# Patient Record
Sex: MALE | Race: WHITE | NOT HISPANIC OR LATINO | Employment: OTHER | URBAN - METROPOLITAN AREA
[De-identification: names, ages, dates, MRNs, and addresses within clinical notes are randomized per-mention and may not be internally consistent; named-entity substitution may affect disease eponyms.]

---

## 2017-01-30 ENCOUNTER — OFFICE VISIT - HEALTHEAST (OUTPATIENT)
Dept: CARDIOLOGY | Facility: CLINIC | Age: 67
End: 2017-01-30

## 2017-01-30 ENCOUNTER — AMBULATORY - HEALTHEAST (OUTPATIENT)
Dept: CARDIOLOGY | Facility: CLINIC | Age: 67
End: 2017-01-30

## 2017-01-30 DIAGNOSIS — I25.10 CORONARY ARTERY DISEASE INVOLVING NATIVE CORONARY ARTERY OF NATIVE HEART WITHOUT ANGINA PECTORIS: ICD-10-CM

## 2017-01-30 DIAGNOSIS — I10 ESSENTIAL HYPERTENSION: ICD-10-CM

## 2017-01-30 DIAGNOSIS — E78.5 HYPERLIPIDEMIA LDL GOAL <70: ICD-10-CM

## 2017-01-30 DIAGNOSIS — I73.9 PVD (PERIPHERAL VASCULAR DISEASE) (H): ICD-10-CM

## 2017-01-30 ASSESSMENT — MIFFLIN-ST. JEOR: SCORE: 1804.99

## 2017-10-25 ENCOUNTER — RECORDS - HEALTHEAST (OUTPATIENT)
Dept: ADMINISTRATIVE | Facility: OTHER | Age: 67
End: 2017-10-25

## 2017-10-25 ENCOUNTER — AMBULATORY - HEALTHEAST (OUTPATIENT)
Dept: CARDIOLOGY | Facility: CLINIC | Age: 67
End: 2017-10-25

## 2017-10-30 ENCOUNTER — OFFICE VISIT - HEALTHEAST (OUTPATIENT)
Dept: CARDIOLOGY | Facility: CLINIC | Age: 67
End: 2017-10-30

## 2017-10-30 DIAGNOSIS — I25.10 CAD (CORONARY ARTERY DISEASE): ICD-10-CM

## 2017-10-30 DIAGNOSIS — R00.1 BRADYCARDIA: ICD-10-CM

## 2017-10-30 RX ORDER — LORAZEPAM 0.5 MG
2000 TABLET ORAL DAILY
Refills: 3 | Status: SHIPPED | COMMUNITY
Start: 2017-09-15

## 2017-10-30 RX ORDER — FUROSEMIDE 20 MG
2 TABLET ORAL DAILY
Refills: 9 | Status: ON HOLD | COMMUNITY
Start: 2017-10-20 | End: 2023-01-01

## 2017-10-30 ASSESSMENT — MIFFLIN-ST. JEOR: SCORE: 1782.31

## 2017-10-31 ENCOUNTER — HOSPITAL ENCOUNTER (OUTPATIENT)
Dept: CARDIOLOGY | Facility: HOSPITAL | Age: 67
Discharge: HOME | End: 2017-10-31
Attending: INTERNAL MEDICINE

## 2017-10-31 DIAGNOSIS — R00.1 BRADYCARDIA: ICD-10-CM

## 2017-11-06 ENCOUNTER — COMMUNICATION - HEALTHEAST (OUTPATIENT)
Dept: CARDIOLOGY | Facility: CLINIC | Age: 67
End: 2017-11-06

## 2017-11-06 DIAGNOSIS — I25.10 CAD (CORONARY ARTERY DISEASE): ICD-10-CM

## 2017-11-28 ENCOUNTER — RECORDS - HEALTHEAST (OUTPATIENT)
Dept: LAB | Facility: CLINIC | Age: 67
End: 2017-11-28

## 2017-11-28 LAB
ALT SERPL W P-5'-P-CCNC: 15 U/L (ref 0–45)
CHOLEST SERPL-MCNC: 164 MG/DL
FASTING STATUS PATIENT QL REPORTED: YES
HDLC SERPL-MCNC: 41 MG/DL
LDLC SERPL CALC-MCNC: 98 MG/DL
TRIGL SERPL-MCNC: 125 MG/DL

## 2017-11-29 ENCOUNTER — AMBULATORY - HEALTHEAST (OUTPATIENT)
Dept: CARDIOLOGY | Facility: CLINIC | Age: 67
End: 2017-11-29

## 2018-01-31 ENCOUNTER — AMBULATORY - HEALTHEAST (OUTPATIENT)
Dept: CARDIOLOGY | Facility: CLINIC | Age: 68
End: 2018-01-31

## 2018-01-31 DIAGNOSIS — I25.10 CAD (CORONARY ARTERY DISEASE): ICD-10-CM

## 2018-05-31 ENCOUNTER — RECORDS - HEALTHEAST (OUTPATIENT)
Dept: LAB | Facility: CLINIC | Age: 68
End: 2018-05-31

## 2018-05-31 ENCOUNTER — COMMUNICATION - HEALTHEAST (OUTPATIENT)
Dept: ADMINISTRATIVE | Facility: CLINIC | Age: 68
End: 2018-05-31

## 2018-05-31 LAB
ANION GAP SERPL CALCULATED.3IONS-SCNC: 11 MMOL/L (ref 5–18)
BUN SERPL-MCNC: 26 MG/DL (ref 8–22)
CALCIUM SERPL-MCNC: 9.3 MG/DL (ref 8.5–10.5)
CHLORIDE BLD-SCNC: 101 MMOL/L (ref 98–107)
CO2 SERPL-SCNC: 25 MMOL/L (ref 22–31)
CREAT SERPL-MCNC: 1.88 MG/DL (ref 0.7–1.3)
GFR SERPL CREATININE-BSD FRML MDRD: 36 ML/MIN/1.73M2
GLUCOSE BLD-MCNC: 136 MG/DL (ref 70–125)
POTASSIUM BLD-SCNC: 4.7 MMOL/L (ref 3.5–5)
SODIUM SERPL-SCNC: 137 MMOL/L (ref 136–145)
TSH SERPL DL<=0.005 MIU/L-ACNC: 1.66 UIU/ML (ref 0.3–5)

## 2018-06-01 LAB — VZV IGG SER QL IA: POSITIVE

## 2018-07-31 ENCOUNTER — RECORDS - HEALTHEAST (OUTPATIENT)
Dept: ADMINISTRATIVE | Facility: OTHER | Age: 68
End: 2018-07-31

## 2018-07-31 ENCOUNTER — AMBULATORY - HEALTHEAST (OUTPATIENT)
Dept: CARDIOLOGY | Facility: CLINIC | Age: 68
End: 2018-07-31

## 2018-08-01 ENCOUNTER — OFFICE VISIT - HEALTHEAST (OUTPATIENT)
Dept: CARDIOLOGY | Facility: CLINIC | Age: 68
End: 2018-08-01

## 2018-08-01 DIAGNOSIS — I25.10 CORONARY ARTERY DISEASE INVOLVING NATIVE CORONARY ARTERY OF NATIVE HEART WITHOUT ANGINA PECTORIS: ICD-10-CM

## 2018-08-01 DIAGNOSIS — R09.89 CAROTID BRUIT: ICD-10-CM

## 2018-08-01 DIAGNOSIS — I10 ESSENTIAL HYPERTENSION: ICD-10-CM

## 2018-08-01 DIAGNOSIS — I73.9 PVD (PERIPHERAL VASCULAR DISEASE) (H): ICD-10-CM

## 2018-08-01 DIAGNOSIS — E78.5 HYPERLIPIDEMIA LDL GOAL <70: ICD-10-CM

## 2018-08-01 ASSESSMENT — MIFFLIN-ST. JEOR: SCORE: 1746.02

## 2018-08-02 ENCOUNTER — HOSPITAL ENCOUNTER (OUTPATIENT)
Dept: ULTRASOUND IMAGING | Facility: HOSPITAL | Age: 68
Discharge: HOME | End: 2018-08-02
Attending: INTERNAL MEDICINE

## 2018-08-02 ENCOUNTER — COMMUNICATION - HEALTHEAST (OUTPATIENT)
Dept: CARDIOLOGY | Facility: CLINIC | Age: 68
End: 2018-08-02

## 2018-08-02 ENCOUNTER — AMBULATORY - HEALTHEAST (OUTPATIENT)
Dept: VASCULAR SURGERY | Facility: CLINIC | Age: 68
End: 2018-08-02

## 2018-08-02 DIAGNOSIS — I25.10 CAD (CORONARY ARTERY DISEASE): ICD-10-CM

## 2018-08-02 DIAGNOSIS — R09.89 CAROTID BRUIT: ICD-10-CM

## 2018-08-02 DIAGNOSIS — I73.9 PVD (PERIPHERAL VASCULAR DISEASE) (H): ICD-10-CM

## 2018-11-29 ENCOUNTER — RECORDS - HEALTHEAST (OUTPATIENT)
Dept: LAB | Facility: CLINIC | Age: 68
End: 2018-11-29

## 2018-11-29 LAB
ANION GAP SERPL CALCULATED.3IONS-SCNC: 9 MMOL/L (ref 5–18)
BUN SERPL-MCNC: 30 MG/DL (ref 8–22)
CALCIUM SERPL-MCNC: 9.2 MG/DL (ref 8.5–10.5)
CHLORIDE BLD-SCNC: 101 MMOL/L (ref 98–107)
CHOLEST SERPL-MCNC: 152 MG/DL
CO2 SERPL-SCNC: 28 MMOL/L (ref 22–31)
CREAT SERPL-MCNC: 2.04 MG/DL (ref 0.7–1.3)
FASTING STATUS PATIENT QL REPORTED: NORMAL
GFR SERPL CREATININE-BSD FRML MDRD: 33 ML/MIN/1.73M2
GLUCOSE BLD-MCNC: 175 MG/DL (ref 70–125)
HDLC SERPL-MCNC: 48 MG/DL
LDLC SERPL CALC-MCNC: 83 MG/DL
POTASSIUM BLD-SCNC: 4.4 MMOL/L (ref 3.5–5)
SODIUM SERPL-SCNC: 138 MMOL/L (ref 136–145)
TRIGL SERPL-MCNC: 103 MG/DL

## 2018-11-30 LAB — 25(OH)D3 SERPL-MCNC: 37.3 NG/ML (ref 30–80)

## 2019-01-10 ENCOUNTER — COMMUNICATION - HEALTHEAST (OUTPATIENT)
Dept: CARDIOLOGY | Facility: CLINIC | Age: 69
End: 2019-01-10

## 2019-01-10 DIAGNOSIS — I25.10 CAD (CORONARY ARTERY DISEASE): ICD-10-CM

## 2019-05-28 ENCOUNTER — RECORDS - HEALTHEAST (OUTPATIENT)
Dept: LAB | Facility: CLINIC | Age: 69
End: 2019-05-28

## 2019-05-28 LAB
ANION GAP SERPL CALCULATED.3IONS-SCNC: 10 MMOL/L (ref 5–18)
BUN SERPL-MCNC: 39 MG/DL (ref 8–22)
CALCIUM SERPL-MCNC: 9.5 MG/DL (ref 8.5–10.5)
CHLORIDE BLD-SCNC: 104 MMOL/L (ref 98–107)
CHOLEST SERPL-MCNC: 141 MG/DL
CO2 SERPL-SCNC: 26 MMOL/L (ref 22–31)
CREAT SERPL-MCNC: 2.34 MG/DL (ref 0.7–1.3)
FASTING STATUS PATIENT QL REPORTED: NORMAL
GFR SERPL CREATININE-BSD FRML MDRD: 28 ML/MIN/1.73M2
GLUCOSE BLD-MCNC: 98 MG/DL (ref 70–125)
HDLC SERPL-MCNC: 41 MG/DL
IRON SATN MFR SERPL: 25 % (ref 20–50)
IRON SERPL-MCNC: 73 UG/DL (ref 42–175)
LDLC SERPL CALC-MCNC: 73 MG/DL
MAGNESIUM SERPL-MCNC: 2 MG/DL (ref 1.8–2.6)
POTASSIUM BLD-SCNC: 4.1 MMOL/L (ref 3.5–5)
SODIUM SERPL-SCNC: 140 MMOL/L (ref 136–145)
TIBC SERPL-MCNC: 289 UG/DL (ref 313–563)
TRANSFERRIN SERPL-MCNC: 231 MG/DL (ref 212–360)
TRIGL SERPL-MCNC: 137 MG/DL

## 2019-08-27 ENCOUNTER — COMMUNICATION - HEALTHEAST (OUTPATIENT)
Dept: CARDIOLOGY | Facility: CLINIC | Age: 69
End: 2019-08-27

## 2019-08-27 DIAGNOSIS — I25.10 CAD (CORONARY ARTERY DISEASE): ICD-10-CM

## 2019-08-29 ENCOUNTER — RECORDS - HEALTHEAST (OUTPATIENT)
Dept: ADMINISTRATIVE | Facility: OTHER | Age: 69
End: 2019-08-29

## 2019-08-29 ENCOUNTER — AMBULATORY - HEALTHEAST (OUTPATIENT)
Dept: CARDIOLOGY | Facility: CLINIC | Age: 69
End: 2019-08-29

## 2019-09-06 ENCOUNTER — OFFICE VISIT - HEALTHEAST (OUTPATIENT)
Dept: CARDIOLOGY | Facility: CLINIC | Age: 69
End: 2019-09-06

## 2019-09-06 DIAGNOSIS — I25.10 CAD (CORONARY ARTERY DISEASE): ICD-10-CM

## 2019-09-06 ASSESSMENT — MIFFLIN-ST. JEOR: SCORE: 1768.7

## 2019-09-19 ENCOUNTER — HOSPITAL ENCOUNTER (OUTPATIENT)
Dept: NUCLEAR MEDICINE | Facility: HOSPITAL | Age: 69
Discharge: HOME | End: 2019-09-19
Attending: INTERNAL MEDICINE

## 2019-09-19 ENCOUNTER — HOSPITAL ENCOUNTER (OUTPATIENT)
Dept: CARDIOLOGY | Facility: HOSPITAL | Age: 69
Discharge: HOME | End: 2019-09-19
Attending: INTERNAL MEDICINE

## 2019-09-19 DIAGNOSIS — I25.10 CAD (CORONARY ARTERY DISEASE): ICD-10-CM

## 2019-09-19 LAB
CV STRESS CURRENT BP HE: NORMAL
CV STRESS CURRENT HR HE: 61
CV STRESS CURRENT HR HE: 62
CV STRESS CURRENT HR HE: 64
CV STRESS CURRENT HR HE: 69
CV STRESS CURRENT HR HE: 69
CV STRESS CURRENT HR HE: 70
CV STRESS CURRENT HR HE: 71
CV STRESS CURRENT HR HE: 71
CV STRESS CURRENT HR HE: 72
CV STRESS CURRENT HR HE: 72
CV STRESS CURRENT HR HE: 73
CV STRESS CURRENT HR HE: 77
CV STRESS CURRENT HR HE: 77
CV STRESS CURRENT HR HE: 78
CV STRESS DEVIATION TIME HE: NORMAL
CV STRESS ECHO PERCENT HR HE: NORMAL
CV STRESS EXERCISE STAGE HE: NORMAL
CV STRESS FINAL RESTING BP HE: NORMAL
CV STRESS FINAL RESTING HR HE: 72
CV STRESS MAX HR HE: 78
CV STRESS MAX TREADMILL GRADE HE: 0
CV STRESS MAX TREADMILL SPEED HE: 0
CV STRESS PEAK DIA BP HE: NORMAL
CV STRESS PEAK SYS BP HE: NORMAL
CV STRESS PHASE HE: NORMAL
CV STRESS PROTOCOL HE: NORMAL
CV STRESS RESTING PT POSITION HE: NORMAL
CV STRESS ST DEVIATION AMOUNT HE: NORMAL
CV STRESS ST DEVIATION ELEVATION HE: NORMAL
CV STRESS ST EVELATION AMOUNT HE: NORMAL
CV STRESS TEST TYPE HE: NORMAL
CV STRESS TOTAL STAGE TIME MIN 1 HE: NORMAL
NUC STRESS EJECTION FRACTION: 52 %
STRESS ECHO BASELINE BP: NORMAL
STRESS ECHO BASELINE HR: 60
STRESS ECHO CALCULATED PERCENT HR: 52 %
STRESS ECHO LAST STRESS BP: NORMAL
STRESS ECHO LAST STRESS HR: 73

## 2019-11-12 ENCOUNTER — RECORDS - HEALTHEAST (OUTPATIENT)
Dept: LAB | Facility: CLINIC | Age: 69
End: 2019-11-12

## 2019-11-13 LAB — BACTERIA SPEC CULT: NO GROWTH

## 2019-11-29 ENCOUNTER — RECORDS - HEALTHEAST (OUTPATIENT)
Dept: LAB | Facility: CLINIC | Age: 69
End: 2019-11-29

## 2019-11-29 LAB
ANION GAP SERPL CALCULATED.3IONS-SCNC: 10 MMOL/L (ref 5–18)
BUN SERPL-MCNC: 37 MG/DL (ref 8–22)
CALCIUM SERPL-MCNC: 9.1 MG/DL (ref 8.5–10.5)
CHLORIDE BLD-SCNC: 102 MMOL/L (ref 98–107)
CHOLEST SERPL-MCNC: 156 MG/DL
CO2 SERPL-SCNC: 25 MMOL/L (ref 22–31)
CREAT SERPL-MCNC: 2.59 MG/DL (ref 0.7–1.3)
FASTING STATUS PATIENT QL REPORTED: ABNORMAL
GFR SERPL CREATININE-BSD FRML MDRD: 25 ML/MIN/1.73M2
GLUCOSE BLD-MCNC: 233 MG/DL (ref 70–125)
HDLC SERPL-MCNC: 39 MG/DL
LDLC SERPL CALC-MCNC: 94 MG/DL
POTASSIUM BLD-SCNC: 4.9 MMOL/L (ref 3.5–5)
PSA SERPL-MCNC: 0.9 NG/ML (ref 0–4.5)
SODIUM SERPL-SCNC: 137 MMOL/L (ref 136–145)
TRIGL SERPL-MCNC: 114 MG/DL
TSH SERPL DL<=0.005 MIU/L-ACNC: 2.28 UIU/ML (ref 0.3–5)

## 2020-03-31 ENCOUNTER — RECORDS - HEALTHEAST (OUTPATIENT)
Dept: ADMINISTRATIVE | Facility: OTHER | Age: 70
End: 2020-03-31

## 2020-03-31 ENCOUNTER — AMBULATORY - HEALTHEAST (OUTPATIENT)
Dept: CARDIOLOGY | Facility: CLINIC | Age: 70
End: 2020-03-31

## 2020-04-01 ENCOUNTER — OFFICE VISIT - HEALTHEAST (OUTPATIENT)
Dept: CARDIOLOGY | Facility: CLINIC | Age: 70
End: 2020-04-01

## 2020-04-01 DIAGNOSIS — E78.5 DYSLIPIDEMIA, GOAL LDL BELOW 70: ICD-10-CM

## 2020-04-01 DIAGNOSIS — I10 ESSENTIAL HYPERTENSION: ICD-10-CM

## 2020-04-01 DIAGNOSIS — I73.9 PVD (PERIPHERAL VASCULAR DISEASE) (H): ICD-10-CM

## 2020-04-01 DIAGNOSIS — I25.10 CORONARY ARTERY DISEASE INVOLVING NATIVE CORONARY ARTERY, ANGINA PRESENCE UNSPECIFIED, UNSPECIFIED WHETHER NATIVE OR TRANSPLANTED HEART: ICD-10-CM

## 2020-04-01 DIAGNOSIS — E78.5 HYPERLIPIDEMIA LDL GOAL <70: ICD-10-CM

## 2020-04-06 ENCOUNTER — RECORDS - HEALTHEAST (OUTPATIENT)
Dept: ADMINISTRATIVE | Facility: OTHER | Age: 70
End: 2020-04-06

## 2020-04-06 ENCOUNTER — AMBULATORY - HEALTHEAST (OUTPATIENT)
Dept: CARDIOLOGY | Facility: CLINIC | Age: 70
End: 2020-04-06

## 2020-04-07 ENCOUNTER — COMMUNICATION - HEALTHEAST (OUTPATIENT)
Dept: CARDIOLOGY | Facility: CLINIC | Age: 70
End: 2020-04-07

## 2020-04-09 ENCOUNTER — AMBULATORY - HEALTHEAST (OUTPATIENT)
Dept: VASCULAR SURGERY | Facility: CLINIC | Age: 70
End: 2020-04-09

## 2020-04-09 DIAGNOSIS — I73.9 PVD (PERIPHERAL VASCULAR DISEASE) (H): ICD-10-CM

## 2020-06-02 ENCOUNTER — RECORDS - HEALTHEAST (OUTPATIENT)
Dept: LAB | Facility: CLINIC | Age: 70
End: 2020-06-02

## 2020-06-02 LAB
ALBUMIN SERPL-MCNC: 3.2 G/DL (ref 3.5–5)
ALBUMIN UR-MCNC: ABNORMAL MG/DL
ANION GAP SERPL CALCULATED.3IONS-SCNC: 10 MMOL/L (ref 5–18)
APPEARANCE UR: CLEAR
BACTERIA #/AREA URNS HPF: ABNORMAL HPF
BILIRUB UR QL STRIP: NEGATIVE
BUN SERPL-MCNC: 40 MG/DL (ref 8–22)
CALCIUM SERPL-MCNC: 8.9 MG/DL (ref 8.5–10.5)
CHLORIDE BLD-SCNC: 100 MMOL/L (ref 98–107)
CHOLEST SERPL-MCNC: 135 MG/DL
CO2 SERPL-SCNC: 26 MMOL/L (ref 22–31)
COLOR UR AUTO: ABNORMAL
CREAT SERPL-MCNC: 2.6 MG/DL (ref 0.7–1.3)
CREAT UR-MCNC: 40.1 MG/DL
FASTING STATUS PATIENT QL REPORTED: NORMAL
GFR SERPL CREATININE-BSD FRML MDRD: 25 ML/MIN/1.73M2
GLUCOSE BLD-MCNC: 205 MG/DL (ref 70–125)
GLUCOSE UR STRIP-MCNC: NEGATIVE MG/DL
HDLC SERPL-MCNC: 42 MG/DL
HGB UR QL STRIP: NEGATIVE
KETONES UR STRIP-MCNC: NEGATIVE MG/DL
LDLC SERPL CALC-MCNC: 79 MG/DL
LEUKOCYTE ESTERASE UR QL STRIP: NEGATIVE
MAGNESIUM SERPL-MCNC: 2.2 MG/DL (ref 1.8–2.6)
MICROALBUMIN UR-MCNC: 34.45 MG/DL (ref 0–1.99)
MICROALBUMIN/CREAT UR: 859.1 MG/G
NITRATE UR QL: NEGATIVE
PH UR STRIP: 6 [PH] (ref 4.5–8)
PHOSPHATE SERPL-MCNC: 3.8 MG/DL (ref 2.5–4.5)
POTASSIUM BLD-SCNC: 4.3 MMOL/L (ref 3.5–5)
PTH-INTACT SERPL-MCNC: 74 PG/ML (ref 10–86)
RBC #/AREA URNS AUTO: ABNORMAL HPF
SODIUM SERPL-SCNC: 136 MMOL/L (ref 136–145)
SP GR UR STRIP: 1.01 (ref 1–1.03)
SQUAMOUS #/AREA URNS AUTO: ABNORMAL LPF
TRIGL SERPL-MCNC: 70 MG/DL
UROBILINOGEN UR STRIP-ACNC: ABNORMAL
WBC #/AREA URNS AUTO: ABNORMAL HPF

## 2020-06-21 ENCOUNTER — COMMUNICATION - HEALTHEAST (OUTPATIENT)
Dept: CARDIOLOGY | Facility: CLINIC | Age: 70
End: 2020-06-21

## 2020-06-21 DIAGNOSIS — I25.10 CAD (CORONARY ARTERY DISEASE): ICD-10-CM

## 2020-10-31 ENCOUNTER — COMMUNICATION - HEALTHEAST (OUTPATIENT)
Dept: CARDIOLOGY | Facility: CLINIC | Age: 70
End: 2020-10-31

## 2020-10-31 DIAGNOSIS — I25.10 CAD (CORONARY ARTERY DISEASE): ICD-10-CM

## 2020-12-17 ENCOUNTER — RECORDS - HEALTHEAST (OUTPATIENT)
Dept: LAB | Facility: CLINIC | Age: 70
End: 2020-12-17

## 2020-12-17 LAB
ANION GAP SERPL CALCULATED.3IONS-SCNC: 9 MMOL/L (ref 5–18)
BUN SERPL-MCNC: 38 MG/DL (ref 8–28)
CALCIUM SERPL-MCNC: 8.9 MG/DL (ref 8.5–10.5)
CHLORIDE BLD-SCNC: 99 MMOL/L (ref 98–107)
CHOLEST SERPL-MCNC: 155 MG/DL
CO2 SERPL-SCNC: 27 MMOL/L (ref 22–31)
CREAT SERPL-MCNC: 2.75 MG/DL (ref 0.7–1.3)
FASTING STATUS PATIENT QL REPORTED: NORMAL
GFR SERPL CREATININE-BSD FRML MDRD: 23 ML/MIN/1.73M2
GLUCOSE BLD-MCNC: 279 MG/DL (ref 70–125)
HDLC SERPL-MCNC: 50 MG/DL
LDLC SERPL CALC-MCNC: 84 MG/DL
POTASSIUM BLD-SCNC: 4.4 MMOL/L (ref 3.5–5)
SODIUM SERPL-SCNC: 135 MMOL/L (ref 136–145)
TRIGL SERPL-MCNC: 104 MG/DL
TSH SERPL DL<=0.005 MIU/L-ACNC: 1.95 UIU/ML (ref 0.3–5)

## 2021-03-09 ENCOUNTER — AMBULATORY - HEALTHEAST (OUTPATIENT)
Dept: NURSING | Facility: CLINIC | Age: 71
End: 2021-03-09

## 2021-03-30 ENCOUNTER — AMBULATORY - HEALTHEAST (OUTPATIENT)
Dept: NURSING | Facility: CLINIC | Age: 71
End: 2021-03-30

## 2021-05-17 ENCOUNTER — COMMUNICATION - HEALTHEAST (OUTPATIENT)
Dept: CARDIOLOGY | Facility: CLINIC | Age: 71
End: 2021-05-17

## 2021-05-17 DIAGNOSIS — I25.10 CAD (CORONARY ARTERY DISEASE): ICD-10-CM

## 2021-05-17 RX ORDER — METOPROLOL SUCCINATE 50 MG/1
50 TABLET, EXTENDED RELEASE ORAL DAILY
Qty: 90 TABLET | Refills: 0 | Status: SHIPPED | OUTPATIENT
Start: 2021-05-17 | End: 2021-07-06

## 2021-05-29 ENCOUNTER — RECORDS - HEALTHEAST (OUTPATIENT)
Dept: ADMINISTRATIVE | Facility: CLINIC | Age: 71
End: 2021-05-29

## 2021-05-30 VITALS — BODY MASS INDEX: 30.07 KG/M2 | WEIGHT: 222 LBS | HEIGHT: 72 IN

## 2021-05-31 ENCOUNTER — RECORDS - HEALTHEAST (OUTPATIENT)
Dept: ADMINISTRATIVE | Facility: CLINIC | Age: 71
End: 2021-05-31

## 2021-05-31 VITALS — BODY MASS INDEX: 29.39 KG/M2 | HEIGHT: 72 IN | WEIGHT: 217 LBS

## 2021-06-01 ENCOUNTER — RECORDS - HEALTHEAST (OUTPATIENT)
Dept: ADMINISTRATIVE | Facility: CLINIC | Age: 71
End: 2021-06-01

## 2021-06-01 ENCOUNTER — RECORDS - HEALTHEAST (OUTPATIENT)
Dept: ADMINISTRATIVE | Facility: OTHER | Age: 71
End: 2021-06-01

## 2021-06-01 VITALS — WEIGHT: 209 LBS | BODY MASS INDEX: 28.31 KG/M2 | HEIGHT: 72 IN

## 2021-06-01 NOTE — PATIENT INSTRUCTIONS - HE
Please try taking the Lisinopril at bedtime and let me know how the lightheaded symptoms are going.We are going to plan a non exercise nuclear stress test and I will be in touch with the results.We talked about referral to the vascular clinic.Please call if you wan to discuss further.My nurse is moises and her number is [phone]

## 2021-06-01 NOTE — PROGRESS NOTES
[facility] Progress Note    Assessment:  1.  Coronary artery disease with no anginal complaints but long-standing history of diabetes.  Coronary angiography after abnormal nuclear stress test as outlined November 2016.  We did talk about follow-up stress testing as he does have long-standing diabetes and remote history of bypass surgery and will plan Lexiscan nuclear stress testing and comment once available.  He would not be able to adequately walk on the treadmill.    2.  Hypertension with evidence for autonomic dysfunction.  Pressures have been variable but does report some lightheadedness during the early afternoon after he takes his morning medications.  I suggested he switch the lisinopril to bedtime.  He will continue with the current combination of medications monitoring his blood pressure updating me with any symptoms or changes.    3,Carotid artery disease.  Carotid ultrasound August 2018 with less than 50% stenosis on the right less than 50% stenosis on the left.  Recent lipids demonstrate improvement in LDL cholesterol now down to 73 on 40 mg of rosuvastatin.    4.  Peripheral vascular disease.  Abnormal TRAE.  I previously had referred him to the vascular clinic but he indicates that he is not currently interested.  We talked about the potential for limited ability for wound healing and at this point he is declining a referral to the vascular clinic but will keep me updated.  He does see a podiatrist periodically.      Plan: As outlined above with follow-up in 6 months    1. CAD (coronary artery disease)  metoprolol succinate (TOPROL-XL) 50 MG 24 hr tablet    NM Pharmacologic Stress Test         An After Visit Summary was printed and given to the patient.    Subjective:    Dakota Bauer is a 69 y.o. male who returned for a planned  follow up visit.  He reports that he is been feeling well.  He denies specific complaints of chest pain, shortness of breath, orthopnea or PND.  He does have  some lightheadedness primarily during the mid afternoon after he is taken his medications in the morning.  On occasion he will become lightheaded and have to sit down for short period of time but no syncope or near syncope.  He has been following with Dr. Bhatia from renal and I have reviewed his most recent notes from 2019 including laboratory studies.  In 2019 creatinine was 2.85 glucose 78.    He has a history of coronary artery disease with prior bypass surgery in  with three-vessel disease as outlined previously.  He did undergo coronary angiography 2016 demonstrated patent grafts and some disease in the  Target vessels with plan medical management.  Review of Systems:   General: WNL  Eyes: WNL  Ears/Nose/Throat: WNL  Lungs: WNL  Heart: WNL  Stomach: WNL  Bladder: WNL  Muscle/Joints: WNL  Skin: WNL  Nervous System: WNL  Mental Health: WNL     Blood: WNL      Problem List:    Patient Active Problem List   Diagnosis     CAD (coronary artery disease)     Hypertension     PVD (peripheral vascular disease) (H)     Hyperlipidemia LDL goal <70       Social History     Socioeconomic History     Marital status:      Spouse name: Not on file     Number of children: Not on file     Years of education: Not on file     Highest education level: Not on file   Occupational History     Not on file   Social Needs     Financial resource strain: Not on file     Food insecurity:     Worry: Not on file     Inability: Not on file     Transportation needs:     Medical: Not on file     Non-medical: Not on file   Tobacco Use     Smoking status: Former Smoker     Last attempt to quit: 10/10/1984     Years since quittin.9     Smokeless tobacco: Never Used   Substance and Sexual Activity     Alcohol use: Not on file     Drug use: Not on file     Sexual activity: Not on file   Lifestyle     Physical activity:     Days per week: Not on file     Minutes per session: Not on file     Stress: Not on file  "  Relationships     Social connections:     Talks on phone: Not on file     Gets together: Not on file     Attends Jainism service: Not on file     Active member of club or organization: Not on file     Attends meetings of clubs or organizations: Not on file     Relationship status: Not on file     Intimate partner violence:     Fear of current or ex partner: Not on file     Emotionally abused: Not on file     Physically abused: Not on file     Forced sexual activity: Not on file   Other Topics Concern     Not on file   Social History Narrative     Not on file       Family History   Problem Relation Age of Onset     Coronary artery disease Father      Coronary artery disease Brother        Current Outpatient Medications   Medication Sig Dispense Refill     ALPRAZolam (XANAX) 0.5 MG tablet Take 0.5 mg by mouth daily.        aspirin 81 MG EC tablet Take 81 mg by mouth daily.       BD INSULIN SYRINGE ULTRA-FINE 0.3 mL 30 gauge x 1/2\" Syrg USE 4 TIMES A DAY WITH INSULIN  3     furosemide (LASIX) 20 MG tablet Take 20 mg by mouth daily.  9     gabapentin (NEURONTIN) 300 MG capsule Take 300 mg by mouth 3 (three) times a day.              HUMALOG 100 unit/mL injection Inject under the skin 3 (three) times a day before meals.              insulin glargine (LANTUS) 100 unit/mL injection Inject 30 Units under the skin bedtime.       levothyroxine (SYNTHROID, LEVOTHROID) 150 MCG tablet Take 150 mcg by mouth daily.       lisinopril (PRINIVIL,ZESTRIL) 40 MG tablet Take 40 mg by mouth daily.       metoprolol succinate (TOPROL-XL) 50 MG 24 hr tablet TAKE 1 TABLET DAILY 90 tablet 4     multivitamin with iron (ONE DAILY WITH IRON) Tab tablet Take 1 tablet by mouth daily.       omega-3 fatty acids-vitamin E (FISH OIL) 1,000 mg cap Take 1,000 mg by mouth daily.       rosuvastatin (CRESTOR) 40 MG tablet Take 40 mg by mouth bedtime.       TRIAMCINOLONE ACETONIDE (NASACORT NASL) 1 spray into each nostril daily.       VITAMIN D3 2,000 " unit capsule Take 2,000 Units by mouth daily.  3     insulin regular (NOVOLIN R) 100 unit/mL injection as directed up to 35 units       ONETOUCH ULTRA TEST strips TEST AS DIRECTED 4 TIMES A DAY AND AS NEEDED.  3     No current facility-administered medications for this visit.        Objective:     /66 (Patient Site: Right Arm, Patient Position: Sitting, Cuff Size: Adult Large)   Pulse (!) 52   Resp 16   Ht 6' (1.829 m)   Wt 214 lb (97.1 kg)   BMI 29.02 kg/m    214 lb (97.1 kg)       Physical Exam:    GENERAL APPEARANCE: alert, no apparent distress  HEENT: no scleral icterus or xanthelasma  NECK: jugular venous pressure within normal limits  CHEST: symmetric, the lungs are clear to auscultation  CARDIOVASCULAR: regular rhythm systolic murmur, S4, right carotid bruit  Abdomen: No Organomegaly, masses, bruits, or tenderness. Bowels sounds are present      EXTREMITIES: no cyanosis, clubbing or edema, diminished pulses in the dorsalis pedis and posterior tibial distribution.    Cardiac Testing:  Cardiac Testing:  . ANKLE-BRACHIAL INDICES   2. BILATERAL LOWER EXTREMITY ARTERIAL DUPLEX ULTRASOUND  11/4/2016 2:28 PM     INDICATIONS: Leg pain.  TECHNIQUE: Arterial duplex ultrasound with gray-scale images and color-flow Doppler and spectral analysis.  COMPARISONS: 09/10/2012.     FINDINGS: The distal left posterior tibial and dorsalis pedis arteries and the distal right dorsalis pedis artery are incompressible. The index for the right posterior tibial artery is markedly abnormal, 0.51.     Digital pressures predict adequate wound healing potential on the right, 48 mm Hg, and marginal wound healing potential on the left, 37 mm Hg.     Bilateral lower extremity arterial duplex ultrasound demonstrates essentially normal multiphasic flow from the external iliac to the popliteal arteries bilaterally. On the left, there is some fluctuation of flow velocities in the superficial femoral   artery, suggesting at least  moderate stenosis. There are abnormal monophasic waveforms in the distal posterior tibial and dorsalis pedis arteries bilaterally, suggesting trifurcation disease.     CONCLUSIONS:   1.  There is loss of compliance and incompressibility distally, so accurate ankle-brachial indices cannot be obtained. The measured index for the right posterior tibial artery is markedly abnormal.  2.  Abnormal monophasic waveforms in the distal posterior tibial and dorsalis pedis arteries bilaterally, suggesting significant trifurcation disease.  3.  The measured digital pressures predict adequate wound healing potential on the right and marginal wound healing potential on the left.         Procedure Type       Diagnostic procedure:Left Heart Catheterization , Venous Graft   Catheterization, LIMA Graft Catheterization, Coronary   Angiogram       Conclusions       Procedure Summary   Known CAD, presenting with occasional CP and abnormal stress   test       LM 30% mid   LAD occluded proximally   LCx occluded proximally   Ramus 60% proximal stenosis, small caliber vessel with diffuse   disease   RCA 50% distal stenosis feeding small caliber PDA       BENITEZ to LAD patent, good distal LAD target   SVG to diagonal patent, mild disease in target diagonal   SVG to OM patent, diffusely diseased target vessel with 70%   stenosis, affecting retrograde filling into mid Cx       EDP 11       Overall, diffuse CAD, but patent grafts. Potential areas for   PCI would include ostial ramus and  of Cx to improve flow   into mid Cx. However, the ramus is a small caliber vessel and   the ostial Cx is heavily calcified, therefore would reserve   PCI if he has significant increase in symptoms, and failing   medical therapy       Recommendations       Cont risk factor control and medical management       Signatures       Electronically signed by SHYAM ELLIS MD       Lab Results:    Lab Results   Component Value Date     05/28/2019    K 4.1  05/28/2019     05/28/2019    CO2 26 05/28/2019    BUN 39 (H) 05/28/2019    CREATININE 2.34 (H) 05/28/2019    CALCIUM 9.5 05/28/2019     Lab Results   Component Value Date    CHOL 141 05/28/2019    TRIG 137 05/28/2019    HDL 41 05/28/2019     No results found for: BNP  Creatinine (mg/dL)   Date Value   05/28/2019 2.34 (H)   11/29/2018 2.04 (H)   05/31/2018 1.88 (H)   11/28/2017 1.90 (H)     LDL Calculated (mg/dL)   Date Value   05/28/2019 73   11/29/2018 83   11/28/2017 98     Lab Results   Component Value Date    WBC 6.2 11/25/2016    HGB 14.7 11/25/2016    HCT 44.7 11/25/2016    MCV 89 11/25/2016     11/25/2016               This note has been dictated using voice recognition software. Any grammatical or context distortions are unintentional and inherent to the software.      Robinson Chopra M.D., F.A.C.C.  [facility]  [phone]

## 2021-06-02 ENCOUNTER — RECORDS - HEALTHEAST (OUTPATIENT)
Dept: ADMINISTRATIVE | Facility: OTHER | Age: 71
End: 2021-06-02

## 2021-06-02 ENCOUNTER — OFFICE VISIT - HEALTHEAST (OUTPATIENT)
Dept: CARDIOLOGY | Facility: CLINIC | Age: 71
End: 2021-06-02

## 2021-06-02 ENCOUNTER — RECORDS - HEALTHEAST (OUTPATIENT)
Dept: CARDIOLOGY | Facility: CLINIC | Age: 71
End: 2021-06-02

## 2021-06-02 DIAGNOSIS — E78.5 HYPERLIPIDEMIA LDL GOAL <70: ICD-10-CM

## 2021-06-02 DIAGNOSIS — I25.10 CAD (CORONARY ARTERY DISEASE): ICD-10-CM

## 2021-06-02 DIAGNOSIS — I10 ESSENTIAL HYPERTENSION: ICD-10-CM

## 2021-06-02 LAB
ALBUMIN SERPL-MCNC: 3.5 G/DL (ref 3.5–5)
ALP SERPL-CCNC: 56 U/L (ref 45–120)
ALT SERPL W P-5'-P-CCNC: 17 U/L (ref 0–45)
ANION GAP SERPL CALCULATED.3IONS-SCNC: 10 MMOL/L (ref 5–18)
AST SERPL W P-5'-P-CCNC: 25 U/L (ref 0–40)
ATRIAL RATE - MUSE: 52 BPM
BILIRUB SERPL-MCNC: 0.8 MG/DL (ref 0–1)
BUN SERPL-MCNC: 41 MG/DL (ref 8–28)
CALCIUM SERPL-MCNC: 8.7 MG/DL (ref 8.5–10.5)
CHLORIDE BLD-SCNC: 100 MMOL/L (ref 98–107)
CHOLEST SERPL-MCNC: 131 MG/DL
CO2 SERPL-SCNC: 25 MMOL/L (ref 22–31)
CREAT SERPL-MCNC: 2.78 MG/DL (ref 0.7–1.3)
DIASTOLIC BLOOD PRESSURE - MUSE: NORMAL
FASTING STATUS PATIENT QL REPORTED: NO
GFR SERPL CREATININE-BSD FRML MDRD: 23 ML/MIN/1.73M2
GLUCOSE BLD-MCNC: 189 MG/DL (ref 70–125)
HDLC SERPL-MCNC: 43 MG/DL
INTERPRETATION ECG - MUSE: NORMAL
LDLC SERPL CALC-MCNC: 68 MG/DL
P AXIS - MUSE: 41 DEGREES
POTASSIUM BLD-SCNC: 4.1 MMOL/L (ref 3.5–5)
PR INTERVAL - MUSE: 186 MS
PROT SERPL-MCNC: 6.8 G/DL (ref 6–8)
QRS DURATION - MUSE: 124 MS
QT - MUSE: 442 MS
QTC - MUSE: 411 MS
R AXIS - MUSE: -49 DEGREES
SODIUM SERPL-SCNC: 135 MMOL/L (ref 136–145)
SYSTOLIC BLOOD PRESSURE - MUSE: NORMAL
T AXIS - MUSE: 0 DEGREES
TRIGL SERPL-MCNC: 99 MG/DL
TROPONIN I SERPL-MCNC: 0.13 NG/ML (ref 0–0.29)
VENTRICULAR RATE- MUSE: 52 BPM

## 2021-06-02 RX ORDER — NITROGLYCERIN 0.4 MG/1
0.4 TABLET SUBLINGUAL EVERY 5 MIN PRN
Qty: 25 TABLET | Refills: 3 | Status: SHIPPED | OUTPATIENT
Start: 2021-06-02 | End: 2023-02-13

## 2021-06-02 ASSESSMENT — MIFFLIN-ST. JEOR: SCORE: 1818.59

## 2021-06-03 VITALS
RESPIRATION RATE: 16 BRPM | HEART RATE: 52 BPM | BODY MASS INDEX: 28.99 KG/M2 | WEIGHT: 214 LBS | HEIGHT: 72 IN | SYSTOLIC BLOOD PRESSURE: 152 MMHG | DIASTOLIC BLOOD PRESSURE: 66 MMHG

## 2021-06-04 VITALS
DIASTOLIC BLOOD PRESSURE: 68 MMHG | WEIGHT: 208 LBS | HEART RATE: 64 BPM | BODY MASS INDEX: 28.21 KG/M2 | SYSTOLIC BLOOD PRESSURE: 141 MMHG

## 2021-06-06 ENCOUNTER — HEALTH MAINTENANCE LETTER (OUTPATIENT)
Age: 71
End: 2021-06-06

## 2021-06-07 NOTE — TELEPHONE ENCOUNTER
----- Message from Karina Leo sent at 4/7/2020  1:33 PM CDT -----  General phone call:  WIFE CALLING, PATIENT IS AT [facility] RIGHT NOW, AMPUTATE PART OF BIG TOE. HE IS ALSO TALKING WITH VASCULAR AT THIS TIME. BLOOD FLOW IS 1.5 TO HIS FEET AT THIS TIME.  PLEASE CALL    Caller: WIFE, VADIM HUGO  Primary cardiologist: DR BENAVIDES  Detailed reason for call: SEE ABOVE  New or active symptoms? SEE ABOVE  Best phone number: [phone]  Best time to contact: ANY TIME  Ok to leave a detailed message? YES  Device?     Additional Info:

## 2021-06-07 NOTE — PATIENT INSTRUCTIONS - HE
It was nice to visit with you by telephone.  And pleased to hear you are not having any specific heart related symptoms.  You did tell me about a nonhealing you are on your big toe and that you have been seeing a podiatrist.  I did send a note to  vascular asking them to coordinate a visit with you.  Please let me know if you do not hear back from them.  I also have requested Dr. Bhatia's recent notes and laboratory studies.  I would like to follow-up in 3 to 4 months with you but asked that you call with any questions or concerns.My nurse is Clari and her number is [phone]

## 2021-06-07 NOTE — TELEPHONE ENCOUNTER
They had asked that I help arrange a vascular consult per the podiatrist, looks like that was arranged  mdg

## 2021-06-07 NOTE — PROGRESS NOTES
"Dakota Bauer is a 69 y.o. male who is being evaluated via a billable telephone visit.      The patient has been notified of following:     \"This telephone visit will be conducted via a call between you and your physician/provider. We have found that certain health care needs can be provided without the need for a physical exam.  This service lets us provide the care you need with a short phone conversation.  If a prescription is necessary we can send it directly to your pharmacy.  If lab work is needed we can place an order for that and you can then stop by our lab to have the test done at a later time.    If during the course of the call the physician/provider feels a telephone visit is not appropriate, you will not be charged for this service.\"     Patient has given verbal consent to a Telephone visit? Yes    Dakota Bauer complains of    Chief Complaint   Patient presents with     Follow-up       I have reviewed and updated the patient's Past Medical History, Social History, Family History and Medication List.    ALLERGIES  Hydrochlorothiazide and Levofloxacin    Additional provider notes: I had the opportunity to visit with Dakota by telephone today in lieu of an office visit secondary to concerns related to the Covid virus.  I have reviewed recent notes including a note from his primary care provider in February 2020 when he presented with symptoms of bronchitis which he states has resolved in the interim.  He tells me today that he is not had any specific complaints of chest pain or shortness of breath.  He has had a chronic difficulty with orthostatic hypotension but he states that this is been under reasonable control.  His blood pressures have trended in the 1 30-1 40 systolic range and in the lower has resulted in more symptomatic dizziness.  He has a history of autonomic dysfunction likely related to his diabetes.  He tells me he saw Dr. Stanley a few months ago and I will request his note and " laboratory studies.    He also tells me that he has had a nonhealing sore on his left big toe and has been seen by Dr. Monroe from podiatry.  He tells me that Dr. Mathis suggested that he be seen by vascular and I have made a request for their consultation.  In 2016 he did have serial studies of his legs that demonstrated loss of compliance and incompressibility distally with abnormal right posterior tibial artery pulses and abnormal pulses on the left.  At that time it was recommended that he be seen by my colleague Dr Fuentes but he was reluctant to make additional visits at that time.    He has a history of coronary artery disease with coronary angiogram in November 2016 most recently.  He had bypass surgery in 2005.  Angiogram demonstrated patent LIMA to the LAD, patent vein graft to a diagonal with mild disease in the target vessel.  Vein graft to the obtuse marginal vessel with diffuse disease in the target vessel with medical management planned at that time.  He did have a nuclear stress test in summer 2019 that demonstrated no ischemic findings with prior areas of infarction and mild reduction in left ventricular function.          Date of      11/25/2016 08:50   Study        AM     Procedure      Procedure Type      Diagnostic procedure:Left Heart Catheterization , Venous Graft   Catheterization, LIMA Graft Catheterization, Coronary   Angiogram      Conclusions      Procedure Summary   Known CAD, presenting with occasional CP and abnormal stress   test      LM 30% mid   LAD occluded proximally   LCx occluded proximally   Ramus 60% proximal stenosis, small caliber vessel with diffuse   disease   RCA 50% distal stenosis feeding small caliber PDA      LIMA to LAD patent, good distal LAD target   SVG to diagonal patent, mild disease in target diagonal   SVG to OM patent, diffusely diseased target vessel with 70%   stenosis, affecting retrograde filling into mid Cx      EDP 11      Overall, diffuse CAD, but  patent grafts. Potential areas for   PCI would include ostial ramus and  of Cx to improve flow   into mid Cx. However, the ramus is a small caliber vessel and   the ostial Cx is heavily calcified, therefore would reserve   PCI if he has significant increase in symptoms, and failing   medical therapy      Recommendations      Cont risk factor control and medical management      Signatures      Electronically signed by SHYAM ELLIS MD   (Diagnostic Physician) on 2016 at 09:37 AM     NM Pharmacologic Stress Test   Order# [id number]   Reading physician: Juany Fuentes MD  Ordering physician: Robinson Chopra MD  Study date: 19    Patient Information     Patient Name   Dakota Bauer  MRN   [id number]  Sex   Male   1   1950 (69 y.o.)    EKG Scan      Stress Test Data - Scan on 19 8:29 AM by     Indications     CAD (coronary artery disease)    Conclusion       The pharmacologic nuclear stress test is abnormal.    There is a small area of a moderate degree of nontransmural infarction in the basilar inferolateral segment(s) and a small area of a mild degree of nontransmural infarction in the distal anteroseptal segment(s) of the left ventricle.    Severe resting hypertension    The left ventricular ejection fraction is 52%.    When compared to the images of 2016, there has been no significant change.     US Carotid Bilateral (Order [id number])   Imaging   Date: 2018  Department: [facility] Ultrasound  Released By: Radha Segovia  Authorizing: Robinson Chopra MD    Study Result     US CAROTID BILATERAL  2018 8:32 AM     INDICATION: Other specified symptoms and signs involving the circulatory and respiratory systems  TECHNIQUE: Duplex exam performed utilizing 2D gray-scale imaging, Doppler interrogation with color-flow and spectral waveform analysis.  COMPARISON: Carotid duplex, 11/15/2016     FINDINGS:  RIGHT: There is mild atheromatous plaque. Normal waveforms with no  significant stenosis.     LEFT: There is mild atheromatous plaque. Normal waveforms with no significant stenosis.     Both vertebral arteries and subclavian artery waveforms are normal.     VELOCITY CHART:   The following velocities were obtained in the RIGHT carotid system.  CCA: 76/9 cm/s  ICA: 122/29 cm/s  ECA: 574/21 cm/s  ICA/CCA: PS 1.6     The following velocities were obtained in the LEFT carotid system.  CCA: 89/13 cm/s  ICA: 127/14 cm/s  ECA: 174/14 cm/s  ICA/CCA: PS 1.4                           IMPRESSION:   CONCLUSION:  1.  RIGHT: Less than 50% stenosis of the right ICA  2.  LEFT: Less than 50% stenosis of the left ICA      Reviewed By     Nitza Cervantes RN on 11/11/2016 12:01    Robinson Chopra MD on 11/6/2016 18:01     Arterial Legs Bilateral Complete (Order [id number])   Imaging   Date: 11/4/2016  Department: [facility] Ultrasound  Released By: Alicia Miller [facility]  Authorizing: Robinson Chopra MD    Study Result     1. ANKLE-BRACHIAL INDICES   2. BILATERAL LOWER EXTREMITY ARTERIAL DUPLEX ULTRASOUND  11/4/2016 2:28 PM     INDICATIONS: Leg pain.  TECHNIQUE: Arterial duplex ultrasound with gray-scale images and color-flow Doppler and spectral analysis.  COMPARISONS: 09/10/2012.     FINDINGS: The distal left posterior tibial and dorsalis pedis arteries and the distal right dorsalis pedis artery are incompressible. The index for the right posterior tibial artery is markedly abnormal, 0.51.     Digital pressures predict adequate wound healing potential on the right, 48 mm Hg, and marginal wound healing potential on the left, 37 mm Hg.     Bilateral lower extremity arterial duplex ultrasound demonstrates essentially normal multiphasic flow from the external iliac to the popliteal arteries bilaterally. On the left, there is some fluctuation of flow velocities in the superficial femoral   artery, suggesting at least moderate stenosis. There are abnormal monophasic waveforms in the distal  posterior tibial and dorsalis pedis arteries bilaterally, suggesting trifurcation disease.     CONCLUSIONS:   1.  There is loss of compliance and incompressibility distally, so accurate ankle-brachial indices cannot be obtained. The measured index for the right posterior tibial artery is markedly abnormal.  2.  Abnormal monophasic waveforms in the distal posterior tibial and dorsalis pedis arteries bilaterally, suggesting significant trifurcation disease.  3.  The measured digital pressures predict adequate wound healing potential on the right and marginal wound healing potential on the left.        1.  Coronary artery disease.  No complaints of chest discomfort.  He states he has been active and doing chores around his home.  He did have a nuclear stress test as described September 2019 and will continue to monitor for symptoms and will continue with the current combination of medications.    2.  Hypertension.  Labile history of hypertension.  He is followed with Dr. Stanley related to chronic renal insufficiency.  He does have autonomic dysfunction and therefore we have not been overly aggressive with respect to lowering his blood pressure.  He tells me that in the interim symptoms have been stable.    3.  Nonhealing ulcer of his big toe.  He tells me today that he is been following with podiatry who wanted him to be seen in vascular.  He does have a ankle-brachial index from a number of years ago as outlined above.  Furl to vascular.  There has been some recent data with respect adding low-dose Xarelto and look forward to input from my vascular colleagues.    4.  History of carotid disease this was felt to be mild on the most recent carotid ultrasound from 2018.    5.  Risk factor modification.  Lipids from member 2019 finds his LDL to be higher than previous 94 from 73 although total cholesterol was 156.  Once things settle down from the virus I would like him to have repeat lipid studies.    Plan as  outlined above with follow-up in 3 to 4 months.    Phone call initiated 1:21 pm  Phone call ended 1:39 PM    Robinson Chopra MD

## 2021-06-08 NOTE — PROGRESS NOTES
[facility] Progress Note    Assessment:  1.  Coronary artery disease.  Complaints of anginal chest discomfort with recent coronary angiogram November 2015 as described.  No coronary intervention was performed and he is not describing anginal chest discomfort we'll continue to monitor with aggressive risk factor modification.    2.  Hypertension.  Autonomic dysfunction and orthostatic hypotension with improvement in symptoms of lightheadedness.  The tendency towards lower heartbeat on atenolol and we talked about whether we would consider Holter monitor.  At this time he is asymptomatic and his resting heart rate during my examination was 55 and he will continue to monitor.    3.  Hyperlipidemia.  Lipids from November 2016 finds an LDL cholesterol of 65 and triglycerides of 149    4.  Moderate carotid artery disease.  Ongoing aggressive risk factor modification.    5.  Recent initiation of CPAP.  He does have a cold back to the CPAP as soon as he can.        Plan:    1. Coronary artery disease involving native coronary artery of native heart without angina pectoris     2. Hyperlipidemia LDL goal <70     3. Essential hypertension     4. PVD (peripheral vascular disease)           An After Visit Summary was printed and given to the patient.    Subjective:    Dakota Bauer is a 66 y.o. male who returned for a planned  follow up visit.  He tells me today for the last few months she's been feeling better.  The dizzy symptoms are improved and in fact he discontinue the doxazosin a few weeks ago and has been monitoring his blood pressure.  He brings in blood pressure records for my review and blood pressures appear to be under reasonable control.  He monitors his blood pressure standing as he does have some autonomic dysfunction.    He tells me he has a cold-like symptom over the past few days but otherwise is doing well  Review of Systems:   General: WNL  Eyes: WNL  Ears/Nose/Throat: WNL  Lungs:  WNL  Heart: WNL  Stomach: WNL  Bladder: WNL  Muscle/Joints: WNL  Skin: WNL  Nervous System: WNL  Mental Health: WNL     Blood: WNL      Problem List:    Patient Active Problem List   Diagnosis     CAD (coronary artery disease)     Hypertension     PVD (peripheral vascular disease)     Hyperlipidemia LDL goal <70       Social History     Social History     Marital status:      Spouse name: N/A     Number of children: N/A     Years of education: N/A     Occupational History     Not on file.     Social History Main Topics     Smoking status: Former Smoker     Quit date: 10/10/1984     Smokeless tobacco: Not on file     Alcohol use Not on file     Drug use: Not on file     Sexual activity: Not on file     Other Topics Concern     Not on file     Social History Narrative       Family History   Problem Relation Age of Onset     Coronary artery disease Father      Coronary artery disease Brother        Current Outpatient Prescriptions   Medication Sig Dispense Refill     ALPRAZolam (XANAX) 0.5 MG tablet Take 0.5 mg by mouth daily.        aspirin 81 MG EC tablet Take 81 mg by mouth daily.       atenolol (TENORMIN) 100 MG tablet Take 100 mg by mouth daily.       gabapentin (NEURONTIN) 300 MG capsule Take 300 mg by mouth daily.       insulin glargine (LANTUS) 100 unit/mL injection Inject 30 Units under the skin bedtime.       insulin regular (NOVOLIN R) 100 unit/mL injection as directed up to 35 units       levothyroxine (SYNTHROID, LEVOTHROID) 150 MCG tablet Take 150 mcg by mouth daily.       lisinopril (PRINIVIL,ZESTRIL) 40 MG tablet Take 40 mg by mouth daily.       multivitamin with iron (ONE DAILY WITH IRON) Tab tablet Take 1 tablet by mouth daily.       omega-3 fatty acids-vitamin E (FISH OIL) 1,000 mg cap Take 1,000 mg by mouth daily.       rosuvastatin (CRESTOR) 40 MG tablet Take 40 mg by mouth bedtime.       TRIAMCINOLONE ACETONIDE (NASACORT NASL) 1 spray into each nostril daily.       doxazosin (CARDURA) 8 MG  tablet Take 8 mg by mouth bedtime.       No current facility-administered medications for this visit.        Objective:     Visit Vitals     /74 (Patient Site: Left Arm, Patient Position: Sitting, Cuff Size: Adult Large)     Pulse (!) 50     Resp 18     Ht 6' (1.829 m)     Wt 222 lb (100.7 kg)     SpO2 97%     BMI 30.11 kg/m2     222 lb (100.7 kg)   Pressure standing 146/68  Heart rate 55    Physical Exam:    GENERAL APPEARANCE: alert, no apparent distress  HEENT: no scleral icterus or xanthelasma  NECK: jugular venous pressure   CHEST: symmetric, the lungs are clear to auscultation  CARDIOVASCULAR: regular rhythm without murmur, click, or gallop; no carotid bruits  Abdomen: No Organomegaly, masses, bruits, or tenderness. Bowels sounds are present      EXTREMITIES: no cyanosis, clubbing or edema    Cardiac Testing:      Conclusions      Procedure Summary  Known CAD, presenting with occasional CP and abnormal stress  test      LM 30% mid  LAD occluded proximally  LCx occluded proximally  Ramus 60% proximal stenosis, small caliber vessel with diffuse  disease  RCA 50% distal stenosis feeding small caliber PDA      LIMA to LAD patent, good distal LAD target  SVG to diagonal patent, mild disease in target diagonal  SVG to OM patent, diffusely diseased target vessel with 70%  stenosis, affecting retrograde filling into mid Cx      EDP 11      Overall, diffuse CAD, but patent grafts. Potential areas for  PCI would include ostial ramus and  of Cx to improve flow  into mid Cx. However, the ramus is a small caliber vessel and  the ostial Cx is heavily calcified, therefore would reserve  PCI if he has significant increase in symptoms, and failing  medical therapy      Recommendations      Cont risk factor control and medical management      Signatures      Electronically signed by SHYAM ELLIS MD  (Diagnostic Physician) on 11/25/2016 at 09:37 AM    CONCLUSION:   1. Lexiscan stress nuclear study is abnormal; there  is a small area of inferolateral ischemia and a very small area of apical ischemia.  2. Normal left ventricular ejection fraction of 58%.     COMMENTS:   The patient is at high risk of future cardiac ischemic events based on perfusion imaging due to the presence of 2 separate ischemic defects.     The findings of this examination were communicated to Dr. Robinson Chopra in the Epic healthcare record.   Wolf Joya MD  11/7/2016 2:25 PM       Conclusions      Summary  Findings:  No reported chest discomfort. Exercise stopped secondary to leg pain  suggestive of claudication.  Reduced exercise tolerance.Resting hypertension.  Blunted heart rate response to exercise.  Abnormal stress ECG with 1mm ST depression in the inferior-lateral leads  with exercise and in recovery.  Definity contrast utilized.  Resting LV function appears normal.Resting LVEF estimated at 60%.  Post exercise LV function appears appropriately augmented without obvious  wall motion abnormality. The posterior wall is not optimally visualized.      Conclusions:  Non diagnostic exercise stress echocardiogram.Exercise heart rate achieved  significantly less than 85% of maximum predicted heart rate.ECG changes  with exercise are abnormal without obvious wall motion abnormality at the  workload achieved.Patient reported leg pain with exercise concerning for  claudication.  Consider additional evaluation given non diagnostic stress echocardiogram  and symptoms suggestive of claudication.    IMPRESSION:   CONCLUSION:  1. Moderate atheromatous plaque in the carotid arteries.  2. 50-69% stenosis proximal right ICA likely at the lower end of this range.  3. 50-69% stenosis proximal left ICA likely at the lower end of this range.  4. Note stenoses in both ECAs as well may explain the bruit especially on the right side.     Evaluation based on velocities and NASCET criteria.       Result History        Lab Results:    Lab Results   Component Value Date    NA  139 11/23/2016    K 4.8 11/23/2016     11/23/2016    CO2 26 11/23/2016    BUN 23 (H) 11/23/2016    CREATININE 1.58 (H) 11/23/2016    CALCIUM 8.6 11/23/2016     Lab Results   Component Value Date    CHOL 136 11/23/2016    TRIG 149 11/23/2016    HDL 41 11/23/2016     No results found for: BNP  CREATININE (mg/dL)   Date Value   11/23/2016 1.58 (H)   10/10/2016 1.54 (H)   05/27/2016 1.69 (H)   11/18/2015 1.70 (H)     LDL CALCULATED (mg/dL)   Date Value   11/23/2016 65   05/27/2016 67   11/18/2015 77     Lab Results   Component Value Date    WBC 6.2 11/25/2016    HGB 14.7 11/25/2016    HCT 44.7 11/25/2016    MCV 89 11/25/2016     11/25/2016               This note has been dictated using voice recognition software. Any grammatical or context distortions are unintentional and inherent to the software.      Robinson Chopra M.D., F.A.C.C.  [facility], [place]  [phone]

## 2021-06-13 NOTE — PROGRESS NOTES
[facility] Progress Note    Assessment:  1.  Coronary artery disease.  No complaints of anginal chest discomfort.  Coronary angiographic findings as outlined from November 2016.  Continue with aggressive risk factor modification.  Lipids from November 2016 finds an LDL at goal at 65.    2.  Hypertension.  He has evidence for autonomic dysfunction.  He checks his blood pressure in the standing position.  He is on atenolol and has underlying renal insufficiency and may wish to consider changing to metoprolol.  I am suggesting that we pursue a Holter monitor to further define rhythm over a 24-hour period of time.    3.  Moderate carotid stenosis bilateral.  Lower end of the range.  We will plan follow-up carotid ultrasound probably in 6 months.        Plan: As outlined above with follow-up in 6 months    1. CAD (coronary artery disease)  CANCELED: ECG Clinic - Today   2. Bradycardia  Holter Monitor         An After Visit Summary was printed and given to the patient.    Subjective:    Dakota Bauer is a 67 y.o. male who returned for a planned  follow up visit.  He reports that he has been feeling well.  He has not had any complaints of chest discomfort.  As outlined he has some autonomic dysfunction with variable blood pressure but lightheaded symptoms have been reasonable.  He will get mildly lightheaded upon standing.  He monitors his blood pressure in the standing position.  He had bypass surgery in 2005 three-vessel with coronary angiogram Feb 2016 demonstrating patent grafts as outlined below.  He reports that he saw Dr Bhatia 1 month ago with no medication changes from an antihypertensive standpoint.  He brings in blood pressure readings for the most part under reasonable control although his blood pressure cuff read a little bit higher here today and the blood pressures that we obtained.    Review of Systems:   General: WNL  Eyes: WNL  Ears/Nose/Throat: WNL  Lungs: WNL  Heart: WNL  Stomach:  "WNL  Bladder: WNL  Muscle/Joints: WNL  Skin: WNL  Nervous System: Dizziness  Mental Health: WNL     Blood: WNL      Problem List:    Patient Active Problem List   Diagnosis     CAD (coronary artery disease)     Hypertension     PVD (peripheral vascular disease)     Hyperlipidemia LDL goal <70       Social History     Social History     Marital status:      Spouse name: N/A     Number of children: N/A     Years of education: N/A     Occupational History     Not on file.     Social History Main Topics     Smoking status: Former Smoker     Quit date: 10/10/1984     Smokeless tobacco: Not on file     Alcohol use Not on file     Drug use: Not on file     Sexual activity: Not on file     Other Topics Concern     Not on file     Social History Narrative       Family History   Problem Relation Age of Onset     Coronary artery disease Father      Coronary artery disease Brother        Current Outpatient Prescriptions   Medication Sig Dispense Refill     ALPRAZolam (XANAX) 0.5 MG tablet Take 0.5 mg by mouth daily.        aspirin 81 MG EC tablet Take 81 mg by mouth daily.       atenolol (TENORMIN) 100 MG tablet Take 100 mg by mouth daily.       BD INSULIN SYRINGE ULTRA-FINE 0.3 mL 30 gauge x 1/2\" Syrg USE 4 TIMES A DAY WITH INSULIN  3     furosemide (LASIX) 20 MG tablet Take 20 mg by mouth daily.  9     gabapentin (NEURONTIN) 300 MG capsule Take 300 mg by mouth 2 (two) times a day.        insulin glargine (LANTUS) 100 unit/mL injection Inject 30 Units under the skin bedtime.       insulin regular (NOVOLIN R) 100 unit/mL injection as directed up to 35 units       levothyroxine (SYNTHROID, LEVOTHROID) 150 MCG tablet Take 150 mcg by mouth daily.       lisinopril (PRINIVIL,ZESTRIL) 40 MG tablet Take 40 mg by mouth daily.       multivitamin with iron (ONE DAILY WITH IRON) Tab tablet Take 1 tablet by mouth daily.       omega-3 fatty acids-vitamin E (FISH OIL) 1,000 mg cap Take 1,000 mg by mouth daily.       ONETOUCH ULTRA " TEST strips TEST AS DIRECTED 4 TIMES A DAY AND AS NEEDED.  3     rosuvastatin (CRESTOR) 40 MG tablet Take 40 mg by mouth bedtime.       TRIAMCINOLONE ACETONIDE (NASACORT NASL) 1 spray into each nostril daily.       VITAMIN D3 2,000 unit capsule Take 2,000 Units by mouth daily.  3     doxazosin (CARDURA) 8 MG tablet Take 4 mg by mouth at bedtime.        No current facility-administered medications for this visit.        Objective:     /75 (Patient Site: Right Arm, Patient Position: Sitting, Cuff Size: Adult Regular)  Pulse (!) 52  Resp 16  Ht 6' (1.829 m)  Wt 217 lb (98.4 kg)  BMI 29.43 kg/m2  217 lb (98.4 kg)   148/70 sitting.  140/68 standing.    Physical Exam:    GENERAL APPEARANCE: alert, no apparent distress  HEENT: no scleral icterus or xanthelasma  NECK: jugular venous pressure within normal limits  CHEST: symmetric, the lungs are clear to auscultation  CARDIOVASCULAR: regular rhythm without murmur, S4; no carotid bruits  Abdomen: No Organomegaly, masses, bruits, or tenderness. Bowels sounds are present      EXTREMITIES: no cyanosis, clubbing or edema    Cardiac Testing:      LM 30% mid   LAD occluded proximally   LCx occluded proximally   Ramus 60% proximal stenosis, small caliber vessel with diffuse   disease   RCA 50% distal stenosis feeding small caliber PDA       BENITEZ to LAD patent, good distal LAD target   SVG to diagonal patent, mild disease in target diagonal   SVG to OM patent, diffusely diseased target vessel with 70%   stenosis, affecting retrograde filling into mid Cx       EDP 11       Overall, diffuse CAD, but patent grafts. Potential areas for   PCI would include ostial ramus and  of Cx to improve flow   into mid Cx. However, the ramus is a small caliber vessel and   the ostial Cx is heavily calcified, therefore would reserve   PCI if he has significant increase in symptoms, and failing   medical therapy       Recommendations       Cont risk factor control and medical  management       Signatures       Electronically signed by SHYAM ELLIS MD   (Diagnostic Physician) on 11/25/2016 at 09:37 AM    CONCLUSION:   1.  Lexiscan stress nuclear study is abnormal; there is a small area of inferolateral ischemia and a very small area of apical ischemia.  2.  Normal left ventricular ejection fraction of 58%.     COMMENTS:   The patient is at high risk of future cardiac ischemic events based on perfusion imaging due to the presence of 2 separate ischemic defects.     The findings of this examination were communicated to Dr. Robinson Chopra in the Epic healthcare record.   Wolf Joya MD  11/7/2016 2:25 PM     Conclusions       Summary   Findings:   No reported chest discomfort. Exercise stopped secondary to leg pain   suggestive of claudication.   Reduced exercise tolerance.Resting hypertension.   Blunted heart rate response to exercise.   Abnormal stress ECG with 1mm ST depression in the inferior-lateral leads   with exercise and in recovery.   Definity contrast utilized.   Resting LV function appears normal.Resting LVEF estimated at 60%.   Post exercise LV function appears appropriately augmented without obvious   wall motion abnormality. The posterior wall is not optimally visualized.       Conclusions:   Non diagnostic exercise stress echocardiogram.Exercise heart rate achieved   significantly less than 85% of maximum predicted heart rate.ECG changes   with exercise are abnormal without obvious wall motion abnormality at the   workload achieved.Patient reported leg pain with exercise concerning for   claudication.   Consider additional evaluation given non diagnostic stress echocardiogram   and symptoms suggestive of claudication.       IMPRESSION:   CONCLUSION:  1.  Moderate atheromatous plaque in the carotid arteries.  2.  50-69% stenosis proximal right ICA likely at the lower end of this range.  3.  50-69% stenosis proximal left ICA likely at the lower end of this range.  4.  Note  stenoses in both ECAs as well may explain the bruit especially on the right side.     Evaluation based on velocities and NASCET criteria.  CONCLUSIONS:   1.  There is loss of compliance and incompressibility distally, so accurate ankle-brachial indices cannot be obtained. The measured index for the right posterior tibial artery is markedly abnormal.  2.  Abnormal monophasic waveforms in the distal posterior tibial and dorsalis pedis arteries bilaterally, suggesting significant trifurcation disease.  3.  The measured digital pressures predict adequate wound healing potential on the right and marginal wound healing potential on the left.         Lab Results:    Lab Results   Component Value Date     (L) 05/25/2017    K 4.7 05/25/2017     05/25/2017    CO2 29 05/25/2017    BUN 37 (H) 05/25/2017    CREATININE 2.23 (H) 05/25/2017    CALCIUM 9.3 05/25/2017     Lab Results   Component Value Date    CHOL 136 11/23/2016    TRIG 149 11/23/2016    HDL 41 11/23/2016     No results found for: BNP  Creatinine (mg/dL)   Date Value   05/25/2017 2.23 (H)   11/23/2016 1.58 (H)   10/10/2016 1.54 (H)   05/27/2016 1.69 (H)     LDL Calculated (mg/dL)   Date Value   11/23/2016 65   05/27/2016 67   11/18/2015 77     Lab Results   Component Value Date    WBC 6.2 11/25/2016    HGB 14.7 11/25/2016    HCT 44.7 11/25/2016    MCV 89 11/25/2016     11/25/2016               This note has been dictated using voice recognition software. Any grammatical or context distortions are unintentional and inherent to the software.      Robinson Chopra M.D., F.A.C.C.  [facility], [place]  [phone]

## 2021-06-16 ENCOUNTER — HOSPITAL ENCOUNTER (OUTPATIENT)
Dept: NUCLEAR MEDICINE | Facility: HOSPITAL | Age: 71
Discharge: HOME | End: 2021-06-16
Attending: INTERNAL MEDICINE
Payer: COMMERCIAL

## 2021-06-16 ENCOUNTER — HOSPITAL ENCOUNTER (OUTPATIENT)
Dept: CARDIOLOGY | Facility: HOSPITAL | Age: 71
Discharge: HOME | End: 2021-06-16
Attending: INTERNAL MEDICINE
Payer: COMMERCIAL

## 2021-06-16 DIAGNOSIS — I25.10 CAD (CORONARY ARTERY DISEASE): ICD-10-CM

## 2021-06-16 LAB
CV STRESS CURRENT BP HE: NORMAL
CV STRESS CURRENT HR HE: 53
CV STRESS CURRENT HR HE: 55
CV STRESS CURRENT HR HE: 57
CV STRESS CURRENT HR HE: 61
CV STRESS CURRENT HR HE: 63
CV STRESS CURRENT HR HE: 63
CV STRESS CURRENT HR HE: 64
CV STRESS CURRENT HR HE: 64
CV STRESS CURRENT HR HE: 65
CV STRESS CURRENT HR HE: 66
CV STRESS CURRENT HR HE: 67
CV STRESS CURRENT HR HE: 67
CV STRESS DEVIATION TIME HE: NORMAL
CV STRESS ECHO PERCENT HR HE: NORMAL
CV STRESS EXERCISE STAGE HE: NORMAL
CV STRESS FINAL RESTING BP HE: NORMAL
CV STRESS FINAL RESTING HR HE: 67
CV STRESS MAX HR HE: 67
CV STRESS MAX TREADMILL GRADE HE: 0
CV STRESS MAX TREADMILL SPEED HE: 0
CV STRESS PEAK DIA BP HE: NORMAL
CV STRESS PEAK SYS BP HE: NORMAL
CV STRESS PHASE HE: NORMAL
CV STRESS PROTOCOL HE: NORMAL
CV STRESS RESTING PT POSITION HE: NORMAL
CV STRESS RESTING PT POSITION HE: NORMAL
CV STRESS ST DEVIATION AMOUNT HE: NORMAL
CV STRESS ST DEVIATION ELEVATION HE: NORMAL
CV STRESS ST EVELATION AMOUNT HE: NORMAL
CV STRESS TEST TYPE HE: NORMAL
CV STRESS TOTAL STAGE TIME MIN 1 HE: NORMAL
NUC STRESS EJECTION FRACTION: 62 %
RATE PRESSURE PRODUCT: NORMAL
STRESS ECHO BASELINE DIASTOLIC HE: 84
STRESS ECHO BASELINE HR: 55
STRESS ECHO BASELINE SYSTOLIC BP: 194
STRESS ECHO CALCULATED PERCENT HR: 45 %
STRESS ECHO LAST STRESS DIASTOLIC BP: 76
STRESS ECHO LAST STRESS HR: 66
STRESS ECHO LAST STRESS SYSTOLIC BP: 168
STRESS ECHO TARGET HR: 150

## 2021-06-17 ENCOUNTER — TRANSFERRED RECORDS (OUTPATIENT)
Dept: HEALTH INFORMATION MANAGEMENT | Facility: CLINIC | Age: 71
End: 2021-06-17

## 2021-06-17 ENCOUNTER — RECORDS - HEALTHEAST (OUTPATIENT)
Dept: LAB | Facility: CLINIC | Age: 71
End: 2021-06-17

## 2021-06-17 LAB
ANION GAP SERPL CALCULATED.3IONS-SCNC: 11 MMOL/L (ref 5–18)
BUN SERPL-MCNC: 40 MG/DL (ref 8–28)
CALCIUM SERPL-MCNC: 8.4 MG/DL (ref 8.5–10.5)
CHLORIDE BLD-SCNC: 100 MMOL/L (ref 98–107)
CHOLEST SERPL-MCNC: 137 MG/DL
CO2 SERPL-SCNC: 25 MMOL/L (ref 22–31)
CREAT SERPL-MCNC: 2.8 MG/DL (ref 0.7–1.3)
FASTING STATUS PATIENT QL REPORTED: NORMAL
GFR SERPL CREATININE-BSD FRML MDRD: 23 ML/MIN/1.73M2
GLUCOSE BLD-MCNC: 147 MG/DL (ref 70–125)
HDLC SERPL-MCNC: 41 MG/DL
LDLC SERPL CALC-MCNC: 78 MG/DL
MAGNESIUM SERPL-MCNC: 2.2 MG/DL (ref 1.8–2.6)
POTASSIUM BLD-SCNC: 4.6 MMOL/L (ref 3.5–5)
SODIUM SERPL-SCNC: 136 MMOL/L (ref 136–145)
TRIGL SERPL-MCNC: 89 MG/DL

## 2021-06-19 NOTE — PROGRESS NOTES
[facility] Progress Note    Assessment:  1.  Coronary artery disease.  No complaints of anginal chest discomfort.  Coronary angiographic findings as outlined November 2016.  We are going to continue with aggressive risk factor modification.  His LDL November 2017 was higher than previous.  I have asked him to have a repeat lipid panel which she would like to do when he has laboratory studies drawn in Dr Bhatia's office.  He is going to try to schedule this.    2.Hypertension with evidence for autonomic dysfunction.  He checks his blood pressures in the standing position.  Following up with Dr. Bhatia in the near future.    3.  Moderate carotid stenosis.  We are going to plan follow-up carotid ultrasound.    4.  Peripheral vascular disease.  He did have an abnormal ankle-brachial index potential marginal wound healing on the left.  I have offered to consult with vascular surgery and have made the referral.      Plan: Outlined above follow-up in 6 months.    1. Carotid bruit  US Carotid Bilateral   2. PVD (peripheral vascular disease) (H)  Ambulatory referral to Vascular Surgery   3. Coronary artery disease involving native coronary artery of native heart without angina pectoris     4. Hyperlipidemia LDL goal <70     5. Essential hypertension           An After Visit Summary was printed and given to the patient.    Subjective:    Dakota Bauer is a 67 y.o. male who returned for a planned  follow up visit.  Ports in the interim he has been feeling well.  He said no complaints of chest discomfort.  He does have a history of autonomic dysfunction variable blood pressures but lightheaded symptoms have been reasonably well-controlled.  He had some lightheadedness 1 month ago but in general this is been under good control.  He has monitored his blood pressure when standing because of the autonomic dysfunction.    He had bypass surgery in 2005 with three-vessel disease.  He had coronary angiogram Feb 2016  "that demonstrated patent grafts with the report outlined below.  He does follow with Dr. Bhatia and has an upcoming follow-up within the next few weeks.    He has a history of peripheral vascular disease.  He describes primarily neuropathic pain.    Review of Systems:   General: WNL  Eyes: WNL  Ears/Nose/Throat: WNL  Lungs: WNL  Heart: WNL  Stomach: Constipation  Bladder: WNL  Muscle/Joints: WNL  Skin: WNL  Nervous System: Dizziness  Mental Health: Anxiety     Blood: WNL      Problem List:    Patient Active Problem List   Diagnosis     CAD (coronary artery disease)     Hypertension     PVD (peripheral vascular disease) (H)     Hyperlipidemia LDL goal <70       Social History     Social History     Marital status:      Spouse name: N/A     Number of children: N/A     Years of education: N/A     Occupational History     Not on file.     Social History Main Topics     Smoking status: Former Smoker     Quit date: 10/10/1984     Smokeless tobacco: Never Used     Alcohol use Not on file     Drug use: Not on file     Sexual activity: Not on file     Other Topics Concern     Not on file     Social History Narrative       Family History   Problem Relation Age of Onset     Coronary artery disease Father      Coronary artery disease Brother        Current Outpatient Prescriptions   Medication Sig Dispense Refill     ALPRAZolam (XANAX) 0.5 MG tablet Take 0.5 mg by mouth daily.        aspirin 81 MG EC tablet Take 81 mg by mouth daily.       BD INSULIN SYRINGE ULTRA-FINE 0.3 mL 30 gauge x 1/2\" Syrg USE 4 TIMES A DAY WITH INSULIN  3     furosemide (LASIX) 20 MG tablet Take 20 mg by mouth daily.  9     gabapentin (NEURONTIN) 300 MG capsule Take 300 mg by mouth 2 (two) times a day.        insulin glargine (LANTUS) 100 unit/mL injection Inject 30 Units under the skin bedtime.       insulin regular (NOVOLIN R) 100 unit/mL injection as directed up to 35 units       levothyroxine (SYNTHROID, LEVOTHROID) 150 MCG tablet Take 150 " mcg by mouth daily.       lisinopril (PRINIVIL,ZESTRIL) 40 MG tablet Take 40 mg by mouth daily.       metoprolol succinate (TOPROL XL) 50 MG 24 hr tablet Take 1 tablet (50 mg total) by mouth daily. 90 tablet 3     multivitamin with iron (ONE DAILY WITH IRON) Tab tablet Take 1 tablet by mouth daily.       omega-3 fatty acids-vitamin E (FISH OIL) 1,000 mg cap Take 1,000 mg by mouth daily.       ONETOUCH ULTRA TEST strips TEST AS DIRECTED 4 TIMES A DAY AND AS NEEDED.  3     rosuvastatin (CRESTOR) 40 MG tablet Take 40 mg by mouth bedtime.       TRIAMCINOLONE ACETONIDE (NASACORT NASL) 1 spray into each nostril daily.       VITAMIN D3 2,000 unit capsule Take 2,000 Units by mouth daily.  3     doxazosin (CARDURA) 8 MG tablet Take 4 mg by mouth at bedtime.        No current facility-administered medications for this visit.        Objective:     /68 (Patient Site: Right Arm, Patient Position: Sitting, Cuff Size: Adult Regular)  Pulse 60  Resp 14  Ht 6' (1.829 m)  Wt 209 lb (94.8 kg)  BMI 28.35 kg/m2  209 lb (94.8 kg)       Physical Exam:    GENERAL APPEARANCE: alert, no apparent distress  HEENT: no scleral icterus or xanthelasma  NECK: jugular venous pressure within normal limits.  CHEST: symmetric, the lungs are clear to auscultation  CARDIOVASCULAR: regular rhythm without murmur, S4; no carotid bruits  Abdomen: No Organomegaly, masses, bruits, or tenderness. Bowels sounds are present      EXTREMITIES: no cyanosis, clubbing or edema    Cardiac Testing:  . ANKLE-BRACHIAL INDICES   2. BILATERAL LOWER EXTREMITY ARTERIAL DUPLEX ULTRASOUND  11/4/2016 2:28 PM     INDICATIONS: Leg pain.  TECHNIQUE: Arterial duplex ultrasound with gray-scale images and color-flow Doppler and spectral analysis.  COMPARISONS: 09/10/2012.     FINDINGS: The distal left posterior tibial and dorsalis pedis arteries and the distal right dorsalis pedis artery are incompressible. The index for the right posterior tibial artery is markedly  abnormal, 0.51.     Digital pressures predict adequate wound healing potential on the right, 48 mm Hg, and marginal wound healing potential on the left, 37 mm Hg.     Bilateral lower extremity arterial duplex ultrasound demonstrates essentially normal multiphasic flow from the external iliac to the popliteal arteries bilaterally. On the left, there is some fluctuation of flow velocities in the superficial femoral   artery, suggesting at least moderate stenosis. There are abnormal monophasic waveforms in the distal posterior tibial and dorsalis pedis arteries bilaterally, suggesting trifurcation disease.     CONCLUSIONS:   1.  There is loss of compliance and incompressibility distally, so accurate ankle-brachial indices cannot be obtained. The measured index for the right posterior tibial artery is markedly abnormal.  2.  Abnormal monophasic waveforms in the distal posterior tibial and dorsalis pedis arteries bilaterally, suggesting significant trifurcation disease.  3.  The measured digital pressures predict adequate wound healing potential on the right and marginal wound healing potential on the left.        Procedure Type       Diagnostic procedure:Left Heart Catheterization , Venous Graft   Catheterization, LIMA Graft Catheterization, Coronary   Angiogram       Conclusions       Procedure Summary   Known CAD, presenting with occasional CP and abnormal stress   test       LM 30% mid   LAD occluded proximally   LCx occluded proximally   Ramus 60% proximal stenosis, small caliber vessel with diffuse   disease   RCA 50% distal stenosis feeding small caliber PDA       BENITEZ to LAD patent, good distal LAD target   SVG to diagonal patent, mild disease in target diagonal   SVG to OM patent, diffusely diseased target vessel with 70%   stenosis, affecting retrograde filling into mid Cx       EDP 11       Overall, diffuse CAD, but patent grafts. Potential areas for   PCI would include ostial ramus and  of Cx to improve  flow   into mid Cx. However, the ramus is a small caliber vessel and   the ostial Cx is heavily calcified, therefore would reserve   PCI if he has significant increase in symptoms, and failing   medical therapy       Recommendations       Cont risk factor control and medical management       Signatures       Electronically signed by SHYAM ELLIS MD    Lab Results:    Lab Results   Component Value Date     05/31/2018    K 4.7 05/31/2018     05/31/2018    CO2 25 05/31/2018    BUN 26 (H) 05/31/2018    CREATININE 1.88 (H) 05/31/2018    CALCIUM 9.3 05/31/2018     Lab Results   Component Value Date    CHOL 164 11/28/2017    TRIG 125 11/28/2017    HDL 41 11/28/2017     No results found for: BNP  Creatinine (mg/dL)   Date Value   05/31/2018 1.88 (H)   11/28/2017 1.90 (H)   05/25/2017 2.23 (H)   11/23/2016 1.58 (H)     LDL Calculated (mg/dL)   Date Value   11/28/2017 98   11/23/2016 65   05/27/2016 67     Lab Results   Component Value Date    WBC 6.2 11/25/2016    HGB 14.7 11/25/2016    HCT 44.7 11/25/2016    MCV 89 11/25/2016     11/25/2016               This note has been dictated using voice recognition software. Any grammatical or context distortions are unintentional and inherent to the software.      Robinson Chopra M.D., F.A.C.C.  [facility], [place]  [phone]

## 2021-06-26 NOTE — PATIENT INSTRUCTIONS - HE
It was nice to visit with you today.  We discussed some chest discomfort that you are having with exertion.  Please try to limit your activity so that you do not get chest discomfort until we can get the stress test completed.  Please seek more immediate attention by coming to the ER if symptoms are worsening or progressive.  We talked about utilizing nitroglycerin for discomfort that lasts more than 5 to 10 minutes.  Please be seated or lying down when to use nitroglycerin because it can cause lightheadedness or dizziness.  We are going to plan a stress test and laboratory studies in the near future.  I will be in touch with results.  My nurse is Clari and her number is [phone].

## 2021-07-04 NOTE — PROGRESS NOTES
Progress Notes by Robinson Chopra MD at 6/2/2021  9:30 AM     Author: Robinson Chopra MD Service: -- Author Type: Physician    Filed: 6/2/2021 10:23 AM Encounter Date: 6/2/2021 Status: Signed    : Robinson Chopra MD (Physician)             [facility] Progress Note    Assessment:  1.  Coronary artery disease.  He is noting some intermittent short-lived chest discomfort with exertion.  I have asked him to monitor for any increased symptoms and to seek more immediate attention.  I have given him a prescription for sublingual nitroglycerin and discussed its use.  We talked about being cautious given that he does have a history of labile hypertension.  Plan troponin and EKG today.  Plan nuclear stress testing.    2.  Hypertension.  Pressures appear to be under reasonable control.  He has a history of orthostatic changes and autonomic dysfunction.  He has a history of renal insufficiency and tells me he saw Pari Brennre a few months ago and felt that his kidney function was stable.  I have reviewed labs from December.    3.  Dyslipidemia.  Lipids were mildly above ideal goal December 2020 at which time LDL was 84.  We are going to plan follow-up lipids at the time that he comes for stress testing.      Plan: 1.  Laboratory studies.  EKG.  Lexiscan nuclear stress test in the near future.  Patient to update me and seek more immediate attention if worsening symptoms.    1. CAD (coronary artery disease)  ECG Clinic - Today    Troponin I    NM MPI with Lexiscan    Lipid Profile    Comprehensive metabolic panel    nitroglycerin (NITROSTAT) 0.4 MG SL tablet   2. Essential hypertension     3. Hyperlipidemia LDL goal <70           An After Visit Summary was printed and given to the patient.    Subjective:    Dakota Bauer is a 70 y.o. male who returned for a planned  follow up visit.  I have reviewed notes regarding his vascular issues and toe amputation.  He does tell me that he was working out in  the yard yesterday and moving rocks he had chest discomfort that lasted a relatively short period of time approximately 1 minute in the left side of his chest which was nonradiating and estimated to be 5 on a scale of 1-10.  He does note and when he has been out mowing the lawn or other like activities this summer he develops a short-lived chest discomfort.  He has not had chest discomfort at rest.  He does not report additional symptoms.  He does report that yesterday evening he felt nauseated with a little bit of chest discomfort later in the evening.  We did talk about trying to limit his when the sun and heavy activities like lifting rocks.    He has a history of coronary artery disease with coronary angiogram November 2016 most recently.  Had bypass surgery in 2005 with the angiogram at that time demonstrating LIMA to the LAD to be patent, vein graft to a diagonal with mild disease vein graft to an obtuse marginal vessel to have diffuse disease with medical management plans.  Nuclear stress test in 2019 reported no ischemic findings with prior area of infarction and mild reduction in left ventricular function.  He is not experiencing any chest discomfort at this time.    Review of Systems:   General: WNL  Eyes: WNL  Ears/Nose/Throat: WNL  Lungs: Shortness of Breath  Heart: Chest Pain, Shortness of Breath with activity  Stomach: WNL  Bladder: WNL  Muscle/Joints: Joint Pain  Skin: WNL  Nervous System: Dizziness, Loss of Balance  Mental Health: WNL     Blood: WNL      Problem List:    Patient Active Problem List   Diagnosis   ? CAD (coronary artery disease)   ? Hypertension   ? PVD (peripheral vascular disease) (H)   ? Hyperlipidemia LDL goal <70       Social History     Socioeconomic History   ? Marital status:      Spouse name: Not on file   ? Number of children: Not on file   ? Years of education: Not on file   ? Highest education level: Not on file   Occupational History   ? Not on file   Social Needs    ? Financial resource strain: Not on file   ? Food insecurity     Worry: Not on file     Inability: Not on file   ? Transportation needs     Medical: Not on file     Non-medical: Not on file   Tobacco Use   ? Smoking status: Former Smoker     Quit date: 10/10/1984     Years since quittin.6   ? Smokeless tobacco: Never Used   Substance and Sexual Activity   ? Alcohol use: Not on file   ? Drug use: Not on file   ? Sexual activity: Not on file   Lifestyle   ? Physical activity     Days per week: Not on file     Minutes per session: Not on file   ? Stress: Not on file   Relationships   ? Social connections     Talks on phone: Not on file     Gets together: Not on file     Attends Judaism service: Not on file     Active member of club or organization: Not on file     Attends meetings of clubs or organizations: Not on file     Relationship status: Not on file   ? Intimate partner violence     Fear of current or ex partner: Not on file     Emotionally abused: Not on file     Physically abused: Not on file     Forced sexual activity: Not on file   Other Topics Concern   ? Not on file   Social History Narrative   ? Not on file       Family History   Problem Relation Age of Onset   ? Coronary artery disease Father    ? Coronary artery disease Brother        Current Outpatient Medications   Medication Sig Dispense Refill   ? ALPRAZolam (XANAX) 0.5 MG tablet Take 0.5 mg by mouth daily.      ? aspirin 81 MG EC tablet Take 81 mg by mouth daily.     ? furosemide (LASIX) 20 MG tablet Take 20 mg by mouth daily.  9   ? gabapentin (NEURONTIN) 300 MG capsule Take 300 mg by mouth 3 (three) times a day.            ? HUMALOG 100 unit/mL injection Inject under the skin 3 (three) times a day before meals.            ? insulin glargine (LANTUS) 100 unit/mL injection Inject 30 Units under the skin every morning.      ? levothyroxine (SYNTHROID, LEVOTHROID) 150 MCG tablet Take 150 mcg by mouth daily.     ? lisinopril (PRINIVIL,ZESTRIL)  40 MG tablet Take 40 mg by mouth daily.     ? metoprolol succinate (TOPROL-XL) 50 MG 24 hr tablet Take 1 tablet (50 mg total) by mouth daily. 90 tablet 0   ? multivitamin with iron (ONE DAILY WITH IRON) Tab tablet Take 1 tablet by mouth daily.     ? omega-3 fatty acids-vitamin E (FISH OIL) 1,000 mg cap Take 1,000 mg by mouth daily.     ? rosuvastatin (CRESTOR) 40 MG tablet Take 40 mg by mouth bedtime.     ? TRIAMCINOLONE ACETONIDE (NASACORT NASL) 1 spray into each nostril daily.     ? VITAMIN D3 2,000 unit capsule Take 2,000 Units by mouth daily.  3   ? insulin regular (NOVOLIN R) 100 unit/mL injection as directed up to 35 units     ? nitroglycerin (NITROSTAT) 0.4 MG SL tablet Place 1 tablet (0.4 mg total) under the tongue every 5 (five) minutes as needed for chest pain. 25 tablet 3   ? ONETOUCH ULTRA TEST strips TEST AS DIRECTED 4 TIMES A DAY AND AS NEEDED.  3     No current facility-administered medications for this visit.        Objective:     /50 (Patient Site: Right Arm, Patient Position: Sitting, Cuff Size: Adult Regular)   Pulse 60   Resp 18   Ht 6' (1.829 m)   Wt (!) 225 lb (102.1 kg)   BMI 30.52 kg/m    (!) 225 lb (102.1 kg)   [unfilled]  Wt Readings from Last 3 Encounters:   06/02/21 (!) 225 lb (102.1 kg)   12/06/20 (!) 223 lb (101.2 kg)   04/01/20 208 lb (94.3 kg)       Physical Exam:    GENERAL APPEARANCE: alert, no apparent distress  HEENT: no scleral icterus or xanthelasma  NECK: jugular venous pressure within normal limits  CHEST: symmetric, the lungs are clear to auscultation  CARDIOVASCULAR: regular rhythm without murmur, S4; no carotid bruits  Abdomen: No Organomegaly, masses, bruits, or tenderness. Bowels sounds are present      EXTREMITIES: no cyanosis, clubbing or edema, left foot in the boot with toe amputation.    Cardiac Testing:  Indications    CAD (coronary artery disease) [I25.10 (ICD-10-CM)]       Conclusion    Cardiac Diagnostic Report      Demographics      Patient Name  KYLAH BARCLAY      Referring Physician    MJ BENAVIDES MD      MRN #        [id number]          Diagnostic Physician   SHYAM ELLIS MD      Account #    [id number]           Interventional                                   Physician      D.O.B        1950         Report Status:      Date of      2016 08:50   Study        AM     Procedure      Procedure Type      Diagnostic procedure:Left Heart Catheterization , Venous Graft   Catheterization, LIMA Graft Catheterization, Coronary   Angiogram      Conclusions      Procedure Summary   Known CAD, presenting with occasional CP and abnormal stress   test      LM 30% mid   LAD occluded proximally   LCx occluded proximally   Ramus 60% proximal stenosis, small caliber vessel with diffuse   disease   RCA 50% distal stenosis feeding small caliber PDA      LIMA to LAD patent, good distal LAD target   SVG to diagonal patent, mild disease in target diagonal   SVG to OM patent, diffusely diseased target vessel with 70%   stenosis, affecting retrograde filling into mid Cx      EDP 11      Overall, diffuse CAD, but patent grafts. Potential areas for   PCI would include ostial ramus and  of Cx to improve flow   into mid Cx. However, the ramus is a small caliber vessel and   the ostial Cx is heavily calcified, therefore would reserve   PCI if he has significant increase in symptoms, and failing   medical therapy      Recommendations      Cont risk factor control and medical management      Signatures      Electronically signed by SHYAM ELLIS MD   (Diagnostic Physician) on 2016 at 09:37 AM     Angiographic Findings     Patient Information    Patient Name   Dakota Bauer MRN   [id number] Sex   Male  1   1950 (66 y.o.)   Indications    Coronary artery disease   Dx: CAD (coronary artery disease) [I25.10 (ICD-10-CM)]   Summary    Stress Echocardiography Report      Demographics      Patient Name      KYLAH BARCLAY  Date of Study           10/18/2016       MRN               [id number]      Room Number      Account Number    [id number]      Accession Number  [id number]      Date of Birth     1950     Referring Physician     MJ BENAVIDES MD      Age               66 year(s)     Sonographer             [id number]      Gender            Male           Interpreting Physician  MJ BENAVIDES MD                                                            [facility] OUTPATIENT     Procedure     Type of Study      Stress procedure:ECHO STRESS EXERCISE.     Procedure Date  Date: 10/18/2016 Start: 07:45 AM     Study Location: [facility]  Technical Quality: Limited visualization     Patient Status: Routine     Contrast Medium: Definity. Used - 6 mland Wasted - 4 ml     Height: 72 inches Weight: 224 pounds BSA: 2.24 m^2 BMI: 30.38 kg/m^2     HR: 55 bpm BP: 148/74 mmHg     Indications  Coronary artery disease.      Conclusions      Summary   Findings:   No reported chest discomfort. Exercise stopped secondary to leg pain   suggestive of claudication.   Reduced exercise tolerance.Resting hypertension.   Blunted heart rate response to exercise.   Abnormal stress ECG with 1mm ST depression in the inferior-lateral leads   with exercise and in recovery.   Definity contrast utilized.   Resting LV function appears normal.Resting LVEF estimated at 60%.   Post exercise LV function appears appropriately augmented without obvious   wall motion abnormality. The posterior wall is not optimally visualized.      Conclusions:   Non diagnostic exercise stress echocardiogram.Exercise heart rate achieved   significantly less than 85% of maximum predicted heart rate.ECG changes   with exercise are abnormal without obvious wall motion abnormality at the   workload achieved.Patient reported leg pain with exercise concerning for   claudication.   Consider additional evaluation given non diagnostic stress echocardiogram   and symptoms suggestive of claudication.          HOLTER MONITOR      INTERPRETATION DATE:   11/01/2017     TEST DATE:  10/31/2017       INTERPRETATION:  Predominant rhythm was sinus rhythm with rates ranging from 42  beats per minute to 85 beats per minute.  Tendency towards sinus bradycardia was  noted with average heart rate of 56 beats per minute and nocturnal resting heart  rates commonly 45 to 50 beats per minute.  There were no clinically significant  pauses.  Rare atrial premature beats were noted 16 over 24 hours.  Occasional  ventricular premature beats were noted, 574 over 24 hours with occasional  ventricular trigeminy.  There were 4 ventricular couplets.  Rate-dependent left  bundle branch block aberrancy was rarely noted with heart rates greater than 75  beats per minute.     CONCLUSION:  Tendency towards sinus bradycardia noted, but no clinically significant  pauses.  Rate-dependent left bundle branch block aberrancy was also present.        TJ RAMIREZ MD  pa  D 11/01/2017 14:07:17  T 11/01/2017 14:13:11  R 11/01/2017 14:13:11    ECG shows sinus bradycardia at 52 bpm, nonspecific T wave changes, cannot exclude lateral ischemia with LVH changes.  Compared to an EKG in the chart record from #2016 PVCs are not present T wave changes are more noted.  Awaiting EKG requested from an outside hospital.  Lab Results:    Lab Results   Component Value Date     (L) 12/17/2020    K 4.4 12/17/2020    CL 99 12/17/2020    CO2 27 12/17/2020    BUN 38 (H) 12/17/2020    CREATININE 2.75 (H) 12/17/2020    CALCIUM 8.9 12/17/2020     Lab Results   Component Value Date    CHOL 155 12/17/2020    TRIG 104 12/17/2020    HDL 50 12/17/2020     No results found for: BNP  Creatinine (mg/dL)   Date Value   12/17/2020 2.75 (H)   06/02/2020 2.60 (H)   11/29/2019 2.59 (H)   05/28/2019 2.34 (H)     LDL Calculated (mg/dL)   Date Value   12/17/2020 84   06/02/2020 79   11/29/2019 94     Lab Results   Component Value Date    WBC 6.2 11/25/2016    HGB 14.7 11/25/2016    HCT 44.7 11/25/2016    MCV 89 11/25/2016      11/25/2016         This note has been dictated using voice recognition software. Any grammatical or context distortions are unintentional and inherent to the software.

## 2021-07-06 ENCOUNTER — COMMUNICATION - HEALTHEAST (OUTPATIENT)
Dept: CARDIOLOGY | Facility: CLINIC | Age: 71
End: 2021-07-06

## 2021-07-06 VITALS
WEIGHT: 225 LBS | DIASTOLIC BLOOD PRESSURE: 50 MMHG | BODY MASS INDEX: 30.48 KG/M2 | SYSTOLIC BLOOD PRESSURE: 134 MMHG | RESPIRATION RATE: 18 BRPM | HEART RATE: 60 BPM | HEIGHT: 72 IN

## 2021-07-06 DIAGNOSIS — I25.10 CAD (CORONARY ARTERY DISEASE): ICD-10-CM

## 2021-07-06 RX ORDER — METOPROLOL SUCCINATE 50 MG/1
50 TABLET, EXTENDED RELEASE ORAL DAILY
Qty: 90 TABLET | Refills: 3 | Status: SHIPPED | OUTPATIENT
Start: 2021-07-06 | End: 2022-04-15

## 2021-07-26 ENCOUNTER — OFFICE VISIT (OUTPATIENT)
Dept: CARDIOLOGY | Facility: CLINIC | Age: 71
End: 2021-07-26
Payer: COMMERCIAL

## 2021-07-26 VITALS
HEIGHT: 72 IN | SYSTOLIC BLOOD PRESSURE: 142 MMHG | BODY MASS INDEX: 30.75 KG/M2 | DIASTOLIC BLOOD PRESSURE: 66 MMHG | RESPIRATION RATE: 16 BRPM | HEART RATE: 60 BPM | WEIGHT: 227 LBS

## 2021-07-26 DIAGNOSIS — I25.10 CORONARY ARTERY DISEASE INVOLVING NATIVE CORONARY ARTERY OF NATIVE HEART WITHOUT ANGINA PECTORIS: ICD-10-CM

## 2021-07-26 DIAGNOSIS — E78.5 HYPERLIPIDEMIA LDL GOAL <70: Primary | ICD-10-CM

## 2021-07-26 DIAGNOSIS — I15.0 RENOVASCULAR HYPERTENSION: ICD-10-CM

## 2021-07-26 PROCEDURE — 99214 OFFICE O/P EST MOD 30 MIN: CPT | Performed by: INTERNAL MEDICINE

## 2021-07-26 RX ORDER — CHLORAL HYDRATE 500 MG
1 CAPSULE ORAL DAILY
COMMUNITY
End: 2023-01-01

## 2021-07-26 ASSESSMENT — MIFFLIN-ST. JEOR: SCORE: 1827.67

## 2021-07-26 NOTE — PATIENT INSTRUCTIONS
Please call my nurse Clari at [phone] with any questions or concerns,We will plan to follow up in 6 months or sooner if any thomas related questions.

## 2021-07-26 NOTE — PROGRESS NOTES
HEART CARE CONSULTATON NOTE        Assessment/Recommendations   Assessment/Plan:  1.  Coronary artery disease.  Recent nuclear stress test does not show significant ischemia with a small area of nontransmural infarction in the distal anteroseptal wall and basal inferior wall.  We talked about monitoring for times of angina and to update me with any questions or concerns.  He has a history of coronary artery disease as previously outlined with prior bypass surgery in 2005.  He did undergo coronary angiography [place] 2016 that demonstrated patent grafts and some disease in the target vessels with medical management.    2.  Dyslipidemia.  He has had 2 recent laboratory results related to his lipids.  LDL cholesterol most recently in June 2 was 68 but the subsequent number on June 17 was 78.  He is on rosuvastatin 40 mg daily.  We talked about ongoing prudent diet and exercise and at this point we will continue with the current dosing of Crestor.    3.  Hypertension.  He does have a history of orthostatic hypotension that has been under reasonable control.  He does have chronic renal insufficiency with a creatinine most recently of 2.8.  He does follow with renal as well.    4.  History of mild nonobstructive carotid disease without bruit on examination.  We did talk about follow-up carotid ultrasound.  He wanted to wait and discuss this further at our 6-month follow-up.    Plan.  We will asked that he follow-up in 6 months and contact me with any changes in the interim.  2.  I made no current changes in his medication regimen       History of Present Illness/Subjective    HPI: Dakota Bauer is a 70 year old male is seen in follow-up.  He reports that he is been feeling well.  He really has not experienced any additional chest discomfort since our visit a few months ago.  He denies significant shortness of breath, dizziness or lightheadedness.  He has a longstanding history of some postural hypotension which has  been stable with mild lightheadedness upon standing.  I have reviewed recent notes from his primary care provider.  He recently saw his renal physician Dr Bhatia but I do not have his notes that are readily available for my review.  We did talk about being more cautious with respect to heavy activity in the hot humid weather.  He had told last visit while moving rocks he had some chest discomfort that was short duration.       Physical Examination  Review of Systems   VITALS: BP (!) 142/66 (BP Location: Right arm, Patient Position: Sitting, Cuff Size: Adult Large)   Pulse 60   Resp 16   Ht 1.829 m (6')   Wt 103 kg (227 lb)   BMI 30.79 kg/m    BMI: Body mass index is 30.79 kg/m .  Wt Readings from Last 3 Encounters:   07/26/21 103 kg (227 lb)   06/02/21 102.1 kg (225 lb)   04/01/20 94.3 kg (208 lb)       General Appearance:   no distress, normal body habitus   ENT/Mouth: membranes moist, no oral lesions or bleeding gums.      EYES:  no scleral icterus, normal conjunctivae   Neck: no carotid bruits or thyromegaly   Chest/Lungs:   lungs are clear to auscultation, no rales or wheezing, equal chest wall expansion    Cardiovascular:   Regular. Normal first and second heart sounds with no significant murmur; the carotid, radial and posterior tibial pulses are intact, Jugular venous pressure within normal limits, mild lower extremity edema bilaterally    Abdomen:  no organomegaly, masses, bruits, or tenderness; bowel sounds are present   Extremities: no cyanosis or clubbing   Skin: no xanthelasma, warm.    Neurologic: normal  bilateral, no tremors     Psychiatric: alert and oriented x3, calm     Review Of Systems  Skin: negative  Eyes: negative  Ears/Nose/Throat: negative  Respiratory: No shortness of breath, dyspnea on exertion, cough, or hemoptysis  Cardiovascular: negative  Gastrointestinal: negative  Genitourinary: negative  Musculoskeletal: negative  Neurologic: negative  Psychiatric:  negative  Hematologic/Lymphatic/Immunologic: negative  Endocrine: negative          Lab Results    Chemistry/lipid CBC Cardiac Enzymes/BNP/TSH/INR   Recent Labs   Lab Test 21  0920   CHOL 137   HDL 41   LDL 78   TRIG 89     Recent Labs   Lab Test 21  0920 21  1016 20  0830   LDL 78 68 84     Recent Labs   Lab Test 21  0920      POTASSIUM 4.6   CHLORIDE 100   CO2 25   *   BUN 40*   CR 2.80*   GFRESTIMATED 23*   SVITLANA 8.4*     Recent Labs   Lab Test 21  0920 21  1016 20  0830   CR 2.80* 2.78* 2.75*     No results for input(s): A1C in the last 96968 hours.    Patient Information    Patient Name   Dakota Bauer MRN   [id number] Legal Sex   Male              Age   1950 (70 year old)   NM Lexiscan stress test  Order: [id number]  Status:  Final result   Visible to patient:  Yes (MyChart) Next appt:  None Dx:  CAD (coronary artery disease)   1 Result Note  Details    Reading Physician Reading Date Result Priority   Provider, Historical 2021 Routine   Marleni Gavin MD  [phone] 2021       Result Text     The nuclear stress test is abnormal.     Nuclear images demonstrate a small area of nontransmural infarctions involving the distal anteroseptal wall and basal inferolateral wall.  No ischemia identified.     The left ventricular ejection fraction at stress is 62% without wall motion abnormality.     A prior study was conducted on 2019.  No change identified.     The patient is at a low risk of future cardiac ischemic events.         Study Result    Narrative & Impression   US CAROTID BILATERAL  2018 8:32 AM     INDICATION: Other specified symptoms and signs involving the circulatory and respiratory systems  TECHNIQUE: Duplex exam performed utilizing 2D gray-scale imaging, Doppler interrogation with color-flow and spectral waveform analysis.  COMPARISON: Carotid duplex, 11/15/2016     FINDINGS:  RIGHT: There is mild atheromatous plaque.  Normal waveforms with no significant stenosis.     LEFT: There is mild atheromatous plaque. Normal waveforms with no significant stenosis.     Both vertebral arteries and subclavian artery waveforms are normal.     VELOCITY CHART:   The following velocities were obtained in the RIGHT carotid system.  CCA: 76/9 cm/s  ICA: 122/29 cm/s  ECA: 574/21 cm/s  ICA/CCA: PS 1.6     The following velocities were obtained in the LEFT carotid system.  CCA: 89/13 cm/s  ICA: 127/14 cm/s  ECA: 174/14 cm/s  ICA/CCA: PS 1.4                           IMPRESSION:  CONCLUSION:  1.  RIGHT: Less than 50% stenosis of the right ICA  2.  LEFT: Less than 50% stenosis of the left ICA     Evaluation based on velocities and NASCET criteria.          No results for input(s): WBC, HGB, HCT, MCV, PLT in the last 81023 hours.  No results for input(s): HGB in the last 87441 hours. Recent Labs   Lab Test 06/02/21  1016   TROPONINI 0.13     No results for input(s): BNP, NTBNPI, NTBNP in the last 78140 hours.  Recent Labs   Lab Test 12/17/20  0830   TSH 1.95     No results for input(s): INR in the last 39200 hours.     Medical History  Surgical History Family History Social History   Past Medical History:   Diagnosis Date     Coronary artery disease      Diabetes mellitus (H)      Hyperlipidemia      Hypertension      Past Surgical History:   Procedure Laterality Date     BYPASS GRAFT ARTERY CORONARY  2005    [facility] with Dr. Holter     CARDIAC CATHETERIZATION  2005     CARDIAC CATHETERIZATION  11/25/2016    no intervention     CERVICAL DISC SURGERY  2014     Family History   Problem Relation Age of Onset     Coronary Artery Disease Father      Coronary Artery Disease Brother         Social History     Socioeconomic History     Marital status:      Spouse name: Not on file     Number of children: Not on file     Years of education: Not on file     Highest education level: Not on file   Occupational History     Not on file   Tobacco Use      Smoking status: Former Smoker     Quit date: 10/10/1984     Years since quittin.8     Smokeless tobacco: Never Used   Substance and Sexual Activity     Alcohol use: Not on file     Drug use: Not on file     Sexual activity: Not on file   Other Topics Concern     Not on file   Social History Narrative     Not on file     Social Determinants of Health     Financial Resource Strain:      Difficulty of Paying Living Expenses:    Food Insecurity:      Worried About Running Out of Food in the Last Year:      Ran Out of Food in the Last Year:    Transportation Needs:      Lack of Transportation (Medical):      Lack of Transportation (Non-Medical):    Physical Activity:      Days of Exercise per Week:      Minutes of Exercise per Session:    Stress:      Feeling of Stress :    Social Connections:      Frequency of Communication with Friends and Family:      Frequency of Social Gatherings with Friends and Family:      Attends Restorationist Services:      Active Member of Clubs or Organizations:      Attends Club or Organization Meetings:      Marital Status:    Intimate Partner Violence:      Fear of Current or Ex-Partner:      Emotionally Abused:      Physically Abused:      Sexually Abused:          Medications  Allergies   Current Outpatient Medications   Medication Sig Dispense Refill     ALPRAZolam (XANAX) 0.5 MG tablet [ALPRAZOLAM (XANAX) 0.5 MG TABLET] Take 0.5 mg by mouth daily.        aspirin 81 MG EC tablet [ASPIRIN 81 MG EC TABLET] Take 81 mg by mouth daily.       fish oil-omega-3 fatty acids 1000 MG capsule Take 1 capsule by mouth daily       furosemide (LASIX) 20 MG tablet [FUROSEMIDE (LASIX) 20 MG TABLET] Take 20 mg by mouth daily.  9     gabapentin (NEURONTIN) 300 MG capsule [GABAPENTIN (NEURONTIN) 300 MG CAPSULE] Take 300 mg by mouth 3 (three) times a day.              HUMALOG 100 unit/mL injection [HUMALOG 100 UNIT/ML INJECTION] Inject under the skin 3 (three) times a day before meals.              insulin  glargine (LANTUS) 100 unit/mL injection [INSULIN GLARGINE (LANTUS) 100 UNIT/ML INJECTION] Inject 30 Units under the skin every morning.        levothyroxine (SYNTHROID, LEVOTHROID) 150 MCG tablet [LEVOTHYROXINE (SYNTHROID, LEVOTHROID) 150 MCG TABLET] Take 150 mcg by mouth daily.       lisinopril (PRINIVIL,ZESTRIL) 40 MG tablet [LISINOPRIL (PRINIVIL,ZESTRIL) 40 MG TABLET] Take 40 mg by mouth daily.       metoprolol succinate (TOPROL-XL) 50 MG 24 hr tablet [METOPROLOL SUCCINATE (TOPROL-XL) 50 MG 24 HR TABLET] Take 1 tablet (50 mg total) by mouth daily. 90 tablet 3     multivitamin with iron (ONE DAILY WITH IRON) Tab tablet [MULTIVITAMIN WITH IRON (ONE DAILY WITH IRON) TAB TABLET] Take 1 tablet by mouth daily.       nitroglycerin (NITROSTAT) 0.4 MG SL tablet [NITROGLYCERIN (NITROSTAT) 0.4 MG SL TABLET] Place 1 tablet (0.4 mg total) under the tongue every 5 (five) minutes as needed for chest pain. 25 tablet 3     ONETOUCH ULTRA TEST strips [ONETOUCH ULTRA TEST STRIPS] TEST AS DIRECTED 4 TIMES A DAY AND AS NEEDED.  3     rosuvastatin (CRESTOR) 40 MG tablet [ROSUVASTATIN (CRESTOR) 40 MG TABLET] Take 40 mg by mouth bedtime.       TRIAMCINOLONE ACETONIDE (NASACORT NASL) [TRIAMCINOLONE ACETONIDE (NASACORT NASL)] 1 spray into each nostril daily.       VITAMIN D3 2,000 unit capsule [VITAMIN D3 2,000 UNIT CAPSULE] Take 2,000 Units by mouth daily.  3        Allergies   Allergen Reactions     Hydrochlorothiazide Unknown     Levofloxacin Diarrhea         Robinson Chopra MD

## 2021-07-26 NOTE — LETTER
7/26/2021    Jamal Gaffney MD  [facility] [address]  [facility] [address]    RE: Dakota Bauer       Dear Colleague,    I had the pleasure of seeing Dakota Bauer in the [facility].      HEART CARE CONSULTATON NOTE        Assessment/Recommendations   Assessment/Plan:  1.  Coronary artery disease.  Recent nuclear stress test does not show significant ischemia with a small area of nontransmural infarction in the distal anteroseptal wall and basal inferior wall.  We talked about monitoring for times of angina and to update me with any questions or concerns.  He has a history of coronary artery disease as previously outlined with prior bypass surgery in 2005.  He did undergo coronary angiography [place] 2016 that demonstrated patent grafts and some disease in the target vessels with medical management.    2.  Dyslipidemia.  He has had 2 recent laboratory results related to his lipids.  LDL cholesterol most recently in June 2 was 68 but the subsequent number on June 17 was 78.  He is on rosuvastatin 40 mg daily.  We talked about ongoing prudent diet and exercise and at this point we will continue with the current dosing of Crestor.    3.  Hypertension.  He does have a history of orthostatic hypotension that has been under reasonable control.  He does have chronic renal insufficiency with a creatinine most recently of 2.8.  He does follow with renal as well.    4.  History of mild nonobstructive carotid disease without bruit on examination.  We did talk about follow-up carotid ultrasound.  He wanted to wait and discuss this further at our 6-month follow-up.    Plan.  We will asked that he follow-up in 6 months and contact me with any changes in the interim.  2.  I made no current changes in his medication regimen       History of Present Illness/Subjective    HPI: Dakota Bauer is a 70 year old male is seen in follow-up.  He reports that he is  been feeling well.  He really has not experienced any additional chest discomfort since our visit a few months ago.  He denies significant shortness of breath, dizziness or lightheadedness.  He has a longstanding history of some postural hypotension which has been stable with mild lightheadedness upon standing.  I have reviewed recent notes from his primary care provider.  He recently saw his renal physician Dr Bhatia but I do not have his notes that are readily available for my review.  We did talk about being more cautious with respect to heavy activity in the hot humid weather.  He had told last visit while moving rocks he had some chest discomfort that was short duration.       Physical Examination  Review of Systems   VITALS: BP (!) 142/66 (BP Location: Right arm, Patient Position: Sitting, Cuff Size: Adult Large)   Pulse 60   Resp 16   Ht 1.829 m (6')   Wt 103 kg (227 lb)   BMI 30.79 kg/m    BMI: Body mass index is 30.79 kg/m .  Wt Readings from Last 3 Encounters:   07/26/21 103 kg (227 lb)   06/02/21 102.1 kg (225 lb)   04/01/20 94.3 kg (208 lb)       General Appearance:   no distress, normal body habitus   ENT/Mouth: membranes moist, no oral lesions or bleeding gums.      EYES:  no scleral icterus, normal conjunctivae   Neck: no carotid bruits or thyromegaly   Chest/Lungs:   lungs are clear to auscultation, no rales or wheezing, equal chest wall expansion    Cardiovascular:   Regular. Normal first and second heart sounds with no significant murmur; the carotid, radial and posterior tibial pulses are intact, Jugular venous pressure within normal limits, mild lower extremity edema bilaterally    Abdomen:  no organomegaly, masses, bruits, or tenderness; bowel sounds are present   Extremities: no cyanosis or clubbing   Skin: no xanthelasma, warm.    Neurologic: normal  bilateral, no tremors     Psychiatric: alert and oriented x3, calm     Review Of Systems  Skin: negative  Eyes:  negative  Ears/Nose/Throat: negative  Respiratory: No shortness of breath, dyspnea on exertion, cough, or hemoptysis  Cardiovascular: negative  Gastrointestinal: negative  Genitourinary: negative  Musculoskeletal: negative  Neurologic: negative  Psychiatric: negative  Hematologic/Lymphatic/Immunologic: negative  Endocrine: negative          Lab Results    Chemistry/lipid CBC Cardiac Enzymes/BNP/TSH/INR   Recent Labs   Lab Test 21  0920   CHOL 137   HDL 41   LDL 78   TRIG 89     Recent Labs   Lab Test 21  0920 21  1016 20  0830   LDL 78 68 84     Recent Labs   Lab Test 21  0920      POTASSIUM 4.6   CHLORIDE 100   CO2 25   *   BUN 40*   CR 2.80*   GFRESTIMATED 23*   SVITLANA 8.4*     Recent Labs   Lab Test 21  0920 21  1016 20  0830   CR 2.80* 2.78* 2.75*     No results for input(s): A1C in the last 74709 hours.    Patient Information    Patient Name   Dakota Bauer MRN   [id number] Legal Sex   Male              Age   1950 (70 year old)   NM Lexiscan stress test  Order: [id number]  Status:  Final result   Visible to patient:  Yes (MyChart) Next appt:  None Dx:  CAD (coronary artery disease)   1 Result Note  Details    Reading Physician Reading Date Result Priority   Provider, Historical 2021 Routine   Marleni Gavin MD  [phone] 2021       Result Text     The nuclear stress test is abnormal.     Nuclear images demonstrate a small area of nontransmural infarctions involving the distal anteroseptal wall and basal inferolateral wall.  No ischemia identified.     The left ventricular ejection fraction at stress is 62% without wall motion abnormality.     A prior study was conducted on 2019.  No change identified.     The patient is at a low risk of future cardiac ischemic events.         Study Result    Narrative & Impression   US CAROTID BILATERAL  2018 8:32 AM     INDICATION: Other specified symptoms and signs involving the  circulatory and respiratory systems  TECHNIQUE: Duplex exam performed utilizing 2D gray-scale imaging, Doppler interrogation with color-flow and spectral waveform analysis.  COMPARISON: Carotid duplex, 11/15/2016     FINDINGS:  RIGHT: There is mild atheromatous plaque. Normal waveforms with no significant stenosis.     LEFT: There is mild atheromatous plaque. Normal waveforms with no significant stenosis.     Both vertebral arteries and subclavian artery waveforms are normal.     VELOCITY CHART:   The following velocities were obtained in the RIGHT carotid system.  CCA: 76/9 cm/s  ICA: 122/29 cm/s  ECA: 574/21 cm/s  ICA/CCA: PS 1.6     The following velocities were obtained in the LEFT carotid system.  CCA: 89/13 cm/s  ICA: 127/14 cm/s  ECA: 174/14 cm/s  ICA/CCA: PS 1.4                           IMPRESSION:  CONCLUSION:  1.  RIGHT: Less than 50% stenosis of the right ICA  2.  LEFT: Less than 50% stenosis of the left ICA     Evaluation based on velocities and NASCET criteria.          No results for input(s): WBC, HGB, HCT, MCV, PLT in the last 12760 hours.  No results for input(s): HGB in the last 43607 hours. Recent Labs   Lab Test 06/02/21  1016   TROPONINI 0.13     No results for input(s): BNP, NTBNPI, NTBNP in the last 90689 hours.  Recent Labs   Lab Test 12/17/20  0830   TSH 1.95     No results for input(s): INR in the last 36916 hours.     Medical History  Surgical History Family History Social History   Past Medical History:   Diagnosis Date     Coronary artery disease      Diabetes mellitus (H)      Hyperlipidemia      Hypertension      Past Surgical History:   Procedure Laterality Date     BYPASS GRAFT ARTERY CORONARY  2005    [place] with Dr. Holter     CARDIAC CATHETERIZATION  2005     CARDIAC CATHETERIZATION  11/25/2016    no intervention     CERVICAL DISC SURGERY  2014     Family History   Problem Relation Age of Onset     Coronary Artery Disease Father      Coronary Artery Disease Brother          Social History     Socioeconomic History     Marital status:      Spouse name: Not on file     Number of children: Not on file     Years of education: Not on file     Highest education level: Not on file   Occupational History     Not on file   Tobacco Use     Smoking status: Former Smoker     Quit date: 10/10/1984     Years since quittin.8     Smokeless tobacco: Never Used   Substance and Sexual Activity     Alcohol use: Not on file     Drug use: Not on file     Sexual activity: Not on file   Other Topics Concern     Not on file   Social History Narrative     Not on file     Social Determinants of Health     Financial Resource Strain:      Difficulty of Paying Living Expenses:    Food Insecurity:      Worried About Running Out of Food in the Last Year:      Ran Out of Food in the Last Year:    Transportation Needs:      Lack of Transportation (Medical):      Lack of Transportation (Non-Medical):    Physical Activity:      Days of Exercise per Week:      Minutes of Exercise per Session:    Stress:      Feeling of Stress :    Social Connections:      Frequency of Communication with Friends and Family:      Frequency of Social Gatherings with Friends and Family:      Attends Orthodox Services:      Active Member of Clubs or Organizations:      Attends Club or Organization Meetings:      Marital Status:    Intimate Partner Violence:      Fear of Current or Ex-Partner:      Emotionally Abused:      Physically Abused:      Sexually Abused:          Medications  Allergies   Current Outpatient Medications   Medication Sig Dispense Refill     ALPRAZolam (XANAX) 0.5 MG tablet [ALPRAZOLAM (XANAX) 0.5 MG TABLET] Take 0.5 mg by mouth daily.        aspirin 81 MG EC tablet [ASPIRIN 81 MG EC TABLET] Take 81 mg by mouth daily.       fish oil-omega-3 fatty acids 1000 MG capsule Take 1 capsule by mouth daily       furosemide (LASIX) 20 MG tablet [FUROSEMIDE (LASIX) 20 MG TABLET] Take 20 mg by mouth daily.  9      gabapentin (NEURONTIN) 300 MG capsule [GABAPENTIN (NEURONTIN) 300 MG CAPSULE] Take 300 mg by mouth 3 (three) times a day.              HUMALOG 100 unit/mL injection [HUMALOG 100 UNIT/ML INJECTION] Inject under the skin 3 (three) times a day before meals.              insulin glargine (LANTUS) 100 unit/mL injection [INSULIN GLARGINE (LANTUS) 100 UNIT/ML INJECTION] Inject 30 Units under the skin every morning.        levothyroxine (SYNTHROID, LEVOTHROID) 150 MCG tablet [LEVOTHYROXINE (SYNTHROID, LEVOTHROID) 150 MCG TABLET] Take 150 mcg by mouth daily.       lisinopril (PRINIVIL,ZESTRIL) 40 MG tablet [LISINOPRIL (PRINIVIL,ZESTRIL) 40 MG TABLET] Take 40 mg by mouth daily.       metoprolol succinate (TOPROL-XL) 50 MG 24 hr tablet [METOPROLOL SUCCINATE (TOPROL-XL) 50 MG 24 HR TABLET] Take 1 tablet (50 mg total) by mouth daily. 90 tablet 3     multivitamin with iron (ONE DAILY WITH IRON) Tab tablet [MULTIVITAMIN WITH IRON (ONE DAILY WITH IRON) TAB TABLET] Take 1 tablet by mouth daily.       nitroglycerin (NITROSTAT) 0.4 MG SL tablet [NITROGLYCERIN (NITROSTAT) 0.4 MG SL TABLET] Place 1 tablet (0.4 mg total) under the tongue every 5 (five) minutes as needed for chest pain. 25 tablet 3     ONETOUCH ULTRA TEST strips [ONETOUCH ULTRA TEST STRIPS] TEST AS DIRECTED 4 TIMES A DAY AND AS NEEDED.  3     rosuvastatin (CRESTOR) 40 MG tablet [ROSUVASTATIN (CRESTOR) 40 MG TABLET] Take 40 mg by mouth bedtime.       TRIAMCINOLONE ACETONIDE (NASACORT NASL) [TRIAMCINOLONE ACETONIDE (NASACORT NASL)] 1 spray into each nostril daily.       VITAMIN D3 2,000 unit capsule [VITAMIN D3 2,000 UNIT CAPSULE] Take 2,000 Units by mouth daily.  3        Allergies   Allergen Reactions     Hydrochlorothiazide Unknown     Levofloxacin Diarrhea         Robinson Chopra MD      Thank you for allowing me to participate in the care of your patient.      Sincerely,     Robinson Chopra MD     [facility]  cc:    No referring provider defined for this encounter.

## 2021-09-26 ENCOUNTER — HEALTH MAINTENANCE LETTER (OUTPATIENT)
Age: 71
End: 2021-09-26

## 2021-12-17 ENCOUNTER — LAB REQUISITION (OUTPATIENT)
Dept: LAB | Facility: CLINIC | Age: 71
End: 2021-12-17

## 2021-12-17 DIAGNOSIS — E78.5 HYPERLIPIDEMIA, UNSPECIFIED: ICD-10-CM

## 2021-12-17 DIAGNOSIS — I12.9 HYPERTENSIVE CHRONIC KIDNEY DISEASE WITH STAGE 1 THROUGH STAGE 4 CHRONIC KIDNEY DISEASE, OR UNSPECIFIED CHRONIC KIDNEY DISEASE: ICD-10-CM

## 2021-12-17 DIAGNOSIS — Z12.5 ENCOUNTER FOR SCREENING FOR MALIGNANT NEOPLASM OF PROSTATE: ICD-10-CM

## 2021-12-17 DIAGNOSIS — E03.9 HYPOTHYROIDISM, UNSPECIFIED: ICD-10-CM

## 2021-12-17 LAB
ANION GAP SERPL CALCULATED.3IONS-SCNC: 13 MMOL/L (ref 5–18)
BUN SERPL-MCNC: 40 MG/DL (ref 8–28)
CALCIUM SERPL-MCNC: 9 MG/DL (ref 8.5–10.5)
CHLORIDE BLD-SCNC: 103 MMOL/L (ref 98–107)
CHOLEST SERPL-MCNC: 137 MG/DL
CO2 SERPL-SCNC: 21 MMOL/L (ref 22–31)
CREAT SERPL-MCNC: 3.29 MG/DL (ref 0.7–1.3)
CREAT UR-MCNC: 39 MG/DL
FASTING STATUS PATIENT QL REPORTED: ABNORMAL
GFR SERPL CREATININE-BSD FRML MDRD: 18 ML/MIN/1.73M2
GLUCOSE BLD-MCNC: 247 MG/DL (ref 70–125)
HDLC SERPL-MCNC: 36 MG/DL
LDLC SERPL CALC-MCNC: 76 MG/DL
MICROALBUMIN UR-MCNC: 27.97 MG/DL (ref 0–1.99)
MICROALBUMIN/CREAT UR: 717.2 MG/G CR
POTASSIUM BLD-SCNC: 4.3 MMOL/L (ref 3.5–5)
PSA SERPL-MCNC: 0.9 UG/L (ref 0–6.5)
SODIUM SERPL-SCNC: 137 MMOL/L (ref 136–145)
TRIGL SERPL-MCNC: 127 MG/DL
TSH SERPL DL<=0.005 MIU/L-ACNC: 0.19 UIU/ML (ref 0.3–5)

## 2021-12-17 PROCEDURE — 84443 ASSAY THYROID STIM HORMONE: CPT | Performed by: FAMILY MEDICINE

## 2021-12-17 PROCEDURE — 82043 UR ALBUMIN QUANTITATIVE: CPT | Performed by: FAMILY MEDICINE

## 2021-12-17 PROCEDURE — 80061 LIPID PANEL: CPT | Performed by: FAMILY MEDICINE

## 2021-12-17 PROCEDURE — 80048 BASIC METABOLIC PNL TOTAL CA: CPT | Performed by: FAMILY MEDICINE

## 2021-12-17 PROCEDURE — G0103 PSA SCREENING: HCPCS | Performed by: FAMILY MEDICINE

## 2022-01-21 ENCOUNTER — OFFICE VISIT (OUTPATIENT)
Dept: CARDIOLOGY | Facility: CLINIC | Age: 72
End: 2022-01-21
Payer: COMMERCIAL

## 2022-01-21 VITALS
HEART RATE: 60 BPM | BODY MASS INDEX: 29.66 KG/M2 | RESPIRATION RATE: 16 BRPM | SYSTOLIC BLOOD PRESSURE: 172 MMHG | DIASTOLIC BLOOD PRESSURE: 74 MMHG | WEIGHT: 219 LBS | HEIGHT: 72 IN

## 2022-01-21 DIAGNOSIS — I10 BENIGN ESSENTIAL HYPERTENSION: Primary | ICD-10-CM

## 2022-01-21 PROCEDURE — 99214 OFFICE O/P EST MOD 30 MIN: CPT | Performed by: INTERNAL MEDICINE

## 2022-01-21 RX ORDER — FLASH GLUCOSE SENSOR
KIT MISCELLANEOUS
COMMUNITY
Start: 2021-12-10

## 2022-01-21 RX ORDER — SYRING-NEEDL,DISP,INSUL,0.3 ML 30 GX5/16"
SYRINGE, EMPTY DISPOSABLE MISCELLANEOUS
COMMUNITY
Start: 2021-11-16 | End: 2023-01-01 | Stop reason: DRUGHIGH

## 2022-01-21 RX ORDER — PEN NEEDLE, DIABETIC 31 GX3/16"
NEEDLE, DISPOSABLE MISCELLANEOUS
COMMUNITY
Start: 2021-12-20

## 2022-01-21 ASSESSMENT — MIFFLIN-ST. JEOR: SCORE: 1786.38

## 2022-01-21 NOTE — PROGRESS NOTES
HEART CARE ENCOUNTER CONSULTATON NOTE      [facility]  [phone]      Assessment/Recommendations   Assessment/Plan:  1.  Coronary artery disease.  Recent nuclear stress test did not show significant ischemia with normal function and small area of nontransmural infarction in the distal anterior septal wall and basilar inferior wall.  We will continue with aggressive medical management and monitoring.    2.  Hypertension.  Blood pressure has been variable.  He does have chronic renal insufficiency with a creatinine most recently of approximately 3.2.  He follows with Dr. Chand.  He has a history of orthostatic hypotension with more aggressive blood pressure management and overall his blood pressures appear to be reasonable on average 130/70.  He will continue to monitor his blood pressure and update us with any issues.    3.  Dyslipidemia.  Most recent lipids are near goal.  LDL cholesterol of 76 with an ideal LDL goal less than 70.  We discussed whether consideration should be given to addition of Zetia.  His preference was not to add additional medications at this time given that he is close to the LDL goal of 70 we will continue with the current combination of medications.    4.  History of mild nonobstructive carotid disease.  Most recent carotid ultrasound is from 2018 that demonstrated mild disease bilaterally.    No change in cardiovascular medications today.  Continue to monitor blood pressure and symptoms and follow-up in approximately 6 months.       History of Present Illness/Subjective    HPI: Dakota Bauer is a 71 year old male who is seen in follow-up.  He reports that he has been feeling well.  He specifically denies chest pain, shortness of breath, or palpitation.  He recently visited with Dr. Bhatia.  He reports some mild lightheadedness intermittently upon standing especially if his blood pressure drops below 120 systolic.  His blood pressures have been variable and at  times they run somewhat elevated but most of the time reviewing his blood pressure graph are reasonable typically in the 130 average over 70-80 average.    He has a history of coronary artery disease as previously outlined and renal insufficiency.  He had a nuclear stress test June 2021 did not suggest significant ischemia with normal left ventricular function.  He underwent prior bypass surgery in 2005.  In February 2016 demonstrated patent grafts with some disease in the target vessels with medical management.    Recent Echocardiogram Results:  Patient Name KYLAH BARCLAY      Referring Physician    MJ BENAVIDES MD      MRN #        [id number]          Diagnostic Physician   SHYAM ELLIS MD      Account #    [id number]           Interventional                                   Physician      D.O.B        1950         Report Status:      Date of      11/25/2016 08:50   Study        AM     Procedure      Procedure Type      Diagnostic procedure:Left Heart Catheterization , Venous Graft   Catheterization, LIMA Graft Catheterization, Coronary   Angiogram      Conclusions      Procedure Summary   Known CAD, presenting with occasional CP and abnormal stress   test      LM 30% mid   LAD occluded proximally   LCx occluded proximally   Ramus 60% proximal stenosis, small caliber vessel with diffuse   disease   RCA 50% distal stenosis feeding small caliber PDA      LIMA to LAD patent, good distal LAD target   SVG to diagonal patent, mild disease in target diagonal   SVG to OM patent, diffusely diseased target vessel with 70%   stenosis, affecting retrograde filling into mid Cx      EDP 11      Overall, diffuse CAD, but patent grafts. Potential areas for   PCI would include ostial ramus and  of Cx to improve flow   into mid Cx. However, the ramus is a small caliber vessel and   the ostial Cx is heavily calcified, therefore would reserve   PCI if he has significant increase in symptoms, and failing   medical therapy       Recommendations      Cont risk factor control and medical management      Signatures      Electronically signed by SHYAM ELLIS MD   (Diagnostic Physician) on 2016 at 09:37 AM     Angiographic Findings      Diagnostic Findings      Cardiac Arteries and Lesion Findings     LMCA: Minimal disease and Moderate disease.     LAD: Chronic occlusion and Severe disease.     LCx: Chronic occlusion and Severe disease.     RCA: Moderate disease.     Ramus: Moderate disease.     Procedure Data  Procedure Date  Date: 2016Start: 08:50 AMEnd: 09:26 AM         Recent Coronary Angiogram Results:    Patient Information    Patient Name   Dakota Bauer MRN   [id number] Legal Sex   Male              Age   1950 (71 year old)       NM Lexiscan stress test  Order: [id number]   Status: Final result     Visible to patient: Yes (seen)     Next appt: 2022 at 08:50 AM in Cardiology (Robinson Chopra MD)     Dx: CAD (coronary artery disease)     1 Result Note    Details    Reading Physician Reading Date Result Priority   Provider, Historical 2021 Routine   Marleni Gavin MD  [phone] 2021      Result Text     The nuclear stress test is abnormal.     Nuclear images demonstrate a small area of nontransmural infarctions involving the distal anteroseptal wall and basal inferolateral wall.  No ischemia identified.     The left ventricular ejection fraction at stress is 62% without wall motion abnormality.     A prior study was conducted on 2019.  No change identified.     The patient is at a low risk of future cardiac ischemic events.          Physical Examination  Review of Systems   Vitals: 172/74 upon arrival, repeat 130/70, weight 219 pounds, heart rate of 60s and regular.  Wt Readings from Last 3 Encounters:   21 103 kg (227 lb)   21 102.1 kg (225 lb)   20 94.3 kg (208 lb)       General Appearance:   no distress, normal body habitus   ENT/Mouth: membranes moist, no oral lesions  or bleeding gums.      EYES:  no scleral icterus, normal conjunctivae   Neck: no carotid bruits or thyromegaly   Chest/Lungs:   lungs are clear to auscultation, no rales or wheezing, well-healed sternal scar, equal chest wall expansion    Cardiovascular:    Distant, regular. Normal first and second heart sounds with soft systolic murmur, S4; the carotid, radial and posterior tibial pulses are intact, Jugular venous pressure within normal limits, no significant edema bilaterally    Abdomen:  no organomegaly, masses, bruits, or tenderness; bowel sounds are present   Extremities: no cyanosis or clubbing   Skin: no xanthelasma, warm.    Neurologic: normal  bilateral, no tremors     Psychiatric: alert and oriented x3, calm        Please refer above for cardiac ROS details.        Medical History  Surgical History Family History Social History   Past Medical History:   Diagnosis Date     Coronary artery disease      Diabetes mellitus (H)      Hyperlipidemia      Hypertension      Past Surgical History:   Procedure Laterality Date     BYPASS GRAFT ARTERY CORONARY      [place] with Dr. Holter     CARDIAC CATHETERIZATION       CARDIAC CATHETERIZATION  2016    no intervention     CERVICAL DISC SURGERY       Family History   Problem Relation Age of Onset     Coronary Artery Disease Father      Coronary Artery Disease Brother         Social History     Socioeconomic History     Marital status:      Spouse name: Not on file     Number of children: Not on file     Years of education: Not on file     Highest education level: Not on file   Occupational History     Not on file   Tobacco Use     Smoking status: Former Smoker     Quit date: 10/10/1984     Years since quittin.3     Smokeless tobacco: Never Used   Substance and Sexual Activity     Alcohol use: Not on file     Drug use: Not on file     Sexual activity: Not on file   Other Topics Concern     Not on file   Social History Narrative     Not  on file     Social Determinants of Health     Financial Resource Strain: Not on file   Food Insecurity: Not on file   Transportation Needs: Not on file   Physical Activity: Not on file   Stress: Not on file   Social Connections: Not on file   Intimate Partner Violence: Not on file   Housing Stability: Not on file           Medications  Allergies   Current Outpatient Medications   Medication Sig Dispense Refill     ALPRAZolam (XANAX) 0.5 MG tablet [ALPRAZOLAM (XANAX) 0.5 MG TABLET] Take 0.5 mg by mouth daily.        aspirin 81 MG EC tablet [ASPIRIN 81 MG EC TABLET] Take 81 mg by mouth daily.       fish oil-omega-3 fatty acids 1000 MG capsule Take 1 capsule by mouth daily       furosemide (LASIX) 20 MG tablet [FUROSEMIDE (LASIX) 20 MG TABLET] Take 20 mg by mouth daily.  9     gabapentin (NEURONTIN) 300 MG capsule [GABAPENTIN (NEURONTIN) 300 MG CAPSULE] Take 300 mg by mouth 3 (three) times a day.              HUMALOG 100 unit/mL injection [HUMALOG 100 UNIT/ML INJECTION] Inject under the skin 3 (three) times a day before meals.              insulin glargine (LANTUS) 100 unit/mL injection [INSULIN GLARGINE (LANTUS) 100 UNIT/ML INJECTION] Inject 30 Units under the skin every morning.        levothyroxine (SYNTHROID, LEVOTHROID) 150 MCG tablet [LEVOTHYROXINE (SYNTHROID, LEVOTHROID) 150 MCG TABLET] Take 150 mcg by mouth daily.       lisinopril (PRINIVIL,ZESTRIL) 40 MG tablet [LISINOPRIL (PRINIVIL,ZESTRIL) 40 MG TABLET] Take 40 mg by mouth daily.       metoprolol succinate (TOPROL-XL) 50 MG 24 hr tablet [METOPROLOL SUCCINATE (TOPROL-XL) 50 MG 24 HR TABLET] Take 1 tablet (50 mg total) by mouth daily. 90 tablet 3     multivitamin with iron (ONE DAILY WITH IRON) Tab tablet [MULTIVITAMIN WITH IRON (ONE DAILY WITH IRON) TAB TABLET] Take 1 tablet by mouth daily.       nitroglycerin (NITROSTAT) 0.4 MG SL tablet [NITROGLYCERIN (NITROSTAT) 0.4 MG SL TABLET] Place 1 tablet (0.4 mg total) under the tongue every 5 (five) minutes as  needed for chest pain. 25 tablet 3     ONETOUCH ULTRA TEST strips [ONETOUCH ULTRA TEST STRIPS] TEST AS DIRECTED 4 TIMES A DAY AND AS NEEDED.  3     rosuvastatin (CRESTOR) 40 MG tablet [ROSUVASTATIN (CRESTOR) 40 MG TABLET] Take 40 mg by mouth bedtime.       TRIAMCINOLONE ACETONIDE (NASACORT NASL) [TRIAMCINOLONE ACETONIDE (NASACORT NASL)] 1 spray into each nostril daily.       VITAMIN D3 2,000 unit capsule [VITAMIN D3 2,000 UNIT CAPSULE] Take 2,000 Units by mouth daily.  3       Allergies   Allergen Reactions     Hydrochlorothiazide Unknown     Levofloxacin Diarrhea          Lab Results    Chemistry/lipid CBC Cardiac Enzymes/BNP/TSH/INR   Recent Labs   Lab Test 12/17/21  0754   CHOL 137   HDL 36*   LDL 76   TRIG 127     Recent Labs   Lab Test 12/17/21  0754 06/17/21  0920 06/02/21  1016   LDL 76 78 68     Recent Labs   Lab Test 12/17/21  0754      POTASSIUM 4.3   CHLORIDE 103   CO2 21*   *   BUN 40*   CR 3.29*   GFRESTIMATED 18*   SVITLANA 9.0     Recent Labs   Lab Test 12/17/21  0754 06/17/21  0920 06/02/21  1016   CR 3.29* 2.80* 2.78*     No results for input(s): A1C in the last 95976 hours.       No results for input(s): WBC, HGB, HCT, MCV, PLT in the last 32095 hours.  No results for input(s): HGB in the last 43609 hours. Recent Labs   Lab Test 06/02/21  1016   TROPONINI 0.13     No results for input(s): BNP, NTBNPI, NTBNP in the last 87190 hours.  Recent Labs   Lab Test 12/17/21  0754   TSH 0.19*     No results for input(s): INR in the last 82196 hours.     Robinson Chopra MD

## 2022-01-21 NOTE — LETTER
1/21/2022    Jamal Gaffney MD  [facility] [address]  [facility] [address]    RE: Dakota Bauer       Dear Colleague,     I had the pleasure of seeing Dakota Bauer in the [facility].    HEART CARE ENCOUNTER CONSULTATON NOTE      [facility]  [phone]      Assessment/Recommendations   Assessment/Plan:  1.  Coronary artery disease.  Recent nuclear stress test did not show significant ischemia with normal function and small area of nontransmural infarction in the distal anterior septal wall and basilar inferior wall.  We will continue with aggressive medical management and monitoring.    2.  Hypertension.  Blood pressure has been variable.  He does have chronic renal insufficiency with a creatinine most recently of approximately 3.2.  He follows with Dr. Chand.  He has a history of orthostatic hypotension with more aggressive blood pressure management and overall his blood pressures appear to be reasonable on average 130/70.  He will continue to monitor his blood pressure and update us with any issues.    3.  Dyslipidemia.  Most recent lipids are near goal.  LDL cholesterol of 76 with an ideal LDL goal less than 70.  We discussed whether consideration should be given to addition of Zetia.  His preference was not to add additional medications at this time given that he is close to the LDL goal of 70 we will continue with the current combination of medications.    4.  History of mild nonobstructive carotid disease.  Most recent carotid ultrasound is from 2018 that demonstrated mild disease bilaterally.    No change in cardiovascular medications today.  Continue to monitor blood pressure and symptoms and follow-up in approximately 6 months.       History of Present Illness/Subjective    HPI: Dakota Bauer is a 71 year old male who is seen in follow-up.  He reports that he has been feeling well.  He specifically denies chest pain, shortness of breath, or  palpitation.  He recently visited with Dr. Bhatia.  He reports some mild lightheadedness intermittently upon standing especially if his blood pressure drops below 120 systolic.  His blood pressures have been variable and at times they run somewhat elevated but most of the time reviewing his blood pressure graph are reasonable typically in the 130 average over 70-80 average.    He has a history of coronary artery disease as previously outlined and renal insufficiency.  He had a nuclear stress test June 2021 did not suggest significant ischemia with normal left ventricular function.  He underwent prior bypass surgery in 2005.  In February 2016 demonstrated patent grafts with some disease in the target vessels with medical management.    Recent Echocardiogram Results:  Patient Name KYLAH BARCLAY      Referring Physician    MJ BENAVIDES MD      MRN #        [id number]          Diagnostic Physician   SHYAM ELLIS MD      Account #    [id number]           Interventional                                   Physician      D.O.B        1950         Report Status:      Date of      11/25/2016 08:50   Study        AM     Procedure      Procedure Type      Diagnostic procedure:Left Heart Catheterization , Venous Graft   Catheterization, LIMA Graft Catheterization, Coronary   Angiogram      Conclusions      Procedure Summary   Known CAD, presenting with occasional CP and abnormal stress   test      LM 30% mid   LAD occluded proximally   LCx occluded proximally   Ramus 60% proximal stenosis, small caliber vessel with diffuse   disease   RCA 50% distal stenosis feeding small caliber PDA      LIMA to LAD patent, good distal LAD target   SVG to diagonal patent, mild disease in target diagonal   SVG to OM patent, diffusely diseased target vessel with 70%   stenosis, affecting retrograde filling into mid Cx      EDP 11      Overall, diffuse CAD, but patent grafts. Potential areas for   PCI would include ostial ramus and  of Cx  to improve flow   into mid Cx. However, the ramus is a small caliber vessel and   the ostial Cx is heavily calcified, therefore would reserve   PCI if he has significant increase in symptoms, and failing   medical therapy      Recommendations      Cont risk factor control and medical management      Signatures      Electronically signed by SHYAM ELLIS MD   (Diagnostic Physician) on 2016 at 09:37 AM     Angiographic Findings      Diagnostic Findings      Cardiac Arteries and Lesion Findings     LMCA: Minimal disease and Moderate disease.     LAD: Chronic occlusion and Severe disease.     LCx: Chronic occlusion and Severe disease.     RCA: Moderate disease.     Ramus: Moderate disease.     Procedure Data  Procedure Date  Date: 2016Start: 08:50 AMEnd: 09:26 AM         Recent Coronary Angiogram Results:    Patient Information    Patient Name   Dakota Bauer MRN   [id number] Legal Sex   Male              Age   1950 (71 year old)       NM Lexiscan stress test  Order: [id number]   Status: Final result     Visible to patient: Yes (seen)     Next appt: 2022 at 08:50 AM in Cardiology (Robinson Chopra MD)     Dx: CAD (coronary artery disease)     1 Result Note    Details    Reading Physician Reading Date Result Priority   Provider, Historical 2021 Routine   Marleni Gavin MD  [phone] 2021      Result Text     The nuclear stress test is abnormal.     Nuclear images demonstrate a small area of nontransmural infarctions involving the distal anteroseptal wall and basal inferolateral wall.  No ischemia identified.     The left ventricular ejection fraction at stress is 62% without wall motion abnormality.     A prior study was conducted on 2019.  No change identified.     The patient is at a low risk of future cardiac ischemic events.          Physical Examination  Review of Systems   Vitals: 172/74 upon arrival, repeat 130/70, weight 219 pounds, heart rate of 60s and regular.  Wt  Readings from Last 3 Encounters:   21 103 kg (227 lb)   21 102.1 kg (225 lb)   20 94.3 kg (208 lb)       General Appearance:   no distress, normal body habitus   ENT/Mouth: membranes moist, no oral lesions or bleeding gums.      EYES:  no scleral icterus, normal conjunctivae   Neck: no carotid bruits or thyromegaly   Chest/Lungs:   lungs are clear to auscultation, no rales or wheezing, well-healed sternal scar, equal chest wall expansion    Cardiovascular:    Distant, regular. Normal first and second heart sounds with soft systolic murmur, S4; the carotid, radial and posterior tibial pulses are intact, Jugular venous pressure within normal limits, no significant edema bilaterally    Abdomen:  no organomegaly, masses, bruits, or tenderness; bowel sounds are present   Extremities: no cyanosis or clubbing   Skin: no xanthelasma, warm.    Neurologic: normal  bilateral, no tremors     Psychiatric: alert and oriented x3, calm        Please refer above for cardiac ROS details.        Medical History  Surgical History Family History Social History   Past Medical History:   Diagnosis Date     Coronary artery disease      Diabetes mellitus (H)      Hyperlipidemia      Hypertension      Past Surgical History:   Procedure Laterality Date     BYPASS GRAFT ARTERY CORONARY      [place] with Dr. Holter     CARDIAC CATHETERIZATION  2005     CARDIAC CATHETERIZATION  2016    no intervention     CERVICAL DISC SURGERY       Family History   Problem Relation Age of Onset     Coronary Artery Disease Father      Coronary Artery Disease Brother         Social History     Socioeconomic History     Marital status:      Spouse name: Not on file     Number of children: Not on file     Years of education: Not on file     Highest education level: Not on file   Occupational History     Not on file   Tobacco Use     Smoking status: Former Smoker     Quit date: 10/10/1984     Years since quittin.3      Smokeless tobacco: Never Used   Substance and Sexual Activity     Alcohol use: Not on file     Drug use: Not on file     Sexual activity: Not on file   Other Topics Concern     Not on file   Social History Narrative     Not on file     Social Determinants of Health     Financial Resource Strain: Not on file   Food Insecurity: Not on file   Transportation Needs: Not on file   Physical Activity: Not on file   Stress: Not on file   Social Connections: Not on file   Intimate Partner Violence: Not on file   Housing Stability: Not on file           Medications  Allergies   Current Outpatient Medications   Medication Sig Dispense Refill     ALPRAZolam (XANAX) 0.5 MG tablet [ALPRAZOLAM (XANAX) 0.5 MG TABLET] Take 0.5 mg by mouth daily.        aspirin 81 MG EC tablet [ASPIRIN 81 MG EC TABLET] Take 81 mg by mouth daily.       fish oil-omega-3 fatty acids 1000 MG capsule Take 1 capsule by mouth daily       furosemide (LASIX) 20 MG tablet [FUROSEMIDE (LASIX) 20 MG TABLET] Take 20 mg by mouth daily.  9     gabapentin (NEURONTIN) 300 MG capsule [GABAPENTIN (NEURONTIN) 300 MG CAPSULE] Take 300 mg by mouth 3 (three) times a day.              HUMALOG 100 unit/mL injection [HUMALOG 100 UNIT/ML INJECTION] Inject under the skin 3 (three) times a day before meals.              insulin glargine (LANTUS) 100 unit/mL injection [INSULIN GLARGINE (LANTUS) 100 UNIT/ML INJECTION] Inject 30 Units under the skin every morning.        levothyroxine (SYNTHROID, LEVOTHROID) 150 MCG tablet [LEVOTHYROXINE (SYNTHROID, LEVOTHROID) 150 MCG TABLET] Take 150 mcg by mouth daily.       lisinopril (PRINIVIL,ZESTRIL) 40 MG tablet [LISINOPRIL (PRINIVIL,ZESTRIL) 40 MG TABLET] Take 40 mg by mouth daily.       metoprolol succinate (TOPROL-XL) 50 MG 24 hr tablet [METOPROLOL SUCCINATE (TOPROL-XL) 50 MG 24 HR TABLET] Take 1 tablet (50 mg total) by mouth daily. 90 tablet 3     multivitamin with iron (ONE DAILY WITH IRON) Tab tablet [MULTIVITAMIN WITH IRON (ONE DAILY  WITH IRON) TAB TABLET] Take 1 tablet by mouth daily.       nitroglycerin (NITROSTAT) 0.4 MG SL tablet [NITROGLYCERIN (NITROSTAT) 0.4 MG SL TABLET] Place 1 tablet (0.4 mg total) under the tongue every 5 (five) minutes as needed for chest pain. 25 tablet 3     ONETOUCH ULTRA TEST strips [ONETOUCH ULTRA TEST STRIPS] TEST AS DIRECTED 4 TIMES A DAY AND AS NEEDED.  3     rosuvastatin (CRESTOR) 40 MG tablet [ROSUVASTATIN (CRESTOR) 40 MG TABLET] Take 40 mg by mouth bedtime.       TRIAMCINOLONE ACETONIDE (NASACORT NASL) [TRIAMCINOLONE ACETONIDE (NASACORT NASL)] 1 spray into each nostril daily.       VITAMIN D3 2,000 unit capsule [VITAMIN D3 2,000 UNIT CAPSULE] Take 2,000 Units by mouth daily.  3       Allergies   Allergen Reactions     Hydrochlorothiazide Unknown     Levofloxacin Diarrhea          Lab Results    Chemistry/lipid CBC Cardiac Enzymes/BNP/TSH/INR   Recent Labs   Lab Test 12/17/21  0754   CHOL 137   HDL 36*   LDL 76   TRIG 127     Recent Labs   Lab Test 12/17/21  0754 06/17/21  0920 06/02/21  1016   LDL 76 78 68     Recent Labs   Lab Test 12/17/21  0754      POTASSIUM 4.3   CHLORIDE 103   CO2 21*   *   BUN 40*   CR 3.29*   GFRESTIMATED 18*   SVITLANA 9.0     Recent Labs   Lab Test 12/17/21  0754 06/17/21  0920 06/02/21  1016   CR 3.29* 2.80* 2.78*     No results for input(s): A1C in the last 24956 hours.       No results for input(s): WBC, HGB, HCT, MCV, PLT in the last 32754 hours.  No results for input(s): HGB in the last 01694 hours. Recent Labs   Lab Test 06/02/21  1016   TROPONINI 0.13     No results for input(s): BNP, NTBNPI, NTBNP in the last 94005 hours.  Recent Labs   Lab Test 12/17/21  0754   TSH 0.19*     No results for input(s): INR in the last 84885 hours.         Robinson Chopra MD  [facility]

## 2022-02-28 ENCOUNTER — LAB REQUISITION (OUTPATIENT)
Dept: LAB | Facility: CLINIC | Age: 72
End: 2022-02-28

## 2022-02-28 DIAGNOSIS — E03.9 HYPOTHYROIDISM, UNSPECIFIED: ICD-10-CM

## 2022-02-28 LAB — TSH SERPL DL<=0.005 MIU/L-ACNC: 0.7 UIU/ML (ref 0.3–5)

## 2022-02-28 PROCEDURE — 84443 ASSAY THYROID STIM HORMONE: CPT | Performed by: FAMILY MEDICINE

## 2022-04-15 DIAGNOSIS — I25.10 CAD (CORONARY ARTERY DISEASE): ICD-10-CM

## 2022-04-15 RX ORDER — METOPROLOL SUCCINATE 50 MG/1
50 TABLET, EXTENDED RELEASE ORAL DAILY
Qty: 90 TABLET | Refills: 2 | Status: SHIPPED | OUTPATIENT
Start: 2022-04-15 | End: 2022-11-22

## 2022-04-20 ENCOUNTER — LAB REQUISITION (OUTPATIENT)
Dept: LAB | Facility: CLINIC | Age: 72
End: 2022-04-20

## 2022-04-20 DIAGNOSIS — E03.9 HYPOTHYROIDISM, UNSPECIFIED: ICD-10-CM

## 2022-04-20 LAB — TSH SERPL DL<=0.005 MIU/L-ACNC: 1.13 UIU/ML (ref 0.3–5)

## 2022-04-20 PROCEDURE — 84443 ASSAY THYROID STIM HORMONE: CPT | Performed by: FAMILY MEDICINE

## 2022-06-17 ENCOUNTER — LAB REQUISITION (OUTPATIENT)
Dept: LAB | Facility: CLINIC | Age: 72
End: 2022-06-17

## 2022-06-17 DIAGNOSIS — I12.9 HYPERTENSIVE CHRONIC KIDNEY DISEASE WITH STAGE 1 THROUGH STAGE 4 CHRONIC KIDNEY DISEASE, OR UNSPECIFIED CHRONIC KIDNEY DISEASE: ICD-10-CM

## 2022-06-17 DIAGNOSIS — E78.5 HYPERLIPIDEMIA, UNSPECIFIED: ICD-10-CM

## 2022-06-17 LAB
ANION GAP SERPL CALCULATED.3IONS-SCNC: 13 MMOL/L (ref 5–18)
BUN SERPL-MCNC: 48 MG/DL (ref 8–28)
CALCIUM SERPL-MCNC: 9.1 MG/DL (ref 8.5–10.5)
CHLORIDE BLD-SCNC: 103 MMOL/L (ref 98–107)
CHOLEST SERPL-MCNC: 154 MG/DL
CO2 SERPL-SCNC: 21 MMOL/L (ref 22–31)
CREAT SERPL-MCNC: 3.38 MG/DL (ref 0.7–1.3)
FASTING STATUS PATIENT QL REPORTED: ABNORMAL
GFR SERPL CREATININE-BSD FRML MDRD: 19 ML/MIN/1.73M2
GLUCOSE BLD-MCNC: 241 MG/DL (ref 70–125)
HDLC SERPL-MCNC: 42 MG/DL
IRON SATN MFR SERPL: 32 % (ref 20–50)
IRON SERPL-MCNC: 88 UG/DL (ref 42–175)
LDLC SERPL CALC-MCNC: 80 MG/DL
MAGNESIUM SERPL-MCNC: 2.3 MG/DL (ref 1.8–2.6)
POTASSIUM BLD-SCNC: 4.4 MMOL/L (ref 3.5–5)
SODIUM SERPL-SCNC: 137 MMOL/L (ref 136–145)
TIBC SERPL-MCNC: 276 UG/DL (ref 313–563)
TRANSFERRIN SERPL-MCNC: 221 MG/DL (ref 212–360)
TRIGL SERPL-MCNC: 160 MG/DL

## 2022-06-17 PROCEDURE — 80061 LIPID PANEL: CPT | Performed by: FAMILY MEDICINE

## 2022-06-17 PROCEDURE — 84466 ASSAY OF TRANSFERRIN: CPT | Performed by: FAMILY MEDICINE

## 2022-06-17 PROCEDURE — 82310 ASSAY OF CALCIUM: CPT | Performed by: FAMILY MEDICINE

## 2022-06-17 PROCEDURE — 83735 ASSAY OF MAGNESIUM: CPT | Performed by: FAMILY MEDICINE

## 2022-07-03 ENCOUNTER — HEALTH MAINTENANCE LETTER (OUTPATIENT)
Age: 72
End: 2022-07-03

## 2022-08-12 ENCOUNTER — OFFICE VISIT (OUTPATIENT)
Dept: CARDIOLOGY | Facility: CLINIC | Age: 72
End: 2022-08-12
Payer: COMMERCIAL

## 2022-08-12 VITALS
SYSTOLIC BLOOD PRESSURE: 164 MMHG | DIASTOLIC BLOOD PRESSURE: 60 MMHG | HEART RATE: 60 BPM | WEIGHT: 216 LBS | BODY MASS INDEX: 29.29 KG/M2 | RESPIRATION RATE: 14 BRPM

## 2022-08-12 DIAGNOSIS — I25.10 CORONARY ARTERY DISEASE DUE TO LIPID RICH PLAQUE: Primary | ICD-10-CM

## 2022-08-12 DIAGNOSIS — I25.83 CORONARY ARTERY DISEASE DUE TO LIPID RICH PLAQUE: Primary | ICD-10-CM

## 2022-08-12 DIAGNOSIS — I10 BENIGN ESSENTIAL HYPERTENSION: ICD-10-CM

## 2022-08-12 PROCEDURE — 99214 OFFICE O/P EST MOD 30 MIN: CPT | Performed by: INTERNAL MEDICINE

## 2022-08-12 RX ORDER — ATORVASTATIN CALCIUM 40 MG/1
40 TABLET, FILM COATED ORAL DAILY
Qty: 90 TABLET | Refills: 4 | Status: SHIPPED | OUTPATIENT
Start: 2022-08-12 | End: 2023-01-01

## 2022-08-12 RX ORDER — EZETIMIBE 10 MG/1
10 TABLET ORAL DAILY
Qty: 90 TABLET | Refills: 4 | Status: SHIPPED | OUTPATIENT
Start: 2022-08-12

## 2022-08-12 RX ORDER — LEVOTHYROXINE SODIUM 125 UG/1
125 TABLET ORAL DAILY
COMMUNITY
Start: 2022-07-27

## 2022-08-12 NOTE — LETTER
8/12/2022    Jamal Gaffney MD  [facility] [address]  [facility] [address]    RE: Dakota Bauer       Dear Colleague,     I had the pleasure of seeing Dakota Bauer in the [facility].    HEART CARE ENCOUNTER CONSULTATON NOTE      [facility]  [phone]      Assessment/Recommendations   Assessment/Plan:  1.  Coronary artery disease.  No complaints of anginal chest discomfort.  Recent nuclear stress test within the past approximate 1 year showed no significant ischemia with normal LV function, small area of nontransmural infarction in the distal anterior septal and basilar inferior wall.  Increased risk of contrast with prior angiography.  Continued medical management and close monitoring.    2.  Hypertension.  Blood pressure is variable.  He has chronic renal insufficiency and follows with .  He does have difficulties with orthostatic hypotension if blood pressure drops too low.  He has some mild lightheadedness with mild trend towards bradycardia.  He does not have profound syncope or near syncope.  We discussed whether we would utilize a Holter monitor but at this point he would like to hold on it as he is feeling okay and will keep me updated.    3.  Dyslipidemia.  He is on 40 mg of rosuvastatin in the setting of renal insufficiency.  His LDL was mildly above goal.  I have suggested that we change him to 40 mg of atorvastatin with 10 of Zetia and that we recheck his lipids, AST and ALT in 2 months.  He is in agreement with this plan and I did make these changes.    Plan as outlined above with follow-up in 6 months with lipids, AST and ALT in 2 months.       History of Present Illness/Subjective    HPI: Dakota Bauer is a 71 year old male who is seen in follow-up.  He reports that overall he has been feeling well.  He has had no anginal type chest discomfort or significant shortness of breath.  He reports that he has seen Dr. Bhatia and I have  reviewed recent notes from his primary care provider Dr. Gaffney.  He reports that blood pressures have been under reasonable control for the most part and he brings in blood pressure records for my review.  He does have some occasional lightheadedness upon standing but no syncope or near syncope.    He has a history of coronary artery disease, diabetes mellitus, renal insufficiency.  He had a nuclear stress test June 2021 that demonstrated no significant ischemia.  He has a history of remote bypass surgery in 2005 and in 2016 had findings of patent grafts with some disease in target vessels with medical management planned at that time as outlined.    Recent Echocardiogram Results:    Conclusion    Cardiac Diagnostic Report      Demographics      Patient Name KYLAH BARCLAY      Referring Physician    MJ BENAVIDES MD      MRN #        [id number]          Diagnostic Physician   SHYAM ELLIS MD      Account #    [id number]           Interventional                                   Physician      D.O.B        1950         Report Status:      Date of      11/25/2016 08:50   Study        AM     Procedure      Procedure Type      Diagnostic procedure:Left Heart Catheterization , Venous Graft   Catheterization, LIMA Graft Catheterization, Coronary   Angiogram      Conclusions      Procedure Summary   Known CAD, presenting with occasional CP and abnormal stress   test      LM 30% mid   LAD occluded proximally   LCx occluded proximally   Ramus 60% proximal stenosis, small caliber vessel with diffuse   disease   RCA 50% distal stenosis feeding small caliber PDA      LIMA to LAD patent, good distal LAD target   SVG to diagonal patent, mild disease in target diagonal   SVG to OM patent, diffusely diseased target vessel with 70%   stenosis, affecting retrograde filling into mid Cx      EDP 11      Overall, diffuse CAD, but patent grafts. Potential areas for   PCI would include ostial ramus and  of Cx to improve flow    into mid Cx. However, the ramus is a small caliber vessel and   the ostial Cx is heavily calcified, therefore would reserve   PCI if he has significant increase in symptoms, and failing   medical therapy      Recommendations      Cont risk factor control and medical management      Signatures      Electronically signed by SHYAM ELLIS MD   (Diagnostic Physician) on 2016 at 09:37 AM       Recent Coronary Angiogram Results:    Dakota Bauer MRN   [id number] Legal Sex   Male              Age   1950 (71 year old)       NM Lexiscan stress test  Order: [id number]   Status: Final result     Visible to patient: Yes (seen)     Next appt: 2022 at 09:20 AM in Cardiology (Robinson Chopra MD)     Dx: CAD (coronary artery disease)     1 Result Note    Details    Reading Physician Reading Date Result Priority   Provider, Historical 2021 Routine   Marleni Gavin MD  [phone] 2021      Result Text     The nuclear stress test is abnormal.     Nuclear images demonstrate a small area of nontransmural infarctions involving the distal anteroseptal wall and basal inferolateral wall.  No ischemia identified.     The left ventricular ejection fraction at stress is 62% without wall motion abnormality.     A prior study was conducted on 2019.  No change identified.     The patient is at a low risk of future cardiac ischemic events.        INTERPRETATION DATE:  2017     TEST DATE:  10/31/2017       INTERPRETATION:  Predominant rhythm was sinus rhythm with rates ranging from 42  beats per minute to 85 beats per minute.  Tendency towards sinus bradycardia was  noted with average heart rate of 56 beats per minute and nocturnal resting heart  rates commonly 45 to 50 beats per minute.  There were no clinically significant  pauses.  Rare atrial premature beats were noted 16 over 24 hours.  Occasional  ventricular premature beats were noted, 574 over 24 hours with occasional  ventricular trigeminy.   There were 4 ventricular couplets.  Rate-dependent left  bundle branch block aberrancy was rarely noted with heart rates greater than 75  beats per minute.     CONCLUSION:  Tendency towards sinus bradycardia noted, but no clinically significant  pauses.  Rate-dependent left bundle branch block aberrancy was also present.        TJ RAMIREZ MD  pa  D 11/01/2017 14:07:17  T 11/01/2017 14:13:11  R 11/01/2017 14:13:11       Physical Examination  Review of Systems   Vitals: 164/60, repeat during my examination 136/60, weight 216 pounds, heart rate 50s and regular  Wt Readings from Last 3 Encounters:   01/21/22 99.3 kg (219 lb)   07/26/21 103 kg (227 lb)   06/02/21 102.1 kg (225 lb)       General Appearance:   no distress, normal body habitus   ENT/Mouth:  Wearing a facemask.      EYES:  no scleral icterus, normal conjunctivae   Neck: no carotid bruits or thyromegaly   Chest/Lungs:   lungs are clear to auscultation, no rales or wheezing, well-healed sternal scar, equal chest wall expansion    Cardiovascular:    Distant, regular. Normal first and second heart sounds with soft systolic murmur, no rubs, or gallops; the carotid, radial and posterior tibial pulses are intact, Jugular venous pressure within normal limits, no edema bilaterally    Abdomen:  no organomegaly, masses, bruits, or tenderness; bowel sounds are present   Extremities: no cyanosis or clubbing   Skin: no xanthelasma, warm.    Neurologic: , no tremors     Psychiatric: alert and oriented x3, calm        Please refer above for cardiac ROS details.        Medical History  Surgical History Family History Social History   Past Medical History:   Diagnosis Date     Coronary artery disease      Diabetes mellitus (H)      Hyperlipidemia      Hypertension      Past Surgical History:   Procedure Laterality Date     BYPASS GRAFT ARTERY CORONARY  2005    [facility] with Dr. Holter     CARDIAC CATHETERIZATION  2005     CARDIAC CATHETERIZATION  11/25/2016    no  intervention     CERVICAL DISC SURGERY       Family History   Problem Relation Age of Onset     Coronary Artery Disease Father      Coronary Artery Disease Brother         Social History     Socioeconomic History     Marital status:      Spouse name: Not on file     Number of children: Not on file     Years of education: Not on file     Highest education level: Not on file   Occupational History     Not on file   Tobacco Use     Smoking status: Former Smoker     Quit date: 10/10/1984     Years since quittin.8     Smokeless tobacco: Never Used   Substance and Sexual Activity     Alcohol use: Not on file     Drug use: Not on file     Sexual activity: Not on file   Other Topics Concern     Not on file   Social History Narrative     Not on file     Social Determinants of Health     Financial Resource Strain: Not on file   Food Insecurity: Not on file   Transportation Needs: Not on file   Physical Activity: Not on file   Stress: Not on file   Social Connections: Not on file   Intimate Partner Violence: Not on file   Housing Stability: Not on file           Medications  Allergies   Current Outpatient Medications   Medication Sig Dispense Refill     ALPRAZolam (XANAX) 0.5 MG tablet [ALPRAZOLAM (XANAX) 0.5 MG TABLET] Take 0.5 mg by mouth daily.        aspirin 81 MG EC tablet [ASPIRIN 81 MG EC TABLET] Take 81 mg by mouth daily.       Continuous Blood Gluc Sensor (FREESTYLE DAVID 14 DAY SENSOR) MISC        fish oil-omega-3 fatty acids 1000 MG capsule Take 1 capsule by mouth daily       furosemide (LASIX) 20 MG tablet [FUROSEMIDE (LASIX) 20 MG TABLET] Take 20 mg by mouth daily.  9     gabapentin (NEURONTIN) 300 MG capsule [GABAPENTIN (NEURONTIN) 300 MG CAPSULE] Take 300 mg by mouth 3 (three) times a day.              HUMALOG 100 unit/mL injection [HUMALOG 100 UNIT/ML INJECTION] Inject under the skin 3 (three) times a day before meals.              insulin glargine (LANTUS) 100 unit/mL injection [INSULIN  "GLARGINE (LANTUS) 100 UNIT/ML INJECTION] Inject 30 Units under the skin every morning.        levothyroxine (SYNTHROID/LEVOTHROID) 137 MCG tablet Take 150 mcg by mouth daily        lisinopril (PRINIVIL,ZESTRIL) 40 MG tablet [LISINOPRIL (PRINIVIL,ZESTRIL) 40 MG TABLET] Take 40 mg by mouth daily.       metoprolol succinate ER (TOPROL-XL) 50 MG 24 hr tablet Take 1 tablet (50 mg) by mouth daily 90 tablet 2     MONOJECT ULTRA COMFORT SYRINGE 30G X 5/16\" 0.3 ML        multivitamin with iron (ONE DAILY WITH IRON) Tab tablet [MULTIVITAMIN WITH IRON (ONE DAILY WITH IRON) TAB TABLET] Take 1 tablet by mouth daily.       nitroglycerin (NITROSTAT) 0.4 MG SL tablet [NITROGLYCERIN (NITROSTAT) 0.4 MG SL TABLET] Place 1 tablet (0.4 mg total) under the tongue every 5 (five) minutes as needed for chest pain. 25 tablet 3     rosuvastatin (CRESTOR) 40 MG tablet [ROSUVASTATIN (CRESTOR) 40 MG TABLET] Take 40 mg by mouth bedtime.       TECHLITE PEN NEEDLES 31G X 5 MM miscellaneous        TRIAMCINOLONE ACETONIDE (NASACORT NASL) [TRIAMCINOLONE ACETONIDE (NASACORT NASL)] 1 spray into each nostril daily.       VITAMIN D3 2,000 unit capsule [VITAMIN D3 2,000 UNIT CAPSULE] Take 2,000 Units by mouth daily.  3       Allergies   Allergen Reactions     Hydrochlorothiazide Unknown     Levofloxacin Diarrhea          Lab Results    Chemistry/lipid CBC Cardiac Enzymes/BNP/TSH/INR   Recent Labs   Lab Test 06/17/22  0851   CHOL 154   HDL 42   LDL 80   TRIG 160*     Recent Labs   Lab Test 06/17/22  0851 12/17/21  0754 06/17/21  0920   LDL 80 76 78     Recent Labs   Lab Test 06/17/22  0851      POTASSIUM 4.4   CHLORIDE 103   CO2 21*   *   BUN 48*   CR 3.38*   GFRESTIMATED 19*   SVITLANA 9.1     Recent Labs   Lab Test 06/17/22  0851 12/17/21  0754 06/17/21  0920   CR 3.38* 3.29* 2.80*     No results for input(s): A1C in the last 93500 hours.       No results for input(s): WBC, HGB, HCT, MCV, PLT in the last 73775 hours.  No results for input(s): HGB " in the last 46633 hours. Recent Labs   Lab Test 06/02/21  1016   TROPONINI 0.13     No results for input(s): BNP, NTBNPI, NTBNP in the last 67468 hours.  Recent Labs   Lab Test 04/20/22  0918   TSH 1.13     No results for input(s): INR in the last 42837 hours.     Robinson Chopra MD    Thank you for allowing me to participate in the care of your patient.      Sincerely,     Robinson Chopra MD     [facility]    cc:   Robinson Chopra MD  [address]  [address]  [place] [address]

## 2022-08-12 NOTE — PATIENT INSTRUCTIONS
We discussed adding Zetia to your cholesterol treatment.We also discussed stopping Rosuvastatin and starting Atorvastatin which is generic lipitor.We will plan to recheck your blood work in 2 months.Please let me know if you have any symptoms or concerns.My nurse is Clari and her number is [phone]  We talked about Jesus Lewis or partner at [facility].

## 2022-08-26 ENCOUNTER — LAB REQUISITION (OUTPATIENT)
Dept: LAB | Facility: CLINIC | Age: 72
End: 2022-08-26

## 2022-08-26 DIAGNOSIS — S91.302A UNSPECIFIED OPEN WOUND, LEFT FOOT, INITIAL ENCOUNTER: ICD-10-CM

## 2022-08-26 PROCEDURE — 87070 CULTURE OTHR SPECIMN AEROBIC: CPT | Performed by: FAMILY MEDICINE

## 2022-08-29 LAB
BACTERIA WND CULT: ABNORMAL
BACTERIA WND CULT: ABNORMAL

## 2022-09-08 ENCOUNTER — LAB REQUISITION (OUTPATIENT)
Dept: LAB | Facility: CLINIC | Age: 72
End: 2022-09-08

## 2022-09-08 ENCOUNTER — TELEPHONE (OUTPATIENT)
Dept: CARDIOLOGY | Facility: CLINIC | Age: 72
End: 2022-09-08

## 2022-09-08 DIAGNOSIS — S91.302D UNSPECIFIED OPEN WOUND, LEFT FOOT, SUBSEQUENT ENCOUNTER: ICD-10-CM

## 2022-09-08 LAB — ERYTHROCYTE [SEDIMENTATION RATE] IN BLOOD BY WESTERGREN METHOD: 36 MM/HR (ref 0–20)

## 2022-09-08 PROCEDURE — 85652 RBC SED RATE AUTOMATED: CPT | Performed by: PHYSICIAN ASSISTANT

## 2022-09-14 ENCOUNTER — TELEPHONE (OUTPATIENT)
Dept: CARDIOLOGY | Facility: CLINIC | Age: 72
End: 2022-09-14

## 2022-09-14 NOTE — TELEPHONE ENCOUNTER
Last Blood Pressure: 164/60  Last Heart Rate: 60  Date: 8/12/22  Location: [facility]    8/12/22 Blood Pressure: 136/60   Heart Rate: 60  Location: [facility]   Retaken at visit by Dr. Chopra     Patient reported blood pressure updated in Epic. Blood pressure falls within [place] Community Measures guidelines.  Patient will follow up as previously advised.

## 2022-10-12 ENCOUNTER — LAB (OUTPATIENT)
Dept: CARDIOLOGY | Facility: CLINIC | Age: 72
End: 2022-10-12
Payer: COMMERCIAL

## 2022-10-12 DIAGNOSIS — I25.83 CORONARY ARTERY DISEASE DUE TO LIPID RICH PLAQUE: ICD-10-CM

## 2022-10-12 DIAGNOSIS — I25.10 CORONARY ARTERY DISEASE DUE TO LIPID RICH PLAQUE: ICD-10-CM

## 2022-10-12 LAB
ALBUMIN SERPL BCG-MCNC: 3.8 G/DL (ref 3.5–5.2)
ALP SERPL-CCNC: 78 U/L (ref 40–129)
ALT SERPL W P-5'-P-CCNC: 18 U/L (ref 10–50)
ANION GAP SERPL CALCULATED.3IONS-SCNC: 11 MMOL/L (ref 7–15)
AST SERPL W P-5'-P-CCNC: 29 U/L (ref 10–50)
BILIRUB SERPL-MCNC: 0.5 MG/DL
BUN SERPL-MCNC: 30.5 MG/DL (ref 8–23)
CALCIUM SERPL-MCNC: 8.9 MG/DL (ref 8.8–10.2)
CHLORIDE SERPL-SCNC: 97 MMOL/L (ref 98–107)
CHOLEST SERPL-MCNC: 99 MG/DL
CREAT SERPL-MCNC: 2.47 MG/DL (ref 0.67–1.17)
DEPRECATED HCO3 PLAS-SCNC: 26 MMOL/L (ref 22–29)
GFR SERPL CREATININE-BSD FRML MDRD: 27 ML/MIN/1.73M2
GLUCOSE SERPL-MCNC: 244 MG/DL (ref 70–99)
HDLC SERPL-MCNC: 39 MG/DL
LDLC SERPL CALC-MCNC: 41 MG/DL
NONHDLC SERPL-MCNC: 60 MG/DL
POTASSIUM SERPL-SCNC: 4.3 MMOL/L (ref 3.4–5.3)
PROT SERPL-MCNC: 6.6 G/DL (ref 6.4–8.3)
SODIUM SERPL-SCNC: 134 MMOL/L (ref 136–145)
TRIGL SERPL-MCNC: 97 MG/DL

## 2022-10-12 PROCEDURE — 80061 LIPID PANEL: CPT

## 2022-10-12 PROCEDURE — 36415 COLL VENOUS BLD VENIPUNCTURE: CPT

## 2022-10-12 PROCEDURE — 80053 COMPREHEN METABOLIC PANEL: CPT

## 2022-11-22 DIAGNOSIS — I25.10 CAD (CORONARY ARTERY DISEASE): ICD-10-CM

## 2022-11-22 RX ORDER — METOPROLOL SUCCINATE 50 MG/1
50 TABLET, EXTENDED RELEASE ORAL DAILY
Qty: 90 TABLET | Refills: 2 | Status: SHIPPED | OUTPATIENT
Start: 2022-11-22 | End: 2024-01-01

## 2022-12-16 ENCOUNTER — TRANSFERRED RECORDS (OUTPATIENT)
Dept: HEALTH INFORMATION MANAGEMENT | Facility: CLINIC | Age: 72
End: 2022-12-16

## 2022-12-16 ENCOUNTER — LAB REQUISITION (OUTPATIENT)
Dept: LAB | Facility: CLINIC | Age: 72
End: 2022-12-16

## 2022-12-16 DIAGNOSIS — E78.5 HYPERLIPIDEMIA, UNSPECIFIED: ICD-10-CM

## 2022-12-16 DIAGNOSIS — I12.9 HYPERTENSIVE CHRONIC KIDNEY DISEASE WITH STAGE 1 THROUGH STAGE 4 CHRONIC KIDNEY DISEASE, OR UNSPECIFIED CHRONIC KIDNEY DISEASE: ICD-10-CM

## 2022-12-16 LAB
ANION GAP SERPL CALCULATED.3IONS-SCNC: 11 MMOL/L (ref 7–15)
BUN SERPL-MCNC: 53 MG/DL (ref 8–23)
CALCIUM SERPL-MCNC: 8.9 MG/DL (ref 8.8–10.2)
CHLORIDE SERPL-SCNC: 102 MMOL/L (ref 98–107)
CHOLEST SERPL-MCNC: 118 MG/DL
CREAT SERPL-MCNC: 2.77 MG/DL (ref 0.67–1.17)
DEPRECATED HCO3 PLAS-SCNC: 26 MMOL/L (ref 22–29)
GFR SERPL CREATININE-BSD FRML MDRD: 24 ML/MIN/1.73M2
GLUCOSE SERPL-MCNC: 134 MG/DL (ref 70–99)
HDLC SERPL-MCNC: 43 MG/DL
LDLC SERPL CALC-MCNC: 60 MG/DL
NONHDLC SERPL-MCNC: 75 MG/DL
POTASSIUM SERPL-SCNC: 4.7 MMOL/L (ref 3.4–5.3)
SODIUM SERPL-SCNC: 139 MMOL/L (ref 136–145)
TRIGL SERPL-MCNC: 76 MG/DL

## 2022-12-16 PROCEDURE — 80048 BASIC METABOLIC PNL TOTAL CA: CPT | Performed by: FAMILY MEDICINE

## 2022-12-16 PROCEDURE — 80061 LIPID PANEL: CPT | Performed by: FAMILY MEDICINE

## 2023-01-01 ENCOUNTER — LAB REQUISITION (OUTPATIENT)
Dept: LAB | Facility: CLINIC | Age: 73
End: 2023-01-01

## 2023-01-01 ENCOUNTER — TELEPHONE (OUTPATIENT)
Dept: VASCULAR SURGERY | Facility: CLINIC | Age: 73
End: 2023-01-01

## 2023-01-01 ENCOUNTER — HOSPITAL ENCOUNTER (OUTPATIENT)
Dept: CARDIOLOGY | Facility: HOSPITAL | Age: 73
Discharge: HOME | End: 2023-06-09
Attending: INTERNAL MEDICINE
Payer: COMMERCIAL

## 2023-01-01 ENCOUNTER — TELEPHONE (OUTPATIENT)
Dept: PHARMACY | Facility: PHYSICIAN GROUP | Age: 73
End: 2023-01-01

## 2023-01-01 ENCOUNTER — APPOINTMENT (OUTPATIENT)
Dept: OCCUPATIONAL THERAPY | Facility: HOSPITAL | Age: 73
DRG: 246 | End: 2023-01-01
Payer: COMMERCIAL

## 2023-01-01 ENCOUNTER — HOSPITAL ENCOUNTER (OUTPATIENT)
Dept: INTERVENTIONAL RADIOLOGY/VASCULAR | Facility: HOSPITAL | Age: 73
Discharge: HOME | End: 2023-06-21
Attending: SURGERY | Admitting: SURGERY
Payer: COMMERCIAL

## 2023-01-01 ENCOUNTER — OFFICE VISIT (OUTPATIENT)
Dept: VASCULAR SURGERY | Facility: CLINIC | Age: 73
End: 2023-01-01
Attending: SURGERY
Payer: COMMERCIAL

## 2023-01-01 ENCOUNTER — HOSPITAL ENCOUNTER (OUTPATIENT)
Dept: CARDIAC REHAB | Facility: HOSPITAL | Age: 73
Discharge: HOME | End: 2023-09-14
Attending: INTERNAL MEDICINE
Payer: COMMERCIAL

## 2023-01-01 ENCOUNTER — OFFICE VISIT (OUTPATIENT)
Dept: CARDIOLOGY | Facility: CLINIC | Age: 73
End: 2023-01-01
Payer: COMMERCIAL

## 2023-01-01 ENCOUNTER — TELEPHONE (OUTPATIENT)
Dept: CARDIOLOGY | Facility: CLINIC | Age: 73
End: 2023-01-01

## 2023-01-01 ENCOUNTER — HEALTH MAINTENANCE LETTER (OUTPATIENT)
Age: 73
End: 2023-01-01

## 2023-01-01 ENCOUNTER — OFFICE VISIT (OUTPATIENT)
Dept: CARDIOLOGY | Facility: CLINIC | Age: 73
End: 2023-01-01
Attending: INTERNAL MEDICINE
Payer: COMMERCIAL

## 2023-01-01 ENCOUNTER — HOSPITAL ENCOUNTER (OUTPATIENT)
Dept: NUCLEAR MEDICINE | Facility: HOSPITAL | Age: 73
Discharge: HOME | End: 2023-06-09
Attending: INTERNAL MEDICINE
Payer: COMMERCIAL

## 2023-01-01 ENCOUNTER — ANCILLARY PROCEDURE (OUTPATIENT)
Dept: VASCULAR ULTRASOUND | Facility: CLINIC | Age: 73
End: 2023-01-01
Attending: SURGERY
Payer: COMMERCIAL

## 2023-01-01 ENCOUNTER — APPOINTMENT (OUTPATIENT)
Dept: RADIOLOGY | Facility: HOSPITAL | Age: 73
DRG: 246 | End: 2023-01-01
Attending: EMERGENCY MEDICINE
Payer: COMMERCIAL

## 2023-01-01 ENCOUNTER — OFFICE VISIT (OUTPATIENT)
Dept: FAMILY MEDICINE | Facility: CLINIC | Age: 73
End: 2023-01-01
Payer: COMMERCIAL

## 2023-01-01 ENCOUNTER — APPOINTMENT (OUTPATIENT)
Dept: MRI IMAGING | Facility: HOSPITAL | Age: 73
DRG: 246 | End: 2023-01-01
Attending: INTERNAL MEDICINE
Payer: COMMERCIAL

## 2023-01-01 ENCOUNTER — APPOINTMENT (OUTPATIENT)
Dept: CARDIOLOGY | Facility: HOSPITAL | Age: 73
DRG: 246 | End: 2023-01-01
Attending: INTERNAL MEDICINE
Payer: COMMERCIAL

## 2023-01-01 ENCOUNTER — APPOINTMENT (OUTPATIENT)
Dept: OCCUPATIONAL THERAPY | Facility: HOSPITAL | Age: 73
DRG: 246 | End: 2023-01-01
Attending: INTERNAL MEDICINE
Payer: COMMERCIAL

## 2023-01-01 ENCOUNTER — TRANSFERRED RECORDS (OUTPATIENT)
Dept: HEALTH INFORMATION MANAGEMENT | Facility: CLINIC | Age: 73
End: 2023-01-01

## 2023-01-01 ENCOUNTER — OFFICE VISIT (OUTPATIENT)
Dept: VASCULAR SURGERY | Facility: CLINIC | Age: 73
End: 2023-01-01
Payer: COMMERCIAL

## 2023-01-01 ENCOUNTER — HOSPITAL ENCOUNTER (INPATIENT)
Facility: HOSPITAL | Age: 73
LOS: 6 days | Discharge: HOME | DRG: 246 | End: 2023-08-02
Attending: EMERGENCY MEDICINE | Admitting: INTERNAL MEDICINE
Payer: COMMERCIAL

## 2023-01-01 ENCOUNTER — APPOINTMENT (OUTPATIENT)
Dept: PHYSICAL THERAPY | Facility: HOSPITAL | Age: 73
DRG: 246 | End: 2023-01-01
Attending: INTERNAL MEDICINE
Payer: COMMERCIAL

## 2023-01-01 ENCOUNTER — APPOINTMENT (OUTPATIENT)
Dept: ULTRASOUND IMAGING | Facility: HOSPITAL | Age: 73
DRG: 246 | End: 2023-01-01
Attending: PSYCHIATRY & NEUROLOGY
Payer: COMMERCIAL

## 2023-01-01 VITALS
SYSTOLIC BLOOD PRESSURE: 159 MMHG | TEMPERATURE: 97.5 F | HEART RATE: 54 BPM | DIASTOLIC BLOOD PRESSURE: 73 MMHG | RESPIRATION RATE: 16 BRPM | OXYGEN SATURATION: 96 %

## 2023-01-01 VITALS
SYSTOLIC BLOOD PRESSURE: 125 MMHG | WEIGHT: 221.4 LBS | HEIGHT: 72 IN | BODY MASS INDEX: 29.99 KG/M2 | HEART RATE: 59 BPM | TEMPERATURE: 98.7 F | OXYGEN SATURATION: 98 % | RESPIRATION RATE: 18 BRPM | DIASTOLIC BLOOD PRESSURE: 60 MMHG

## 2023-01-01 VITALS
DIASTOLIC BLOOD PRESSURE: 70 MMHG | RESPIRATION RATE: 16 BRPM | SYSTOLIC BLOOD PRESSURE: 128 MMHG | BODY MASS INDEX: 29.39 KG/M2 | HEART RATE: 62 BPM | HEIGHT: 72 IN | OXYGEN SATURATION: 100 % | WEIGHT: 217 LBS

## 2023-01-01 VITALS
DIASTOLIC BLOOD PRESSURE: 78 MMHG | WEIGHT: 228 LBS | SYSTOLIC BLOOD PRESSURE: 130 MMHG | BODY MASS INDEX: 30.92 KG/M2 | HEART RATE: 58 BPM | OXYGEN SATURATION: 97 % | RESPIRATION RATE: 16 BRPM

## 2023-01-01 VITALS
SYSTOLIC BLOOD PRESSURE: 155 MMHG | DIASTOLIC BLOOD PRESSURE: 66 MMHG | OXYGEN SATURATION: 100 % | RESPIRATION RATE: 18 BRPM | TEMPERATURE: 98.1 F | HEART RATE: 58 BPM

## 2023-01-01 VITALS
HEART RATE: 60 BPM | WEIGHT: 216 LBS | RESPIRATION RATE: 14 BRPM | DIASTOLIC BLOOD PRESSURE: 58 MMHG | SYSTOLIC BLOOD PRESSURE: 150 MMHG | BODY MASS INDEX: 29.29 KG/M2

## 2023-01-01 VITALS — DIASTOLIC BLOOD PRESSURE: 62 MMHG | SYSTOLIC BLOOD PRESSURE: 134 MMHG

## 2023-01-01 VITALS
HEART RATE: 58 BPM | OXYGEN SATURATION: 98 % | DIASTOLIC BLOOD PRESSURE: 64 MMHG | TEMPERATURE: 98 F | SYSTOLIC BLOOD PRESSURE: 156 MMHG | RESPIRATION RATE: 16 BRPM

## 2023-01-01 VITALS
DIASTOLIC BLOOD PRESSURE: 58 MMHG | RESPIRATION RATE: 16 BRPM | SYSTOLIC BLOOD PRESSURE: 140 MMHG | HEART RATE: 56 BPM | TEMPERATURE: 97.6 F

## 2023-01-01 VITALS
HEIGHT: 72 IN | WEIGHT: 221 LBS | DIASTOLIC BLOOD PRESSURE: 62 MMHG | OXYGEN SATURATION: 99 % | BODY MASS INDEX: 29.93 KG/M2 | HEART RATE: 57 BPM | SYSTOLIC BLOOD PRESSURE: 156 MMHG | RESPIRATION RATE: 16 BRPM

## 2023-01-01 DIAGNOSIS — S91.302A OPEN WOUND OF LEFT FOOT, INITIAL ENCOUNTER: ICD-10-CM

## 2023-01-01 DIAGNOSIS — T14.8XXA BLEEDING FROM WOUND: Primary | ICD-10-CM

## 2023-01-01 DIAGNOSIS — I21.4 NSTEMI (NON-ST ELEVATED MYOCARDIAL INFARCTION) (H): Primary | ICD-10-CM

## 2023-01-01 DIAGNOSIS — R06.09 DOE (DYSPNEA ON EXERTION): Primary | ICD-10-CM

## 2023-01-01 DIAGNOSIS — I50.20 HEART FAILURE WITH REDUCED EJECTION FRACTION, NYHA CLASS I (H): ICD-10-CM

## 2023-01-01 DIAGNOSIS — Z13.6 ENCOUNTER FOR SCREENING FOR ABDOMINAL AORTIC ANEURYSM (AAA) IN PATIENT 50 YEARS OF AGE OR OLDER WITHOUT OTHER RISK FACTORS FOR AAA: ICD-10-CM

## 2023-01-01 DIAGNOSIS — I25.10 CORONARY ARTERY DISEASE DUE TO LIPID RICH PLAQUE: Primary | ICD-10-CM

## 2023-01-01 DIAGNOSIS — E03.9 HYPOTHYROIDISM, UNSPECIFIED: ICD-10-CM

## 2023-01-01 DIAGNOSIS — N18.4 CKD (CHRONIC KIDNEY DISEASE) STAGE 4, GFR 15-29 ML/MIN (H): Primary | ICD-10-CM

## 2023-01-01 DIAGNOSIS — I10 BENIGN ESSENTIAL HYPERTENSION: ICD-10-CM

## 2023-01-01 DIAGNOSIS — R06.09 DOE (DYSPNEA ON EXERTION): ICD-10-CM

## 2023-01-01 DIAGNOSIS — I73.9 PAD (PERIPHERAL ARTERY DISEASE) (H): ICD-10-CM

## 2023-01-01 DIAGNOSIS — I15.0 RENOVASCULAR HYPERTENSION: ICD-10-CM

## 2023-01-01 DIAGNOSIS — I70.248 ATHEROSCLEROSIS OF NATIVE ARTERIES OF LEFT LEG WITH ULCERATION OF OTHER PART OF LOWER LEG (H): ICD-10-CM

## 2023-01-01 DIAGNOSIS — N18.4 TYPE 1 DIABETES MELLITUS WITH STAGE 4 CHRONIC KIDNEY DISEASE (H): Chronic | ICD-10-CM

## 2023-01-01 DIAGNOSIS — R52 PAIN: ICD-10-CM

## 2023-01-01 DIAGNOSIS — I63.132 CEREBRAL INFARCTION DUE TO EMBOLISM OF LEFT CAROTID ARTERY (H): ICD-10-CM

## 2023-01-01 DIAGNOSIS — N18.4 ACUTE RENAL FAILURE SUPERIMPOSED ON STAGE 4 CHRONIC KIDNEY DISEASE, UNSPECIFIED ACUTE RENAL FAILURE TYPE (H): ICD-10-CM

## 2023-01-01 DIAGNOSIS — R78.81 STREPTOCOCCAL BACTEREMIA: ICD-10-CM

## 2023-01-01 DIAGNOSIS — E78.5 HYPERLIPIDEMIA, UNSPECIFIED: ICD-10-CM

## 2023-01-01 DIAGNOSIS — N17.9 ACUTE RENAL FAILURE SUPERIMPOSED ON STAGE 4 CHRONIC KIDNEY DISEASE, UNSPECIFIED ACUTE RENAL FAILURE TYPE (H): ICD-10-CM

## 2023-01-01 DIAGNOSIS — Z12.5 ENCOUNTER FOR SCREENING FOR MALIGNANT NEOPLASM OF PROSTATE: ICD-10-CM

## 2023-01-01 DIAGNOSIS — I73.9 PAD (PERIPHERAL ARTERY DISEASE) (H): Primary | ICD-10-CM

## 2023-01-01 DIAGNOSIS — I12.9 HYPERTENSIVE CHRONIC KIDNEY DISEASE WITH STAGE 1 THROUGH STAGE 4 CHRONIC KIDNEY DISEASE, OR UNSPECIFIED CHRONIC KIDNEY DISEASE: ICD-10-CM

## 2023-01-01 DIAGNOSIS — B95.5 STREPTOCOCCAL BACTEREMIA: ICD-10-CM

## 2023-01-01 DIAGNOSIS — G47.33 OBSTRUCTIVE SLEEP APNEA: ICD-10-CM

## 2023-01-01 DIAGNOSIS — F41.9 ANXIETY: ICD-10-CM

## 2023-01-01 DIAGNOSIS — I21.4 NSTEMI (NON-ST ELEVATED MYOCARDIAL INFARCTION) (H): ICD-10-CM

## 2023-01-01 DIAGNOSIS — I73.9 PVD (PERIPHERAL VASCULAR DISEASE) (H): Primary | ICD-10-CM

## 2023-01-01 DIAGNOSIS — D50.9 IRON DEFICIENCY ANEMIA, UNSPECIFIED IRON DEFICIENCY ANEMIA TYPE: ICD-10-CM

## 2023-01-01 DIAGNOSIS — I25.10 CORONARY ARTERY DISEASE INVOLVING NATIVE CORONARY ARTERY OF NATIVE HEART WITHOUT ANGINA PECTORIS: ICD-10-CM

## 2023-01-01 DIAGNOSIS — E10.22 TYPE 1 DIABETES MELLITUS WITH STAGE 4 CHRONIC KIDNEY DISEASE (H): Chronic | ICD-10-CM

## 2023-01-01 DIAGNOSIS — E55.9 VITAMIN D DEFICIENCY, UNSPECIFIED: ICD-10-CM

## 2023-01-01 DIAGNOSIS — E78.5 HYPERLIPIDEMIA LDL GOAL <70: ICD-10-CM

## 2023-01-01 DIAGNOSIS — I25.83 CORONARY ARTERY DISEASE DUE TO LIPID RICH PLAQUE: Primary | ICD-10-CM

## 2023-01-01 LAB
ABO/RH(D): NORMAL
ACT BLD: 209 SECONDS (ref 74–150)
ACT BLD: 451 SECONDS (ref 74–150)
ALBUMIN SERPL BCG-MCNC: 2.8 G/DL (ref 3.5–5.2)
ALBUMIN SERPL BCG-MCNC: 2.9 G/DL (ref 3.5–5.2)
ALBUMIN SERPL BCG-MCNC: 3 G/DL (ref 3.5–5.2)
ALBUMIN SERPL BCG-MCNC: 3.2 G/DL (ref 3.5–5.2)
ALBUMIN SERPL BCG-MCNC: 3.2 G/DL (ref 3.5–5.2)
ALBUMIN SERPL BCG-MCNC: 3.5 G/DL (ref 3.5–5.2)
ALBUMIN UR-MCNC: 200 MG/DL
ALP SERPL-CCNC: 43 U/L (ref 40–129)
ALP SERPL-CCNC: 48 U/L (ref 40–129)
ALP SERPL-CCNC: 49 U/L (ref 40–129)
ALP SERPL-CCNC: 50 U/L (ref 40–129)
ALP SERPL-CCNC: 50 U/L (ref 40–129)
ALP SERPL-CCNC: 59 U/L (ref 40–129)
ALT SERPL W P-5'-P-CCNC: 103 U/L (ref 0–70)
ALT SERPL W P-5'-P-CCNC: 45 U/L (ref 0–70)
ALT SERPL W P-5'-P-CCNC: 45 U/L (ref 0–70)
ALT SERPL W P-5'-P-CCNC: 56 U/L (ref 0–70)
ALT SERPL W P-5'-P-CCNC: 71 U/L (ref 0–70)
ALT SERPL W P-5'-P-CCNC: 87 U/L (ref 0–70)
ANION GAP SERPL CALCULATED.3IONS-SCNC: 10 MMOL/L (ref 7–15)
ANION GAP SERPL CALCULATED.3IONS-SCNC: 11 MMOL/L (ref 7–15)
ANION GAP SERPL CALCULATED.3IONS-SCNC: 11 MMOL/L (ref 7–15)
ANION GAP SERPL CALCULATED.3IONS-SCNC: 12 MMOL/L (ref 7–15)
ANION GAP SERPL CALCULATED.3IONS-SCNC: 12 MMOL/L (ref 7–15)
ANION GAP SERPL CALCULATED.3IONS-SCNC: 14 MMOL/L (ref 7–15)
ANION GAP SERPL CALCULATED.3IONS-SCNC: 19 MMOL/L (ref 7–15)
ANION GAP SERPL CALCULATED.3IONS-SCNC: 23 MMOL/L (ref 7–15)
ANION GAP SERPL CALCULATED.3IONS-SCNC: 6 MMOL/L (ref 5–18)
ANION GAP SERPL CALCULATED.3IONS-SCNC: 8 MMOL/L (ref 7–15)
ANION GAP SERPL CALCULATED.3IONS-SCNC: 9 MMOL/L (ref 7–15)
ANTIBODY SCREEN: NEGATIVE
APPEARANCE UR: CLEAR
AST SERPL W P-5'-P-CCNC: 118 U/L (ref 0–45)
AST SERPL W P-5'-P-CCNC: 137 U/L (ref 0–45)
AST SERPL W P-5'-P-CCNC: 59 U/L (ref 0–45)
AST SERPL W P-5'-P-CCNC: 63 U/L (ref 0–45)
AST SERPL W P-5'-P-CCNC: 84 U/L (ref 0–45)
AST SERPL W P-5'-P-CCNC: 87 U/L (ref 0–45)
ATRIAL RATE - MUSE: 58 BPM
ATRIAL RATE - MUSE: 66 BPM
ATRIAL RATE - MUSE: 88 BPM
ATRIAL RATE - MUSE: 90 BPM
BACTERIA BLD CULT: ABNORMAL
BACTERIA BLD CULT: NO GROWTH
BACTERIA BLD CULT: NO GROWTH
BASOPHILS # BLD AUTO: 0 10E3/UL (ref 0–0.2)
BASOPHILS # BLD AUTO: 0 10E3/UL (ref 0–0.2)
BASOPHILS NFR BLD AUTO: 0 %
BASOPHILS NFR BLD AUTO: 0 %
BILIRUB DIRECT SERPL-MCNC: 0.2 MG/DL (ref 0–0.3)
BILIRUB SERPL-MCNC: 0.4 MG/DL
BILIRUB SERPL-MCNC: 0.4 MG/DL
BILIRUB SERPL-MCNC: 0.5 MG/DL
BILIRUB SERPL-MCNC: 0.6 MG/DL
BILIRUB SERPL-MCNC: 1 MG/DL
BILIRUB SERPL-MCNC: 1.3 MG/DL
BILIRUB UR QL STRIP: NEGATIVE
BUN SERPL-MCNC: 38 MG/DL (ref 8–28)
BUN SERPL-MCNC: 42.6 MG/DL (ref 8–23)
BUN SERPL-MCNC: 49.7 MG/DL (ref 8–23)
BUN SERPL-MCNC: 50.4 MG/DL (ref 8–23)
BUN SERPL-MCNC: 53.5 MG/DL (ref 8–23)
BUN SERPL-MCNC: 53.9 MG/DL (ref 8–23)
BUN SERPL-MCNC: 60.9 MG/DL (ref 8–23)
BUN SERPL-MCNC: 61.7 MG/DL (ref 8–23)
BUN SERPL-MCNC: 76.5 MG/DL (ref 8–23)
BUN SERPL-MCNC: 82.7 MG/DL (ref 8–23)
BUN SERPL-MCNC: 85.9 MG/DL (ref 8–23)
BUN SERPL-MCNC: 90.9 MG/DL (ref 8–23)
BUN SERPL-MCNC: 93.5 MG/DL (ref 8–23)
CALCIUM SERPL-MCNC: 8.1 MG/DL (ref 8.8–10.2)
CALCIUM SERPL-MCNC: 8.1 MG/DL (ref 8.8–10.2)
CALCIUM SERPL-MCNC: 8.5 MG/DL (ref 8.8–10.2)
CALCIUM SERPL-MCNC: 8.6 MG/DL (ref 8.8–10.2)
CALCIUM SERPL-MCNC: 8.7 MG/DL (ref 8.8–10.2)
CALCIUM SERPL-MCNC: 8.7 MG/DL (ref 8.8–10.2)
CALCIUM SERPL-MCNC: 8.8 MG/DL (ref 8.8–10.2)
CALCIUM SERPL-MCNC: 9.1 MG/DL (ref 8.8–10.2)
CALCIUM SERPL-MCNC: 9.2 MG/DL (ref 8.8–10.2)
CALCIUM SERPL-MCNC: 9.4 MG/DL (ref 8.5–10.5)
CALCIUM SERPL-MCNC: 9.4 MG/DL (ref 8.8–10.2)
CHLORIDE BLD-SCNC: 103 MMOL/L (ref 98–107)
CHLORIDE SERPL-SCNC: 100 MMOL/L (ref 98–107)
CHLORIDE SERPL-SCNC: 101 MMOL/L (ref 98–107)
CHLORIDE SERPL-SCNC: 101 MMOL/L (ref 98–107)
CHLORIDE SERPL-SCNC: 103 MMOL/L (ref 98–107)
CHLORIDE SERPL-SCNC: 95 MMOL/L (ref 98–107)
CHLORIDE SERPL-SCNC: 96 MMOL/L (ref 98–107)
CHLORIDE SERPL-SCNC: 97 MMOL/L (ref 98–107)
CHLORIDE SERPL-SCNC: 98 MMOL/L (ref 98–107)
CHOLEST SERPL-MCNC: 122 MG/DL
CHOLEST SERPL-MCNC: 96 MG/DL
CO2 SERPL-SCNC: 30 MMOL/L (ref 22–31)
COLOR UR AUTO: YELLOW
CREAT SERPL-MCNC: 3.05 MG/DL (ref 0.67–1.17)
CREAT SERPL-MCNC: 3.21 MG/DL (ref 0.67–1.17)
CREAT SERPL-MCNC: 3.21 MG/DL (ref 0.7–1.3)
CREAT SERPL-MCNC: 3.41 MG/DL (ref 0.67–1.17)
CREAT SERPL-MCNC: 3.43 MG/DL (ref 0.67–1.17)
CREAT SERPL-MCNC: 3.6 MG/DL (ref 0.67–1.17)
CREAT SERPL-MCNC: 3.64 MG/DL (ref 0.67–1.17)
CREAT SERPL-MCNC: 3.71 MG/DL (ref 0.67–1.17)
CREAT SERPL-MCNC: 4.11 MG/DL (ref 0.67–1.17)
CREAT SERPL-MCNC: 4.21 MG/DL (ref 0.67–1.17)
CREAT SERPL-MCNC: 4.91 MG/DL (ref 0.67–1.17)
CREAT SERPL-MCNC: 4.99 MG/DL (ref 0.67–1.17)
CREAT SERPL-MCNC: 5.16 MG/DL (ref 0.67–1.17)
CRP SERPL-MCNC: 169.1 MG/L
CV STRESS CURRENT BP HE: NORMAL
CV STRESS CURRENT HR HE: 62
CV STRESS CURRENT HR HE: 63
CV STRESS CURRENT HR HE: 64
CV STRESS CURRENT HR HE: 65
CV STRESS CURRENT HR HE: 66
CV STRESS CURRENT HR HE: 67
CV STRESS DEVIATION TIME HE: NORMAL
CV STRESS ECHO PERCENT HR HE: NORMAL
CV STRESS EXERCISE STAGE HE: NORMAL
CV STRESS FINAL RESTING BP HE: NORMAL
CV STRESS FINAL RESTING HR HE: 64
CV STRESS MAX HR HE: 67
CV STRESS MAX TREADMILL GRADE HE: 0
CV STRESS MAX TREADMILL SPEED HE: 0
CV STRESS PEAK DIA BP HE: NORMAL
CV STRESS PEAK SYS BP HE: NORMAL
CV STRESS PHASE HE: NORMAL
CV STRESS PROTOCOL HE: NORMAL
CV STRESS RESTING PT POSITION HE: NORMAL
CV STRESS ST DEVIATION AMOUNT HE: NORMAL
CV STRESS ST DEVIATION ELEVATION HE: NORMAL
CV STRESS ST EVELATION AMOUNT HE: NORMAL
CV STRESS TEST TYPE HE: NORMAL
CV STRESS TOTAL STAGE TIME MIN 1 HE: NORMAL
DEPRECATED HCO3 PLAS-SCNC: 13 MMOL/L (ref 22–29)
DEPRECATED HCO3 PLAS-SCNC: 17 MMOL/L (ref 22–29)
DEPRECATED HCO3 PLAS-SCNC: 21 MMOL/L (ref 22–29)
DEPRECATED HCO3 PLAS-SCNC: 22 MMOL/L (ref 22–29)
DEPRECATED HCO3 PLAS-SCNC: 22 MMOL/L (ref 22–29)
DEPRECATED HCO3 PLAS-SCNC: 23 MMOL/L (ref 22–29)
DEPRECATED HCO3 PLAS-SCNC: 23 MMOL/L (ref 22–29)
DEPRECATED HCO3 PLAS-SCNC: 24 MMOL/L (ref 22–29)
DEPRECATED HCO3 PLAS-SCNC: 24 MMOL/L (ref 22–29)
DEPRECATED HCO3 PLAS-SCNC: 25 MMOL/L (ref 22–29)
DEPRECATED HCO3 PLAS-SCNC: 29 MMOL/L (ref 22–29)
DEPRECATED HCO3 PLAS-SCNC: 30 MMOL/L (ref 22–29)
DIASTOLIC BLOOD PRESSURE - MUSE: NORMAL MMHG
EGFRCR SERPLBLD CKD-EPI 2021: 21 ML/MIN/1.73M2
ENTEROCOCCUS FAECALIS: NOT DETECTED
ENTEROCOCCUS FAECIUM: NOT DETECTED
EOSINOPHIL # BLD AUTO: 0 10E3/UL (ref 0–0.7)
EOSINOPHIL # BLD AUTO: 0 10E3/UL (ref 0–0.7)
EOSINOPHIL NFR BLD AUTO: 0 %
EOSINOPHIL NFR BLD AUTO: 0 %
ERYTHROCYTE [DISTWIDTH] IN BLOOD BY AUTOMATED COUNT: 14 % (ref 10–15)
ERYTHROCYTE [DISTWIDTH] IN BLOOD BY AUTOMATED COUNT: 14.4 % (ref 10–15)
ERYTHROCYTE [DISTWIDTH] IN BLOOD BY AUTOMATED COUNT: 14.6 % (ref 10–15)
ERYTHROCYTE [DISTWIDTH] IN BLOOD BY AUTOMATED COUNT: 14.7 % (ref 10–15)
ERYTHROCYTE [DISTWIDTH] IN BLOOD BY AUTOMATED COUNT: 14.8 % (ref 10–15)
ERYTHROCYTE [SEDIMENTATION RATE] IN BLOOD BY WESTERGREN METHOD: 101 MM/HR (ref 0–20)
FASTING STATUS PATIENT QL REPORTED: NORMAL
FLUAV RNA SPEC QL NAA+PROBE: NEGATIVE
FLUBV RNA RESP QL NAA+PROBE: NEGATIVE
GFR SERPL CREATININE-BSD FRML MDRD: 11 ML/MIN/1.73M2
GFR SERPL CREATININE-BSD FRML MDRD: 12 ML/MIN/1.73M2
GFR SERPL CREATININE-BSD FRML MDRD: 12 ML/MIN/1.73M2
GFR SERPL CREATININE-BSD FRML MDRD: 14 ML/MIN/1.73M2
GFR SERPL CREATININE-BSD FRML MDRD: 15 ML/MIN/1.73M2
GFR SERPL CREATININE-BSD FRML MDRD: 17 ML/MIN/1.73M2
GFR SERPL CREATININE-BSD FRML MDRD: 18 ML/MIN/1.73M2
GFR SERPL CREATININE-BSD FRML MDRD: 18 ML/MIN/1.73M2
GFR SERPL CREATININE-BSD FRML MDRD: 20 ML/MIN/1.73M2
GFR SERPL CREATININE-BSD FRML MDRD: 20 ML/MIN/1.73M2
GLUCOSE BLD-MCNC: 126 MG/DL (ref 70–125)
GLUCOSE BLDC GLUCOMTR-MCNC: 120 MG/DL (ref 70–99)
GLUCOSE BLDC GLUCOMTR-MCNC: 161 MG/DL (ref 70–99)
GLUCOSE BLDC GLUCOMTR-MCNC: 186 MG/DL (ref 70–99)
GLUCOSE BLDC GLUCOMTR-MCNC: 193 MG/DL (ref 70–99)
GLUCOSE BLDC GLUCOMTR-MCNC: 194 MG/DL (ref 70–99)
GLUCOSE BLDC GLUCOMTR-MCNC: 195 MG/DL (ref 70–99)
GLUCOSE BLDC GLUCOMTR-MCNC: 198 MG/DL (ref 70–99)
GLUCOSE BLDC GLUCOMTR-MCNC: 207 MG/DL (ref 70–99)
GLUCOSE BLDC GLUCOMTR-MCNC: 213 MG/DL (ref 70–99)
GLUCOSE BLDC GLUCOMTR-MCNC: 229 MG/DL (ref 70–99)
GLUCOSE BLDC GLUCOMTR-MCNC: 230 MG/DL (ref 70–99)
GLUCOSE BLDC GLUCOMTR-MCNC: 234 MG/DL (ref 70–99)
GLUCOSE BLDC GLUCOMTR-MCNC: 243 MG/DL (ref 70–99)
GLUCOSE BLDC GLUCOMTR-MCNC: 262 MG/DL (ref 70–99)
GLUCOSE BLDC GLUCOMTR-MCNC: 271 MG/DL (ref 70–99)
GLUCOSE BLDC GLUCOMTR-MCNC: 297 MG/DL (ref 70–99)
GLUCOSE BLDC GLUCOMTR-MCNC: 306 MG/DL (ref 70–99)
GLUCOSE BLDC GLUCOMTR-MCNC: 309 MG/DL (ref 70–99)
GLUCOSE BLDC GLUCOMTR-MCNC: 311 MG/DL (ref 70–99)
GLUCOSE BLDC GLUCOMTR-MCNC: 315 MG/DL (ref 70–99)
GLUCOSE BLDC GLUCOMTR-MCNC: 321 MG/DL (ref 70–99)
GLUCOSE BLDC GLUCOMTR-MCNC: 342 MG/DL (ref 70–99)
GLUCOSE BLDC GLUCOMTR-MCNC: 343 MG/DL (ref 70–99)
GLUCOSE BLDC GLUCOMTR-MCNC: 354 MG/DL (ref 70–99)
GLUCOSE BLDC GLUCOMTR-MCNC: 357 MG/DL (ref 70–99)
GLUCOSE BLDC GLUCOMTR-MCNC: 369 MG/DL (ref 70–99)
GLUCOSE BLDC GLUCOMTR-MCNC: 400 MG/DL (ref 70–99)
GLUCOSE BLDC GLUCOMTR-MCNC: 87 MG/DL (ref 70–99)
GLUCOSE SERPL-MCNC: 152 MG/DL (ref 70–99)
GLUCOSE SERPL-MCNC: 161 MG/DL (ref 70–99)
GLUCOSE SERPL-MCNC: 167 MG/DL (ref 70–99)
GLUCOSE SERPL-MCNC: 186 MG/DL (ref 70–99)
GLUCOSE SERPL-MCNC: 192 MG/DL (ref 70–99)
GLUCOSE SERPL-MCNC: 207 MG/DL (ref 70–99)
GLUCOSE SERPL-MCNC: 227 MG/DL (ref 70–99)
GLUCOSE SERPL-MCNC: 251 MG/DL (ref 70–99)
GLUCOSE SERPL-MCNC: 267 MG/DL (ref 70–99)
GLUCOSE SERPL-MCNC: 356 MG/DL (ref 70–99)
GLUCOSE SERPL-MCNC: 70 MG/DL (ref 70–99)
GLUCOSE SERPL-MCNC: 71 MG/DL (ref 70–99)
GLUCOSE UR STRIP-MCNC: 100 MG/DL
HBA1C MFR BLD: 7.4 %
HCT VFR BLD AUTO: 27.1 % (ref 40–53)
HCT VFR BLD AUTO: 28.2 % (ref 40–53)
HCT VFR BLD AUTO: 29.5 % (ref 40–53)
HCT VFR BLD AUTO: 30.4 % (ref 40–53)
HCT VFR BLD AUTO: 32 % (ref 40–53)
HCT VFR BLD AUTO: 32.2 % (ref 40–53)
HCT VFR BLD AUTO: 35.6 % (ref 40–53)
HDLC SERPL-MCNC: 34 MG/DL
HDLC SERPL-MCNC: 40 MG/DL
HGB BLD-MCNC: 10.2 G/DL (ref 13.3–17.7)
HGB BLD-MCNC: 10.6 G/DL (ref 13.3–17.7)
HGB BLD-MCNC: 11.3 G/DL (ref 13.3–17.7)
HGB BLD-MCNC: 11.7 G/DL (ref 13.3–17.7)
HGB BLD-MCNC: 8.7 G/DL (ref 13.3–17.7)
HGB BLD-MCNC: 9.2 G/DL (ref 13.3–17.7)
HGB BLD-MCNC: 9.5 G/DL (ref 13.3–17.7)
HGB BLD-MCNC: 9.8 G/DL (ref 13.3–17.7)
HGB UR QL STRIP: NEGATIVE
HOLD SPECIMEN: NORMAL
HOLD SPECIMEN: NORMAL
HYALINE CASTS: 3 /LPF
IMM GRANULOCYTES # BLD: 0 10E3/UL
IMM GRANULOCYTES # BLD: 0.1 10E3/UL
IMM GRANULOCYTES NFR BLD: 0 %
IMM GRANULOCYTES NFR BLD: 1 %
INR PPP: 1 (ref 0.85–1.15)
INTERPRETATION ECG - MUSE: NORMAL
IRON BINDING CAPACITY (ROCHE): 222 UG/DL (ref 240–430)
IRON SATN MFR SERPL: 7 % (ref 15–46)
IRON SERPL-MCNC: 15 UG/DL (ref 61–157)
KETONES UR STRIP-MCNC: NEGATIVE MG/DL
LACTATE SERPL-SCNC: 1.8 MMOL/L (ref 0.7–2)
LDLC SERPL CALC-MCNC: 47 MG/DL
LDLC SERPL CALC-MCNC: 64 MG/DL
LEUKOCYTE ESTERASE UR QL STRIP: NEGATIVE
LISTERIA SPECIES (DETECTED/NOT DETECTED): NOT DETECTED
LVEF ECHO: NORMAL
LVEF ECHO: NORMAL
LYMPHOCYTES # BLD AUTO: 0.3 10E3/UL (ref 0.8–5.3)
LYMPHOCYTES # BLD AUTO: 0.5 10E3/UL (ref 0.8–5.3)
LYMPHOCYTES NFR BLD AUTO: 2 %
LYMPHOCYTES NFR BLD AUTO: 5 %
MAGNESIUM SERPL-MCNC: 2 MG/DL (ref 1.7–2.3)
MAGNESIUM SERPL-MCNC: 2.2 MG/DL (ref 1.7–2.3)
MAGNESIUM SERPL-MCNC: 2.3 MG/DL (ref 1.7–2.3)
MCH RBC QN AUTO: 29.3 PG (ref 26.5–33)
MCH RBC QN AUTO: 29.9 PG (ref 26.5–33)
MCH RBC QN AUTO: 30.1 PG (ref 26.5–33)
MCH RBC QN AUTO: 30.2 PG (ref 26.5–33)
MCH RBC QN AUTO: 30.5 PG (ref 26.5–33)
MCHC RBC AUTO-ENTMCNC: 31.3 G/DL (ref 31.5–36.5)
MCHC RBC AUTO-ENTMCNC: 31.7 G/DL (ref 31.5–36.5)
MCHC RBC AUTO-ENTMCNC: 32.1 G/DL (ref 31.5–36.5)
MCHC RBC AUTO-ENTMCNC: 32.6 G/DL (ref 31.5–36.5)
MCHC RBC AUTO-ENTMCNC: 32.9 G/DL (ref 31.5–36.5)
MCHC RBC AUTO-ENTMCNC: 33.1 G/DL (ref 31.5–36.5)
MCHC RBC AUTO-ENTMCNC: 33.2 G/DL (ref 31.5–36.5)
MCV RBC AUTO: 91 FL (ref 78–100)
MCV RBC AUTO: 92 FL (ref 78–100)
MCV RBC AUTO: 93 FL (ref 78–100)
MCV RBC AUTO: 94 FL (ref 78–100)
MCV RBC AUTO: 94 FL (ref 78–100)
MONOCYTES # BLD AUTO: 0.4 10E3/UL (ref 0–1.3)
MONOCYTES # BLD AUTO: 0.6 10E3/UL (ref 0–1.3)
MONOCYTES NFR BLD AUTO: 4 %
MONOCYTES NFR BLD AUTO: 5 %
MUCOUS THREADS #/AREA URNS LPF: PRESENT /LPF
NEUTROPHILS # BLD AUTO: 10.4 10E3/UL (ref 1.6–8.3)
NEUTROPHILS # BLD AUTO: 9.8 10E3/UL (ref 1.6–8.3)
NEUTROPHILS NFR BLD AUTO: 91 %
NEUTROPHILS NFR BLD AUTO: 92 %
NITRATE UR QL: NEGATIVE
NONHDLC SERPL-MCNC: 62 MG/DL
NONHDLC SERPL-MCNC: 82 MG/DL
NRBC # BLD AUTO: 0 10E3/UL
NRBC # BLD AUTO: 0 10E3/UL
NRBC BLD AUTO-RTO: 0 /100
NRBC BLD AUTO-RTO: 0 /100
NT-PROBNP SERPL-MCNC: 4719 PG/ML (ref 0–900)
NUC STRESS EJECTION FRACTION: 67 %
P AXIS - MUSE: 52 DEGREES
P AXIS - MUSE: 57 DEGREES
P AXIS - MUSE: 71 DEGREES
P AXIS - MUSE: 73 DEGREES
PH UR STRIP: 5.5 [PH] (ref 5–7)
PHOSPHATE SERPL-MCNC: 4 MG/DL (ref 2.5–4.5)
PHOSPHATE SERPL-MCNC: 4.5 MG/DL (ref 2.5–4.5)
PLATELET # BLD AUTO: 123 10E3/UL (ref 150–450)
PLATELET # BLD AUTO: 126 10E3/UL (ref 150–450)
PLATELET # BLD AUTO: 130 10E3/UL (ref 150–450)
PLATELET # BLD AUTO: 135 10E3/UL (ref 150–450)
PLATELET # BLD AUTO: 139 10E3/UL (ref 150–450)
PLATELET # BLD AUTO: 146 10E3/UL (ref 150–450)
PLATELET # BLD AUTO: 154 10E3/UL (ref 150–450)
PLATELET # BLD AUTO: 168 10E3/UL (ref 150–450)
POTASSIUM BLD-SCNC: 4.3 MMOL/L (ref 3.5–5)
POTASSIUM SERPL-SCNC: 3.9 MMOL/L (ref 3.4–5.3)
POTASSIUM SERPL-SCNC: 4 MMOL/L (ref 3.4–5.3)
POTASSIUM SERPL-SCNC: 4.1 MMOL/L (ref 3.4–5.3)
POTASSIUM SERPL-SCNC: 4.3 MMOL/L (ref 3.4–5.3)
POTASSIUM SERPL-SCNC: 4.6 MMOL/L (ref 3.4–5.3)
POTASSIUM SERPL-SCNC: 4.6 MMOL/L (ref 3.4–5.3)
POTASSIUM SERPL-SCNC: 4.7 MMOL/L (ref 3.4–5.3)
POTASSIUM SERPL-SCNC: 5.2 MMOL/L (ref 3.4–5.3)
PR INTERVAL - MUSE: 202 MS
PR INTERVAL - MUSE: 202 MS
PR INTERVAL - MUSE: 208 MS
PR INTERVAL - MUSE: 214 MS
PROT SERPL-MCNC: 5.8 G/DL (ref 6.4–8.3)
PROT SERPL-MCNC: 6.1 G/DL (ref 6.4–8.3)
PROT SERPL-MCNC: 6.2 G/DL (ref 6.4–8.3)
PROT SERPL-MCNC: 6.3 G/DL (ref 6.4–8.3)
PROT SERPL-MCNC: 6.4 G/DL (ref 6.4–8.3)
PROT SERPL-MCNC: 6.7 G/DL (ref 6.4–8.3)
PSA SERPL DL<=0.01 NG/ML-MCNC: 1.41 NG/ML (ref 0–6.5)
PTH-INTACT SERPL-MCNC: 81 PG/ML (ref 15–65)
QRS DURATION - MUSE: 122 MS
QRS DURATION - MUSE: 126 MS
QRS DURATION - MUSE: 126 MS
QRS DURATION - MUSE: 130 MS
QT - MUSE: 376 MS
QT - MUSE: 416 MS
QT - MUSE: 440 MS
QT - MUSE: 458 MS
QTC - MUSE: 449 MS
QTC - MUSE: 459 MS
QTC - MUSE: 461 MS
QTC - MUSE: 503 MS
R AXIS - MUSE: -53 DEGREES
R AXIS - MUSE: -53 DEGREES
R AXIS - MUSE: -57 DEGREES
R AXIS - MUSE: -65 DEGREES
RATE PRESSURE PRODUCT: NORMAL
RBC # BLD AUTO: 2.91 10E6/UL (ref 4.4–5.9)
RBC # BLD AUTO: 3.08 10E6/UL (ref 4.4–5.9)
RBC # BLD AUTO: 3.24 10E6/UL (ref 4.4–5.9)
RBC # BLD AUTO: 3.25 10E6/UL (ref 4.4–5.9)
RBC # BLD AUTO: 3.41 10E6/UL (ref 4.4–5.9)
RBC # BLD AUTO: 3.47 10E6/UL (ref 4.4–5.9)
RBC # BLD AUTO: 3.89 10E6/UL (ref 4.4–5.9)
RBC URINE: <1 /HPF
RSV RNA SPEC NAA+PROBE: NEGATIVE
SARS-COV-2 RNA RESP QL NAA+PROBE: NEGATIVE
SODIUM SERPL-SCNC: 128 MMOL/L (ref 136–145)
SODIUM SERPL-SCNC: 130 MMOL/L (ref 136–145)
SODIUM SERPL-SCNC: 131 MMOL/L (ref 136–145)
SODIUM SERPL-SCNC: 134 MMOL/L (ref 136–145)
SODIUM SERPL-SCNC: 135 MMOL/L (ref 136–145)
SODIUM SERPL-SCNC: 136 MMOL/L (ref 135–145)
SODIUM SERPL-SCNC: 136 MMOL/L (ref 136–145)
SODIUM SERPL-SCNC: 136 MMOL/L (ref 136–145)
SODIUM SERPL-SCNC: 137 MMOL/L (ref 136–145)
SODIUM SERPL-SCNC: 138 MMOL/L (ref 136–145)
SODIUM SERPL-SCNC: 139 MMOL/L (ref 136–145)
SP GR UR STRIP: 1.03 (ref 1–1.03)
SPECIMEN EXPIRATION DATE: NORMAL
STAPHYLOCOCCUS AUREUS: NOT DETECTED
STAPHYLOCOCCUS EPIDERMIDIS: NOT DETECTED
STAPHYLOCOCCUS LUGDUNENSIS: NOT DETECTED
STAPHYLOCOCCUS SPECIES: NOT DETECTED
STREPTOCOCCUS AGALACTIAE: NOT DETECTED
STREPTOCOCCUS ANGINOSUS GROUP: NOT DETECTED
STREPTOCOCCUS PNEUMONIAE: NOT DETECTED
STREPTOCOCCUS PYOGENES: NOT DETECTED
STREPTOCOCCUS SPECIES: DETECTED
STRESS ECHO BASELINE DIASTOLIC HE: 89
STRESS ECHO BASELINE HR: 62
STRESS ECHO BASELINE SYSTOLIC BP: 201
STRESS ECHO CALCULATED PERCENT HR: 45 %
STRESS ECHO LAST STRESS DIASTOLIC BP: 74
STRESS ECHO LAST STRESS HR: 67
STRESS ECHO LAST STRESS SYSTOLIC BP: 166
STRESS ECHO TARGET HR: 148
SYSTOLIC BLOOD PRESSURE - MUSE: NORMAL MMHG
T AXIS - MUSE: 105 DEGREES
T AXIS - MUSE: 138 DEGREES
T AXIS - MUSE: 45 DEGREES
T AXIS - MUSE: 93 DEGREES
TRIGL SERPL-MCNC: 75 MG/DL
TRIGL SERPL-MCNC: 92 MG/DL
TROPONIN T SERPL HS-MCNC: 289 NG/L
TSH SERPL DL<=0.005 MIU/L-ACNC: 1.68 UIU/ML (ref 0.3–4.2)
TSH SERPL DL<=0.005 MIU/L-ACNC: 2.48 UIU/ML (ref 0.3–5)
UFH PPP CHRO-ACNC: 0.98 IU/ML
UROBILINOGEN UR STRIP-MCNC: <2 MG/DL
VENTRICULAR RATE- MUSE: 58 BPM
VENTRICULAR RATE- MUSE: 66 BPM
VENTRICULAR RATE- MUSE: 88 BPM
VENTRICULAR RATE- MUSE: 90 BPM
VIT D+METAB SERPL-MCNC: 39 NG/ML (ref 20–50)
WBC # BLD AUTO: 10 10E3/UL (ref 4–11)
WBC # BLD AUTO: 10.8 10E3/UL (ref 4–11)
WBC # BLD AUTO: 11.3 10E3/UL (ref 4–11)
WBC # BLD AUTO: 4.8 10E3/UL (ref 4–11)
WBC # BLD AUTO: 5.3 10E3/UL (ref 4–11)
WBC # BLD AUTO: 5.3 10E3/UL (ref 4–11)
WBC # BLD AUTO: 7.9 10E3/UL (ref 4–11)
WBC URINE: <1 /HPF

## 2023-01-01 PROCEDURE — 258N000003 HC RX IP 258 OP 636: Performed by: STUDENT IN AN ORGANIZED HEALTH CARE EDUCATION/TRAINING PROGRAM

## 2023-01-01 PROCEDURE — 80053 COMPREHEN METABOLIC PANEL: CPT | Performed by: INTERNAL MEDICINE

## 2023-01-01 PROCEDURE — 93010 ELECTROCARDIOGRAM REPORT: CPT | Performed by: INTERNAL MEDICINE

## 2023-01-01 PROCEDURE — C1760 CLOSURE DEV, VASC: HCPCS

## 2023-01-01 PROCEDURE — 99223 1ST HOSP IP/OBS HIGH 75: CPT | Performed by: INTERNAL MEDICINE

## 2023-01-01 PROCEDURE — 250N000013 HC RX MED GY IP 250 OP 250 PS 637: Performed by: INTERNAL MEDICINE

## 2023-01-01 PROCEDURE — 97166 OT EVAL MOD COMPLEX 45 MIN: CPT | Mod: GO

## 2023-01-01 PROCEDURE — 84484 ASSAY OF TROPONIN QUANT: CPT | Performed by: EMERGENCY MEDICINE

## 2023-01-01 PROCEDURE — 99152 MOD SED SAME PHYS/QHP 5/>YRS: CPT

## 2023-01-01 PROCEDURE — 99222 1ST HOSP IP/OBS MODERATE 55: CPT | Performed by: PODIATRIST

## 2023-01-01 PROCEDURE — C1887 CATHETER, GUIDING: HCPCS | Performed by: INTERNAL MEDICINE

## 2023-01-01 PROCEDURE — 84155 ASSAY OF PROTEIN SERUM: CPT | Performed by: INTERNAL MEDICINE

## 2023-01-01 PROCEDURE — 210N000001 HC R&B IMCU HEART CARE

## 2023-01-01 PROCEDURE — 250N000011 HC RX IP 250 OP 636: Mod: JZ | Performed by: INTERNAL MEDICINE

## 2023-01-01 PROCEDURE — 250N000011 HC RX IP 250 OP 636: Performed by: SURGERY

## 2023-01-01 PROCEDURE — 99232 SBSQ HOSP IP/OBS MODERATE 35: CPT | Performed by: INTERNAL MEDICINE

## 2023-01-01 PROCEDURE — 93005 ELECTROCARDIOGRAM TRACING: CPT | Performed by: EMERGENCY MEDICINE

## 2023-01-01 PROCEDURE — C9604 PERC D-E COR REVASC T CABG S: HCPCS | Performed by: INTERNAL MEDICINE

## 2023-01-01 PROCEDURE — 97116 GAIT TRAINING THERAPY: CPT | Mod: GP

## 2023-01-01 PROCEDURE — 83880 ASSAY OF NATRIURETIC PEPTIDE: CPT | Performed by: EMERGENCY MEDICINE

## 2023-01-01 PROCEDURE — 93000 ELECTROCARDIOGRAM COMPLETE: CPT | Performed by: INTERNAL MEDICINE

## 2023-01-01 PROCEDURE — 99153 MOD SED SAME PHYS/QHP EA: CPT

## 2023-01-01 PROCEDURE — 99232 SBSQ HOSP IP/OBS MODERATE 35: CPT | Performed by: PSYCHIATRY & NEUROLOGY

## 2023-01-01 PROCEDURE — 93320 DOPPLER ECHO COMPLETE: CPT | Mod: 26 | Performed by: INTERNAL MEDICINE

## 2023-01-01 PROCEDURE — 84443 ASSAY THYROID STIM HORMONE: CPT | Performed by: FAMILY MEDICINE

## 2023-01-01 PROCEDURE — 272N000001 HC OR GENERAL SUPPLY STERILE: Performed by: INTERNAL MEDICINE

## 2023-01-01 PROCEDURE — 86901 BLOOD TYPING SEROLOGIC RH(D): CPT | Performed by: NURSE PRACTITIONER

## 2023-01-01 PROCEDURE — 96375 TX/PRO/DX INJ NEW DRUG ADDON: CPT

## 2023-01-01 PROCEDURE — 83970 ASSAY OF PARATHORMONE: CPT | Performed by: INTERNAL MEDICINE

## 2023-01-01 PROCEDURE — C1887 CATHETER, GUIDING: HCPCS

## 2023-01-01 PROCEDURE — 36415 COLL VENOUS BLD VENIPUNCTURE: CPT | Performed by: INTERNAL MEDICINE

## 2023-01-01 PROCEDURE — 85347 COAGULATION TIME ACTIVATED: CPT

## 2023-01-01 PROCEDURE — 250N000011 HC RX IP 250 OP 636: Performed by: INTERNAL MEDICINE

## 2023-01-01 PROCEDURE — 97110 THERAPEUTIC EXERCISES: CPT | Mod: GO

## 2023-01-01 PROCEDURE — 85018 HEMOGLOBIN: CPT | Performed by: SURGERY

## 2023-01-01 PROCEDURE — B2111ZZ FLUOROSCOPY OF MULTIPLE CORONARY ARTERIES USING LOW OSMOLAR CONTRAST: ICD-10-PCS | Performed by: INTERNAL MEDICINE

## 2023-01-01 PROCEDURE — 85014 HEMATOCRIT: CPT | Performed by: INTERNAL MEDICINE

## 2023-01-01 PROCEDURE — 87149 DNA/RNA DIRECT PROBE: CPT | Performed by: EMERGENCY MEDICINE

## 2023-01-01 PROCEDURE — 85520 HEPARIN ASSAY: CPT | Performed by: INTERNAL MEDICINE

## 2023-01-01 PROCEDURE — 80061 LIPID PANEL: CPT | Performed by: FAMILY MEDICINE

## 2023-01-01 PROCEDURE — 87637 SARSCOV2&INF A&B&RSV AMP PRB: CPT | Performed by: EMERGENCY MEDICINE

## 2023-01-01 PROCEDURE — 99233 SBSQ HOSP IP/OBS HIGH 50: CPT | Performed by: INTERNAL MEDICINE

## 2023-01-01 PROCEDURE — 93459 L HRT ART/GRFT ANGIO: CPT | Performed by: INTERNAL MEDICINE

## 2023-01-01 PROCEDURE — 97535 SELF CARE MNGMENT TRAINING: CPT | Mod: GO

## 2023-01-01 PROCEDURE — C1725 CATH, TRANSLUMIN NON-LASER: HCPCS

## 2023-01-01 PROCEDURE — 93226 XTRNL ECG REC<48 HR SCAN A/R: CPT

## 2023-01-01 PROCEDURE — 250N000009 HC RX 250: Performed by: INTERNAL MEDICINE

## 2023-01-01 PROCEDURE — 85027 COMPLETE CBC AUTOMATED: CPT | Performed by: INTERNAL MEDICINE

## 2023-01-01 PROCEDURE — 93017 CV STRESS TEST TRACING ONLY: CPT

## 2023-01-01 PROCEDURE — 80048 BASIC METABOLIC PNL TOTAL CA: CPT | Performed by: INTERNAL MEDICINE

## 2023-01-01 PROCEDURE — C1725 CATH, TRANSLUMIN NON-LASER: HCPCS | Performed by: INTERNAL MEDICINE

## 2023-01-01 PROCEDURE — 99152 MOD SED SAME PHYS/QHP 5/>YRS: CPT | Performed by: INTERNAL MEDICINE

## 2023-01-01 PROCEDURE — 83735 ASSAY OF MAGNESIUM: CPT

## 2023-01-01 PROCEDURE — 87077 CULTURE AEROBIC IDENTIFY: CPT | Performed by: EMERGENCY MEDICINE

## 2023-01-01 PROCEDURE — 99214 OFFICE O/P EST MOD 30 MIN: CPT | Performed by: INTERNAL MEDICINE

## 2023-01-01 PROCEDURE — 36415 COLL VENOUS BLD VENIPUNCTURE: CPT | Performed by: EMERGENCY MEDICINE

## 2023-01-01 PROCEDURE — 250N000009 HC RX 250: Performed by: STUDENT IN AN ORGANIZED HEALTH CARE EDUCATION/TRAINING PROGRAM

## 2023-01-01 PROCEDURE — 250N000011 HC RX IP 250 OP 636: Performed by: STUDENT IN AN ORGANIZED HEALTH CARE EDUCATION/TRAINING PROGRAM

## 2023-01-01 PROCEDURE — B2131ZZ FLUOROSCOPY OF MULTIPLE CORONARY ARTERY BYPASS GRAFTS USING LOW OSMOLAR CONTRAST: ICD-10-PCS | Performed by: INTERNAL MEDICINE

## 2023-01-01 PROCEDURE — 255N000002 HC RX 255 OP 636: Performed by: SURGERY

## 2023-01-01 PROCEDURE — 86140 C-REACTIVE PROTEIN: CPT | Performed by: INTERNAL MEDICINE

## 2023-01-01 PROCEDURE — 999N000248 HC STATISTIC IV INSERT WITH US BY RN

## 2023-01-01 PROCEDURE — 99418 PROLNG IP/OBS E/M EA 15 MIN: CPT | Performed by: PSYCHIATRY & NEUROLOGY

## 2023-01-01 PROCEDURE — 92937 PRQ TRLUML REVSC CAB GRF 1: CPT | Mod: LC | Performed by: INTERNAL MEDICINE

## 2023-01-01 PROCEDURE — 83735 ASSAY OF MAGNESIUM: CPT | Performed by: FAMILY MEDICINE

## 2023-01-01 PROCEDURE — 87147 CULTURE TYPE IMMUNOLOGIC: CPT | Performed by: HOSPITALIST

## 2023-01-01 PROCEDURE — 82248 BILIRUBIN DIRECT: CPT | Performed by: INTERNAL MEDICINE

## 2023-01-01 PROCEDURE — 93923 UPR/LXTR ART STDY 3+ LVLS: CPT | Mod: 26 | Performed by: SURGERY

## 2023-01-01 PROCEDURE — 258N000003 HC RX IP 258 OP 636: Performed by: HOSPITALIST

## 2023-01-01 PROCEDURE — 85004 AUTOMATED DIFF WBC COUNT: CPT | Performed by: EMERGENCY MEDICINE

## 2023-01-01 PROCEDURE — A9500 TC99M SESTAMIBI: HCPCS | Performed by: INTERNAL MEDICINE

## 2023-01-01 PROCEDURE — 80048 BASIC METABOLIC PNL TOTAL CA: CPT | Performed by: FAMILY MEDICINE

## 2023-01-01 PROCEDURE — 84100 ASSAY OF PHOSPHORUS: CPT | Performed by: INTERNAL MEDICINE

## 2023-01-01 PROCEDURE — 37232 HC REVASC TIB-PERON ARTANGIOPLASTY EA ADL VESSEL: CPT | Mod: LT

## 2023-01-01 PROCEDURE — 99203 OFFICE O/P NEW LOW 30 MIN: CPT | Performed by: FAMILY MEDICINE

## 2023-01-01 PROCEDURE — 93798 PHYS/QHP OP CAR RHAB W/ECG: CPT

## 2023-01-01 PROCEDURE — 96376 TX/PRO/DX INJ SAME DRUG ADON: CPT

## 2023-01-01 PROCEDURE — 272N000199 HC ACCESSORY CR8

## 2023-01-01 PROCEDURE — G0463 HOSPITAL OUTPT CLINIC VISIT: HCPCS | Mod: 25

## 2023-01-01 PROCEDURE — 272N000117 HC CATH CR2

## 2023-01-01 PROCEDURE — 272N000572 HC SHEATH CR9

## 2023-01-01 PROCEDURE — 86850 RBC ANTIBODY SCREEN: CPT | Performed by: NURSE PRACTITIONER

## 2023-01-01 PROCEDURE — 93227 XTRNL ECG REC<48 HR R&I: CPT | Performed by: INTERNAL MEDICINE

## 2023-01-01 PROCEDURE — G0463 HOSPITAL OUTPT CLINIC VISIT: HCPCS

## 2023-01-01 PROCEDURE — 85652 RBC SED RATE AUTOMATED: CPT | Performed by: INTERNAL MEDICINE

## 2023-01-01 PROCEDURE — 78452 HT MUSCLE IMAGE SPECT MULT: CPT

## 2023-01-01 PROCEDURE — 97161 PT EVAL LOW COMPLEX 20 MIN: CPT | Mod: GP

## 2023-01-01 PROCEDURE — 250N000013 HC RX MED GY IP 250 OP 250 PS 637: Performed by: STUDENT IN AN ORGANIZED HEALTH CARE EDUCATION/TRAINING PROGRAM

## 2023-01-01 PROCEDURE — 272N000500 HC NEEDLE CR2

## 2023-01-01 PROCEDURE — 93455 CORONARY ART/GRFT ANGIO S&I: CPT | Mod: 26 | Performed by: INTERNAL MEDICINE

## 2023-01-01 PROCEDURE — 75710 ARTERY X-RAYS ARM/LEG: CPT | Mod: XU

## 2023-01-01 PROCEDURE — 93325 DOPPLER ECHO COLOR FLOW MAPG: CPT | Mod: 26 | Performed by: INTERNAL MEDICINE

## 2023-01-01 PROCEDURE — 250N000012 HC RX MED GY IP 250 OP 636 PS 637: Performed by: INTERNAL MEDICINE

## 2023-01-01 PROCEDURE — 70551 MRI BRAIN STEM W/O DYE: CPT

## 2023-01-01 PROCEDURE — 999N000198 HC STATISTIC WOC PT EDUCATION, 16-30 MIN

## 2023-01-01 PROCEDURE — 343N000001 HC RX 343: Performed by: INTERNAL MEDICINE

## 2023-01-01 PROCEDURE — C1769 GUIDE WIRE: HCPCS

## 2023-01-01 PROCEDURE — 93926 LOWER EXTREMITY STUDY: CPT | Mod: LT

## 2023-01-01 PROCEDURE — 93455 CORONARY ART/GRFT ANGIO S&I: CPT | Performed by: INTERNAL MEDICINE

## 2023-01-01 PROCEDURE — 250N000011 HC RX IP 250 OP 636: Performed by: HOSPITALIST

## 2023-01-01 PROCEDURE — 37228 HC REVASC TIB-PERON ART ANGIOPLASTY INIT VESSEL: CPT

## 2023-01-01 PROCEDURE — 76937 US GUIDE VASCULAR ACCESS: CPT | Mod: 26 | Performed by: SURGERY

## 2023-01-01 PROCEDURE — 36415 COLL VENOUS BLD VENIPUNCTURE: CPT | Performed by: SURGERY

## 2023-01-01 PROCEDURE — 36415 COLL VENOUS BLD VENIPUNCTURE: CPT | Performed by: HOSPITALIST

## 2023-01-01 PROCEDURE — 81001 URINALYSIS AUTO W/SCOPE: CPT | Performed by: INTERNAL MEDICINE

## 2023-01-01 PROCEDURE — 93000 ELECTROCARDIOGRAM COMPLETE: CPT | Performed by: GENERAL ACUTE CARE HOSPITAL

## 2023-01-01 PROCEDURE — 93016 CV STRESS TEST SUPVJ ONLY: CPT | Performed by: INTERNAL MEDICINE

## 2023-01-01 PROCEDURE — 80053 COMPREHEN METABOLIC PANEL: CPT | Performed by: EMERGENCY MEDICINE

## 2023-01-01 PROCEDURE — 255N000002 HC RX 255 OP 636: Performed by: INTERNAL MEDICINE

## 2023-01-01 PROCEDURE — 85610 PROTHROMBIN TIME: CPT | Performed by: SURGERY

## 2023-01-01 PROCEDURE — 93005 ELECTROCARDIOGRAM TRACING: CPT | Performed by: INTERNAL MEDICINE

## 2023-01-01 PROCEDURE — 76937 US GUIDE VASCULAR ACCESS: CPT

## 2023-01-01 PROCEDURE — 99239 HOSP IP/OBS DSCHRG MGMT >30: CPT | Performed by: HOSPITALIST

## 2023-01-01 PROCEDURE — 83036 HEMOGLOBIN GLYCOSYLATED A1C: CPT | Performed by: INTERNAL MEDICINE

## 2023-01-01 PROCEDURE — 96365 THER/PROPH/DIAG IV INF INIT: CPT

## 2023-01-01 PROCEDURE — 99222 1ST HOSP IP/OBS MODERATE 55: CPT | Performed by: INTERNAL MEDICINE

## 2023-01-01 PROCEDURE — 99215 OFFICE O/P EST HI 40 MIN: CPT

## 2023-01-01 PROCEDURE — 250N000011 HC RX IP 250 OP 636: Mod: JZ | Performed by: EMERGENCY MEDICINE

## 2023-01-01 PROCEDURE — 80048 BASIC METABOLIC PNL TOTAL CA: CPT | Performed by: STUDENT IN AN ORGANIZED HEALTH CARE EDUCATION/TRAINING PROGRAM

## 2023-01-01 PROCEDURE — 99223 1ST HOSP IP/OBS HIGH 75: CPT | Mod: AI | Performed by: INTERNAL MEDICINE

## 2023-01-01 PROCEDURE — 99223 1ST HOSP IP/OBS HIGH 75: CPT | Performed by: PSYCHIATRY & NEUROLOGY

## 2023-01-01 PROCEDURE — 93005 ELECTROCARDIOGRAM TRACING: CPT

## 2023-01-01 PROCEDURE — 93312 ECHO TRANSESOPHAGEAL: CPT | Mod: 26 | Performed by: INTERNAL MEDICINE

## 2023-01-01 PROCEDURE — 71045 X-RAY EXAM CHEST 1 VIEW: CPT

## 2023-01-01 PROCEDURE — G0463 HOSPITAL OUTPT CLINIC VISIT: HCPCS | Performed by: SURGERY

## 2023-01-01 PROCEDURE — 258N000003 HC RX IP 258 OP 636: Performed by: INTERNAL MEDICINE

## 2023-01-01 PROCEDURE — 272N000302 HC DEVICE INFLATION CR5

## 2023-01-01 PROCEDURE — 93306 TTE W/DOPPLER COMPLETE: CPT | Mod: 26 | Performed by: INTERNAL MEDICINE

## 2023-01-01 PROCEDURE — 85049 AUTOMATED PLATELET COUNT: CPT | Performed by: SURGERY

## 2023-01-01 PROCEDURE — 250N000013 HC RX MED GY IP 250 OP 250 PS 637: Performed by: NURSE PRACTITIONER

## 2023-01-01 PROCEDURE — G0103 PSA SCREENING: HCPCS | Performed by: FAMILY MEDICINE

## 2023-01-01 PROCEDURE — 99285 EMERGENCY DEPT VISIT HI MDM: CPT

## 2023-01-01 PROCEDURE — 87040 BLOOD CULTURE FOR BACTERIA: CPT | Performed by: INTERNAL MEDICINE

## 2023-01-01 PROCEDURE — 99233 SBSQ HOSP IP/OBS HIGH 50: CPT | Mod: 25 | Performed by: INTERNAL MEDICINE

## 2023-01-01 PROCEDURE — 250N000013 HC RX MED GY IP 250 OP 250 PS 637: Performed by: EMERGENCY MEDICINE

## 2023-01-01 PROCEDURE — 99213 OFFICE O/P EST LOW 20 MIN: CPT | Performed by: SURGERY

## 2023-01-01 PROCEDURE — 83605 ASSAY OF LACTIC ACID: CPT | Performed by: EMERGENCY MEDICINE

## 2023-01-01 PROCEDURE — 80048 BASIC METABOLIC PNL TOTAL CA: CPT | Performed by: NURSE PRACTITIONER

## 2023-01-01 PROCEDURE — 93923 UPR/LXTR ART STDY 3+ LVLS: CPT

## 2023-01-01 PROCEDURE — 37228 PR REVASC TIB/PERON ART, ANGIOPLASTY INIT VESSEL: CPT | Mod: LT | Performed by: SURGERY

## 2023-01-01 PROCEDURE — 99215 OFFICE O/P EST HI 40 MIN: CPT | Performed by: NURSE PRACTITIONER

## 2023-01-01 PROCEDURE — 93880 EXTRACRANIAL BILAT STUDY: CPT

## 2023-01-01 PROCEDURE — 93926 LOWER EXTREMITY STUDY: CPT | Mod: 26 | Performed by: SURGERY

## 2023-01-01 PROCEDURE — C1760 CLOSURE DEV, VASC: HCPCS | Performed by: INTERNAL MEDICINE

## 2023-01-01 PROCEDURE — 37232 PR REVASC TIB/PERON ART,ANGIOPLASTY, EA ADL VESSEL: CPT | Mod: LT | Performed by: SURGERY

## 2023-01-01 PROCEDURE — C1769 GUIDE WIRE: HCPCS | Performed by: INTERNAL MEDICINE

## 2023-01-01 PROCEDURE — 75710 ARTERY X-RAYS ARM/LEG: CPT | Mod: 26 | Performed by: SURGERY

## 2023-01-01 PROCEDURE — 85025 COMPLETE CBC W/AUTO DIFF WBC: CPT | Performed by: INTERNAL MEDICINE

## 2023-01-01 PROCEDURE — 027034Z DILATION OF CORONARY ARTERY, ONE ARTERY WITH DRUG-ELUTING INTRALUMINAL DEVICE, PERCUTANEOUS APPROACH: ICD-10-PCS | Performed by: INTERNAL MEDICINE

## 2023-01-01 PROCEDURE — 83550 IRON BINDING TEST: CPT | Performed by: INTERNAL MEDICINE

## 2023-01-01 PROCEDURE — 93325 DOPPLER ECHO COLOR FLOW MAPG: CPT

## 2023-01-01 PROCEDURE — 83735 ASSAY OF MAGNESIUM: CPT | Performed by: INTERNAL MEDICINE

## 2023-01-01 PROCEDURE — 84443 ASSAY THYROID STIM HORMONE: CPT | Performed by: INTERNAL MEDICINE

## 2023-01-01 PROCEDURE — 272N000566 HC SHEATH CR3

## 2023-01-01 PROCEDURE — C1874 STENT, COATED/COV W/DEL SYS: HCPCS | Performed by: INTERNAL MEDICINE

## 2023-01-01 PROCEDURE — 36415 COLL VENOUS BLD VENIPUNCTURE: CPT | Performed by: NURSE PRACTITIONER

## 2023-01-01 PROCEDURE — 99152 MOD SED SAME PHYS/QHP 5/>YRS: CPT | Performed by: SURGERY

## 2023-01-01 PROCEDURE — 78452 HT MUSCLE IMAGE SPECT MULT: CPT | Mod: 26 | Performed by: INTERNAL MEDICINE

## 2023-01-01 PROCEDURE — 93018 CV STRESS TEST I&R ONLY: CPT | Performed by: INTERNAL MEDICINE

## 2023-01-01 PROCEDURE — 87077 CULTURE AEROBIC IDENTIFY: CPT | Performed by: HOSPITALIST

## 2023-01-01 PROCEDURE — 82306 VITAMIN D 25 HYDROXY: CPT | Performed by: FAMILY MEDICINE

## 2023-01-01 DEVICE — IMPLANTABLE DEVICE: Type: IMPLANTABLE DEVICE | Status: FUNCTIONAL

## 2023-01-01 DEVICE — CLOSURE ANGIOSEAL 6FR 610130: Type: IMPLANTABLE DEVICE | Status: FUNCTIONAL

## 2023-01-01 RX ORDER — OXYCODONE HYDROCHLORIDE 5 MG/1
5 TABLET ORAL EVERY 4 HOURS PRN
Status: DISCONTINUED | OUTPATIENT
Start: 2023-01-01 | End: 2023-01-01 | Stop reason: HOSPADM

## 2023-01-01 RX ORDER — LIDOCAINE 40 MG/G
CREAM TOPICAL
Status: DISCONTINUED | OUTPATIENT
Start: 2023-01-01 | End: 2023-01-01 | Stop reason: HOSPADM

## 2023-01-01 RX ORDER — NALOXONE HYDROCHLORIDE 0.4 MG/ML
0.2 INJECTION, SOLUTION INTRAMUSCULAR; INTRAVENOUS; SUBCUTANEOUS
Status: DISCONTINUED | OUTPATIENT
Start: 2023-01-01 | End: 2023-01-01

## 2023-01-01 RX ORDER — NALOXONE HYDROCHLORIDE 0.4 MG/ML
0.4 INJECTION, SOLUTION INTRAMUSCULAR; INTRAVENOUS; SUBCUTANEOUS
Status: DISCONTINUED | OUTPATIENT
Start: 2023-01-01 | End: 2023-01-01

## 2023-01-01 RX ORDER — NITROGLYCERIN 0.4 MG/1
0.4 TABLET SUBLINGUAL EVERY 5 MIN PRN
Status: DISCONTINUED | OUTPATIENT
Start: 2023-01-01 | End: 2023-01-01

## 2023-01-01 RX ORDER — SODIUM CHLORIDE 9 MG/ML
INJECTION, SOLUTION INTRAVENOUS CONTINUOUS
Status: DISCONTINUED | OUTPATIENT
Start: 2023-01-01 | End: 2023-01-01 | Stop reason: HOSPADM

## 2023-01-01 RX ORDER — FLUMAZENIL 0.1 MG/ML
0.2 INJECTION, SOLUTION INTRAVENOUS
Status: DISCONTINUED | OUTPATIENT
Start: 2023-01-01 | End: 2023-01-01 | Stop reason: HOSPADM

## 2023-01-01 RX ORDER — CEFAZOLIN SODIUM 1 G/50ML
20 SOLUTION INTRAVENOUS ONCE
Status: COMPLETED | OUTPATIENT
Start: 2023-01-01 | End: 2023-01-01

## 2023-01-01 RX ORDER — LIDOCAINE HYDROCHLORIDE 20 MG/ML
SOLUTION OROPHARYNGEAL
Status: COMPLETED | OUTPATIENT
Start: 2023-01-01 | End: 2023-01-01

## 2023-01-01 RX ORDER — NITROGLYCERIN 0.4 MG/1
0.4 TABLET SUBLINGUAL EVERY 5 MIN PRN
Status: DISCONTINUED | OUTPATIENT
Start: 2023-01-01 | End: 2023-01-01 | Stop reason: HOSPADM

## 2023-01-01 RX ORDER — HEPARIN SODIUM 200 [USP'U]/100ML
1 INJECTION, SOLUTION INTRAVENOUS CONTINUOUS PRN
Status: DISCONTINUED | OUTPATIENT
Start: 2023-01-01 | End: 2023-01-01 | Stop reason: HOSPADM

## 2023-01-01 RX ORDER — NALOXONE HYDROCHLORIDE 0.4 MG/ML
0.2 INJECTION, SOLUTION INTRAMUSCULAR; INTRAVENOUS; SUBCUTANEOUS
Status: DISCONTINUED | OUTPATIENT
Start: 2023-01-01 | End: 2023-01-01 | Stop reason: HOSPADM

## 2023-01-01 RX ORDER — IODIXANOL 320 MG/ML
INJECTION, SOLUTION INTRAVASCULAR
Status: DISCONTINUED | OUTPATIENT
Start: 2023-01-01 | End: 2023-01-01 | Stop reason: HOSPADM

## 2023-01-01 RX ORDER — FUROSEMIDE 10 MG/ML
40 INJECTION INTRAMUSCULAR; INTRAVENOUS DAILY
Status: DISCONTINUED | OUTPATIENT
Start: 2023-01-01 | End: 2023-01-01

## 2023-01-01 RX ORDER — REGADENOSON 0.08 MG/ML
0.4 INJECTION, SOLUTION INTRAVENOUS ONCE
Status: COMPLETED | OUTPATIENT
Start: 2023-01-01 | End: 2023-01-01

## 2023-01-01 RX ORDER — NALOXONE HYDROCHLORIDE 0.4 MG/ML
0.4 INJECTION, SOLUTION INTRAMUSCULAR; INTRAVENOUS; SUBCUTANEOUS
Status: DISCONTINUED | OUTPATIENT
Start: 2023-01-01 | End: 2023-01-01 | Stop reason: HOSPADM

## 2023-01-01 RX ORDER — FENTANYL CITRATE 50 UG/ML
25 INJECTION, SOLUTION INTRAMUSCULAR; INTRAVENOUS
Status: DISCONTINUED | OUTPATIENT
Start: 2023-01-01 | End: 2023-01-01

## 2023-01-01 RX ORDER — CLOPIDOGREL BISULFATE 75 MG/1
TABLET ORAL
Status: DISCONTINUED | OUTPATIENT
Start: 2023-01-01 | End: 2023-01-01 | Stop reason: HOSPADM

## 2023-01-01 RX ORDER — OXYMETAZOLINE HYDROCHLORIDE 0.05 G/100ML
2 SPRAY NASAL 2 TIMES DAILY
Status: COMPLETED | OUTPATIENT
Start: 2023-01-01 | End: 2023-01-01

## 2023-01-01 RX ORDER — ACETAMINOPHEN 325 MG/1
650 TABLET ORAL EVERY 4 HOURS PRN
Status: DISCONTINUED | OUTPATIENT
Start: 2023-01-01 | End: 2023-01-01

## 2023-01-01 RX ORDER — ATROPINE SULFATE 0.1 MG/ML
0.5 INJECTION INTRAVENOUS
Status: DISCONTINUED | OUTPATIENT
Start: 2023-01-01 | End: 2023-01-01

## 2023-01-01 RX ORDER — ATORVASTATIN CALCIUM 40 MG/1
40 TABLET, FILM COATED ORAL EVERY EVENING
COMMUNITY

## 2023-01-01 RX ORDER — FENTANYL CITRATE 50 UG/ML
25-50 INJECTION, SOLUTION INTRAMUSCULAR; INTRAVENOUS EVERY 5 MIN PRN
Status: DISCONTINUED | OUTPATIENT
Start: 2023-01-01 | End: 2023-01-01 | Stop reason: HOSPADM

## 2023-01-01 RX ORDER — OXYCODONE HYDROCHLORIDE 5 MG/1
2.5 TABLET ORAL EVERY 6 HOURS PRN
Qty: 8 TABLET | Refills: 0 | Status: SHIPPED | OUTPATIENT
Start: 2023-01-01 | End: 2023-01-01

## 2023-01-01 RX ORDER — NICOTINE POLACRILEX 4 MG
15-30 LOZENGE BUCCAL
Status: DISCONTINUED | OUTPATIENT
Start: 2023-01-01 | End: 2023-01-01

## 2023-01-01 RX ORDER — DIAZEPAM 5 MG
5 TABLET ORAL
Status: COMPLETED | OUTPATIENT
Start: 2023-01-01 | End: 2023-01-01

## 2023-01-01 RX ORDER — HEPARIN SODIUM 1000 [USP'U]/ML
5000 INJECTION, SOLUTION INTRAVENOUS; SUBCUTANEOUS ONCE
Status: COMPLETED | OUTPATIENT
Start: 2023-01-01 | End: 2023-01-01

## 2023-01-01 RX ORDER — SODIUM CHLORIDE 9 MG/ML
INJECTION, SOLUTION INTRAVENOUS CONTINUOUS
Status: ACTIVE | OUTPATIENT
Start: 2023-01-01 | End: 2023-01-01

## 2023-01-01 RX ORDER — CEFDINIR 300 MG/1
300 CAPSULE ORAL DAILY
Status: DISCONTINUED | OUTPATIENT
Start: 2023-01-01 | End: 2023-01-01 | Stop reason: HOSPADM

## 2023-01-01 RX ORDER — FERROUS SULFATE 325(65) MG
325 TABLET ORAL DAILY
Qty: 30 TABLET | Refills: 1 | Status: SHIPPED | OUTPATIENT
Start: 2023-01-01 | End: 2023-01-01

## 2023-01-01 RX ORDER — CLOPIDOGREL BISULFATE 75 MG/1
75 TABLET ORAL DAILY
Status: DISCONTINUED | OUTPATIENT
Start: 2023-01-01 | End: 2023-01-01 | Stop reason: HOSPADM

## 2023-01-01 RX ORDER — HYDRALAZINE HYDROCHLORIDE 20 MG/ML
10 INJECTION INTRAMUSCULAR; INTRAVENOUS EVERY 4 HOURS PRN
Status: DISCONTINUED | OUTPATIENT
Start: 2023-01-01 | End: 2023-01-01 | Stop reason: HOSPADM

## 2023-01-01 RX ORDER — ASPIRIN 81 MG/1
81 TABLET, CHEWABLE ORAL ONCE
Status: DISCONTINUED | OUTPATIENT
Start: 2023-01-01 | End: 2023-01-01

## 2023-01-01 RX ORDER — ONDANSETRON 4 MG/1
4 TABLET, ORALLY DISINTEGRATING ORAL EVERY 6 HOURS PRN
Status: DISCONTINUED | OUTPATIENT
Start: 2023-01-01 | End: 2023-01-01 | Stop reason: HOSPADM

## 2023-01-01 RX ORDER — TORSEMIDE 20 MG/1
40 TABLET ORAL DAILY
Status: DISCONTINUED | OUTPATIENT
Start: 2023-01-01 | End: 2023-01-01 | Stop reason: HOSPADM

## 2023-01-01 RX ORDER — PIPERACILLIN SODIUM, TAZOBACTAM SODIUM 3; .375 G/15ML; G/15ML
3.38 INJECTION, POWDER, LYOPHILIZED, FOR SOLUTION INTRAVENOUS EVERY 8 HOURS
Status: DISCONTINUED | OUTPATIENT
Start: 2023-01-01 | End: 2023-01-01

## 2023-01-01 RX ORDER — FUROSEMIDE 10 MG/ML
40 INJECTION INTRAMUSCULAR; INTRAVENOUS ONCE
Status: COMPLETED | OUTPATIENT
Start: 2023-01-01 | End: 2023-01-01

## 2023-01-01 RX ORDER — LISINOPRIL 20 MG/1
40 TABLET ORAL DAILY
Status: DISCONTINUED | OUTPATIENT
Start: 2023-01-01 | End: 2023-01-01 | Stop reason: HOSPADM

## 2023-01-01 RX ORDER — METOPROLOL SUCCINATE 50 MG/1
50 TABLET, EXTENDED RELEASE ORAL DAILY
Status: DISCONTINUED | OUTPATIENT
Start: 2023-01-01 | End: 2023-01-01

## 2023-01-01 RX ORDER — LEVOTHYROXINE SODIUM 125 UG/1
125 TABLET ORAL DAILY
Status: DISCONTINUED | OUTPATIENT
Start: 2023-01-01 | End: 2023-01-01 | Stop reason: HOSPADM

## 2023-01-01 RX ORDER — EZETIMIBE 10 MG/1
10 TABLET ORAL EVERY EVENING
Status: DISCONTINUED | OUTPATIENT
Start: 2023-01-01 | End: 2023-01-01 | Stop reason: HOSPADM

## 2023-01-01 RX ORDER — ASPIRIN 81 MG/1
81 TABLET ORAL DAILY
Status: DISCONTINUED | OUTPATIENT
Start: 2023-01-01 | End: 2023-01-01

## 2023-01-01 RX ORDER — ATORVASTATIN CALCIUM 40 MG/1
40 TABLET, FILM COATED ORAL DAILY
Status: DISCONTINUED | OUTPATIENT
Start: 2023-01-01 | End: 2023-01-01

## 2023-01-01 RX ORDER — ASPIRIN 325 MG
325 TABLET ORAL ONCE
Status: COMPLETED | OUTPATIENT
Start: 2023-01-01 | End: 2023-01-01

## 2023-01-01 RX ORDER — ASPIRIN 81 MG/1
81 TABLET ORAL DAILY
Status: DISCONTINUED | OUTPATIENT
Start: 2023-01-01 | End: 2023-01-01 | Stop reason: HOSPADM

## 2023-01-01 RX ORDER — CEFDINIR 300 MG/1
300 CAPSULE ORAL DAILY
Qty: 9 CAPSULE | Refills: 0 | Status: SHIPPED | OUTPATIENT
Start: 2023-01-01 | End: 2023-01-01

## 2023-01-01 RX ORDER — FLUMAZENIL 0.1 MG/ML
0.2 INJECTION, SOLUTION INTRAVENOUS
Status: DISCONTINUED | OUTPATIENT
Start: 2023-01-01 | End: 2023-01-01

## 2023-01-01 RX ORDER — SODIUM CHLORIDE 9 MG/ML
INJECTION, SOLUTION INTRAVENOUS CONTINUOUS
Status: DISCONTINUED | OUTPATIENT
Start: 2023-01-01 | End: 2023-01-01

## 2023-01-01 RX ORDER — HEPARIN SODIUM 1000 [USP'U]/ML
INJECTION, SOLUTION INTRAVENOUS; SUBCUTANEOUS
Status: DISCONTINUED | OUTPATIENT
Start: 2023-01-01 | End: 2023-01-01 | Stop reason: HOSPADM

## 2023-01-01 RX ORDER — NITROGLYCERIN 5 MG/ML
VIAL (ML) INTRAVENOUS
Status: DISCONTINUED | OUTPATIENT
Start: 2023-01-01 | End: 2023-01-01 | Stop reason: HOSPADM

## 2023-01-01 RX ORDER — HEPARIN SODIUM 5000 [USP'U]/.5ML
5000 INJECTION, SOLUTION INTRAVENOUS; SUBCUTANEOUS EVERY 12 HOURS
Status: DISCONTINUED | OUTPATIENT
Start: 2023-01-01 | End: 2023-01-01 | Stop reason: HOSPADM

## 2023-01-01 RX ORDER — NITROGLYCERIN 20 MG/100ML
10-200 INJECTION INTRAVENOUS CONTINUOUS
Status: DISCONTINUED | OUTPATIENT
Start: 2023-01-01 | End: 2023-01-01

## 2023-01-01 RX ORDER — CEFTRIAXONE 2 G/1
2 INJECTION, POWDER, FOR SOLUTION INTRAMUSCULAR; INTRAVENOUS EVERY 24 HOURS
Status: DISCONTINUED | OUTPATIENT
Start: 2023-01-01 | End: 2023-01-01

## 2023-01-01 RX ORDER — METOPROLOL TARTRATE 1 MG/ML
5 INJECTION, SOLUTION INTRAVENOUS
Status: DISCONTINUED | OUTPATIENT
Start: 2023-01-01 | End: 2023-01-01 | Stop reason: HOSPADM

## 2023-01-01 RX ORDER — LISINOPRIL 40 MG/1
40 TABLET ORAL DAILY
Start: 2023-01-01 | End: 2023-01-01

## 2023-01-01 RX ORDER — FERROUS SULFATE 325(65) MG
325 TABLET ORAL DAILY
Status: DISCONTINUED | OUTPATIENT
Start: 2023-01-01 | End: 2023-01-01 | Stop reason: HOSPADM

## 2023-01-01 RX ORDER — FLUTICASONE PROPIONATE 50 MCG
1 SPRAY, SUSPENSION (ML) NASAL DAILY
Status: DISCONTINUED | OUTPATIENT
Start: 2023-01-01 | End: 2023-01-01

## 2023-01-01 RX ORDER — CLOPIDOGREL BISULFATE 75 MG/1
300 TABLET ORAL ONCE
Status: COMPLETED | OUTPATIENT
Start: 2023-01-01 | End: 2023-01-01

## 2023-01-01 RX ORDER — LOSARTAN POTASSIUM 25 MG/1
25 TABLET ORAL EVERY EVENING
COMMUNITY
Start: 2023-01-01 | End: 2024-10-04

## 2023-01-01 RX ORDER — DEXTROSE MONOHYDRATE 25 G/50ML
25-50 INJECTION, SOLUTION INTRAVENOUS
Status: DISCONTINUED | OUTPATIENT
Start: 2023-01-01 | End: 2023-01-01 | Stop reason: HOSPADM

## 2023-01-01 RX ORDER — ATORVASTATIN CALCIUM 40 MG/1
40 TABLET, FILM COATED ORAL DAILY
Status: DISCONTINUED | OUTPATIENT
Start: 2023-01-01 | End: 2023-01-01 | Stop reason: HOSPADM

## 2023-01-01 RX ORDER — FLUTICASONE PROPIONATE 50 MCG
2 SPRAY, SUSPENSION (ML) NASAL DAILY
Status: DISCONTINUED | OUTPATIENT
Start: 2023-01-01 | End: 2023-01-01 | Stop reason: HOSPADM

## 2023-01-01 RX ORDER — PIPERACILLIN SODIUM, TAZOBACTAM SODIUM 3; .375 G/15ML; G/15ML
3.38 INJECTION, POWDER, LYOPHILIZED, FOR SOLUTION INTRAVENOUS ONCE
Status: COMPLETED | OUTPATIENT
Start: 2023-01-01 | End: 2023-01-01

## 2023-01-01 RX ORDER — ONDANSETRON 2 MG/ML
4 INJECTION INTRAMUSCULAR; INTRAVENOUS EVERY 6 HOURS PRN
Status: DISCONTINUED | OUTPATIENT
Start: 2023-01-01 | End: 2023-01-01 | Stop reason: HOSPADM

## 2023-01-01 RX ORDER — ALPRAZOLAM 0.5 MG
0.5 TABLET ORAL AT BEDTIME
Qty: 6 TABLET | Refills: 0 | Status: ON HOLD | OUTPATIENT
Start: 2023-01-01 | End: 2024-01-01

## 2023-01-01 RX ORDER — ACETAMINOPHEN 325 MG/1
650 TABLET ORAL EVERY 4 HOURS PRN
Status: DISCONTINUED | OUTPATIENT
Start: 2023-01-01 | End: 2023-01-01 | Stop reason: HOSPADM

## 2023-01-01 RX ORDER — NICOTINE POLACRILEX 4 MG
15-30 LOZENGE BUCCAL
Status: DISCONTINUED | OUTPATIENT
Start: 2023-01-01 | End: 2023-01-01 | Stop reason: HOSPADM

## 2023-01-01 RX ORDER — METOPROLOL SUCCINATE 100 MG/1
100 TABLET, EXTENDED RELEASE ORAL DAILY
Status: DISCONTINUED | OUTPATIENT
Start: 2023-01-01 | End: 2023-01-01 | Stop reason: HOSPADM

## 2023-01-01 RX ORDER — TORSEMIDE 20 MG/1
40 TABLET ORAL DAILY
COMMUNITY
Start: 2023-01-01 | End: 2024-01-01 | Stop reason: DRUGHIGH

## 2023-01-01 RX ORDER — ONDANSETRON 2 MG/ML
4 INJECTION INTRAMUSCULAR; INTRAVENOUS EVERY 6 HOURS PRN
Status: DISCONTINUED | OUTPATIENT
Start: 2023-01-01 | End: 2023-01-01

## 2023-01-01 RX ORDER — OXYCODONE HYDROCHLORIDE 5 MG/1
10 TABLET ORAL EVERY 4 HOURS PRN
Status: DISCONTINUED | OUTPATIENT
Start: 2023-01-01 | End: 2023-01-01 | Stop reason: HOSPADM

## 2023-01-01 RX ORDER — IODIXANOL 320 MG/ML
150 INJECTION, SOLUTION INTRAVASCULAR ONCE
Status: COMPLETED | OUTPATIENT
Start: 2023-01-01 | End: 2023-01-01

## 2023-01-01 RX ORDER — FENTANYL CITRATE 50 UG/ML
INJECTION, SOLUTION INTRAMUSCULAR; INTRAVENOUS
Status: DISCONTINUED | OUTPATIENT
Start: 2023-01-01 | End: 2023-01-01 | Stop reason: HOSPADM

## 2023-01-01 RX ORDER — CLOPIDOGREL BISULFATE 75 MG/1
75 TABLET ORAL DAILY
Qty: 90 TABLET | Refills: 3 | Status: SHIPPED | OUTPATIENT
Start: 2023-01-01

## 2023-01-01 RX ORDER — FENTANYL CITRATE 50 UG/ML
25 INJECTION, SOLUTION INTRAMUSCULAR; INTRAVENOUS
Status: DISCONTINUED | OUTPATIENT
Start: 2023-01-01 | End: 2023-01-01 | Stop reason: HOSPADM

## 2023-01-01 RX ORDER — DEXTROSE MONOHYDRATE 25 G/50ML
25-50 INJECTION, SOLUTION INTRAVENOUS
Status: DISCONTINUED | OUTPATIENT
Start: 2023-01-01 | End: 2023-01-01

## 2023-01-01 RX ORDER — CLOPIDOGREL BISULFATE 75 MG/1
75 TABLET ORAL DAILY
Qty: 90 TABLET | Refills: 0 | Status: ON HOLD | OUTPATIENT
Start: 2023-01-01 | End: 2023-01-01

## 2023-01-01 RX ORDER — ACETAMINOPHEN 325 MG/1
650 TABLET ORAL EVERY 6 HOURS PRN
Status: DISCONTINUED | OUTPATIENT
Start: 2023-01-01 | End: 2023-01-01 | Stop reason: HOSPADM

## 2023-01-01 RX ORDER — ALPRAZOLAM 0.25 MG
0.25 TABLET ORAL AT BEDTIME
Status: DISCONTINUED | OUTPATIENT
Start: 2023-01-01 | End: 2023-01-01 | Stop reason: HOSPADM

## 2023-01-01 RX ORDER — GABAPENTIN 300 MG/1
300 CAPSULE ORAL 2 TIMES DAILY
Status: DISCONTINUED | OUTPATIENT
Start: 2023-01-01 | End: 2023-01-01 | Stop reason: HOSPADM

## 2023-01-01 RX ORDER — FENTANYL CITRATE 50 UG/ML
INJECTION, SOLUTION INTRAMUSCULAR; INTRAVENOUS
Status: COMPLETED | OUTPATIENT
Start: 2023-01-01 | End: 2023-01-01

## 2023-01-01 RX ORDER — ASPIRIN 81 MG/1
243 TABLET, CHEWABLE ORAL ONCE
Status: COMPLETED | OUTPATIENT
Start: 2023-01-01 | End: 2023-01-01

## 2023-01-01 RX ORDER — ONDANSETRON 4 MG/1
4 TABLET, ORALLY DISINTEGRATING ORAL EVERY 6 HOURS PRN
Status: DISCONTINUED | OUTPATIENT
Start: 2023-01-01 | End: 2023-01-01

## 2023-01-01 RX ORDER — HEPARIN SODIUM 1000 [USP'U]/ML
3000 INJECTION, SOLUTION INTRAVENOUS; SUBCUTANEOUS ONCE
Status: COMPLETED | OUTPATIENT
Start: 2023-01-01 | End: 2023-01-01

## 2023-01-01 RX ORDER — ASPIRIN 81 MG/1
81 TABLET ORAL DAILY
Qty: 30 TABLET | Refills: 3 | Status: SHIPPED | OUTPATIENT
Start: 2023-01-01

## 2023-01-01 RX ORDER — ACETAMINOPHEN 325 MG/1
975 TABLET ORAL ONCE
Status: COMPLETED | OUTPATIENT
Start: 2023-01-01 | End: 2023-01-01

## 2023-01-01 RX ORDER — FLUTICASONE PROPIONATE 50 MCG
2 SPRAY, SUSPENSION (ML) NASAL DAILY
Qty: 16 G | Refills: 1 | Status: SHIPPED | OUTPATIENT
Start: 2023-01-01 | End: 2024-01-01

## 2023-01-01 RX ORDER — HEPARIN SODIUM 10000 [USP'U]/100ML
0-5000 INJECTION, SOLUTION INTRAVENOUS CONTINUOUS
Status: DISCONTINUED | OUTPATIENT
Start: 2023-01-01 | End: 2023-01-01

## 2023-01-01 RX ORDER — CEFUROXIME AXETIL 500 MG/1
500 TABLET ORAL DAILY
Status: DISCONTINUED | OUTPATIENT
Start: 2023-01-01 | End: 2023-01-01

## 2023-01-01 RX ORDER — MORPHINE SULFATE 4 MG/ML
4 INJECTION, SOLUTION INTRAMUSCULAR; INTRAVENOUS ONCE
Status: DISCONTINUED | OUTPATIENT
Start: 2023-01-01 | End: 2023-01-01

## 2023-01-01 RX ORDER — BISACODYL 10 MG
10 SUPPOSITORY, RECTAL RECTAL DAILY PRN
Status: DISCONTINUED | OUTPATIENT
Start: 2023-01-01 | End: 2023-01-01 | Stop reason: HOSPADM

## 2023-01-01 RX ADMIN — MIDAZOLAM HYDROCHLORIDE 0.5 MG: 1 INJECTION, SOLUTION INTRAMUSCULAR; INTRAVENOUS at 08:26

## 2023-01-01 RX ADMIN — CEFTRIAXONE SODIUM 2 G: 2 INJECTION, POWDER, FOR SOLUTION INTRAMUSCULAR; INTRAVENOUS at 16:12

## 2023-01-01 RX ADMIN — EZETIMIBE 10 MG: 10 TABLET ORAL at 21:13

## 2023-01-01 RX ADMIN — Medication 81 MG: at 08:37

## 2023-01-01 RX ADMIN — INSULIN GLARGINE 30 UNITS: 100 INJECTION, SOLUTION SUBCUTANEOUS at 08:27

## 2023-01-01 RX ADMIN — BENZOCAINE 6 MG-MENTHOL 10 MG LOZENGES 1 LOZENGE: at 07:04

## 2023-01-01 RX ADMIN — FERROUS SULFATE TAB 325 MG (65 MG ELEMENTAL FE) 325 MG: 325 (65 FE) TAB at 08:01

## 2023-01-01 RX ADMIN — Medication 10.1 MILLICURIE: at 08:57

## 2023-01-01 RX ADMIN — LEVOTHYROXINE SODIUM 125 MCG: 125 TABLET ORAL at 08:33

## 2023-01-01 RX ADMIN — MIDAZOLAM HYDROCHLORIDE 1 MG: 1 INJECTION, SOLUTION INTRAMUSCULAR; INTRAVENOUS at 08:17

## 2023-01-01 RX ADMIN — OXYMETAZOLINE HYDROCHLORIDE 2 SPRAY: 0.05 SPRAY NASAL at 17:44

## 2023-01-01 RX ADMIN — OXYMETAZOLINE HYDROCHLORIDE 2 SPRAY: 0.05 SPRAY NASAL at 08:45

## 2023-01-01 RX ADMIN — HEPARIN SODIUM 5000 UNITS: 10000 INJECTION, SOLUTION INTRAVENOUS; SUBCUTANEOUS at 21:39

## 2023-01-01 RX ADMIN — CLOPIDOGREL BISULFATE 75 MG: 75 TABLET ORAL at 08:34

## 2023-01-01 RX ADMIN — ATORVASTATIN CALCIUM 40 MG: 40 TABLET, FILM COATED ORAL at 08:49

## 2023-01-01 RX ADMIN — CLOPIDOGREL BISULFATE 75 MG: 75 TABLET ORAL at 08:01

## 2023-01-01 RX ADMIN — HEPARIN SODIUM AND DEXTROSE 1200 UNITS/HR: 10000; 5 INJECTION INTRAVENOUS at 13:19

## 2023-01-01 RX ADMIN — PIPERACILLIN AND TAZOBACTAM 3.38 G: 3; .375 INJECTION, POWDER, LYOPHILIZED, FOR SOLUTION INTRAVENOUS at 10:12

## 2023-01-01 RX ADMIN — MIDAZOLAM HYDROCHLORIDE 0.5 MG: 1 INJECTION, SOLUTION INTRAMUSCULAR; INTRAVENOUS at 08:45

## 2023-01-01 RX ADMIN — LIDOCAINE HYDROCHLORIDE 15 ML: 20 SOLUTION ORAL; TOPICAL at 11:00

## 2023-01-01 RX ADMIN — CLOPIDOGREL BISULFATE 300 MG: 75 TABLET ORAL at 12:20

## 2023-01-01 RX ADMIN — EZETIMIBE 10 MG: 10 TABLET ORAL at 20:38

## 2023-01-01 RX ADMIN — INSULIN GLARGINE 30 UNITS: 100 INJECTION, SOLUTION SUBCUTANEOUS at 08:09

## 2023-01-01 RX ADMIN — GABAPENTIN 300 MG: 300 CAPSULE ORAL at 21:13

## 2023-01-01 RX ADMIN — VANCOMYCIN HYDROCHLORIDE 2000 MG: 10 INJECTION, POWDER, LYOPHILIZED, FOR SOLUTION INTRAVENOUS at 02:12

## 2023-01-01 RX ADMIN — CEFDINIR 300 MG: 300 CAPSULE ORAL at 12:48

## 2023-01-01 RX ADMIN — ACETAMINOPHEN 975 MG: 325 TABLET ORAL at 13:02

## 2023-01-01 RX ADMIN — ACETAMINOPHEN 650 MG: 325 TABLET ORAL at 05:01

## 2023-01-01 RX ADMIN — CEFTRIAXONE SODIUM 2 G: 2 INJECTION, POWDER, FOR SOLUTION INTRAMUSCULAR; INTRAVENOUS at 16:31

## 2023-01-01 RX ADMIN — CEFTRIAXONE SODIUM 2 G: 2 INJECTION, POWDER, FOR SOLUTION INTRAMUSCULAR; INTRAVENOUS at 16:08

## 2023-01-01 RX ADMIN — INSULIN ASPART 4 UNITS: 100 INJECTION, SOLUTION INTRAVENOUS; SUBCUTANEOUS at 13:08

## 2023-01-01 RX ADMIN — GABAPENTIN 300 MG: 300 CAPSULE ORAL at 20:37

## 2023-01-01 RX ADMIN — Medication 81 MG: at 08:49

## 2023-01-01 RX ADMIN — FERROUS SULFATE TAB 325 MG (65 MG ELEMENTAL FE) 325 MG: 325 (65 FE) TAB at 08:25

## 2023-01-01 RX ADMIN — EZETIMIBE 10 MG: 10 TABLET ORAL at 21:37

## 2023-01-01 RX ADMIN — HEPARIN SODIUM 5000 UNITS: 10000 INJECTION, SOLUTION INTRAVENOUS; SUBCUTANEOUS at 20:16

## 2023-01-01 RX ADMIN — CLOPIDOGREL BISULFATE 75 MG: 75 TABLET ORAL at 08:49

## 2023-01-01 RX ADMIN — HEPARIN SODIUM 5000 UNITS: 10000 INJECTION, SOLUTION INTRAVENOUS; SUBCUTANEOUS at 08:49

## 2023-01-01 RX ADMIN — GABAPENTIN 300 MG: 300 CAPSULE ORAL at 21:08

## 2023-01-01 RX ADMIN — HEPARIN SODIUM 5000 UNITS: 10000 INJECTION, SOLUTION INTRAVENOUS; SUBCUTANEOUS at 21:08

## 2023-01-01 RX ADMIN — GABAPENTIN 300 MG: 300 CAPSULE ORAL at 08:01

## 2023-01-01 RX ADMIN — HEPARIN SODIUM 5000 UNITS: 10000 INJECTION, SOLUTION INTRAVENOUS; SUBCUTANEOUS at 08:36

## 2023-01-01 RX ADMIN — FENTANYL CITRATE 25 MCG: 50 INJECTION, SOLUTION INTRAMUSCULAR; INTRAVENOUS at 08:56

## 2023-01-01 RX ADMIN — BENZOCAINE 6 MG-MENTHOL 10 MG LOZENGES 1 LOZENGE: at 21:11

## 2023-01-01 RX ADMIN — ATORVASTATIN CALCIUM 40 MG: 40 TABLET, FILM COATED ORAL at 09:14

## 2023-01-01 RX ADMIN — ALPRAZOLAM 0.25 MG: 0.25 TABLET ORAL at 21:37

## 2023-01-01 RX ADMIN — Medication 34.9 MILLICURIE: at 10:46

## 2023-01-01 RX ADMIN — FUROSEMIDE 40 MG: 10 INJECTION, SOLUTION INTRAMUSCULAR; INTRAVENOUS at 14:21

## 2023-01-01 RX ADMIN — EZETIMIBE 10 MG: 10 TABLET ORAL at 20:20

## 2023-01-01 RX ADMIN — FENTANYL CITRATE 50 MCG: 50 INJECTION, SOLUTION INTRAMUSCULAR; INTRAVENOUS at 11:02

## 2023-01-01 RX ADMIN — METOPROLOL SUCCINATE 100 MG: 100 TABLET, EXTENDED RELEASE ORAL at 09:14

## 2023-01-01 RX ADMIN — ATORVASTATIN CALCIUM 40 MG: 40 TABLET, FILM COATED ORAL at 07:54

## 2023-01-01 RX ADMIN — OXYMETAZOLINE HYDROCHLORIDE 2 SPRAY: 0.05 SPRAY NASAL at 21:08

## 2023-01-01 RX ADMIN — BENZOCAINE 6 MG-MENTHOL 10 MG LOZENGES 1 LOZENGE: at 00:32

## 2023-01-01 RX ADMIN — NITROGLYCERIN 10 MCG/MIN: 20 INJECTION INTRAVENOUS at 14:03

## 2023-01-01 RX ADMIN — HEPARIN SODIUM 5000 UNITS: 10000 INJECTION, SOLUTION INTRAVENOUS; SUBCUTANEOUS at 08:26

## 2023-01-01 RX ADMIN — ASPIRIN 325 MG: 325 TABLET ORAL at 16:29

## 2023-01-01 RX ADMIN — TOPICAL ANESTHETIC 0.5 ML: 200 SPRAY DENTAL; PERIODONTAL at 11:01

## 2023-01-01 RX ADMIN — ONDANSETRON 4 MG: 2 INJECTION INTRAMUSCULAR; INTRAVENOUS at 01:48

## 2023-01-01 RX ADMIN — MIDAZOLAM 1 MG: 1 INJECTION INTRAMUSCULAR; INTRAVENOUS at 11:02

## 2023-01-01 RX ADMIN — GABAPENTIN 300 MG: 300 CAPSULE ORAL at 08:25

## 2023-01-01 RX ADMIN — LEVOTHYROXINE SODIUM 125 MCG: 125 TABLET ORAL at 07:54

## 2023-01-01 RX ADMIN — EZETIMIBE 10 MG: 10 TABLET ORAL at 21:08

## 2023-01-01 RX ADMIN — SODIUM CHLORIDE: 9 INJECTION, SOLUTION INTRAVENOUS at 20:37

## 2023-01-01 RX ADMIN — HEPARIN SODIUM 5000 UNITS: 10000 INJECTION, SOLUTION INTRAVENOUS; SUBCUTANEOUS at 21:23

## 2023-01-01 RX ADMIN — HEPARIN SODIUM 5000 UNITS: 1000 INJECTION INTRAVENOUS; SUBCUTANEOUS at 08:41

## 2023-01-01 RX ADMIN — FLUTICASONE PROPIONATE 1 SPRAY: 50 SPRAY, METERED NASAL at 08:51

## 2023-01-01 RX ADMIN — INSULIN ASPART 6 UNITS: 100 INJECTION, SOLUTION INTRAVENOUS; SUBCUTANEOUS at 12:13

## 2023-01-01 RX ADMIN — NITROGLYCERIN 0.4 MG: 0.4 TABLET, ORALLY DISINTEGRATING SUBLINGUAL at 13:32

## 2023-01-01 RX ADMIN — GABAPENTIN 300 MG: 300 CAPSULE ORAL at 09:14

## 2023-01-01 RX ADMIN — EZETIMIBE 10 MG: 10 TABLET ORAL at 21:23

## 2023-01-01 RX ADMIN — HEPARIN SODIUM 5000 UNITS: 10000 INJECTION, SOLUTION INTRAVENOUS; SUBCUTANEOUS at 20:38

## 2023-01-01 RX ADMIN — FENTANYL CITRATE 25 MCG: 50 INJECTION, SOLUTION INTRAMUSCULAR; INTRAVENOUS at 09:52

## 2023-01-01 RX ADMIN — CLOPIDOGREL BISULFATE 75 MG: 75 TABLET ORAL at 08:25

## 2023-01-01 RX ADMIN — LEVOTHYROXINE SODIUM 125 MCG: 125 TABLET ORAL at 08:26

## 2023-01-01 RX ADMIN — GABAPENTIN 300 MG: 300 CAPSULE ORAL at 20:20

## 2023-01-01 RX ADMIN — ALPRAZOLAM 0.25 MG: 0.25 TABLET ORAL at 21:08

## 2023-01-01 RX ADMIN — ALPRAZOLAM 0.25 MG: 0.25 TABLET ORAL at 21:13

## 2023-01-01 RX ADMIN — SODIUM CHLORIDE: 9 INJECTION, SOLUTION INTRAVENOUS at 12:21

## 2023-01-01 RX ADMIN — DIAZEPAM 5 MG: 5 TABLET ORAL at 17:05

## 2023-01-01 RX ADMIN — ATORVASTATIN CALCIUM 40 MG: 40 TABLET, FILM COATED ORAL at 08:34

## 2023-01-01 RX ADMIN — REGADENOSON 0.4 MG: 0.08 INJECTION, SOLUTION INTRAVENOUS at 09:48

## 2023-01-01 RX ADMIN — FENTANYL CITRATE 50 MCG: 50 INJECTION, SOLUTION INTRAMUSCULAR; INTRAVENOUS at 08:19

## 2023-01-01 RX ADMIN — CLOPIDOGREL BISULFATE 75 MG: 75 TABLET ORAL at 07:53

## 2023-01-01 RX ADMIN — FLUTICASONE PROPIONATE 1 SPRAY: 50 SPRAY, METERED NASAL at 22:32

## 2023-01-01 RX ADMIN — GABAPENTIN 300 MG: 300 CAPSULE ORAL at 21:37

## 2023-01-01 RX ADMIN — FLUTICASONE PROPIONATE 1 SPRAY: 50 SPRAY, METERED NASAL at 08:03

## 2023-01-01 RX ADMIN — Medication 81 MG: at 08:00

## 2023-01-01 RX ADMIN — OXYMETAZOLINE HYDROCHLORIDE 2 SPRAY: 0.05 SPRAY NASAL at 11:45

## 2023-01-01 RX ADMIN — CLOPIDOGREL BISULFATE 75 MG: 75 TABLET ORAL at 16:29

## 2023-01-01 RX ADMIN — GABAPENTIN 300 MG: 300 CAPSULE ORAL at 07:54

## 2023-01-01 RX ADMIN — INSULIN ASPART 8 UNITS: 100 INJECTION, SOLUTION INTRAVENOUS; SUBCUTANEOUS at 17:55

## 2023-01-01 RX ADMIN — INSULIN ASPART 3 UNITS: 100 INJECTION, SOLUTION INTRAVENOUS; SUBCUTANEOUS at 09:34

## 2023-01-01 RX ADMIN — Medication 81 MG: at 08:34

## 2023-01-01 RX ADMIN — FENTANYL CITRATE 25 MCG: 50 INJECTION, SOLUTION INTRAMUSCULAR; INTRAVENOUS at 08:34

## 2023-01-01 RX ADMIN — ALPRAZOLAM 0.25 MG: 0.25 TABLET ORAL at 20:21

## 2023-01-01 RX ADMIN — PERFLUTREN 3 ML: 6.52 INJECTION, SUSPENSION INTRAVENOUS at 15:56

## 2023-01-01 RX ADMIN — Medication 1 MG: at 01:08

## 2023-01-01 RX ADMIN — MIDAZOLAM HYDROCHLORIDE 0.5 MG: 1 INJECTION, SOLUTION INTRAMUSCULAR; INTRAVENOUS at 09:06

## 2023-01-01 RX ADMIN — FUROSEMIDE 40 MG: 10 INJECTION, SOLUTION INTRAMUSCULAR; INTRAVENOUS at 14:12

## 2023-01-01 RX ADMIN — Medication 81 MG: at 08:25

## 2023-01-01 RX ADMIN — CEFTRIAXONE SODIUM 2 G: 2 INJECTION, POWDER, FOR SOLUTION INTRAMUSCULAR; INTRAVENOUS at 16:59

## 2023-01-01 RX ADMIN — FERROUS SULFATE TAB 325 MG (65 MG ELEMENTAL FE) 325 MG: 325 (65 FE) TAB at 08:33

## 2023-01-01 RX ADMIN — INSULIN GLARGINE 30 UNITS: 100 INJECTION, SOLUTION SUBCUTANEOUS at 08:57

## 2023-01-01 RX ADMIN — INSULIN ASPART 5 UNITS: 100 INJECTION, SOLUTION INTRAVENOUS; SUBCUTANEOUS at 06:08

## 2023-01-01 RX ADMIN — CLOPIDOGREL BISULFATE 75 MG: 75 TABLET ORAL at 09:14

## 2023-01-01 RX ADMIN — GABAPENTIN 300 MG: 300 CAPSULE ORAL at 08:33

## 2023-01-01 RX ADMIN — BENZOCAINE 6 MG-MENTHOL 10 MG LOZENGES 1 LOZENGE: at 21:37

## 2023-01-01 RX ADMIN — ATORVASTATIN CALCIUM 40 MG: 40 TABLET, FILM COATED ORAL at 08:01

## 2023-01-01 RX ADMIN — CEFTRIAXONE SODIUM 2 G: 2 INJECTION, POWDER, FOR SOLUTION INTRAMUSCULAR; INTRAVENOUS at 16:39

## 2023-01-01 RX ADMIN — FENTANYL CITRATE 25 MCG: 50 INJECTION, SOLUTION INTRAMUSCULAR; INTRAVENOUS at 09:15

## 2023-01-01 RX ADMIN — FERROUS SULFATE TAB 325 MG (65 MG ELEMENTAL FE) 325 MG: 325 (65 FE) TAB at 09:13

## 2023-01-01 RX ADMIN — ALPRAZOLAM 0.25 MG: 0.25 TABLET ORAL at 22:25

## 2023-01-01 RX ADMIN — HEPARIN SODIUM 5000 UNITS: 10000 INJECTION, SOLUTION INTRAVENOUS; SUBCUTANEOUS at 08:10

## 2023-01-01 RX ADMIN — LIDOCAINE HYDROCHLORIDE 15 ML: 10 INJECTION, SOLUTION INFILTRATION; PERINEURAL at 08:27

## 2023-01-01 RX ADMIN — Medication 2 SPRAY: at 08:37

## 2023-01-01 RX ADMIN — HEPARIN SODIUM 3000 UNITS: 1000 INJECTION INTRAVENOUS; SUBCUTANEOUS at 09:30

## 2023-01-01 RX ADMIN — ACETAMINOPHEN 650 MG: 325 TABLET ORAL at 01:08

## 2023-01-01 RX ADMIN — HEPARIN SODIUM 5000 UNITS: 10000 INJECTION, SOLUTION INTRAVENOUS; SUBCUTANEOUS at 09:15

## 2023-01-01 RX ADMIN — INSULIN GLARGINE 30 UNITS: 100 INJECTION, SOLUTION SUBCUTANEOUS at 09:35

## 2023-01-01 RX ADMIN — SODIUM CHLORIDE 250 ML: 9 INJECTION, SOLUTION INTRAVENOUS at 19:28

## 2023-01-01 RX ADMIN — SODIUM CHLORIDE: 9 INJECTION, SOLUTION INTRAVENOUS at 10:39

## 2023-01-01 RX ADMIN — GABAPENTIN 300 MG: 300 CAPSULE ORAL at 08:49

## 2023-01-01 RX ADMIN — LEVOTHYROXINE SODIUM 125 MCG: 125 TABLET ORAL at 09:13

## 2023-01-01 RX ADMIN — METOPROLOL SUCCINATE 50 MG: 50 TABLET, EXTENDED RELEASE ORAL at 08:49

## 2023-01-01 RX ADMIN — MIDAZOLAM HYDROCHLORIDE 0.5 MG: 1 INJECTION, SOLUTION INTRAMUSCULAR; INTRAVENOUS at 09:33

## 2023-01-01 RX ADMIN — INSULIN GLARGINE 30 UNITS: 100 INJECTION, SOLUTION SUBCUTANEOUS at 08:36

## 2023-01-01 RX ADMIN — HEPARIN SODIUM 1 BAG: 200 INJECTION, SOLUTION INTRAVENOUS at 08:25

## 2023-01-01 RX ADMIN — METOPROLOL SUCCINATE 100 MG: 100 TABLET, EXTENDED RELEASE ORAL at 08:00

## 2023-01-01 RX ADMIN — Medication 81 MG: at 09:13

## 2023-01-01 RX ADMIN — METOPROLOL SUCCINATE 100 MG: 100 TABLET, EXTENDED RELEASE ORAL at 08:25

## 2023-01-01 RX ADMIN — OXYMETAZOLINE HYDROCHLORIDE 2 SPRAY: 0.05 SPRAY NASAL at 20:38

## 2023-01-01 RX ADMIN — ALPRAZOLAM 0.25 MG: 0.25 TABLET ORAL at 21:23

## 2023-01-01 RX ADMIN — GABAPENTIN 300 MG: 300 CAPSULE ORAL at 21:23

## 2023-01-01 RX ADMIN — ATORVASTATIN CALCIUM 40 MG: 40 TABLET, FILM COATED ORAL at 08:25

## 2023-01-01 RX ADMIN — FERROUS SULFATE TAB 325 MG (65 MG ELEMENTAL FE) 325 MG: 325 (65 FE) TAB at 13:52

## 2023-01-01 RX ADMIN — FUROSEMIDE 40 MG: 10 INJECTION, SOLUTION INTRAMUSCULAR; INTRAVENOUS at 09:20

## 2023-01-01 RX ADMIN — LEVOTHYROXINE SODIUM 125 MCG: 125 TABLET ORAL at 08:49

## 2023-01-01 RX ADMIN — LEVOTHYROXINE SODIUM 125 MCG: 125 TABLET ORAL at 08:00

## 2023-01-01 RX ADMIN — IODIXANOL 80 ML: 320 INJECTION, SOLUTION INTRAVASCULAR at 10:27

## 2023-01-01 RX ADMIN — FUROSEMIDE 40 MG: 10 INJECTION, SOLUTION INTRAMUSCULAR; INTRAVENOUS at 08:26

## 2023-01-01 ASSESSMENT — ACTIVITIES OF DAILY LIVING (ADL)
ADLS_ACUITY_SCORE: 20
ADLS_ACUITY_SCORE: 24
ADLS_ACUITY_SCORE: 20
TOILETING_ISSUES: NO
ADLS_ACUITY_SCORE: 20
ADLS_ACUITY_SCORE: 22
WEAR_GLASSES_OR_BLIND: YES
ADLS_ACUITY_SCORE: 20
ADLS_ACUITY_SCORE: 22
ADLS_ACUITY_SCORE: 25
CONCENTRATING,_REMEMBERING_OR_MAKING_DECISIONS_DIFFICULTY: NO
ADLS_ACUITY_SCORE: 24
ADLS_ACUITY_SCORE: 20
DIFFICULTY_EATING/SWALLOWING: NO
ADLS_ACUITY_SCORE: 20
ADLS_ACUITY_SCORE: 35
ADLS_ACUITY_SCORE: 25
ADLS_ACUITY_SCORE: 20
DIFFICULTY_COMMUNICATING: NO
ADLS_ACUITY_SCORE: 22
ADLS_ACUITY_SCORE: 20
ADLS_ACUITY_SCORE: 21
ADLS_ACUITY_SCORE: 20
ADLS_ACUITY_SCORE: 20
ADLS_ACUITY_SCORE: 22
CHANGE_IN_FUNCTIONAL_STATUS_SINCE_ONSET_OF_CURRENT_ILLNESS/INJURY: NO
ADLS_ACUITY_SCORE: 24
ADLS_ACUITY_SCORE: 25
ADLS_ACUITY_SCORE: 20
ADLS_ACUITY_SCORE: 24
ADLS_ACUITY_SCORE: 22
ADLS_ACUITY_SCORE: 20
HEARING_DIFFICULTY_OR_DEAF: NO
ADLS_ACUITY_SCORE: 20
ADLS_ACUITY_SCORE: 20
ADLS_ACUITY_SCORE: 22
ADLS_ACUITY_SCORE: 20
ADLS_ACUITY_SCORE: 20
WALKING_OR_CLIMBING_STAIRS_DIFFICULTY: NO
DRESSING/BATHING_DIFFICULTY: NO
ADLS_ACUITY_SCORE: 20
ADLS_ACUITY_SCORE: 25
ADLS_ACUITY_SCORE: 20
ADLS_ACUITY_SCORE: 25
DOING_ERRANDS_INDEPENDENTLY_DIFFICULTY: NO
FALL_HISTORY_WITHIN_LAST_SIX_MONTHS: NO
ADLS_ACUITY_SCORE: 35
ADLS_ACUITY_SCORE: 20
ADLS_ACUITY_SCORE: 25
ADLS_ACUITY_SCORE: 20
ADLS_ACUITY_SCORE: 22
ADLS_ACUITY_SCORE: 35
ADLS_ACUITY_SCORE: 25
ADLS_ACUITY_SCORE: 24
ADLS_ACUITY_SCORE: 20
ADLS_ACUITY_SCORE: 22
ADLS_ACUITY_SCORE: 20
ADLS_ACUITY_SCORE: 24
ADLS_ACUITY_SCORE: 22

## 2023-01-01 ASSESSMENT — PAIN SCALES - GENERAL: PAINLEVEL: NO PAIN (0)

## 2023-01-08 ENCOUNTER — HEALTH MAINTENANCE LETTER (OUTPATIENT)
Age: 73
End: 2023-01-08

## 2023-02-12 NOTE — PROGRESS NOTES
HEART CARE ENCOUNTER CONSULTATON NOTE      [facility]  [phone]      Assessment/Recommendations   Assessment/Plan:  1.  Coronary artery disease.  Coronary angiogram results from 2016 is reviewed as well as nuclear stress test from June 2021.  He is not describing any anginal chest discomfort.  He does have longstanding diabetes mellitus.  I have recommended that we pursue follow-up echocardiography to reevaluate left ventricular systolic function.    2.  Hypertension.  Blood pressure has been variable.  He has chronic renal insufficiency and follows with nephrology.  He has had difficulty with orthostatic hypotension if blood pressure drops too low.  I had discussed during his  last visit whether we would consider Holter monitor for some of the lightheadedness which he wanted to wait on.  The dizzy symptoms have been improved.  I suspect that the higher blood pressure sometimes recorded are related to stiff vasculature.  When I auscultate his blood pressure I determined that blood pressure was 130/60 today.  Asked him to monitor and report blood pressure.    3.  Dyslipidemia.  40 mg of rosuvastatin in the setting of renal insufficiency.  I suggested that we change to atorvastatin with 10 mg of Zetia.  In December the LDL cholesterol was 60 with a total cholesterol 118.  Lipid results from December 2022 finds a cholesterol 118, LDL of 60.    4.  Peripheral vascular disease and diabetic foot ulcer.  Patient is requesting a consultation with vascular surgery.  I forwarded a consult.  Carotid ultrasound most recently completed 2018 revealed mild nonobstructive disease.  Consideration could be given to repeating carotid ultrasound which we can discuss at next visit.  He does have risk factors for abdominal aortic aneurysm and I did recommend that we screen for aneurysm based on Medicare guidelines.      Plan: Echocardiogram  Blood pressure monitoring and report  Abdominal aortic aneurysm  screening  Referral to vascular surgery         History of Present Illness/Subjective    HPI: Dakota Bauer is a 72 year old male who is seen in follow-up.  We last visited August 2022.  He has a history of coronary artery disease, diabetes mellitus, renal insufficiency.  He has a history of remote bypass surgery in 2005 and in 2016 had angiogram with findings of patent grafts with some distal disease.  At that time LIMA to the LAD was patent, good distal LAD target, vein graft to diagonal was patent, saphenous vein graft to the OM was patent with diffusely diseased target vessel.  Nuclear stress test was completed June 2021 which revealed a small area of nontransmural infarction with ejection fraction of 62%.    He reports no specific complaints of angina or significant shortness of breath.  He reports that symptoms of dizziness have been quite stable.  He has a history of orthostatic hypotension which has prompted avoiding to lower blood pressure.  He monitors his blood pressures at home and brings in records typically they have been running 1 25-40 over 70s to 80s.  He does tell me that podiatrist has made a recommendation that he see vascular surgery and was asking me for some recommendations as well.  He has a history of neuropathy reportedly had diabetic ulcer on his toe.  It is wrapped today.  He also has a history of peripheral vascular disease.  He follows with Dr. Bhatia from nephrology.  He saw him most recently in December.    Recent Echocardiogram Results:  Study Result    Narrative & Impression   US CAROTID BILATERAL  8/2/2018 8:32 AM     INDICATION: Other specified symptoms and signs involving the circulatory and respiratory systems  TECHNIQUE: Duplex exam performed utilizing 2D gray-scale imaging, Doppler interrogation with color-flow and spectral waveform analysis.  COMPARISON: Carotid duplex, 11/15/2016     FINDINGS:  RIGHT: There is mild atheromatous plaque. Normal waveforms with no significant  stenosis.     LEFT: There is mild atheromatous plaque. Normal waveforms with no significant stenosis.     Both vertebral arteries and subclavian artery waveforms are normal.     VELOCITY CHART:   The following velocities were obtained in the RIGHT carotid system.  CCA: 76/9 cm/s  ICA: 122/29 cm/s  ECA: 574/21 cm/s  ICA/CCA: PS 1.6     The following velocities were obtained in the LEFT carotid system.  CCA: 89/13 cm/s  ICA: 127/14 cm/s  ECA: 174/14 cm/s  ICA/CCA: PS 1.4                           IMPRESSION:  CONCLUSION:  1.  RIGHT: Less than 50% stenosis of the right ICA  2.  LEFT: Less than 50% stenosis of the left ICA     Evaluation based on velocities and NASCET criteria.     Order-Leve        Recent Coronary Angiogram Results:    Dakota Burnschandrika  Cv Coronary Angio Pos Pci - Historical  Order# [id number]  Reading physician: Shyam Cornejo MD Ordering physician: Robinson Chopra MD Study date: 11/25/2016     Patient Information    Name MRN Description   Dakota Bauer [id number] 66 year old male     Indications    Atherosclerotic heart disease of native coronary artery without angina pectoris [I25.10 (ICD-10-CM)]         Conclusion    Cardiac Diagnostic Report      Demographics      Patient Name KYLAH BARCLAY      Referring Physician    ROBINSON CHOPRA MD      MRN #        [id number]          Diagnostic Physician   SHYAM CORNEJO MD      Account #    [id number]           Interventional                                   Physician      D.O.B        1950         Report Status:      Date of      11/25/2016 08:50   Study        AM     Procedure      Procedure Type      Diagnostic procedure:Left Heart Catheterization , Venous Graft   Catheterization, LIMA Graft Catheterization, Coronary   Angiogram      Conclusions      Procedure Summary   Known CAD, presenting with occasional CP and abnormal stress   test      LM 30% mid   LAD occluded proximally   LCx occluded proximally   Ramus 60% proximal stenosis, small caliber  vessel with diffuse   disease   RCA 50% distal stenosis feeding small caliber PDA      LIMA to LAD patent, good distal LAD target   SVG to diagonal patent, mild disease in target diagonal   SVG to OM patent, diffusely diseased target vessel with 70%   stenosis, affecting retrograde filling into mid Cx      EDP 11      Overall, diffuse CAD, but patent grafts. Potential areas for   PCI would include ostial ramus and  of Cx to improve flow   into mid Cx. However, the ramus is a small caliber vessel and   the ostial Cx is heavily calcified, therefore would reserve   PCI if he has significant increase in symptoms, and failing   medical therapy      Recommendations      Cont risk factor control and medical management      Signatures      Electronically signed by SHYAM ELLIS MD   (Diagnostic Physician) on 11/25/2016 at 09:37 AM     Angiographic Findings      Diagnostic Findings      Cardiac Arteries and Lesion Findings     LMCA: Minimal disease and Moderate disease.     LAD: Chronic occlusion and Severe disease.     LCx: Chronic occlusion and Severe disease.     RCA: Moderate disease.     Ramus: Moderate disease.     Procedure Data  Procedure Date  Date: 11/25/2016Start: 08:50 AMEnd: 09:26 AM         Reading Physician Reading Date Result Priority   Provider, Historical 6/16/2021 Routine   Marleni Gavin MD  [phone] 6/16/2021      Result Text     The nuclear stress test is abnormal.     Nuclear images demonstrate a small area of nontransmural infarctions involving the distal anteroseptal wall and basal inferolateral wall.  No ischemia identified.     The left ventricular ejection fraction at stress is 62% without wall motion abnormality.     A prior study was conducted on 9/19/2019.  No change identified.     The patient is at a low risk of future cardiac ischemic events.     HOLTER MONITOR      INTERPRETATION DATE:  11/01/2017     TEST DATE:  10/31/2017       INTERPRETATION:  Predominant rhythm was sinus rhythm  with rates ranging from 42  beats per minute to 85 beats per minute.  Tendency towards sinus bradycardia was  noted with average heart rate of 56 beats per minute and nocturnal resting heart  rates commonly 45 to 50 beats per minute.  There were no clinically significant  pauses.  Rare atrial premature beats were noted 16 over 24 hours.  Occasional  ventricular premature beats were noted, 574 over 24 hours with occasional  ventricular trigeminy.  There were 4 ventricular couplets.  Rate-dependent left  bundle branch block aberrancy was rarely noted with heart rates greater than 75  beats per minute.     CONCLUSION:  Tendency towards sinus bradycardia noted, but no clinically significant  pauses.  Rate-dependent left bundle branch block aberrancy was also present.        TJ lincoln  D 11/01/2017 14:07:17  T 11/01/2017 14:13:11  R 11/01/2017 14:13:11  [id number]          Physical Examination  Review of Systems   Vitals: 190/76 upon arrival my examination is 130/60, weight 219 pounds, rate of 60s and regular respiratory rate 14  Wt Readings from Last 3 Encounters:   08/12/22 98 kg (216 lb)   01/21/22 99.3 kg (219 lb)   07/26/21 103 kg (227 lb)       General Appearance:   no distress, normal body habitus   ENT/Mouth:  Wearing a facemask.      EYES:  no scleral icterus, normal conjunctivae   Neck: no carotid bruits or thyromegaly   Chest/Lungs:   lungs are clear to auscultation, no rales or wheezing, well-healed sternal scar, equal chest wall expansion    Cardiovascular:   Regular. Normal first and second heart sounds with soft systolic murmur rubs, or gallops; the carotid, radial and posterior tibial pulses are intact, Jugular venous pressure within normal limits, mild left greater than right edema bilaterally    Abdomen:  no, bruits, or tenderness; bowel sounds are present   Extremities: no cyanosis or clubbing   Skin: no xanthelasma, warm.    Neurologic: , no tremors     Psychiatric: alert and oriented x3,  calm        Please refer above for cardiac ROS details.        Medical History  Surgical History Family History Social History   Past Medical History:   Diagnosis Date     Coronary artery disease      Diabetes mellitus (H)      Hyperlipidemia      Hypertension      Past Surgical History:   Procedure Laterality Date     BYPASS GRAFT ARTERY CORONARY  2005    [place] with Dr. Holter     CARDIAC CATHETERIZATION       CARDIAC CATHETERIZATION  2016    no intervention     CERVICAL DISC SURGERY       Family History   Problem Relation Age of Onset     Coronary Artery Disease Father      Coronary Artery Disease Brother         Social History     Socioeconomic History     Marital status:      Spouse name: Not on file     Number of children: Not on file     Years of education: Not on file     Highest education level: Not on file   Occupational History     Not on file   Tobacco Use     Smoking status: Former     Types: Cigarettes     Quit date: 10/10/1984     Years since quittin.3     Smokeless tobacco: Never   Substance and Sexual Activity     Alcohol use: Not on file     Drug use: Not on file     Sexual activity: Not on file   Other Topics Concern     Not on file   Social History Narrative     Not on file     Social Determinants of Health     Financial Resource Strain: Not on file   Food Insecurity: Not on file   Transportation Needs: Not on file   Physical Activity: Not on file   Stress: Not on file   Social Connections: Not on file   Intimate Partner Violence: Not on file   Housing Stability: Not on file           Medications  Allergies   Current Outpatient Medications   Medication Sig Dispense Refill     ALPRAZolam (XANAX) 0.5 MG tablet [ALPRAZOLAM (XANAX) 0.5 MG TABLET] Take 0.5 mg by mouth daily.        aspirin 81 MG EC tablet [ASPIRIN 81 MG EC TABLET] Take 81 mg by mouth daily.       atorvastatin (LIPITOR) 40 MG tablet Take 1 tablet (40 mg) by mouth daily 90 tablet 4     Continuous Blood Gluc  "Sensor (FREESTYLE DAVID 14 DAY SENSOR) MISC        ezetimibe (ZETIA) 10 MG tablet Take 1 tablet (10 mg) by mouth daily 90 tablet 4     fish oil-omega-3 fatty acids 1000 MG capsule Take 1 capsule by mouth daily       furosemide (LASIX) 20 MG tablet [FUROSEMIDE (LASIX) 20 MG TABLET] Take 20 mg by mouth daily.  9     gabapentin (NEURONTIN) 300 MG capsule [GABAPENTIN (NEURONTIN) 300 MG CAPSULE] Take 300 mg by mouth 3 (three) times a day.              HUMALOG 100 unit/mL injection [HUMALOG 100 UNIT/ML INJECTION] Inject under the skin 3 (three) times a day before meals.              insulin glargine (LANTUS) 100 unit/mL injection [INSULIN GLARGINE (LANTUS) 100 UNIT/ML INJECTION] Inject 30 Units under the skin every morning.        levothyroxine (SYNTHROID/LEVOTHROID) 125 MCG tablet Take 125 mcg by mouth daily       lisinopril (PRINIVIL,ZESTRIL) 40 MG tablet [LISINOPRIL (PRINIVIL,ZESTRIL) 40 MG TABLET] Take 40 mg by mouth daily.       metoprolol succinate ER (TOPROL XL) 50 MG 24 hr tablet Take 1 tablet (50 mg) by mouth daily 90 tablet 2     MONOJECT ULTRA COMFORT SYRINGE 30G X 5/16\" 0.3 ML        multivitamin with iron (ONE DAILY WITH IRON) Tab tablet [MULTIVITAMIN WITH IRON (ONE DAILY WITH IRON) TAB TABLET] Take 1 tablet by mouth daily.       nitroglycerin (NITROSTAT) 0.4 MG SL tablet [NITROGLYCERIN (NITROSTAT) 0.4 MG SL TABLET] Place 1 tablet (0.4 mg total) under the tongue every 5 (five) minutes as needed for chest pain. 25 tablet 3     TECHLITE PEN NEEDLES 31G X 5 MM miscellaneous        TRIAMCINOLONE ACETONIDE (NASACORT NASL) [TRIAMCINOLONE ACETONIDE (NASACORT NASL)] 1 spray into each nostril daily.       VITAMIN D3 2,000 unit capsule [VITAMIN D3 2,000 UNIT CAPSULE] Take 2,000 Units by mouth daily.  3       Allergies   Allergen Reactions     Hydrochlorothiazide Unknown     Levofloxacin Diarrhea          Lab Results    Chemistry/lipid CBC Cardiac Enzymes/BNP/TSH/INR   Recent Labs   Lab Test 12/16/22  0919   CHOL 118 "   HDL 43   LDL 60   TRIG 76     Recent Labs   Lab Test 12/16/22  0919 10/12/22  0819 06/17/22  0851   LDL 60 41 80     Recent Labs   Lab Test 12/16/22 0919      POTASSIUM 4.7   CHLORIDE 102   CO2 26   *   BUN 53.0*   CR 2.77*   GFRESTIMATED 24*   SVITLANA 8.9     Recent Labs   Lab Test 12/16/22  0919 10/12/22  0819 06/17/22  0851   CR 2.77* 2.47* 3.38*     No results for input(s): A1C in the last 21936 hours.       No results for input(s): WBC, HGB, HCT, MCV, PLT in the last 49396 hours.  No results for input(s): HGB in the last 02944 hours. Recent Labs   Lab Test 06/02/21  1016   TROPONINI 0.13     No results for input(s): BNP, NTBNPI, NTBNP in the last 94528 hours.  Recent Labs   Lab Test 04/20/22  0918   TSH 1.13     No results for input(s): INR in the last 79057 hours.     Robinson Chopra MD

## 2023-02-13 ENCOUNTER — OFFICE VISIT (OUTPATIENT)
Dept: CARDIOLOGY | Facility: CLINIC | Age: 73
End: 2023-02-13
Attending: INTERNAL MEDICINE
Payer: COMMERCIAL

## 2023-02-13 VITALS
RESPIRATION RATE: 14 BRPM | WEIGHT: 219 LBS | SYSTOLIC BLOOD PRESSURE: 190 MMHG | BODY MASS INDEX: 29.7 KG/M2 | HEART RATE: 60 BPM | DIASTOLIC BLOOD PRESSURE: 76 MMHG

## 2023-02-13 DIAGNOSIS — I25.10 CORONARY ARTERY DISEASE DUE TO LIPID RICH PLAQUE: ICD-10-CM

## 2023-02-13 DIAGNOSIS — I25.83 CORONARY ARTERY DISEASE DUE TO LIPID RICH PLAQUE: ICD-10-CM

## 2023-02-13 DIAGNOSIS — Z13.6 ENCOUNTER FOR SCREENING FOR ABDOMINAL AORTIC ANEURYSM (AAA) IN PATIENT 50 YEARS OF AGE OR OLDER WITHOUT OTHER RISK FACTORS FOR AAA: ICD-10-CM

## 2023-02-13 DIAGNOSIS — I10 BENIGN ESSENTIAL HYPERTENSION: ICD-10-CM

## 2023-02-13 DIAGNOSIS — I73.9 PAD (PERIPHERAL ARTERY DISEASE) (H): Primary | ICD-10-CM

## 2023-02-13 DIAGNOSIS — I73.9 PVD (PERIPHERAL VASCULAR DISEASE) (H): Primary | ICD-10-CM

## 2023-02-13 PROCEDURE — 99214 OFFICE O/P EST MOD 30 MIN: CPT | Performed by: INTERNAL MEDICINE

## 2023-02-13 RX ORDER — NITROGLYCERIN 0.4 MG/1
0.4 TABLET SUBLINGUAL EVERY 5 MIN PRN
Qty: 25 TABLET | Refills: 3 | Status: SHIPPED | OUTPATIENT
Start: 2023-02-13

## 2023-02-13 NOTE — LETTER
2/13/2023    Jamal Gaffney MD  [address]  [facility] [address]    RE: Dakota Bauer       Dear Colleague,     I had the pleasure of seeing Dakota Bauer in the [facility].    HEART CARE ENCOUNTER CONSULTATON NOTE      [facility]  [phone]      Assessment/Recommendations   Assessment/Plan:  1.  Coronary artery disease.  Coronary angiogram results from 2016 is reviewed as well as nuclear stress test from June 2021.  He is not describing any anginal chest discomfort.  He does have longstanding diabetes mellitus.  I have recommended that we pursue follow-up echocardiography to reevaluate left ventricular systolic function.    2.  Hypertension.  Blood pressure has been variable.  He has chronic renal insufficiency and follows with nephrology.  He has had difficulty with orthostatic hypotension if blood pressure drops too low.  I had discussed during his  last visit whether we would consider Holter monitor for some of the lightheadedness which he wanted to wait on.  The dizzy symptoms have been improved.  I suspect that the higher blood pressure sometimes recorded are related to stiff vasculature.  When I auscultate his blood pressure I determined that blood pressure was 130/60 today.  Asked him to monitor and report blood pressure.    3.  Dyslipidemia.  40 mg of rosuvastatin in the setting of renal insufficiency.  I suggested that we change to atorvastatin with 10 mg of Zetia.  In December the LDL cholesterol was 60 with a total cholesterol 118.  Lipid results from December 2022 finds a cholesterol 118, LDL of 60.    4.  Peripheral vascular disease and diabetic foot ulcer.  Patient is requesting a consultation with vascular surgery.  I forwarded a consult.  Carotid ultrasound most recently completed 2018 revealed mild nonobstructive disease.  Consideration could be given to repeating carotid ultrasound which we can discuss at next visit.  He does have risk factors for  abdominal aortic aneurysm and I did recommend that we screen for aneurysm based on Medicare guidelines.      Plan: Echocardiogram  Blood pressure monitoring and report  Abdominal aortic aneurysm screening  Referral to vascular surgery         History of Present Illness/Subjective    HPI: Dakota Bauer is a 72 year old male who is seen in follow-up.  We last visited August 2022.  He has a history of coronary artery disease, diabetes mellitus, renal insufficiency.  He has a history of remote bypass surgery in 2005 and in 2016 had angiogram with findings of patent grafts with some distal disease.  At that time LIMA to the LAD was patent, good distal LAD target, vein graft to diagonal was patent, saphenous vein graft to the OM was patent with diffusely diseased target vessel.  Nuclear stress test was completed June 2021 which revealed a small area of nontransmural infarction with ejection fraction of 62%.    He reports no specific complaints of angina or significant shortness of breath.  He reports that symptoms of dizziness have been quite stable.  He has a history of orthostatic hypotension which has prompted avoiding to lower blood pressure.  He monitors his blood pressures at home and brings in records typically they have been running 1 25-40 over 70s to 80s.  He does tell me that podiatrist has made a recommendation that he see vascular surgery and was asking me for some recommendations as well.  He has a history of neuropathy reportedly had diabetic ulcer on his toe.  It is wrapped today.  He also has a history of peripheral vascular disease.  He follows with Dr. Bhatia from nephrology.  He saw him most recently in December.    Recent Echocardiogram Results:  Study Result    Narrative & Impression   US CAROTID BILATERAL  8/2/2018 8:32 AM     INDICATION: Other specified symptoms and signs involving the circulatory and respiratory systems  TECHNIQUE: Duplex exam performed utilizing 2D gray-scale imaging, Doppler  interrogation with color-flow and spectral waveform analysis.  COMPARISON: Carotid duplex, 11/15/2016     FINDINGS:  RIGHT: There is mild atheromatous plaque. Normal waveforms with no significant stenosis.     LEFT: There is mild atheromatous plaque. Normal waveforms with no significant stenosis.     Both vertebral arteries and subclavian artery waveforms are normal.     VELOCITY CHART:   The following velocities were obtained in the RIGHT carotid system.  CCA: 76/9 cm/s  ICA: 122/29 cm/s  ECA: 574/21 cm/s  ICA/CCA: PS 1.6     The following velocities were obtained in the LEFT carotid system.  CCA: 89/13 cm/s  ICA: 127/14 cm/s  ECA: 174/14 cm/s  ICA/CCA: PS 1.4                           IMPRESSION:  CONCLUSION:  1.  RIGHT: Less than 50% stenosis of the right ICA  2.  LEFT: Less than 50% stenosis of the left ICA     Evaluation based on velocities and NASCET criteria.     Order-Leve        Recent Coronary Angiogram Results:    Dakota Bauer  Cv Coronary Angio Pos Pci - Historical  Order# [id number]  Reading physician: Shyam Cornejo MD Ordering physician: Robinson Chopra MD Study date: 11/25/2016     Patient Information    Name MRN Description   Dakota Bauer [id number] 66 year old male     Indications    Atherosclerotic heart disease of native coronary artery without angina pectoris [I25.10 (ICD-10-CM)]         Conclusion    Cardiac Diagnostic Report      Demographics      Patient Name KYLAH BARCLAY      Referring Physician    ROBINSON CHOPRA MD      MRN #        [id number]          Diagnostic Physician   SHYAM CORNEJO MD      Account #    [id number]           Interventional                                   Physician      D.O.B        1950         Report Status:      Date of      11/25/2016 08:50   Study        AM     Procedure      Procedure Type      Diagnostic procedure:Left Heart Catheterization , Venous Graft   Catheterization, LIMA Graft Catheterization, Coronary   Angiogram      Conclusions       Procedure Summary   Known CAD, presenting with occasional CP and abnormal stress   test      LM 30% mid   LAD occluded proximally   LCx occluded proximally   Ramus 60% proximal stenosis, small caliber vessel with diffuse   disease   RCA 50% distal stenosis feeding small caliber PDA      LIMA to LAD patent, good distal LAD target   SVG to diagonal patent, mild disease in target diagonal   SVG to OM patent, diffusely diseased target vessel with 70%   stenosis, affecting retrograde filling into mid Cx      EDP 11      Overall, diffuse CAD, but patent grafts. Potential areas for   PCI would include ostial ramus and  of Cx to improve flow   into mid Cx. However, the ramus is a small caliber vessel and   the ostial Cx is heavily calcified, therefore would reserve   PCI if he has significant increase in symptoms, and failing   medical therapy      Recommendations      Cont risk factor control and medical management      Signatures      Electronically signed by SHYAM ELLIS MD   (Diagnostic Physician) on 11/25/2016 at 09:37 AM     Angiographic Findings      Diagnostic Findings      Cardiac Arteries and Lesion Findings     LMCA: Minimal disease and Moderate disease.     LAD: Chronic occlusion and Severe disease.     LCx: Chronic occlusion and Severe disease.     RCA: Moderate disease.     Ramus: Moderate disease.     Procedure Data  Procedure Date  Date: 11/25/2016Start: 08:50 AMEnd: 09:26 AM         Reading Physician Reading Date Result Priority   Provider, Historical 6/16/2021 Routine   Marleni Gavin MD  [phone] 6/16/2021      Result Text     The nuclear stress test is abnormal.     Nuclear images demonstrate a small area of nontransmural infarctions involving the distal anteroseptal wall and basal inferolateral wall.  No ischemia identified.     The left ventricular ejection fraction at stress is 62% without wall motion abnormality.     A prior study was conducted on 9/19/2019.  No change identified.     The  patient is at a low risk of future cardiac ischemic events.     HOLTER MONITOR      INTERPRETATION DATE:  11/01/2017     TEST DATE:  10/31/2017       INTERPRETATION:  Predominant rhythm was sinus rhythm with rates ranging from 42  beats per minute to 85 beats per minute.  Tendency towards sinus bradycardia was  noted with average heart rate of 56 beats per minute and nocturnal resting heart  rates commonly 45 to 50 beats per minute.  There were no clinically significant  pauses.  Rare atrial premature beats were noted 16 over 24 hours.  Occasional  ventricular premature beats were noted, 574 over 24 hours with occasional  ventricular trigeminy.  There were 4 ventricular couplets.  Rate-dependent left  bundle branch block aberrancy was rarely noted with heart rates greater than 75  beats per minute.     CONCLUSION:  Tendency towards sinus bradycardia noted, but no clinically significant  pauses.  Rate-dependent left bundle branch block aberrancy was also present.        TJ lincoln  D 11/01/2017 14:07:17  T 11/01/2017 14:13:11  R 11/01/2017 14:13:11  [id number]          Physical Examination  Review of Systems   Vitals: 190/76 upon arrival my examination is 130/60, weight 219 pounds, rate of 60s and regular respiratory rate 14  Wt Readings from Last 3 Encounters:   08/12/22 98 kg (216 lb)   01/21/22 99.3 kg (219 lb)   07/26/21 103 kg (227 lb)       General Appearance:   no distress, normal body habitus   ENT/Mouth:  Wearing a facemask.      EYES:  no scleral icterus, normal conjunctivae   Neck: no carotid bruits or thyromegaly   Chest/Lungs:   lungs are clear to auscultation, no rales or wheezing, well-healed sternal scar, equal chest wall expansion    Cardiovascular:   Regular. Normal first and second heart sounds with soft systolic murmur rubs, or gallops; the carotid, radial and posterior tibial pulses are intact, Jugular venous pressure within normal limits, mild left greater than right edema bilaterally     Abdomen:  no, bruits, or tenderness; bowel sounds are present   Extremities: no cyanosis or clubbing   Skin: no xanthelasma, warm.    Neurologic: , no tremors     Psychiatric: alert and oriented x3, calm        Please refer above for cardiac ROS details.        Medical History  Surgical History Family History Social History   Past Medical History:   Diagnosis Date     Coronary artery disease      Diabetes mellitus (H)      Hyperlipidemia      Hypertension      Past Surgical History:   Procedure Laterality Date     BYPASS GRAFT ARTERY CORONARY      [facility] with Dr. Holter     CARDIAC CATHETERIZATION       CARDIAC CATHETERIZATION  2016    no intervention     CERVICAL DISC SURGERY       Family History   Problem Relation Age of Onset     Coronary Artery Disease Father      Coronary Artery Disease Brother         Social History     Socioeconomic History     Marital status:      Spouse name: Not on file     Number of children: Not on file     Years of education: Not on file     Highest education level: Not on file   Occupational History     Not on file   Tobacco Use     Smoking status: Former     Types: Cigarettes     Quit date: 10/10/1984     Years since quittin.3     Smokeless tobacco: Never   Substance and Sexual Activity     Alcohol use: Not on file     Drug use: Not on file     Sexual activity: Not on file   Other Topics Concern     Not on file   Social History Narrative     Not on file     Social Determinants of Health     Financial Resource Strain: Not on file   Food Insecurity: Not on file   Transportation Needs: Not on file   Physical Activity: Not on file   Stress: Not on file   Social Connections: Not on file   Intimate Partner Violence: Not on file   Housing Stability: Not on file           Medications  Allergies   Current Outpatient Medications   Medication Sig Dispense Refill     ALPRAZolam (XANAX) 0.5 MG tablet [ALPRAZOLAM (XANAX) 0.5 MG TABLET] Take 0.5 mg by mouth daily.    "     aspirin 81 MG EC tablet [ASPIRIN 81 MG EC TABLET] Take 81 mg by mouth daily.       atorvastatin (LIPITOR) 40 MG tablet Take 1 tablet (40 mg) by mouth daily 90 tablet 4     Continuous Blood Gluc Sensor (FREESTYLE DAVID 14 DAY SENSOR) MISC        ezetimibe (ZETIA) 10 MG tablet Take 1 tablet (10 mg) by mouth daily 90 tablet 4     fish oil-omega-3 fatty acids 1000 MG capsule Take 1 capsule by mouth daily       furosemide (LASIX) 20 MG tablet [FUROSEMIDE (LASIX) 20 MG TABLET] Take 20 mg by mouth daily.  9     gabapentin (NEURONTIN) 300 MG capsule [GABAPENTIN (NEURONTIN) 300 MG CAPSULE] Take 300 mg by mouth 3 (three) times a day.              HUMALOG 100 unit/mL injection [HUMALOG 100 UNIT/ML INJECTION] Inject under the skin 3 (three) times a day before meals.              insulin glargine (LANTUS) 100 unit/mL injection [INSULIN GLARGINE (LANTUS) 100 UNIT/ML INJECTION] Inject 30 Units under the skin every morning.        levothyroxine (SYNTHROID/LEVOTHROID) 125 MCG tablet Take 125 mcg by mouth daily       lisinopril (PRINIVIL,ZESTRIL) 40 MG tablet [LISINOPRIL (PRINIVIL,ZESTRIL) 40 MG TABLET] Take 40 mg by mouth daily.       metoprolol succinate ER (TOPROL XL) 50 MG 24 hr tablet Take 1 tablet (50 mg) by mouth daily 90 tablet 2     MONOJECT ULTRA COMFORT SYRINGE 30G X 5/16\" 0.3 ML        multivitamin with iron (ONE DAILY WITH IRON) Tab tablet [MULTIVITAMIN WITH IRON (ONE DAILY WITH IRON) TAB TABLET] Take 1 tablet by mouth daily.       nitroglycerin (NITROSTAT) 0.4 MG SL tablet [NITROGLYCERIN (NITROSTAT) 0.4 MG SL TABLET] Place 1 tablet (0.4 mg total) under the tongue every 5 (five) minutes as needed for chest pain. 25 tablet 3     TECHLITE PEN NEEDLES 31G X 5 MM miscellaneous        TRIAMCINOLONE ACETONIDE (NASACORT NASL) [TRIAMCINOLONE ACETONIDE (NASACORT NASL)] 1 spray into each nostril daily.       VITAMIN D3 2,000 unit capsule [VITAMIN D3 2,000 UNIT CAPSULE] Take 2,000 Units by mouth daily.  3       Allergies "   Allergen Reactions     Hydrochlorothiazide Unknown     Levofloxacin Diarrhea          Lab Results    Chemistry/lipid CBC Cardiac Enzymes/BNP/TSH/INR   Recent Labs   Lab Test 12/16/22  0919   CHOL 118   HDL 43   LDL 60   TRIG 76     Recent Labs   Lab Test 12/16/22 0919 10/12/22  0819 06/17/22  0851   LDL 60 41 80     Recent Labs   Lab Test 12/16/22 0919      POTASSIUM 4.7   CHLORIDE 102   CO2 26   *   BUN 53.0*   CR 2.77*   GFRESTIMATED 24*   SVITLANA 8.9     Recent Labs   Lab Test 12/16/22  0919 10/12/22  0819 06/17/22  0851   CR 2.77* 2.47* 3.38*     No results for input(s): A1C in the last 64175 hours.       No results for input(s): WBC, HGB, HCT, MCV, PLT in the last 23328 hours.  No results for input(s): HGB in the last 77489 hours. Recent Labs   Lab Test 06/02/21  1016   TROPONINI 0.13     No results for input(s): BNP, NTBNPI, NTBNP in the last 71118 hours.  Recent Labs   Lab Test 04/20/22  0918   TSH 1.13     No results for input(s): INR in the last 09642 hours.     Robinson Chopra MD    Thank you for allowing me to participate in the care of your patient.      Sincerely,     Robinson Chopra MD     [facility]    cc:   Robinson Chopra MD  [address] [facility]  [address]  [place] [address]

## 2023-02-13 NOTE — PATIENT INSTRUCTIONS
We are going to plan a referral to the vascular surgeon,heart and abdominal ultrasound.Pleas continue to monitor your blood pressure and consider bringing th cuff to the heart ultrasound.My nurse is Clari and her number is [phone]

## 2023-02-27 ENCOUNTER — ANCILLARY PROCEDURE (OUTPATIENT)
Dept: VASCULAR ULTRASOUND | Facility: CLINIC | Age: 73
End: 2023-02-27
Attending: SURGERY
Payer: COMMERCIAL

## 2023-02-27 ENCOUNTER — TELEPHONE (OUTPATIENT)
Dept: VASCULAR SURGERY | Facility: CLINIC | Age: 73
End: 2023-02-27
Payer: COMMERCIAL

## 2023-02-27 ENCOUNTER — OFFICE VISIT (OUTPATIENT)
Dept: VASCULAR SURGERY | Facility: CLINIC | Age: 73
End: 2023-02-27
Attending: INTERNAL MEDICINE
Payer: COMMERCIAL

## 2023-02-27 VITALS
WEIGHT: 218 LBS | TEMPERATURE: 98.2 F | DIASTOLIC BLOOD PRESSURE: 80 MMHG | HEART RATE: 60 BPM | BODY MASS INDEX: 29.57 KG/M2 | SYSTOLIC BLOOD PRESSURE: 158 MMHG

## 2023-02-27 DIAGNOSIS — I73.9 PAD (PERIPHERAL ARTERY DISEASE) (H): ICD-10-CM

## 2023-02-27 DIAGNOSIS — I73.9 PVD (PERIPHERAL VASCULAR DISEASE) (H): ICD-10-CM

## 2023-02-27 DIAGNOSIS — I70.245 ATHEROSCLEROSIS OF NATIVE ARTERIES OF LEFT LEG WITH ULCERATION OF OTHER PART OF FOOT (H): ICD-10-CM

## 2023-02-27 PROCEDURE — 93925 LOWER EXTREMITY STUDY: CPT

## 2023-02-27 PROCEDURE — 99204 OFFICE O/P NEW MOD 45 MIN: CPT | Performed by: SURGERY

## 2023-02-27 PROCEDURE — 93923 UPR/LXTR ART STDY 3+ LVLS: CPT | Mod: 26 | Performed by: SURGERY

## 2023-02-27 PROCEDURE — 93923 UPR/LXTR ART STDY 3+ LVLS: CPT

## 2023-02-27 PROCEDURE — G0463 HOSPITAL OUTPT CLINIC VISIT: HCPCS | Mod: 25 | Performed by: SURGERY

## 2023-02-27 PROCEDURE — 93925 LOWER EXTREMITY STUDY: CPT | Mod: 26 | Performed by: SURGERY

## 2023-02-27 ASSESSMENT — PAIN SCALES - GENERAL: PAINLEVEL: NO PAIN (0)

## 2023-02-27 NOTE — PROGRESS NOTES
[facility]        Patient is here for a consult to discuss Peripheral artery disease (PAD). Symptoms include non-healing wound in left lower extremity. Has had left leg wounds for over 2 years. No pain with walking.   He has a history of remote bypass surgery in 2005 and in 2016 had angiogram with findings of patent grafts with some distal disease. Left footwound? Seeing [facility] - Dr. Tinoco.     Needs updated carotid US and AAA screen. ASA, statin.    Pt is currently taking Aspirin and Statin.    The provider has been notified that the patient has no concerns.     Questions patient would like addressed today are: N/A.    Refills are needed: N/A    Has homecare services and agency name:  Grisel Rowan RN

## 2023-02-27 NOTE — TELEPHONE ENCOUNTER
Surgery Scheduled      Surgery/Procedure: LLE ANGIO    Special Equipment: NA    Location: [facility]:  [address] [place] [id number] (phone: [phone], Fax: [phone])    Date: 4/7/23    Time: 8am    Admission Type: Outpatient    Surgeon: Dr. Lynne    OR Confirmed & :  Yes with Diane in IR scheduling on 2/27/2023    Entered on provider calendar:  Yes    Covid Scheduled: NA    Post Op: 4/12 ultrasounds and 4/13 virtual visit with Lilia PATTERSON.    Wound Vac Needed: No    Home Care Needed: No    Ultrasound Contacted: Confirmed with NA on 2/27/2023    Reps Contacted: SUSANNE    Blood Thinners Addressed:  Yes - asa

## 2023-02-27 NOTE — PROGRESS NOTES
VASCULAR SURGERY CLINIC CONSULTATION    VASCULAR SURGEON: Edwin Lynne MD, RPVI     LOCATION:  [facility]     Dakota Bauer   Medical Record #:  [id number]  YOB: 1950  Age:  72 year old     Date of Service: 2/27/2023    PRIMARY CARE PROVIDER: Jamal Gaffney      Reason for visit: Peripheral arterial disease    IMPRESSION: 72-year-old male with past medical history significant for diabetes, coronary artery disease, hypertension, peripheral arterial disease, who comes to vascular surgery clinic for evaluation of nonhealing wound involving left foot.  Patient does have a history of left TMA in 2020.  Since then he had developed a small wound on the plantar surface of the left foot, he also developed new wound on the lateral surface of the ankle that has been slowly healing.  Patient underwent several I&D's in the past.  Patient also underwent left lower extremity angiogram in 2020 with balloon angioplasty.  The ultrasound did show transition to monophasic flow at the level of distal SFA and popliteal artery.    RECOMMENDATION/RISKS: Continue optimal medical management therapy and PAD risk management.  Based on ultrasound I think is reasonable to proceed with left extremity angiogram to to see if there is some tibial disease that can be treated to improve the inflow to the left foot.  Risk and benefits of this procedure were explained, patient agreed to proceed.    HPI:  Dakota Bauer is a 72 year old male who was seen today in consultation for pericardial disease.  Patient has a history of left TMA in 2020.  Denies any symptoms claudication, pain at rest, but admits to nonhealing wounds involving the left foot.  Patient has been managed by podiatry service from [facility].    REVIEW OF SYSTEMS:    A 12 point ROS was reviewed and is negative except for nonhealing wound in the left foot    PHH:    Past Medical History:   Diagnosis Date     Coronary artery disease      Diabetes mellitus  (H)      Hyperlipidemia      Hypertension           Past Surgical History:   Procedure Laterality Date     BYPASS GRAFT ARTERY CORONARY  2005    [facility] with Dr. Holter     CARDIAC CATHETERIZATION  2005     CARDIAC CATHETERIZATION  11/25/2016    no intervention     CERVICAL DISC SURGERY  2014       ALLERGIES:  Hydrochlorothiazide and Levofloxacin    MEDS:    Current Outpatient Medications:      ALPRAZolam (XANAX) 0.5 MG tablet, [ALPRAZOLAM (XANAX) 0.5 MG TABLET] Take 0.5 mg by mouth daily. , Disp: , Rfl:      aspirin 81 MG EC tablet, [ASPIRIN 81 MG EC TABLET] Take 81 mg by mouth daily., Disp: , Rfl:      atorvastatin (LIPITOR) 40 MG tablet, Take 1 tablet (40 mg) by mouth daily, Disp: 90 tablet, Rfl: 4     Continuous Blood Gluc Sensor (FREESTYLE DAVID 14 DAY SENSOR) MISC, , Disp: , Rfl:      ezetimibe (ZETIA) 10 MG tablet, Take 1 tablet (10 mg) by mouth daily, Disp: 90 tablet, Rfl: 4     fish oil-omega-3 fatty acids 1000 MG capsule, Take 1 capsule by mouth daily, Disp: , Rfl:      furosemide (LASIX) 20 MG tablet, [FUROSEMIDE (LASIX) 20 MG TABLET] Take 20 mg by mouth daily., Disp: , Rfl: 9     gabapentin (NEURONTIN) 300 MG capsule, [GABAPENTIN (NEURONTIN) 300 MG CAPSULE] Take 300 mg by mouth 3 (three) times a day.       , Disp: , Rfl:      HUMALOG 100 unit/mL injection, [HUMALOG 100 UNIT/ML INJECTION] Inject under the skin 3 (three) times a day before meals.       , Disp: , Rfl:      insulin glargine (LANTUS) 100 unit/mL injection, [INSULIN GLARGINE (LANTUS) 100 UNIT/ML INJECTION] Inject 30 Units under the skin every morning. , Disp: , Rfl:      levothyroxine (SYNTHROID/LEVOTHROID) 125 MCG tablet, Take 125 mcg by mouth daily, Disp: , Rfl:      lisinopril (PRINIVIL,ZESTRIL) 40 MG tablet, [LISINOPRIL (PRINIVIL,ZESTRIL) 40 MG TABLET] Take 40 mg by mouth daily., Disp: , Rfl:      metoprolol succinate ER (TOPROL XL) 50 MG 24 hr tablet, Take 1 tablet (50 mg) by mouth daily, Disp: 90 tablet, Rfl: 2     multivitamin with  "iron (ONE DAILY WITH IRON) Tab tablet, [MULTIVITAMIN WITH IRON (ONE DAILY WITH IRON) TAB TABLET] Take 1 tablet by mouth daily., Disp: , Rfl:      nitroGLYcerin (NITROSTAT) 0.4 MG sublingual tablet, Place 1 tablet (0.4 mg) under the tongue every 5 minutes as needed, Disp: 25 tablet, Rfl: 3     TECHLITE PEN NEEDLES 31G X 5 MM miscellaneous, , Disp: , Rfl:      TRIAMCINOLONE ACETONIDE (NASACORT NASL), [TRIAMCINOLONE ACETONIDE (NASACORT NASL)] 1 spray into each nostril daily., Disp: , Rfl:      VITAMIN D3 2,000 unit capsule, [VITAMIN D3 2,000 UNIT CAPSULE] Take 2,000 Units by mouth daily., Disp: , Rfl: 3     MONOJECT ULTRA COMFORT SYRINGE 30G X 5/16\" 0.3 ML, , Disp: , Rfl:     SOCIAL HABITS:    History   Smoking Status     Former     Types: Cigarettes     Quit date: 10/10/1984   Smokeless Tobacco     Never     Social History    Substance and Sexual Activity      Alcohol use: Not on file      History   Drug Use Not on file       FAMILY HISTORY:    Family History   Problem Relation Age of Onset     Coronary Artery Disease Father      Coronary Artery Disease Brother        PE:  BP (!) 158/80   Pulse 60   Temp 98.2  F (36.8  C)   Wt 98.9 kg (218 lb)   BMI 29.57 kg/m    Wt Readings from Last 1 Encounters:   02/27/23 98.9 kg (218 lb)     Body mass index is 29.57 kg/m .    EXAM:  GENERAL: This is a well-developed 72 year old male who appears his stated age  EYES: Grossly normal.  MOUTH: Buccal mucosa normal   CARDIAC: Normal   CHEST/LUNG: Clear to auscultation bilaterally  GASTROINTESINAL soft nontender nondistended  MUSCULOSKELETAL: Grossly normal and both lower extremities are intact.  HEME/LYMPH: No lymphedema  NEUROLOGIC: Focally intact, Alert and oriented x 3.   PSYCH: appropriate affect  INTEGUMENT: No open lesions or ulcers  Pulse Exam: Monophasic signals present bilaterally.  Palpable femoral pulses bilaterally          DIAGNOSTIC STUDIES:     Images:  No results found.        LABS:      Sodium   Date Value Ref " Range Status   12/16/2022 139 136 - 145 mmol/L Final   10/12/2022 134 (L) 136 - 145 mmol/L Final   06/17/2022 137 136 - 145 mmol/L Final     Urea Nitrogen   Date Value Ref Range Status   12/16/2022 53.0 (H) 8.0 - 23.0 mg/dL Final   10/12/2022 30.5 (H) 8.0 - 23.0 mg/dL Final   06/17/2022 48 (H) 8 - 28 mg/dL Final   12/17/2021 40 (H) 8 - 28 mg/dL Final   06/17/2021 40 (H) 8 - 28 mg/dL Final       45 minutes spent on the day of encounter doing chart review, history and exam, documentation, and further activities as noted.         Edwin Lynne MD, [facility]  VASCULAR SURGERY

## 2023-02-27 NOTE — PATIENT INSTRUCTIONS
Dakota Bauer,    Your visit to [facility] for your procedure is coming soon and we look forward to seeing you! This friendly reminder and pre-procedure checklist will help to ensure your procedure goes smoothly and meets your expectations. At [facility], our goal is to provide you with a great patient experience and to deliver genuine, professional care to every patient.     Please complete all the steps in advance of your visit. If you have any questions about the items listed below, please give our office a call. We can be reached at [phone] or visit our website at [email or web address] for more information.     Procedure: Left Leg Angiogram    Procedure Date :  TBD    Procedure Time :  TBD    Arrival Time: TBD    Admission Type: Outpatient    Procedure Location: TBD    Surgeon: MD Edwin Lynne    COVID PCR Test: is no longer required. If you are experiencing COVID symptoms or have tested positive for COVID-19 within 14 days of procedure date, reach out to the care team to reschedule please.     2 week Post Procedure Appointment with Dr Donell Lynne or Lilia PATTERSON and also ultrasound: TBD        PLEASE DO NOT STOP YOUR ASPIRIN OR PLAVIX UNLESS SPECIFICALLY DIRECTED BY THE VASCULAR SURGEON TO STOP!  In most cases Vascular surgeons want you to continue these. This is different from most NON vascular surgeries and may not be well known by your Primary Care Provider    If you take blood thinners: SEE SPECIFIC INSTRUCTIONS BELOW      In most cases Vascular providers want you to continue these. This is different from most NON vascular surgeries and may not be well known by your Primary Care Provider  Aspirin: PLEASE DO NOT STOP THIS MEDICATION PRIOR TO SURGERY/ PROCEDURE.       Prepare for the peripheral angiography as follows:  Do not eat 8 hours before your procedure. You may have clear liquids up to 2 hours before surgery.   If your provider says to take your normal medicines, swallow them with only small sips of water.  Tell your healthcare provider about all medicines you take and any allergies you may have.  Arrange for a family member or friend to drive you home.  If you are on insulin, please take only 1/2 the dose the night before and HOLD the day of the procedure.   If you are on Metformin, please HOLD for 48 hours after the procedure.     Peripheral Angiography    Peripheral angiography is an outpatient procedure that makes a  map  of the vessels (arteries) in your lower body, legs, and arms, using X-ray and dye.This map can show where blood flow may be blocked.    An angiogram is commonly performed under sedation with the use of local anesthesia.    The procedure usually starts with a needle put into the femoral (groin) artery. From one treatment site, areas all over the body can be treated.  After access is established, catheters (thin tubes) and wires are threaded through the arterial system to a specific area of interest or throughout the entire body.  As a contrast agent (iodine dye) is injected, X-ray images are taken to let your provider view the flow of the dye and identify blockages. The surgeon can then choose the best mode of therapy for you - whether during or following the angiogram. This decision depends on your symptoms and the severity and characteristics of the blockages.  Two common therapies that can be provided during the angiogram are balloon angioplasty and stent placement.                   Angioplasty can be used to open arterial blockages. Guided by X-ray, your provider navigates through the blockage with a wire and introduces a special device equipped with an inflatable balloon. After positioning the balloon device across the blocked portion of the artery, the provider inflates the balloon to expand the artery and compress the blockage. The balloon is then deflated and removed while keeping the wire in  place across the area that has been treated. Next, contrast dye is injected to assess the result. Treatment is considered a success if blood flow is improved and less than 30% of the blockage remains. If the vessel is still considerably narrowed, placing a stent may be the next step.    Stents are used to prop open an artery at the site of a narrowing. Stents are generally placed after balloon angioplasty when there is residual narrowing or insufficient blood flow in a treated vessel. Stents are considered a permanent implant and cannot be used if you have a metal allergy. Stents that are used in the leg are constructed of a nickel-titanium alloy (Nitinol), a memory-shaped metal. This alloy has a predetermined size and shape at body temperature and expands to this size and shape after being introduced through a catheter. These stents resist kinking and are flexible so that damage from activities that involve your legs is minimized.  Your procedure may require other techniques to address the problem or plaque.     If surgery is felt to be a better option, your vascular provider will obtain any additional X-ray images needed to plan a surgical bypass of the blocked vessel/s and will then conclude the angiogram.      During the procedure  Here is what to expect:  You may get medicine through an IV (intravenous) line to relax you. You re given an injection to numb the insertion site. Then, a tiny skin cut (incision) is made near an artery in your groin.  Your provider inserts a thin tube (catheter) through the incision. He or she then threads the catheter into an artery while looking at a video monitor.  Contrast  dye  is injected into the catheter to confirm position. You may feel warmth or pressure in your legs and back. You lie still as X-rays are taken. The catheter is then taken out.  After the procedure  You ll be taken to a recovery area. A healthcare provider will apply pressure to the site for about 10 minutes.  Your healthcare provider will tell you how long to lie down and keep the insertion site still. Your healthcare provider will discuss the results with you soon after the procedure.      Angiogram Procedure Discharge Instructions:     1. If you received sedation for your procedure: Do not drive or operate heavy machinery for the rest of the day.  2. Avoid strenuous activity for 72 hours (3 days):                        - Do not lift greater than 10 pounds.                        - Excessive exercise                        - Straining                        - Return to your normal activities as you tolerate after the 3 days restriction  3. Avoid tub baths, Jacuzzis, hot tubs and pools for 72 hours (3 days) or until puncture site is will healed.  4. You may shower beginning tomorrow. Do not scrub puncture site(s) until well healed, pat dry.  5. You can expect to return to work 1-2 days after your procedure - depending on the nature of your profession.  6. It is normal to have some tenderness and minimal swelling at puncture site. A small area of discoloration may be present. Tenderness typically subsides in 24-48 hours. A small knot may also be present at puncture site for 6-8 weeks, this can be a normal part of the healing process.       After the angiogram If you:      1. Experience any bleeding or active swelling from puncture site: Lie down, firmly apply pressure to puncture site and CALL 9-1-1  2. Fever greater than 101 degrees Fahrenheit.  3. Redness, swelling, warmth to touch, or purulent (yellow/green/foul smelling) drainage from the puncture site.  4. Increasing pain, tenderness or swelling at puncture site OR of arm/leg near puncture site.  5. Feeling weak or faint.  6. Change in color, temperature, or sensation of arm/leg where puncture was made.  7. You can t feel your thrill (pulse at your dialysis fistula site) or it feels weak (If you had fistulogram done).     Call us with any other questions or concerns  after your procedure: [phone]      All invasive procedures can have complications. While the risk of an angiogram is low it is not zero. The most common complications are related to the arterial access site.      Risks/ Complications  Bruising is Common  You will likely have bruising (ecchymosis) where the artery was entered.    Pain and Bleeding  Less commonly, patients experience pain and bleeding that may include blood collecting under the skin (hematoma).    Blockage and Leakage   In rare cases, the access artery can become blocked. Infrequently, patients experience persistent leakage of blood where the artery was entered, which can result in the formation of a pseudoaneurysm--a blood-filled sac--that may require further treatment.  Other complications related to an angiogram include:   -Allergic reaction to the iodine contrast dye, which can lead to the development of kidney failure.  -Very rarely during balloon angioplasty and/or stent placement, part of the arterial blockage can break off (embolism) and travel to more distant arteries. This can worsen blood flow.        Notify our office right away, if you have any changes in your health status, or if you develop a cold, flu, diarrhea, infection, fever or sore throat before your scheduled surgery date. We can be reached at [phone] Monday-Friday 8 am-4:30 pm if you have any questions.   Thank you for choosing [facility]      [] Contact your insurance regarding coverage  If you would like a Good Inés Estimate for your upcoming procedure at [facility], contact Cost of Care Estimates   Advocates are available Monday through Friday 8am - 5pm   [phone]  You may also submit a request online through your [employer] account.    [] DO NOT BRING FMLA WITH TO SURGERY.  These should be sent to the provider's office by fax to [phone].     [] Day of Surgery  Medications - Take as indicated with sips of water.   Wear  comfortable loose fitting clothes. Wear your glasses-Not contacts. Do not wear jewelry and remove body piercing's. Surgery may be cancelled if they are not removed.   You may have 1 family member wait in the lobby during your surgery. Visitor restrictions are subject to change. Please verify with the surgery nurse when they call.   If same day surgery-Have a someone come with you to surgery that can help you understand the surgeon's instructions, drive you home and stay with you overnight the first night.

## 2023-03-08 ENCOUNTER — HOSPITAL ENCOUNTER (OUTPATIENT)
Dept: ULTRASOUND IMAGING | Facility: HOSPITAL | Age: 73
Discharge: HOME | End: 2023-03-08
Attending: INTERNAL MEDICINE
Payer: COMMERCIAL

## 2023-03-08 ENCOUNTER — HOSPITAL ENCOUNTER (OUTPATIENT)
Dept: CARDIOLOGY | Facility: HOSPITAL | Age: 73
Discharge: HOME | End: 2023-03-08
Attending: INTERNAL MEDICINE
Payer: COMMERCIAL

## 2023-03-08 DIAGNOSIS — I10 BENIGN ESSENTIAL HYPERTENSION: ICD-10-CM

## 2023-03-08 DIAGNOSIS — I25.83 CORONARY ARTERY DISEASE DUE TO LIPID RICH PLAQUE: ICD-10-CM

## 2023-03-08 DIAGNOSIS — I25.10 CORONARY ARTERY DISEASE DUE TO LIPID RICH PLAQUE: ICD-10-CM

## 2023-03-08 DIAGNOSIS — Z13.6 ENCOUNTER FOR SCREENING FOR ABDOMINAL AORTIC ANEURYSM (AAA) IN PATIENT 50 YEARS OF AGE OR OLDER WITHOUT OTHER RISK FACTORS FOR AAA: ICD-10-CM

## 2023-03-08 LAB — BI-PLANE LVEF ECHO: NORMAL

## 2023-03-08 PROCEDURE — 255N000002 HC RX 255 OP 636: Performed by: INTERNAL MEDICINE

## 2023-03-08 PROCEDURE — 93306 TTE W/DOPPLER COMPLETE: CPT | Mod: 26 | Performed by: GENERAL ACUTE CARE HOSPITAL

## 2023-03-08 PROCEDURE — 76775 US EXAM ABDO BACK WALL LIM: CPT

## 2023-03-08 RX ADMIN — PERFLUTREN 3 ML: 6.52 INJECTION, SUSPENSION INTRAVENOUS at 08:35

## 2023-03-16 ENCOUNTER — MYC MEDICAL ADVICE (OUTPATIENT)
Dept: CARDIOLOGY | Facility: CLINIC | Age: 73
End: 2023-03-16
Payer: COMMERCIAL

## 2023-03-20 ENCOUNTER — LAB REQUISITION (OUTPATIENT)
Dept: LAB | Facility: CLINIC | Age: 73
End: 2023-03-20

## 2023-03-20 ENCOUNTER — TELEPHONE (OUTPATIENT)
Dept: CARDIOLOGY | Facility: CLINIC | Age: 73
End: 2023-03-20
Payer: COMMERCIAL

## 2023-03-20 DIAGNOSIS — R10.9 UNSPECIFIED ABDOMINAL PAIN: ICD-10-CM

## 2023-03-20 DIAGNOSIS — E10.21 TYPE 1 DIABETES MELLITUS WITH DIABETIC NEPHROPATHY (H): ICD-10-CM

## 2023-03-20 LAB
ALBUMIN SERPL BCG-MCNC: 4.2 G/DL (ref 3.5–5.2)
ALP SERPL-CCNC: 73 U/L (ref 40–129)
ALT SERPL W P-5'-P-CCNC: 23 U/L (ref 10–50)
ANION GAP SERPL CALCULATED.3IONS-SCNC: 14 MMOL/L (ref 7–15)
AST SERPL W P-5'-P-CCNC: 34 U/L (ref 10–50)
BILIRUB SERPL-MCNC: 0.6 MG/DL
BUN SERPL-MCNC: 37.4 MG/DL (ref 8–23)
CALCIUM SERPL-MCNC: 9.3 MG/DL (ref 8.8–10.2)
CHLORIDE SERPL-SCNC: 98 MMOL/L (ref 98–107)
CREAT SERPL-MCNC: 2.58 MG/DL (ref 0.67–1.17)
CREAT UR-MCNC: 29.9 MG/DL
DEPRECATED HCO3 PLAS-SCNC: 26 MMOL/L (ref 22–29)
GFR SERPL CREATININE-BSD FRML MDRD: 26 ML/MIN/1.73M2
GLUCOSE SERPL-MCNC: 122 MG/DL (ref 70–99)
MICROALBUMIN UR-MCNC: 594 MG/L
MICROALBUMIN/CREAT UR: 1986.62 MG/G CR (ref 0–17)
POTASSIUM SERPL-SCNC: 4 MMOL/L (ref 3.4–5.3)
PROT SERPL-MCNC: 6.9 G/DL (ref 6.4–8.3)
SODIUM SERPL-SCNC: 138 MMOL/L (ref 136–145)

## 2023-03-20 PROCEDURE — 80053 COMPREHEN METABOLIC PANEL: CPT | Performed by: FAMILY MEDICINE

## 2023-03-20 PROCEDURE — 82570 ASSAY OF URINE CREATININE: CPT | Performed by: FAMILY MEDICINE

## 2023-03-20 NOTE — TELEPHONE ENCOUNTER
Pt reports elevated blood pressures the past two nights - 190s/80s.  Pt said this morning was 143/76, HR 56.  Pt is seated quietly for 5-10 minutes prior to checking, and has not had increased sodium in his diet.    Pt meds:  Furosemide 20 mg daily  Lisinopril 40 mg every morning  Metoprolol succinate 50 mg every evening    Pt said he does have appt with PCP this afternoon.  -michaelle

## 2023-03-21 NOTE — TELEPHONE ENCOUNTER
Recommendations discussed with pt.  Pt said PCP had him take an extra furosemide last night, BP this morning is 136/75.  He had labs, and CT this morning to check his kidneys.  Flank / back pain is not as bad, informed pt he did not mention flank / back pain yesterday and pain will make BP go up.  Pt said he will continue to monitor and had no additional questions.  -rah    ===View-only below this line===  ----- Message -----  From: Robinson Chopra MD  Sent: 3/21/2023   7:08 AM CDT  To: Clari Min RN    I would ask that he monitor and update us and watch the salt

## 2023-03-31 ENCOUNTER — TRANSFERRED RECORDS (OUTPATIENT)
Dept: HEALTH INFORMATION MANAGEMENT | Facility: CLINIC | Age: 73
End: 2023-03-31

## 2023-04-02 RX ORDER — FLUMAZENIL 0.1 MG/ML
0.2 INJECTION, SOLUTION INTRAVENOUS
Status: DISCONTINUED | OUTPATIENT
Start: 2023-04-02 | End: 2023-04-08 | Stop reason: HOSPADM

## 2023-04-02 RX ORDER — NALOXONE HYDROCHLORIDE 0.4 MG/ML
0.4 INJECTION, SOLUTION INTRAMUSCULAR; INTRAVENOUS; SUBCUTANEOUS
Status: DISCONTINUED | OUTPATIENT
Start: 2023-04-02 | End: 2023-04-08 | Stop reason: HOSPADM

## 2023-04-02 RX ORDER — NALOXONE HYDROCHLORIDE 0.4 MG/ML
0.2 INJECTION, SOLUTION INTRAMUSCULAR; INTRAVENOUS; SUBCUTANEOUS
Status: DISCONTINUED | OUTPATIENT
Start: 2023-04-02 | End: 2023-04-08 | Stop reason: HOSPADM

## 2023-04-02 RX ORDER — HEPARIN SODIUM 200 [USP'U]/100ML
1 INJECTION, SOLUTION INTRAVENOUS CONTINUOUS PRN
Status: DISCONTINUED | OUTPATIENT
Start: 2023-04-02 | End: 2023-04-08 | Stop reason: HOSPADM

## 2023-04-02 RX ORDER — FENTANYL CITRATE 50 UG/ML
25-50 INJECTION, SOLUTION INTRAMUSCULAR; INTRAVENOUS EVERY 5 MIN PRN
Status: DISCONTINUED | OUTPATIENT
Start: 2023-04-02 | End: 2023-04-08 | Stop reason: HOSPADM

## 2023-04-05 ENCOUNTER — TELEPHONE (OUTPATIENT)
Dept: VASCULAR SURGERY | Facility: CLINIC | Age: 73
End: 2023-04-05
Payer: COMMERCIAL

## 2023-04-05 NOTE — TELEPHONE ENCOUNTER
Procedure: LLE angiogram    Date: Friday, 4/7/23    Surgeon: MD Edwin Lynne    Called patient to review upcoming procedure. Reviewed visitor allowance of 1 person at this time.     Discussed procedure with patients and instructions: Yes    Reviewed Post Procedure Follow up plan and Appts made: Yes    Reviewed Covid Test Scheduled/Completed: NA    Reviewed Blood Thinners and plan for holding, continuing and/or bridging:Not Applicable       Reviewed Pre Op Appointment Required and Completed: N/A    Reviewed Allergies and if Contrast Allergy-Instructions and plan: no contrast allergy    Reviewed Arrival Time of 7am and procedure time of 8am and location of procedure [facility]:  [address] [place] [address] (phone: [phone], Fax: [phone])        All questions answered and number provided if any further questions arise or changes in patient status.

## 2023-04-05 NOTE — TELEPHONE ENCOUNTER
Spoke to patient about instructions for Friday angiogram. He is concerned about the use of the contrast dye. He is not on dialysis but has kidney issues. Last creatinine was 2.58 on 3/20/23.    Sees Dr. [facility]

## 2023-04-07 ENCOUNTER — HOSPITAL ENCOUNTER (OUTPATIENT)
Dept: INTERVENTIONAL RADIOLOGY/VASCULAR | Facility: HOSPITAL | Age: 73
Discharge: HOME | End: 2023-04-07
Attending: SURGERY | Admitting: SURGERY
Payer: COMMERCIAL

## 2023-04-07 VITALS
SYSTOLIC BLOOD PRESSURE: 177 MMHG | HEART RATE: 66 BPM | RESPIRATION RATE: 16 BRPM | TEMPERATURE: 97.8 F | HEIGHT: 72 IN | OXYGEN SATURATION: 100 % | WEIGHT: 218 LBS | BODY MASS INDEX: 29.53 KG/M2 | DIASTOLIC BLOOD PRESSURE: 81 MMHG

## 2023-04-07 DIAGNOSIS — I70.245 ATHEROSCLEROSIS OF NATIVE ARTERIES OF LEFT LEG WITH ULCERATION OF OTHER PART OF FOOT (H): ICD-10-CM

## 2023-04-07 DIAGNOSIS — I73.9 PVD (PERIPHERAL VASCULAR DISEASE) (H): ICD-10-CM

## 2023-04-07 LAB
ANION GAP SERPL CALCULATED.3IONS-SCNC: 6 MMOL/L (ref 7–15)
BUN SERPL-MCNC: 42 MG/DL (ref 8–23)
CALCIUM SERPL-MCNC: 8.9 MG/DL (ref 8.8–10.2)
CHLORIDE SERPL-SCNC: 102 MMOL/L (ref 98–107)
CREAT SERPL-MCNC: 2.64 MG/DL (ref 0.67–1.17)
DEPRECATED HCO3 PLAS-SCNC: 29 MMOL/L (ref 22–29)
ERYTHROCYTE [DISTWIDTH] IN BLOOD BY AUTOMATED COUNT: 13.8 % (ref 10–15)
GFR SERPL CREATININE-BSD FRML MDRD: 25 ML/MIN/1.73M2
GLUCOSE SERPL-MCNC: 204 MG/DL (ref 70–99)
HCT VFR BLD AUTO: 39 % (ref 40–53)
HGB BLD-MCNC: 12.6 G/DL (ref 13.3–17.7)
MCH RBC QN AUTO: 29.4 PG (ref 26.5–33)
MCHC RBC AUTO-ENTMCNC: 32.3 G/DL (ref 31.5–36.5)
MCV RBC AUTO: 91 FL (ref 78–100)
PLATELET # BLD AUTO: 201 10E3/UL (ref 150–450)
POTASSIUM SERPL-SCNC: 4.6 MMOL/L (ref 3.4–5.3)
RBC # BLD AUTO: 4.29 10E6/UL (ref 4.4–5.9)
SODIUM SERPL-SCNC: 137 MMOL/L (ref 136–145)
WBC # BLD AUTO: 6.3 10E3/UL (ref 4–11)

## 2023-04-07 PROCEDURE — 272N000302 HC DEVICE INFLATION CR5

## 2023-04-07 PROCEDURE — 85027 COMPLETE CBC AUTOMATED: CPT | Performed by: SURGERY

## 2023-04-07 PROCEDURE — C1887 CATHETER, GUIDING: HCPCS

## 2023-04-07 PROCEDURE — 272N000566 HC SHEATH CR3

## 2023-04-07 PROCEDURE — 250N000013 HC RX MED GY IP 250 OP 250 PS 637: Performed by: SURGERY

## 2023-04-07 PROCEDURE — 36415 COLL VENOUS BLD VENIPUNCTURE: CPT | Performed by: SURGERY

## 2023-04-07 PROCEDURE — 75625 CONTRAST EXAM ABDOMINL AORTA: CPT

## 2023-04-07 PROCEDURE — C1769 GUIDE WIRE: HCPCS

## 2023-04-07 PROCEDURE — 36246 INS CATH ABD/L-EXT ART 2ND: CPT

## 2023-04-07 PROCEDURE — 99152 MOD SED SAME PHYS/QHP 5/>YRS: CPT | Performed by: SURGERY

## 2023-04-07 PROCEDURE — 75710 ARTERY X-RAYS ARM/LEG: CPT | Mod: 26 | Performed by: SURGERY

## 2023-04-07 PROCEDURE — 272N000570 HC SHEATH CR7

## 2023-04-07 PROCEDURE — C1760 CLOSURE DEV, VASC: HCPCS

## 2023-04-07 PROCEDURE — 75710 ARTERY X-RAYS ARM/LEG: CPT | Mod: LT

## 2023-04-07 PROCEDURE — 272N000199 HC ACCESSORY CR8

## 2023-04-07 PROCEDURE — 36247 INS CATH ABD/L-EXT ART 3RD: CPT

## 2023-04-07 PROCEDURE — 76937 US GUIDE VASCULAR ACCESS: CPT | Mod: 26 | Performed by: SURGERY

## 2023-04-07 PROCEDURE — 36246 INS CATH ABD/L-EXT ART 2ND: CPT | Performed by: SURGERY

## 2023-04-07 PROCEDURE — 272N000500 HC NEEDLE CR2

## 2023-04-07 PROCEDURE — 80048 BASIC METABOLIC PNL TOTAL CA: CPT | Performed by: SURGERY

## 2023-04-07 PROCEDURE — 250N000009 HC RX 250: Performed by: SURGERY

## 2023-04-07 PROCEDURE — 250N000011 HC RX IP 250 OP 636: Performed by: SURGERY

## 2023-04-07 PROCEDURE — 76937 US GUIDE VASCULAR ACCESS: CPT

## 2023-04-07 PROCEDURE — 255N000002 HC RX 255 OP 636: Performed by: SURGERY

## 2023-04-07 PROCEDURE — 99152 MOD SED SAME PHYS/QHP 5/>YRS: CPT

## 2023-04-07 PROCEDURE — 258N000003 HC RX IP 258 OP 636: Performed by: SURGERY

## 2023-04-07 RX ORDER — ACETAMINOPHEN 325 MG/1
650 TABLET ORAL EVERY 4 HOURS PRN
Status: DISCONTINUED | OUTPATIENT
Start: 2023-04-07 | End: 2023-04-08 | Stop reason: HOSPADM

## 2023-04-07 RX ORDER — SODIUM CHLORIDE 9 MG/ML
INJECTION, SOLUTION INTRAVENOUS CONTINUOUS
Status: CANCELLED | OUTPATIENT
Start: 2023-04-07

## 2023-04-07 RX ORDER — SODIUM CHLORIDE 9 MG/ML
INJECTION, SOLUTION INTRAVENOUS CONTINUOUS
Status: DISCONTINUED | OUTPATIENT
Start: 2023-04-07 | End: 2023-04-08 | Stop reason: HOSPADM

## 2023-04-07 RX ORDER — ACETAMINOPHEN 325 MG/1
650 TABLET ORAL EVERY 6 HOURS PRN
Status: CANCELLED | OUTPATIENT
Start: 2023-04-07

## 2023-04-07 RX ORDER — HEPARIN SODIUM 1000 [USP'U]/ML
9000 INJECTION, SOLUTION INTRAVENOUS; SUBCUTANEOUS ONCE
Status: COMPLETED | OUTPATIENT
Start: 2023-04-07 | End: 2023-04-07

## 2023-04-07 RX ORDER — LIDOCAINE 40 MG/G
CREAM TOPICAL
Status: DISCONTINUED | OUTPATIENT
Start: 2023-04-07 | End: 2023-04-08 | Stop reason: HOSPADM

## 2023-04-07 RX ORDER — OXYCODONE HYDROCHLORIDE 5 MG/1
5 TABLET ORAL EVERY 4 HOURS PRN
Status: CANCELLED | OUTPATIENT
Start: 2023-04-07

## 2023-04-07 RX ORDER — IODIXANOL 320 MG/ML
100 INJECTION, SOLUTION INTRAVASCULAR ONCE
Status: COMPLETED | OUTPATIENT
Start: 2023-04-07 | End: 2023-04-07

## 2023-04-07 RX ORDER — HYDRALAZINE HYDROCHLORIDE 20 MG/ML
10 INJECTION INTRAMUSCULAR; INTRAVENOUS EVERY 10 MIN PRN
Status: DISCONTINUED | OUTPATIENT
Start: 2023-04-07 | End: 2023-04-08 | Stop reason: HOSPADM

## 2023-04-07 RX ORDER — OXYCODONE HYDROCHLORIDE 5 MG/1
5 TABLET ORAL
Status: DISCONTINUED | OUTPATIENT
Start: 2023-04-07 | End: 2023-04-08 | Stop reason: HOSPADM

## 2023-04-07 RX ORDER — HYDRALAZINE HYDROCHLORIDE 20 MG/ML
10 INJECTION INTRAMUSCULAR; INTRAVENOUS ONCE
Status: COMPLETED | OUTPATIENT
Start: 2023-04-07 | End: 2023-04-07

## 2023-04-07 RX ADMIN — LIDOCAINE HYDROCHLORIDE 4 ML: 10 INJECTION, SOLUTION INFILTRATION; PERINEURAL at 09:09

## 2023-04-07 RX ADMIN — MIDAZOLAM HYDROCHLORIDE 0.5 MG: 1 INJECTION, SOLUTION INTRAMUSCULAR; INTRAVENOUS at 08:27

## 2023-04-07 RX ADMIN — MIDAZOLAM HYDROCHLORIDE 1 MG: 1 INJECTION, SOLUTION INTRAMUSCULAR; INTRAVENOUS at 08:08

## 2023-04-07 RX ADMIN — HYDRALAZINE HYDROCHLORIDE 10 MG: 20 INJECTION INTRAMUSCULAR; INTRAVENOUS at 07:45

## 2023-04-07 RX ADMIN — IODIXANOL 30 ML: 320 INJECTION, SOLUTION INTRAVASCULAR at 09:29

## 2023-04-07 RX ADMIN — FENTANYL CITRATE 25 MCG: 50 INJECTION, SOLUTION INTRAMUSCULAR; INTRAVENOUS at 08:21

## 2023-04-07 RX ADMIN — HEPARIN SODIUM 9000 UNITS: 1000 INJECTION INTRAVENOUS; SUBCUTANEOUS at 08:58

## 2023-04-07 RX ADMIN — FENTANYL CITRATE 25 MCG: 50 INJECTION, SOLUTION INTRAMUSCULAR; INTRAVENOUS at 08:57

## 2023-04-07 RX ADMIN — ACETAMINOPHEN 650 MG: 325 TABLET ORAL at 11:52

## 2023-04-07 RX ADMIN — MIDAZOLAM HYDROCHLORIDE 0.5 MG: 1 INJECTION, SOLUTION INTRAMUSCULAR; INTRAVENOUS at 08:16

## 2023-04-07 RX ADMIN — FENTANYL CITRATE 25 MCG: 50 INJECTION, SOLUTION INTRAMUSCULAR; INTRAVENOUS at 08:31

## 2023-04-07 RX ADMIN — FENTANYL CITRATE 50 MCG: 50 INJECTION, SOLUTION INTRAMUSCULAR; INTRAVENOUS at 08:12

## 2023-04-07 RX ADMIN — SODIUM CHLORIDE: 9 INJECTION, SOLUTION INTRAVENOUS at 07:34

## 2023-04-07 RX ADMIN — MIDAZOLAM HYDROCHLORIDE 0.5 MG: 1 INJECTION, SOLUTION INTRAMUSCULAR; INTRAVENOUS at 08:45

## 2023-04-07 NOTE — IP AVS SNAPSHOT
[facility]  [address] [place] [id number]  Phone: [phone]  Fax: [phone]                              After Visit Summary   4/7/2023    Dakota Bauer   MRN: [id number]           After Visit Summary Signature Page    I have received my discharge instructions, and my questions have been answered. I have discussed any challenges I see with this plan with the nurse or doctor.    ..........................................................................................................................................  Patient/Patient Representative Signature      ..........................................................................................................................................  Patient Representative Print Name and Relationship to Patient    ..................................................               ................................................  Date                                   Time    ..........................................................................................................................................  Reviewed by Signature/Title    ...................................................              ..............................................  Date                                               Time    22EPIC Rev 08/18

## 2023-04-07 NOTE — PRE-PROCEDURE
GENERAL PRE-PROCEDURE:   Procedure:  Left lower extremity angiogram with possible intervention with moderate sedation and monitoring  Date/Time:  4/7/2023 7:25 AM    Verbal consent obtained?: Yes    Written consent obtained?: Yes    Risks and benefits: Risks, benefits and alternatives were discussed    Consent given by:  Patient  Patient states understanding of procedure being performed: Yes    Patient's understanding of procedure matches consent: Yes    Procedure consent matches procedure scheduled: Yes    Expected level of sedation:  Moderate  Appropriately NPO:  Yes  ASA Class:  3  Mallampati  :  Grade 2- soft palate, base of uvula, tonsillar pillars, and portion of posterior pharyngeal wall visible  Lungs:  Lungs clear with good breath sounds bilaterally  Heart:  Normal heart sounds and rate  History & Physical reviewed:  History and physical reviewed and no updates needed  Statement of review:  I have reviewed the lab findings, diagnostic data, medications, and the plan for sedation

## 2023-04-07 NOTE — PROGRESS NOTES
Dr. Hopper aware of patients back pain which is now improved 1/10, positional with bending backward. OK to discharge patient home per Dr. Hopper.

## 2023-04-07 NOTE — OP NOTE
VASCULAR SURGERY ANGIOGRAM REPORT      [facility]    DATE: 04/07/23     PROCEDURES PERFORMED:   1.  Ultrasound-guided access of right common femoral artery  2.  Aortogram with CO2  3.  Selective cannulation of the left common iliac artery, external iliac artery, common femoral, superficial femoral, popliteal arteries with nonselective left lower extremity angiogram  4.  Moderate sedation  5.  Arteriotomy closure with Angio-Seal device    PROVIDER: Edwin Lynne MD    FELLOW: Ana Laura Quintanilla MD    INDICATION: Left lower extremity chronic limb threatening ischemia with chronic wounds    SEDATION:   The procedure was performed with administration of intravenous conscious sedation with appropriate preoperative, intraoperative, and postoperative evaluation. 2.5mg Versed and 125mcg fentanyl administered.  67 minutes of supervised face to face intraservice time was provided by a radiology nurse under my direct supervision.     ANTIBIOTICS: none  FLUOROSCOPIC TIME: 8.3 minutes  RADIATION DOSE: 543 mGy  NUMBER OF IMAGES: 8  CONTRAST: 30ml Visipaque       PROCEDURE:  Ultrasound was used to evaluate the right common femoral artery. The selected vessel was patent. Ultrasound was then used for real-time ultrasound guided needle entry of the  right common femoral artery. Permanent images were recorded and saved to the patient's medical record.  Micropuncture sheath was inserted.  A glide advantage wire was advanced into the aorta.  5 Thai sheath was placed.  An Omni Flush catheter was advanced over the wire and positioned just below bilateral renal arteries at the level of L2.  Aortogram was performed using CO2.  Then using up and over maneuver I was able to advance the wire into the left femoral artery.  Over the wire the Omni Flush catheter was advanced and positioned in the  left common femoral artery.  Left lower extremity angiogram was performed, switching to contrast for the tibials.  The 5Fr sheath was  exchanged for a 90cm 6Fr sheath in order to treat the AT stenosis. However, on further consideration given his poor outflow and single vessel runoff the decision was made not to treat the SFA, popliteal, or tibial disease. A right femoral angiogram was shot to confirm suitability for a closure device.  The arteriotomy closure was performed using an Angio-Seal device. Ultrasound was used to confirm hemostasis and distal flow.  At that point the procedure was concluded.  Patient tolerated procedure well and was transferred to recovery area in stable condition.    FINDINGS   Widely patent distal aorta, bilateral common iliac, internal and external iliac arteries. Widely patent left common femoral. Multifocal moderate calcified stenosis of the mid and distal SFA and popliteal arteries. Ostial stenosis of the AT, patent TP trunk and proximal PT and peroneal both of which occlude in the mid calf. AT is patent to the ankle where it fills a collateral; no visible DP or pedal arch.    IMPRESSION   Multifocal moderate calcified SFA and popliteal stenosis. Ostial stenosis of the anterior tibial, otherwise patent to the ankle. No peroneal or posterior tibial flow at the ankle. Given that his wounds are healing, will avoid intervention on his single vessel runoff. If his wounds worsen, would attempt aggressive angio intervention with likely SFA and popliteal atherectomy and anterior tibial angioplasty.     Edwin Lynne MD  VASCULAR SURGERY

## 2023-04-07 NOTE — DISCHARGE INSTRUCTIONS
Angiogram Discharge Instructions:    You had an angiogram procedure. An angiogram is a procedure thatuses x-rays to take pictures of your blood vessels. A long, flexible tube or catheter is inserted through the blood stream (through the procedure site) to help deliver contrast (dye) into the arteries so they can be visibleon the x-ray. Angiograms are used to evaluate and treat possible blockages or other disease in the arterial system. Please follow the below instructions after your angiogram, including monitoring of your procedure site.    Care instructions after angiogram procedure:    - If you received sedation for your procedure, do not drive or operate heavy machinery for the rest of the day.    - Do not lift objects greater than 10 poundsfor 2 days following angiogram procedure.    - Avoid excessive exercise and straining for 2 days.    - Avoid tub baths, pools, hot tubs and Jacuzzis for 3 days or until procedure site is well healed.    - Youmay shower beginning tomorrow. Do not scrub procedure site until well healed; pat dry.    - Return to your normal activities as you tolerate after the 2 day restriction.    - You can expect to return to work 1-2days after your procedure - depending on the nature of your profession.    - It is normal to have some tenderness and minimal swelling at procedure puncture site. A small area of discoloration may be present.Tenderness typically subsides in 1-2 days. A small knot may also be present at puncture site for 6-8 weeks. This can be a normal part of the healing process.    Medications and other post-procedure care:    -If you had a blockage opened please make sure to fill your prescription for any new medication, such as Plavix, that you may have been prescribed and take it everyday    - If you have kidney function issues, please makesure to hydrate yourself well for the next two days by drinking lots of fluids to help clear your body of the dye used. If you have heart  problems such as heart failure, this may have to be moderated.    - If you are onMetformin, please do not resume it for 48hrs.    Follow up:    - Follow up with your vascular surgery team at the [facility] vascular center A follow up appointment should already be arranged for you.If you are unsure of your appointment or do not have a follow up appointment in the next 2-3 weeks, please call our office at [phone]. You will need to have an ultrasound 2-3 weeks after your angiogram and should bescheduled at the time of your follow up appt.    Further follow up will be based on ultrasound results. Typical follow up is every 3 months for the first year, then every 6 months to one year thereafter.    seek medical evaluation for:    - If you develop fevers (greater than 101 F (38.3C)).    - If you develop increasing pain, redness, purulent drainage, tenderness, or swelling at procedure site.    If you experience any bleeding from procedure/puncture site: lie down, firmly apply pressure to puncture site and call 911.    - Seek emergent evaluation if you experience any new leg/arm pain, discoloration ornumbness.    Call the vascular center at [phone] with questions/concerns or if you have any of the above symptoms.

## 2023-04-07 NOTE — PROGRESS NOTES
B/P in the 200's. Rechecked multiple times. Dr. Morgan Called. Order to give Hydralazine obtained. Cont to monitor.   [facility] called to fax er visit records     Kyle Flores, formerly [facility]  12/17/21 [id number]       Kyle Flores, formerly [facility]  12/17/21 [id number]

## 2023-04-23 ENCOUNTER — HEALTH MAINTENANCE LETTER (OUTPATIENT)
Age: 73
End: 2023-04-23

## 2023-04-25 ENCOUNTER — TELEPHONE (OUTPATIENT)
Dept: PHARMACY | Facility: PHYSICIAN GROUP | Age: 73
End: 2023-04-25
Payer: COMMERCIAL

## 2023-04-25 NOTE — TELEPHONE ENCOUNTER
Patient was referred for an MTM appointment by their insurance plan.  Calling patient to see if they had time to review their medications today, or if we could schedule a follow up appointment.  Appointment would be a general review of their medications to make sure they are working well, don't have any serious interactions/aren't causing side effects, and if there are ways to simplify them, make them easier to take, or ways to get the cost down.  Patient can call [phone] to schedule an appointment with me.

## 2023-05-04 NOTE — PROGRESS NOTES
[facility]        Patient is here for a 1 month follow up  to discuss L leg angio that was done diagnostically. Wound has healed.    Pt is currently taking Aspirin and Statin.    There were no vitals taken for this visit.    The provider has been notified that the patient has no concerns.     Questions patient would like addressed today are: N/A.    Refills are needed: No    Has homecare services and agency name:  Grisel Beard RN

## 2023-05-04 NOTE — PATIENT INSTRUCTIONS
Ankle-Brachial Index (TRAE) or Physiologic Test    Description  An ankle-brachial index test is relatively pain free. Blood pressure cuffs of various sizes are placed on your thigh, calf, foot and toes.  Similar to having your blood pressure checked with an arm cuff, as the technician inflates the cuffs, they progressively tighten and are then quickly released.  You may feel some discomfort, but generally for less than 60 seconds for each measurement. You will be asked to remove your socks and shoes and possibly your pants or shorts. Gowns will be provided. It usually takes about 30-60 minutes.   Depending on the initial readings and patient symptoms, you may be asked to perform a light walk on a treadmill.  The technician will apply ultrasound gel, usually warmed for your comfort, to your ankles and wrists. Through the gel, the technician will use a small hand-held device that emits sound waves.  Risks  There are typically no side effects or complications associated with a physiologic study.  How to Prepare  Eat and take medications as usual.  There is no preparation required for an ankle-brachial index (TRAE) or physiologic exam.  What Can I Expect After the Test?  The technician will send the ultrasound images to your vascular surgeon for evaluation. Typically, a report is available in 2-3 days. If anything critical is found, it is standard practice to notify the vascular surgeon immediately.  Reference: [email or web address]/patient-resources/vascular-tests     Lower Extremity Arterial Ultrasound    Description  Ultrasound examinations are painless and easy for the patient. The vascular laboratory will contain a bed and just two or three pieces of equipment. You will be asked to remove pants or shorts and gowns will be provided. It usually takes about 30 minutes.  The technician will tuck a towel under your underpants in the groin. The gel is water-soluble and will not stain your skin or clothes.  Ultrasound gel,  usually warmed for your comfort, will be placed on the inner side of your legs.    Through the gel, the technician will apply to your legs a small hand-held device that emits sound waves.  When the test is completed, the technician will remove excess gel from your legs.    Risks  There are typically no side effects or complications associated with a lower extremity arterial duplex ultrasound.  How to Prepare  Eat and take medications as usual.  There is no preparation required for a lower extremity arterial duplex ultrasound.  What Can I Expect After the Test?  The technician will send the ultrasound images to your vascular surgeon for evaluation. Typically, a report is available in 2-3 days. If anything critical is found, it is standard practice to notify the vascular surgeon immediately.  Reference: [email or web address].org/patient-resources/vascular-tests       Again thank you for your time.

## 2023-05-08 ENCOUNTER — OFFICE VISIT (OUTPATIENT)
Dept: VASCULAR SURGERY | Facility: CLINIC | Age: 73
End: 2023-05-08
Payer: COMMERCIAL

## 2023-05-08 VITALS — SYSTOLIC BLOOD PRESSURE: 148 MMHG | HEART RATE: 64 BPM | DIASTOLIC BLOOD PRESSURE: 80 MMHG | OXYGEN SATURATION: 94 %

## 2023-05-08 DIAGNOSIS — I73.9 PVD (PERIPHERAL VASCULAR DISEASE) (H): Primary | ICD-10-CM

## 2023-05-08 PROCEDURE — 99203 OFFICE O/P NEW LOW 30 MIN: CPT | Performed by: PHYSICIAN ASSISTANT

## 2023-05-08 PROCEDURE — G0463 HOSPITAL OUTPT CLINIC VISIT: HCPCS | Performed by: PHYSICIAN ASSISTANT

## 2023-05-08 ASSESSMENT — PAIN SCALES - GENERAL: PAINLEVEL: NO PAIN (0)

## 2023-05-08 NOTE — NURSING NOTE
[facility]        Patient is here for a 1 month follow up  to discuss L side angio    Pt is currently taking Aspirin.    BP (!) 148/80   Pulse 64   SpO2 94%     The provider has been notified that the patient has no concerns.     Questions patient would like addressed today are: N/A.    Refills are needed: No    Has homecare services and agency name:  Grisel Dumont LPN

## 2023-05-08 NOTE — PROGRESS NOTES
VASCULAR SURGERY PROGRESS NOTE    LOCATION:  [facility]     Dakota Bauer  Medical Record #: [id number]  YOB: 1950  Age: 72 year old     Date of Service: 5/8/2023    PRIMARY CARE PROVIDER: Jamal Gaffney    Reason for visit: PAD / wound check     IMPRESSION: 72-year-old male presenting for follow-up of peripheral arterial disease status post left lower extremity angiogram due to chronic limb threatening ischemia with wounds. Given that his wounds were healing at this time, decision was made to avoid intervention on his single-vessel runoff with plans to pursue aggressive intervention if his wounds worsen. Left plantar foot wound has almost completely healed today. Unfortunately, since his angiogram, patient has developed a new sore due to a blister at his ankle.    RECOMMENDATION/RISKS: Continue best medical management with aspirin and statin therapy. Encouraged continued good foot care and wound care to his scabbed blister. Follow-up in 6 months with repeat lower extremity studies or sooner if he develops new or worsening lower extremity wounds.    HPI:  Dakota Bauer is a 72 year old male with past medical history significant for hypertension, hyperlipidemia, coronary artery disease, diabetes mellitus, and peripheral arterial disease. Patient underwent recent left lower extremity angiogram due to nonhealing wounds of the left foot.  Given that his wounds are healing at this time, intervention was avoided on his single-vessel runoff and he was scheduled for a repeat wound check.    Today, Mr. Bauer is accompanied by his wife. He notes that his left foot wound has nearly healed. He has, unfortunately, developed a new sore to his left medial ankle. He notes this started as a blister and has since scabbed over. This has happened multiple times in the past and while usually slow to heal, he does reach complete wound healing. They have been painting the area daily with Betadine and  keeping clean and dry.  Patient denies any significant leg pain with walking or at rest. He is compliant with his medications.    No other concerns    REVIEW OF SYSTEMS:    A 12 point ROS was reviewed and is negative except for what is listed above in HPI.    PHH:    Past Medical History:   Diagnosis Date     Chronic kidney disease      Coronary artery disease      Diabetes mellitus (H)      Disorder of thyroid      Hyperlipidemia      Hypertension       Past Surgical History:   Procedure Laterality Date     AMPUTATE TOE(S) Left      BYPASS GRAFT ARTERY CORONARY  01/01/2005    [facility] with Dr. Holter     CARDIAC CATHETERIZATION  01/01/2005     CARDIAC CATHETERIZATION  11/25/2016    no intervention     CERVICAL DISC SURGERY  01/01/2014     IR LOWER EXTREMITY ANGIOGRAM LEFT  4/7/2023     IR LOWER EXTREMITY ANGIOGRAM LEFT  4/8/2020     ALLERGIES:  Hydrochlorothiazide and Levofloxacin    MEDS:    Current Outpatient Medications:      ALPRAZolam (XANAX) 0.5 MG tablet, [ALPRAZOLAM (XANAX) 0.5 MG TABLET] Take 0.5 mg by mouth daily. , Disp: , Rfl:      aspirin 81 MG EC tablet, [ASPIRIN 81 MG EC TABLET] Take 81 mg by mouth daily., Disp: , Rfl:      atorvastatin (LIPITOR) 40 MG tablet, Take 1 tablet (40 mg) by mouth daily, Disp: 90 tablet, Rfl: 4     Continuous Blood Gluc Sensor (FREESTYLE DAVID 14 DAY SENSOR) MISC, , Disp: , Rfl:      ezetimibe (ZETIA) 10 MG tablet, Take 1 tablet (10 mg) by mouth daily, Disp: 90 tablet, Rfl: 4     fish oil-omega-3 fatty acids 1000 MG capsule, Take 1 capsule by mouth daily, Disp: , Rfl:      furosemide (LASIX) 20 MG tablet, [FUROSEMIDE (LASIX) 20 MG TABLET] Take 20 mg by mouth daily., Disp: , Rfl: 9     gabapentin (NEURONTIN) 300 MG capsule, [GABAPENTIN (NEURONTIN) 300 MG CAPSULE] Take 300 mg by mouth 3 (three) times a day.       , Disp: , Rfl:      HUMALOG 100 unit/mL injection, [HUMALOG 100 UNIT/ML INJECTION] Inject under the skin 3 (three) times a day before meals.       , Disp: , Rfl:      " insulin glargine (LANTUS) 100 unit/mL injection, [INSULIN GLARGINE (LANTUS) 100 UNIT/ML INJECTION] Inject 30 Units under the skin every morning. , Disp: , Rfl:      levothyroxine (SYNTHROID/LEVOTHROID) 125 MCG tablet, Take 125 mcg by mouth daily, Disp: , Rfl:      lisinopril (PRINIVIL,ZESTRIL) 40 MG tablet, [LISINOPRIL (PRINIVIL,ZESTRIL) 40 MG TABLET] Take 40 mg by mouth daily., Disp: , Rfl:      metoprolol succinate ER (TOPROL XL) 50 MG 24 hr tablet, Take 1 tablet (50 mg) by mouth daily, Disp: 90 tablet, Rfl: 2     MONOJECT ULTRA COMFORT SYRINGE 30G X 5/16\" 0.3 ML, , Disp: , Rfl:      multivitamin with iron (ONE DAILY WITH IRON) Tab tablet, [MULTIVITAMIN WITH IRON (ONE DAILY WITH IRON) TAB TABLET] Take 1 tablet by mouth daily., Disp: , Rfl:      nitroGLYcerin (NITROSTAT) 0.4 MG sublingual tablet, Place 1 tablet (0.4 mg) under the tongue every 5 minutes as needed, Disp: 25 tablet, Rfl: 3     TECHLITE PEN NEEDLES 31G X 5 MM miscellaneous, , Disp: , Rfl:      TRIAMCINOLONE ACETONIDE (NASACORT NASL), [TRIAMCINOLONE ACETONIDE (NASACORT NASL)] 1 spray into each nostril daily., Disp: , Rfl:      VITAMIN D3 2,000 unit capsule, [VITAMIN D3 2,000 UNIT CAPSULE] Take 2,000 Units by mouth daily., Disp: , Rfl: 3    SOCIAL HABITS:    History   Smoking Status     Former     Types: Cigarettes     Quit date: 10/10/1984   Smokeless Tobacco     Never     Social History    Substance and Sexual Activity      Alcohol use: Not Currently      History   Drug Use Unknown     FAMILY HISTORY:    Family History   Problem Relation Age of Onset     Coronary Artery Disease Father      Coronary Artery Disease Brother      PE:  There were no vitals taken for this visit.  Wt Readings from Last 1 Encounters:   04/07/23 218 lb (98.9 kg)     There is no height or weight on file to calculate BMI.    EXAM:  GENERAL: well-developed 72 year old male who appears his stated age  CARDIAC: normal   CHEST/LUNG: normal respiratory effort   MUSCULOSKELETAL: " grossly normal and both lower extremities are intact, no lower extremity edema  NEUROLOGIC: focally intact, alert and oriented x 3  PSYCH: appropriate affect  VASCULAR: small scab to plantar surface of left foot, no surrounding erythema or warmth, chronic skin changes to distal left ankle with scabbed blister to medial leg, image uploaded to chart     DIAGNOSTIC STUDIES:     Images:  Pertinent imaging reviewed.    LABS:      Sodium   Date Value Ref Range Status   04/07/2023 137 136 - 145 mmol/L Final   03/20/2023 138 136 - 145 mmol/L Final   12/16/2022 139 136 - 145 mmol/L Final     Urea Nitrogen   Date Value Ref Range Status   04/07/2023 42.0 (H) 8.0 - 23.0 mg/dL Final   03/20/2023 37.4 (H) 8.0 - 23.0 mg/dL Final   12/16/2022 53.0 (H) 8.0 - 23.0 mg/dL Final   06/17/2022 48 (H) 8 - 28 mg/dL Final   12/17/2021 40 (H) 8 - 28 mg/dL Final   06/17/2021 40 (H) 8 - 28 mg/dL Final     Hemoglobin   Date Value Ref Range Status   04/07/2023 12.6 (L) 13.3 - 17.7 g/dL Final     Platelet Count   Date Value Ref Range Status   04/07/2023 201 150 - 450 10e3/uL Final     25 minutes spent on the day of encounter doing chart review, history and exam, documentation, and further activities as noted.     Lilia Fox PA-C  VASCULAR SURGERY

## 2023-06-05 NOTE — LETTER
6/5/2023    Jamal Gaffney MD  [address] [facility] [place]  [facility] [address]    RE: Dakota Lizette       Dear Colleague,     I had the pleasure of seeing Dakota Lizette in the [facility].    HEART CARE ENCOUNTER CONSULTATON NOTE      [facility]  [phone]      Assessment/Recommendations   Assessment/Plan:  1.  Coronary artery disease.  Patient has endorsed some intermittent shortness of breath with exertion and some dizziness upon standing.  He does have a history of orthostatic hypotension which has been better on the current combination of medications historically.  The symptoms of shortness of breath with exertion are more noted a week or so ago and less noted currently.  He also reported some tightness in his chest during these events.  This symptom was primarily isolated to working outside.  In addition he did identify some trend toward slower heartbeat with his blood pressure monitoring device.  I have recommended that we pursue a Lexiscan nuclear stress test.  The most recent 1 dates to June 2021.  He will update me in the interim if symptoms are worsening or progressive.    2.  Hypertension.  Blood pressures appear reasonable today.  He has underlying renal insufficiency and I have reviewed most recent notes from nephrology.  He has endorsed some increased lower extremity edema but no orthopnea or PND.  His most recent creatinine was 2.79.  Plan to recheck chemistries today to exclude significant worsening of his renal insufficiency in the interim with plans for additional discussion pending the results.  I did ask him to touch base with nephrology regarding the lower extremity edema at this point would like their input before up titration of the furosemide.    3.  Dyslipidemia.  He has been on 40 mg of rosuvastatin in the setting of renal insufficiency.  He was changed to atorvastatin in the setting of worsening renal insufficiency.  This was completed in December 2022.  In  the interim he wondered whether the recent symptoms of dizziness was related to the atorvastatin so on his own went back to the rosuvastatin.  Lipid results from December finds a total cholesterol 118 with an LDL of 61.  I did inform him of my concern about the potential relationship between the rosuvastatin and renal insufficiency and I did forward a note to the pharmacist.  We will make additional recommendations pending the very return note.  He was on 40 mg of atorvastatin.    4.  Peripheral vascular disease.  Ongoing wound on his left foot.  He has not been wearing the boot as of recent because of some increased lower extremity edema.    Plan 1.  Chemistry profile today.  2.  EKG today.  3.  Holter monitor to further define potential for bradycardia.  4.  Lexiscan nuclear stress test to reevaluate ischemia.  5.  I did ask him to touch base with his nephrologist regarding lower extremity edema and the potential for further up titration of his Lasix which he is currently taking 20 mg daily.  He will update me.  6.  We will correspond with the findings above ask him to seek more immediate attention if symptoms are worsening or progressive.    ECG today demonstrates sinus bradycardia 58 bpm, LVH with repolarization changes, compared to previous T wave changes are less prominent.  Plan as outlined above.         History of Present Illness/Subjective    HPI: Dakota Bauer is a 72 year old male who is seen in follow-up.  We last visited in February 2023.  He has a history of coronary artery disease, diabetes mellitus and renal insufficiency and peripheral vascular disease.  He has a remote history of bypass surgery in 2005 and underwent coronary angiogram in 2016 with findings at that time with patent grafts with some distal disease.  The LIMA was patent to the LAD, vein graft to diagonal was patent, vein graft to the OM was patent and diffusely diseased target vessels.  Nuclear stress test was completed most recently  June 2021 that demonstrated a small area of nontransmural infarction with an ejection fraction of 62%.  At that time he had not been describing angina or significant shortness of breath.  He has had some chronic symptoms of dizziness which seem to become more pronounced if his blood pressure systolic drops lower than approximately 130s to 140s.  He follows with  from nephrology.  In April 2023 he underwent femoral angiogram per chart review.  Findings by report suggested multifocal moderate calcified SFA and popliteal stenosis.  The results of which are in the body of this report.  He was seen by nephrology May 2023.  That note indicated that he had been doing well.  Notes indicate that renal function has slowly been declining over the past 2 years.  Most recent creatinine in April 27 was 2.79.  Patient contacted our office in March with concerns for elevated blood pressure.    He returns today telling me that he has had some increased dyspnea when he was out working in the yard a few weeks ago on a few occasions and also has been describing some lightheadedness.  There was some associated chest tightness while he was doing these activities in the yard.  States in the interim he has been admitted to the activities in the yard and has had no reoccurrence of his significant shortness of breath or chest tightness.  He has been more fatigued.  He denies syncope or near syncope.  He has had no additional symptoms while in his home.  He tells me that on his own he switched from atorvastatin back to rosuvastatin and indicates that the symptoms have improved.  A while back rosuvastatin was changed to atorvastatin in the setting of renal insufficiency.  He also endorses some increased lower extremity edema left greater than right.  He has not been able to wear his boot on his left foot.  He also noted that his blood pressure machine has been documenting heart rates in the 40s.  Recent Echocardiogram Results:      Echocardiogram Complete: Result Notes     Clari Min RN   3/14/2023  1:01 PM CDT       Last read by Dakota Bauer at  8:14 AM on 3/11/2023.    Robinson Chopra MD   3/8/2023  5:24 PM CST       Echocardiogram personally reviewed shows normal systolic function with some left ventricular hypertrophy.  Normal right ventricular function no significant valve abnormality.  I sent him a MyChart note.  In addition the abdominal ultrasound shows no aneurysm.              Echocardiogram Complete: Patient Communication     Append Comments   Seen    Hi Dakota  I hope you are well.  Your heart ultrasound looks good.  Your heart function is normal there is no major valve issues.  The wall thickness of your heart is mildly increased related to the blood pressure history.  We will continue to keep a close eye on your blood pressure.  The ultrasound of your abdomen shows no findings to suggest an aneurysm.  I read the chart notes and understand that you will be having a vascular procedure.  Please keep me updated with any concerns related to your heart.   Written by Robinson Chopra MD on 3/8/2023  5:24 PM CST  Seen by patient Dakota Bauer on 3/11/2023  8:16 AM    Results  Echocardiogram Complete (Order [id number])  External Result Report    External Result Report     PACS Images     Show images for Echocardiogram Complete  Echocardiogram Complete  Order: [id number]  Status: Edited Result - FINAL     Visible to patient: Yes (seen)     Next appt: 2023 at 02:20 PM in Cardiology (Robinson Chopra MD)     Dx: Benign essential hypertension; [place]...     2 Result Notes    1 Patient Communication  Details    Reading Physician Reading Date Result Priority   Mic Valerio MD  [phone] 3/8/2023      Narrative & Impression  [id number]  [id number]  [id number]  [id number]^KENNEDY^[place] [address]     Name: DAKOTA BAUER  MRN: [id number]  : 1950  Study Date: 2023 08:00 AM  Age:  72 yrs  Gender: Male  Patient Location: [facility]  Reason For Study: Benign essential hypertension, Coronary artery disease due  to li  Ordering Physician: MJ BENAVIDES  Referring Physician: MJ BENAVIDES  Performed By: GUERRERO     BSA: 2.2 m2  Height: 72 in  Weight: 218 lb  HR: 62  ______________________________________________________________________________  Procedure  Complete Echo Adult. Definity (NDC #94510-102) given intravenously.  Technically difficult study. A prior study was performed 10/18/2016. Images  are unavailable for comparison.  ______________________________________________________________________________  Interpretation Summary     1. Left ventricular size is normal. Mild concentric increase in wall  thickness. Systolic function is normal. The calculated left ventricular  ejection fraction is 59%.  2. Right ventricular size is normal. Systolic function is low normal.  3. No hemodynamically significant valvular abnormalities.  4. Mild pulmonary hypertension is present with an estimated pulmonary artery  systolic pressure of 41 mm Hg.  5. A prior study was performed 10/18/2016. Images are unavailable for  comparison.         Recent Coronary Angiogram Results:    Provider, Historical 6/16/2021 Routine   Marleni Gavin MD  [phone] 6/16/2021      Result Text    The nuclear stress test is abnormal.    Nuclear images demonstrate a small area of nontransmural infarctions involving the distal anteroseptal wall and basal inferolateral wall.  No ischemia identified.    The left ventricular ejection fraction at stress is 62% without wall motion abnormality.    A prior study was conducted on 9/19/2019.  No change identified.    The patient is at a low risk of future cardiac ischemic events.       DATE: 04/07/23      PROCEDURES PERFORMED:   1.  Ultrasound-guided access of right common femoral artery  2.  Aortogram with CO2  3.  Selective cannulation of the left common iliac artery, external iliac artery,  common femoral, superficial femoral, popliteal arteries with nonselective left lower extremity angiogram  4.  Moderate sedation  5.  Arteriotomy closure with Angio-Seal device     PROVIDER: Edwin Lynne MD     FELLOW: Ana Laura Quintanilla MD     INDICATION: Left lower extremity chronic limb threatening ischemia with chronic wounds     SEDATION:   The procedure was performed with administration of intravenous conscious sedation with appropriate preoperative, intraoperative, and postoperative evaluation. 2.5mg Versed and 125mcg fentanyl administered.  67 minutes of supervised face to face intraservice time was provided by a radiology nurse under my direct supervision.      ANTIBIOTICS: none  FLUOROSCOPIC TIME: 8.3 minutes  RADIATION DOSE: 543 mGy  NUMBER OF IMAGES: 8  CONTRAST: 30ml Visipaque        PROCEDURE:  Ultrasound was used to evaluate the right common femoral artery. The selected vessel was patent. Ultrasound was then used for real-time ultrasound guided needle entry of the  right common femoral artery. Permanent images were recorded and saved to the patient's medical record.  Micropuncture sheath was inserted.  A glide advantage wire was advanced into the aorta.  5 Portuguese sheath was placed.  An Omni Flush catheter was advanced over the wire and positioned just below bilateral renal arteries at the level of L2.  Aortogram was performed using CO2.  Then using up and over maneuver I was able to advance the wire into the left femoral artery.  Over the wire the Omni Flush catheter was advanced and positioned in the  left common femoral artery.  Left lower extremity angiogram was performed, switching to contrast for the tibials.  The 5Fr sheath was exchanged for a 90cm 6Fr sheath in order to treat the AT stenosis. However, on further consideration given his poor outflow and single vessel runoff the decision was made not to treat the SFA, popliteal, or tibial disease. A right femoral angiogram was shot to confirm  suitability for a closure device.  The arteriotomy closure was performed using an Angio-Seal device. Ultrasound was used to confirm hemostasis and distal flow.  At that point the procedure was concluded.  Patient tolerated procedure well and was transferred to recovery area in stable condition.     FINDINGS   Widely patent distal aorta, bilateral common iliac, internal and external iliac arteries. Widely patent left common femoral. Multifocal moderate calcified stenosis of the mid and distal SFA and popliteal arteries. Ostial stenosis of the AT, patent TP trunk and proximal PT and peroneal both of which occlude in the mid calf. AT is patent to the ankle where it fills a collateral; no visible DP or pedal arch.     IMPRESSION   Multifocal moderate calcified SFA and popliteal stenosis. Ostial stenosis of the anterior tibial, otherwise patent to the ankle. No peroneal or posterior tibial flow at the ankle. Given that his wounds are healing, will avoid intervention on his single vessel runoff. If his wounds worsen, would attempt aggressive angio intervention with likely SFA and popliteal atherectomy and anterior tibial angioplasty.      Edwin Lynne MD  VASCULAR SURGERY           Physical Examination  Review of Systems   Vitals: 130/78, weight 228 pounds, heart rate of 58 and regular.  Wt Readings from Last 3 Encounters:   04/07/23 98.9 kg (218 lb)   02/27/23 98.9 kg (218 lb)   02/13/23 99.3 kg (219 lb)       General Appearance:   no distress, normal body habitus   ENT/Mouth: membranes moist,      EYES:  no scleral icterus, normal conjunctivae   Neck: no carotid bruits or thyromegaly   Chest/Lungs:   lungs are clear to auscultation, no rales or wheezing, healed sternal scar, equal chest wall expansion    Cardiovascular:    Distant, regular. Normal first and second heart sounds with *soft systolic murmur rubs, or gallops; the carotid, radial and posterior tibial pulses are intact, Jugular venous pressure not  obviously increased.  Bilateral lower extremity edema left greater than right.   Abdomen:  no , bruits, or tenderness; bowel sounds are present   Extremities: no cyanosis or clubbing   Skin: no xanthelasma, warm.    Neurologic: no tremors     Psychiatric: alert and oriented x3, calm        Please refer above for cardiac ROS details.        Medical History  Surgical History Family History Social History   Past Medical History:   Diagnosis Date    Chronic kidney disease     Coronary artery disease     Diabetes mellitus (H)     Disorder of thyroid     Hyperlipidemia     Hypertension      Past Surgical History:   Procedure Laterality Date    AMPUTATE TOE(S) Left     BYPASS GRAFT ARTERY CORONARY  2005    [place] with Dr. Holter    CARDIAC CATHETERIZATION  2005    CARDIAC CATHETERIZATION  2016    no intervention    CERVICAL DISC SURGERY  2014    IR LOWER EXTREMITY ANGIOGRAM LEFT  2023    IR LOWER EXTREMITY ANGIOGRAM LEFT  2020     Family History   Problem Relation Age of Onset    Coronary Artery Disease Father     Coronary Artery Disease Brother         Social History     Socioeconomic History    Marital status:      Spouse name: Not on file    Number of children: Not on file    Years of education: Not on file    Highest education level: Not on file   Occupational History    Not on file   Tobacco Use    Smoking status: Former     Types: Cigarettes     Quit date: 10/10/1984     Years since quittin.6    Smokeless tobacco: Never   Vaping Use    Vaping status: Never Used   Substance and Sexual Activity    Alcohol use: Not Currently    Drug use: Never    Sexual activity: Not on file   Other Topics Concern    Not on file   Social History Narrative    Not on file     Social Determinants of Health     Financial Resource Strain: Not on file   Food Insecurity: Not on file   Transportation Needs: Not on file   Physical Activity: Not on file   Stress: Not on file   Social Connections: Not  "on file   Intimate Partner Violence: Not on file   Housing Stability: Not on file           Medications  Allergies   Current Outpatient Medications   Medication Sig Dispense Refill    ALPRAZolam (XANAX) 0.5 MG tablet [ALPRAZOLAM (XANAX) 0.5 MG TABLET] Take 0.5 mg by mouth daily.       aspirin 81 MG EC tablet [ASPIRIN 81 MG EC TABLET] Take 81 mg by mouth daily.      atorvastatin (LIPITOR) 40 MG tablet Take 1 tablet (40 mg) by mouth daily 90 tablet 4    Continuous Blood Gluc Sensor (FREESTYLE DAVID 14 DAY SENSOR) MISC       ezetimibe (ZETIA) 10 MG tablet Take 1 tablet (10 mg) by mouth daily 90 tablet 4    fish oil-omega-3 fatty acids 1000 MG capsule Take 1 capsule by mouth daily      furosemide (LASIX) 20 MG tablet [FUROSEMIDE (LASIX) 20 MG TABLET] Take 20 mg by mouth daily.  9    gabapentin (NEURONTIN) 300 MG capsule [GABAPENTIN (NEURONTIN) 300 MG CAPSULE] Take 300 mg by mouth 3 (three) times a day.             HUMALOG 100 unit/mL injection [HUMALOG 100 UNIT/ML INJECTION] Inject under the skin 3 (three) times a day before meals.             insulin glargine (LANTUS) 100 unit/mL injection [INSULIN GLARGINE (LANTUS) 100 UNIT/ML INJECTION] Inject 30 Units under the skin every morning.       levothyroxine (SYNTHROID/LEVOTHROID) 125 MCG tablet Take 125 mcg by mouth daily      lisinopril (PRINIVIL,ZESTRIL) 40 MG tablet [LISINOPRIL (PRINIVIL,ZESTRIL) 40 MG TABLET] Take 40 mg by mouth daily.      metoprolol succinate ER (TOPROL XL) 50 MG 24 hr tablet Take 1 tablet (50 mg) by mouth daily 90 tablet 2    MONOJECT ULTRA COMFORT SYRINGE 30G X 5/16\" 0.3 ML       multivitamin with iron (ONE DAILY WITH IRON) Tab tablet [MULTIVITAMIN WITH IRON (ONE DAILY WITH IRON) TAB TABLET] Take 1 tablet by mouth daily.      nitroGLYcerin (NITROSTAT) 0.4 MG sublingual tablet Place 1 tablet (0.4 mg) under the tongue every 5 minutes as needed 25 tablet 3    TECHLITE PEN NEEDLES 31G X 5 MM miscellaneous       TRIAMCINOLONE ACETONIDE (NASACORT NASL) " [TRIAMCINOLONE ACETONIDE (NASACORT NASL)] 1 spray into each nostril daily.      VITAMIN D3 2,000 unit capsule [VITAMIN D3 2,000 UNIT CAPSULE] Take 2,000 Units by mouth daily.  3       Allergies   Allergen Reactions    Hydrochlorothiazide Unknown    Levofloxacin Diarrhea          Lab Results    Chemistry/lipid CBC Cardiac Enzymes/BNP/TSH/INR   Recent Labs   Lab Test 12/16/22  0919   CHOL 118   HDL 43   LDL 60   TRIG 76     Recent Labs   Lab Test 12/16/22  0919 10/12/22  0819 06/17/22  0851   LDL 60 41 80     Recent Labs   Lab Test 04/07/23  0716      POTASSIUM 4.6   CHLORIDE 102   CO2 29   *   BUN 42.0*   CR 2.64*   GFRESTIMATED 25*   SVITLANA 8.9     Recent Labs   Lab Test 04/07/23  0716 03/20/23  1518 12/16/22  0919   CR 2.64* 2.58* 2.77*     No results for input(s): A1C in the last 46639 hours.       Recent Labs   Lab Test 04/07/23  0716   WBC 6.3   HGB 12.6*   HCT 39.0*   MCV 91        Recent Labs   Lab Test 04/07/23  0716   HGB 12.6*    Recent Labs   Lab Test 06/02/21  1016   TROPONINI 0.13     No results for input(s): BNP, NTBNPI, NTBNP in the last 08141 hours.  Recent Labs   Lab Test 04/20/22  0918   TSH 1.13     No results for input(s): INR in the last 78888 hours.     Robinson Chopra MD    Thank you for allowing me to participate in the care of your patient.      Sincerely,     Robinson Chopra MD     [facility]  cc:   Robinson Chopra MD  [address]  [address]  [place] [address]

## 2023-06-05 NOTE — PATIENT INSTRUCTIONS
Please call with any heart related questions.We are going to plan a 24 hour monitor and a non exercise stress test.I will be in touch about the Rosuvastatin after I review with pharmacy.Please update me any worsening or re-occurence of symptoms./My nurse is Clari and her number is [phone]' please contact  to get his input regarding the lower extremity edema.  Specifically would he be okay with increasing the furosemide.  Please let me know and I will correspond with you with the test results.  If symptoms are worsening or progressive please seek more immediate attention.

## 2023-06-05 NOTE — PROGRESS NOTES
HEART CARE ENCOUNTER CONSULTATON NOTE      [facility]  [phone]      Assessment/Recommendations   Assessment/Plan:  1.  Coronary artery disease.  Patient has endorsed some intermittent shortness of breath with exertion and some dizziness upon standing.  He does have a history of orthostatic hypotension which has been better on the current combination of medications historically.  The symptoms of shortness of breath with exertion are more noted a week or so ago and less noted currently.  He also reported some tightness in his chest during these events.  This symptom was primarily isolated to working outside.  In addition he did identify some trend toward slower heartbeat with his blood pressure monitoring device.  I have recommended that we pursue a Lexiscan nuclear stress test.  The most recent 1 dates to June 2021.  He will update me in the interim if symptoms are worsening or progressive.    2.  Hypertension.  Blood pressures appear reasonable today.  He has underlying renal insufficiency and I have reviewed most recent notes from nephrology.  He has endorsed some increased lower extremity edema but no orthopnea or PND.  His most recent creatinine was 2.79.  Plan to recheck chemistries today to exclude significant worsening of his renal insufficiency in the interim with plans for additional discussion pending the results.  I did ask him to touch base with nephrology regarding the lower extremity edema at this point would like their input before up titration of the furosemide.    3.  Dyslipidemia.  He has been on 40 mg of rosuvastatin in the setting of renal insufficiency.  He was changed to atorvastatin in the setting of worsening renal insufficiency.  This was completed in December 2022.  In the interim he wondered whether the recent symptoms of dizziness was related to the atorvastatin so on his own went back to the rosuvastatin.  Lipid results from December finds a total cholesterol  118 with an LDL of 61.  I did inform him of my concern about the potential relationship between the rosuvastatin and renal insufficiency and I did forward a note to the pharmacist.  We will make additional recommendations pending the very return note.  He was on 40 mg of atorvastatin.    4.  Peripheral vascular disease.  Ongoing wound on his left foot.  He has not been wearing the boot as of recent because of some increased lower extremity edema.    Plan 1.  Chemistry profile today.  2.  EKG today.  3.  Holter monitor to further define potential for bradycardia.  4.  Lexiscan nuclear stress test to reevaluate ischemia.  5.  I did ask him to touch base with his nephrologist regarding lower extremity edema and the potential for further up titration of his Lasix which he is currently taking 20 mg daily.  He will update me.  6.  We will correspond with the findings above ask him to seek more immediate attention if symptoms are worsening or progressive.    ECG today demonstrates sinus bradycardia 58 bpm, LVH with repolarization changes, compared to previous T wave changes are less prominent.  Plan as outlined above.         History of Present Illness/Subjective    HPI: Dakota Bauer is a 72 year old male who is seen in follow-up.  We last visited in February 2023.  He has a history of coronary artery disease, diabetes mellitus and renal insufficiency and peripheral vascular disease.  He has a remote history of bypass surgery in 2005 and underwent coronary angiogram in 2016 with findings at that time with patent grafts with some distal disease.  The LIMA was patent to the LAD, vein graft to diagonal was patent, vein graft to the OM was patent and diffusely diseased target vessels.  Nuclear stress test was completed most recently June 2021 that demonstrated a small area of nontransmural infarction with an ejection fraction of 62%.  At that time he had not been describing angina or significant shortness of breath.  He has  had some chronic symptoms of dizziness which seem to become more pronounced if his blood pressure systolic drops lower than approximately 130s to 140s.  He follows with  from nephrology.  In April 2023 he underwent femoral angiogram per chart review.  Findings by report suggested multifocal moderate calcified SFA and popliteal stenosis.  The results of which are in the body of this report.  He was seen by nephrology May 2023.  That note indicated that he had been doing well.  Notes indicate that renal function has slowly been declining over the past 2 years.  Most recent creatinine in April 27 was 2.79.  Patient contacted our office in March with concerns for elevated blood pressure.    He returns today telling me that he has had some increased dyspnea when he was out working in the yard a few weeks ago on a few occasions and also has been describing some lightheadedness.  There was some associated chest tightness while he was doing these activities in the yard.  States in the interim he has been admitted to the activities in the yard and has had no reoccurrence of his significant shortness of breath or chest tightness.  He has been more fatigued.  He denies syncope or near syncope.  He has had no additional symptoms while in his home.  He tells me that on his own he switched from atorvastatin back to rosuvastatin and indicates that the symptoms have improved.  A while back rosuvastatin was changed to atorvastatin in the setting of renal insufficiency.  He also endorses some increased lower extremity edema left greater than right.  He has not been able to wear his boot on his left foot.  He also noted that his blood pressure machine has been documenting heart rates in the 40s.  Recent Echocardiogram Results:     Echocardiogram Complete: Result Notes     Clari Min RN   3/14/2023  1:01 PM CDT       Last read by Dakota Bauer at  8:14 AM on 3/11/2023.    Robinson Chopra MD   3/8/2023  5:24 PM CST        Echocardiogram personally reviewed shows normal systolic function with some left ventricular hypertrophy.  Normal right ventricular function no significant valve abnormality.  I sent him a MyChart note.  In addition the abdominal ultrasound shows no aneurysm.              Echocardiogram Complete: Patient Communication     Append Comments   Seen    Hi Dakota  I hope you are well.  Your heart ultrasound looks good.  Your heart function is normal there is no major valve issues.  The wall thickness of your heart is mildly increased related to the blood pressure history.  We will continue to keep a close eye on your blood pressure.  The ultrasound of your abdomen shows no findings to suggest an aneurysm.  I read the chart notes and understand that you will be having a vascular procedure.  Please keep me updated with any concerns related to your heart.   Written by Robinson Chopra MD on 3/8/2023  5:24 PM CST  Seen by patient Dakota Bauer on 3/11/2023  8:16 AM    Results  Echocardiogram Complete (Order [id number])  External Result Report    External Result Report     PACS Images     Show images for Echocardiogram Complete  Echocardiogram Complete  Order: [id number]   Status: Edited Result - FINAL      Visible to patient: Yes (seen)      Next appt: 2023 at 02:20 PM in Cardiology (Robinson Chopra MD)      Dx: Benign essential hypertension; [place]...      2 Result Notes     1 Patient Communication  Details    Reading Physician Reading Date Result Priority   Mic Valerio MD  [phone] 3/8/2023      Narrative & Impression  [id number]  [id number]  [id number]  [id number]^KENNEDY^[place] [address]     Name: DAKOTA BAUER  MRN: [id number]  : 1950  Study Date: 2023 08:00 AM  Age: 72 yrs  Gender: Male  [facility]  Reason For Study: Benign essential hypertension, Coronary artery disease due  to li  Ordering Physician: ROBINSON CHOPRA  Referring  Physician: MJ BENAVIDES  Performed By: GUERRERO     BSA: 2.2 m2  Height: 72 in  Weight: 218 lb  HR: 62  ______________________________________________________________________________  Procedure  Complete Echo Adult. Definity (NDC #87979-491) given intravenously.  Technically difficult study. A prior study was performed 10/18/2016. Images  are unavailable for comparison.  ______________________________________________________________________________  Interpretation Summary     1. Left ventricular size is normal. Mild concentric increase in wall  thickness. Systolic function is normal. The calculated left ventricular  ejection fraction is 59%.  2. Right ventricular size is normal. Systolic function is low normal.  3. No hemodynamically significant valvular abnormalities.  4. Mild pulmonary hypertension is present with an estimated pulmonary artery  systolic pressure of 41 mm Hg.  5. A prior study was performed 10/18/2016. Images are unavailable for  comparison.         Recent Coronary Angiogram Results:    Provider, Historical 6/16/2021 Routine   Marleni Gavin MD  [phone] 6/16/2021      Result Text     The nuclear stress test is abnormal.     Nuclear images demonstrate a small area of nontransmural infarctions involving the distal anteroseptal wall and basal inferolateral wall.  No ischemia identified.     The left ventricular ejection fraction at stress is 62% without wall motion abnormality.     A prior study was conducted on 9/19/2019.  No change identified.     The patient is at a low risk of future cardiac ischemic events.       DATE: 04/07/23      PROCEDURES PERFORMED:   1.  Ultrasound-guided access of right common femoral artery  2.  Aortogram with CO2  3.  Selective cannulation of the left common iliac artery, external iliac artery, common femoral, superficial femoral, popliteal arteries with nonselective left lower extremity angiogram  4.  Moderate sedation  5.  Arteriotomy closure with Angio-Seal  device     PROVIDER: Edwin Lynne MD     FELLOW: Ana Laura Quintanilla MD     INDICATION: Left lower extremity chronic limb threatening ischemia with chronic wounds     SEDATION:   The procedure was performed with administration of intravenous conscious sedation with appropriate preoperative, intraoperative, and postoperative evaluation. 2.5mg Versed and 125mcg fentanyl administered.  67 minutes of supervised face to face intraservice time was provided by a radiology nurse under my direct supervision.      ANTIBIOTICS: none  FLUOROSCOPIC TIME: 8.3 minutes  RADIATION DOSE: 543 mGy  NUMBER OF IMAGES: 8  CONTRAST: 30ml Visipaque        PROCEDURE:  Ultrasound was used to evaluate the right common femoral artery. The selected vessel was patent. Ultrasound was then used for real-time ultrasound guided needle entry of the  right common femoral artery. Permanent images were recorded and saved to the patient's medical record.  Micropuncture sheath was inserted.  A glide advantage wire was advanced into the aorta.  5 Cameroonian sheath was placed.  An Omni Flush catheter was advanced over the wire and positioned just below bilateral renal arteries at the level of L2.  Aortogram was performed using CO2.  Then using up and over maneuver I was able to advance the wire into the left femoral artery.  Over the wire the Omni Flush catheter was advanced and positioned in the  left common femoral artery.  Left lower extremity angiogram was performed, switching to contrast for the tibials.  The 5Fr sheath was exchanged for a 90cm 6Fr sheath in order to treat the AT stenosis. However, on further consideration given his poor outflow and single vessel runoff the decision was made not to treat the SFA, popliteal, or tibial disease. A right femoral angiogram was shot to confirm suitability for a closure device.  The arteriotomy closure was performed using an Angio-Seal device. Ultrasound was used to confirm hemostasis and distal flow.  At that  point the procedure was concluded.  Patient tolerated procedure well and was transferred to recovery area in stable condition.     FINDINGS   Widely patent distal aorta, bilateral common iliac, internal and external iliac arteries. Widely patent left common femoral. Multifocal moderate calcified stenosis of the mid and distal SFA and popliteal arteries. Ostial stenosis of the AT, patent TP trunk and proximal PT and peroneal both of which occlude in the mid calf. AT is patent to the ankle where it fills a collateral; no visible DP or pedal arch.     IMPRESSION   Multifocal moderate calcified SFA and popliteal stenosis. Ostial stenosis of the anterior tibial, otherwise patent to the ankle. No peroneal or posterior tibial flow at the ankle. Given that his wounds are healing, will avoid intervention on his single vessel runoff. If his wounds worsen, would attempt aggressive angio intervention with likely SFA and popliteal atherectomy and anterior tibial angioplasty.      Edwin Lynne MD  VASCULAR SURGERY           Physical Examination  Review of Systems   Vitals: 130/78, weight 228 pounds, heart rate of 58 and regular.  Wt Readings from Last 3 Encounters:   04/07/23 98.9 kg (218 lb)   02/27/23 98.9 kg (218 lb)   02/13/23 99.3 kg (219 lb)       General Appearance:   no distress, normal body habitus   ENT/Mouth: membranes moist,      EYES:  no scleral icterus, normal conjunctivae   Neck: no carotid bruits or thyromegaly   Chest/Lungs:   lungs are clear to auscultation, no rales or wheezing, healed sternal scar, equal chest wall expansion    Cardiovascular:    Distant, regular. Normal first and second heart sounds with *soft systolic murmur rubs, or gallops; the carotid, radial and posterior tibial pulses are intact, Jugular venous pressure not obviously increased.  Bilateral lower extremity edema left greater than right.   Abdomen:  no , bruits, or tenderness; bowel sounds are present   Extremities: no cyanosis or  clubbing   Skin: no xanthelasma, warm.    Neurologic: no tremors     Psychiatric: alert and oriented x3, calm        Please refer above for cardiac ROS details.        Medical History  Surgical History Family History Social History   Past Medical History:   Diagnosis Date     Chronic kidney disease      Coronary artery disease      Diabetes mellitus (H)      Disorder of thyroid      Hyperlipidemia      Hypertension      Past Surgical History:   Procedure Laterality Date     AMPUTATE TOE(S) Left      BYPASS GRAFT ARTERY CORONARY  2005    [place] with Dr. Holter     CARDIAC CATHETERIZATION  2005     CARDIAC CATHETERIZATION  2016    no intervention     CERVICAL DISC SURGERY  2014     IR LOWER EXTREMITY ANGIOGRAM LEFT  2023     IR LOWER EXTREMITY ANGIOGRAM LEFT  2020     Family History   Problem Relation Age of Onset     Coronary Artery Disease Father      Coronary Artery Disease Brother         Social History     Socioeconomic History     Marital status:      Spouse name: Not on file     Number of children: Not on file     Years of education: Not on file     Highest education level: Not on file   Occupational History     Not on file   Tobacco Use     Smoking status: Former     Types: Cigarettes     Quit date: 10/10/1984     Years since quittin.6     Smokeless tobacco: Never   Vaping Use     Vaping status: Never Used   Substance and Sexual Activity     Alcohol use: Not Currently     Drug use: Never     Sexual activity: Not on file   Other Topics Concern     Not on file   Social History Narrative     Not on file     Social Determinants of Health     Financial Resource Strain: Not on file   Food Insecurity: Not on file   Transportation Needs: Not on file   Physical Activity: Not on file   Stress: Not on file   Social Connections: Not on file   Intimate Partner Violence: Not on file   Housing Stability: Not on file           Medications  Allergies   Current Outpatient  "Medications   Medication Sig Dispense Refill     ALPRAZolam (XANAX) 0.5 MG tablet [ALPRAZOLAM (XANAX) 0.5 MG TABLET] Take 0.5 mg by mouth daily.        aspirin 81 MG EC tablet [ASPIRIN 81 MG EC TABLET] Take 81 mg by mouth daily.       atorvastatin (LIPITOR) 40 MG tablet Take 1 tablet (40 mg) by mouth daily 90 tablet 4     Continuous Blood Gluc Sensor (FREESTYLE DAVID 14 DAY SENSOR) MISC        ezetimibe (ZETIA) 10 MG tablet Take 1 tablet (10 mg) by mouth daily 90 tablet 4     fish oil-omega-3 fatty acids 1000 MG capsule Take 1 capsule by mouth daily       furosemide (LASIX) 20 MG tablet [FUROSEMIDE (LASIX) 20 MG TABLET] Take 20 mg by mouth daily.  9     gabapentin (NEURONTIN) 300 MG capsule [GABAPENTIN (NEURONTIN) 300 MG CAPSULE] Take 300 mg by mouth 3 (three) times a day.              HUMALOG 100 unit/mL injection [HUMALOG 100 UNIT/ML INJECTION] Inject under the skin 3 (three) times a day before meals.              insulin glargine (LANTUS) 100 unit/mL injection [INSULIN GLARGINE (LANTUS) 100 UNIT/ML INJECTION] Inject 30 Units under the skin every morning.        levothyroxine (SYNTHROID/LEVOTHROID) 125 MCG tablet Take 125 mcg by mouth daily       lisinopril (PRINIVIL,ZESTRIL) 40 MG tablet [LISINOPRIL (PRINIVIL,ZESTRIL) 40 MG TABLET] Take 40 mg by mouth daily.       metoprolol succinate ER (TOPROL XL) 50 MG 24 hr tablet Take 1 tablet (50 mg) by mouth daily 90 tablet 2     MONOJECT ULTRA COMFORT SYRINGE 30G X 5/16\" 0.3 ML        multivitamin with iron (ONE DAILY WITH IRON) Tab tablet [MULTIVITAMIN WITH IRON (ONE DAILY WITH IRON) TAB TABLET] Take 1 tablet by mouth daily.       nitroGLYcerin (NITROSTAT) 0.4 MG sublingual tablet Place 1 tablet (0.4 mg) under the tongue every 5 minutes as needed 25 tablet 3     TECHLITE PEN NEEDLES 31G X 5 MM miscellaneous        TRIAMCINOLONE ACETONIDE (NASACORT NASL) [TRIAMCINOLONE ACETONIDE (NASACORT NASL)] 1 spray into each nostril daily.       VITAMIN D3 2,000 unit capsule [VITAMIN " D3 2,000 UNIT CAPSULE] Take 2,000 Units by mouth daily.  3       Allergies   Allergen Reactions     Hydrochlorothiazide Unknown     Levofloxacin Diarrhea          Lab Results    Chemistry/lipid CBC Cardiac Enzymes/BNP/TSH/INR   Recent Labs   Lab Test 12/16/22  0919   CHOL 118   HDL 43   LDL 60   TRIG 76     Recent Labs   Lab Test 12/16/22  0919 10/12/22  0819 06/17/22  0851   LDL 60 41 80     Recent Labs   Lab Test 04/07/23  0716      POTASSIUM 4.6   CHLORIDE 102   CO2 29   *   BUN 42.0*   CR 2.64*   GFRESTIMATED 25*   SVITLANA 8.9     Recent Labs   Lab Test 04/07/23  0716 03/20/23  1518 12/16/22  0919   CR 2.64* 2.58* 2.77*     No results for input(s): A1C in the last 03493 hours.       Recent Labs   Lab Test 04/07/23  0716   WBC 6.3   HGB 12.6*   HCT 39.0*   MCV 91        Recent Labs   Lab Test 04/07/23  0716   HGB 12.6*    Recent Labs   Lab Test 06/02/21  1016   TROPONINI 0.13     No results for input(s): BNP, NTBNPI, NTBNP in the last 20134 hours.  Recent Labs   Lab Test 04/20/22  0918   TSH 1.13     No results for input(s): INR in the last 84405 hours.     Robinson Chopra MD

## 2023-06-06 NOTE — RESULT ENCOUNTER NOTE
Renal function is a little worse, can we please send a copy to Dr Bhatia, I sent a my chart note  mdg

## 2023-06-14 NOTE — PROGRESS NOTES
HEART CARE ENCOUNTER CONSULTATON NOTE      [facility]  [phone]      Assessment/Recommendations   Assessment/Plan:  1.  Coronary artery disease.  No complaints of anginal chest discomfort.  Recent nuclear stress test within the past approximate 1 year showed no significant ischemia with normal LV function, small area of nontransmural infarction in the distal anterior septal and basilar inferior wall.  Increased risk of contrast with prior angiography.  Continued medical management and close monitoring.    2.  Hypertension.  Blood pressure is variable.  He has chronic renal insufficiency and follows with .  He does have difficulties with orthostatic hypotension if blood pressure drops too low.  He has some mild lightheadedness with mild trend towards bradycardia.  He does not have profound syncope or near syncope.  We discussed whether we would utilize a Holter monitor but at this point he would like to hold on it as he is feeling okay and will keep me updated.    3.  Dyslipidemia.  He is on 40 mg of rosuvastatin in the setting of renal insufficiency.  His LDL was mildly above goal.  I have suggested that we change him to 40 mg of atorvastatin with 10 of Zetia and that we recheck his lipids, AST and ALT in 2 months.  He is in agreement with this plan and I did make these changes.    Plan as outlined above with follow-up in 6 months with lipids, AST and ALT in 2 months.       History of Present Illness/Subjective    HPI: Dakota Bauer is a 71 year old male who is seen in follow-up.  He reports that overall he has been feeling well.  He has had no anginal type chest discomfort or significant shortness of breath.  He reports that he has seen Dr. Bhatia and I have reviewed recent notes from his primary care provider Dr. Gaffney.  He reports that blood pressures have been under reasonable control for the most part and he brings in blood pressure records for my review.  He does have some  occasional lightheadedness upon standing but no syncope or near syncope.    He has a history of coronary artery disease, diabetes mellitus, renal insufficiency.  He had a nuclear stress test June 2021 that demonstrated no significant ischemia.  He has a history of remote bypass surgery in 2005 and in 2016 had findings of patent grafts with some disease in target vessels with medical management planned at that time as outlined.    Recent Echocardiogram Results:    Conclusion    Cardiac Diagnostic Report      Demographics      Patient Name KYLAH BARCLAY      Referring Physician    MJ BENAVIDES MD      MRN #        [id number]          Diagnostic Physician   SHYAM ELLIS MD      Account #    [id number]           Interventional                                   Physician      D.O.B        1950         Report Status:      Date of      11/25/2016 08:50   Study        AM     Procedure      Procedure Type      Diagnostic procedure:Left Heart Catheterization , Venous Graft   Catheterization, LIMA Graft Catheterization, Coronary   Angiogram      Conclusions      Procedure Summary   Known CAD, presenting with occasional CP and abnormal stress   test      LM 30% mid   LAD occluded proximally   LCx occluded proximally   Ramus 60% proximal stenosis, small caliber vessel with diffuse   disease   RCA 50% distal stenosis feeding small caliber PDA      LIMA to LAD patent, good distal LAD target   SVG to diagonal patent, mild disease in target diagonal   SVG to OM patent, diffusely diseased target vessel with 70%   stenosis, affecting retrograde filling into mid Cx      EDP 11      Overall, diffuse CAD, but patent grafts. Potential areas for   PCI would include ostial ramus and  of Cx to improve flow   into mid Cx. However, the ramus is a small caliber vessel and   the ostial Cx is heavily calcified, therefore would reserve   PCI if he has significant increase in symptoms, and failing   medical therapy      Recommendations       Cont risk factor control and medical management      Signatures      Electronically signed by SHYAM ELLIS MD   (Diagnostic Physician) on 2016 at 09:37 AM       Recent Coronary Angiogram Results:    Dakota Bauer MRN   [id number] Legal Sex   Male              Age   1950 (71 year old)       NM Lexiscan stress test  Order: [id number]   Status: Final result     Visible to patient: Yes (seen)     Next appt: 2022 at 09:20 AM in Cardiology (Robinson Chopra MD)     Dx: CAD (coronary artery disease)     1 Result Note    Details    Reading Physician Reading Date Result Priority   Provider, Historical 2021 Routine   Marleni Gavin MD  [phone] 2021      Result Text     The nuclear stress test is abnormal.     Nuclear images demonstrate a small area of nontransmural infarctions involving the distal anteroseptal wall and basal inferolateral wall.  No ischemia identified.     The left ventricular ejection fraction at stress is 62% without wall motion abnormality.     A prior study was conducted on 2019.  No change identified.     The patient is at a low risk of future cardiac ischemic events.        INTERPRETATION DATE:  2017     TEST DATE:  10/31/2017       INTERPRETATION:  Predominant rhythm was sinus rhythm with rates ranging from 42  beats per minute to 85 beats per minute.  Tendency towards sinus bradycardia was  noted with average heart rate of 56 beats per minute and nocturnal resting heart  rates commonly 45 to 50 beats per minute.  There were no clinically significant  pauses.  Rare atrial premature beats were noted 16 over 24 hours.  Occasional  ventricular premature beats were noted, 574 over 24 hours with occasional  ventricular trigeminy.  There were 4 ventricular couplets.  Rate-dependent left  bundle branch block aberrancy was rarely noted with heart rates greater than 75  beats per minute.     CONCLUSION:  Tendency towards sinus bradycardia noted, but no  clinically significant  pauses.  Rate-dependent left bundle branch block aberrancy was also present.        TJ ACHARYA 11/01/2017 14:07:17  T 11/01/2017 14:13:11  R 11/01/2017 14:13:11       Physical Examination  Review of Systems   Vitals: 164/60, repeat during my examination 136/60, weight 216 pounds, heart rate 50s and regular  Wt Readings from Last 3 Encounters:   01/21/22 99.3 kg (219 lb)   07/26/21 103 kg (227 lb)   06/02/21 102.1 kg (225 lb)       General Appearance:   no distress, normal body habitus   ENT/Mouth:  Wearing a facemask.      EYES:  no scleral icterus, normal conjunctivae   Neck: no carotid bruits or thyromegaly   Chest/Lungs:   lungs are clear to auscultation, no rales or wheezing, well-healed sternal scar, equal chest wall expansion    Cardiovascular:    Distant, regular. Normal first and second heart sounds with soft systolic murmur, no rubs, or gallops; the carotid, radial and posterior tibial pulses are intact, Jugular venous pressure within normal limits, no edema bilaterally    Abdomen:  no organomegaly, masses, bruits, or tenderness; bowel sounds are present   Extremities: no cyanosis or clubbing   Skin: no xanthelasma, warm.    Neurologic: , no tremors     Psychiatric: alert and oriented x3, calm        Please refer above for cardiac ROS details.        Medical History  Surgical History Family History Social History   Past Medical History:   Diagnosis Date     Coronary artery disease      Diabetes mellitus (H)      Hyperlipidemia      Hypertension      Past Surgical History:   Procedure Laterality Date     BYPASS GRAFT ARTERY CORONARY  2005    [facility] with Dr. Holter     CARDIAC CATHETERIZATION  2005     CARDIAC CATHETERIZATION  11/25/2016    no intervention     CERVICAL DISC SURGERY  2014     Family History   Problem Relation Age of Onset     Coronary Artery Disease Father      Coronary Artery Disease Brother         Social History     Socioeconomic History     Marital  status:      Spouse name: Not on file     Number of children: Not on file     Years of education: Not on file     Highest education level: Not on file   Occupational History     Not on file   Tobacco Use     Smoking status: Former Smoker     Quit date: 10/10/1984     Years since quittin.8     Smokeless tobacco: Never Used   Substance and Sexual Activity     Alcohol use: Not on file     Drug use: Not on file     Sexual activity: Not on file   Other Topics Concern     Not on file   Social History Narrative     Not on file     Social Determinants of Health     Financial Resource Strain: Not on file   Food Insecurity: Not on file   Transportation Needs: Not on file   Physical Activity: Not on file   Stress: Not on file   Social Connections: Not on file   Intimate Partner Violence: Not on file   Housing Stability: Not on file           Medications  Allergies   Current Outpatient Medications   Medication Sig Dispense Refill     ALPRAZolam (XANAX) 0.5 MG tablet [ALPRAZOLAM (XANAX) 0.5 MG TABLET] Take 0.5 mg by mouth daily.        aspirin 81 MG EC tablet [ASPIRIN 81 MG EC TABLET] Take 81 mg by mouth daily.       Continuous Blood Gluc Sensor (FREESTYLE DAVID 14 DAY SENSOR) MISC        fish oil-omega-3 fatty acids 1000 MG capsule Take 1 capsule by mouth daily       furosemide (LASIX) 20 MG tablet [FUROSEMIDE (LASIX) 20 MG TABLET] Take 20 mg by mouth daily.  9     gabapentin (NEURONTIN) 300 MG capsule [GABAPENTIN (NEURONTIN) 300 MG CAPSULE] Take 300 mg by mouth 3 (three) times a day.              HUMALOG 100 unit/mL injection [HUMALOG 100 UNIT/ML INJECTION] Inject under the skin 3 (three) times a day before meals.              insulin glargine (LANTUS) 100 unit/mL injection [INSULIN GLARGINE (LANTUS) 100 UNIT/ML INJECTION] Inject 30 Units under the skin every morning.        levothyroxine (SYNTHROID/LEVOTHROID) 137 MCG tablet Take 150 mcg by mouth daily        lisinopril (PRINIVIL,ZESTRIL) 40 MG tablet [LISINOPRIL  "(PRINIVIL,ZESTRIL) 40 MG TABLET] Take 40 mg by mouth daily.       metoprolol succinate ER (TOPROL-XL) 50 MG 24 hr tablet Take 1 tablet (50 mg) by mouth daily 90 tablet 2     MONOJECT ULTRA COMFORT SYRINGE 30G X 5/16\" 0.3 ML        multivitamin with iron (ONE DAILY WITH IRON) Tab tablet [MULTIVITAMIN WITH IRON (ONE DAILY WITH IRON) TAB TABLET] Take 1 tablet by mouth daily.       nitroglycerin (NITROSTAT) 0.4 MG SL tablet [NITROGLYCERIN (NITROSTAT) 0.4 MG SL TABLET] Place 1 tablet (0.4 mg total) under the tongue every 5 (five) minutes as needed for chest pain. 25 tablet 3     rosuvastatin (CRESTOR) 40 MG tablet [ROSUVASTATIN (CRESTOR) 40 MG TABLET] Take 40 mg by mouth bedtime.       TECHLITE PEN NEEDLES 31G X 5 MM miscellaneous        TRIAMCINOLONE ACETONIDE (NASACORT NASL) [TRIAMCINOLONE ACETONIDE (NASACORT NASL)] 1 spray into each nostril daily.       VITAMIN D3 2,000 unit capsule [VITAMIN D3 2,000 UNIT CAPSULE] Take 2,000 Units by mouth daily.  3       Allergies   Allergen Reactions     Hydrochlorothiazide Unknown     Levofloxacin Diarrhea          Lab Results    Chemistry/lipid CBC Cardiac Enzymes/BNP/TSH/INR   Recent Labs   Lab Test 06/17/22  0851   CHOL 154   HDL 42   LDL 80   TRIG 160*     Recent Labs   Lab Test 06/17/22  0851 12/17/21  0754 06/17/21  0920   LDL 80 76 78     Recent Labs   Lab Test 06/17/22  0851      POTASSIUM 4.4   CHLORIDE 103   CO2 21*   *   BUN 48*   CR 3.38*   GFRESTIMATED 19*   SVITLANA 9.1     Recent Labs   Lab Test 06/17/22  0851 12/17/21  0754 06/17/21  0920   CR 3.38* 3.29* 2.80*     No results for input(s): A1C in the last 14958 hours.       No results for input(s): WBC, HGB, HCT, MCV, PLT in the last 56064 hours.  No results for input(s): HGB in the last 44354 hours. Recent Labs   Lab Test 06/02/21  1016   TROPONINI 0.13     No results for input(s): BNP, NTBNPI, NTBNP in the last 96036 hours.  Recent Labs   Lab Test 04/20/22  0918   TSH 1.13     No results for input(s): INR " in the last 63721 hours.     Robinson Chopra MD                                     [place] Pregnancy And Lactation Text: It is unknown if this medication is safe to use during pregnancy.  It is unknown if this medication is excreted in breast milk.  Breastfeeding women should use the topical cream on the smallest area of the skin for the shortest time needed while breastfeeding.  Do not apply to nipple and areola.

## 2023-06-14 NOTE — RESULT ENCOUNTER NOTE
Holter looks ok, sinus rhythm no major ectopy reported, recent nuke shows a small area of ischemia in the inferior-lateral distribution, normal ef. Low risk study ? Angio results from 2016 reviewed, higher risk angio given creat elevation, my chart note sent  mdg

## 2023-06-15 NOTE — TELEPHONE ENCOUNTER
Spoke with patient and his wife. They will review the instructions in mychart and call back with any questions. They will contact PCP regarding insulin dosing for the procedure day.

## 2023-06-15 NOTE — TELEPHONE ENCOUNTER
Left message for patient to call back on mobile and home numbers.    His podiatrist at [facility] got in touch with Dr. Lynne and patient needs a LLE angiogram for left foot wound.    This will be scheduled for Wednesday June 21st at [facility], arrival time 7am.    Need to contact atherectomy reps at [facility] and [facility]

## 2023-06-20 NOTE — TELEPHONE ENCOUNTER
Procedure: LLE angiogram    Date: 6/21/23    Surgeon: MD Edwin Lynne    Called patient to review upcoming procedure. Reviewed visitor allowance of 1 person at this time.     Discussed procedure with patients and instructions: Yes    Reviewed Post Procedure Follow up plan and Appts made: Yes    Reviewed Covid Test Scheduled/Completed: NA    Reviewed Blood Thinners and plan for holding, continuing and/or bridging:Other- ok to take aspirin. address insulin with PCP    Reviewed Pre Op Appointment Required and Completed: N/A    Reviewed Allergies and if Contrast Allergy-Instructions and plan: no contrast allergy    Reviewed Arrival Time of 7am and procedure time of 8am and location of procedure [facility]:  [address] [place] [address] (phone: [phone], Fax: [phone])        All questions answered and number provided if any further questions arise or changes in patient status.

## 2023-06-21 NOTE — IP AVS SNAPSHOT
[facility] Interventional Radiology  [address] [place] [id number]  Phone: [phone]  Fax: [phone]                              After Visit Summary   6/21/2023    Dakota Bauer   MRN: [id number]           After Visit Summary Signature Page    I have received my discharge instructions, and my questions have been answered. I have discussed any challenges I see with this plan with the nurse or doctor.    ..........................................................................................................................................  Patient/Patient Representative Signature      ..........................................................................................................................................  Patient Representative Print Name and Relationship to Patient    ..................................................               ................................................  Date                                   Time    ..........................................................................................................................................  Reviewed by Signature/Title    ...................................................              ..............................................  Date                                               Time    22EPIC Rev 08/18

## 2023-06-21 NOTE — PRE-PROCEDURE
GENERAL PRE-PROCEDURE:   Procedure:  Left lower extremity with conciuos sedation and possible intervention  Date/Time:  6/21/2023 7:24 AM    Verbal consent obtained?: Yes    Written consent obtained?: Yes    Risks and benefits: Risks, benefits and alternatives were discussed    Consent given by:  Patient  Patient states understanding of procedure being performed: Yes    Patient's understanding of procedure matches consent: Yes    Procedure consent matches procedure scheduled: Yes    Expected level of sedation:  Moderate  Appropriately NPO:  Yes  ASA Class:  3  Mallampati  :  Grade 3- soft palate visible, posterior pharyngeal wall not visible  Lungs:  Lungs clear with good breath sounds bilaterally  Heart:  Normal heart sounds and rate  History & Physical reviewed:  History and physical reviewed and no updates needed  Statement of review:  I have reviewed the lab findings, diagnostic data, medications, and the plan for sedation    Palpable femoral pulses. Bilateral DP and PT monophasic signals. PT signals are faint.

## 2023-06-21 NOTE — OP NOTE
VASCULAR SURGERY ANGIOGRAM REPORT      [facility]    DATE: 06/21/23      PROCEDURES PERFORMED:     1.  Ultrasound-guided access of right common femoral artery  2.  Selective cannulation of the left common iliac artery, external iliac artery, common femoral artery, superficial femoral artery, popliteal artery, anterior tibial artery, dorsalis pedis artery, pedal arch, tibioperoneal trunk, and peroneal artery with selective left lower extremity angiography  3.  Plain balloon angioplasty of the left pedal arch and dorsalis pedis artery with 2 mm x 100 mm Crosstella balloon, followed by 2 mm x 40 mm Crosstella balloon  4. Plain balloon angioplasty of the left dorsalis pedis and anterior tibial artery with 3 mm x 200 mm Crosstella balloon  5. Attempted recannalization of left peroneal artery with balloon angioplasty using 3 mm x 200 mm Crosstella balloon  6.  Moderate sedation  7.  Arteriotomy closure with Angio-Seal device    SURGEON: Dr. Lynne    RESIDENT: Jayden Paniagua MD    INDICATION:   Patient is a 72-year-old male with chronic limb threatening ischemia and left foot wounds after previous transmetatarsal amputation.  He underwent a left lower extremity angiogram in April 2023 that identified single-vessel runoff via the left anterior tibial artery.  At that time his wound did appear to be making small amounts of progress and given the single-vessel runoff the decision was made not to treat.his to artery disease.  In the intervening months his left foot wounds have progressed and the decision was made to repeat arteriography today with the intention to treat his distal tibial disease.    SEDATION:   The procedure was performed with administration of intravenous conscious sedation with appropriate preoperative, intraoperative, and postoperative evaluation.  124 minutes of supervised face to face intraservice time was provided by a radiology nurse under my direct supervision.     ANTIBIOTICS:  None  FLUOROSCOPIC TIME: 34.4 min  RADIATION DOSE:  323 mGy  CONTRAST: 80 ml visipaque       PROCEDURE:   Ultrasound was used to evaluate the right common femoral artery. The selected vessel was patent. Ultrasound was then used for real-time ultrasound guided needle entry of the  right common femoral artery. Permanent images were recorded and saved to the patient's medical record.  Micropuncture sheath was inserted.  A glide advantage wire was advanced into the aorta.  5 Kisw[place]ili sheath was placed.  An Omni Flush catheter was advanced over the wire. Using up and over maneuver I was able to advance the wire into the left femoral artery.  Over the wire the Omni Flush catheter was advanced and positioned in the  left common femoral artery.  Left lower extremity angiogram was performed using CO2 which demonstrated mild stenosis in the distal SFA and popliteal artery.  An 035 angled Irizarry cross catheter was advanced over the wire and placed in the mid popliteal artery.  Lower extremity angiography was then performed with contrast which demonstrated the high-grade stenosis of the proximal left anterior tibial artery, and occlusion of the left posterior tibial and peroneal arteries.  There is high-grade stenosis in the left dorsalis pedis artery with an incomplete pedal arch.  An 035 glide advantage wire was used to select the posterior tibial artery, the 5 Kiswahili sheath was exchanged for a 90 cm 6 Kiswahili TerumEnSol destination sheath.  With accommodation of an 018 angled Irizarry cross catheter and 1 8 glide advantage wire, the left anterior tibial artery was selected.  Wire was advanced to the TP trunk and pedal arch.  Repeat angiography was performed demonstrating stenoses throughout dorsalis pedis pedal arch.    Next we proceeded with plain balloon angioplasty of the left pedal arch and dorsalis pedis trunk using a 2 mm x 100 mm Crosstella balloon.  Repeat angiography identified a persistent segment of stenosis in the very distal  pedal arch, which was then treated with a 2 mm x 40 mm Crosstella plain balloon angioplasty.  This balloon was then exchanged for a 3 mm x 200 mm Crosstella plain balloon used to treat the dorsalis pedis and the entire length of the left anterior tibial artery.  At this point, angiography demonstrated brisk flow through the anterior tibial artery, dorsalis pedis, and pedal arch with significantly improved collateral sensation at the distal foot.  There was residual stenosis at the very proximal left anterior tibial artery, it was again treated with a 3 mm x 40 mm balloon.  There was only modest improvement with additional treatment, likely as a result of eccentric plaque.  We excepted this result and then attempted to recanalize the left peroneal artery.  This was patent to about the proximal third of the peroneal artery.  Using an 014 glide advantage wire and 018 Irizarry cross catheter, the peroneal artery was recanalized to the distal aspect of the artery.  A blush of contrast was used to confirm that the catheter tip was intraluminal.  A 3 mm x 200 mm Crosstella balloon was used for platelet angioplasty of the recanalize peroneal artery segment.  This was unsuccessful and the peroneal artery immediately reoccluded.  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    Catheter was withdrawn to the mid SFA and angiogram again demonstrated nonflow-limiting stenosis of the distal SFA and above-knee popliteal arteries.  His disease were left untreated. At that point the procedure was concluded.  The arteriotomy closure was performed using an Angio-Seal device.  Patient tolerated procedure well and was transferred to recovery area in stable condition.    FINDINGS   1.  Mild, non-flow-limiting stenosis of the distal left superficial femoral artery and popliteal arteries.  2.  High-grade stenosis of the left proximal anterior tibial artery, proximal dorsalis pedis artery  3.  Incomplete left pedal arch  4.  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    IMPRESSION   Successful treatment of the left anterior tibial, dorsalis pedis, and distal pedal arch with balloon angioplasty.  Completion angiogram shows brisk flow through the AT to the distal left foot, much improved from prior.  Patient will be started on Plavix in addition to aspirin and will follow-up in vascular clinic.      Dr. Lynne was immediately available for all portions of the procedure and was present in the angiography suite for all critical portions of the procedure.    Jayden Paniagua MD

## 2023-06-30 NOTE — TELEPHONE ENCOUNTER
[place] Community Measures Blood Pressure guideline reviewed.  Patients recent blood pressure is outside of guideline parameters.  Called pt to review, no answer.  Left voicemail message asking patient to check their blood pressure using a home blood pressure cuff or by going to a [facility].  Patient instructed to then call [phone] (East) and leave a message with their name, date of birth, and blood pressure reading that was completed within the last 24 hours and where it was completed.  Will await call back for further review.    Last Blood Pressure: 159/73  Last Heart Rate: 54  Date: 6/21/2023  Location: Other Specialty    [facility] message sent.

## 2023-07-03 NOTE — TELEPHONE ENCOUNTER
Patient sent [employer] message with updated blood pressure values      7/1/2023 Blood Pressure: 132/61  7/1/2023 Heart Rate: 56  Location: Home BP    Patient reported blood pressure updated in Epic. Blood pressure falls within [place] Community Measures guidelines.  Patient will follow up as previously advised.

## 2023-07-07 NOTE — PROGRESS NOTES
[facility]        Patient is here for a 2 week follow up to discuss LLE angiogram 6/21/23. Pt has left plantar foot ulcer and following Dr. Tinoco. Denies any pain. Pt stated he has more feeling in foot since the angiogram and the wound is improving.     Pt is currently taking Aspirin and Plavix.    BP (!) 156/64   Pulse 58   Temp 98  F (36.7  C) (Oral)   Resp 16   SpO2 98%     The provider has been notified that the patient has no concerns.     Questions patient would like addressed today are: Should he only be on plavix for 6 weeks..    Refills are needed: No    Has homecare services and agency name:  No

## 2023-07-07 NOTE — PATIENT INSTRUCTIONS
Fanta Duncan,    Thank you for entrusting your care with us today. After your visit today with NP Lorin Crews this is the plan that was discussed at your appointment.    Follow up in 1 month with repeat imaging.    Continue following Dr. Tinoco for your left foot ulcer.       I am including additional information on these things and our contact information if you have any questions or concerns.   Please do not hesitate to reach out to us if you felt we did not answer your questions or you are unsure of the treatment plan after your visit today. Our number is [phone].Thank you for trusting us with your care.         Again thank you for your time.     Patient Education     Leg Artery Emergencies: Critical Limb Ischemia (CLI)  Critical limb ischemia (CLI) is a condition that can occur over time when your leg arteries are damaged. It's a severe form of peripheral arterial disease (PAD). PAD is caused when leg arteries are narrowed, reducing blood flow. If blood flow to the toe, foot, or leg is completely blocked, the tissue starts to die (gangrene). If this happens, you need medical care right away to restore blood flow and possibly save the leg. But even with the best medical care, it might not be possible to save a severely affected limb.     When do you need emergency care?  CLI can get worse and cause an urgent problem. For instance, if you have a wound, it may not heal. This can lead to gangrene. Go to the emergency room right away if you have any of these symptoms:   A wound that's foul smelling, draining pus, or discolored  Severe foot or leg pain that occurs suddenly without injury, especially if the foot or leg is cold or numb  Call your healthcare provider if:   You have a cut or pressure injury that doesn't heal  You have foot or leg pain that happens when walking but goes away with rest. This may be a sign of blocked arteries.  How is critical limb ischemia diagnosed?  Certain tests may be done to find out  if you have CLI. First your healthcare provider will carefully examine you, checking for pulses at several places. Other common tests include:   Ankle-brachial index (TRAE). The blood pressure in your ankle is compared with the blood pressure in your arm.  Duplex ultrasound. Harmless sound waves are used to create images of blood flow in your legs.  Arteriography. X-ray dye (contrast medium) is injected into the artery using a thin, flexible tube (catheter). This allows blood vessels to be seen easily on X-rays.  How is critical limb ischemia treated?  Possible treatments for CLI include:  Dissolving or removing a blood clot. To dissolve a clot, a tube (catheter) is put into an artery in your groin. Clot-busting medicine is put into the tube to dissolve the clot. Or surgery may be done to remove the clot. A cut (incision) is made in the artery at the blocked area. The clot is then removed.  Angioplasty. A tiny, uninflated balloon is sent to the narrowed area by a catheter. It's then inflated to widen the artery. The balloon is deflated and removed.  Stenting. After angioplasty, a tiny wire mesh tube (stent) may be put in the artery to help hold it open. The stent is also put in using a catheter.  Endarterectomy. An incision is made in the artery at the blocked area. The material that blocks the artery is then removed from artery walls.  Peripheral bypass surgery. A natural or artificial graft is used to bypass the blocked area.  Amputation. If left untreated, the affected area may have to be removed (amputated).  How can critical limb ischemia emergencies be prevented?  Know the signs and symptoms of a leg artery emergency. If you have diabetes or poor blood circulation, check your feet daily for wounds, sores, blisters, and color changes. If you smoke, get help to stop.   [employer] last reviewed this educational content on 10/1/2021    0593-7268 [employer]. All rights reserved. This information is not  intended as a substitute for professional medical care. Always follow your healthcare professional's instructions.

## 2023-07-10 NOTE — PROGRESS NOTES
VASCULAR SURGERY PROGRESS NOTE    LOCATION: [facility]    Dakota Bauer  Medical Record #:  [id number]  YOB: 1950  Age:  72 year old     Date of Service: 7/10/2023    PRIMARY CARE PROVIDER: Jamal Gaffney    Reason for visit:  Post-op following LLE angiogram    IMPRESSION:  Dakota Bauer is a 72 year old male status post left lower extremity angiogram on 6/21/2023 due to chronic limb threatening ischemia with wounds with angioplasty to the left anterior tibial, dorsalis pedis, and distal pedal arch.    Today he reports that his wounds are improving, has improved sensation in his left foot. Has no complaints. Unable to obtain ABIs, duplex shows monophasic waveforms with slightly dampened waveforms.     RECOMMENDATION/RISKS: Continue best medical management with aspirin, plavix, and statin therapy. Encouraged good foot care and wound care. Follow-up in 1 month with repeat lower extremity studies or sooner if worsening wounds.        HPI:  Dakota Bauer is a 72 year old male with past medical history significant for hypertension, hyperlipidemia, coronary artery disease, diabetes mellitus, and peripheral arterial disease, who underwent a recent left lower extremity angiogram due to nonhealing wounds of the left foot.     REVIEW OF SYSTEMS:    A 12 point ROS was reviewed and is negative except for what is listed above in HPI.    PHH:    Past Medical History:   Diagnosis Date     Chronic kidney disease      Coronary artery disease      Diabetes mellitus (H)      Disorder of thyroid      Hyperlipidemia      Hypertension           Past Surgical History:   Procedure Laterality Date     AMPUTATE TOE(S) Left      BYPASS GRAFT ARTERY CORONARY  01/01/2005    [place] with Dr. Holter     CARDIAC CATHETERIZATION  01/01/2005     CARDIAC CATHETERIZATION  11/25/2016    no intervention     CERVICAL DISC SURGERY  01/01/2014     IR LOWER EXTREMITY ANGIOGRAM LEFT  4/7/2023     IR LOWER EXTREMITY  ANGIOGRAM LEFT  4/8/2020     IR LOWER EXTREMITY ANGIOGRAM LEFT  6/21/2023       ALLERGIES:  Hydrochlorothiazide and Levofloxacin    MEDS:    Current Outpatient Medications:      ALPRAZolam (XANAX) 0.5 MG tablet, [ALPRAZOLAM (XANAX) 0.5 MG TABLET] Take 0.5 mg by mouth daily. , Disp: , Rfl:      aspirin 81 MG EC tablet, [ASPIRIN 81 MG EC TABLET] Take 81 mg by mouth daily., Disp: , Rfl:      clopidogrel (PLAVIX) 75 MG tablet, Take 1 tablet (75 mg) by mouth daily, Disp: 90 tablet, Rfl: 0     Continuous Blood Gluc Sensor (FREESTYLE DAVID 14 DAY SENSOR) MISC, , Disp: , Rfl:      ezetimibe (ZETIA) 10 MG tablet, Take 1 tablet (10 mg) by mouth daily, Disp: 90 tablet, Rfl: 4     fish oil-omega-3 fatty acids 1000 MG capsule, Take 1 capsule by mouth daily, Disp: , Rfl:      furosemide (LASIX) 20 MG tablet, Take 40 mg by mouth daily, Disp: , Rfl: 9     gabapentin (NEURONTIN) 300 MG capsule, [GABAPENTIN (NEURONTIN) 300 MG CAPSULE] Take 300 mg by mouth 3 (three) times a day.       , Disp: , Rfl:      HUMALOG 100 unit/mL injection, [HUMALOG 100 UNIT/ML INJECTION] Inject under the skin 3 (three) times a day before meals.       , Disp: , Rfl:      insulin glargine (LANTUS) 100 unit/mL injection, [INSULIN GLARGINE (LANTUS) 100 UNIT/ML INJECTION] Inject 30 Units under the skin every morning. , Disp: , Rfl:      levothyroxine (SYNTHROID/LEVOTHROID) 125 MCG tablet, Take 125 mcg by mouth daily, Disp: , Rfl:      lisinopril (PRINIVIL,ZESTRIL) 40 MG tablet, [LISINOPRIL (PRINIVIL,ZESTRIL) 40 MG TABLET] Take 40 mg by mouth daily., Disp: , Rfl:      metoprolol succinate ER (TOPROL XL) 50 MG 24 hr tablet, Take 1 tablet (50 mg) by mouth daily, Disp: 90 tablet, Rfl: 2     multivitamin with iron (ONE DAILY WITH IRON) Tab tablet, [MULTIVITAMIN WITH IRON (ONE DAILY WITH IRON) TAB TABLET] Take 1 tablet by mouth daily., Disp: , Rfl:      nitroGLYcerin (NITROSTAT) 0.4 MG sublingual tablet, Place 1 tablet (0.4 mg) under the tongue every 5 minutes as  needed (Patient not taking: Reported on 6/5/2023), Disp: 25 tablet, Rfl: 3     TECHLITE PEN NEEDLES 31G X 5 MM miscellaneous, , Disp: , Rfl:      TRIAMCINOLONE ACETONIDE (NASACORT NASL), [TRIAMCINOLONE ACETONIDE (NASACORT NASL)] 1 spray into each nostril daily., Disp: , Rfl:      VITAMIN D3 2,000 unit capsule, [VITAMIN D3 2,000 UNIT CAPSULE] Take 2,000 Units by mouth daily., Disp: , Rfl: 3    SOCIAL HABITS:    History   Smoking Status     Former     Types: Cigarettes     Quit date: 10/10/1984   Smokeless Tobacco     Never     Social History    Substance and Sexual Activity      Alcohol use: Not Currently      History   Drug Use Unknown       FAMILY HISTORY:    Family History   Problem Relation Age of Onset     Coronary Artery Disease Father      Coronary Artery Disease Brother        PE:  There were no vitals taken for this visit.  Wt Readings from Last 1 Encounters:   06/05/23 228 lb (103.4 kg)     There is no height or weight on file to calculate BMI.    EXAM:  GENERAL: well-developed 72 year old male who appears his stated age  CARDIAC: normal   CHEST/LUNG: normal respiratory effort   MUSCULOSKELETAL: grossly normal and both lower extremities are intact, no lower extremity edema  NEUROLOGIC: focally intact, alert and oriented x 3  PSYCH: appropriate affect  VASCULAR:       DIAGNOSTIC STUDIES:     Images:  US Low Ext Arterial Dop Seg Pres w/o Exercise    Result Date: 7/7/2023  Table formatting from the original result was not included. BILATERAL RESTING ANKLE-BRACHIAL INDICES (TRAE'S) (Date: 07/06/23) Indication: Surveillance TRAE's: PAD. S/P IR Left Balloon Angiogram ( Pedal Arch, DPA, ISAIAS) done 6/21/2023. Hx: Left Transmetatarsal Amputation done 9/3/2021. Previous: 2/7/2023 Bilateral ANDREINA/ TRAE's w/o, 4/7/2023 IR Left Angiogram. History: Previous Smoker, Hypertension, Diabetic, Hyperlipidemia, PAD, and Angioplasty  Resting TRAE's          Right: mmHg Index     Brachial: 158  Ankle-(PT): 157 0.99 Ankle-(DP): >254 NC           Digit: 47 0.30               Left: mmHg Index     Brachial: 157  Ankle-(PT): >254 NC Ankle-(DP): >254 NC          Digit: AMP N/A Resting ankle-brachial index of 0.99 on the right. Toe Pressures of 47 mmHg and TBI of 0.30 Resting ankle-brachial index is non compressible on the left.  VPR WAVEFORMS: The right volume plethysmography waveforms are mildly abnormal at the lower thigh level, mildly abnormal at the upper calf level and mildly abnormal at the ankle. The left volume plethysmography waveforms are mildly abnormal at the lower thigh level, mildly abnormal at the upper calf level and mildly abnormal at the ankle.  Impression:  1. RIGHT LOWER EXTREMITY: TRAE is Non-diagnostic indicating vessel calcification. Toe Pressures are Abnormal and impaired for wound healing with toe pressures of 47 mmHg. 2. LEFT LOWER EXTREMITY: TRAE is Non-diagnostic indicating vessel calcification. Toe Pressures are Un-interpretable and Non-diagnostic. Reference: Wound classification Grade TRAE Ankle Systolic Pressure Toe Pressures 0 > 0.80 > 100 mmHg > 60 mmHg 1 0.6 - 0.79 70 - 100 mmHg 40 - 59 mmHg 2 0.4 - 0.59 50-70 mmHg 30 - 39 mmHg 3 < 0.39 < 50 mmHg < 30 mmHg Digit Pressures DBI Disease Category > 0.70 Normal < 0.70 Abnormal > 30 mmHg Potential wound healing < 30 mmHg Impaired wound healing Ankle Brachial Pressures TRAE Disease Category > 1.3  Likely vessel calcification with monophasic waveforms, non-diagnostic 0.95-1.30 Normal with multiphasic waveforms 0.50-0.95 Single level disease 0.30-0.50 Multilevel disease < 0.30 Critical limb ischema Volume Plethysmography Recording (VPR) at all levels Normal Sharp systolic peak, fast upstroke, prominent dicrotic notch in wave Mild Sharp systolic peak, fast upstroke, absent dicrotic notch in wave Moderate Flattened systolic peak, slowed upstroke, absent dicrotic notch inwave Severe amplitude wave with = upslope and down slope Occluded Flat Line      US Lower Extremity Arterial Duplex  Left    Result Date: 7/7/2023  Table formatting from the original result was not included. Arterial Duplex Ultrasound (Date: 07/06/23) Lower Extremity Artery Evaluation Indication: Surveillance Left Leg Arterial: PAD. S/P IR Left Balloon Angiogram ( Pedal Arch, DPA, ISAIAS) done 6/21/2023. Hx: Left Transmetatarsal Amputation done 9/3/2021. Previous: 2/7/2023 Bilateral ANDREINA/ TRAE's w/o, 4/7/2023 IR Left Angiogram. History: Previous Smoker, Hypertension, Diabetic, Hyperlipidemia, PAD, and Angioplasty  Technique: Duplex imaging is performed utilizing gray-scale, two-dimensional images, and color-flow imaging. Doppler waveform analysis and spectral Doppler imaging is also performed. LOWER EXTREMITY ARTERIAL DUPLEX EXAM WITH WAVEFORMS Right Leg:(cm/s) Location: Velocities Waveforms PTA:   23   M ISAIAS:   57  M DPA:   50  M Waveforms: T=Triphasic, M=Monophasic, B=Biphasic Left Leg:(cm/s) Location: Velocities Waveforms EIA:   141  T CFA:   201  T PFA:   138  B SFA Proximal:   158  T SFA Mid:   114  M SFA Distal:   209 /  145 M Popliteal Artery:   167 / 135   M PTA:   24   M ISAIAS:   112  M DPA:   135  M Waveforms: T=Triphasic, M=Monophasic, B=Biphasic Stenotic Profile Ratio: 209 / 114 = 1.83  Impression: Right Lower Extremity: Not fully examined but monophasic flow in tibial vessels suggest more proximal disease Left Lower Extremity: Triphasic flow in common femoral artery suggesting no inflow disease.  Transition to monophasic flow within the level of mid SFA consistent with underlying hemodynamically significant disease.  Monophasic flow remains distally Reference: Category Normal 1-19% 20-49% 50-99% Occluded PSV <160 cm/sec without spectral broadening <160 cm/sec with spectral broadening Increased Increased Absent Flow Ratio N/A N/A < 2.0 >2.0 N/A Post-Stenotic Turbulence No No No Yes N/A      IR Lower Extremity Angiogram Left    Result Date: 6/22/2023  VASCULAR SURGERY ANGIOGRAM REPORT   [facility]  DATE: 06/21/23    PROCEDURES PERFORMED:  1.  Ultrasound-guided access of right common femoral artery 2.  Selective cannulation of the left common iliac artery, external iliac artery, common femoral artery, superficial femoral artery, popliteal artery, anterior tibial artery, dorsalis pedis artery, pedal arch, tibioperoneal trunk, and peroneal artery with selective left lower extremity angiography 3.  Plain balloon angioplasty of the left pedal arch and dorsalis pedis artery with 2 mm x 100 mm Crosstella balloon, followed by 2 mm x 40 mm Crosstella balloon 4. Plain balloon angioplasty of the left dorsalis pedis and anterior tibial artery with 3 mm x 200 mm Crosstella balloon 5. Attempted recannalization of left peroneal artery with balloon angioplasty using 3 mm x 200 mm Crosstella balloon 6.  Moderate sedation 7.  Arteriotomy closure with Angio-Seal device  SURGEON: Dr. Lynne  RESIDENT: Jayden Paniagua MD  INDICATION: Patient is a 72-year-old male with chronic limb threatening ischemia and left foot wounds after previous transmetatarsal amputation.  He underwent a left lower extremity angiogram in April 2023 that identified single-vessel runoff via the left anterior tibial artery.  At that time his wound did appear to be making small amounts of progress and given the single-vessel runoff the decision was made not to treat.his to artery disease.  In the intervening months his left foot wounds have progressed and the decision was made to repeat arteriography today with the intention to treat his distal tibial disease.  SEDATION: The procedure was performed with administration of intravenous conscious sedation with appropriate preoperative, intraoperative, and postoperative evaluation.  124 minutes of supervised face to face intraservice time was provided by a radiology nurse under my direct supervision.  ANTIBIOTICS: None FLUOROSCOPIC TIME: 34.4 min RADIATION DOSE:  323 mGy CONTRAST: 80 ml visipaque   PROCEDURE: Ultrasound was  used to evaluate the right common femoral artery. The selected vessel was patent. Ultrasound was then used for real-time ultrasound guided needle entry of the  right common femoral artery. Permanent images were recorded and saved to the patient's medical record.  Micropuncture sheath was inserted.  A glide advantage wire was advanced into the aorta.  5 Greek sheath was placed.  An Omni Flush catheter was advanced over the wire. Using up and over maneuver I was able to advance the wire into the left femoral artery.  Over the wire the Omni Flush catheter was advanced and positioned in the  left common femoral artery.  Left lower extremity angiogram was performed using CO2 which demonstrated mild stenosis in the distal SFA and popliteal artery.  An 035 angled Irizarry cross catheter was advanced over the wire and placed in the mid popliteal artery.  Lower extremity angiography was then performed with contrast which demonstrated the high-grade stenosis of the proximal left anterior tibial artery, and occlusion of the left posterior tibial and peroneal arteries.  There is high-grade stenosis in the left dorsalis pedis artery with an incomplete pedal arch.  An 035 glide advantage wire was used to select the posterior tibial artery, the 5 Greek sheath was exchanged for a 90 cm 6 Greek [employer] destination sheath.  With accommodation of an 018 angled Irizarry cross catheter and 1 8 glide advantage wire, the left anterior tibial artery was selected.  Wire was advanced to the TP trunk and pedal arch.  Repeat angiography was performed demonstrating stenoses throughout dorsalis pedis pedal arch.  Next we proceeded with plain balloon angioplasty of the left pedal arch and dorsalis pedis trunk using a 2 mm x 100 mm Crosstella balloon.  Repeat angiography identified a persistent segment of stenosis in the very distal pedal arch, which was then treated with a 2 mm x 40 mm Crosstella plain balloon angioplasty.  This balloon was then  exchanged for a 3 mm x 200 mm Crosstella plain balloon used to treat the dorsalis pedis and the entire length of the left anterior tibial artery.  At this point, angiography demonstrated brisk flow through the anterior tibial artery, dorsalis pedis, and pedal arch with significantly improved collateral sensation at the distal foot.  There was residual stenosis at the very proximal left anterior tibial artery, it was again treated with a 3 mm x 40 mm balloon.  There was only modest improvement with additional treatment, likely as a result of eccentric plaque.  We excepted this result and then attempted to recanalize the left peroneal artery.  This was patent to about the proximal third of the peroneal artery.  Using an 014 glide advantage wire and 018 Irizarry cross catheter, the peroneal artery was recanalized to the distal aspect of the artery.  A blush of contrast was used to confirm that the catheter tip was intraluminal.  A 3 mm x 200 mm Crosstella balloon was used for platelet angioplasty of the recanalize peroneal artery segment.  This was unsuccessful and the peroneal artery immediately reoccluded.  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  Catheter was withdrawn to the mid SFA and angiogram again demonstrated nonflow-limiting stenosis of the distal SFA and above-knee popliteal arteries.  His disease were left untreated. At that point the procedure was concluded.  The arteriotomy closure was performed using an Angio-Seal device.  Patient tolerated procedure well and was transferred to recovery area in stable condition.  FINDINGS 1.  Mild, non-flow-limiting stenosis of the distal left superficial femoral artery and popliteal arteries. 2.  High-grade stenosis of the left proximal anterior tibial artery, proximal dorsalis pedis artery 3. Incomplete left pedal arch 4.  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  IMPRESSION Successful  treatment of the left anterior tibial, dorsalis pedis, and distal pedal arch with balloon angioplasty.  Completion angiogram shows brisk flow through the AT to the distal left foot, much improved from prior.  Patient will be started on Plavix in addition to aspirin and will follow-up in vascular clinic.       LABS:      Sodium   Date Value Ref Range Status   06/21/2023 136 136 - 145 mmol/L Final   06/16/2023 135 (L) 136 - 145 mmol/L Final   06/05/2023 139 136 - 145 mmol/L Final     Urea Nitrogen   Date Value Ref Range Status   06/21/2023 53.9 (H) 8.0 - 23.0 mg/dL Final   06/16/2023 49.7 (H) 8.0 - 23.0 mg/dL Final   06/05/2023 38 (H) 8 - 28 mg/dL Final   04/07/2023 42.0 (H) 8.0 - 23.0 mg/dL Final   06/17/2022 48 (H) 8 - 28 mg/dL Final   12/17/2021 40 (H) 8 - 28 mg/dL Final     Hemoglobin   Date Value Ref Range Status   06/21/2023 11.3 (L) 13.3 - 17.7 g/dL Final   04/07/2023 12.6 (L) 13.3 - 17.7 g/dL Final     Platelet Count   Date Value Ref Range Status   06/21/2023 168 150 - 450 10e3/uL Final   04/07/2023 201 150 - 450 10e3/uL Final     INR   Date Value Ref Range Status   06/21/2023 1.00 0.85 - 1.15 Final       45 minutes spent on the day of encounter doing chart review, history and exam, documentation, and further activities as noted.       Lorin Crews, NP  VASCULAR SURGERY

## 2023-07-21 NOTE — TELEPHONE ENCOUNTER
Clinical Pharmacist Note    Patient was referred for an MTM appointment by their insurance plan.  Calling patient to see if they had time to review their medications today, or if we could schedule a follow up appointment.  Appointment would be a general review of their medications to make sure they are working well, don't have any serious interactions/aren't causing side effects, and if there are ways to simplify them, make them easier to take, or ways to get the cost down.  Patient can call [phone] to schedule an appointment with any MTM Pharmacist.    Esha Estrada, Pharm D., MPH  MTM Pharmacist - [facility]  Secure Voicemail: [phone]  In Office: Monday - Thursday

## 2023-07-27 NOTE — PROGRESS NOTES
[facility] Heart Care team is asked to see Dakota Bauer in consultation to evaluate non-ST elevation myocardial infarction.      Assessment:     Non-ST elevation myocardial infarction and 72-year-old gentleman with diabetes mellitus 18 years out from coronary bypass vascularization.  ECG changes and elevated troponin suggest acute coronary syndrome, with persistent pain on IV heparin and nitroglycerin would plan for urgent coronary angiography with possible revascularization if his anatomy is amenable.  Discussed risks and benefits of this procedure including progression to end-stage renal disease.  New systolic murmur, will check stat echo for further evaluation.  Peripheral arterial disease status post recent revascularization, maintained on aspirin and clopidogrel since that time.  Has noticed increased bruising with blood sugar sticks in the abdomen no other bleeding problems.  Persistent ulcer on the left foot proving following revascularization  Chronic kidney disease stage IV, high risk for progression to end-stage renal disease.  Dyslipidemia on treatment  Hypertension     Plan:     IV heparin, aspirin load administered  Sublingual nitroglycerin plus IV nitro drip to help relieve chest discomfort  Plan for coronary angiography, graft angiography, possible percutaneous revascularization     Current History:     Dakota Bauer is a 72-year-old gentleman with a history of coronary artery disease, status post bypass revascularization 2005 (x4?).  2016 coronary angiography revealed patent LIMA to LAD, patent SVG to diagonal, patent SVG to OM, and diffusely diseased distal vessels.  Is a long history of diabetes mellitus, with nephropathy and peripheral vascular disease.  He status post left transmet amputation and has had nonhealing ulcers, underwent left lower extremity percutaneous revascularization last month.  Pharmacologic nuclear stress test earlier this year showed small area of  inferolateral ischemia, along with apical infarction, and a normal ejection fraction, unchanged versus 2021.    Patient reports that soon after that he began experiencing exertional dyspnea and chest tightness, he does not carry nitroglycerin with him but with yard work he would notice tightness and shortness of breath requiring him to stop and rest with resolution of the discomfort within 15 minutes.  This has increased in frequency over the last couple of months.  Today he awakened at his usual time of 5 AM and noted onset of chest tightness which initially resolved with rest but then became recurrent couple of hours later.  He took a nitroglycerin and not had improvement in the discomfort transiently but with recurrent chest pain he was brought to the emergency room for further evaluation.  In the emergency room again nitroglycerin brought about transient relief of the discomfort with later recurrence.  ECG in the ER shows new ST segment depression in the anterior lateral leads.  Troponin is elevated.    Patient reports recent hemoglobin A1c is 7, no change in his weight.  He has no previous history of atrial fibrillation.  His activities consist of yard work and he has been decreasing them recently because of his chest discomfort.  Gradual progression in his kidney disease has been noted.  He has been nothing by mouth since last evening.    Past Medical History:     Past Medical History:   Diagnosis Date    Chronic kidney disease     Coronary artery disease     Diabetes mellitus (H)     Disorder of thyroid     Hyperlipidemia     Hypertension      Sleep history: Generally restorative  Past Surgical History:     Past Surgical History:   Procedure Laterality Date    AMPUTATE TOE(S) Left     BYPASS GRAFT ARTERY CORONARY  01/01/2005    [facility] [facility] with Dr. Holter    CARDIAC CATHETERIZATION  01/01/2005    CARDIAC CATHETERIZATION  11/25/2016    no intervention    CERVICAL DISC SURGERY  01/01/2014    IR LOWER EXTREMITY  ANGIOGRAM LEFT  4/7/2023    IR LOWER EXTREMITY ANGIOGRAM LEFT  4/8/2020    IR LOWER EXTREMITY ANGIOGRAM LEFT  6/21/2023       Family History:     Family History   Problem Relation Age of Onset    Coronary Artery Disease Father     Coronary Artery Disease Brother      Family history reviewed and is not pertinent to the chief complaint or presenting problem    Social History:    reports that he quit smoking about 38 years ago. His smoking use included cigarettes. He has never used smokeless tobacco. He reports that he does not currently use alcohol. He reports that he does not use drugs.  Exercise history: Yard work    Meds:     Current Outpatient Medications   Medication Sig Dispense Refill    ALPRAZolam (XANAX) 0.5 MG tablet [ALPRAZOLAM (XANAX) 0.5 MG TABLET] Take 0.5 mg by mouth daily.       aspirin 81 MG EC tablet [ASPIRIN 81 MG EC TABLET] Take 81 mg by mouth daily.      clopidogrel (PLAVIX) 75 MG tablet Take 1 tablet (75 mg) by mouth daily 90 tablet 0    Continuous Blood Gluc Sensor (Comply365STYLE DAVID 14 DAY SENSOR) MISC       ezetimibe (ZETIA) 10 MG tablet Take 1 tablet (10 mg) by mouth daily 90 tablet 4    fish oil-omega-3 fatty acids 1000 MG capsule Take 1 capsule by mouth daily      furosemide (LASIX) 20 MG tablet Take 40 mg by mouth daily  9    gabapentin (NEURONTIN) 300 MG capsule [GABAPENTIN (NEURONTIN) 300 MG CAPSULE] Take 300 mg by mouth 3 (three) times a day.             HUMALOG 100 unit/mL injection [HUMALOG 100 UNIT/ML INJECTION] Inject under the skin 3 (three) times a day before meals.             insulin glargine (LANTUS) 100 unit/mL injection [INSULIN GLARGINE (LANTUS) 100 UNIT/ML INJECTION] Inject 30 Units under the skin every morning.       levothyroxine (SYNTHROID/LEVOTHROID) 125 MCG tablet Take 125 mcg by mouth daily      lisinopril (PRINIVIL,ZESTRIL) 40 MG tablet [LISINOPRIL (PRINIVIL,ZESTRIL) 40 MG TABLET] Take 40 mg by mouth daily.      metoprolol succinate ER (TOPROL XL) 50 MG 24 hr tablet  Take 1 tablet (50 mg) by mouth daily 90 tablet 2    multivitamin with iron (ONE DAILY WITH IRON) Tab tablet [MULTIVITAMIN WITH IRON (ONE DAILY WITH IRON) TAB TABLET] Take 1 tablet by mouth daily.      nitroGLYcerin (NITROSTAT) 0.4 MG sublingual tablet Place 1 tablet (0.4 mg) under the tongue every 5 minutes as needed 25 tablet 3    TECHLITE PEN NEEDLES 31G X 5 MM miscellaneous       TRIAMCINOLONE ACETONIDE (NASACORT NASL) [TRIAMCINOLONE ACETONIDE (NASACORT NASL)] 1 spray into each nostril daily.      VITAMIN D3 2,000 unit capsule [VITAMIN D3 2,000 UNIT CAPSULE] Take 2,000 Units by mouth daily.  3       Allergies:   Hydrochlorothiazide and Levofloxacin    Review of Systems:   A 12 point comprehensive review of systems was negative except as noted in the current history.    Objective:      Physical Exam  Wt Readings from Last 3 Encounters:   07/27/23 99.8 kg (220 lb)   06/05/23 103.4 kg (228 lb)   04/07/23 98.9 kg (218 lb)     Body mass index is 29.84 kg/m .  /61   Pulse 92   Temp 100.3  F (37.9  C) (Oral)   Resp 16   Ht 1.829 m (6')   Wt 99.8 kg (220 lb)   BMI 29.84 kg/m      General Appearance:  No apparent distress.  HEENT: No scleral icterus; the mucous membranes were pink and moist.  Conjunctiva not injected  Neck:  No cervical bruits, jugular venous distention, or thyromegaly.  Chest:The spine was straight.  Well-healed midline sternal incision without evidence of instability.  Lungs:  Respirations unlabored; the lungs are clear to auscultation.  Cardiovascular:    Nonpalpable point of maximal impulse. Auscultation reveals regular first and second heart sounds with 2/6 to 3/6 holosystolic murmur audible best along left sternal border radiating to the axilla.  Carotid, radial, and dorsalis pedal pulses are intact and symmetric.  Abdomen:  No organomegaly, masses, bruits, or tenderness. Bowels sounds are present  Extremities: Trace pretibial edema.  Left transmet amputation, boot in place  Skin:  No  xanthelasma.  Neurologic: Mood and affect are appropriate. Speech is fluent.      EKG (personally reviewed): Today: Sinus rhythm 93 bpm.  Left axis deviation.  Anterolateral ST segment depression and T wave inversion new versus previous study.    Imaging chest x-ray pending      Cardiac diagnostics    Pharmacologic nuclear stress test 6/2023:    The nuclear stress test is abnormal.    There is a small area of moderate ischemia in the mid to basal inferolateral segment(s) of the left ventricle.    There is also a small area of a moderate degree of nontransmural infarction in the apical segment(s) of the left ventricle.    The left ventricular ejection fraction at stress is 67%.    The patient is at a low risk of future cardiac ischemic events.    A prior study was conducted on 6/16/2021.  This study has no change when compared with the prior study.    Echocardiogram 3/2023:  1. Left ventricular size is normal. Mild concentric increase in wall  thickness. Systolic function is normal. The calculated left ventricular  ejection fraction is 59%.  2. Right ventricular size is normal. Systolic function is low normal.  3. No hemodynamically significant valvular abnormalities.  4. Mild pulmonary hypertension is present with an estimated pulmonary artery  systolic pressure of 41 mm Hg.  5. A prior study was performed 10/18/2016. Images are unavailable for  comparison.    24-hour Holter monitor 6/2023: Sinus rhythm 48-75, averaging 57 bpm with no atrial fibrillation.      Lab Review     Lab Results   Component Value Date    HGB 10.6 07/27/2023     Lab Results   Component Value Date     07/27/2023     Lab Results   Component Value Date    POTASSIUM 4.7 07/27/2023    POTASSIUM 4.3 06/05/2023     Lab Results   Component Value Date    BUN 53.5 07/27/2023    BUN 38 06/05/2023     Lab Results   Component Value Date    CR 3.43 07/27/2023     Lab Results   Component Value Date    CHOL 96 06/16/2023    HDL 34 06/16/2023    LDL 47  06/16/2023    TRIG 75 06/16/2023           Deonte Solorzano MD  [facility]  7/27/2023    Note created using Dragon voice recognition software.  Sound alike errors may have escaped editing.    Clinically Significant Risk Factors Present on Admission             # Anion Gap Metabolic Acidosis: Highest Anion Gap = 23 mmol/L in last 2 days, will monitor and treat as appropriate       # Drug Induced Platelet Defect: home medication list includes an antiplatelet medication

## 2023-07-27 NOTE — Clinical Note
Stent deployed in the saphenous vein graft. Max pressure = 14 ghanshyam. Total duration = 30 seconds.

## 2023-07-27 NOTE — PROGRESS NOTES
"Diabetes Care Screen Note  Situation:  Diabetes blood glucose screen  72 year old male with type 1 diabetes admitted with chest pain  Background:  Type 1 diabetes, has had heart attacks in past, CAB in past, foot concerns  He is wearing the Freestyle Ethel 14 day CGMS presently  Home PTA diabetes meds:  30 units of Lantus in am, last at 5 am 7/27  Humalog at meals, 3 units per 15 grams plus a correction scale  Current Inpatient diabetes meds:  Not ordered yet  Values;  A1C: 7.0 6/16/23           GFR:18        BMI:29.84         Talked with patient about his DM control.  Low BG in middle of the night occasionally but not a pattern.  Talked to him about upgrading his CGMS to the Ethel 3 so he has the alerts if he is low or high.   No glucagon at home, never a time when he couldn't help himself.   He has seen OP diabetes educators in the past.    Assessment: Patient's diabetes has been in good control.   Will benefit from upgrading his CGMS    Recommendations:  Start his basal/bolus insulin therapy while here, assess BG pattern  See rx request for discharge below    See \"Guidelines for Insulin Initiation and Care in Hospitalized Adults\" link in the Diabetes Management order set for dosing guidelines    Hospital BG goals < 180 for improved outcomes. There is impaired immune function at BG levels above 180 mg/dl.    Rx at discharge:  Freestyle Ethel 3 sensors, 2/mo, refills 11  Has catracho on phone so no reader needed      Thanks.     Bambi Caban RN, Certified [place] [address]  [email or web address]   Office: [phone]  Pager: [phone]                                  "

## 2023-07-27 NOTE — Clinical Note
The first balloon was inserted into the saphenous vein graft.Max pressure = 7 ghanshyam. Total duration = 30 seconds.     Max pressure = 6 ghanshyam. Total duration = 20 seconds.    Balloon reinflated a second time: Max pressure = 6 ghanshyam. Total duration = 20 seconds.

## 2023-07-27 NOTE — ED NOTES
Bed: [place] [address]  Expected date:   Expected time:   Means of arrival: Ambulance  Comments:  [facility]: Chest pain, NTG given

## 2023-07-27 NOTE — PHARMACY-ADMISSION MEDICATION HISTORY
Pharmacist Admission Medication History    Admission medication history is complete. The information provided in this note is only as accurate as the sources available at the time of the update.    Medication reconciliation/reorder completed by provider prior to medication history? No    Information Source(s): Patient and CareEverywhere/SureScripts via in-person    Pertinent Information: Furosemide: recently increased from 20mg daily to 40mg daily    Changes made to PTA medication list:  Added: Atorvastatin  Deleted: None  Changed: Xanax from 1 tab daily to 1/2 tab daily at bedtime    Allergies reviewed with patient and updates made in EHR: yes    Medication History Completed By: KENDALL AHN [facility] 7/27/2023 2:01 PM    Prior to Admission medications    Medication Sig Last Dose Taking? Auth Provider Long Term End Date   ALPRAZolam (XANAX) 0.5 MG tablet Take 0.5 tablets by mouth At Bedtime 7/26/2023 Yes Provider, Historical     aspirin 81 MG EC tablet [ASPIRIN 81 MG EC TABLET] Take 81 mg by mouth daily. 7/26/2023 at am Yes Provider, Historical     atorvastatin (LIPITOR) 40 MG tablet Take 40 mg by mouth daily 7/26/2023 at am Yes Unknown, Entered By History No    clopidogrel (PLAVIX) 75 MG tablet Take 1 tablet (75 mg) by mouth daily 7/26/2023 at am Yes Edwin Lynne MD Yes    ezetimibe (ZETIA) 10 MG tablet Take 1 tablet (10 mg) by mouth daily 7/26/2023 at pm Yes Robinson Chopra MD Yes    fish oil-omega-3 fatty acids 1000 MG capsule Take 1 capsule by mouth daily 7/26/2023 Yes Reported, Patient     furosemide (LASIX) 20 MG tablet Take 2 tablets by mouth daily 7/26/2023 at am Yes Provider, Historical     gabapentin (NEURONTIN) 300 MG capsule [GABAPENTIN (NEURONTIN) 300 MG CAPSULE] Take 300 mg by mouth 3 (three) times a day.        7/26/2023 at pm Yes Provider, Historical     HUMALOG 100 unit/mL injection Inject Subcutaneous 3 times daily (before meals) Carb count: 3 g carb : 1 unit insulin 7/26/2023 at  pm Yes Provider, Historical Yes    insulin glargine (LANTUS) 100 unit/mL injection [INSULIN GLARGINE (LANTUS) 100 UNIT/ML INJECTION] Inject 30 Units under the skin every morning.  7/27/2023 at am Yes Provider, Historical     levothyroxine (SYNTHROID/LEVOTHROID) 125 MCG tablet Take 125 mcg by mouth daily 7/27/2023 at am Yes Reported, Patient     lisinopril (PRINIVIL,ZESTRIL) 40 MG tablet [LISINOPRIL (PRINIVIL,ZESTRIL) 40 MG TABLET] Take 40 mg by mouth daily. 7/26/2023 at am Yes Provider, Historical     metoprolol succinate ER (TOPROL XL) 50 MG 24 hr tablet Take 1 tablet (50 mg) by mouth daily 7/26/2023 at am Yes Robinson Chopra MD Yes    multivitamin with iron (ONE DAILY WITH IRON) Tab tablet [MULTIVITAMIN WITH IRON (ONE DAILY WITH IRON) TAB TABLET] Take 1 tablet by mouth daily. 7/26/2023 at am Yes Provider, Historical     nitroGLYcerin (NITROSTAT) 0.4 MG sublingual tablet Place 1 tablet (0.4 mg) under the tongue every 5 minutes as needed 7/27/2023 at am Yes Robinson Chopra MD Yes    TRIAMCINOLONE ACETONIDE (NASACORT NASL) [TRIAMCINOLONE ACETONIDE (NASACORT NASL)] 1 spray into each nostril daily. 7/26/2023 at am Yes Provider, Historical     VITAMIN D3 2,000 unit capsule [VITAMIN D3 2,000 UNIT CAPSULE] Take 2,000 Units by mouth daily. 7/26/2023 at am Yes Provider, Historical     Continuous Blood Gluc Sensor (FREESTYLE DAVID 14 DAY SENSOR) MISC    Reported, Patient     TECHLITE PEN NEEDLES 31G X 5 MM miscellaneous    Reported, Patient

## 2023-07-27 NOTE — Clinical Note
The first balloon was inserted into the saphenous vein graft.Max pressure = 4 ghanshyam. Total duration = 20 seconds.     Max pressure = 4 ghanshyam. Total duration = 17 seconds.

## 2023-07-27 NOTE — PLAN OF CARE
Goal Outcome Evaluation:             Pt admitted for [facility] coronary angiogram from ED to [facility] 7. Pt prepped and ready for procedure. Wife Funmilayo Schroeder here for support.  Pt denies pain and nausea at this time.      Shadia Gonzalez RN

## 2023-07-27 NOTE — ED PROVIDER NOTES
EMERGENCY DEPARTMENT ENCOUNTER      NAME: Dakota Bauer  AGE: 72 year old male  YOB: 1950  MRN: [id number]  EVALUATION DATE & TIME: No admission date for patient encounter.    PCP: Jamal Gaffney    ED PROVIDER: Tyson Burris D.O.      Chief Complaint   Patient presents with    Chest Pain       FINAL IMPRESSION:  1. CKD (chronic kidney disease) stage 4, GFR 15-29 ml/min (H)    2. NSTEMI (non-ST elevated myocardial infarction) (H)        ED COURSE & MEDICAL DECISION MAKIN:57 AM I met with the patient to gather history and to perform my initial exam. I discussed the plan for care while in the Emergency Department.  1:02 PM Patient will receive heparin drip. Troponin came back >200  1:13 PM I spoke with Dr. Solorzano, Cardiology. He recommended a nitro drip and will come down to the ED to see the patient  2:07 PM I discussed the case with hospitalist, Dr Phipps, who accepts the patient           Pertinent Labs & Imaging studies reviewed. (See chart for details)  72 year old male presents to the Emergency Department for evaluation of intermittent episodes of chest pain that do resolve with nitro.  Patient has a previous history of cardiac disease with cardiac bypass over 20 years ago.  Patient arrived by EMS with an EKG that did show some ST segment depressions in the precordial leads.  Patient was pain-free at time of arrival.  Of note he was borderline febrile, but obvious source of infection is uncertain at this time is COVID-19, influenza, RSV testing was all negative, and there is no evidence of pneumonia on his chest x-ray without additional symptoms.  Initial concern was for PE versus ACS versus pneumothorax.  Unlikely represent PE at this time based on clinical presentation, and he was not tachycardic, otherwise PERC negative.  Imaging did not show any evidence of pneumothorax or infiltrate.  Our EKG also confirms and ST segment depressions.  Unfortunately his troponin has come back elevated,  and therefore I started a heparin drip.  I consulted with cardiology who did evaluate him in the emergency department and requested we start him on a nitro drip as well.  At this time the plan is for admission for further management on the floor.    Medical Decision Making    History:  Supplemental history from: Documented in chart, if applicable and EMS  External Record(s) reviewed: Documented in chart, if applicable.    Work Up:  Chart documentation includes differential considered and any EKGs or imaging independently interpreted by provider, where specified.  In additional to work up documented, I considered the following work up: Documented in chart, if applicable.    External consultation:  Discussion of management with another provider: Documented in chart, if applicable and Cardiology and Hospitalist    Complicating factors:  Care impacted by chronic illness: Anticoagulated State, Chronic Kidney Disease, Diabetes, Heart Disease, Hyperlipidemia, Hypertension, and Peripheral Vascular Disease  Care affected by social determinants of health: N/A    Disposition considerations: Admit.        At the conclusion of the encounter I discussed the results of all of the tests and the disposition. The questions were answered. The patient or family acknowledged understanding and was agreeable with the care plan.      CRITICAL CARE:  Critical Care  Performed by: NAA SALES  Authorized by: NAA SALES  Total critical care time: 35 minutes  Critical care time was exclusive of separately billable procedures and treating other patients.  Critical care was necessary to treat or prevent imminent or life-threatening deterioration of the following conditions: STEMI  Critical care was time spent personally by me on the following activities: development of treatment plan with patient or surrogate, discussions with consultants, examination of patient, evaluation of patient's response to treatment, obtaining history  from patient or surrogate, ordering and performing treatments and interventions, ordering and review of laboratory studies, ordering and review of radiographic studies and re-evaluation of patient's condition, this excludes any separately billable procedures.        HPI    Patient information was obtained from: Patient     Use of : N/A       Dakota Bauer is a 72 year old male with a pertinent medical history of hypertension, hyperlipidemia, CAD, Anticoagulated State, CKD, type I diabetes, and PVD who presents to this ED by ambulance for evaluation of chest pain.     Patient reports three different episodes of sharp chest pain radiating into his back with associated shortness of breath and nausea that first started at 10:30 AM. He states that the pain had been temporarily completely alleviated by nitroglycerin, but had come back after 30-40 minutes. Patient rated the pain as a 10/10 initially but en route rated it as a 5/10. He does note that he is currently on Plavix and his last heart related procedure was the bypass in 2000. Patient denies lightheadedness, fever, or additional symptoms or complaints at this time.       REVIEW OF SYSTEMS  Constitutional:  Denies fever, chills, weight loss or weakness  Eyes:  No pain, discharge, redness  HENT:  Denies sore throat, ear pain, congestion  Respiratory: No wheeze or cough. Positive for shortness of breath   Cardiovascular:  No palpitations. Positive for chest pain   GI:  Denies abdominal pain, vomiting, diarrhea. Positive for nausea  : Denies dysuria, hematuria  Musculoskeletal:  Denies any new muscle/joint pain, swelling or loss of function.  Skin:  Denies rash, pallor  Neurologic:  Denies headache, focal weakness or sensory changes  Lymph: Denies swollen nodes    All other systems negative unless noted in HPI.    PAST MEDICAL HISTORY:  Past Medical History:   Diagnosis Date    Chronic kidney disease     Coronary artery disease     Diabetes mellitus (H)      Disorder of thyroid     Hyperlipidemia     Hypertension        PAST SURGICAL HISTORY:  Past Surgical History:   Procedure Laterality Date    AMPUTATE TOE(S) Left     BYPASS GRAFT ARTERY CORONARY  01/01/2005    [facility] [facility] with Dr. Holter    CARDIAC CATHETERIZATION  01/01/2005    CARDIAC CATHETERIZATION  11/25/2016    no intervention    CERVICAL DISC SURGERY  01/01/2014    IR LOWER EXTREMITY ANGIOGRAM LEFT  4/7/2023    IR LOWER EXTREMITY ANGIOGRAM LEFT  4/8/2020    IR LOWER EXTREMITY ANGIOGRAM LEFT  6/21/2023         CURRENT MEDICATIONS:    Current Facility-Administered Medications   Medication    acetaminophen (TYLENOL) tablet 650 mg    bisacodyl (DULCOLAX) suppository 10 mg    heparin infusion 25,000 units in D5W 250 mL ANTICOAGULANT    lidocaine (LMX4) cream    lidocaine 1 % 0.1-1 mL    melatonin tablet 1 mg    nitroGLYcerin (NITROSTAT) sublingual tablet 0.4 mg    nitroGLYcerin 50 mg in D5W 250 mL (adult std) infusion CENTRAL    ondansetron (ZOFRAN ODT) ODT tab 4 mg    Or    ondansetron (ZOFRAN) injection 4 mg    Patient is already receiving anticoagulation with heparin, enoxaparin (LOVENOX), warfarin (COUMADIN)  or other anticoagulant medication    sodium chloride (PF) 0.9% PF flush 3 mL    sodium chloride (PF) 0.9% PF flush 3 mL     Current Outpatient Medications   Medication    ALPRAZolam (XANAX) 0.5 MG tablet    aspirin 81 MG EC tablet    atorvastatin (LIPITOR) 40 MG tablet    clopidogrel (PLAVIX) 75 MG tablet    ezetimibe (ZETIA) 10 MG tablet    fish oil-omega-3 fatty acids 1000 MG capsule    furosemide (LASIX) 20 MG tablet    gabapentin (NEURONTIN) 300 MG capsule    HUMALOG 100 unit/mL injection    insulin glargine (LANTUS) 100 unit/mL injection    levothyroxine (SYNTHROID/LEVOTHROID) 125 MCG tablet    lisinopril (PRINIVIL,ZESTRIL) 40 MG tablet    metoprolol succinate ER (TOPROL XL) 50 MG 24 hr tablet    multivitamin with iron (ONE DAILY WITH IRON) Tab tablet    nitroGLYcerin (NITROSTAT) 0.4 MG sublingual  tablet    TRIAMCINOLONE ACETONIDE (NASACORT NASL)    VITAMIN D3 2,000 unit capsule    Continuous Blood Gluc Sensor (FREESTYLE DAVID 14 DAY SENSOR) MISC    TECHLITE PEN NEEDLES 31G X 5 MM miscellaneous         ALLERGIES:  Allergies   Allergen Reactions    Hydrochlorothiazide Unknown    Levofloxacin Diarrhea       FAMILY HISTORY:  Family History   Problem Relation Age of Onset    Coronary Artery Disease Father     Coronary Artery Disease Brother        SOCIAL HISTORY:  Social History     Socioeconomic History    Marital status:    Tobacco Use    Smoking status: Former     Types: Cigarettes     Quit date: 10/10/1984     Years since quittin.8    Smokeless tobacco: Never   Vaping Use    Vaping Use: Never used   Substance and Sexual Activity    Alcohol use: Not Currently    Drug use: Never       VITALS:  Patient Vitals for the past 24 hrs:   BP Temp Temp src Pulse Resp SpO2 Height Weight   23 1402 111/53 -- -- 84 28 96 % -- --   23 1354 -- -- -- 86 21 91 % -- --   23 1339 95/53 -- -- 90 24 96 % -- --   23 1309 121/58 -- -- 92 19 92 % -- --   23 1254 -- -- -- -- 16 -- -- --   23 1239 133/60 -- -- 90 18 93 % -- --   23 1224 -- -- -- -- 22 -- -- --   23 1209 -- -- -- -- 19 -- -- --   23 1204 134/61 100.3  F (37.9  C) Oral 92 26 -- 1.829 m (6') 99.8 kg (220 lb)       PHYSICAL EXAM    VITAL SIGNS: /53   Pulse 84   Temp 100.3  F (37.9  C) (Oral)   Resp 28   Ht 1.829 m (6')   Wt 99.8 kg (220 lb)   SpO2 96%   BMI 29.84 kg/m      General Appearance: Well-appearing, well-nourished, no acute distress   Head:  Normocephalic, without obvious abnormality, atraumatic  Eyes:  PERRL, conjunctiva/corneas clear, EOM's intact,  ENT:  Lips, mucosa, and tongue normal, membranes are moist without pallor  Neck:  Normal ROM, symmetrical, trachea midline    Cardio:  Regular rate and rhythm, no murmur, rub or gallop, 2+ pulses symmetric in all extremities  Pulm:  Clear  to auscultation bilaterally, respirations unlabored,  Abdomen:  Soft, non-tender, no rebound or guarding.  Musculoskeletal: Full ROM, no edema, no cyanosis, good ROM of major joints  Integument:  Warm, Dry, No erythema, No rash.    Neurologic:  Alert & oriented.  No focal deficits appreciated.  Ambulatory.  Psychiatric:  Affect normal, Judgment normal, Mood normal.      LABS  Results for orders placed or performed during the hospital encounter of 07/27/23 (from the past 24 hour(s))   [facility] Draw *Canceled*    Narrative    The following orders were created for panel order [facility] Draw.  Procedure                               Abnormality         Status                     ---------                               -----------         ------                       Please view results for these tests on the individual orders.   CBC with platelets + differential    Narrative    The following orders were created for panel order CBC with platelets + differential.  Procedure                               Abnormality         Status                     ---------                               -----------         ------                     CBC with platelets and d...[[id number]]  Abnormal            Final result                 Please view results for these tests on the individual orders.   Basic metabolic panel   Result Value Ref Range    Sodium 137 136 - 145 mmol/L    Potassium 4.7 3.4 - 5.3 mmol/L    Chloride 101 98 - 107 mmol/L    Carbon Dioxide (CO2) 13 (L) 22 - 29 mmol/L    Anion Gap 23 (H) 7 - 15 mmol/L    Urea Nitrogen 53.5 (H) 8.0 - 23.0 mg/dL    Creatinine 3.43 (H) 0.67 - 1.17 mg/dL    Calcium 8.5 (L) 8.8 - 10.2 mg/dL    Glucose 161 (H) 70 - 99 mg/dL    GFR Estimate 18 (L) >60 mL/min/1.73m2   Nt probnp inpatient   Result Value Ref Range    N terminal Pro BNP Inpatient 4,719 (H) 0 - 900 pg/mL   Lactic acid whole blood   Result Value Ref Range    Lactic Acid 1.8 0.7 - 2.0 mmol/L   Troponin T, High Sensitivity (now)    Result Value Ref Range    Troponin T, High Sensitivity 289 (HH) <=22 ng/L   CBC with platelets and differential   Result Value Ref Range    WBC Count 11.3 (H) 4.0 - 11.0 10e3/uL    RBC Count 3.47 (L) 4.40 - 5.90 10e6/uL    Hemoglobin 10.6 (L) 13.3 - 17.7 g/dL    Hematocrit 32.0 (L) 40.0 - 53.0 %    MCV 92 78 - 100 fL    MCH 30.5 26.5 - 33.0 pg    MCHC 33.1 31.5 - 36.5 g/dL    RDW 14.4 10.0 - 15.0 %    Platelet Count 154 150 - 450 10e3/uL    % Neutrophils 92 %    % Lymphocytes 2 %    % Monocytes 5 %    % Eosinophils 0 %    % Basophils 0 %    % Immature Granulocytes 1 %    NRBCs per 100 WBC 0 <1 /100    Absolute Neutrophils 10.4 (H) 1.6 - 8.3 10e3/uL    Absolute Lymphocytes 0.3 (L) 0.8 - 5.3 10e3/uL    Absolute Monocytes 0.6 0.0 - 1.3 10e3/uL    Absolute Eosinophils 0.0 0.0 - 0.7 10e3/uL    Absolute Basophils 0.0 0.0 - 0.2 10e3/uL    Absolute Immature Granulocytes 0.1 <=0.4 10e3/uL    Absolute NRBCs 0.0 10e3/uL   [facility] Draw    Narrative    The following orders were created for panel order [facility] Draw.  Procedure                               Abnormality         Status                     ---------                               -----------         ------                     Extra Blue Top Tube[[id number]]                              Final result               Extra Red Top Tube[[id number]]                               Final result               Extra Purple Top Tube[[id number]]                                                         Please view results for these tests on the individual orders.   Extra Blue Top Tube   Result Value Ref Range    Hold Specimen JIC    Extra Red Top Tube   Result Value Ref Range    Hold Specimen JIC    Symptomatic Influenza A/B, RSV, & SARS-CoV2 PCR (COVID-19) Nasopharyngeal    Specimen: Nasopharyngeal; Swab   Result Value Ref Range    Influenza A PCR Negative Negative    Influenza B PCR Negative Negative    RSV PCR Negative Negative    SARS CoV2 PCR Negative Negative    Narrative     Testing was performed using the Xpert Xpress CoV2/Flu/RSV Assay on the [employer] GeneXpert Instrument. This test should be ordered for the detection of SARS-CoV-2, influenza, and RSV viruses in individuals who meet clinical and/or epidemiological criteria. Test performance is unknown in asymptomatic patients. This test is for in vitro diagnostic use under the FDA EUA for laboratories certified under CLIA to perform high or moderate complexity testing. This test has not been FDA cleared or approved. A negative result does not rule out the presence of PCR inhibitors in the specimen or target RNA in concentration below the limit of detection for the assay. If only one viral target is positive but coinfection with multiple targets is suspected, the sample should be re-tested with another FDA cleared, approved, or authorized test, if coinfection would change clinical management. This test was validated by the [facility]. These laboratories are certified under the Clinical Laboratory Improvement Amendments of 1988 (CLIA-88) as qualified to perform high complexity laboratory testing.   XR Chest Port 1 View    Narrative    EXAM: XR CHEST PORT 1 VIEW  LOCATION: [facility]  DATE: 7/27/2023    INDICATION: Chest pain  COMPARISON: 04/05/2021      Impression    IMPRESSION: Right costophrenic angle is clipped. Poststernotomy changes. Heart is magnified due to projection. Lungs are clear. No overt signs of failure or pneumonia.         RADIOLOGY  XR Chest Port 1 View   Final Result   IMPRESSION: Right costophrenic angle is clipped. Poststernotomy changes. Heart is magnified due to projection. Lungs are clear. No overt signs of failure or pneumonia.      Echocardiogram Complete    (Results Pending)          EKG:    Rate: 93 bpm  Rhythm: Normal Sinus Rhythm  Axis: Left  Interval: Normal  Conduction: Normal  QRS: Normal  ST: Depressed V2, V3, V4, V5, V6  T-wave: Normal  QT: Not  prolonged  Comparison EKG: T wave depressions are new compared to 5 June 2023  Impression:  No acute ischemic change   I have independently reviewed and interpreted today's EKG, pending Cardiologist read      MEDICATIONS GIVEN IN THE EMERGENCY:  Medications   heparin infusion 25,000 units in D5W 250 mL ANTICOAGULANT (1,200 Units/hr Intravenous $New Bag 7/27/23 1319)   nitroGLYcerin 50 mg in D5W 250 mL (adult std) infusion CENTRAL (10 mcg/min Intravenous $New Bag 7/27/23 1403)   nitroGLYcerin (NITROSTAT) sublingual tablet 0.4 mg (0.4 mg Sublingual $Given 7/27/23 1332)   lidocaine 1 % 0.1-1 mL (has no administration in time range)   lidocaine (LMX4) cream (has no administration in time range)   sodium chloride (PF) 0.9% PF flush 3 mL (3 mLs Intracatheter Not Given 7/27/23 1412)   sodium chloride (PF) 0.9% PF flush 3 mL (has no administration in time range)   melatonin tablet 1 mg (has no administration in time range)   Patient is already receiving anticoagulation with heparin, enoxaparin (LOVENOX), warfarin (COUMADIN)  or other anticoagulant medication (has no administration in time range)   bisacodyl (DULCOLAX) suppository 10 mg (has no administration in time range)   ondansetron (ZOFRAN ODT) ODT tab 4 mg (has no administration in time range)     Or   ondansetron (ZOFRAN) injection 4 mg (has no administration in time range)   acetaminophen (TYLENOL) tablet 650 mg (has no administration in time range)   acetaminophen (TYLENOL) tablet 975 mg (975 mg Oral $Given 7/27/23 1302)   heparin loading dose for LOW INTENSITY TREATMENT * Give BEFORE starting heparin infusion (6,000 Units Intravenous $Given 7/27/23 1318)       NEW PRESCRIPTIONS STARTED AT TODAY'S ER VISIT  New Prescriptions    No medications on file        I, Sebastian Yepez, am serving as a scribe to document services personally performed by Tyson Burris D.O., based on my observations and the provider's statements to me.  I, Tyson Burris D.O., attest that Sebastian Yepez  is acting in a scribe capacity, has observed my performance of the services and has documented them in accordance with my direction.     Tyson Burris D.O.  Emergency Medicine  [facility] EMERGENCY DEPARTMENT  [address] [place] [id number] [phone]  Dept: [phone]       Tyson Burris,   07/27/23 1423

## 2023-07-27 NOTE — Clinical Note
The first balloon was inserted into the saphenous vein graft.Max pressure = 5 ghanshyam. Total duration = 30 seconds.     Max pressure = 8 ghanshyam. Total duration = 30 seconds.    Balloon reinflated a second time: Max pressure = 8 ghanshyam. Total duration = 30 seconds.

## 2023-07-27 NOTE — CONSULTS
RENAL CONSULTATION:     Date of Consultation:  7/27/2023    Requesting Physician: Dr. 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 Creatinine baseline 2.5-2.8 mg/dl when last seen by Dr. Bhatia in 5/2023. Has had spikes to mid-3's in  2022 and currently worsened function in setting of NSTEMI. Has had 2 angiograms of LLE since April that may be contributing to his worsened kidney function as well but no change in renal function noted after June procedure at least. No uremic symptoms.  -Patient understands risk of acute kidney injury from contrast   - Okay to proceed with coronary angiogram.   -We will trend renal function closely.   -Discussed dialysis procedure and indications for emergent dialysis in the event of acute kidney injury with the patient although he was noncommittal at this time and no aucte indication.   - Recommend ESRD education soon as outpt.    Coronary artery disease with previous CABG 18 years ago and now non-STEMI with troponin 289.  Plan for urgent coronary angiography this afternoon.   -Currently pain-free     Type 1 diabetes mellitus with nephropathy, peripheral artery disease, neuropathy   - Diabetes has been well reasonably controlled with Hgb A1c 7.4%  - Management per Hospital med    PAD with previous left TMA 2020: Peripheral angio noted 6/2023 with angioplasties and previously angioplasty in April 2023.    CKD-MBD: Stable calcium, PTH 66 (11/30/2022).  Continue cholecalciferol 2000 units daily.     Anemia due to CKD: Hgb decreased 2 g/dl since April. Check iron stores. No need for EPO. Monitor as inpt.     HTN with CKD and CHF (LVH noted on ECHO). BP low at present on NTG gtt. Chronically on Lisinopril 40 mg/day, Lasix 40 mg/day and metoprolol 50 mg/day.    - Agree with holding Lisinopril given low BP and worsened renal function for now but chronically would like to resume.     This document is created with the help of Estephania  dictation system. All grammatical errors are unintentional.     Teja Elam MD  [facility]  [phone] (off)       History of present illness: Dakota is a 72-year-old gentleman with history of type 1 diabetes mellitus diagnosed at age 19, coronary artery disease with previous CABG 18 years ago, hypothyroidism, PAD with left transmetatarsal amputation in 2020, hypertension with left ventricular hypertrophy and chronic kidney disease stage IV baseline creatinine 2.5-2.8 mg/dL for which she follows with Dr. Bhatia at [facility].  During most recent visit in May dialysis was discussed although it was felt that preparations were not needed until his GFR remained less than 20 mL/min.  He has had increases in creatinine over 3 mg/dL in 2022 that subsequently improved.  This morning he awakened with chest pain 10 out of 10 in severity.  He took a nitroglycerin tablet and it resolved 1 hour before recurring.  He eventually came to emergency department was found to have a non-ST elevation myocardial infarction with elevated troponin and EKG changes.  Cardiology saw him earlier with plans for urgent angiography this afternoon.  Review of more recent medical issues shows that he underwent peripheral angiogram with 80 mL Omnipaque on 6/21/2023 and previous peripheral angiogram on 4/7/2023.  Since April his creatinine has remained higher than baseline at 3.2-3.6 mg/dL.  He is accompanied by his wife today currently is chest pain-free.  No recent urinary complaints, nausea/vomiting although he was nauseated with chest pain this morning.    Past Medical History:   Diagnosis Date    Chronic kidney disease     Coronary artery disease     Diabetes mellitus (H)     Disorder of thyroid     Hyperlipidemia     Hypertension          Current Facility-Administered Medications:     acetaminophen (TYLENOL) tablet 650 mg, 650 mg, Oral, Q4H PRN, Remy Phipps DO    ALPRAZolam (XANAX) tablet 0.25 mg, 0.25 mg, Oral,  At Bedtime, Remy Phipps DO    [START ON 7/28/2023] aspirin EC tablet 81 mg, 81 mg, Oral, Daily, Remy Phipps DO    atorvastatin (LIPITOR) tablet 40 mg, 40 mg, Oral, Daily, Remy Phipps DO    bisacodyl (DULCOLAX) suppository 10 mg, 10 mg, Rectal, Daily PRN, Remy Phipps DO    clopidogrel (PLAVIX) tablet 75 mg, 75 mg, Oral, Daily, Remy Phipps DO    glucose gel 15-30 g, 15-30 g, Oral, Q15 Min PRN **OR** dextrose 50 % injection 25-50 mL, 25-50 mL, Intravenous, Q15 Min PRN **OR** glucagon injection 1 mg, 1 mg, Subcutaneous, Q15 Min PRN, Remy Phipps DO    ezetimibe (ZETIA) tablet 10 mg, 10 mg, Oral, QPM, Remy Phipps DO    gabapentin (NEURONTIN) capsule 300 mg, 300 mg, Oral, BID, Remy Phipps DO    heparin infusion 25,000 units in D5W 250 mL ANTICOAGULANT, 0-5,000 Units/hr, Intravenous, Continuous, Tyson Burris DO, Last Rate: 12 mL/hr at 07/27/23 1319, 1,200 Units/hr at 07/27/23 1319    insulin aspart (NovoLOG) injection (RAPID ACTING), 1-6 Units, Subcutaneous, Q4H, Remy Phipps DO    [START ON 7/28/2023] insulin glargine (LANTUS PEN) injection 30 Units, 30 Units, Subcutaneous, QAM, Remy Phipps DO    [START ON 7/28/2023] levothyroxine (SYNTHROID/LEVOTHROID) tablet 125 mcg, 125 mcg, Oral, Daily, Remy Phipps DO    lidocaine (LMX4) cream, , Topical, Q1H PRN, Remy Phipps DO    lidocaine 1 % 0.1-1 mL, 0.1-1 mL, Other, Q1H PRN, Remy Phipps DO    [Held by provider] lisinopril (ZESTRIL) tablet 40 mg, 40 mg, Oral, Daily, Halla, Remy Deonte, DO    melatonin tablet 1 mg, 1 mg, Oral, At Bedtime PRN, Remy Phipps,     metoprolol succinate ER (TOPROL XL) 24 hr tablet 50 mg, 50 mg, Oral, Daily, Remy Phipps,     nitroGLYcerin (NITROSTAT) sublingual tablet 0.4 mg, 0.4 mg, Sublingual, Q5 Min PRN, Tyson Burris DO, 0.4 mg at 07/27/23 1332    nitroGLYcerin 50 mg in D5W 250 mL (adult std) infusion  CENTRAL,  mcg/min, Intravenous, Continuous, Tyson Burris DO, Last Rate: 3 mL/hr at 07/27/23 1403, 10 mcg/min at 07/27/23 1403    ondansetron (ZOFRAN ODT) ODT tab 4 mg, 4 mg, Oral, Q6H PRN **OR** ondansetron (ZOFRAN) injection 4 mg, 4 mg, Intravenous, Q6H PRN, Remy Phipps DO    Patient is already receiving anticoagulation with heparin, enoxaparin (LOVENOX), warfarin (COUMADIN)  or other anticoagulant medication, , Does not apply, Continuous PRN, Remy Phipps DO    sodium chloride (PF) 0.9% PF flush 3 mL, 3 mL, Intracatheter, Q8H, Remy Phipps DO    sodium chloride (PF) 0.9% PF flush 3 mL, 3 mL, Intracatheter, q1 min prn, Remy Phipps DO    Current Outpatient Medications:     ALPRAZolam (XANAX) 0.5 MG tablet, Take 0.5 tablets by mouth At Bedtime, Disp: , Rfl:     aspirin 81 MG EC tablet, [ASPIRIN 81 MG EC TABLET] Take 81 mg by mouth daily., Disp: , Rfl:     atorvastatin (LIPITOR) 40 MG tablet, Take 40 mg by mouth daily, Disp: , Rfl:     clopidogrel (PLAVIX) 75 MG tablet, Take 1 tablet (75 mg) by mouth daily, Disp: 90 tablet, Rfl: 0    ezetimibe (ZETIA) 10 MG tablet, Take 1 tablet (10 mg) by mouth daily, Disp: 90 tablet, Rfl: 4    fish oil-omega-3 fatty acids 1000 MG capsule, Take 1 capsule by mouth daily, Disp: , Rfl:     furosemide (LASIX) 20 MG tablet, Take 2 tablets by mouth daily, Disp: , Rfl: 9    gabapentin (NEURONTIN) 300 MG capsule, [GABAPENTIN (NEURONTIN) 300 MG CAPSULE] Take 300 mg by mouth 3 (three) times a day.       , Disp: , Rfl:     HUMALOG 100 unit/mL injection, Inject Subcutaneous 3 times daily (before meals) Carb count: 3 g carb : 1 unit insulin, Disp: , Rfl:     insulin glargine (LANTUS) 100 unit/mL injection, [INSULIN GLARGINE (LANTUS) 100 UNIT/ML INJECTION] Inject 30 Units under the skin every morning. , Disp: , Rfl:     levothyroxine (SYNTHROID/LEVOTHROID) 125 MCG tablet, Take 125 mcg by mouth daily, Disp: , Rfl:     lisinopril (PRINIVIL,ZESTRIL) 40  MG tablet, [LISINOPRIL (PRINIVIL,ZESTRIL) 40 MG TABLET] Take 40 mg by mouth daily., Disp: , Rfl:     metoprolol succinate ER (TOPROL XL) 50 MG 24 hr tablet, Take 1 tablet (50 mg) by mouth daily, Disp: 90 tablet, Rfl: 2    multivitamin with iron (ONE DAILY WITH IRON) Tab tablet, [MULTIVITAMIN WITH IRON (ONE DAILY WITH IRON) TAB TABLET] Take 1 tablet by mouth daily., Disp: , Rfl:     nitroGLYcerin (NITROSTAT) 0.4 MG sublingual tablet, Place 1 tablet (0.4 mg) under the tongue every 5 minutes as needed, Disp: 25 tablet, Rfl: 3    TRIAMCINOLONE ACETONIDE (NASACORT NASL), [TRIAMCINOLONE ACETONIDE (NASACORT NASL)] 1 spray into each nostril daily., Disp: , Rfl:     VITAMIN D3 2,000 unit capsule, [VITAMIN D3 2,000 UNIT CAPSULE] Take 2,000 Units by mouth daily., Disp: , Rfl: 3    Continuous Blood Gluc Sensor (RAP IndexSTYLE DAVID 14 DAY SENSOR) MISC, , Disp: , Rfl:     TECHLITE PEN NEEDLES 31G X 5 MM miscellaneous, , Disp: , Rfl:     Allergies   Allergen Reactions    Hydrochlorothiazide Unknown    Levofloxacin Diarrhea       Social History     Socioeconomic History    Marital status:      Spouse name: None    Number of children: None    Years of education: None    Highest education level: None   Tobacco Use    Smoking status: Former     Types: Cigarettes     Quit date: 10/10/1984     Years since quittin.8    Smokeless tobacco: Never   Vaping Use    Vaping Use: Never used   Substance and Sexual Activity    Alcohol use: Not Currently    Drug use: Never       Family History   Problem Relation Age of Onset    Coronary Artery Disease Father     Coronary Artery Disease Brother        Review of Systems: No headaches, dizziness, vision changes.  No chest pain at present.  Denies orthopnea. The remainder of 10 point review of systems is negative except as noted in HPI above.     BP 99/49   Pulse 82   Temp 100.3  F (37.9  C) (Oral)   Resp 13   Ht 1.829 m (6')   Wt 99.8 kg (220 lb)   SpO2 92%   BMI 29.84 kg/m    No intake  or output data in the 24 hours ending 07/27/23 1452  Physical Exam:   GENERAL: calm and comfortable, alert  EYES: No scleral icterus, conjunctiva clear  ENT: Hearing normal, Oral mucosa moist  RESP: Clear to auscultation bilaterally with no respiratory distress, normal effort.  CV: RRR, no murmurs.  Trace leg edema.    GI: Active BS, Soft, NT/ND, no masses or HSM  : No CVA tenderness   Musculoskeletal: Normal muscle bulk/ tone; No gross joint abnormalities  SKIN: No rash, warm/ dry  PSYCH:  Appropriate mood and affect  Lymph: No cervical/ inguinal adenopathy    LABS:  .[email or web address]  Recent Labs   Lab 07/27/23  1222      POTASSIUM 4.7   CHLORIDE 101   CO2 13*   BUN 53.5*   CR 3.43*   GFRESTIMATED 18*   SVITLANA 8.5*   MAG 2.0       Recent Labs   Lab 07/27/23  1222   WBC 11.3*   HGB 10.6*   HCT 32.0*   MCV 92        Left peripheral angio (80 ml omnipaque) 6/21/23:  Narrative & Impression   VASCULAR SURGERY ANGIOGRAM REPORT       [facility]     DATE: 06/21/23        PROCEDURES PERFORMED:      1.  Ultrasound-guided access of right common femoral artery  2.  Selective cannulation of the left common iliac artery, external iliac artery, common femoral artery, superficial femoral artery, popliteal artery, anterior tibial artery, dorsalis pedis artery, pedal arch, tibioperoneal trunk, and peroneal artery with selective left lower extremity angiography  3.  Plain balloon angioplasty of the left pedal arch and dorsalis pedis artery with 2 mm x 100 mm Crosstella balloon, followed by 2 mm x 40 mm Crosstella balloon  4. Plain balloon angioplasty of the left dorsalis pedis and anterior tibial artery with 3 mm x 200 mm Crosstella balloon  5. Attempted recannalization of left peroneal artery with balloon angioplasty using 3 mm x 200 mm Crosstella balloon  6.  Moderate sedation  7.  Arteriotomy closure with Angio-Seal device     SURGEON: Dr. Lynne     RESIDENT: Jayden Paniagua MD     INDICATION:   Patient is a  72-year-old male with chronic limb threatening ischemia and left foot wounds after previous transmetatarsal amputation.  He underwent a left lower extremity angiogram in April 2023 that identified single-vessel runoff via the left anterior tibial artery.  At that time his wound did appear to be making small amounts of progress and given the single-vessel runoff the decision was made not to treat.his to artery disease.  In the intervening months his left foot wounds have progressed and the decision was made to repeat arteriography today with the intention to treat his distal tibial disease.     SEDATION:   The procedure was performed with administration of intravenous conscious sedation with appropriate preoperative, intraoperative, and postoperative evaluation.  124 minutes of supervised face to face intraservice time was provided by a radiology nurse under my direct supervision.      ANTIBIOTICS: None  FLUOROSCOPIC TIME: 34.4 min  RADIATION DOSE:  323 mGy  CONTRAST: 80 ml visipaque        PROCEDURE:   Ultrasound was used to evaluate the right common femoral artery. The selected vessel was patent. Ultrasound was then used for real-time ultrasound guided needle entry of the  right common femoral artery. Permanent images were recorded and saved to the patient's medical record.  Micropuncture sheath was inserted.  A glide advantage wire was advanced into the aorta.  5 Cymraes sheath was placed.  An Omni Flush catheter was advanced over the wire. Using up and over maneuver I was able to advance the wire into the left femoral artery.  Over the wire the Omni Flush catheter was advanced and positioned in the  left common femoral artery.  Left lower extremity angiogram was performed using CO2 which demonstrated mild stenosis in the distal SFA and popliteal artery.  An 035 angled Irizarry cross catheter was advanced over the wire and placed in the mid popliteal artery.  Lower extremity angiography was then performed with contrast  which demonstrated the high-grade stenosis of the proximal left anterior tibial artery, and occlusion of the left posterior tibial and peroneal arteries.  There is high-grade stenosis in the left dorsalis pedis artery with an incomplete pedal arch.  An 035 glide advantage wire was used to select the posterior tibial artery, the 5 Egyptian sheath was exchanged for a 90 cm 6 Egyptian TerumOraMetrix destination sheath.  With accommodation of an 018 angled Irizarry cross catheter and 1 8 glide advantage wire, the left anterior tibial artery was selected.  Wire was advanced to the TP trunk and pedal arch.  Repeat angiography was performed demonstrating stenoses throughout dorsalis pedis pedal arch.     Next we proceeded with plain balloon angioplasty of the left pedal arch and dorsalis pedis trunk using a 2 mm x 100 mm Crosstella balloon.  Repeat angiography identified a persistent segment of stenosis in the very distal pedal arch, which was then treated with a 2 mm x 40 mm Crosstella plain balloon angioplasty.  This balloon was then exchanged for a 3 mm x 200 mm Crosstella plain balloon used to treat the dorsalis pedis and the entire length of the left anterior tibial artery.  At this point, angiography demonstrated brisk flow through the anterior tibial artery, dorsalis pedis, and pedal arch with significantly improved collateral sensation at the distal foot.  There was residual stenosis at the very proximal left anterior tibial artery, it was again treated with a 3 mm x 40 mm balloon.  There was only modest improvement with additional treatment, likely as a result of eccentric plaque.  We excepted this result and then attempted to recanalize the left peroneal artery.  This was patent to about the proximal third of the peroneal artery.  Using an 014 glide advantage wire and 018 Irizarry cross catheter, the peroneal artery was recanalized to the distal aspect of the artery.  A blush of contrast was used to confirm that the catheter tip was  intraluminal.  A 3 mm x 200 mm Crosstella balloon was used for platelet angioplasty of the recanalize peroneal artery segment.  This was unsuccessful and the peroneal artery immediately reoccluded.  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     Catheter was withdrawn to the mid SFA and angiogram again demonstrated nonflow-limiting stenosis of the distal SFA and above-knee popliteal arteries.  His disease were left untreated. At that point the procedure was concluded.  The arteriotomy closure was performed using an Angio-Seal device.  Patient tolerated procedure well and was transferred to recovery area in stable condition.     FINDINGS   1.  Mild, non-flow-limiting stenosis of the distal left superficial femoral artery and popliteal arteries.  2.  High-grade stenosis of the left proximal anterior tibial artery, proximal dorsalis pedis artery  3. Incomplete left pedal arch  4.  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         All lab data was reviewed at 2:52 PM

## 2023-07-27 NOTE — ED NOTES
[facility] ED Handoff Report    ED Chief Complaint: Chest pain    Pt had sharp left-sided chest pain radiating to back starting this morning. Relief with nitroglycerin at home. History of CABG in 2000.    ED Diagnosis:  (N18.4) CKD (chronic kidney disease) stage 4, GFR 15-29 ml/min (H)  (primary encounter diagnosis)    Plan: Case Request Cath Lab: Coronary Angiogram,         Coronary Angiogram Graft, Percutaneous Coronary        Intervention, Cardiac Catheterization           (I21.4) NSTEMI (non-ST elevated myocardial infarction) (H)  Comment: Pt is going to cath lab prior to coming to the floor.  Plan: Case Request Cath Lab: Coronary Angiogram,         Coronary Angiogram Graft, Percutaneous Coronary        Intervention, Cardiac Catheterization               PMH:    Past Medical History:   Diagnosis Date    Chronic kidney disease     Coronary artery disease     Diabetes mellitus (H)     Disorder of thyroid     Hyperlipidemia     Hypertension         Code Status:  Full Code     Falls Risk: No Band: Not applicable    Current Living Situation/Residence: lives with a significant other     Elimination Status: Continent: Yes     Activity Level: Independent    Patients Preferred Language:  English     Needed: No    Vital Signs:  BP 99/49   Pulse 82   Temp 100.3  F (37.9  C) (Oral)   Resp 13   Ht 1.829 m (6')   Wt 99.8 kg (220 lb)   SpO2 92%   BMI 29.84 kg/m       Cardiac Rhythm: SR     Pain Score: 3/10 intermittent sharp chest pain    Is the Patient Confused:  No    Last Food or Drink: 07/27/23 at this morning prior to arrival    Focused Assessment:  Pt has had intermittent sharp L sided chest pain relileved with Nitroglycerin SL and gtt started.   90% on 2l NC 96% ON 4L. Fine crackles noted post. B\L lower lobes. BNP elevated, CXR unremarkable.     Tests Performed: Done: Labs and Imaging    Treatments Provided:  Heparin/Ntg gtt, echo being done now.     Family Dynamics/Concerns:no      Family  Updated On Visitor Policy: Yes    Plan of Care Communicated to Family: Yes    Who Was Updated about Plan of Care: wife     Belongings Checklist Done and Signed by Patient: Yes    Medications sent with patient: heparin gtt at 1200u/hr and ntg gtt at 10mcg/min    Covid: symptomatic, negative    Additional Information: pt had fever at home and upon arriva to ER     RN: Rj Chavira RN   7/27/2023 3:04 PM

## 2023-07-27 NOTE — PRE-PROCEDURE
GENERAL PRE-PROCEDURE:   Procedure:  Coronary angiogram with possible PCI  Date/Time:  7/27/2023 2:52 PM    Written consent obtained?: Yes    Risks and benefits: Risks, benefits and alternatives were discussed    Consent given by:  Patient  Patient states understanding of procedure being performed: Yes    Patient's understanding of procedure matches consent: Yes    Procedure consent matches procedure scheduled: Yes    Expected level of sedation:  Moderate  Appropriately NPO:  Yes  ASA Class:  4 (elevated troponin, chest pain, advanced CKD, new murmur, PAD, HTN, HLD, borderline obesity; BMI 29.84kg/m2)  Mallampati  :  Grade 4- soft palate obscured by base of tongue  Lungs:  Lungs clear with good breath sounds bilaterally  Heart:  Systolic murmur  History & Physical reviewed:  History and physical reviewed and no updates needed  Statement of review:  I have reviewed the lab findings, diagnostic data, medications, and the plan for sedation

## 2023-07-27 NOTE — H&P
[facility]    History and Physical - Hospitalist Service       Date of Admission:  7/27/2023    Assessment & Plan    NSTEMI  CAD  -coronary angiogram today  -heparin gtt  -esvin asa/plavix PTA meds  -statin, BB  -npo  -cardiology following. Defer ongoing heparin/ntg gtts to cardiology  -TTE    HLD:  -LDL 47 (6/2023)  -lipitor 40 at bedtime    DM 1  -lantus 30 U qam  -novology q4 while npo, accuchecks q4 while npo. Change to ac/hs when oral diet started.    HTN  -BB, NTG gtt per cardiology, hold lisinopril for now    PAD, s/p PBA left pedal arch, DPA, ISAIAS (6/2023).    CKD stage IV  -monitor renal fnction closely  -appreciate nephrology assistance     Diet:  cardiac  DVT Prophylaxis: Pneumatic Compression Devices, heparin gtt  Mohan Catheter: Not present  Lines: None     Cardiac Monitoring: None  Code Status:  full    Clinically Significant Risk Factors Present on Admission             # Anion Gap Metabolic Acidosis: Highest Anion Gap = 23 mmol/L in last 2 days, will monitor and treat as appropriate    # Drug Induced Platelet Defect: home medication list includes an antiplatelet medication   # Hypertension: Noted on problem list      # Overweight: Estimated body mass index is 29.84 kg/m  as calculated from the following:    Height as of this encounter: 1.829 m (6').    Weight as of this encounter: 99.8 kg (220 lb).            Disposition Plan           Remy Phipps DO, DO  Hospitalist Service  [facility]  Securely message with Anurag (more info)  Text page via [employer] Paging/Directory     ______________________________________________________________________    Chief Complaint   Chest pain, sob    History of Present Illness   Dakota Bauer is a 72 year old male who has a pmhx significant for  type 1 diabetes mellitus, coronary artery disease s/p CABG 18 years ago with last LHC in 2016 showing patent BENITEZ to LAD, patent SVG to diagonal, patent SVG to OM, and  diffusely diseased distal vessels. Hypothyroidism, PAD with left transmetatarsal amputation in 2020, hypertension with left ventricular hypertrophy and chronic kidney disease stage IV baseline creatinine 2.5-2.8 mg/dL   Presented to the ED with chest pain, sob.  Troponin T HS elevated at 289, st depressions on ECG, case d/w Cardiology and initiated on heparin gtt/ntg gtt, currently taken to cath lab for angiography.      Past Medical History    Past Medical History:   Diagnosis Date    Chronic kidney disease     Coronary artery disease     Diabetes mellitus (H)     Disorder of thyroid     Hyperlipidemia     Hypertension        Past Surgical History   Past Surgical History:   Procedure Laterality Date    AMPUTATE TOE(S) Left     BYPASS GRAFT ARTERY CORONARY  01/01/2005    [place] with Dr. Holter    CARDIAC CATHETERIZATION  01/01/2005    CARDIAC CATHETERIZATION  11/25/2016    no intervention    CERVICAL DISC SURGERY  01/01/2014    IR LOWER EXTREMITY ANGIOGRAM LEFT  4/7/2023    IR LOWER EXTREMITY ANGIOGRAM LEFT  4/8/2020    IR LOWER EXTREMITY ANGIOGRAM LEFT  6/21/2023       Prior to Admission Medications   Prior to Admission Medications   Prescriptions Last Dose Informant Patient Reported? Taking?   ALPRAZolam (XANAX) 0.5 MG tablet 7/26/2023  Yes Yes   Sig: Take 0.5 tablets by mouth At Bedtime   Continuous Blood Gluc Sensor (FREESTYLE DAVID 14 DAY SENSOR) MISC   Yes No   HUMALOG 100 unit/mL injection 7/26/2023 at pm  Yes Yes   Sig: Inject Subcutaneous 3 times daily (before meals) Carb count: 3 g carb : 1 unit insulin   TECHLITE PEN NEEDLES 31G X 5 MM miscellaneous   Yes No   TRIAMCINOLONE ACETONIDE (NASACORT NASL) 7/26/2023 at am  Yes Yes   Sig: [TRIAMCINOLONE ACETONIDE (NASACORT NASL)] 1 spray into each nostril daily.   VITAMIN D3 2,000 unit capsule 7/26/2023 at am  Yes Yes   Sig: [VITAMIN D3 2,000 UNIT CAPSULE] Take 2,000 Units by mouth daily.   aspirin 81 MG EC tablet 7/26/2023 at am  Yes Yes   Sig: [ASPIRIN 81 MG  EC TABLET] Take 81 mg by mouth daily.   atorvastatin (LIPITOR) 40 MG tablet 7/26/2023 at am  Yes Yes   Sig: Take 40 mg by mouth daily   clopidogrel (PLAVIX) 75 MG tablet 7/26/2023 at am  No Yes   Sig: Take 1 tablet (75 mg) by mouth daily   ezetimibe (ZETIA) 10 MG tablet 7/26/2023 at pm  No Yes   Sig: Take 1 tablet (10 mg) by mouth daily   fish oil-omega-3 fatty acids 1000 MG capsule 7/26/2023  Yes Yes   Sig: Take 1 capsule by mouth daily   furosemide (LASIX) 20 MG tablet 7/26/2023 at am  Yes Yes   Sig: Take 2 tablets by mouth daily   gabapentin (NEURONTIN) 300 MG capsule 7/26/2023 at pm  Yes Yes   Sig: [GABAPENTIN (NEURONTIN) 300 MG CAPSULE] Take 300 mg by mouth 3 (three) times a day.          insulin glargine (LANTUS) 100 unit/mL injection 7/27/2023 at am  Yes Yes   Sig: [INSULIN GLARGINE (LANTUS) 100 UNIT/ML INJECTION] Inject 30 Units under the skin every morning.    levothyroxine (SYNTHROID/LEVOTHROID) 125 MCG tablet 7/27/2023 at am  Yes Yes   Sig: Take 125 mcg by mouth daily   lisinopril (PRINIVIL,ZESTRIL) 40 MG tablet 7/26/2023 at am  Yes Yes   Sig: [LISINOPRIL (PRINIVIL,ZESTRIL) 40 MG TABLET] Take 40 mg by mouth daily.   metoprolol succinate ER (TOPROL XL) 50 MG 24 hr tablet 7/26/2023 at am  No Yes   Sig: Take 1 tablet (50 mg) by mouth daily   multivitamin with iron (ONE DAILY WITH IRON) Tab tablet 7/26/2023 at am  Yes Yes   Sig: [MULTIVITAMIN WITH IRON (ONE DAILY WITH IRON) TAB TABLET] Take 1 tablet by mouth daily.   nitroGLYcerin (NITROSTAT) 0.4 MG sublingual tablet 7/27/2023 at am  No Yes   Sig: Place 1 tablet (0.4 mg) under the tongue every 5 minutes as needed      Facility-Administered Medications: None          Physical Exam   Vital Signs: Temp: 100.3  F (37.9  C) Temp src: Oral BP: 95/53 Pulse: 86   Resp: 21 SpO2: 91 % O2 Device: None (Room air)    Weight: 220 lbs 0 oz  Physical Examination:   General appearance - alert, and in no distress  Eyes - pupils equal and reactive, extraocular eye movements  intact, sclera anicteric  Mouth - mucous membranes moist, pharynx normal without lesions  Lungs - clear to auscultation, no wheezes, rales or rhonchi, symmetric air entry  Heart - normal rate, regular rhythm, +murmur, No peripheral edema.  Abdomen - soft, nontender, nondistended, no masses or organomegaly, BS+  Neurological - alert, oriented, normal speech, no focal findings or movement disorder noted  Skin - no c/c/p    Lab/imaging/ecg reviewed    D/w dr. pro

## 2023-07-28 NOTE — CONSULTS
NUTRITION EDUCATION      REASON FOR ASSESSMENT:  Provider Order- Heart Healthy Diet Education     NUTRITION HISTORY:  Information obtained from Patient and spouse    Met with pt and spouse at bedside this AM. Pt spouse reports cooking and preparing most meals for household. Pt eat primarily chicken and ground beef for protein, frozen vegetables, uses liquid fats when cooking, eats variety of processed foods, gets take out occasionally.     CURRENT DIET:  Consistent Carbohydrate, Low Saturated Fat, <2400 mg Na     NUTRITION DIAGNOSIS:  Food- and nutrition-related knowledge deficit R/t Heart healthy diet AEB need for diet education     INTERVENTIONS:    Nutrition Prescription:  CCHO, Low Fat, Low Na Diet     Implementation:      *  Nutrition Education (Content):   A)  Provided handouts- Heart healthy nutrition therapy, heart healthy shopping tips, heart healthy label reading, sodium free flavoring tips.    B)  Discussed handouts, not adding salt when cooking, limiting processed foods, eating out less frequently, choosing lean meats frequently, grocery store shopping tips.       *  Nutrition Education (Application):   A)  Discussed current eating habits and recommended alternative food choices      *  Anticipate good compliance      *  Diet Education - refer to Education Flowsheet    Goals:      *  Patient will verbalize understanding of diet       *  All of the above goals met during the education session    Follow Up/Monitoring:      *  Provided RD contact information for future questions      *  Recommended Out-Patient Nutrition Referral, if further diet instructions are needed

## 2023-07-28 NOTE — CONSULTS
[facility]    Infectious Disease Consultation     Date of Admission:  7/27/2023  Date of Consult (When I saw the patient): 07/28/23    Assessment & Plan   Dakota Bauer is a 72 year old male who was admitted on 7/27/2023.     Impression:  Non-ST elevation MI, mild LV dysfunction, status PCI 7/27/2023  Status post CABG 18 years ago, diffusely diseased distal vessels, LIMA to LAD, patent SVG to diagonal, SVG to OM  Peripheral arterial disease, vascular has been following in the past  Streptococcal bacteremia--Streptococcus dysgalactiae (Group C Streptococcus) 7/27/2023  Fever  Blurred vision, getting evaluation  Type 1 diabetes mellitus, chronic kidney disease stage IV  History of diabetic foot infection, transmetatarsal amputation in 2020, lower extremity wound, patient followed by Dr. Tinoco at [facility] podiatry  Diagnosis: 7/26/2023  S/P Sinus tract left foot at [facility] site with I & D and flowable , DOS 11/8/2022, healed  Diabetes with peripheral neuropathy  transmetatarsal amputation with varus deformity left foot  Deshpande 1 ulcer plantar lateral foot without signs of infection  PAD --S/P angioplasty ([facility] system)--6/2023              Recommendations    Repeat blood cultures  Ceftriaxone  Stop vancomycin and Zosyn on 7/28/2023  If repeat blood cultures remain positive, or concern for septic emboli, would need SHAYLEE  Monitor CBC, CMP, follow blood cultures  Wound care consult  Ophthalmology evaluation of possible, brain MRI, blurred vision  Consider podiatry consult reviewed chart under care everywhere-  Plan/Recommendations per podiatry at [facility] 7/26/2023:  -Cage boot (too much edema today), New DB inserts with carbon plate  -wound care: Vashe, elijah , alginate daily  -continue compression left lower extremity  -follow up 1 weeks  -Discussed wound clinic option SGS/TCC, but patient has not been able to use TCC in the past.  -Patient responding well to Epifix and CAGE boot,  -  Underlying varus deformity is the issue for continued recurrence. [facility] discussed tendon rebalancing but he is not interested.  -Epifix procedure done after debridement    Discussed with patient, unit nurse  Thank you for consulting infectious disease.  We will follow with you      Miryam Quintana MD  [facility]  Answering Service: [phone]  On-Call ID provider: [phone], option: 9      Reason for Consult   Reason for consult: I was asked to evaluate this patient for     bacteremia       Primary Care Physician   Jamal Gaffney    Chief Complaint   Chest pain, shortness of breath    History is obtained from the patient and medical records    History of Present Illness   Dakota Bauer is a 72 year old male who presents with history of type 1 diabetes, neuropathy, coronary artery disease, status post CABG 18 years ago, peripheral arterial disease, left metatarsal amputation 2020, hypertension, chronic kidney disease, presented with chest pain, shortness of breath, non-ST elevation MI, went to cardiac catheterization underwent PCI  Patient spiked a fever, noted to have streptococcal bacteremia, ID consulted  Patient also has wounds on lower extremity see photos above  Reviewed record under care everywhere, patient sees podiatry at [facility]      Per note at [facility] Podiatry, Dr. Tinoco 7/26/2023    Gaudencio Hayes 7/5/2023 8:20 AM Signed  Epifix #2 applied by Dr. Tinoco [id number] exp 9/1/2027 .  Gaudencio Hayes L.P.N..................... 7/5/2023 8:20 AM    Chief Complaint: Dakota Bauer is a 72 y.o. male who RTC TMA and recent surgery 11/2022. Has a new wound plantar lateral left foot. All previous surgical sites are healed. Hasn't been able to wear CAGE boot due to edema but diuretic was increased per his primary care physician. No complaints of pain.   [facility]ogram [facility] in [place] but no intervention done. Said to proceed with intervention if wound  not healing. Wound has been present now for greater than 4 weeks. Has been doing Ninoska.   He just underwent angioplasty at [facility].   Here for 3rd EPIFIX treatment. He can't do a TCC due to severe rash break out in the past.     Diagnosis:  S/P Sinus tract left foot at TMA site with I & D and flowable , DOS 11/8/2022, healed  Diabetes with peripheral neuropathy  transmetatarsal amputation with varus deformity left foot  Deshpande 1 ulcer plantar lateral foot without signs of infection  PAD --S/P angioplasty ([facility] system)--6/2023    Plan/Recommendations:  -Cage boot (too much edema today), New DB inserts with carbon plate  -wound care: Vashe, ninoska , alginate daily  -continue compression left lower extremity  -prior auth for Epifix approved  -follow up 1 weeks  -Discussed wound clinic option SGS/TCC, but patient has not been able to use TCC in the past.  -Patient responding well to Epifix and CAGE boot,  - Underlying varus deformity is the issue for continued recurrence. [facility] program discussed tendon rebalancing but he is not interested.  -Epifix procedure done after debridement, see below.      Lower Extremity Examination:  General: Alert and oriented x 3. Pleasant and cooperative     Neuro: Sensory exam of the foot is abnormal, tested with theSemmes Weinstien 0.5 monofilament to the level of rearfoot     Vascular: Skin warm to the touch and supple. Capillary fill time <3 seconds to all toes. All of the above are bilateral and symmetric. Minimal soft tissue swelling consistent with postoperative course.  Pulses palpable 2/4 .     Integument:   Ulceration noted to the plantar lateral TMA site reopened. Larger than previous fissure last visit. Granular base. periphral HKT tissue  Has an abraision anterior shin left leg, which is epithelialized. No infection.     Photo:     7/26/2023 :     7/5/2023 : 2cm x 1cm x 0.2cm     6/28/2023 6/14/2023     Musculoskeletal: Transmetatarsal amputation noted. Varus foot  deformity       Past Medical History   I have reviewed this patient's medical history and updated it with pertinent information if needed.   Past Medical History:   Diagnosis Date    Chronic kidney disease     Coronary artery disease     Diabetes mellitus (H)     Disorder of thyroid     Hyperlipidemia     Hypertension    Coronary angiogram 7/27/2023  1.  Severe diffuse disease of native vessels with small caliber vessels distally.  2.  Chronic occlusion of native LAD and native left circumflex.  3.  Left main fills small narrow caliber ramus branch only  4.  Right coronary artery has diffuse mild to moderate disease but no severe stenoses.  5.  Patent saphenous vein graft to high distal lateral wall branch (? Diagonal or marginal branch) which is diffusely diseased. Anastomosis appears normal.  6.  Occluded saphenous vein graft to distal diagonal/lateral wall branches, most likely the acute infarct vessel.  7.  Patent BENITEZ to LAD. Anastomosis appears normal.  8.  Successful PCI saphenous vein graft to distal diagonal/lateral with drug-eluting stent implant x1  9.  Recommend continuing Plavix therapy indefinitely, risk factor management for ASCVD.    Past Surgical History   I have reviewed this patient's surgical history and updated it with pertinent information if needed.  Past Surgical History:   Procedure Laterality Date    AMPUTATE TOE(S) Left     BYPASS GRAFT ARTERY CORONARY  01/01/2005    [place] [facility] with Dr. Holter    CARDIAC CATHETERIZATION  01/01/2005    CARDIAC CATHETERIZATION  11/25/2016    no intervention    CERVICAL DISC SURGERY  01/01/2014    CV ANGIOGRAM CORONARY GRAFT N/A 7/27/2023    Procedure: Coronary Angiogram Graft;  Surgeon: Maikel Tripathi MD;  Location: [facility] CATH LAB CV    CV PCI N/A 7/27/2023    Procedure: Percutaneous Coronary Intervention;  Surgeon: Maikel Tripathi MD;  Location: [facility] CATH LAB CV    IR LOWER EXTREMITY ANGIOGRAM LEFT  4/7/2023    IR LOWER EXTREMITY ANGIOGRAM LEFT   4/8/2020    IR LOWER EXTREMITY ANGIOGRAM LEFT  6/21/2023       Prior to Admission Medications   Prior to Admission Medications   Prescriptions Last Dose Informant Patient Reported? Taking?   ALPRAZolam (XANAX) 0.5 MG tablet 7/26/2023  Yes Yes   Sig: Take 0.5 tablets by mouth At Bedtime   Continuous Blood Gluc Sensor (FREESTYLE DAVID 14 DAY SENSOR) MISC   Yes No   HUMALOG 100 unit/mL injection 7/26/2023 at pm  Yes Yes   Sig: Inject Subcutaneous 3 times daily (before meals) Carb count: 3 g carb : 1 unit insulin   TECHLITE PEN NEEDLES 31G X 5 MM miscellaneous   Yes No   TRIAMCINOLONE ACETONIDE (NASACORT NASL) 7/26/2023 at am  Yes Yes   Sig: [TRIAMCINOLONE ACETONIDE (NASACORT NASL)] 1 spray into each nostril daily.   VITAMIN D3 2,000 unit capsule 7/26/2023 at am  Yes Yes   Sig: [VITAMIN D3 2,000 UNIT CAPSULE] Take 2,000 Units by mouth daily.   aspirin 81 MG EC tablet 7/26/2023 at am  Yes Yes   Sig: [ASPIRIN 81 MG EC TABLET] Take 81 mg by mouth daily.   atorvastatin (LIPITOR) 40 MG tablet 7/26/2023 at am  Yes Yes   Sig: Take 40 mg by mouth daily   clopidogrel (PLAVIX) 75 MG tablet 7/26/2023 at am  No Yes   Sig: Take 1 tablet (75 mg) by mouth daily   ezetimibe (ZETIA) 10 MG tablet 7/26/2023 at pm  No Yes   Sig: Take 1 tablet (10 mg) by mouth daily   fish oil-omega-3 fatty acids 1000 MG capsule 7/26/2023  Yes Yes   Sig: Take 1 capsule by mouth daily   furosemide (LASIX) 20 MG tablet 7/26/2023 at am  Yes Yes   Sig: Take 2 tablets by mouth daily   gabapentin (NEURONTIN) 300 MG capsule 7/26/2023 at pm  Yes Yes   Sig: [GABAPENTIN (NEURONTIN) 300 MG CAPSULE] Take 300 mg by mouth 3 (three) times a day.          insulin glargine (LANTUS) 100 unit/mL injection 7/27/2023 at am  Yes Yes   Sig: [INSULIN GLARGINE (LANTUS) 100 UNIT/ML INJECTION] Inject 30 Units under the skin every morning.    levothyroxine (SYNTHROID/LEVOTHROID) 125 MCG tablet 7/27/2023 at am  Yes Yes   Sig: Take 125 mcg by mouth daily   lisinopril (PRINIVIL,ZESTRIL)  40 MG tablet 7/26/2023 at am  Yes Yes   Sig: [LISINOPRIL (PRINIVIL,ZESTRIL) 40 MG TABLET] Take 40 mg by mouth daily.   metoprolol succinate ER (TOPROL XL) 50 MG 24 hr tablet 7/26/2023 at am  No Yes   Sig: Take 1 tablet (50 mg) by mouth daily   multivitamin with iron (ONE DAILY WITH IRON) Tab tablet 7/26/2023 at am  Yes Yes   Sig: [MULTIVITAMIN WITH IRON (ONE DAILY WITH IRON) TAB TABLET] Take 1 tablet by mouth daily.   nitroGLYcerin (NITROSTAT) 0.4 MG sublingual tablet 7/27/2023 at am  No Yes   Sig: Place 1 tablet (0.4 mg) under the tongue every 5 minutes as needed      Facility-Administered Medications: None     Allergies   Allergies   Allergen Reactions    Hydrochlorothiazide Unknown    Levofloxacin Diarrhea       Immunization History   Immunization History   Administered Date(s) Administered    COVID-19 MONOVALENT 12+ (Pfizer) 03/09/2021, 03/30/2021, 10/06/2021    Flu, Unspecified 09/21/2016       Social History   I have reviewed this patient's social history and updated it with pertinent information if needed. Dakota Bauer  reports that he quit smoking about 38 years ago. His smoking use included cigarettes. He has never used smokeless tobacco. He reports that he does not currently use alcohol. He reports that he does not use drugs.    Family History   I have reviewed this patient's family history and updated it with pertinent information if needed.   Family History   Problem Relation Age of Onset    Coronary Artery Disease Father     Coronary Artery Disease Brother        Review of Systems   The 10 point Review of Systems is negative other than noted in the HPI or here.     Physical Exam   Temp: 98  F (36.7  C) Temp src: Oral BP: 97/50 Pulse: 68   Resp: 18 SpO2: 91 % O2 Device: Nasal cannula Oxygen Delivery: 2 LPM  Vital Signs with Ranges  Temp:  [97.8  F (36.6  C)-101  F (38.3  C)] 98  F (36.7  C)  Pulse:  [66-95] 68  Resp:  [13-28] 18  BP: ()/(45-69) 97/50  SpO2:  [89 %-96 %] 91 %  223 lbs 4.8 oz  Body  mass index is 30.28 kg/m .    GENERAL APPEARANCE:  awake, NAD  EYES: Eyes grossly normal to inspection, conjunctivae and sclerae normal  HENT: mouth without ulcers or lesions  NECK: supple  RESP: normal breathing pattern  CV: regular rates and rhythm, S1 S2, click  LYMPHATICS: no swelling  ABDOMEN: soft, nontender, nondistended  MS: extremities normal- no gross deformities noted  SKIN: no suspicious lesions or rashes  NEURO: coherent      LABORATORY DATA:  Reviewed    CBC RESULTS:   Recent Labs   Lab Test 07/28/23  0448   WBC 10.8   RBC 3.41*   HGB 10.2*   HCT 32.2*   MCV 94   MCH 29.9   MCHC 31.7   RDW 14.8   *        Last Comprehensive Metabolic Panel:  Sodium   Date Value Ref Range Status   07/28/2023 136 136 - 145 mmol/L Final     Potassium   Date Value Ref Range Status   07/28/2023 5.2 3.4 - 5.3 mmol/L Final   06/05/2023 4.3 3.5 - 5.0 mmol/L Final     Chloride   Date Value Ref Range Status   07/28/2023 100 98 - 107 mmol/L Final   06/05/2023 103 98 - 107 mmol/L Final     Carbon Dioxide (CO2)   Date Value Ref Range Status   07/28/2023 17 (L) 22 - 29 mmol/L Final   06/05/2023 30 22 - 31 mmol/L Final     Anion Gap   Date Value Ref Range Status   07/28/2023 19 (H) 7 - 15 mmol/L Final   06/05/2023 6 5 - 18 mmol/L Final     Glucose   Date Value Ref Range Status   06/05/2023 126 (H) 70 - 125 mg/dL Final     GLUCOSE BY METER POCT   Date Value Ref Range Status   07/28/2023 400 (H) 70 - 99 mg/dL Final     Urea Nitrogen   Date Value Ref Range Status   07/28/2023 60.9 (H) 8.0 - 23.0 mg/dL Final   06/05/2023 38 (H) 8 - 28 mg/dL Final     Creatinine   Date Value Ref Range Status   07/28/2023 3.64 (H) 0.67 - 1.17 mg/dL Final     GFR Estimate   Date Value Ref Range Status   07/28/2023 17 (L) >60 mL/min/1.73m2 Final   06/17/2021 23 (L) >60 mL/min/1.73m2 Final     Calcium   Date Value Ref Range Status   07/28/2023 8.1 (L) 8.8 - 10.2 mg/dL Final     Bilirubin Total   Date Value Ref Range Status   07/28/2023 1.3 (H) <=1.2  mg/dL Final     Alkaline Phosphatase   Date Value Ref Range Status   07/28/2023 48 40 - 129 U/L Final     ALT   Date Value Ref Range Status   07/28/2023 87 (H) 0 - 70 U/L Final     Comment:     Reference intervals for this test were updated on 6/12/2023 to more accurately reflect our healthy population. There may be differences in the flagging of prior results with similar values performed with this method. Interpretation of those prior results can be made in the context of the updated reference intervals.       AST   Date Value Ref Range Status   07/28/2023 137 (H) 0 - 45 U/L Final     Comment:     Reference intervals for this test were updated on 6/12/2023 to more accurately reflect our healthy population. There may be differences in the flagging of prior results with similar values performed with this method. Interpretation of those prior results can be made in the context of the updated reference intervals.             No results found for: CRP         MICROBIOLOGY:    Reviewed    Blood cultures  Other Micro:  Streptococcal bacteremia    7-Day Micro Results       Collected Updated Procedure Result Status      07/28/2023 0212 07/28/2023 0218 Blood Culture Hand, Right [[id number]]   Blood from Hand, Right    In process Component Value   No component results               07/28/2023 0202 07/28/2023 0217 Blood Culture Arm, Right [[id number]]   Blood from Arm, Right    In process Component Value   No component results               07/27/2023 1247 07/27/2023 1331 Symptomatic Influenza A/B, RSV, & SARS-CoV2 PCR (COVID-19) Nasopharyngeal [[id number]]    Swab from Nasopharyngeal    Final result Component Value   Influenza A PCR Negative   Influenza B PCR Negative   RSV PCR Negative   SARS CoV2 PCR Negative   NEGATIVE: SARS-CoV-2 (COVID-19) RNA not detected, presumed negative.            07/27/2023 1231 07/28/2023 1416 Blood Culture Peripheral Blood [[id number]]   Peripheral Blood    Preliminary result Component  Value   Culture No growth after 1 day  [P]                07/27/2023 1222 07/28/2023 1447 Blood Culture Peripheral Blood [[id number]]   (Abnormal)   Peripheral Blood    Preliminary result Component Value   Culture Positive on the 1st day of incubation  [P]     Streptococcus dysgalactiae (Group C Streptococcus)  [P]     2 of 2 bottles               07/27/2023 1222 07/28/2023 0308 Verigene GP Panel [[id number]]    (Abnormal)   Peripheral Blood    Final result Component Value   Staphylococcus species Not Detected   Staphylococcus aureus Not Detected   Staphylococcus epidermidis Not Detected   Staphylococcus lugdunensis Not Detected   Enterococcus faecalis Not Detected   Enterococcus faecium Not Detected   Streptococcus species Detected   Positive for Streptococcus species other than Streptococcus pneumococcus, Streptococcus anginosus group, Streptococcus pyogenes and Streptococcus agalactiae. Performed using LIBCAST multiplex nucleic acid test. Final identification and antimicrobial susceptibility testing will be verified by standard methods.   Streptococcus agalactiae Not Detected   Streptococcus anginosus group Not Detected   Streptococcus pneumoniae Not Detected   Streptococcus pyogenes Not Detected   Listeria species Not Detected                   RADIOLOGY:    Cardiac Catheterization    Addendum Date: 7/28/2023      Ost Cx to Prox Cx lesion is 100% stenosed.   Prox LAD lesion is 100% stenosed.   Ramus lesion is 50% stenosed.   Prox RCA to Dist RCA lesion is 30% stenosed.   Prox Graft to Mid Graft lesion is 100% stenosed. 1.  Severe diffuse disease of native vessels with small caliber vessels distally. 2.  Chronic occlusion of native LAD and native left circumflex. 3.  Left main fills small narrow caliber ramus branch only 4.  Right coronary artery has diffuse mild to moderate disease but no severe stenoses. 5.  Patent saphenous vein graft to high distal lateral wall branch (? Diagonal or marginal branch)  which is diffusely diseased. Anastomosis appears normal. 6.  Occluded saphenous vein graft to distal diagonal/lateral wall branches, most likely the acute infarct vessel. 7.  Patent BENITEZ to LAD. Anastomosis appears normal. 8.  Successful PCI saphenous vein graft to distal diagonal/lateral with drug-eluting stent implant x1 9.  Recommend continuing Plavix therapy indefinitely, risk factor management for ASCVD.    Result Date: 2023    Ost Cx to Prox Cx lesion is 100% stenosed.   Prox LAD lesion is 100% stenosed.   Ramus lesion is 50% stenosed.   Prox RCA to Dist RCA lesion is 30% stenosed.   Prox Graft to Mid Graft lesion is 100% stenosed. 1.  Severe diffuse disease of native vessels with small caliber vessels distally. 2.  Chronic occlusion of native LAD and native left circumflex. 3.  Left main fills small narrow caliber ramus branch only 4.  Right coronary artery has diffuse mild to moderate disease but no severe stenoses. 5.  Patent saphenous vein graft to high diagonal branch which is diffusely diseased. Anastomosis appears normal. 6.  Occluded saphenous vein graft to distal diagonal/lateral wall branches, most likely the acute infarct vessel. 7.  Patent BENITEZ to LAD. Anastomosis appears normal. 8.  Successful PCI saphenous vein graft to distal diagonal/lateral with drug-eluting stent implant x1 9.  Recommend continuing Plavix therapy indefinitely, risk factor management for ASCVD.     Echocardiogram Complete    Result Date: 2023  [id number]^BRITTANY^GUME^[place] [address]  Name: DENY MONTERO MRN: [id number] : 1950 Study Date: 2023 03:39 PM Age: 72 yrs Gender: Male Patient Location: [facility] Reason For Study: Acute Myocardial Infarction Ordering Physician: GUME SOTO Referring Physician: GUME SOTO Performed By: BP  BSA: 2.2 m2 Height: 72 in Weight: 220 lb HR: 77 BP: 118/55 mmHg  ______________________________________________________________________________ Procedure Complete Portable Echo Adult. Definity (NDC #11511-035) given intravenously. Technically difficult study.Extremely difficult acoustic windows despite the use of contrast for endcardial border definition. ______________________________________________________________________________ Interpretation Summary  The left ventricle is normal in size. There is mild concentric left ventricular hypertrophy. The visual ejection fraction is 45-50%. Diastolic Doppler findings (E/E' ratio and/or other parameters) suggest left ventricular filling pressures are increased. Poor quality images but suspicion of inferior and apical hypokinesis. Normal right ventricle size and systolic function. The right ventricular systolic pressure is approximated at 19mmHg plus the right atrial pressure. Sinus rhythm was noted. Technically difficult, suboptimal study. Consider cardiac MRI for improved image quality. Prior echo from 3/8/23 was of better quality. Inferior wll was normal on that study. ______________________________________________________________________________ Left Ventricle The left ventricle is normal in size. There is mild concentric left ventricular hypertrophy. Diastolic Doppler findings (E/E' ratio and/or other parameters) suggest left ventricular filling pressures are increased. The visual ejection fraction is 45-50%. Poor quality images but suspicion of inferior and apical hypokinesis.  Right Ventricle Normal right ventricle size and systolic function.  Atria The left atrium is moderately dilated. Right atrial size is normal.  Mitral Valve The mitral valve is not well visualized. There is mild to moderate (1-2+) mitral regurgitation. There is no mitral valve stenosis.  Tricuspid Valve The tricuspid valve is not well visualized. There is mild (1+) tricuspid regurgitation. The right ventricular systolic pressure is approximated at 19mmHg  plus the right atrial pressure.  Aortic Valve Mild aortic valve calcification is present. The aortic valve is not well visualized. No aortic regurgitation is present. No aortic stenosis is present.  Pulmonic Valve The pulmonic valve is not well visualized. There is mild (1+) pulmonic valvular regurgitation. Right ventricular diastolic pressure is approximated at 8mmHg plus the right atrial pressure. There is no pulmonic valvular stenosis.  Vessels The aorta root is normal. IVC diameter and respiratory changes fall into an intermediate range suggesting an RA pressure of 8 mmHg.  Pericardium There is no pericardial effusion.  Rhythm Sinus rhythm was noted. ______________________________________________________________________________ MMode/2D Measurements & Calculations IVSd: 1.2 cm  LVIDd: 5.4 cm LVIDs: 4.8 cm LVPWd: 1.1 cm FS: 11.2 % LV mass(C)d: 242.0 grams LV mass(C)dI: 109.1 grams/m2 Ao root diam: 2.9 cm LA dimension: 4.9 cm LA/Ao: 1.7 LVOT diam: 2.3 cm LVOT area: 4.2 cm2  EF(MOD-bp): 37.1 % LA Volume Indexed (AL/bp): 38.1 ml/m2 RWT: 0.41 TAPSE: 1.8 cm  Time Measurements MM HR: 77.0 BPM  Doppler Measurements & Calculations MV E max sagar: 99.0 cm/sec MV A max asgar: 55.7 cm/sec MV E/A: 1.8 MV dec time: 0.20 sec Ao V2 max: 105.0 cm/sec Ao max P.0 mmHg Ao V2 mean: 79.6 cm/sec Ao mean PG: 3.0 mmHg Ao V2 VTI: 20.4 cm ELIZABETH(I,D): 3.9 cm2 ELIZABETH(V,D): 3.8 cm2 LV V1 max PG: 3.6 mmHg LV V1 max: 95.3 cm/sec LV V1 VTI: 19.1 cm SV(LVOT): 79.4 ml SI(LVOT): 35.8 ml/m2 PA acc time: 0.09 sec PI end-d sagar: 138.0 cm/sec TR max sagar: 217.0 cm/sec TR max P.8 mmHg AV Sagar Ratio (DI): 0.91 ELIZABETH Index (cm2/m2): 1.8 E/E': 16.2 E/E' av.3  Lateral E/e': 16.4 Medial E/e': 18.2 Peak E' Sagar: 6.1 cm/sec RV S Sagar: 7.8 cm/sec  ______________________________________________________________________________ Report approved by: Davon Allen 2023 04:59 PM       XR Chest Port 1 View    Result Date: 2023  EXAM: XR CHEST PORT 1  VIEW LOCATION: [facility] DATE: 7/27/2023 INDICATION: Chest pain COMPARISON: 04/05/2021     IMPRESSION: Right costophrenic angle is clipped. Poststernotomy changes. Heart is magnified due to projection. Lungs are clear. No overt signs of failure or pneumonia.    US Low Ext Arterial Dop Seg Pres w/o Exercise    Result Date: 7/7/2023  Table formatting from the original result was not included. BILATERAL RESTING ANKLE-BRACHIAL INDICES (TRAE'S) (Date: 07/06/23) Indication: Surveillance TRAE's: PAD. S/P IR Left Balloon Angiogram ( Pedal Arch, DPA, ISAIAS) done 6/21/2023. Hx: Left Transmetatarsal Amputation done 9/3/2021. Previous: 2/7/2023 Bilateral ANDREINA/ TRAE's w/o, 4/7/2023 IR Left Angiogram. History: Previous Smoker, Hypertension, Diabetic, Hyperlipidemia, PAD, and Angioplasty  Resting TRAE's          Right: mmHg Index     Brachial: 158  Ankle-(PT): 157 0.99 Ankle-(DP): >254 NC          Digit: 47 0.30               Left: mmHg Index     Brachial: 157  Ankle-(PT): >254 NC Ankle-(DP): >254 NC          Digit: AMP N/A Resting ankle-brachial index of 0.99 on the right. Toe Pressures of 47 mmHg and TBI of 0.30 Resting ankle-brachial index is non compressible on the left.  VPR WAVEFORMS: The right volume plethysmography waveforms are mildly abnormal at the lower thigh level, mildly abnormal at the upper calf level and mildly abnormal at the ankle. The left volume plethysmography waveforms are mildly abnormal at the lower thigh level, mildly abnormal at the upper calf level and mildly abnormal at the ankle.  Impression:  1. RIGHT LOWER EXTREMITY: TRAE is Non-diagnostic indicating vessel calcification. Toe Pressures are Abnormal and impaired for wound healing with toe pressures of 47 mmHg. 2. LEFT LOWER EXTREMITY: TRAE is Non-diagnostic indicating vessel calcification. Toe Pressures are Un-interpretable and Non-diagnostic. Reference: Wound classification Grade TRAE Ankle Systolic Pressure Toe Pressures 0 > 0.80 >  100 mmHg > 60 mmHg 1 0.6 - 0.79 70 - 100 mmHg 40 - 59 mmHg 2 0.4 - 0.59 50-70 mmHg 30 - 39 mmHg 3 < 0.39 < 50 mmHg < 30 mmHg Digit Pressures DBI Disease Category > 0.70 Normal < 0.70 Abnormal > 30 mmHg Potential wound healing < 30 mmHg Impaired wound healing Ankle Brachial Pressures TRAE Disease Category > 1.3  Likely vessel calcification with monophasic waveforms, non-diagnostic 0.95-1.30 Normal with multiphasic waveforms 0.50-0.95 Single level disease 0.30-0.50 Multilevel disease < 0.30 Critical limb ischema Volume Plethysmography Recording (VPR) at all levels Normal Sharp systolic peak, fast upstroke, prominent dicrotic notch in wave Mild Sharp systolic peak, fast upstroke, absent dicrotic notch in wave Moderate Flattened systolic peak, slowed upstroke, absent dicrotic notch inwave Severe amplitude wave with = upslope and down slope Occluded Flat Line      US Lower Extremity Arterial Duplex Left    Result Date: 7/7/2023  Table formatting from the original result was not included. Arterial Duplex Ultrasound (Date: 07/06/23) Lower Extremity Artery Evaluation Indication: Surveillance Left Leg Arterial: PAD. S/P IR Left Balloon Angiogram ( Pedal Arch, DPA, ISAIAS) done 6/21/2023. Hx: Left Transmetatarsal Amputation done 9/3/2021. Previous: 2/7/2023 Bilateral ANDREINA/ TRAE's w/o, 4/7/2023 IR Left Angiogram. History: Previous Smoker, Hypertension, Diabetic, Hyperlipidemia, PAD, and Angioplasty  Technique: Duplex imaging is performed utilizing gray-scale, two-dimensional images, and color-flow imaging. Doppler waveform analysis and spectral Doppler imaging is also performed. LOWER EXTREMITY ARTERIAL DUPLEX EXAM WITH WAVEFORMS Right Leg:(cm/s) Location: Velocities Waveforms PTA:   23   M ISAIAS:   57  M DPA:   50  M Waveforms: T=Triphasic, M=Monophasic, B=Biphasic Left Leg:(cm/s) Location: Velocities Waveforms EIA:   141  T CFA:   201  T PFA:   138  B SFA Proximal:   158  T SFA Mid:   114  M SFA Distal:   209 /  145 M Popliteal  Artery:   167 / 135   M PTA:   24   M ISAIAS:   112  M DPA:   135  M Waveforms: T=Triphasic, M=Monophasic, B=Biphasic Stenotic Profile Ratio: 209 / 114 = 1.83  Impression: Right Lower Extremity: Not fully examined but monophasic flow in tibial vessels suggest more proximal disease Left Lower Extremity: Triphasic flow in common femoral artery suggesting no inflow disease.  Transition to monophasic flow within the level of mid SFA consistent with underlying hemodynamically significant disease.  Monophasic flow remains distally Reference: Category Normal 1-19% 20-49% 50-99% Occluded PSV <160 cm/sec without spectral broadening <160 cm/sec with spectral broadening Increased Increased Absent Flow Ratio N/A N/A < 2.0 >2.0 N/A Post-Stenotic Turbulence No No No Yes N/A

## 2023-07-28 NOTE — PROGRESS NOTES
07/28/23 0855   Appointment Info   Signing Clinician's Name / Credentials (OT) Gricel Rain, OTR/L   Living Environment   People in Home spouse   Current Living Arrangements house   Home Accessibility stairs within home   Number of Stairs, Within Home, Primary greater than 10 stairs   Living Environment Comments bedroom and shower upstairs   Self-Care   Usual Activity Tolerance good   Current Activity Tolerance moderate   Equipment Currently Used at Home other (see comments)  (L walking boot)   Fall history within last six months no   Activity/Exercise/Self-Care Comment ind for BADLs   Instrumental Activities of Daily Living (IADL)   IADL Comments ind for IADLs, very active, does yard work   General Information   Onset of Illness/Injury or Date of Surgery 07/27/23   Referring Physician Maikel Tripathi MD   Patient/Family Therapy Goal Statement (OT) feel better   Additional Occupational Profile Info/Pertinent History of Current Problem admitted with NSTEMI s/p PCI, symptoms of chest pain. pt reports past cardiac event in 2017   Existing Precautions/Restrictions cardiac;fall;weight bearing  (pt reports he needs to wear boot whenever he's walking)   General Observations and Info angio via groin   Cognitive Status Examination   Affect/Mental Status (Cognitive) WFL   Follows Commands WFL   Pain Assessment   Patient Currently in Pain Yes, see Vital Sign flowsheet   Range of Motion Comprehensive   General Range of Motion no range of motion deficits identified   Strength Comprehensive (MMT)   General Manual Muscle Testing (MMT) Assessment no strength deficits identified   Bed Mobility   Bed Mobility supine-sit   Supine-Sit [place] (Bed Mobility) supervision   Transfers   Transfers sit-stand transfer   Sit-Stand Transfer   Sit-Stand [place] (Transfers) contact guard   Sit/Stand Transfer Comments unsteady when standing - wanting to sit back down   Balance   Balance Comments CGA static standing   Activities of  Daily Living   BADL Assessment/Intervention lower body dressing   Lower Body Dressing Assessment/Training   Comment, (Lower Body Dressing) to don walking boot - pt reports being shaky   [place] Level (Lower Body Dressing) minimum assist (75% patient effort)   Clinical Impression   Criteria for Skilled Therapeutic Interventions Met (OT) Yes, treatment indicated   OT Diagnosis dec functional mobility   OT Problem List-Impairments impacting ADL problems related to;activity tolerance impaired;mobility;balance;post-surgical precautions   Assessment of Occupational Performance 3-5 Performance Deficits   Identified Performance Deficits household mobility, functional transfers, community mobility, dressing, IADLs   Planned Therapy Interventions (OT) transfer training;home program guidelines;progressive activity/exercise;risk factor education   Clinical Decision Making Complexity (OT) moderate complexity   Risk & Benefits of therapy have been explained evaluation/treatment results reviewed;participants included;patient;spouse/significant other   OT Total Evaluation Time   OT Eval, Moderate Complexity Minutes (22525) 10   OT Goals   Therapy Frequency (OT) 2 times/day   OT Predicted Duration/Target Date for Goal Attainment 07/30/23   OT Goals Cardiac Phase 1   OT: Understanding of cardiac education to maximize quality of life, condition management, and health outcomes Patient;Verbalize;Demonstrate   OT: Perform aerobic activity with stable cardiovascular response intermittent;10 minutes   OT: Functional/aerobic ambulation tolerance with stable cardiovascular response in order to return to home and community environment Supervision/SBA;Standard walker;Greater than 300 feet   OT: Navigation of stairs simulating home set up with stable cardiovascular response in order to return to home and community environment Supervision/SBA;Greater than 10 stairs   Self-Care/Home Management   Self-Care/Home Mgmt/ADL, Compensatory, Meal  Prep Minutes (29370) 10   Symptoms Noted During/After Treatment (Meal Preparation/Planning Training) none   Treatment Detail/Skilled Intervention See cardiac educ.   Therapeutic Procedures/Exercise   Therapeutic Procedure: strength, endurance, ROM, flexibillity minutes (48421) 15   Symptoms Noted During/After Treatment fatigue   Treatment Detail/Skilled Intervention See ambulation   Treatment Time Includes (CR Only) Monitoring of vital signs (see vital signs flowsheet for details)   Ambulation   Workload 250ft   Symptoms Fatigue  (Shaky)   Cardiovascular Response Normal   Exercise Details CGA/SBA FWW looney walk with L boot on; pt heavily using FWW   Vital Signs Details WNL on 2L O2   Cardiac Education   Education Provided Diagnosis;Emergency plan;Home exercise program;OMNI Scale;Outpatient Cardiac Rehab;Precautions;Resuming home activities;Risk factors;Signs and symptoms;Stop light tool   Education Packet Given to Patient Yes   All Patient Education Handouts Reviewed with Patient and/or Family Yes   Cardiac Rehab Phase II Plan   Phase II Order Received Yes   Phase II Appointment Status Scheduled   Date/Time 9/14/23   OT Discharge Planning   OT Plan to clinic for stairs, progress amb without FWW   OT Discharge Recommendation (DC Rec) home with assist;home with outpatient cardiac rehab   OT Rationale for DC Rec pt lives with supportive spouse and shows good cardiac tolerance for activity.   OT Brief overview of current status CGA/SBA with FWW for looney walk, provided cardiac educ, needs to do stairs prior to d/c   Total Session Time   Timed Code Treatment Minutes 25   Total Session Time (sum of timed and untimed services) 35

## 2023-07-28 NOTE — PROGRESS NOTES
'    RENAL ([facility]) progress note  CC: F/U ASHLEY on CKD-4, NSTEMI  S: Since last visit, Dakota underwent cor angio 7/27 with angioplasty/BINA to SVG that was thought causative for NSTEMI. Feels better today with no CP, dyspnea. No pain at angio site. Denies dizziness, nausea. 2/2 blood cultures from admit growing GPC chains/pairs but subsequent cultures NGTD. WBC 11K on admit. Started empirically on Vanco.            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 Creatinine baseline 2.5-2.8 mg/dl when last seen by Dr. Bhatia in 5/2023. Has had spikes to mid-3's in  2022 and currently worsened function in setting of NSTEMI. Has had 2 angiograms of LLE since April that may be contributing to his worsened kidney function as well but no change in renal function noted after June procedure at least. No uremic symptoms.  -Patient understands risk of acute kidney injury from contrast   - Okay to proceed with coronary angiogram.              -We will trend renal function closely.              -Discussed dialysis procedure and indications for emergent dialysis in the event of acute kidney injury with the patient although he was noncommittal at this time and no aucte indication.              - Recommend ESRD education soon as outpt.     Coronary artery disease with previous CABG 18 years ago and now non-STEMI with troponin 289.  S/P Cor angio 7/27 with BINA to SVG graft.    -Currently pain-free      GPC bacteremia 7/27. WBC was elevated but no fevers or other recent infectious symptoms. Started empirically on Vanco. Trend subsequent cultures.     Type 1 diabetes mellitus with nephropathy, peripheral artery disease, neuropathy              - Diabetes has been well reasonably controlled with Hgb A1c 7.4%  - Management per Hospital med     PAD with previous left TMA 2020: Peripheral angio noted 6/2023 with angioplasties and previously angioplasty in April 2023.     CKD-MBD: Stable calcium, PTH 66 (11/30/2022).  Continue cholecalciferol 2000 units  daily.     Anemia due to CKD: Hgb decreased 2 g/dl since April. Check iron stores. No need for EPO. Monitor as inpt.     HTN with CKD and CHF (LVH noted on ECHO). BP low at present on NTG gtt. Chronically on Lisinopril 40 mg/day, Lasix 40 mg/day and metoprolol 50 mg/day.               - Agree with holding Lisinopril given low BP and worsened renal function for now but chronically would like to resume.       Teja Elam MD  [facility]  [phone] (off)       No interval changes to past medical history, social history or family history to report.    /50 (BP Location: Right arm)   Pulse 73   Temp 100.4  F (38  C) (Oral)   Resp 18   Ht 1.829 m (6')   Wt 101.3 kg (223 lb 4.8 oz)   SpO2 92%   BMI 30.28 kg/m      I/O last 3 completed shifts:  In: 103.75 [I.V.:103.75]  Out: 200 [Urine:200]    Physical Exam:   GENERAL: calm and comfortable, alert  EYES: pupils equal, sclerae not icteric.  ENT: Hearing normal, oral mucosa moist.  RESP: Clear to auscultation bilaterally with no respiratory distress, normal effort.  CV: RRR, no murmurs. Trace leg edema.    GI: Active BS, Soft, NT/ND, no masses or HSM  : No CVA tenderness   SKIN: No rash, warm/ dry  MSK: S/P left TMA  NEURO: Moves all extremities, no tremor. Sensation grossly intact to LT  PSYCH: Appropriate mood and affect    Recent Labs   Lab 07/28/23  0448 07/27/23  1222    137   POTASSIUM 5.2 4.7   CHLORIDE 100 101   CO2 17* 13*   BUN 60.9* 53.5*   CR 3.64* 3.43*   GFRESTIMATED 17* 18*   SVITLANA 8.1* 8.5*   MAG  --  2.0   ALBUMIN 3.2* 3.5       Recent Labs   Lab 07/28/23  0448 07/27/23  1222   WBC 10.8 11.3*   HGB 10.2* 10.6*   HCT 32.2* 32.0*   MCV 94 92   * 154         Cor Angio 7/27/23: (Visipaque 116 ml)  1.  Severe diffuse disease of native vessels with small caliber vessels distally.  2.  Chronic occlusion of native LAD and native left circumflex.  3.  Left main fills small narrow caliber ramus branch only  4.   Right coronary artery has diffuse mild to moderate disease but no severe stenoses.  5.  Patent saphenous vein graft to high diagonal branch which is diffusely diseased. Anastomosis appears normal.  6.  Occluded saphenous vein graft to distal diagonal/lateral wall branches, most likely the acute infarct vessel.  7.  Patent BENITEZ to LAD. Anastomosis appears normal.  8.  Successful PCI saphenous vein graft to distal diagonal/lateral with drug-eluting stent implant x1  9.  Recommend continuing Plavix therapy indefinitely, risk factor management for ASCVD.        Current Facility-Administered Medications:     acetaminophen (TYLENOL) tablet 650 mg, 650 mg, Oral, Q4H PRN, Maikel Tripathi MD    ALPRAZolam (XANAX) tablet 0.25 mg, 0.25 mg, Oral, At Bedtime, Maikel Tripathi MD, 0.25 mg at 07/27/23 2113    aspirin EC tablet 81 mg, 81 mg, Oral, Daily, Maikel Tripathi MD, 81 mg at 07/28/23 0837    atorvastatin (LIPITOR) tablet 40 mg, 40 mg, Oral, Daily, Maikel Tripathi MD, 40 mg at 07/28/23 0754    bisacodyl (DULCOLAX) suppository 10 mg, 10 mg, Rectal, Daily PRN, Maikel Tripathi MD    clopidogrel (PLAVIX) tablet 75 mg, 75 mg, Oral, Daily, Maikel Tripathi MD, 75 mg at 07/28/23 0753    Continuing antiplatelet from home medication list OR antiplatelet order already placed during this visit, , Does not apply, DOES NOT GO TO Bhumi LEAVITT Kenneth W, MD    Continuing beta blocker from home medication list OR beta blocker order already placed during this visit, , Does not apply, DOES NOT GO TO Bhumi LEAVITT Kenneth W, MD    Continuing statin from home medication list OR statin order already placed during this visit, , Does not apply, DOES NOT GO TO Bhumi LEAVITT Kenneth W, MD    glucose gel 15-30 g, 15-30 g, Oral, Q15 Min PRN **OR** dextrose 50 % injection 25-50 mL, 25-50 mL, Intravenous, Q15 Min PRN **OR** glucagon injection 1 mg, 1 mg, Subcutaneous, Q15 Min PRN, Maikel Tripathi MD    ezetimibe (ZETIA) tablet 10 mg, 10 mg, Oral,  QPM, Maikel Tripathi MD, 10 mg at 07/27/23 2113    gabapentin (NEURONTIN) capsule 300 mg, 300 mg, Oral, BID, Maikel Tripathi MD, 300 mg at 07/28/23 0754    HOLD: Metformin and metformin containing medications on day of procedure and 48 hours after IV contrast given for patients with acute kidney injury or severe chronic kidney disease (stage IV or stage V; i.e., eGFR less than 30), , Does not apply, HOLD, Maikel Tripathi MD    hydrALAZINE (APRESOLINE) injection 10 mg, 10 mg, Intravenous, Q4H PRN, Maikel Tripathi MD    insulin aspart (NovoLOG) injection (RAPID ACTING), , Subcutaneous, TID AC, Remy Phipps DO, 16 Units at 07/28/23 1003    insulin aspart (NovoLOG) injection (RAPID ACTING), 1-6 Units, Subcutaneous, TID w/meals, Remy Phipps DO    insulin glargine (LANTUS PEN) injection 30 Units, 30 Units, Subcutaneous, QAM, Maikel Tripathi MD, 30 Units at 07/28/23 0836    levothyroxine (SYNTHROID/LEVOTHROID) tablet 125 mcg, 125 mcg, Oral, Daily, Maikel Tripathi MD, 125 mcg at 07/28/23 0754    lidocaine (LMX4) cream, , Topical, Q1H PRN, Maikel Tripathi MD    lidocaine 1 % 0.1-1 mL, 0.1-1 mL, Other, Q1H PRN, Maikel Tripathi MD    [Held by provider] lisinopril (ZESTRIL) tablet 40 mg, 40 mg, Oral, Daily, Maikel Tripathi MD    melatonin tablet 1 mg, 1 mg, Oral, At Bedtime PRN, Maikel Tripathi MD    metoprolol (LOPRESSOR) injection 5 mg, 5 mg, Intravenous, Q15 Min PRN, Maikel Tripathi MD    metoprolol succinate ER (TOPROL XL) 24 hr tablet 50 mg, 50 mg, Oral, Daily, Maikel Tripathi MD    naloxone (NARCAN) injection 0.2 mg, 0.2 mg, Intravenous, Q2 Min PRN **OR** naloxone (NARCAN) injection 0.4 mg, 0.4 mg, Intravenous, Q2 Min PRN **OR** naloxone (NARCAN) injection 0.2 mg, 0.2 mg, Intramuscular, Q2 Min PRN **OR** naloxone (NARCAN) injection 0.4 mg, 0.4 mg, Intramuscular, Q2 Min PRN, Remy Phipps,     nitroGLYcerin (NITROSTAT) sublingual tablet 0.4 mg, 0.4 mg, Sublingual, Q5 Min  PRN, Maikel Tripathi MD    nitroGLYcerin 50 mg in D5W 250 mL (adult std) infusion CENTRAL,  mcg/min, Intravenous, Continuous, Maikel Tripathi MD, Stopped at 07/27/23 1734    ondansetron (ZOFRAN ODT) ODT tab 4 mg, 4 mg, Oral, Q6H PRN **OR** ondansetron (ZOFRAN) injection 4 mg, 4 mg, Intravenous, Q6H PRN, Maikel Tripathi MD, 4 mg at 07/28/23 0148    oxyCODONE (ROXICODONE) tablet 5 mg, 5 mg, Oral, Q4H PRN **OR** oxyCODONE (ROXICODONE) tablet 10 mg, 10 mg, Oral, Q4H PRN, Maikel Tripathi MD    Patient is already receiving anticoagulation with heparin, enoxaparin (LOVENOX), warfarin (COUMADIN)  or other anticoagulant medication, , Does not apply, Continuous PRN, Maikel Tripathi MD    Percutaneous Coronary Intervention orders placed (this is information for BPA alerting), , Does not apply, DOES NOT GO TO Bhumi LEAVITT Kenneth W, MD    piperacillin-tazobactam (ZOSYN) 3.375 g vial to attach to  mL bag, 3.375 g, Intravenous, Q8H, Remy Phipps DO    sodium chloride (PF) 0.9% PF flush 3 mL, 3 mL, Intracatheter, Q8H, Maikel Tripathi MD, 3 mL at 07/28/23 0610    sodium chloride (PF) 0.9% PF flush 3 mL, 3 mL, Intracatheter, q1 min prn, Maikel Tripathi MD    vancomycin place galvez - receiving intermittent dosing, 1 each, Intravenous, See Admin Instructions, Ash Fernandez MD      Labs personally reviewed today during this evaluation at 10:54 AM

## 2023-07-28 NOTE — PROGRESS NOTES
[facility]    Medicine Progress Note - Hospitalist Service    Date of Admission:  7/27/2023    Assessment & Plan   NSTEMI  CAD  -s/p coronary angiogram 7/27/23 with BINA to SVG-distal diagonal/lateral  -esvin asa/plavix PTA meds  -statin, BB  -TTE 7/27/23 showed LVEF 45-50%, increased LV filling pressures, inf/apical hypokinesis      Acute hypoxic respitatory failure  -wean O2 as able to goal sPO2 > 90%  -consider Lasix if remains hypoxia    [facility] bacteremia: has LLE skin graft and wound concerning as source.   -IV Vanc/zosyn  -ID consult  -repeat BC today    HLD:  -LDL 47 (6/2023)  -lipitor 40 at bedtime     DM 1  -lantus 30 U qam  -novology 1U:3g CHO ac tid, sliding scale insulin ac TID, accuchecks ac/hs     HTN  -BB, NTG gtt per cardiology, hold lisinopril for now     PAD, s/p PBA left pedal arch, DPA, ISAIAS (6/2023).     CKD stage IV  -Cr slightly up. Monitor closley  -monitor renal fnction closely  -appreciate nephrology assistance    Double vision: believes new. Horizontal and occurs only with both eyes open not unilateral.  -MRI brain          Diet: Combination Diet Moderate Consistent Carb (60 g CHO per Meal) Diet; Low Saturated Fat Na <2400mg Diet    DVT Prophylaxis: Pneumatic Compression Devices  Mohan Catheter: Not present  Lines: None     Cardiac Monitoring: ACTIVE order. Indication: Post- PCI/Angiogram (24 hours)  Code Status: Full Code      Clinically Significant Risk Factors Present on Admission          # Hypocalcemia: Lowest Ca = 8.1 mg/dL in last 2 days, will monitor and replace as appropriate    # Anion Gap Metabolic Acidosis: Highest Anion Gap = 23 mmol/L in last 2 days, will monitor and treat as appropriate  # Hypoalbuminemia: Lowest albumin = 3.2 g/dL at 7/28/2023  4:48 AM, will monitor as appropriate   # Drug Induced Platelet Defect: home medication list includes an antiplatelet medication   # Hypertension: Noted on problem list      # Obesity: Estimated body mass index  is 30.28 kg/m  as calculated from the following:    Height as of this encounter: 1.829 m (6').    Weight as of this encounter: 101.3 kg (223 lb 4.8 oz).            Disposition Plan     Expected Discharge Date: 07/29/2023                  Remy Phipps DO, DO  Hospitalist Service  [facility]  Securely message with [employer] (more info)  Text page via [facility] Paging/Directory   ______________________________________________________________________    Interval History   Febrile overnight. HD stable. Elevated sugars.    Physical Exam   Vital Signs: Temp: 98  F (36.7  C) Temp src: Oral BP: 97/50 Pulse: 68   Resp: 18 SpO2: 91 % O2 Device: Nasal cannula Oxygen Delivery: 2 LPM  Weight: 223 lbs 4.8 oz    General appearance - alert, and in no distress  Eyes - pupils equal and reactive, extraocular eye movements intact, sclera anicteric  Lungs - clear to auscultation, no wheezes, rales or rhonchi, symmetric air entry  Heart - normal rate, regular rhythm, +murmur, No peripheral edema.  Abdomen - soft, nontender, nondistended, no masses or organomegaly, BS+  Neurological - alert, oriented, normal speech, double horizontal vision when both eyes open, no diplopia when one eye closed. Grossly intact otherwise. Remainder cn ii-xii intact  Skin - no c/c/p. Patient would not let me unwrap LLE surgical skin graft site     Lab/imagin

## 2023-07-28 NOTE — PHARMACY-VANCOMYCIN DOSING SERVICE
Pharmacy Vancomycin Initial Note  Date of Service 2023  Patient's  1950  72 year old, male  Indication: Bacteremia  Current estimated CrCl = Estimated Creatinine Clearance: 23.8 mL/min (A) (based on SCr of 3.43 mg/dL (H)).  Creatinine for last 3 days  2023: 12:22 PM Creatinine 3.43 mg/dL     Start     Dose/Rate Route Frequency Ordered Stop    23 0200  vancomycin (VANCOCIN) 2,000 mg in sodium chloride 0.9 % 500 mL intermittent infusion         20 mg/kg × 99.8 kg  over 2 Hours Intravenous ONCE 23 01323 013  vancomycin place galvez - receiving intermittent dosing         1 each Intravenous SEE ADMIN INSTRUCTIONS 23 01323 1430  [Held by provider]  lisinopril (ZESTRIL) tablet 40 mg        (Held by provider since Thu 2023 at 1427 by Remy Phipps DO.Hold Reason: NPO)   Note to Pharmacy: PTA Sig:[LISINOPRIL (PRINIVIL,ZESTRIL) 40 MG TABLET] Take 40 mg by mouth daily.      40 mg Oral DAILY 23 1427              Contrast Orders - past 72 hours (72h ago, onward)      Start     Dose/Rate Route Frequency Stop    23 1820  iodixanol (VISIPAQUE 320) injection  Status:  Discontinued           ONCE PRN 23 1836    23 1600  perflutren lipid microsphere (DEFINITY) injection SUSP 3 mL         3 mL Intravenous ONCE 23 1556          Plan:  Give vancomycin  2000 mg IV once.   Vancomycin monitoring method: Trough (Method 2 = manual dose calculation)  Vancomycin therapeutic monitoring goal: 15-20 mg/L / Intermittent dosing for now  Pharmacy will check vancomycin levels as appropriate in 1-3 Days.    Serum creatinine levels will be ordered daily for the first week of therapy and at least twice weekly for subsequent weeks.      Gricel Ruvalcaba [facility]

## 2023-07-28 NOTE — PLAN OF CARE
Problem: Diabetes Comorbidity  Goal: Blood Glucose Level Within Targeted Range  Outcome: Not Progressing       Problem: Cardiac Catheterization (Diagnostic/Interventional)  Goal: Stable Heart Rate and Rhythm  7/28/2023 1253 by Tavia Pereira RN  Outcome: Progressing     Problem: Cardiac Catheterization (Diagnostic/Interventional)  Goal: Absence of Bleeding  7/28/2023 1253 by Tavia Pereira, RN  Outcome: Progressing     Problem: Cardiac Catheterization (Diagnostic/Interventional)  Goal: Acceptable Pain Control  7/28/2023 1253 by Tavia Pereira RN  Outcome: Progressing     Pt A/O. Denied pain and SOB. Pt on 1L NC, attempted to wean to room air, sats dropped to mid 80s. Right groin site WNL. Right posterior tibial pulse absent with doppler, cardiologist notified, not a concern per MD. BG this shift 369 and 400. Hospitalist notified, new orders noted. Pt reported double vision this shift, hospitalist notified and orders for MRI placed.

## 2023-07-28 NOTE — CONSULTS
Care Management Follow Up    Length of Stay (days): 1    Expected Discharge Date: 07/29/2023     Concerns to be Addressed:     discharge planning  Patient plan of care discussed at interdisciplinary rounds: Yes    Anticipated Discharge Disposition:  home no needs     Anticipated Discharge Services:  n/a  Anticipated Discharge DME:  n/a    Patient/family educated on Medicare website which has current facility and service quality ratings:  n/a  Education Provided on the Discharge Plan: yes   Patient/Family in Agreement with the Plan:  yes    Referrals Placed by CM/SW:  n/a  Private pay costs discussed: Not applicable    Additional Information:  SW met with pt to introduce role of CM, complete  initial assessment, and to discuss needs at time of d/c. Pt comes from home; lives with spouse, and noted to be independent at baseline. Pt feels that there will be no needs from CM at discharge, and will follow with PCP if needs arise post discharge.    CM to continue to follow through hospitalization.  9:58 AM    SW spoke with OT; recommending home care (pt agreeable). PT consulted and will follow for recommendations. Referral made to [facility] for home PT OT (pending). Care management following.  2:08 PM    GUDELIA Angel  7/28/2023

## 2023-07-28 NOTE — PROGRESS NOTES
Cardiology Progress Note    Assessment/Plan:    Non-ST elevation myocardial infarction likely secondary to failed coronary bypass graft to OM, status post PCI.  Mild LV dysfunction noted on echo.  Starting cardiac rehab.  Likely stable in a.m. for discharge from cardiac standpoint.  Peripheral arterial disease recently managed on aspirin and clopidogrel  CKD 4, with slight increase in creatinine today following angiogram.  Will reassess in a.m.    Principal Problem:    NSTEMI (non-ST elevated myocardial infarction) (H)  Active Problems:    CKD (chronic kidney disease) stage 4, GFR 15-29 ml/min (H)     LOS: 1 day     Subjective:  No chest pain since percutaneous intervention yesterday.  Breathing stable today.  Fatigued with walking.      Objective:   Vital signs in last 24 hours:  Vitals:    07/28/23 0450 07/28/23 0611 07/28/23 0751 07/28/23 1154   BP: 124/60  103/50 91/45   BP Location: Right arm  Right arm Left arm   Patient Position: Semi-Hawley's      Pulse: 95  73 66   Resp: 20  18 18   Temp: (!) 101  F (38.3  C) (!) 100.6  F (38.1  C) 100.4  F (38  C) 98  F (36.7  C)   TempSrc: Oral Oral Oral Oral   SpO2: 92%  92% 92%   Weight: 101.3 kg (223 lb 4.8 oz)      Height:         Weight:   Wt Readings from Last 3 Encounters:   07/28/23 101.3 kg (223 lb 4.8 oz)   06/05/23 103.4 kg (228 lb)   04/07/23 98.9 kg (218 lb)           PHYSICAL EXAM      Respiratory:  Normal breath sounds, No respiratory distress, No wheezing, No chest tenderness.   Cardiovascular:   Normal heart rate, Normal rhythm, 2 out of 6 harsh systolic murmur left sternal border.  GI:  Bowel sounds normal, Soft, No tenderness, No masses  Extremities: no edema       Cardiographics:   Telemetry sinus rhythm, 60 to 90 bpm.    Overnight oxygen desaturation noted    Echocardiogram yesterday:  The left ventricle is normal in size.  There is mild concentric left ventricular hypertrophy.  The visual ejection fraction is 45-50%.  Diastolic Doppler findings (E/E'  ratio and/or other parameters) suggest left  ventricular filling pressures are increased.  Poor quality images but suspicion of inferior and apical hypokinesis.  Normal right ventricle size and systolic function.  The right ventricular systolic pressure is approximated at 19mmHg plus the  right atrial pressure.  Sinus rhythm was noted.  Technically difficult, suboptimal study. Consider cardiac MRI for improved  image quality.  Prior echo from 3/8/23 was of better quality. Inferior wll was normal on that  study.    Imaging:   Coronary angiography with intervention yesterday.  Images personally reviewed.  1.  Severe diffuse disease of native vessels with small caliber vessels distally.  2.  Chronic occlusion of native LAD and native left circumflex.  3.  Left main fills small narrow caliber ramus branch only  4.  Right coronary artery has diffuse mild to moderate disease but no severe stenoses.  5.  Patent saphenous vein graft to high diagonal branch which is diffusely diseased. Anastomosis appears normal.  6.  Occluded saphenous vein graft to distal diagonal/lateral wall branches, most likely the acute infarct vessel.  7.  Patent BENITEZ to LAD. Anastomosis appears normal.  8.  Successful PCI saphenous vein graft to distal diagonal/lateral with drug-eluting stent implant x1  9.  Recommend continuing Plavix therapy indefinitely, risk factor management for ASCVD.    Lab Results:   Lab Results   Component Value Date    WBC 10.8 07/28/2023    HGB 10.2 (L) 07/28/2023    HCT 32.2 (L) 07/28/2023     (L) 07/28/2023    CHOL 96 06/16/2023    TRIG 75 06/16/2023    HDL 34 (L) 06/16/2023    ALT 87 (H) 07/28/2023     (H) 07/28/2023     07/28/2023    BUN 60.9 (H) 07/28/2023    CO2 17 (L) 07/28/2023    TSH 1.68 06/16/2023    PSA 0.90 12/17/2021    INR 1.00 06/21/2023    MICROALBUR 27.97 (H) 12/17/2021     Lab Results   Component Value Date    TROPONINI 0.13 06/02/2021         Deonte Solorzano MD [facility]  7/28/2023

## 2023-07-28 NOTE — CONSULTS
Consultation - Foot and Ankle/Podiatry  Dakota Bauer,  1950, MRN [id number]    Admitting Dx: CKD (chronic kidney disease) stage 4, GFR 15-29 ml/min (H) [N18.4]  NSTEMI (non-ST elevated myocardial infarction) (H) [I21.4]    PCP: Jamal Gaffney, [phone]   Code status:  Full Code       Extended Emergency Contact Information  Primary Emergency Contact: Funmilayo Bauer  Address: [address] [place] [address]  Home Phone: [phone]  Mobile Phone: [phone]  Relation: Spouse       ASSESSMENT   Diabetic ulceration left foot  Principal Problem:    NSTEMI (non-ST elevated myocardial infarction) (H)  Active Problems:    CKD (chronic kidney disease) stage 4, GFR 15-29 ml/min (H)       PLAN   -I discussed with the patient and his wife today that the plantar left midfoot ulceration is stable without signs of infection, appears to be progressing well.  WBC 10.8.  Afebrile.    -Based on the above, no plan for advanced imaging or surgical treatment at this time.    -I recommend continued local wound care to the left foot with limited to nonweightbearing which I discussed with the patient and his wife today.    -Medical management per hospitalist.  Antibiotics per ID.  Recommend patient follow-up with Dr. Tinoco at time of discharge for continued wound cares to the left foot.    Thank you for the consultation. I will sign off at this time.  Please contact me with questions.    Miguel Gonzalez DPM  [facility] Podiatry/Foot & Ankle Surgery  On-Call: [phone]  ______________________________________________________________________        Reason For Consult: NSTEMI (non-ST elevated myocardial infarction) (H)  Diabetic ulceration left foot       HPI    I have been requested by the hospitalist service to evaluate Dakota Bauer who is a 72 year old year old male for the above. The patient was admitted to [facility] for chest pain.  I been asked to evaluate the ulceration on the  patient's left foot.  Patient states that he has had the sore for several months and is being followed closely by Dr. Tinoco at [facility].  Patient's wife is present today and reports that she is applies a wound dressing to the left foot weekly.  He has tried remain limited walking on the left foot with a cam boot.  Past medical history significant for type 1 diabetes.         Medical History  Past Medical History:   Diagnosis Date    Chronic kidney disease     Coronary artery disease     Diabetes mellitus (H)     Disorder of thyroid     Hyperlipidemia     Hypertension      Surgical History  He  has a past surgical history that includes Cervical Disc Surgery (01/01/2014); Bypass graft artery coronary (01/01/2005); Cardiac catheterization (01/01/2005); Cardiac catheterization (11/25/2016); Amputate toe(s) (Left); IR Lower Extremity Angiogram Left (4/7/2023); IR Lower Extremity Angiogram Left (4/8/2020); IR Lower Extremity Angiogram Left (6/21/2023); Coronary Angiogram Graft (N/A, 7/27/2023); and Percutaneous Coronary Intervention (N/A, 7/27/2023).   Social History  Reviewed, and he  reports that he quit smoking about 38 years ago. His smoking use included cigarettes. He has never used smokeless tobacco. He reports that he does not currently use alcohol. He reports that he does not use drugs.   Allergies  Allergies   Allergen Reactions    Hydrochlorothiazide Unknown    Levofloxacin Diarrhea    Family History  family history includes Coronary Artery Disease in his brother and father.  family history not pertinent to presenting problem.    Psychosocial Needs  Social History     Social History Narrative    Not on file     Additional psychosocial needs reviewed per nursing assessment.       Prior to Admission Medications   Medications Prior to Admission   Medication Sig Dispense Refill Last Dose    ALPRAZolam (XANAX) 0.5 MG tablet Take 0.5 tablets by mouth At Bedtime   7/26/2023    aspirin 81 MG EC tablet [ASPIRIN 81 MG EC  TABLET] Take 81 mg by mouth daily.   7/26/2023 at am    atorvastatin (LIPITOR) 40 MG tablet Take 40 mg by mouth daily   7/26/2023 at am    clopidogrel (PLAVIX) 75 MG tablet Take 1 tablet (75 mg) by mouth daily 90 tablet 0 7/26/2023 at am    ezetimibe (ZETIA) 10 MG tablet Take 1 tablet (10 mg) by mouth daily 90 tablet 4 7/26/2023 at pm    fish oil-omega-3 fatty acids 1000 MG capsule Take 1 capsule by mouth daily   7/26/2023    furosemide (LASIX) 20 MG tablet Take 2 tablets by mouth daily  9 7/26/2023 at am    gabapentin (NEURONTIN) 300 MG capsule [GABAPENTIN (NEURONTIN) 300 MG CAPSULE] Take 300 mg by mouth 3 (three) times a day.          7/26/2023 at pm    HUMALOG 100 unit/mL injection Inject Subcutaneous 3 times daily (before meals) Carb count: 3 g carb : 1 unit insulin   7/26/2023 at pm    insulin glargine (LANTUS) 100 unit/mL injection [INSULIN GLARGINE (LANTUS) 100 UNIT/ML INJECTION] Inject 30 Units under the skin every morning.    7/27/2023 at am    levothyroxine (SYNTHROID/LEVOTHROID) 125 MCG tablet Take 125 mcg by mouth daily   7/27/2023 at am    lisinopril (PRINIVIL,ZESTRIL) 40 MG tablet [LISINOPRIL (PRINIVIL,ZESTRIL) 40 MG TABLET] Take 40 mg by mouth daily.   7/26/2023 at am    metoprolol succinate ER (TOPROL XL) 50 MG 24 hr tablet Take 1 tablet (50 mg) by mouth daily 90 tablet 2 7/26/2023 at am    multivitamin with iron (ONE DAILY WITH IRON) Tab tablet [MULTIVITAMIN WITH IRON (ONE DAILY WITH IRON) TAB TABLET] Take 1 tablet by mouth daily.   7/26/2023 at am    nitroGLYcerin (NITROSTAT) 0.4 MG sublingual tablet Place 1 tablet (0.4 mg) under the tongue every 5 minutes as needed 25 tablet 3 7/27/2023 at am    TRIAMCINOLONE ACETONIDE (NASACORT NASL) [TRIAMCINOLONE ACETONIDE (NASACORT NASL)] 1 spray into each nostril daily.   7/26/2023 at am    VITAMIN D3 2,000 unit capsule [VITAMIN D3 2,000 UNIT CAPSULE] Take 2,000 Units by mouth daily.  3 7/26/2023 at am    Continuous Blood Gluc Sensor (FREESTYLE DAVID 14 DAY  SENSOR) [facility]        TECHLITE PEN NEEDLES 31G X 5 MM miscellaneous                  Review of Systems:  All other systems negative in detail except what is noted in HPI.  Physical Exam:  Temp:  [97.8  F (36.6  C)-101  F (38.3  C)] 98.4  F (36.9  C)  Pulse:  [64-95] 71  Resp:  [18-22] 18  BP: ()/(45-69) 101/53  SpO2:  [89 %-94 %] 91 %    General appearance: Patient is alert and fully cooperative with history & exam.  No sign of distress is noted during the visit.  Psychiatric: Affect is pleasant & appropriate.  Patient appears motivated to improve health.  Respiratory: Breathing is regular & unlabored while sitting.  HEENT: Hearing is intact to spoken word.  Speech is clear.  No gross evidence of visual impairment that would impact ambulation.    Vascular: Dorsalis pedis palpable left foot.  There is no appreciable edema noted.  Dermatologic: Ulceration plantar left midfoot has granular base, no erythema or fluctuance noted.  Small stable eschar anterior medial left leg.  Neurologic: Diminished light touch left foot.  Musculoskeletal: TMA left foot.     /53 (BP Location: Right arm)   Pulse 71   Temp 98.4  F (36.9  C) (Oral)   Resp 18   Ht 1.829 m (6')   Wt 101.3 kg (223 lb 4.8 oz)   SpO2 91%   BMI 30.28 kg/m      BMI= Body mass index is 30.28 kg/m .    Pertinent Labs    [unfilled]       Recent Labs   Lab 07/28/23  0448 07/27/23  1222    137   CO2 17* 13*   BUN 60.9* 53.5*       Lab Results   Component Value Date    ALT 87 (H) 07/28/2023     (H) 07/28/2023    ALKPHOS 48 07/28/2023        No results found for: CRP   [unfilled]     Pertinent Radiology  Radiology Results: Reviewed  Cardiac Catheterization    Addendum Date: 7/28/2023      Ost Cx to Prox Cx lesion is 100% stenosed.   Prox LAD lesion is 100% stenosed.   Ramus lesion is 50% stenosed.   Prox RCA to Dist RCA lesion is 30% stenosed.   Prox Graft to Mid Graft lesion is 100% stenosed. 1.  Severe diffuse disease of native vessels with  small caliber vessels distally. 2.  Chronic occlusion of native LAD and native left circumflex. 3.  Left main fills small narrow caliber ramus branch only 4.  Right coronary artery has diffuse mild to moderate disease but no severe stenoses. 5.  Patent saphenous vein graft to high distal lateral wall branch (? Diagonal or marginal branch) which is diffusely diseased. Anastomosis appears normal. 6.  Occluded saphenous vein graft to distal diagonal/lateral wall branches, most likely the acute infarct vessel. 7.  Patent BENITEZ to LAD. Anastomosis appears normal. 8.  Successful PCI saphenous vein graft to distal diagonal/lateral with drug-eluting stent implant x1 9.  Recommend continuing Plavix therapy indefinitely, risk factor management for ASCVD.    Result Date: 7/28/2023    Ost Cx to Prox Cx lesion is 100% stenosed.   Prox LAD lesion is 100% stenosed.   Ramus lesion is 50% stenosed.   Prox RCA to Dist RCA lesion is 30% stenosed.   Prox Graft to Mid Graft lesion is 100% stenosed. 1.  Severe diffuse disease of native vessels with small caliber vessels distally. 2.  Chronic occlusion of native LAD and native left circumflex. 3.  Left main fills small narrow caliber ramus branch only 4.  Right coronary artery has diffuse mild to moderate disease but no severe stenoses. 5.  Patent saphenous vein graft to high diagonal branch which is diffusely diseased. Anastomosis appears normal. 6.  Occluded saphenous vein graft to distal diagonal/lateral wall branches, most likely the acute infarct vessel. 7.  Patent BENITEZ to LAD. Anastomosis appears normal. 8.  Successful PCI saphenous vein graft to distal diagonal/lateral with drug-eluting stent implant x1 9.  Recommend continuing Plavix therapy indefinitely, risk factor management for ASCVD.     Echocardiogram Complete    Result Date: 7/27/2023  [id number]^BRITTANY^GUME^[place] [address]  Name: DENY MONTERO MRN:  [id number] : 1950 Study Date: 2023 03:39 PM Age: 72 yrs Gender: Male Patient Location: [facility] Reason For Study: Acute Myocardial Infarction Ordering Physician: GUME SOTO Referring Physician: GUME SOTO Performed By: BP  BSA: 2.2 m2 Height: 72 in Weight: 220 lb HR: 77 BP: 118/55 mmHg ______________________________________________________________________________ Procedure Complete Portable Echo Adult. Definity (NDC #89858-843) given intravenously. Technically difficult study.Extremely difficult acoustic windows despite the use of contrast for endcardial border definition. ______________________________________________________________________________ Interpretation Summary  The left ventricle is normal in size. There is mild concentric left ventricular hypertrophy. The visual ejection fraction is 45-50%. Diastolic Doppler findings (E/E' ratio and/or other parameters) suggest left ventricular filling pressures are increased. Poor quality images but suspicion of inferior and apical hypokinesis. Normal right ventricle size and systolic function. The right ventricular systolic pressure is approximated at 19mmHg plus the right atrial pressure. Sinus rhythm was noted. Technically difficult, suboptimal study. Consider cardiac MRI for improved image quality. Prior echo from 3/8/23 was of better quality. Inferior wll was normal on that study. ______________________________________________________________________________ Left Ventricle The left ventricle is normal in size. There is mild concentric left ventricular hypertrophy. Diastolic Doppler findings (E/E' ratio and/or other parameters) suggest left ventricular filling pressures are increased. The visual ejection fraction is 45-50%. Poor quality images but suspicion of inferior and apical hypokinesis.  Right Ventricle Normal right ventricle size and systolic function.  Atria The left atrium is moderately dilated. Right atrial size is normal.   Mitral Valve The mitral valve is not well visualized. There is mild to moderate (1-2+) mitral regurgitation. There is no mitral valve stenosis.  Tricuspid Valve The tricuspid valve is not well visualized. There is mild (1+) tricuspid regurgitation. The right ventricular systolic pressure is approximated at 19mmHg plus the right atrial pressure.  Aortic Valve Mild aortic valve calcification is present. The aortic valve is not well visualized. No aortic regurgitation is present. No aortic stenosis is present.  Pulmonic Valve The pulmonic valve is not well visualized. There is mild (1+) pulmonic valvular regurgitation. Right ventricular diastolic pressure is approximated at 8mmHg plus the right atrial pressure. There is no pulmonic valvular stenosis.  Vessels The aorta root is normal. IVC diameter and respiratory changes fall into an intermediate range suggesting an RA pressure of 8 mmHg.  Pericardium There is no pericardial effusion.  Rhythm Sinus rhythm was noted. ______________________________________________________________________________ MMode/2D Measurements & Calculations IVSd: 1.2 cm  LVIDd: 5.4 cm LVIDs: 4.8 cm LVPWd: 1.1 cm FS: 11.2 % LV mass(C)d: 242.0 grams LV mass(C)dI: 109.1 grams/m2 Ao root diam: 2.9 cm LA dimension: 4.9 cm LA/Ao: 1.7 LVOT diam: 2.3 cm LVOT area: 4.2 cm2  EF(MOD-bp): 37.1 % LA Volume Indexed (AL/bp): 38.1 ml/m2 RWT: 0.41 TAPSE: 1.8 cm  Time Measurements MM HR: 77.0 BPM  Doppler Measurements & Calculations MV E max saagr: 99.0 cm/sec MV A max sagar: 55.7 cm/sec MV E/A: 1.8 MV dec time: 0.20 sec Ao V2 max: 105.0 cm/sec Ao max P.0 mmHg Ao V2 mean: 79.6 cm/sec Ao mean PG: 3.0 mmHg Ao V2 VTI: 20.4 cm ELIZABETH(I,D): 3.9 cm2 ELIZABETH(V,D): 3.8 cm2 LV V1 max PG: 3.6 mmHg LV V1 max: 95.3 cm/sec LV V1 VTI: 19.1 cm SV(LVOT): 79.4 ml SI(LVOT): 35.8 ml/m2 PA acc time: 0.09 sec PI end-d sagar: 138.0 cm/sec TR max sagar: 217.0 cm/sec TR max P.8 mmHg AV Sagar Ratio (DI): 0.91 ELIZABETH Index (cm2/m2): 1.8 E/E': 16.2  E/E' av.3  Lateral E/e': 16.4 Medial E/e': 18.2 Peak E' Sagar: 6.1 cm/sec RV S Sagar: 7.8 cm/sec  ______________________________________________________________________________ Report approved by: Davon Allen 2023 04:59 PM       XR Chest Port 1 View    Result Date: 2023  EXAM: XR CHEST PORT 1 VIEW LOCATION: [facility] DATE: 2023 INDICATION: Chest pain COMPARISON: 2021     IMPRESSION: Right costophrenic angle is clipped. Poststernotomy changes. Heart is magnified due to projection. Lungs are clear. No overt signs of failure or pneumonia.    US Low Ext Arterial Dop Seg Pres w/o Exercise    Result Date: 2023  Table formatting from the original result was not included. BILATERAL RESTING ANKLE-BRACHIAL INDICES (TRAE'S) (Date: 23) Indication: Surveillance TRAE's: PAD. S/P IR Left Balloon Angiogram ( Pedal Arch, DPA, ISAIAS) done 2023. Hx: Left Transmetatarsal Amputation done 9/3/2021. Previous: 2023 Bilateral ANDREINA/ TRAE's w/o, 2023 IR Left Angiogram. History: Previous Smoker, Hypertension, Diabetic, Hyperlipidemia, PAD, and Angioplasty  Resting TRAE's          Right: mmHg Index     Brachial: 158  Ankle-(PT): 157 0.99 Ankle-(DP): >254 NC          Digit: 47 0.30               Left: mmHg Index     Brachial: 157  Ankle-(PT): >254 NC Ankle-(DP): >254 NC          Digit: AMP N/A Resting ankle-brachial index of 0.99 on the right. Toe Pressures of 47 mmHg and TBI of 0.30 Resting ankle-brachial index is non compressible on the left.  VPR WAVEFORMS: The right volume plethysmography waveforms are mildly abnormal at the lower thigh level, mildly abnormal at the upper calf level and mildly abnormal at the ankle. The left volume plethysmography waveforms are mildly abnormal at the lower thigh level, mildly abnormal at the upper calf level and mildly abnormal at the ankle.  Impression:  1. RIGHT LOWER EXTREMITY: TRAE is Non-diagnostic indicating vessel  calcification. Toe Pressures are Abnormal and impaired for wound healing with toe pressures of 47 mmHg. 2. LEFT LOWER EXTREMITY: TRAE is Non-diagnostic indicating vessel calcification. Toe Pressures are Un-interpretable and Non-diagnostic. Reference: Wound classification Grade TRAE Ankle Systolic Pressure Toe Pressures 0 > 0.80 > 100 mmHg > 60 mmHg 1 0.6 - 0.79 70 - 100 mmHg 40 - 59 mmHg 2 0.4 - 0.59 50-70 mmHg 30 - 39 mmHg 3 < 0.39 < 50 mmHg < 30 mmHg Digit Pressures DBI Disease Category > 0.70 Normal < 0.70 Abnormal > 30 mmHg Potential wound healing < 30 mmHg Impaired wound healing Ankle Brachial Pressures TRAE Disease Category > 1.3  Likely vessel calcification with monophasic waveforms, non-diagnostic 0.95-1.30 Normal with multiphasic waveforms 0.50-0.95 Single level disease 0.30-0.50 Multilevel disease < 0.30 Critical limb ischema Volume Plethysmography Recording (VPR) at all levels Normal Sharp systolic peak, fast upstroke, prominent dicrotic notch in wave Mild Sharp systolic peak, fast upstroke, absent dicrotic notch in wave Moderate Flattened systolic peak, slowed upstroke, absent dicrotic notch inwave Severe amplitude wave with = upslope and down slope Occluded Flat Line      US Lower Extremity Arterial Duplex Left    Result Date: 7/7/2023  Table formatting from the original result was not included. Arterial Duplex Ultrasound (Date: 07/06/23) Lower Extremity Artery Evaluation Indication: Surveillance Left Leg Arterial: PAD. S/P IR Left Balloon Angiogram ( Pedal Arch, DPA, ISAIAS) done 6/21/2023. Hx: Left Transmetatarsal Amputation done 9/3/2021. Previous: 2/7/2023 Bilateral ANDREINA/ TRAE's w/o, 4/7/2023 IR Left Angiogram. History: Previous Smoker, Hypertension, Diabetic, Hyperlipidemia, PAD, and Angioplasty  Technique: Duplex imaging is performed utilizing gray-scale, two-dimensional images, and color-flow imaging. Doppler waveform analysis and spectral Doppler imaging is also performed. LOWER EXTREMITY ARTERIAL  DUPLEX EXAM WITH WAVEFORMS Right Leg:(cm/s) Location: Velocities Waveforms PTA:   23   M ISAIAS:   57  M DPA:   50  M Waveforms: T=Triphasic, M=Monophasic, B=Biphasic Left Leg:(cm/s) Location: Velocities Waveforms EIA:   141  T CFA:   201  T PFA:   138  B SFA Proximal:   158  T SFA Mid:   114  M SFA Distal:   209 /  145 M Popliteal Artery:   167 / 135   M PTA:   24   M ISAISA:   112  M DPA:   135  M Waveforms: T=Triphasic, M=Monophasic, B=Biphasic Stenotic Profile Ratio: 209 / 114 = 1.83  Impression: Right Lower Extremity: Not fully examined but monophasic flow in tibial vessels suggest more proximal disease Left Lower Extremity: Triphasic flow in common femoral artery suggesting no inflow disease.  Transition to monophasic flow within the level of mid SFA consistent with underlying hemodynamically significant disease.  Monophasic flow remains distally Reference: Category Normal 1-19% 20-49% 50-99% Occluded PSV <160 cm/sec without spectral broadening <160 cm/sec with spectral broadening Increased Increased Absent Flow Ratio N/A N/A < 2.0 >2.0 N/A Post-Stenotic Turbulence No No No Yes N/A      IR Lower Extremity Angiogram Left    Result Date: 6/22/2023  VASCULAR SURGERY ANGIOGRAM REPORT   [facility]  DATE: 06/21/23   PROCEDURES PERFORMED:  1.  Ultrasound-guided access of right common femoral artery 2.  Selective cannulation of the left common iliac artery, external iliac artery, common femoral artery, superficial femoral artery, popliteal artery, anterior tibial artery, dorsalis pedis artery, pedal arch, tibioperoneal trunk, and peroneal artery with selective left lower extremity angiography 3.  Plain balloon angioplasty of the left pedal arch and dorsalis pedis artery with 2 mm x 100 mm Crosstella balloon, followed by 2 mm x 40 mm Crosstella balloon 4. Plain balloon angioplasty of the left dorsalis pedis and anterior tibial artery with 3 mm x 200 mm Crosstella balloon 5. Attempted recannalization of left peroneal  artery with balloon angioplasty using 3 mm x 200 mm Crosstella balloon 6.  Moderate sedation 7.  Arteriotomy closure with Angio-Seal device  SURGEON: Dr. Lynne  RESIDENT: Jayden Paniagua MD  INDICATION: Patient is a 72-year-old male with chronic limb threatening ischemia and left foot wounds after previous transmetatarsal amputation.  He underwent a left lower extremity angiogram in April 2023 that identified single-vessel runoff via the left anterior tibial artery.  At that time his wound did appear to be making small amounts of progress and given the single-vessel runoff the decision was made not to treat.his to artery disease.  In the intervening months his left foot wounds have progressed and the decision was made to repeat arteriography today with the intention to treat his distal tibial disease.  SEDATION: The procedure was performed with administration of intravenous conscious sedation with appropriate preoperative, intraoperative, and postoperative evaluation.  124 minutes of supervised face to face intraservice time was provided by a radiology nurse under my direct supervision.  ANTIBIOTICS: None FLUOROSCOPIC TIME: 34.4 min RADIATION DOSE:  323 mGy CONTRAST: 80 ml visipaque   PROCEDURE: Ultrasound was used to evaluate the right common femoral artery. The selected vessel was patent. Ultrasound was then used for real-time ultrasound guided needle entry of the  right common femoral artery. Permanent images were recorded and saved to the patient's medical record.  Micropuncture sheath was inserted.  A glide advantage wire was advanced into the aorta.  5 [place] sheath was placed.  An Omni Flush catheter was advanced over the wire. Using up and over maneuver I was able to advance the wire into the left femoral artery.  Over the wire the Omni Flush catheter was advanced and positioned in the  left common femoral artery.  Left lower extremity angiogram was performed using CO2 which demonstrated mild  stenosis in the distal SFA and popliteal artery.  An 035 angled Irizarry cross catheter was advanced over the wire and placed in the mid popliteal artery.  Lower extremity angiography was then performed with contrast which demonstrated the high-grade stenosis of the proximal left anterior tibial artery, and occlusion of the left posterior tibial and peroneal arteries.  There is high-grade stenosis in the left dorsalis pedis artery with an incomplete pedal arch.  An 035 glide advantage wire was used to select the posterior tibial artery, the 5 Turkmen sheath was exchanged for a 90 cm 6 [employer] destination sheath.  With accommodation of an 018 angled Irizarry cross catheter and 1 8 glide advantage wire, the left anterior tibial artery was selected.  Wire was advanced to the TP trunk and pedal arch.  Repeat angiography was performed demonstrating stenoses throughout dorsalis pedis pedal arch.  Next we proceeded with plain balloon angioplasty of the left pedal arch and dorsalis pedis trunk using a 2 mm x 100 mm Crosstella balloon.  Repeat angiography identified a persistent segment of stenosis in the very distal pedal arch, which was then treated with a 2 mm x 40 mm Crosstella plain balloon angioplasty.  This balloon was then exchanged for a 3 mm x 200 mm Crosstella plain balloon used to treat the dorsalis pedis and the entire length of the left anterior tibial artery.  At this point, angiography demonstrated brisk flow through the anterior tibial artery, dorsalis pedis, and pedal arch with significantly improved collateral sensation at the distal foot.  There was residual stenosis at the very proximal left anterior tibial artery, it was again treated with a 3 mm x 40 mm balloon.  There was only modest improvement with additional treatment, likely as a result of eccentric plaque.  We excepted this result and then attempted to recanalize the left peroneal artery.  This was patent to about the proximal third of the peroneal  artery.  Using an 014 glide advantage wire and 018 Irizarry cross catheter, the peroneal artery was recanalized to the distal aspect of the artery.  A blush of contrast was used to confirm that the catheter tip was intraluminal.  A 3 mm x 200 mm Crosstella balloon was used for platelet angioplasty of the recanalize peroneal artery segment.  This was unsuccessful and the peroneal artery immediately reoccluded.  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  Catheter was withdrawn to the mid SFA and angiogram again demonstrated nonflow-limiting stenosis of the distal SFA and above-knee popliteal arteries.  His disease were left untreated. At that point the procedure was concluded.  The arteriotomy closure was performed using an Angio-Seal device.  Patient tolerated procedure well and was transferred to recovery area in stable condition.  FINDINGS 1.  Mild, non-flow-limiting stenosis of the distal left superficial femoral artery and popliteal arteries. 2.  High-grade stenosis of the left proximal anterior tibial artery, proximal dorsalis pedis artery 3. Incomplete left pedal arch 4.  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  IMPRESSION Successful treatment of the left anterior tibial, dorsalis pedis, and distal pedal arch with balloon angioplasty.  Completion angiogram shows brisk flow through the AT to the distal left foot, much improved from prior.  Patient will be started on Plavix in addition to aspirin and will follow-up in vascular clinic.

## 2023-07-28 NOTE — SIGNIFICANT EVENT
Significant Event Note    Time of event: 1:22 AM July 28, 2023    Description of event:  RN reported positive blood cultures 2 of 2 bottles from 7/27/2023 at 12:22 PM growing gram-positive cocci in pairs and chains.  Blood culture from 12:31 PM is in process.    Plan:  Repeat blood culture  Start vancomycin  Follow-up Verigene    Discussed with: bedside nurse    Ash Fernandez MD

## 2023-07-28 NOTE — PLAN OF CARE
Goal Outcome Evaluation:    Pt arrived to floor from [facility] at approx 1830. Access site to right groin CDI, no oozing/fresh blood. VSS ex= O2 sats dipping into 80s while on RA. Nasal cannula tried, but Pt unable to breath through nose while supine. Oxymask at 4L resolved issue. When bedrest concluded Pt went back on NC after raising HOB -- sats WNL on 2L NC. Rhythm NSR, BP soft following procedure and transfer to ; 250mL NS bolus given -- BP currently 130s/60s. No acute issues during shift. Nursing staff will continue to monitor.

## 2023-07-29 NOTE — PLAN OF CARE
Problem: Cardiac Catheterization (Diagnostic/Interventional)  Goal: Stable Heart Rate and Rhythm  Outcome: Progressing     Problem: Cardiac Catheterization (Diagnostic/Interventional)  Goal: Absence of Bleeding  Outcome: Progressing     Problem: Cardiac Catheterization (Diagnostic/Interventional)  Goal: Acceptable Pain Control  Outcome: Progressing     Pt A/O. Up with a SBA. Denied pain and SOB. Weaned to 1L NC this shift, tolerating well sating in low 90s. Pt refusing MRI d/t laying flat and increased SOB, MD notified. Pt refused wound care to be done on LLE this shift, dressing dry and intact.  and 262.

## 2023-07-29 NOTE — PROGRESS NOTES
[facility]    Medicine Progress Note - Hospitalist Service    Date of Admission:  7/27/2023    Assessment & Plan   NSTEMI  CAD  -s/p coronary angiogram 7/27/23 with BINA to SVG-distal diagonal/lateral  -esvin asa/plavix PTA meds  -statin, BB  -TTE 7/27/23 showed LVEF 45-50%, increased LV filling pressures, inf/apical hypokinesis  -cardiology following      Acute hypoxic respitatory failure  -wean O2 as able to goal sPO2 > 90%  -improving/stable    [facility] bacteremia: source unclear  -IV CTX per ID  -ID consulted. Recs appreciated  -follow cultures    HLD:  -LDL 47 (6/2023)  -lipitor 40 at bedtime     DM 1  -lantus 30 U qam  -novology 1U:3g CHO ac tid, sliding scale insulin ac TID, accuchecks ac/hs     HTN  -BB, NTG gtt per cardiology, hold lisinopril for now     PAD, s/p PBA left pedal arch, DPA, ISAIAS (6/2023).     ASHLEY on CKD stage IV  -suspect KARLA  -Cr worse. Monitor closely  -monitor renal fnction closely  -appreciate nephrology assistance    Double vision: believes new. Horizontal and occurs only with both eyes open not unilateral.  -MRI brain ordered yesterday needs to be done  -ophtho outpt          Diet: Combination Diet Moderate Consistent Carb (60 g CHO per Meal) Diet; Low Saturated Fat Na <2400mg Diet    DVT Prophylaxis: Pneumatic Compression Devices  Mohan Catheter: Not present  Lines: None     Cardiac Monitoring: ACTIVE order. Indication: Post- PCI/Angiogram (24 hours)  Code Status: Full Code      Clinically Significant Risk Factors          # Hypocalcemia: Lowest Ca = 8.1 mg/dL in last 2 days, will monitor and replace as appropriate    # Anion Gap Metabolic Acidosis: Highest Anion Gap = 23 mmol/L in last 2 days, will monitor and treat as appropriate  # Hypoalbuminemia: Lowest albumin = 3.2 g/dL at 7/29/2023  4:36 AM, will monitor as appropriate     # Thrombocytopenia: Lowest platelets = 123 in last 2 days, will monitor for bleeding  # Acute Kidney Injury, unspecified: based on a  >150% or 0.3 mg/dL increase in last creatinine compared to past 90 day average, will monitor renal function    # Hypertension: Noted on problem list        # Obesity: Estimated body mass index is 30.56 kg/m  as calculated from the following:    Height as of this encounter: 1.829 m (6').    Weight as of this encounter: 102.2 kg (225 lb 4.8 oz)., PRESENT ON ADMISSION          Disposition Plan      Expected Discharge Date: 07/31/2023        Discharge Comments: iv abx  home          Remy Phipps DO, DO  Hospitalist Service  [facility]  Securely message with [employer] (more info)  Text page via [employer] Paging/Directory   ______________________________________________________________________    Interval History   Febrile overnight. HD stable. Elevated sugars.    Physical Exam   Vital Signs: Temp: 98.1  F (36.7  C) Temp src: Oral BP: 118/57 Pulse: 81   Resp: 18 SpO2: 97 % O2 Device: Nasal cannula Oxygen Delivery: 2 LPM  Weight: 225 lbs 4.8 oz    General appearance - alert, and in no distress  Eyes - pupils equal and reactive, extraocular eye movements intact, sclera anicteric  Lungs - clear to auscultation, no wheezes, rales or rhonchi, symmetric air entry  Heart - normal rate, regular rhythm, +murmur, No peripheral edema.  Abdomen - soft, nontender, nondistended, no masses or organomegaly, BS+  Neurological - alert, oriented, normal speech, double horizontal vision when both eyes open, no diplopia when one eye closed. Grossly intact otherwise. Remainder cn ii-xii intact  Skin - no c/c/p. Patient would not let me unwrap LLE surgical skin graft site     Lab/imagin

## 2023-07-29 NOTE — PROGRESS NOTES
INFECTIOUS DISEASE FOLLOW UP NOTE      ASSESSMENT:  Non-ST elevation MI, mild LV dysfunction, status PCI 7/27/2023  Status post CABG 18 years ago, diffusely diseased distal vessels, LIMA to LAD, patent SVG to diagonal, SVG to OM  Peripheral arterial disease, vascular has been following in the past  Streptococcal bacteremia--Streptococcus dysgalactiae (Group C Streptococcus) 7/27/2023. Cultures repeated 7/28 prior to antibiotics -- also positive. Source often from skin, can seed bones, joints, valves.   Fever resolving.  Blurred vision, getting evaluation  Type 1 diabetes mellitus, chronic kidney disease stage IV  History of diabetic foot infection, transmetatarsal amputation in 2020, lower extremity wound, patient followed by Dr. Tinoco at [facility] podiatry  Diagnosis: 7/26/2023 -- had ulcer debrided in clinicon 7/26/23 -- no sign of infection at that time reported.   S/P Sinus tract left foot at [facility] site with I & D and flowable , DOS 11/8/2022, healed  Diabetes with peripheral neuropathy  transmetatarsal amputation with varus deformity left foot  Deshpande 1 ulcer plantar lateral foot without signs of infection  PAD --S/P angioplasty ([facility] system)--6/2023    Could be that he became bacteremic from wound from debridement 7/26 prior to admission. Wound without external sign of infection this admission.     PLAN:  Continue ceftriaxone  Likely SHAYLEE this week to help determine duration of antibiotics.     Robinson Sellers MD  [facility]  [phone] [facility] paging    ______________________________________________________________________    SUBJECTIVE / INTERVAL HISTORY: no pain. Temps better. Reviewed results. Notes that L foot wound has been healing, but he had infection there this past year -- MRSA previously.     ROS: All other systems negative except as listed above.        OBJECTIVE:  /57   Pulse 81   Temp 98.1  F (36.7  C) (Oral)   Resp 18   Ht 1.829 m (6')   Wt 102.2  kg (225 lb 4.8 oz)   SpO2 97%   BMI 30.56 kg/m         Vital Signs  Temp: 98.1  F (36.7  C)  Temp src: Oral  Resp: 18  Pulse: 81  Pulse Rate Source: Monitor  BP: 118/57  BP Location: Left arm    Temp (24hrs), Av  F (37.2  C), Min:98.1  F (36.7  C), Max:99.9  F (37.7  C)      GEN: No acute distress.    RESPIRATORY:  Normal breathing pattern. Clear to auscultation  CARDIOVASCULAR:  Regular rate and rhythm. Normal S1 and S2. No murmur, click, gallop or rub. No dependent edema. No excess JVD.  ABDOMEN:  Soft, normal bowel sounds, non-tender, no masses  Angiogram access site R groin not tender, dressing in place.   EXTREMITIES: No edema. L foot wrapped.   SKIN/HAIR/NAILS:  No rashes  IV: peripheral        Antibiotics:  Ceftriaxone -  Vancomycin 7/28 x1 (2g)  Pip/tazo 7/28 x1    Pertinent labs:    Recent Labs   Lab 23  0436 23  0448 23  1222   WBC 10.0 10.8 11.3*   HGB 9.5* 10.2* 10.6*   HCT 30.4* 32.2* 32.0*   * 135* 154        Recent Labs   Lab 23  0436 23  0448 23  1222   * 136 137   CO2 21* 17* 13*   BUN 82.7* 60.9* 53.5*       Lab Results   Component Value Date    ALT 71 (H) 2023     (H) 2023    ALKPHOS 43 2023         MICROBIOLOGY DATA:   blood 1 of 2 sets Group C strep   blood both sets collected prior to antibiotics -- GPC pairs+chains    RADIOLOGY:  Cardiac Catheterization    Addendum Date: 2023      Ost Cx to Prox Cx lesion is 100% stenosed.   Prox LAD lesion is 100% stenosed.   Ramus lesion is 50% stenosed.   Prox RCA to Dist RCA lesion is 30% stenosed.   Prox Graft to Mid Graft lesion is 100% stenosed. 1.  Severe diffuse disease of native vessels with small caliber vessels distally. 2.  Chronic occlusion of native LAD and native left circumflex. 3.  Left main fills small narrow caliber ramus branch only 4.  Right coronary artery has diffuse mild to moderate disease but no severe stenoses. 5.  Patent saphenous vein  graft to high distal lateral wall branch (? Diagonal or marginal branch) which is diffusely diseased. Anastomosis appears normal. 6.  Occluded saphenous vein graft to distal diagonal/lateral wall branches, most likely the acute infarct vessel. 7.  Patent BENITEZ to LAD. Anastomosis appears normal. 8.  Successful PCI saphenous vein graft to distal diagonal/lateral with drug-eluting stent implant x1 9.  Recommend continuing Plavix therapy indefinitely, risk factor management for ASCVD.    Result Date: 2023    Ost Cx to Prox Cx lesion is 100% stenosed.   Prox LAD lesion is 100% stenosed.   Ramus lesion is 50% stenosed.   Prox RCA to Dist RCA lesion is 30% stenosed.   Prox Graft to Mid Graft lesion is 100% stenosed. 1.  Severe diffuse disease of native vessels with small caliber vessels distally. 2.  Chronic occlusion of native LAD and native left circumflex. 3.  Left main fills small narrow caliber ramus branch only 4.  Right coronary artery has diffuse mild to moderate disease but no severe stenoses. 5.  Patent saphenous vein graft to high diagonal branch which is diffusely diseased. Anastomosis appears normal. 6.  Occluded saphenous vein graft to distal diagonal/lateral wall branches, most likely the acute infarct vessel. 7.  Patent BENITEZ to LAD. Anastomosis appears normal. 8.  Successful PCI saphenous vein graft to distal diagonal/lateral with drug-eluting stent implant x1 9.  Recommend continuing Plavix therapy indefinitely, risk factor management for ASCVD.     Echocardiogram Complete    Result Date: 2023  [id number]^BRITTANY^GUME^[place] [address]  Name: DENY MONTERO MRN: [id number] : 1950 Study Date: 2023 03:39 PM Age: 72 yrs Gender: Male Patient Location: [facility] Reason For Study: Acute Myocardial Infarction Ordering Physician: GUME SOTO Referring Physician: GUME SOTO Performed By: BP  BSA: 2.2 m2 Height: 72  in Weight: 220 lb HR: 77 BP: 118/55 mmHg ______________________________________________________________________________ Procedure Complete Portable Echo Adult. Definity (NDC #10663-829) given intravenously. Technically difficult study.Extremely difficult acoustic windows despite the use of contrast for endcardial border definition. ______________________________________________________________________________ Interpretation Summary  The left ventricle is normal in size. There is mild concentric left ventricular hypertrophy. The visual ejection fraction is 45-50%. Diastolic Doppler findings (E/E' ratio and/or other parameters) suggest left ventricular filling pressures are increased. Poor quality images but suspicion of inferior and apical hypokinesis. Normal right ventricle size and systolic function. The right ventricular systolic pressure is approximated at 19mmHg plus the right atrial pressure. Sinus rhythm was noted. Technically difficult, suboptimal study. Consider cardiac MRI for improved image quality. Prior echo from 3/8/23 was of better quality. Inferior wll was normal on that study. ______________________________________________________________________________ Left Ventricle The left ventricle is normal in size. There is mild concentric left ventricular hypertrophy. Diastolic Doppler findings (E/E' ratio and/or other parameters) suggest left ventricular filling pressures are increased. The visual ejection fraction is 45-50%. Poor quality images but suspicion of inferior and apical hypokinesis.  Right Ventricle Normal right ventricle size and systolic function.  Atria The left atrium is moderately dilated. Right atrial size is normal.  Mitral Valve The mitral valve is not well visualized. There is mild to moderate (1-2+) mitral regurgitation. There is no mitral valve stenosis.  Tricuspid Valve The tricuspid valve is not well visualized. There is mild (1+) tricuspid regurgitation. The right ventricular  systolic pressure is approximated at 19mmHg plus the right atrial pressure.  Aortic Valve Mild aortic valve calcification is present. The aortic valve is not well visualized. No aortic regurgitation is present. No aortic stenosis is present.  Pulmonic Valve The pulmonic valve is not well visualized. There is mild (1+) pulmonic valvular regurgitation. Right ventricular diastolic pressure is approximated at 8mmHg plus the right atrial pressure. There is no pulmonic valvular stenosis.  Vessels The aorta root is normal. IVC diameter and respiratory changes fall into an intermediate range suggesting an RA pressure of 8 mmHg.  Pericardium There is no pericardial effusion.  Rhythm Sinus rhythm was noted. ______________________________________________________________________________ MMode/2D Measurements & Calculations IVSd: 1.2 cm  LVIDd: 5.4 cm LVIDs: 4.8 cm LVPWd: 1.1 cm FS: 11.2 % LV mass(C)d: 242.0 grams LV mass(C)dI: 109.1 grams/m2 Ao root diam: 2.9 cm LA dimension: 4.9 cm LA/Ao: 1.7 LVOT diam: 2.3 cm LVOT area: 4.2 cm2  EF(MOD-bp): 37.1 % LA Volume Indexed (AL/bp): 38.1 ml/m2 RWT: 0.41 TAPSE: 1.8 cm  Time Measurements MM HR: 77.0 BPM  Doppler Measurements & Calculations MV E max sagar: 99.0 cm/sec MV A max sagar: 55.7 cm/sec MV E/A: 1.8 MV dec time: 0.20 sec Ao V2 max: 105.0 cm/sec Ao max P.0 mmHg Ao V2 mean: 79.6 cm/sec Ao mean PG: 3.0 mmHg Ao V2 VTI: 20.4 cm ELIZABETH(I,D): 3.9 cm2 ELIZABETH(V,D): 3.8 cm2 LV V1 max PG: 3.6 mmHg LV V1 max: 95.3 cm/sec LV V1 VTI: 19.1 cm SV(LVOT): 79.4 ml SI(LVOT): 35.8 ml/m2 PA acc time: 0.09 sec PI end-d sagar: 138.0 cm/sec TR max sagar: 217.0 cm/sec TR max P.8 mmHg AV Sagar Ratio (DI): 0.91 ELIZABETH Index (cm2/m2): 1.8 E/E': 16.2 E/E' av.3  Lateral E/e': 16.4 Medial E/e': 18.2 Peak E' Sagar: 6.1 cm/sec RV S Sagar: 7.8 cm/sec  ______________________________________________________________________________ Report approved by: Davon Allen 2023 04:59 PM       XR Chest Port 1  View    Result Date: 7/27/2023  EXAM: XR CHEST PORT 1 VIEW LOCATION: [facility] DATE: 7/27/2023 INDICATION: Chest pain COMPARISON: 04/05/2021     IMPRESSION: Right costophrenic angle is clipped. Poststernotomy changes. Heart is magnified due to projection. Lungs are clear. No overt signs of failure or pneumonia.    US Low Ext Arterial Dop Seg Pres w/o Exercise    Result Date: 7/7/2023  Table formatting from the original result was not included. BILATERAL RESTING ANKLE-BRACHIAL INDICES (TRAE'S) (Date: 07/06/23) Indication: Surveillance TRAE's: PAD. S/P IR Left Balloon Angiogram ( Pedal Arch, DPA, ISAIAS) done 6/21/2023. Hx: Left Transmetatarsal Amputation done 9/3/2021. Previous: 2/7/2023 Bilateral ANDREINA/ TRAE's w/o, 4/7/2023 IR Left Angiogram. History: Previous Smoker, Hypertension, Diabetic, Hyperlipidemia, PAD, and Angioplasty  Resting TRAE's          Right: mmHg Index     Brachial: 158  Ankle-(PT): 157 0.99 Ankle-(DP): >254 NC          Digit: 47 0.30               Left: mmHg Index     Brachial: 157  Ankle-(PT): >254 NC Ankle-(DP): >254 NC          Digit: AMP N/A Resting ankle-brachial index of 0.99 on the right. Toe Pressures of 47 mmHg and TBI of 0.30 Resting ankle-brachial index is non compressible on the left.  VPR WAVEFORMS: The right volume plethysmography waveforms are mildly abnormal at the lower thigh level, mildly abnormal at the upper calf level and mildly abnormal at the ankle. The left volume plethysmography waveforms are mildly abnormal at the lower thigh level, mildly abnormal at the upper calf level and mildly abnormal at the ankle.  Impression:  1. RIGHT LOWER EXTREMITY: TRAE is Non-diagnostic indicating vessel calcification. Toe Pressures are Abnormal and impaired for wound healing with toe pressures of 47 mmHg. 2. LEFT LOWER EXTREMITY: TRAE is Non-diagnostic indicating vessel calcification. Toe Pressures are Un-interpretable and Non-diagnostic. Reference: Wound classification Grade  TRAE Ankle Systolic Pressure Toe Pressures 0 > 0.80 > 100 mmHg > 60 mmHg 1 0.6 - 0.79 70 - 100 mmHg 40 - 59 mmHg 2 0.4 - 0.59 50-70 mmHg 30 - 39 mmHg 3 < 0.39 < 50 mmHg < 30 mmHg Digit Pressures DBI Disease Category > 0.70 Normal < 0.70 Abnormal > 30 mmHg Potential wound healing < 30 mmHg Impaired wound healing Ankle Brachial Pressures TRAE Disease Category > 1.3  Likely vessel calcification with monophasic waveforms, non-diagnostic 0.95-1.30 Normal with multiphasic waveforms 0.50-0.95 Single level disease 0.30-0.50 Multilevel disease < 0.30 Critical limb ischema Volume Plethysmography Recording (VPR) at all levels Normal Sharp systolic peak, fast upstroke, prominent dicrotic notch in wave Mild Sharp systolic peak, fast upstroke, absent dicrotic notch in wave Moderate Flattened systolic peak, slowed upstroke, absent dicrotic notch inwave Severe amplitude wave with = upslope and down slope Occluded Flat Line      US Lower Extremity Arterial Duplex Left    Result Date: 7/7/2023  Table formatting from the original result was not included. Arterial Duplex Ultrasound (Date: 07/06/23) Lower Extremity Artery Evaluation Indication: Surveillance Left Leg Arterial: PAD. S/P IR Left Balloon Angiogram ( Pedal Arch, DPA, ISAIAS) done 6/21/2023. Hx: Left Transmetatarsal Amputation done 9/3/2021. Previous: 2/7/2023 Bilateral ANDREINA/ TRAE's w/o, 4/7/2023 IR Left Angiogram. History: Previous Smoker, Hypertension, Diabetic, Hyperlipidemia, PAD, and Angioplasty  Technique: Duplex imaging is performed utilizing gray-scale, two-dimensional images, and color-flow imaging. Doppler waveform analysis and spectral Doppler imaging is also performed. LOWER EXTREMITY ARTERIAL DUPLEX EXAM WITH WAVEFORMS Right Leg:(cm/s) Location: Velocities Waveforms PTA:   23   M ISAIAS:   57  M DPA:   50  M Waveforms: T=Triphasic, M=Monophasic, B=Biphasic Left Leg:(cm/s) Location: Velocities Waveforms EIA:   141  T CFA:   201  T PFA:   138  B SFA Proximal:   158  T SFA  Mid:   114  M SFA Distal:   209 /  145 M Popliteal Artery:   167 / 135   M PTA:   24   M ISAIAS:   112  M DPA:   135  M Waveforms: T=Triphasic, M=Monophasic, B=Biphasic Stenotic Profile Ratio: 209 / 114 = 1.83  Impression: Right Lower Extremity: Not fully examined but monophasic flow in tibial vessels suggest more proximal disease Left Lower Extremity: Triphasic flow in common femoral artery suggesting no inflow disease.  Transition to monophasic flow within the level of mid SFA consistent with underlying hemodynamically significant disease.  Monophasic flow remains distally Reference: Category Normal 1-19% 20-49% 50-99% Occluded PSV <160 cm/sec without spectral broadening <160 cm/sec with spectral broadening Increased Increased Absent Flow Ratio N/A N/A < 2.0 >2.0 N/A Post-Stenotic Turbulence No No No Yes N/A      IR Lower Extremity Angiogram Left    Result Date: 6/22/2023  VASCULAR SURGERY ANGIOGRAM REPORT   [facility]  DATE: 06/21/23   PROCEDURES PERFORMED:  1.  Ultrasound-guided access of right common femoral artery 2.  Selective cannulation of the left common iliac artery, external iliac artery, common femoral artery, superficial femoral artery, popliteal artery, anterior tibial artery, dorsalis pedis artery, pedal arch, tibioperoneal trunk, and peroneal artery with selective left lower extremity angiography 3.  Plain balloon angioplasty of the left pedal arch and dorsalis pedis artery with 2 mm x 100 mm Crosstella balloon, followed by 2 mm x 40 mm Crosstella balloon 4. Plain balloon angioplasty of the left dorsalis pedis and anterior tibial artery with 3 mm x 200 mm Crosstella balloon 5. Attempted recannalization of left peroneal artery with balloon angioplasty using 3 mm x 200 mm Crosstella balloon 6.  Moderate sedation 7.  Arteriotomy closure with Angio-Seal device  SURGEON: Dr. Lynne  RESIDENT: Jayden Paniagua MD  INDICATION: Patient is a 72-year-old male with chronic limb threatening ischemia  and left foot wounds after previous transmetatarsal amputation.  He underwent a left lower extremity angiogram in April 2023 that identified single-vessel runoff via the left anterior tibial artery.  At that time his wound did appear to be making small amounts of progress and given the single-vessel runoff the decision was made not to treat.his to artery disease.  In the intervening months his left foot wounds have progressed and the decision was made to repeat arteriography today with the intention to treat his distal tibial disease.  SEDATION: The procedure was performed with administration of intravenous conscious sedation with appropriate preoperative, intraoperative, and postoperative evaluation.  124 minutes of supervised face to face intraservice time was provided by a radiology nurse under my direct supervision.  ANTIBIOTICS: None FLUOROSCOPIC TIME: 34.4 min RADIATION DOSE:  323 mGy CONTRAST: 80 ml visipaque   PROCEDURE: Ultrasound was used to evaluate the right common femoral artery. The selected vessel was patent. Ultrasound was then used for real-time ultrasound guided needle entry of the  right common femoral artery. Permanent images were recorded and saved to the patient's medical record.  Micropuncture sheath was inserted.  A glide advantage wire was advanced into the aorta.  5 Azeri sheath was placed.  An Omni Flush catheter was advanced over the wire. Using up and over maneuver I was able to advance the wire into the left femoral artery.  Over the wire the Omni Flush catheter was advanced and positioned in the  left common femoral artery.  Left lower extremity angiogram was performed using CO2 which demonstrated mild stenosis in the distal SFA and popliteal artery.  An 035 angled Irizarry cross catheter was advanced over the wire and placed in the mid popliteal artery.  Lower extremity angiography was then performed with contrast which demonstrated the high-grade stenosis of the proximal left anterior  tibial artery, and occlusion of the left posterior tibial and peroneal arteries.  There is high-grade stenosis in the left dorsalis pedis artery with an incomplete pedal arch.  An 035 glide advantage wire was used to select the posterior tibial artery, the 5 Sinhala sheath was exchanged for a 90 cm 6 Sinhala [employer] destination sheath.  With accommodation of an 018 angled Irizarry cross catheter and 1 8 glide advantage wire, the left anterior tibial artery was selected.  Wire was advanced to the TP trunk and pedal arch.  Repeat angiography was performed demonstrating stenoses throughout dorsalis pedis pedal arch.  Next we proceeded with plain balloon angioplasty of the left pedal arch and dorsalis pedis trunk using a 2 mm x 100 mm Crosstella balloon.  Repeat angiography identified a persistent segment of stenosis in the very distal pedal arch, which was then treated with a 2 mm x 40 mm Crosstella plain balloon angioplasty.  This balloon was then exchanged for a 3 mm x 200 mm Crosstella plain balloon used to treat the dorsalis pedis and the entire length of the left anterior tibial artery.  At this point, angiography demonstrated brisk flow through the anterior tibial artery, dorsalis pedis, and pedal arch with significantly improved collateral sensation at the distal foot.  There was residual stenosis at the very proximal left anterior tibial artery, it was again treated with a 3 mm x 40 mm balloon.  There was only modest improvement with additional treatment, likely as a result of eccentric plaque.  We excepted this result and then attempted to recanalize the left peroneal artery.  This was patent to about the proximal third of the peroneal artery.  Using an 014 glide advantage wire and 018 Irizarry cross catheter, the peroneal artery was recanalized to the distal aspect of the artery.  A blush of contrast was used to confirm that the catheter tip was intraluminal.  A 3 mm x 200 mm Crosstella balloon was used for platelet  angioplasty of the recanalize peroneal artery segment.  This was unsuccessful and the peroneal artery immediately reoccluded.  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  Catheter was withdrawn to the mid SFA and angiogram again demonstrated nonflow-limiting stenosis of the distal SFA and above-knee popliteal arteries.  His disease were left untreated. At that point the procedure was concluded.  The arteriotomy closure was performed using an Angio-Seal device.  Patient tolerated procedure well and was transferred to recovery area in stable condition.  FINDINGS 1.  Mild, non-flow-limiting stenosis of the distal left superficial femoral artery and popliteal arteries. 2.  High-grade stenosis of the left proximal anterior tibial artery, proximal dorsalis pedis artery 3. Incomplete left pedal arch 4.  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  IMPRESSION Successful treatment of the left anterior tibial, dorsalis pedis, and distal pedal arch with balloon angioplasty.  Completion angiogram shows brisk flow through the AT to the distal left foot, much improved from prior.  Patient will be started on Plavix in addition to aspirin and will follow-up in vascular clinic.      Medical Decision Making

## 2023-07-29 NOTE — PLAN OF CARE
Physical Therapy Discharge Summary    Reason for therapy discharge:    All goals and outcomes met, no further needs identified.    Progress towards therapy goal(s). See goals on Care Plan in Epic electronic health record for goal details.  Goals met    Therapy recommendation(s):    Continue Cardiac rehab as more appropriate due to diagnosis and ambulation with nursing staff.

## 2023-07-29 NOTE — PROGRESS NOTES
'    RENAL ([facility]) progress note  CC: F/U GENARO on CKD-4, NSTEMI  S: My first eval today, denies nausea or sob, eating lunch today, no chest pain. We discussed worsening GENARO and potential that may not see improvement for several days post-angiogram.      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 Creatinine baseline 2.5-2.8 mg/dl when last seen by Dr. Bhatia in 5/2023. Has had spikes to mid-3's in  2022 and currently worsened function in setting of NSTEMI. Has had 2 angiograms of LLE since April that may be contributing to his worsened kidney function as well but no change in renal function noted after June procedure at least. No uremic symptoms.  -Genaro worse today consistent with contrast nephropathy after cor angio 7/27              -We will trend renal function closely.   -no indications for dialysis now   -cont to hold lisinopril until creat back to baseline                        - Recommend ESRD education soon as outpt.      Group c strep bacteremia 7/27. Now on ceftriaxone. Trend subsequent cultures.     Type 1 diabetes mellitus with nephropathy, peripheral artery disease, neuropathy              - Diabetes has been well reasonably controlled with Hgb A1c 7.4%  - Management per Hospital med     PAD with previous left TMA 2020: Peripheral angio noted 6/2023 with angioplasties and previously angioplasty in April 2023.     CKD-MBD: Stable calcium, PTH 66 (11/30/2022).  Continue cholecalciferol 2000 units daily.     Anemia due to CKD: Hgb decreased 2 g/dl since April.   -iron stores low  -hold on IV iron with bacteremia  -will start ferrous sulfate    HTN with CKD and CHF (LVH noted on ECHO). Ok control, no med changes now    Jenny Thomason MD  [facility]  [phone]        No interval changes to past medical history, social history or family history to report.    /57   Pulse 81   Temp 98.1  F (36.7  C) (Oral)   Resp 18   Ht 1.829 m (6')   Wt 102.2 kg (225 lb 4.8 oz)   SpO2 97%   BMI 30.56  kg/m      I/O last 3 completed shifts:  In: 940 [P.O.:940]  Out: 1270 [Urine:1270]    Physical Exam:   GENERAL: calm and comfortable, alert  EYES: pupils equal, sclerae not icteric.  ENT: Hearing normal, oral mucosa moist.  RESP: Clear to auscultation bilaterally with no respiratory distress, normal effort.  CV: RRR, no murmurs. Trace leg edema.    GI: Active BS, Soft, NT/ND, no masses or HSM  : No CVA tenderness   SKIN: No rash, warm/ dry  MSK: S/P left TMA  NEURO: Moves all extremities, no tremor. Sensation grossly intact to LT  PSYCH: Appropriate mood and affect    Recent Labs   Lab 07/29/23  0436 07/28/23  0448 07/27/23  1222   * 136 137   POTASSIUM 4.1 5.2 4.7   CHLORIDE 98 100 101   CO2 21* 17* 13*   BUN 82.7* 60.9* 53.5*   CR 4.91* 3.64* 3.43*   GFRESTIMATED 12* 17* 18*   SVITLANA 8.7* 8.1* 8.5*   MAG  --   --  2.0   ALBUMIN 3.2* 3.2* 3.5         Recent Labs   Lab 07/29/23  0436 07/28/23  0448 07/27/23  1222   WBC 10.0 10.8 11.3*   HGB 9.5* 10.2* 10.6*   HCT 30.4* 32.2* 32.0*   MCV 94 94 92   * 135* 154           Cor Angio 7/27/23: (Visipaque 116 ml)  1.  Severe diffuse disease of native vessels with small caliber vessels distally.  2.  Chronic occlusion of native LAD and native left circumflex.  3.  Left main fills small narrow caliber ramus branch only  4.  Right coronary artery has diffuse mild to moderate disease but no severe stenoses.  5.  Patent saphenous vein graft to high diagonal branch which is diffusely diseased. Anastomosis appears normal.  6.  Occluded saphenous vein graft to distal diagonal/lateral wall branches, most likely the acute infarct vessel.  7.  Patent BENITEZ to LAD. Anastomosis appears normal.  8.  Successful PCI saphenous vein graft to distal diagonal/lateral with drug-eluting stent implant x1  9.  Recommend continuing Plavix therapy indefinitely, risk factor management for ASCVD.        Current Facility-Administered Medications:     acetaminophen (TYLENOL) tablet 650 mg,  650 mg, Oral, Q4H PRN, Maikel Tripathi MD, 650 mg at 07/29/23 0108    ALPRAZolam (XANAX) tablet 0.25 mg, 0.25 mg, Oral, At Bedtime, Maikel Tripathi MD, 0.25 mg at 07/28/23 2123    aspirin EC tablet 81 mg, 81 mg, Oral, Daily, Maikel Tripathi MD, 81 mg at 07/29/23 0849    atorvastatin (LIPITOR) tablet 40 mg, 40 mg, Oral, Daily, Maikel Tripathi MD, 40 mg at 07/29/23 0849    bisacodyl (DULCOLAX) suppository 10 mg, 10 mg, Rectal, Daily PRN, Maikel Tripathi MD    cefTRIAXone (ROCEPHIN) 2 g vial to attach to  ml bag for ADULTS or NS 50 ml bag for PEDS, 2 g, Intravenous, Q24H, Miryam Quintana MD, 2 g at 07/28/23 1639    clopidogrel (PLAVIX) tablet 75 mg, 75 mg, Oral, Daily, Maikel Tripathi MD, 75 mg at 07/29/23 0849    Continuing antiplatelet from home medication list OR antiplatelet order already placed during this visit, , Does not apply, DOES NOT GO TO Bhumi LEAVITT Kenneth W, MD    Continuing beta blocker from home medication list OR beta blocker order already placed during this visit, , Does not apply, DOES NOT GO TO Bhumi LEAVITT Kenneth W, MD    Continuing statin from home medication list OR statin order already placed during this visit, , Does not apply, DOES NOT GO TO Bhumi LEAVITT Kenneth W, MD    glucose gel 15-30 g, 15-30 g, Oral, Q15 Min PRN **OR** dextrose 50 % injection 25-50 mL, 25-50 mL, Intravenous, Q15 Min PRN **OR** glucagon injection 1 mg, 1 mg, Subcutaneous, Q15 Min PRN, Maikel Tripathi MD    ezetimibe (ZETIA) tablet 10 mg, 10 mg, Oral, QPM, Maikel Tripathi MD, 10 mg at 07/28/23 2123    fluticasone (FLONASE) 50 MCG/ACT spray 1 spray, 1 spray, Both Nostrils, Daily, Sabas Minor MD, 1 spray at 07/29/23 0851    gabapentin (NEURONTIN) capsule 300 mg, 300 mg, Oral, BID, Maikel Tripathi MD, 300 mg at 07/29/23 0849    heparin ANTICOAGULANT injection 5,000 Units, 5,000 Units, Subcutaneous, Q12H, Remy Phipps DO, 5,000 Units at 07/29/23 0849    HOLD: Metformin and metformin  containing medications on day of procedure and 48 hours after IV contrast given for patients with acute kidney injury or severe chronic kidney disease (stage IV or stage V; i.e., eGFR less than 30), , Does not apply, HOLD, Maikel Tripathi MD    hydrALAZINE (APRESOLINE) injection 10 mg, 10 mg, Intravenous, Q4H PRN, Maikel Tripathi MD    insulin aspart (NovoLOG) injection (RAPID ACTING), , Subcutaneous, TID AC, Remy Phipps DO, 18 Units at 07/29/23 0853    insulin aspart (NovoLOG) injection (RAPID ACTING), 1-6 Units, Subcutaneous, TID w/meals, Remy Phipps DO, 3 Units at 07/29/23 1227    insulin glargine (LANTUS PEN) injection 30 Units, 30 Units, Subcutaneous, QAM, Maikel Tripathi MD, 30 Units at 07/29/23 0857    levothyroxine (SYNTHROID/LEVOTHROID) tablet 125 mcg, 125 mcg, Oral, Daily, Maikel Tripathi MD, 125 mcg at 07/29/23 0849    lidocaine (LMX4) cream, , Topical, Q1H PRN, Maikel Tripathi MD    lidocaine 1 % 0.1-1 mL, 0.1-1 mL, Other, Q1H PRN, Maikel Tripathi MD    [Held by provider] lisinopril (ZESTRIL) tablet 40 mg, 40 mg, Oral, Daily, Maikel Tripathi MD    melatonin tablet 1 mg, 1 mg, Oral, At Bedtime PRN, Maikel Tripathi MD, 1 mg at 07/29/23 0108    metoprolol (LOPRESSOR) injection 5 mg, 5 mg, Intravenous, Q15 Min PRN, Maikel Tripathi MD    [START ON 7/30/2023] metoprolol succinate ER (TOPROL XL) 24 hr tablet 100 mg, 100 mg, Oral, Daily, Kike Valderrama MD    naloxone (NARCAN) injection 0.2 mg, 0.2 mg, Intravenous, Q2 Min PRN **OR** naloxone (NARCAN) injection 0.4 mg, 0.4 mg, Intravenous, Q2 Min PRN **OR** naloxone (NARCAN) injection 0.2 mg, 0.2 mg, Intramuscular, Q2 Min PRN **OR** naloxone (NARCAN) injection 0.4 mg, 0.4 mg, Intramuscular, Q2 Min PRN, Remy Phipps DO    nitroGLYcerin (NITROSTAT) sublingual tablet 0.4 mg, 0.4 mg, Sublingual, Q5 Min PRN, Maikel Tripathi MD    ondansetron (ZOFRAN ODT) ODT tab 4 mg, 4 mg, Oral, Q6H PRN **OR** ondansetron (ZOFRAN)  injection 4 mg, 4 mg, Intravenous, Q6H PRN, Maikel Tripathi MD, 4 mg at 07/28/23 0148    oxyCODONE (ROXICODONE) tablet 5 mg, 5 mg, Oral, Q4H PRN **OR** oxyCODONE (ROXICODONE) tablet 10 mg, 10 mg, Oral, Q4H PRN, Maikel Tripathi MD    Patient is already receiving anticoagulation with heparin, enoxaparin (LOVENOX), warfarin (COUMADIN)  or other anticoagulant medication, , Does not apply, Continuous PRN, Maikel Tripathi MD    Percutaneous Coronary Intervention orders placed (this is information for BPA alerting), , Does not apply, DOES NOT GO TO MARBhumi Kenneth W, MD    sodium chloride (PF) 0.9% PF flush 3 mL, 3 mL, Intracatheter, Q8H, Maikel Tripathi MD, 3 mL at 07/28/23 2232    sodium chloride (PF) 0.9% PF flush 3 mL, 3 mL, Intracatheter, q1 min prn, Maikel Tripathi MD      Labs personally reviewed today during this evaluation at 10:54 AM

## 2023-07-29 NOTE — PROGRESS NOTES
07/29/23 0850   Appointment Info   Signing Clinician's Name / Credentials (PT) Bridgette Byrd PT   Rehab Comments (PT) Cooperative   Living Environment   People in Home spouse   Current Living Arrangements house   Home Accessibility stairs within home   Number of Stairs, Within Home, Primary greater than 10 stairs  (13 steps)   Stair Railings, Within Home, Primary railing on left side (ascending);other (see comments)  (pt hangs on to wall on one side)   Transportation Anticipated family or friend will provide   Self-Care   Usual Activity Tolerance good   Current Activity Tolerance moderate   Equipment Currently Used at Home none  (has FWW at home)   Fall history within last six months no   Activity/Exercise/Self-Care Comment Patient independent with mobility and ADL   General Information   Onset of Illness/Injury or Date of Surgery 07/27/23   Referring Physician Remy Phipps   Patient/Family Therapy Goals Statement (PT) return home   Pertinent History of Current Problem (include personal factors and/or comorbidities that impact the POC) Admitted with CP NSEMI and CKD.Patient has a hx of C-spine surgery,CABG,Diabetic lcer,mid foot amp.  (cage splint/boot L foot)   Existing Precautions/Restrictions cardiac;fall;oxygen therapy device and L/min;other (see comments)  (2l  for amb /1 l at rest)   Weight-Bearing Status - LLE weight-bearing as tolerated   Weight-Bearing Status - RLE full weight-bearing   Cognition   Affect/Mental Status (Cognition) WFL   Orientation Status (Cognition) oriented x 4   Range of Motion (ROM)   Range of Motion ROM is WFL   ROM Comment except L foot/ankle   Strength (Manual Muscle Testing)   Strength (Manual Muscle Testing) strength is WFL   Bed Mobility   Bed Mobility no deficits identified   Transfers   Transfers sit-stand transfer   Maintains Weight-bearing Status (Transfers) able to maintain   Sit-Stand Transfer   Sit-Stand [place] (Transfers) supervision   Assistive Device  (Sit-Stand Transfers) walker, front-wheeled   Stand-Sit Transfer   Stand-Sit [place] (Transfers) supervision;verbal cues   Assistive Device (Stand-Sit Transfers) walker, front-wheeled   Comment, (Stand-Sit Transfer) cues for safety/hands placement   Gait/Stairs (Locomotion)   [place] Level (Gait) supervision   Assistive Device (Gait) walker, front-wheeled   Distance in Feet 20   Distance in Feet (Gait) 150 feet   Pattern (Gait) step-through   Deviations/Abnormal Patterns (Gait) vish decreased;gait speed decreased   Maintains Weight-bearing Status (Gait) able to maintain   Comment, (Gait/Stairs) kelsey on R  and cage splint L foot   Clinical Impression   Criteria for Skilled Therapeutic Intervention Evaluation only;No problems identified which require skilled intervention   Clinical Presentation (PT Evaluation Complexity) Stable/Uncomplicated   Clinical Presentation Rationale pt presents as med diagnosed   Clinical Decision Making (Complexity) low complexity   Planned Therapy Interventions (PT) gait training;stair training;transfer training   Anticipated Equipment Needs at Discharge (PT) walker, rolling   Risk & Benefits of therapy have been explained evaluation/treatment results reviewed;patient;spouse/significant other   PT Total Evaluation Time   PT Eval, Low Complexity Minutes (11089) 15   Physical Therapy Goals   PT Frequency One time eval and treatment only   PT Predicted Duration/Target Date for Goal Attainment 07/29/23   PT Goals Bed Mobility;Transfers;Gait;Stairs   PT: Bed Mobility Independent;Supine to/from sit;Goal Met   PT: Transfers Supervision/stand-by assist;Sit to/from stand;Assistive device;Goal Met   PT: Gait Supervision/stand-by assist;Assistive device;Rolling walker;150 feet   PT: Stairs Supervision/stand-by assist;Greater than 10 stairs;Rail on both sides;Goal Met   Interventions   Interventions Quick Adds Gait Training   Therapeutic Activity   Therapeutic Activities: dynamic activities  to improve functional performance Minutes (45151) 5   Treatment Detail/Skilled Intervention Patient sat on EOB to asya/doff shoe on R and brace on L .Stood by the side of bed to put /take off pijama bottom.   Gait Training   Gait Training Minutes (32163) 12   Symptoms Noted During/After Treatment (Gait Training) fatigue   Treatment Detail/Skilled Intervention ambulated out on hallway   [place] Level (Gait Training) stand-by assist   Physical Assistance Level (Gait Training) supervision;1 person assist   Weight Bearing (Gait Training) weight-bearing as tolerated   Assistive Device (Gait Training) rolling walker   Pattern Analysis (Gait Training) swing-through gait   Gait Analysis Deviations decreased step length;decreased stride length;decreased toe-to-floor clearance;decreased weight-shifting ability   Stair Railings present at both sides  (13 steps)   Physical Assist/Nonphysical Assist (Stairs) supervision   Level of [place] (Stairs) stand-by assist   PT Discharge Planning   PT Plan d/c PT -recommend cont CR more appropriate and not to duplicate services   PT Discharge Recommendation (DC Rec) home;home with outpatient physical therapy  (Cardiac rehab)   PT Rationale for DC Rec Patient moving well.was independent prior,spouse able to assist as needed   PT Brief overview of current status SBA for mobility and amb 150 feet with FWW,able to negotiate 13 steps with SBA   Total Session Time   Timed Code Treatment Minutes 17   Total Session Time (sum of timed and untimed services) 32

## 2023-07-29 NOTE — PROGRESS NOTES
Cardiology Progress Note    Assessment/Plan:    Non-ST elevation myocardial infarction likely secondary to failed coronary bypass graft to OM, status post PCI. On asa plavix and Statin   Cardiomyopathy: Ischemic. Mild LV dysfunction noted on echo. On Metoprolol succinate.   Peripheral arterial disease recently managed on aspirin and clopidogrel. Also on Atorvastatin.  HTN: inadequate control, will increase Metoprolol  CKD 4, with slight increase in creatinine today following angiogram. Recommend nephrology consult.       Principal Problem:    NSTEMI (non-ST elevated myocardial infarction) (H)  Active Problems:    CKD (chronic kidney disease) stage 4, GFR 15-29 ml/min (H)        Subjective:  No chest pain since percutaneous intervention.  Breathing stable today.  Fatigued with walking.      Objective:   Vital signs in last 24 hours:  Vitals:    07/29/23 0408 07/29/23 0423 07/29/23 0715 07/29/23 0846   BP: (!) 165/72 138/63 117/58 118/57   BP Location: Right arm Right arm Left arm    Pulse: 87  76 81   Resp: 18  18    Temp: 99.7  F (37.6  C)  98.1  F (36.7  C)    TempSrc: Oral  Oral    SpO2: 92%  91% 97%   Weight: 102.2 kg (225 lb 4.8 oz)      Height:         Weight:   Wt Readings from Last 3 Encounters:   07/29/23 102.2 kg (225 lb 4.8 oz)   06/05/23 103.4 kg (228 lb)   04/07/23 98.9 kg (218 lb)           PHYSICAL EXAM      Respiratory:  Normal breath sounds, No respiratory distress, No wheezing, No chest tenderness.   Cardiovascular:   Normal heart rate, Normal rhythm, 2 out of 6 harsh systolic murmur left sternal border.  GI:  Bowel sounds normal, Soft, No tenderness, No masses  Extremities: no edema       Cardiographics:   Telemetry sinus rhythm, 60 to 90 bpm.    Overnight oxygen desaturation noted    Echocardiogram yesterday:  The left ventricle is normal in size.  There is mild concentric left ventricular hypertrophy.  The visual ejection fraction is 45-50%.  Diastolic Doppler findings (E/E' ratio and/or other  parameters) suggest left  ventricular filling pressures are increased.  Poor quality images but suspicion of inferior and apical hypokinesis.  Normal right ventricle size and systolic function.  The right ventricular systolic pressure is approximated at 19mmHg plus the  right atrial pressure.  Sinus rhythm was noted.  Technically difficult, suboptimal study. Consider cardiac MRI for improved  image quality.  Prior echo from 3/8/23 was of better quality. Inferior wll was normal on that  study.    Imaging:   Coronary angiography with intervention yesterday.  Images personally reviewed.  1.  Severe diffuse disease of native vessels with small caliber vessels distally.  2.  Chronic occlusion of native LAD and native left circumflex.  3.  Left main fills small narrow caliber ramus branch only  4.  Right coronary artery has diffuse mild to moderate disease but no severe stenoses.  5.  Patent saphenous vein graft to high diagonal branch which is diffusely diseased. Anastomosis appears normal.  6.  Occluded saphenous vein graft to distal diagonal/lateral wall branches, most likely the acute infarct vessel.  7.  Patent BENITEZ to LAD. Anastomosis appears normal.  8.  Successful PCI saphenous vein graft to distal diagonal/lateral with drug-eluting stent implant x1  9.  Recommend continuing Plavix therapy indefinitely, risk factor management for ASCVD.    Lab Results:   Lab Results   Component Value Date    WBC 10.0 07/29/2023    HGB 9.5 (L) 07/29/2023    HCT 30.4 (L) 07/29/2023     (L) 07/29/2023    CHOL 96 06/16/2023    TRIG 75 06/16/2023    HDL 34 (L) 06/16/2023    ALT 71 (H) 07/29/2023     (H) 07/29/2023     (L) 07/29/2023    BUN 82.7 (H) 07/29/2023    CO2 21 (L) 07/29/2023    TSH 1.68 06/16/2023    PSA 0.90 12/17/2021    INR 1.00 06/21/2023    MICROALBUR 27.97 (H) 12/17/2021     Lab Results   Component Value Date    TROPONINI 0.13 06/02/2021       Kike Valderrama MD  Clinical Cardiac  Electrophysiology

## 2023-07-29 NOTE — PLAN OF CARE
Problem: Plan of Care - These are the overarching goals to be used throughout the patient stay.    Goal: Absence of Hospital-Acquired Illness or Injury  Intervention: Identify and Manage Fall Risk  Recent Flowsheet Documentation  Taken 7/28/2023 2130 by Lidia Fuentes, RN  Safety Promotion/Fall Prevention:   activity supervised   assistive device/personal items within reach   clutter free environment maintained   increased rounding and observation   increase visualization of patient   lighting adjusted   mobility aid in reach   nonskid shoes/slippers when out of bed   patient and family education   room door open   room near nurse's station   room organization consistent   safety round/check completed   supervised activity   toileting scheduled  Taken 7/28/2023 1645 by Lidia Fuentes, RN  Safety Promotion/Fall Prevention:   activity supervised   assistive device/personal items within reach   clutter free environment maintained   increased rounding and observation   increase visualization of patient   lighting adjusted   mobility aid in reach   nonskid shoes/slippers when out of bed   patient and family education   room door open   room near nurse's station   room organization consistent   safety round/check completed   supervised activity   toileting scheduled     Problem: Plan of Care - These are the overarching goals to be used throughout the patient stay.    Goal: Optimal Comfort and Wellbeing  Outcome: Progressing     Problem: Diabetes Comorbidity  Goal: Blood Glucose Level Within Targeted Range  Outcome: Progressing   Goal Outcome Evaluation:    A & O x 4, VSS, no fever this shift, NSR, on 2 L NC. Pt weaned to 1 L NC but desatted to low 90s. MRI could not be completed this evening, see my progress note. ID and podiatry came and saw pt this evening. Wound care to left foot ordered and pt and wife wanted alginate applied only for now. Denies pain. Lab called positive blood cultures, see my progress note. Blood  sugars were in the 300-400 this evening and came down to 234 at bedtime. Pt wants his lasix medication resumed, sticky note left for physicians.

## 2023-07-29 NOTE — PROGRESS NOTES
SW made referral to [facility] to run benefits if pt were to discharge home with IC abx. Care management following.  10:41 AM    GUDELIA Angel  7/29/2023

## 2023-07-29 NOTE — PROGRESS NOTES
Lab called positive blood cultures drawn at 0200 H on 7/28/23 from right hand and right arm. Both are positive for gram positive cocci in pairs and chains. Dr. Minor notified via [email or web address].

## 2023-07-29 NOTE — PROGRESS NOTES
Pt went down to MRI this evening at approx. 2245 H. MRI staff called writer and stated that pt reported having difficulty breathing while lying flat. Pt is on 2 L O2 NC and MRI staff also increased his O2 while laying flat, but pt reported that this didn't help. MRI was unable to complete imaging this evening and said they'll reschedule it for tomorrow.   Pt did inform writer this afternoon that he's on lasix PO at home, but this med is currently not ordered for him at this time, and he wanted to resume this medication. Bryce SERRATO was paged about his lasix med who recommended for pt to discuss this med with the day MDs, including cards and nephrology. Pt agreed to this. Will leave sticky note for day team.

## 2023-07-29 NOTE — PLAN OF CARE
Patient denied pain and was able to sleep for most of the night after taking tylenol and melatonin at HS and use of lavender essential oils. Tele NSR. Still using 2L oxygen via NC. Patient calls appropriately for staff before getting up with assist x1 and walker.    Plan to try again to do MRI however, this needs addressing with day team as he has severe Orthopnea preventing this right now and may need to be medicated.     Vitals:    07/28/23 1939 07/29/23 0031 07/29/23 0408 07/29/23 0423   BP: 117/55 (!) 146/55 (!) 165/72 138/63   BP Location: Right arm Right arm Right arm Right arm   Patient Position:       Pulse: 75 84 87    Resp: 18 16 18    Temp: 98.7  F (37.1  C) 99.9  F (37.7  C) 99.7  F (37.6  C)    TempSrc: Oral Oral Oral    SpO2: 95%  92%    Weight:   102.2 kg (225 lb 4.8 oz)    Height:            Problem: Plan of Care - These are the overarching goals to be used throughout the patient stay.    Goal: Optimal Comfort and Wellbeing  Outcome: Progressing     Problem: Risk for Delirium  Goal: Improved Attention and Thought Clarity  Outcome: Progressing  Intervention: Maximize Cognitive Function  Recent Flowsheet Documentation  Taken 7/29/2023 0054 by Phoebe Pike RN  Sensory Stimulation Regulation: (Lavender essential oils and ear plugs)   lighting decreased   quiet environment promoted   other (see comments)  Goal: Improved Sleep  Outcome: Progressing     Problem: Chronic Kidney Disease  Goal: Acceptable Pain Control  Outcome: Progressing     Problem: Cardiac Catheterization (Diagnostic/Interventional)  Goal: Stable Heart Rate and Rhythm  Outcome: Progressing  Goal: Acceptable Pain Control  Outcome: Progressing   Goal Outcome Evaluation:

## 2023-07-30 PROBLEM — N52.9 ED (ERECTILE DYSFUNCTION): Status: ACTIVE | Noted: 2020-04-07

## 2023-07-30 PROBLEM — F41.1 GENERALIZED ANXIETY DISORDER: Status: ACTIVE | Noted: 2020-04-07

## 2023-07-30 PROBLEM — E11.40 DIABETIC NEUROPATHY (H): Status: ACTIVE | Noted: 2020-04-07

## 2023-07-30 PROBLEM — I63.132: Status: ACTIVE | Noted: 2023-01-01

## 2023-07-30 NOTE — PLAN OF CARE
Problem: Chronic Kidney Disease  Goal: Fluid Balance  Outcome: Progressing     Problem: Cardiac Catheterization (Diagnostic/Interventional)  Goal: Stable Heart Rate and Rhythm  Outcome: Progressing     Problem: Cardiac Catheterization (Diagnostic/Interventional)  Goal: Absence of Bleeding  Outcome: Progressing     Problem: Diabetes Comorbidity  Goal: Blood Glucose Level Within Targeted Range  Outcome: Progressing     Pt A/O. Up with a SBA. Denied pain and SOB. Weaned to room air this shift, pt tolerating well sating in the 90s. Wound care completed on LLE, pt refuses the use of vashe. Carotid US completed this shift. Plan for SHAYLEE 7/31.

## 2023-07-30 NOTE — CONSULTS
NEUROLOGY INPATIENT CONSULTATION NOTE       [facility]  [address] [place] [address]  Tel: [phone]  Fax: [phone]  [email or web address]     Dakota Bauer,  1950, MRN [id number]  PCP: Jamal Gaffney  Date: 2023     ASSESSMENT & PLAN     Diagnosis code: Embolic infarction    Left pontomedullary, left frontal) midline cerebellar infarction likely embolic  72-year-old male with history of HTN, HLD, CKD stage IV, DM, CAD s/p CABG who was admitted on 2023 and had successful PCI but following day complaint of diplopia  MRI brain shows small infarct in the left pontomedullary junction, left frontal lobe and questionable area in the right paramidline cerebellum.  This likely is embolic in the setting of recent coronary angiogram  Check carotid ultrasound  Continue dual antiplatelet therapy with aspirin and Plavix in addition to statin.    SHAYLEE scheduled for tomorrow  I expect diplopia to eventually improve and in the meantime would recommend eyepatch alternating between both eyes    Thank you again for this referral, please feel free to contact me if you have any questions.    Pollo Hurtado MD  [facility]  (Formerly, [facility]     CHIEF COMPLAINT NSTEMI (non-ST elevated myocardial infarction) (H)     HISTORY OF PRESENT ILLNESS     We have been requested by Dr. Phipps to evaluate Dakota Bauer who is a 72 year old  male for CVA    Patient is a 72-year-old male with history of HTN, HLD, SHERRELL, CKD stage IV, DM, CAD, s/p CABG who was admitted 2023 with chest pain and shortness of breath.  He was evaluated cardiology and had a cardiac catheterization and had successful PCI saphenous vein graft to the distal diagonal/lateral with drug-eluting stent implant echocardiogram was difficult study and it showed a low ejection fraction.  Patient complained of diplopia on 2023 and had a MRI of  the brain that showed small infarct in the left pontomedullary junction in the left frontal lobe there was questionable right paramidline cerebellar infarct also in addition to age-related changes.  He had a lipid profile checked recently and his LDL was 47.  According to patient he feels his symptoms are improving     PROBLEM LIST      Patient Active Problem List   Diagnosis Code    CAD (coronary artery disease) I25.10    Hypertension I10    PVD (peripheral vascular disease) (H) I73.9    Hyperlipidemia LDL goal <70 E78.5    NSTEMI (non-ST elevated myocardial infarction) (H) I21.4    CKD (chronic kidney disease) stage 4, GFR 15-29 ml/min (H) N18.4    Type 1 diabetes mellitus with chronic kidney disease (H) E10.22    Diabetic neuropathy (H) E11.40    ED (erectile dysfunction) N52.9    Generalized anxiety disorder F41.1    Hypothyroidism E03.9    Obstructive sleep apnea G47.33    Cerebral infarction due to embolism of left carotid artery (H) I63.132      Clinically Significant Risk Factors              # Hypoalbuminemia: Lowest albumin = 2.9 g/dL at 7/30/2023  4:41 AM, will monitor as appropriate     # Thrombocytopenia: Lowest platelets = 123 in last 2 days, will monitor for bleeding    # Acute Kidney Injury, unspecified: based on a >150% or 0.3 mg/dL increase in last creatinine compared to past 90 day average, will monitor renal function    # Hypertension: Noted on problem list       # DMII: A1C = 7.4 % (Ref range: <5.7 %) within past 6 months   # Obesity: Estimated body mass index is 31.11 kg/m  as calculated from the following:    Height as of this encounter: 1.829 m (6').    Weight as of this encounter: 104.1 kg (229 lb 6.4 oz).  , PRESENT ON ADMISSION        PAST MEDICAL & SURGICAL HISTORY     Past Medical History: Patient  has a past medical history of Chronic kidney disease, Coronary artery disease, Diabetes mellitus (H), Disorder of thyroid, Hyperlipidemia, and Hypertension.    Past Surgical History: He  has a  past surgical history that includes Cervical Disc Surgery (01/01/2014); Bypass graft artery coronary (01/01/2005); Cardiac catheterization (01/01/2005); Cardiac catheterization (11/25/2016); Amputate toe(s) (Left); IR Lower Extremity Angiogram Left (4/7/2023); IR Lower Extremity Angiogram Left (4/8/2020); IR Lower Extremity Angiogram Left (6/21/2023); Coronary Angiogram Graft (N/A, 7/27/2023); and Percutaneous Coronary Intervention (N/A, 7/27/2023).     SOCIAL HISTORY     Reviewed, and he  reports that he quit smoking about 38 years ago. His smoking use included cigarettes. He has never used smokeless tobacco. He reports that he does not currently use alcohol. He reports that he does not use drugs.     FAMILY HISTORY     Reviewed, and family history includes Coronary Artery Disease in his brother and father.     ALLERGIES     Allergies   Allergen Reactions    Hydrochlorothiazide Unknown    Levofloxacin Diarrhea        REVIEW OF SYSTEMS     Pertinent items are noted in HPI.     HOME & HOSPITAL MEDICATIONS     Prior to Admission Medications  Medications Prior to Admission   Medication Sig Dispense Refill Last Dose    ALPRAZolam (XANAX) 0.5 MG tablet Take 0.5 tablets by mouth At Bedtime   7/26/2023    aspirin 81 MG EC tablet [ASPIRIN 81 MG EC TABLET] Take 81 mg by mouth daily.   7/26/2023 at am    atorvastatin (LIPITOR) 40 MG tablet Take 40 mg by mouth daily   7/26/2023 at am    clopidogrel (PLAVIX) 75 MG tablet Take 1 tablet (75 mg) by mouth daily 90 tablet 0 7/26/2023 at am    ezetimibe (ZETIA) 10 MG tablet Take 1 tablet (10 mg) by mouth daily 90 tablet 4 7/26/2023 at pm    fish oil-omega-3 fatty acids 1000 MG capsule Take 1 capsule by mouth daily   7/26/2023    furosemide (LASIX) 20 MG tablet Take 2 tablets by mouth daily  9 7/26/2023 at am    gabapentin (NEURONTIN) 300 MG capsule [GABAPENTIN (NEURONTIN) 300 MG CAPSULE] Take 300 mg by mouth 3 (three) times a day.          7/26/2023 at pm    HUMALOG 100 unit/mL  injection Inject Subcutaneous 3 times daily (before meals) Carb count: 3 g carb : 1 unit insulin   7/26/2023 at pm    insulin glargine (LANTUS) 100 unit/mL injection [INSULIN GLARGINE (LANTUS) 100 UNIT/ML INJECTION] Inject 30 Units under the skin every morning.    7/27/2023 at am    levothyroxine (SYNTHROID/LEVOTHROID) 125 MCG tablet Take 125 mcg by mouth daily   7/27/2023 at am    lisinopril (PRINIVIL,ZESTRIL) 40 MG tablet [LISINOPRIL (PRINIVIL,ZESTRIL) 40 MG TABLET] Take 40 mg by mouth daily.   7/26/2023 at am    metoprolol succinate ER (TOPROL XL) 50 MG 24 hr tablet Take 1 tablet (50 mg) by mouth daily 90 tablet 2 7/26/2023 at am    multivitamin with iron (ONE DAILY WITH IRON) Tab tablet [MULTIVITAMIN WITH IRON (ONE DAILY WITH IRON) TAB TABLET] Take 1 tablet by mouth daily.   7/26/2023 at am    nitroGLYcerin (NITROSTAT) 0.4 MG sublingual tablet Place 1 tablet (0.4 mg) under the tongue every 5 minutes as needed 25 tablet 3 7/27/2023 at am    TRIAMCINOLONE ACETONIDE (NASACORT NASL) [TRIAMCINOLONE ACETONIDE (NASACORT NASL)] 1 spray into each nostril daily.   7/26/2023 at am    VITAMIN D3 2,000 unit capsule [VITAMIN D3 2,000 UNIT CAPSULE] Take 2,000 Units by mouth daily.  3 7/26/2023 at am    Continuous Blood Gluc Sensor (Lekiosque.frSTYLE DAVID 14 DAY SENSOR) [facility] [place]        TECHLITE PEN NEEDLES 31G X 5 MM miscellaneous           Hospital Medications   ALPRAZolam  0.25 mg Oral At Bedtime    aspirin  81 mg Oral Daily    atorvastatin  40 mg Oral Daily    cefTRIAXone  2 g Intravenous Q24H    clopidogrel  75 mg Oral Daily    ezetimibe  10 mg Oral QPM    ferrous sulfate  325 mg Oral Daily    [START ON 7/31/2023] fluticasone  2 spray Both Nostrils Daily    gabapentin  300 mg Oral BID    heparin ANTICOAGULANT  5,000 Units Subcutaneous Q12H    insulin aspart   Subcutaneous TID AC    insulin aspart  1-6 Units Subcutaneous TID w/meals    insulin glargine  30 Units Subcutaneous QAM    levothyroxine  125 mcg Oral Daily    [Held by  provider] lisinopril  40 mg Oral Daily    metoprolol succinate ER  100 mg Oral Daily    oxymetazoline  2 spray Both Nostrils BID    sodium chloride (PF)  3 mL Intracatheter Q8H        PHYSICAL EXAM     Vital signs  Temp:  [97.3  F (36.3  C)-98.3  F (36.8  C)] 97.9  F (36.6  C)  Pulse:  [64-69] 68  Resp:  [18-20] 18  BP: (104-132)/(54-62) 122/58  SpO2:  [92 %-97 %] 95 %    Weight:   Wt Readings from Last 1 Encounters:   07/30/23 104.1 kg (229 lb 6.4 oz)        General Physical Exam: Patient is alert and oriented x 3. Vital signs were reviewed and are documented in EMR. Neck was supple, no carotid bruit, thyromegaly, JVD or lymphadenopathy noted.  Neurological Exam:  Mental status: Alert and oriented x3 no acute distress  Speech: Normal with no dysarthria or aphasia  Cranial nerves: Pupil equal round and reactive face symmetrical tongue midline he has horizontal diplopia on right extreme gaze  Motor: 5/5 he has left transmetatarsal amputation  Reflexes: Globally absent  Sensory: Decreased light touch pinprick vibratory position sensation  Coordination: No dysmetria noted on finger-nose testing  Gait: Deferred     DIAGNOSTIC STUDIES     Pertinent Radiology   Radiology Results: Reviewed impression and images     MRI BRAIN 7/29/2023  1.  Small foci of acute to subacute infarction involving the dorsal left pontomedullary junction and left frontal lobe.  2.  Questionable punctate focus of infarction in the right para midline cerebellum seen on coronal diffusion weighted imaging only.  3.  Mild age-related changes as above.    Pertinent Labs   Lab Results: Personally Reviewed   Recent Results (from the past 24 hour(s))   Glucose by meter    Collection Time: 07/29/23 11:32 AM   Result Value Ref Range    GLUCOSE BY METER POCT 262 (H) 70 - 99 mg/dL   Glucose by meter    Collection Time: 07/29/23  6:04 PM   Result Value Ref Range    GLUCOSE BY METER POCT 213 (H) 70 - 99 mg/dL   Glucose by meter    Collection Time: 07/29/23   9:01 PM   Result Value Ref Range    GLUCOSE BY METER POCT 229 (H) 70 - 99 mg/dL   CBC with platelets    Collection Time: 07/30/23  4:41 AM   Result Value Ref Range    WBC Count 7.9 4.0 - 11.0 10e3/uL    RBC Count 3.08 (L) 4.40 - 5.90 10e6/uL    Hemoglobin 9.2 (L) 13.3 - 17.7 g/dL    Hematocrit 28.2 (L) 40.0 - 53.0 %    MCV 92 78 - 100 fL    MCH 29.9 26.5 - 33.0 pg    MCHC 32.6 31.5 - 36.5 g/dL    RDW 14.6 10.0 - 15.0 %    Platelet Count 126 (L) 150 - 450 10e3/uL   Comprehensive metabolic panel    Collection Time: 07/30/23  4:41 AM   Result Value Ref Range    Sodium 130 (L) 136 - 145 mmol/L    Potassium 4.6 3.4 - 5.3 mmol/L    Chloride 97 (L) 98 - 107 mmol/L    Carbon Dioxide (CO2) 22 22 - 29 mmol/L    Anion Gap 11 7 - 15 mmol/L    Urea Nitrogen 90.9 (H) 8.0 - 23.0 mg/dL    Creatinine 5.16 (H) 0.67 - 1.17 mg/dL    Calcium 8.6 (L) 8.8 - 10.2 mg/dL    Glucose 207 (H) 70 - 99 mg/dL    Alkaline Phosphatase 50 40 - 129 U/L    AST 84 (H) 0 - 45 U/L    ALT 56 0 - 70 U/L    Protein Total 6.1 (L) 6.4 - 8.3 g/dL    Albumin 2.9 (L) 3.5 - 5.2 g/dL    Bilirubin Total 0.5 <=1.2 mg/dL    GFR Estimate 11 (L) >60 mL/min/1.73m2   Erythrocyte sedimentation rate auto    Collection Time: 07/30/23  4:41 AM   Result Value Ref Range    Erythrocyte Sedimentation Rate 101 (H) 0 - 20 mm/hr   Glucose by meter    Collection Time: 07/30/23  7:16 AM   Result Value Ref Range    GLUCOSE BY METER POCT 193 (H) 70 - 99 mg/dL       Total time spent for face to face visit, reviewing labs/imaging studies, counseling and coordination of care was: 1 Hour 30 Minutes More than 50% of this time was spent on counseling and coordination of care.    This note was dictated using voice recognition software.  Any grammatical or context distortions are unintentional and inherent to the software.

## 2023-07-30 NOTE — PROGRESS NOTES
Cardiology Progress Note    Assessment/Plan:    Non-ST elevation myocardial infarction likely secondary to failed coronary bypass graft to OM, status post PCI. On asa plavix and Statin   Cardiomyopathy: Ischemic. Mild LV dysfunction noted on echo. On Metoprolol succinate.   Stroke: post PCI patient had some double vision. Stroke work up initiated. Neurology following.   Peripheral arterial disease recently managed on aspirin and clopidogrel. Also on Atorvastatin.  HTN: inadequate control, will increase Metoprolol  CKD 4, with slight increase in creatinine today following angiogram. Follow nephrology recs       Principal Problem:    NSTEMI (non-ST elevated myocardial infarction) (H)  Active Problems:    CKD (chronic kidney disease) stage 4, GFR 15-29 ml/min (H)    Cerebral infarction due to embolism of left carotid artery (H)        Subjective:  Patient not in room today during two attempts.      Objective:   Vital signs in last 24 hours:  Vitals:    07/30/23 1009 07/30/23 1059 07/30/23 1141 07/30/23 1255   BP:   106/59    BP Location:   Left arm    Pulse:   60    Resp:   18    Temp:   98.4  F (36.9  C)    TempSrc:   Oral    SpO2: 92% 95% 95% 95%   Weight:       Height:         Weight:   Wt Readings from Last 3 Encounters:   07/30/23 104.1 kg (229 lb 6.4 oz)   06/05/23 103.4 kg (228 lb)   04/07/23 98.9 kg (218 lb)           PHYSICAL EXAM      Patient not in his room during two attempts to visit with him       Cardiographics:   Telemetry sinus rhythm, 60 to 90 bpm.        Echocardiogram yesterday:  The left ventricle is normal in size.  There is mild concentric left ventricular hypertrophy.  The visual ejection fraction is 45-50%.  Diastolic Doppler findings (E/E' ratio and/or other parameters) suggest left  ventricular filling pressures are increased.  Poor quality images but suspicion of inferior and apical hypokinesis.  Normal right ventricle size and systolic function.  The right ventricular systolic pressure is  approximated at 19mmHg plus the  right atrial pressure.  Sinus rhythm was noted.  Technically difficult, suboptimal study. Consider cardiac MRI for improved  image quality.  Prior echo from 3/8/23 was of better quality. Inferior wll was normal on that  study.    Imaging:   Coronary angiography with intervention yesterday.  Images personally reviewed.  1.  Severe diffuse disease of native vessels with small caliber vessels distally.  2.  Chronic occlusion of native LAD and native left circumflex.  3.  Left main fills small narrow caliber ramus branch only  4.  Right coronary artery has diffuse mild to moderate disease but no severe stenoses.  5.  Patent saphenous vein graft to high diagonal branch which is diffusely diseased. Anastomosis appears normal.  6.  Occluded saphenous vein graft to distal diagonal/lateral wall branches, most likely the acute infarct vessel.  7.  Patent BENITEZ to LAD. Anastomosis appears normal.  8.  Successful PCI saphenous vein graft to distal diagonal/lateral with drug-eluting stent implant x1  9.  Recommend continuing Plavix therapy indefinitely, risk factor management for ASCVD.    Lab Results:   Lab Results   Component Value Date    WBC 7.9 07/30/2023    HGB 9.2 (L) 07/30/2023    HCT 28.2 (L) 07/30/2023     (L) 07/30/2023    CHOL 96 06/16/2023    TRIG 75 06/16/2023    HDL 34 (L) 06/16/2023    ALT 56 07/30/2023    AST 84 (H) 07/30/2023     (L) 07/30/2023    BUN 90.9 (H) 07/30/2023    CO2 22 07/30/2023    TSH 1.68 06/16/2023    PSA 0.90 12/17/2021    INR 1.00 06/21/2023    MICROALBUR 27.97 (H) 12/17/2021     Lab Results   Component Value Date    TROPONINI 0.13 06/02/2021       Kike Valderrama MD  Clinical Cardiac Electrophysiology

## 2023-07-30 NOTE — PLAN OF CARE
Patient denied pain and was able to sleep for majority of the night without concern. Tele NSR. Still using 2L oxygen and some [place]. Patient reports he still has double vision but its improving.     Vitals:    07/29/23 1510 07/29/23 1924 07/30/23 0016 07/30/23 0518   BP: 104/56 116/59 132/62 104/54   BP Location: Right arm Right arm Right arm Right arm   Pulse: 65 69 67 64   Resp: 18 18 20 18   Temp: 97.3  F (36.3  C) 97.8  F (36.6  C) 98.3  F (36.8  C) 97.8  F (36.6  C)   TempSrc: Oral Oral Oral Tympanic   SpO2: 95% 93% 92% 94%   Weight:    104.1 kg (229 lb 6.4 oz)   Height:            Problem: Plan of Care - These are the overarching goals to be used throughout the patient stay.    Goal: Optimal Comfort and Wellbeing  Outcome: Progressing     Problem: Risk for Delirium  Goal: Improved Sleep  Outcome: Progressing   Goal Outcome Evaluation:

## 2023-07-30 NOTE — PROGRESS NOTES
INFECTIOUS DISEASE FOLLOW UP NOTE      ASSESSMENT:  Non-ST elevation MI, mild LV dysfunction, status PCI 7/27/2023  Status post CABG 18 years ago, diffusely diseased distal vessels, LIMA to LAD, patent SVG to diagonal, SVG to OM  Peripheral arterial disease, vascular has been following in the past  Streptococcal bacteremia--Streptococcus dysgalactiae (Group C Streptococcus) 7/27/2023. Cultures repeated 7/28 prior to antibiotics -- also positive. Source often from skin, can seed bones, joints, valves.   Fever resolving.  Blurred vision, getting evaluation  Type 1 diabetes mellitus, chronic kidney disease stage IV  History of diabetic foot infection, transmetatarsal amputation in 2020, lower extremity wound, patient followed by Dr. Tinoco at [facility] podiatry  Diagnosis: 7/26/2023 -- had ulcer debrided in clinicon 7/26/23 -- no sign of infection at that time reported.   S/P Sinus tract left foot at TMA site with I & D and flowable , DOS 11/8/2022, healed  Diabetes with peripheral neuropathy  transmetatarsal amputation with varus deformity left foot  Deshpande 1 ulcer plantar lateral foot without signs of infection  PAD --S/P angioplasty ([facility] system)--6/2023    Could be that he became bacteremic from wound from debridement 7/26 prior to admission. Wound without external sign of infection this admission.   Brain MRI shows acute/subacute stroke -- could have had embolic event from endocarditis    PLAN:  Continue ceftriaxone  SHAYLEE this week to help determine duration of antibiotics.     Robinson Sellers MD  [facility]  [phone] [facility]    ______________________________________________________________________    SUBJECTIVE / INTERVAL HISTORY: reviewed MRI. Pain controlled. Temps better.     ROS: All other systems negative except as listed above.        OBJECTIVE:  /59 (BP Location: Left arm)   Pulse 60   Temp 98.4  F (36.9  C) (Oral)   Resp 18   Ht 1.829 m (6')   Wt  104.1 kg (229 lb 6.4 oz)   SpO2 95%   BMI 31.11 kg/m         Vital Signs  Temp: 98.1  F (36.7  C)  Temp src: Oral  Resp: 18  Pulse: 81  Pulse Rate Source: Monitor  BP: 118/57  BP Location: Left arm    Temp (24hrs), Av  F (37.2  C), Min:98.1  F (36.7  C), Max:99.9  F (37.7  C)      GEN: No acute distress.    RESPIRATORY:  Normal breathing pattern. Clear to auscultation  CARDIOVASCULAR:  Regular rate and rhythm. Normal S1 and S2. No murmur, click, gallop or rub. No dependent edema. No excess JVD.  ABDOMEN:  Soft, normal bowel sounds, non-tender, no masses  Angiogram access site R groin not tender, dressing in place.   EXTREMITIES: No edema. L foot wrapped.   SKIN/HAIR/NAILS:  No rashes  IV: peripheral        Antibiotics:  Ceftriaxone -  Vancomycin 7/28 x1 (2g)  Pip/tazo 7/28 x1    Pertinent labs:    Recent Labs   Lab 23  0441 23  0436 23  0448   WBC 7.9 10.0 10.8   HGB 9.2* 9.5* 10.2*   HCT 28.2* 30.4* 32.2*   * 123* 135*        Recent Labs   Lab 23  0441 23  0436 23  0448   * 131* 136   CO2 22 21* 17*   BUN 90.9* 82.7* 60.9*       Lab Results   Component Value Date    ALT 56 2023    AST 84 (H) 2023    ALKPHOS 50 2023         MICROBIOLOGY DATA:   blood 1 of 2 sets Group C strep   blood both sets collected prior to antibiotics -- Group C strep    RADIOLOGY:  MR Brain w/o Contrast    Result Date: 2023  EXAM: MR BRAIN W/O CONTRAST LOCATION: [facility] DATE: 2023 INDICATION: double vision. post coronary angiogram. r o CVA. CKD stage IV. COMPARISON: None. TECHNIQUE: Routine multiplanar multisequence head MRI without intravenous contrast. FINDINGS: INTRACRANIAL CONTENTS: Small acute to subacute infarction along the dorsal left pontomedullary junction. A tiny, punctate focus of infarction in the left frontal lobe (series 7 image 41). On coronal diffusion weighted imaging there is a questionable area  of  punctate infarction versus artifact in the right para midline cerebellum (series 14 image 34). No mass, acute hemorrhage, or extra-axial fluid collections. Scattered nonspecific T2/FLAIR hyperintensities within the cerebral white matter most consistent with mild chronic microvascular ischemic change. Mild generalized cerebral atrophy. No hydrocephalus. Normal position of the cerebellar tonsils. SELLA: No abnormality accounting for technique. OSSEOUS STRUCTURES/SOFT TISSUES: Normal marrow signal. The major intracranial vascular flow voids are maintained. ORBITS: No abnormality accounting for technique. SINUSES/MASTOIDS: Moderate mucosal thickening of the left maxillary sinus. No middle ear or mastoid effusion.     IMPRESSION: 1.  Small foci of acute to subacute infarction involving the dorsal left pontomedullary junction and left frontal lobe. 2.  Questionable punctate focus of infarction in the right para midline cerebellum seen on coronal diffusion weighted imaging only. 3.  Mild age-related changes as above.    Cardiac Catheterization    Addendum Date: 7/28/2023      Ost Cx to Prox Cx lesion is 100% stenosed.   Prox LAD lesion is 100% stenosed.   Ramus lesion is 50% stenosed.   Prox RCA to Dist RCA lesion is 30% stenosed.   Prox Graft to Mid Graft lesion is 100% stenosed. 1.  Severe diffuse disease of native vessels with small caliber vessels distally. 2.  Chronic occlusion of native LAD and native left circumflex. 3.  Left main fills small narrow caliber ramus branch only 4.  Right coronary artery has diffuse mild to moderate disease but no severe stenoses. 5.  Patent saphenous vein graft to high distal lateral wall branch (? Diagonal or marginal branch) which is diffusely diseased. Anastomosis appears normal. 6.  Occluded saphenous vein graft to distal diagonal/lateral wall branches, most likely the acute infarct vessel. 7.  Patent BENITEZ to LAD. Anastomosis appears normal. 8.  Successful PCI saphenous vein graft  to distal diagonal/lateral with drug-eluting stent implant x1 9.  Recommend continuing Plavix therapy indefinitely, risk factor management for ASCVD.    Result Date: 2023    Ost Cx to Prox Cx lesion is 100% stenosed.   Prox LAD lesion is 100% stenosed.   Ramus lesion is 50% stenosed.   Prox RCA to Dist RCA lesion is 30% stenosed.   Prox Graft to Mid Graft lesion is 100% stenosed. 1.  Severe diffuse disease of native vessels with small caliber vessels distally. 2.  Chronic occlusion of native LAD and native left circumflex. 3.  Left main fills small narrow caliber ramus branch only 4.  Right coronary artery has diffuse mild to moderate disease but no severe stenoses. 5.  Patent saphenous vein graft to high diagonal branch which is diffusely diseased. Anastomosis appears normal. 6.  Occluded saphenous vein graft to distal diagonal/lateral wall branches, most likely the acute infarct vessel. 7.  Patent BENITEZ to LAD. Anastomosis appears normal. 8.  Successful PCI saphenous vein graft to distal diagonal/lateral with drug-eluting stent implant x1 9.  Recommend continuing Plavix therapy indefinitely, risk factor management for ASCVD.     Echocardiogram Complete    Result Date: 2023  [id number]^BRITTANY^GUME^[place] [address]  Name: DENY MONTERO MRN: [id number] : 1950 Study Date: 2023 03:39 PM Age: 72 yrs Gender: Male Patient Location: [facility] Reason For Study: Acute Myocardial Infarction Ordering Physician: GUME SOTO Referring Physician: GUME SOTO Performed By: BP  BSA: 2.2 m2 Height: 72 in Weight: 220 lb HR: 77 BP: 118/55 mmHg ______________________________________________________________________________ Procedure Complete Portable Echo Adult. Definity (NDC #09151-573) given intravenously. Technically difficult study.Extremely difficult acoustic windows despite the use of contrast for endcardial border definition.  ______________________________________________________________________________ Interpretation Summary  The left ventricle is normal in size. There is mild concentric left ventricular hypertrophy. The visual ejection fraction is 45-50%. Diastolic Doppler findings (E/E' ratio and/or other parameters) suggest left ventricular filling pressures are increased. Poor quality images but suspicion of inferior and apical hypokinesis. Normal right ventricle size and systolic function. The right ventricular systolic pressure is approximated at 19mmHg plus the right atrial pressure. Sinus rhythm was noted. Technically difficult, suboptimal study. Consider cardiac MRI for improved image quality. Prior echo from 3/8/23 was of better quality. Inferior wll was normal on that study. ______________________________________________________________________________ Left Ventricle The left ventricle is normal in size. There is mild concentric left ventricular hypertrophy. Diastolic Doppler findings (E/E' ratio and/or other parameters) suggest left ventricular filling pressures are increased. The visual ejection fraction is 45-50%. Poor quality images but suspicion of inferior and apical hypokinesis.  Right Ventricle Normal right ventricle size and systolic function.  Atria The left atrium is moderately dilated. Right atrial size is normal.  Mitral Valve The mitral valve is not well visualized. There is mild to moderate (1-2+) mitral regurgitation. There is no mitral valve stenosis.  Tricuspid Valve The tricuspid valve is not well visualized. There is mild (1+) tricuspid regurgitation. The right ventricular systolic pressure is approximated at 19mmHg plus the right atrial pressure.  Aortic Valve Mild aortic valve calcification is present. The aortic valve is not well visualized. No aortic regurgitation is present. No aortic stenosis is present.  Pulmonic Valve The pulmonic valve is not well visualized. There is mild (1+) pulmonic valvular  regurgitation. Right ventricular diastolic pressure is approximated at 8mmHg plus the right atrial pressure. There is no pulmonic valvular stenosis.  Vessels The aorta root is normal. IVC diameter and respiratory changes fall into an intermediate range suggesting an RA pressure of 8 mmHg.  Pericardium There is no pericardial effusion.  Rhythm Sinus rhythm was noted. ______________________________________________________________________________ MMode/2D Measurements & Calculations IVSd: 1.2 cm  LVIDd: 5.4 cm LVIDs: 4.8 cm LVPWd: 1.1 cm FS: 11.2 % LV mass(C)d: 242.0 grams LV mass(C)dI: 109.1 grams/m2 Ao root diam: 2.9 cm LA dimension: 4.9 cm LA/Ao: 1.7 LVOT diam: 2.3 cm LVOT area: 4.2 cm2  EF(MOD-bp): 37.1 % LA Volume Indexed (AL/bp): 38.1 ml/m2 RWT: 0.41 TAPSE: 1.8 cm  Time Measurements MM HR: 77.0 BPM  Doppler Measurements & Calculations MV E max sagar: 99.0 cm/sec MV A max sagar: 55.7 cm/sec MV E/A: 1.8 MV dec time: 0.20 sec Ao V2 max: 105.0 cm/sec Ao max P.0 mmHg Ao V2 mean: 79.6 cm/sec Ao mean PG: 3.0 mmHg Ao V2 VTI: 20.4 cm ELIZABETH(I,D): 3.9 cm2 ELIZABETH(V,D): 3.8 cm2 LV V1 max PG: 3.6 mmHg LV V1 max: 95.3 cm/sec LV V1 VTI: 19.1 cm SV(LVOT): 79.4 ml SI(LVOT): 35.8 ml/m2 PA acc time: 0.09 sec PI end-d sagar: 138.0 cm/sec TR max sagar: 217.0 cm/sec TR max P.8 mmHg AV Sagar Ratio (DI): 0.91 ELIZABETH Index (cm2/m2): 1.8 E/E': 16.2 E/E' av.3  Lateral E/e': 16.4 Medial E/e': 18.2 Peak E' Sagar: 6.1 cm/sec RV S Sagar: 7.8 cm/sec  ______________________________________________________________________________ Report approved by: Davon Allen 2023 04:59 PM       XR Chest Port 1 View    Result Date: 2023  EXAM: XR CHEST PORT 1 VIEW LOCATION: [facility] DATE: 2023 INDICATION: Chest pain COMPARISON: 2021     IMPRESSION: Right costophrenic angle is clipped. Poststernotomy changes. Heart is magnified due to projection. Lungs are clear. No overt signs of failure or pneumonia.    US Low  Ext Arterial Dop Seg Pres w/o Exercise    Result Date: 7/7/2023  Table formatting from the original result was not included. BILATERAL RESTING ANKLE-BRACHIAL INDICES (TRAE'S) (Date: 07/06/23) Indication: Surveillance TRAE's: PAD. S/P IR Left Balloon Angiogram ( Pedal Arch, DPA, ISAIAS) done 6/21/2023. Hx: Left Transmetatarsal Amputation done 9/3/2021. Previous: 2/7/2023 Bilateral ANDREINA/ TRAE's w/o, 4/7/2023 IR Left Angiogram. History: Previous Smoker, Hypertension, Diabetic, Hyperlipidemia, PAD, and Angioplasty  Resting TRAE's          Right: mmHg Index     Brachial: 158  Ankle-(PT): 157 0.99 Ankle-(DP): >254 NC          Digit: 47 0.30               Left: mmHg Index     Brachial: 157  Ankle-(PT): >254 NC Ankle-(DP): >254 NC          Digit: AMP N/A Resting ankle-brachial index of 0.99 on the right. Toe Pressures of 47 mmHg and TBI of 0.30 Resting ankle-brachial index is non compressible on the left.  VPR WAVEFORMS: The right volume plethysmography waveforms are mildly abnormal at the lower thigh level, mildly abnormal at the upper calf level and mildly abnormal at the ankle. The left volume plethysmography waveforms are mildly abnormal at the lower thigh level, mildly abnormal at the upper calf level and mildly abnormal at the ankle.  Impression:  1. RIGHT LOWER EXTREMITY: TRAE is Non-diagnostic indicating vessel calcification. Toe Pressures are Abnormal and impaired for wound healing with toe pressures of 47 mmHg. 2. LEFT LOWER EXTREMITY: TRAE is Non-diagnostic indicating vessel calcification. Toe Pressures are Un-interpretable and Non-diagnostic. Reference: Wound classification Grade TRAE Ankle Systolic Pressure Toe Pressures 0 > 0.80 > 100 mmHg > 60 mmHg 1 0.6 - 0.79 70 - 100 mmHg 40 - 59 mmHg 2 0.4 - 0.59 50-70 mmHg 30 - 39 mmHg 3 < 0.39 < 50 mmHg < 30 mmHg Digit Pressures DBI Disease Category > 0.70 Normal < 0.70 Abnormal > 30 mmHg Potential wound healing < 30 mmHg Impaired wound healing Ankle Brachial Pressures TRAE Disease  Category > 1.3  Likely vessel calcification with monophasic waveforms, non-diagnostic 0.95-1.30 Normal with multiphasic waveforms 0.50-0.95 Single level disease 0.30-0.50 Multilevel disease < 0.30 Critical limb ischema Volume Plethysmography Recording (VPR) at all levels Normal Sharp systolic peak, fast upstroke, prominent dicrotic notch in wave Mild Sharp systolic peak, fast upstroke, absent dicrotic notch in wave Moderate Flattened systolic peak, slowed upstroke, absent dicrotic notch inwave Severe amplitude wave with = upslope and down slope Occluded Flat Line      US Lower Extremity Arterial Duplex Left    Result Date: 7/7/2023  Table formatting from the original result was not included. Arterial Duplex Ultrasound (Date: 07/06/23) Lower Extremity Artery Evaluation Indication: Surveillance Left Leg Arterial: PAD. S/P IR Left Balloon Angiogram ( Pedal Arch, DPA, ISAIAS) done 6/21/2023. Hx: Left Transmetatarsal Amputation done 9/3/2021. Previous: 2/7/2023 Bilateral ANDREINA/ TRAE's w/o, 4/7/2023 IR Left Angiogram. History: Previous Smoker, Hypertension, Diabetic, Hyperlipidemia, PAD, and Angioplasty  Technique: Duplex imaging is performed utilizing gray-scale, two-dimensional images, and color-flow imaging. Doppler waveform analysis and spectral Doppler imaging is also performed. LOWER EXTREMITY ARTERIAL DUPLEX EXAM WITH WAVEFORMS Right Leg:(cm/s) Location: Velocities Waveforms PTA:   23   M ISAIAS:   57  M DPA:   50  M Waveforms: T=Triphasic, M=Monophasic, B=Biphasic Left Leg:(cm/s) Location: Velocities Waveforms EIA:   141  T CFA:   201  T PFA:   138  B SFA Proximal:   158  T SFA Mid:   114  M SFA Distal:   209 /  145 M Popliteal Artery:   167 / 135   M PTA:   24   M ISAIAS:   112  M DPA:   135  M Waveforms: T=Triphasic, M=Monophasic, B=Biphasic Stenotic Profile Ratio: 209 / 114 = 1.83  Impression: Right Lower Extremity: Not fully examined but monophasic flow in tibial vessels suggest more proximal disease Left Lower Extremity:  Triphasic flow in common femoral artery suggesting no inflow disease.  Transition to monophasic flow within the level of mid SFA consistent with underlying hemodynamically significant disease.  Monophasic flow remains distally Reference: Category Normal 1-19% 20-49% 50-99% Occluded PSV <160 cm/sec without spectral broadening <160 cm/sec with spectral broadening Increased Increased Absent Flow Ratio N/A N/A < 2.0 >2.0 N/A Post-Stenotic Turbulence No No No Yes N/A      IR Lower Extremity Angiogram Left    Result Date: 6/22/2023  VASCULAR SURGERY ANGIOGRAM REPORT   [facility]  DATE: 06/21/23   PROCEDURES PERFORMED:  1.  Ultrasound-guided access of right common femoral artery 2.  Selective cannulation of the left common iliac artery, external iliac artery, common femoral artery, superficial femoral artery, popliteal artery, anterior tibial artery, dorsalis pedis artery, pedal arch, tibioperoneal trunk, and peroneal artery with selective left lower extremity angiography 3.  Plain balloon angioplasty of the left pedal arch and dorsalis pedis artery with 2 mm x 100 mm Crosstella balloon, followed by 2 mm x 40 mm Crosstella balloon 4. Plain balloon angioplasty of the left dorsalis pedis and anterior tibial artery with 3 mm x 200 mm Crosstella balloon 5. Attempted recannalization of left peroneal artery with balloon angioplasty using 3 mm x 200 mm Crosstella balloon 6.  Moderate sedation 7.  Arteriotomy closure with Angio-Seal device  SURGEON: Dr. Lynne  RESIDENT: Jayden Paniagua MD  INDICATION: Patient is a 72-year-old male with chronic limb threatening ischemia and left foot wounds after previous transmetatarsal amputation.  He underwent a left lower extremity angiogram in April 2023 that identified single-vessel runoff via the left anterior tibial artery.  At that time his wound did appear to be making small amounts of progress and given the single-vessel runoff the decision was made not to treat.his to  artery disease.  In the intervening months his left foot wounds have progressed and the decision was made to repeat arteriography today with the intention to treat his distal tibial disease.  SEDATION: The procedure was performed with administration of intravenous conscious sedation with appropriate preoperative, intraoperative, and postoperative evaluation.  124 minutes of supervised face to face intraservice time was provided by a radiology nurse under my direct supervision.  ANTIBIOTICS: None FLUOROSCOPIC TIME: 34.4 min RADIATION DOSE:  323 mGy CONTRAST: 80 ml visipaque   PROCEDURE: Ultrasound was used to evaluate the right common femoral artery. The selected vessel was patent. Ultrasound was then used for real-time ultrasound guided needle entry of the  right common femoral artery. Permanent images were recorded and saved to the patient's medical record.  Micropuncture sheath was inserted.  A glide advantage wire was advanced into the aorta.  5 [place]i sheath was placed.  An Omni Flush catheter was advanced over the wire. Using up and over maneuver I was able to advance the wire into the left femoral artery.  Over the wire the Omni Flush catheter was advanced and positioned in the  left common femoral artery.  Left lower extremity angiogram was performed using CO2 which demonstrated mild stenosis in the distal SFA and popliteal artery.  An 035 angled Irizarry cross catheter was advanced over the wire and placed in the mid popliteal artery.  Lower extremity angiography was then performed with contrast which demonstrated the high-grade stenosis of the proximal left anterior tibial artery, and occlusion of the left posterior tibial and peroneal arteries.  There is high-grade stenosis in the left dorsalis pedis artery with an incomplete pedal arch.  An 035 glide advantage wire was used to select the posterior tibial artery, the 5 [place]i sheath was exchanged for a 90 cm 6 [place]i EnertivumLe Floch Depollution destination sheath.  With  accommodation of an 018 angled Iirzarry cross catheter and 1 8 glide advantage wire, the left anterior tibial artery was selected.  Wire was advanced to the TP trunk and pedal arch.  Repeat angiography was performed demonstrating stenoses throughout dorsalis pedis pedal arch.  Next we proceeded with plain balloon angioplasty of the left pedal arch and dorsalis pedis trunk using a 2 mm x 100 mm Crosstella balloon.  Repeat angiography identified a persistent segment of stenosis in the very distal pedal arch, which was then treated with a 2 mm x 40 mm Crosstella plain balloon angioplasty.  This balloon was then exchanged for a 3 mm x 200 mm Crosstella plain balloon used to treat the dorsalis pedis and the entire length of the left anterior tibial artery.  At this point, angiography demonstrated brisk flow through the anterior tibial artery, dorsalis pedis, and pedal arch with significantly improved collateral sensation at the distal foot.  There was residual stenosis at the very proximal left anterior tibial artery, it was again treated with a 3 mm x 40 mm balloon.  There was only modest improvement with additional treatment, likely as a result of eccentric plaque.  We excepted this result and then attempted to recanalize the left peroneal artery.  This was patent to about the proximal third of the peroneal artery.  Using an 014 glide advantage wire and 018 Irizarry cross catheter, the peroneal artery was recanalized to the distal aspect of the artery.  A blush of contrast was used to confirm that the catheter tip was intraluminal.  A 3 mm x 200 mm Crosstella balloon was used for platelet angioplasty of the recanalize peroneal artery segment.  This was unsuccessful and the peroneal artery immediately reoccluded.  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  Catheter was withdrawn to the mid SFA and angiogram again demonstrated nonflow-limiting stenosis of the distal SFA and  above-knee popliteal arteries.  His disease were left untreated. At that point the procedure was concluded.  The arteriotomy closure was performed using an Angio-Seal device.  Patient tolerated procedure well and was transferred to recovery area in stable condition.  FINDINGS 1.  Mild, non-flow-limiting stenosis of the distal left superficial femoral artery and popliteal arteries. 2.  High-grade stenosis of the left proximal anterior tibial artery, proximal dorsalis pedis artery 3. Incomplete left pedal arch 4.  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  IMPRESSION Successful treatment of the left anterior tibial, dorsalis pedis, and distal pedal arch with balloon angioplasty.  Completion angiogram shows brisk flow through the AT to the distal left foot, much improved from prior.  Patient will be started on Plavix in addition to aspirin and will follow-up in vascular clinic.

## 2023-07-30 NOTE — PLAN OF CARE
Problem: Plan of Care - These are the overarching goals to be used throughout the patient stay.    Goal: Plan of Care Review  Description: The Plan of Care Review/Shift note should be completed every shift.  The Outcome Evaluation is a brief statement about your assessment that the patient is improving, declining, or no change.  This information will be displayed automatically on your shift note.  Outcome: Progressing     Problem: Plan of Care - These are the overarching goals to be used throughout the patient stay.    Goal: Absence of Hospital-Acquired Illness or Injury  Intervention: Identify and Manage Fall Risk  Recent Flowsheet Documentation  Taken 7/29/2023 2130 by Lidia Fuentes, RN  Safety Promotion/Fall Prevention:   activity supervised   assistive device/personal items within reach   clutter free environment maintained   increased rounding and observation   increase visualization of patient   lighting adjusted   mobility aid in reach   nonskid shoes/slippers when out of bed   patient and family education   room door open   room near nurse's station   room organization consistent   safety round/check completed   supervised activity   toileting scheduled  Taken 7/29/2023 1600 by Lidia Fuentes, RN  Safety Promotion/Fall Prevention:   activity supervised   assistive device/personal items within reach   clutter free environment maintained   increased rounding and observation   increase visualization of patient   lighting adjusted   mobility aid in reach   nonskid shoes/slippers when out of bed   patient and family education   room door open   room near nurse's station   room organization consistent   safety round/check completed   supervised activity   toileting scheduled     Problem: Plan of Care - These are the overarching goals to be used throughout the patient stay.    Goal: Optimal Comfort and Wellbeing  Outcome: Progressing   Goal Outcome Evaluation:    Pt c/o congestion in nose and throat that worsens  when laying flat and was concerned about this affecting his brain MRI again. MD ordered Afrin nasal spray and this was administsered x 1. Pt reported being able to breathe better, but also reported a burning sore throat after adminstration. MD notified and chloraseptic lozenges ordered and administered. Pt was able to undergo MRI this evening. VSS, NSR, on 2 L NC. Blood sugars stable.

## 2023-07-30 NOTE — PROGRESS NOTES
[facility]    Medicine Progress Note - Hospitalist Service    Date of Admission:  7/27/2023    Assessment & Plan   NSTEMI  CAD  -s/p coronary angiogram 7/27/23 with BINA to SVG-distal diagonal/lateral  -esvin asa/plavix PTA meds  -statin, BB  -TTE 7/27/23 showed LVEF 45-50%, increased LV filling pressures, inf/apical hypokinesis  -cardiology following  -SHAYLEE a.m.      Acute hypoxic respitatory failure  -wean O2 as able to goal sPO2 > 90%  -improving/stable/resolved    Strep dysgalactiae bacteremia: source unclear  -IV CTX per ID  -NPO MN for SHAYLEE a.m.  -follow cultures    HLD:  -LDL 47 (6/2023)  -lipitor 40 at bedtime     DM 1  -lantus 30 U qam  -novology 1U:3g CHO ac tid, sliding scale insulin ac TID, accuchecks ac/hs     HTN  -BB, NTG gtt per cardiology, hold lisinopril for now     PAD, s/p PBA left pedal arch, DPA, ISAIAS (6/2023).     ASHLEY on CKD stage IV  -suspect KARLA  -Cr continues to worsen. Monitor closely  -monitor renal fnction closely  -appreciate nephrology assistance    Double vision: new. Horizontal and occurs only with both eyes open not unilateral.  -MRI brain acute/subacute infarcts dorsal left pontomedullary junction and left frontal lobe. Questionable punctate focus of infarction in the right para midline cerebellum   -asa, plavix, statin  -neurology consult  -ophtho outpt  -SHAYLEE a.m.          Diet: Combination Diet Moderate Consistent Carb (60 g CHO per Meal) Diet; Low Saturated Fat Na <2400mg Diet    DVT Prophylaxis: Heparin SQ  Mohan Catheter: Not present  Lines: None     Cardiac Monitoring: ACTIVE order. Indication: Post- PCI/Angiogram (24 hours)  Code Status: Full Code      Clinically Significant Risk Factors              # Hypoalbuminemia: Lowest albumin = 2.9 g/dL at 7/30/2023  4:41 AM, will monitor as appropriate   # Thrombocytopenia: Lowest platelets = 123 in last 2 days, will monitor for bleeding  # Acute Kidney Injury, unspecified: based on a >150% or 0.3 mg/dL increase  in last creatinine compared to past 90 day average, will monitor renal function  # Hypertension: Noted on problem list        # Obesity: Estimated body mass index is 31.11 kg/m  as calculated from the following:    Height as of this encounter: 1.829 m (6').    Weight as of this encounter: 104.1 kg (229 lb 6.4 oz)., PRESENT ON ADMISSION          Disposition Plan     Expected Discharge Date: 07/31/2023        Discharge Comments: iv abx, renal to see  home          Remy Phipps DO, DO  Hospitalist Service  [facility]  Securely message with [employer] (more info)  Text page via [facility] Paging/Directory   ______________________________________________________________________    Interval History   Afebrile.    Physical Exam   Vital Signs: Temp: 97.9  F (36.6  C) Temp src: Oral BP: 122/58 Pulse: 68   Resp: 18 SpO2: 92 % O2 Device: None (Room air) Oxygen Delivery: 2 LPM  Weight: 229 lbs 6.4 oz    General appearance - alert, and in no distress  Eyes - pupils equal and reactive, extraocular eye movements intact, sclera anicteric  Lungs - clear to auscultation, no wheezes, rales or rhonchi, symmetric air entry  Heart - normal rate, regular rhythm, +murmur, No peripheral edema.  Abdomen - soft, nontender, nondistended, no masses or organomegaly, BS+  Neurological - alert, oriented, normal speech, double horizontal vision when both eyes open, no diplopia when one eye closed. Grossly intact otherwise. Remainder cn ii-xii intact  Skin - no c/c/p.      Lab/imaging reviewed

## 2023-07-30 NOTE — PROGRESS NOTES
'    [facility]NAL ([facility]) progress note  CC: F/U GENARO on CKD-4, NSTEMI  S: no complaints, tolerating po without nausea, no sob.  Has ongoing leg edema and weight is up 4.3 kg since admit.       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 Creatinine baseline 2.5-2.8 mg/dl when last seen by Dr. Bhatia in 5/2023. Has had spikes to mid-3's in  2022 and currently worsened function in setting of NSTEMI. Has had 2 angiograms of LLE since April that may be contributing to his worsened kidney function as well but no change in renal function noted after June procedure at least. No uremic symptoms.  -Genaro worse today consistent with contrast nephropathy after cor angio 7/27              -We will trend renal function closely.   -no indications for dialysis now   -cont to hold lisinopril until creat back to baseline   -rate of rise in creat slowing, hopefully will see trend down over next 1-2 days   -give lasix 40 mg iv today                        - Recommend ESRD education soon as outpt.      Group c strep bacteremia 7/27. Now on ceftriaxone. Trend subsequent cultures.     Type 1 diabetes mellitus with nephropathy, peripheral artery disease, neuropathy              - Diabetes has been well reasonably controlled with Hgb A1c 7.4%  - Management per Hospital med     PAD with previous left TMA 2020: Peripheral angio noted 6/2023 with angioplasties and previously angioplasty in April 2023.     CKD-MBD: Stable calcium, PTH 66 (11/30/2022).  Continue cholecalciferol 2000 units daily.     Anemia due to CKD: Hgb decreased 2 g/dl since April.   -iron stores low  -hold on IV iron with bacteremia  -will start ferrous sulfate    HTN with CKD and CHF (LVH noted on ECHO). Ok control, no med changes now    Hyponatremia: due to GENARO, hypervolemia  -should improve with diuresis today    Discussed with spouse    Jenny Thomason MD  [facility]  [phone]        No interval changes to past medical history, social history or family history to  report.    /59 (BP Location: Left arm)   Pulse 60   Temp 98.4  F (36.9  C) (Oral)   Resp 18   Ht 1.829 m (6')   Wt 104.1 kg (229 lb 6.4 oz)   SpO2 95%   BMI 31.11 kg/m      I/O last 3 completed shifts:  In: 1464 [P.O.:1464]  Out: 850 [Urine:850]    Physical Exam:   GENERAL: calm and comfortable, alert  EYES: pupils equal, sclerae not icteric.  ENT: Hearing normal, oral mucosa moist.  RESP: Clear to auscultation bilaterally with no respiratory distress, normal effort.  CV: RRR, no murmurs. 1+  leg edema.    GI: Active BS, Soft, NT/ND, obese  SKIN: No rash, warm/ dry  MSK: S/P left TMA  NEURO: Moves all extremities, no tremor. Sensation grossly intact to LT  PSYCH: Appropriate mood and affect    Recent Labs   Lab 07/30/23 0441 07/29/23 0436 07/28/23 0448 07/27/23  1222   * 131* 136 137   POTASSIUM 4.6 4.1 5.2 4.7   CHLORIDE 97* 98 100 101   CO2 22 21* 17* 13*   BUN 90.9* 82.7* 60.9* 53.5*   CR 5.16* 4.91* 3.64* 3.43*   GFRESTIMATED 11* 12* 17* 18*   SVITLANA 8.6* 8.7* 8.1* 8.5*   MAG  --   --   --  2.0   ALBUMIN 2.9* 3.2* 3.2* 3.5         Recent Labs   Lab 07/30/23  0441 07/29/23 0436 07/28/23 0448 07/27/23  1222   WBC 7.9 10.0 10.8 11.3*   HGB 9.2* 9.5* 10.2* 10.6*   HCT 28.2* 30.4* 32.2* 32.0*   MCV 92 94 94 92   * 123* 135* 154           Cor Angio 7/27/23: (Visipaque 116 ml)  1.  Severe diffuse disease of native vessels with small caliber vessels distally.  2.  Chronic occlusion of native LAD and native left circumflex.  3.  Left main fills small narrow caliber ramus branch only  4.  Right coronary artery has diffuse mild to moderate disease but no severe stenoses.  5.  Patent saphenous vein graft to high diagonal branch which is diffusely diseased. Anastomosis appears normal.  6.  Occluded saphenous vein graft to distal diagonal/lateral wall branches, most likely the acute infarct vessel.  7.  Patent BENITEZ to LAD. Anastomosis appears normal.  8.  Successful PCI saphenous vein graft to  distal diagonal/lateral with drug-eluting stent implant x1  9.  Recommend continuing Plavix therapy indefinitely, risk factor management for ASCVD.        Current Facility-Administered Medications:     acetaminophen (TYLENOL) tablet 650 mg, 650 mg, Oral, Q4H PRN, Maikel Tripathi MD, 650 mg at 07/29/23 0108    ALPRAZolam (XANAX) tablet 0.25 mg, 0.25 mg, Oral, At Bedtime, Maikel Tripathi MD, 0.25 mg at 07/29/23 2137    aspirin EC tablet 81 mg, 81 mg, Oral, Daily, Maikel Tripathi MD, 81 mg at 07/30/23 0800    atorvastatin (LIPITOR) tablet 40 mg, 40 mg, Oral, Daily, Maikel Tripathi MD, 40 mg at 07/30/23 0801    benzocaine-menthol (CHLORASEPTIC) 6-10 MG lozenge 1 lozenge, 1 lozenge, Buccal, Q1H PRN, Jahaira Jimenez MD, 1 lozenge at 07/30/23 0032    bisacodyl (DULCOLAX) suppository 10 mg, 10 mg, Rectal, Daily PRN, Maikel Tripathi MD    cefTRIAXone (ROCEPHIN) 2 g vial to attach to  ml bag for ADULTS or NS 50 ml bag for PEDS, 2 g, Intravenous, Q24H, Miryam Quintana MD, 2 g at 07/29/23 1608    clopidogrel (PLAVIX) tablet 75 mg, 75 mg, Oral, Daily, Maikel Tripathi MD, 75 mg at 07/30/23 0801    Continuing antiplatelet from home medication list OR antiplatelet order already placed during this visit, , Does not apply, DOES NOT GO TO Bhumi LEAVITT Kenneth W, MD    Continuing beta blocker from home medication list OR beta blocker order already placed during this visit, , Does not apply, DOES NOT GO TO Bhumi LEAVITT Kenneth W, MD    Continuing statin from home medication list OR statin order already placed during this visit, , Does not apply, DOES NOT GO TO Bhumi LEAVITT Kenneth W, MD    glucose gel 15-30 g, 15-30 g, Oral, Q15 Min PRN **OR** dextrose 50 % injection 25-50 mL, 25-50 mL, Intravenous, Q15 Min PRN **OR** glucagon injection 1 mg, 1 mg, Subcutaneous, Q15 Min PRN, Maikel Tripathi MD    ezetimibe (ZETIA) tablet 10 mg, 10 mg, Oral, QPM, Maikel Tripathi MD, 10 mg at 07/29/23 2132    ferrous sulfate (FEROSUL)  tablet 325 mg, 325 mg, Oral, Daily, Jenny Thomason MD, 325 mg at 07/30/23 0801    [START ON 7/31/2023] fluticasone (FLONASE) 50 MCG/ACT spray 2 spray, 2 spray, Both Nostrils, Daily, Remy Phipps DO    gabapentin (NEURONTIN) capsule 300 mg, 300 mg, Oral, BID, Maikel Tripathi MD, 300 mg at 07/30/23 0801    heparin ANTICOAGULANT injection 5,000 Units, 5,000 Units, Subcutaneous, Q12H, Remy Phipps DO, 5,000 Units at 07/30/23 0810    hydrALAZINE (APRESOLINE) injection 10 mg, 10 mg, Intravenous, Q4H PRN, Maikel Tripathi MD    insulin aspart (NovoLOG) injection (RAPID ACTING), , Subcutaneous, TID AC, Remy Phipps DO, 20 Units at 07/30/23 1254    insulin aspart (NovoLOG) injection (RAPID ACTING), 1-6 Units, Subcutaneous, TID w/meals, Remy Phipps DO, 4 Units at 07/30/23 1143    insulin glargine (LANTUS PEN) injection 30 Units, 30 Units, Subcutaneous, QAM, Maikel Tripathi MD, 30 Units at 07/30/23 0809    levothyroxine (SYNTHROID/LEVOTHROID) tablet 125 mcg, 125 mcg, Oral, Daily, Maikel Tripathi MD, 125 mcg at 07/30/23 0800    lidocaine (LMX4) cream, , Topical, Q1H PRN, Maikel Tripathi MD    lidocaine 1 % 0.1-1 mL, 0.1-1 mL, Other, Q1H PRN, Maikel Tripathi MD    [Held by provider] lisinopril (ZESTRIL) tablet 40 mg, 40 mg, Oral, Daily, Maikel Tripathi MD    melatonin tablet 1 mg, 1 mg, Oral, At Bedtime PRN, Maikel Tripathi MD, 1 mg at 07/29/23 0108    metoprolol (LOPRESSOR) injection 5 mg, 5 mg, Intravenous, Q15 Min PRN, Maikel Tripathi MD    metoprolol succinate ER (TOPROL XL) 24 hr tablet 100 mg, 100 mg, Oral, Daily, Kike Valderrama MD, 100 mg at 07/30/23 0800    naloxone (NARCAN) injection 0.2 mg, 0.2 mg, Intravenous, Q2 Min PRN **OR** naloxone (NARCAN) injection 0.4 mg, 0.4 mg, Intravenous, Q2 Min PRN **OR** naloxone (NARCAN) injection 0.2 mg, 0.2 mg, Intramuscular, Q2 Min PRN **OR** naloxone (NARCAN) injection 0.4 mg, 0.4 mg, Intramuscular, Q2 Min PRN, Moise  Remy Clemons DO    nitroGLYcerin (NITROSTAT) sublingual tablet 0.4 mg, 0.4 mg, Sublingual, Q5 Min PRN, Maikel Tripathi MD    ondansetron (ZOFRAN ODT) ODT tab 4 mg, 4 mg, Oral, Q6H PRN **OR** ondansetron (ZOFRAN) injection 4 mg, 4 mg, Intravenous, Q6H PRN, Maikel Tripathi MD, 4 mg at 07/28/23 0148    oxyCODONE (ROXICODONE) tablet 5 mg, 5 mg, Oral, Q4H PRN **OR** oxyCODONE (ROXICODONE) tablet 10 mg, 10 mg, Oral, Q4H PRN, Maikel Tripathi MD    oxymetazoline (AFRIN) 0.05 % spray 2 spray, 2 spray, Both Nostrils, BID, Halla, Remy Clemons DO, 2 spray at 07/30/23 1145    Patient is already receiving anticoagulation with heparin, enoxaparin (LOVENOX), warfarin (COUMADIN)  or other anticoagulant medication, , Does not apply, Continuous PRN, Maikel Tripathi MD    Percutaneous Coronary Intervention orders placed (this is information for BPA alerting), , Does not apply, DOES NOT GO TO MARBhumi Kenneth W, MD    sodium chloride (PF) 0.9% PF flush 3 mL, 3 mL, Intracatheter, Q8H, Maikel Tripathi MD, 3 mL at 07/29/23 2139    sodium chloride (PF) 0.9% PF flush 3 mL, 3 mL, Intracatheter, q1 min prn, Maikel Tripathi MD      Labs personally reviewed today during this evaluation at 10:54 AM

## 2023-07-31 NOTE — PROGRESS NOTES
Diabetes Care Screen Note  Situation:  Diabetes blood glucose screen  72 year old male with type 1 diabetes (dx at 19) admitted with chest pain  Background:  Type 1 diabetes, has had heart attacks in past, CAB in past, foot concerns  He is wearing the Freestyle Ethel 14 day CGMS presently  Home PTA diabetes meds:  30 units of Lantus in am  Humalog at meals, 1 units per 3 grams  Correction scale:Humalog 6 units for -200                             Humalog 10 units for -250   Current Inpatient diabetes meds:  Lantus 30 units in am  I:C ratio 1:3 at meals, Novolog to carb grams  Correction scale: 1/25 over 140 at meals, changed today  Values;  A1C: 7.4 7/27/23           GFR:12        BMI:31.36        Stopped in to see patient and talk about his DM control PTA and stressors in hospital setting affecting BG control.    Assessment:   With infection, less activity, stress, BG higher than usual for patient    Recommendations:  Correction scale changed today  2.   May need increase in Lantus but did fall some overnight. Could wait to assess overnight pattern if not so high at       Hospital BG goals < 180 for improved outcomes. There is impaired immune function at BG levels above 180 mg/dl.    Thanks.     Bambi Caban RN, Certified Diabetes Care and   [address] [address]  [email or web address]   Office: [phone]  Pager: [phone]

## 2023-07-31 NOTE — PROGRESS NOTES
INFECTIOUS DISEASE FOLLOW UP NOTE      ASSESSMENT:  Non-ST elevation MI, mild LV dysfunction, status PCI 7/27/2023  Status post CABG 18 years ago, diffusely diseased distal vessels, LIMA to LAD, patent SVG to diagonal, SVG to OM  Peripheral arterial disease, vascular has been following in the past  Streptococcal bacteremia--Streptococcus dysgalactiae (Group C Streptococcus) 7/27/2023. Cultures repeated 7/28 prior to antibiotics -- also positive. Source often from skin, can seed bones, joints, valves.   Fever resolving.  Blurred vision, getting evaluation  Type 1 diabetes mellitus, chronic kidney disease stage IV  History of diabetic foot infection, transmetatarsal amputation in 2020, lower extremity wound, patient followed by Dr. Tinoco at [facility] podiatry  Diagnosis: 7/26/2023 -- had ulcer debrided in clinicon 7/26/23 -- no sign of infection at that time reported.   S/P Sinus tract left foot at [facility] site with I & D and flowable , DOS 11/8/2022, healed  Diabetes with peripheral neuropathy  transmetatarsal amputation with varus deformity left foot  Deshpande 1 ulcer plantar lateral foot without signs of infection  PAD --S/P angioplasty ([facility] system)--6/2023    Could be that he became bacteremic from wound from debridement 7/26 prior to admission. Wound without external sign of infection this admission.   Brain MRI shows acute/subacute stroke     PLAN:  Continue ceftriaxone  SHAYLEE no vegetations.   IV abx while here, oral to complete rx.  Treat to August 11th.   I would think cefdinir 300mg PO every day (due to renal fxn) as the Discharge abx.  No reason I can see for isolation and also no reason for PICC or IV at discharge.     ANUJ Lyman MD  [facility]  [phone] [facility]  Amcom paging    ______________________________________________________________________    SUBJECTIVE / INTERVAL HISTORY: No endocarditis on SHAYLEE imaging.  Stable. Wife here.     ROS: All other systems negative except as  listed above.        OBJECTIVE:  BP (!) 149/69 (BP Location: Left arm)   Pulse 64   Temp 97.9  F (36.6  C) (Oral)   Resp 23   Ht 1.829 m (6')   Wt 104.9 kg (231 lb 3.2 oz)   SpO2 94%   BMI 31.36 kg/m         Vital Signs  Temp: 98.1  F (36.7  C)  Temp src: Oral  Resp: 18  Pulse: 81  Pulse Rate Source: Monitor  BP: 118/57  BP Location: Left arm    Temp (24hrs), Av  F (37.2  C), Min:98.1  F (36.7  C), Max:99.9  F (37.7  C)      GEN: No acute distress.    RESPIRATORY:  Normal breathing pattern. Clear to auscultation  CARDIOVASCULAR:  Regular rate and rhythm. Normal S1 and S2. No murmur, click, gallop or rub. No dependent edema. No excess JVD.  ABDOMEN:  Soft, normal bowel sounds, non-tender, no masses  Angiogram access site R groin not tender, dressing in place.   EXTREMITIES: No edema. L foot wrapped.   SKIN/HAIR/NAILS:  No rashes  IV: peripheral        Antibiotics:  Ceftriaxone -  Vancomycin 7/28 x1 (2g)  Pip/tazo 7/28 x1    Pertinent labs:    Recent Labs   Lab 23  0441 23  0436   WBC 4.8 7.9 10.0   HGB 8.7* 9.2* 9.5*   HCT 27.1* 28.2* 30.4*   * 126* 123*          Recent Labs   Lab 23  0441 23  0436   * 130* 131*   CO2 22 22 21*   BUN 93.5* 90.9* 82.7*         Lab Results   Component Value Date    ALT 45 2023    AST 63 (H) 2023    ALKPHOS 50 2023         MICROBIOLOGY DATA:   blood 1 of 2 sets Group C strep   blood both sets collected prior to antibiotics -- Group C strep    RADIOLOGY:  US Carotid Bilateral    Result Date: 2023  EXAM: US CAROTID BILATERAL LOCATION: [facility] DATE: 2023 INDICATION: cva COMPARISON: MRI brain 2023, carotid duplex 2016 TECHNIQUE: Duplex exam performed utilizing 2D gray-scale imaging, Doppler interrogation with color-flow and spectral waveform analysis. The percent diameter stenosis is determined using NASCET criteria and Society of  Radiologists in Ultrasound Consensus Criteria. FINDINGS: RIGHT: Moderate plaque at the bifurcation. The highest peak systolic velocity in the ICA is 153 cm/sec, consistent with 50-69% stenosis. Previously highest ICA velocity was 122 cm/s. Although there is not severe velocity elevation in the external carotid  artery, there is turbulence at the external carotid ostium and poststenotic waveforms in the distal external carotid, suggestive of significant ostial external carotid stenosis. Previous exams demonstrate significant external carotid velocity elevations. Antegrade flow within the vertebral artery. LEFT: Mild plaque at the bifurcation. The peak systolic velocity in the ICA is less than 125 cm/sec, consistent with less than 50% stenosis. Progressive external external carotid velocity elevation, suggestive of of significant external carotid stenosis.  Antegrade flow within the vertebral artery. VELOCITY CHART: CCA   Right: 58/17 cm/s   Left: 81/13 cm/s ICA   Right: 153/36 cm/s   Left: 116/29 cm/s ECA   Right: 180/23 cm/s   Left: 270 cm/s ICA/CCA PSV Ratio   Right: 2.6   Left: 1.4     IMPRESSION: 1.  Right carotid: Moderate plaque remission. Progressive ICA velocity elevation consistent with 50-69% stenosis in the right internal carotid artery. Significant external carotid stenosis. 2.  Left carotid: Mild plaque formation, velocities consistent with less than 50% stenosis in the left internal carotid artery. Significant, progressive external carotid carotid stenosis. 3.  Flow within the vertebral arteries is antegrade.    MR Brain w/o Contrast    Result Date: 7/29/2023  EXAM: MR BRAIN W/O CONTRAST LOCATION: [facility] DATE: 7/29/2023 INDICATION: double vision. post coronary angiogram. r o CVA. CKD stage IV. COMPARISON: None. TECHNIQUE: Routine multiplanar multisequence head MRI without intravenous contrast. FINDINGS: INTRACRANIAL CONTENTS: Small acute to subacute infarction along the  dorsal left pontomedullary junction. A tiny, punctate focus of infarction in the left frontal lobe (series 7 image 41). On coronal diffusion weighted imaging there is a questionable area  of punctate infarction versus artifact in the right para midline cerebellum (series 14 image 34). No mass, acute hemorrhage, or extra-axial fluid collections. Scattered nonspecific T2/FLAIR hyperintensities within the cerebral white matter most consistent with mild chronic microvascular ischemic change. Mild generalized cerebral atrophy. No hydrocephalus. Normal position of the cerebellar tonsils. SELLA: No abnormality accounting for technique. OSSEOUS STRUCTURES/SOFT TISSUES: Normal marrow signal. The major intracranial vascular flow voids are maintained. ORBITS: No abnormality accounting for technique. SINUSES/MASTOIDS: Moderate mucosal thickening of the left maxillary sinus. No middle ear or mastoid effusion.     IMPRESSION: 1.  Small foci of acute to subacute infarction involving the dorsal left pontomedullary junction and left frontal lobe. 2.  Questionable punctate focus of infarction in the right para midline cerebellum seen on coronal diffusion weighted imaging only. 3.  Mild age-related changes as above.    Cardiac Catheterization    Addendum Date: 7/28/2023      Ost Cx to Prox Cx lesion is 100% stenosed.   Prox LAD lesion is 100% stenosed.   Ramus lesion is 50% stenosed.   Prox RCA to Dist RCA lesion is 30% stenosed.   Prox Graft to Mid Graft lesion is 100% stenosed. 1.  Severe diffuse disease of native vessels with small caliber vessels distally. 2.  Chronic occlusion of native LAD and native left circumflex. 3.  Left main fills small narrow caliber ramus branch only 4.  Right coronary artery has diffuse mild to moderate disease but no severe stenoses. 5.  Patent saphenous vein graft to high distal lateral wall branch (? Diagonal or marginal branch) which is diffusely diseased. Anastomosis appears normal. 6.  Occluded  saphenous vein graft to distal diagonal/lateral wall branches, most likely the acute infarct vessel. 7.  Patent BENITEZ to LAD. Anastomosis appears normal. 8.  Successful PCI saphenous vein graft to distal diagonal/lateral with drug-eluting stent implant x1 9.  Recommend continuing Plavix therapy indefinitely, risk factor management for ASCVD.    Result Date: 2023    Ost Cx to Prox Cx lesion is 100% stenosed.   Prox LAD lesion is 100% stenosed.   Ramus lesion is 50% stenosed.   Prox RCA to Dist RCA lesion is 30% stenosed.   Prox Graft to Mid Graft lesion is 100% stenosed. 1.  Severe diffuse disease of native vessels with small caliber vessels distally. 2.  Chronic occlusion of native LAD and native left circumflex. 3.  Left main fills small narrow caliber ramus branch only 4.  Right coronary artery has diffuse mild to moderate disease but no severe stenoses. 5.  Patent saphenous vein graft to high diagonal branch which is diffusely diseased. Anastomosis appears normal. 6.  Occluded saphenous vein graft to distal diagonal/lateral wall branches, most likely the acute infarct vessel. 7.  Patent BENITEZ to LAD. Anastomosis appears normal. 8.  Successful PCI saphenous vein graft to distal diagonal/lateral with drug-eluting stent implant x1 9.  Recommend continuing Plavix therapy indefinitely, risk factor management for ASCVD.     Echocardiogram Complete    Result Date: 2023  [id number]^BRITTANY^[place] [address]  Name: DENY MONTERO MRN: [id number] : 1950 Study Date: 2023 03:39 PM Age: 72 yrs Gender: Male Patient Location: [facility] Reason For Study: Acute Myocardial Infarction Ordering Physician: GUME SOTO Referring Physician: GUME SOTO Performed By: BP  BSA: 2.2 m2 Height: 72 in Weight: 220 lb HR: 77 BP: 118/55 mmHg ______________________________________________________________________________ Procedure Complete  Portable Echo Adult. Definity (NDC #80643-694) given intravenously. Technically difficult study.Extremely difficult acoustic windows despite the use of contrast for endcardial border definition. ______________________________________________________________________________ Interpretation Summary  The left ventricle is normal in size. There is mild concentric left ventricular hypertrophy. The visual ejection fraction is 45-50%. Diastolic Doppler findings (E/E' ratio and/or other parameters) suggest left ventricular filling pressures are increased. Poor quality images but suspicion of inferior and apical hypokinesis. Normal right ventricle size and systolic function. The right ventricular systolic pressure is approximated at 19mmHg plus the right atrial pressure. Sinus rhythm was noted. Technically difficult, suboptimal study. Consider cardiac MRI for improved image quality. Prior echo from 3/8/23 was of better quality. Inferior wll was normal on that study. ______________________________________________________________________________ Left Ventricle The left ventricle is normal in size. There is mild concentric left ventricular hypertrophy. Diastolic Doppler findings (E/E' ratio and/or other parameters) suggest left ventricular filling pressures are increased. The visual ejection fraction is 45-50%. Poor quality images but suspicion of inferior and apical hypokinesis.  Right Ventricle Normal right ventricle size and systolic function.  Atria The left atrium is moderately dilated. Right atrial size is normal.  Mitral Valve The mitral valve is not well visualized. There is mild to moderate (1-2+) mitral regurgitation. There is no mitral valve stenosis.  Tricuspid Valve The tricuspid valve is not well visualized. There is mild (1+) tricuspid regurgitation. The right ventricular systolic pressure is approximated at 19mmHg plus the right atrial pressure.  Aortic Valve Mild aortic valve calcification is present. The aortic  valve is not well visualized. No aortic regurgitation is present. No aortic stenosis is present.  Pulmonic Valve The pulmonic valve is not well visualized. There is mild (1+) pulmonic valvular regurgitation. Right ventricular diastolic pressure is approximated at 8mmHg plus the right atrial pressure. There is no pulmonic valvular stenosis.  Vessels The aorta root is normal. IVC diameter and respiratory changes fall into an intermediate range suggesting an RA pressure of 8 mmHg.  Pericardium There is no pericardial effusion.  Rhythm Sinus rhythm was noted. ______________________________________________________________________________ MMode/2D Measurements & Calculations IVSd: 1.2 cm  LVIDd: 5.4 cm LVIDs: 4.8 cm LVPWd: 1.1 cm FS: 11.2 % LV mass(C)d: 242.0 grams LV mass(C)dI: 109.1 grams/m2 Ao root diam: 2.9 cm LA dimension: 4.9 cm LA/Ao: 1.7 LVOT diam: 2.3 cm LVOT area: 4.2 cm2  EF(MOD-bp): 37.1 % LA Volume Indexed (AL/bp): 38.1 ml/m2 RWT: 0.41 TAPSE: 1.8 cm  Time Measurements MM HR: 77.0 BPM  Doppler Measurements & Calculations MV E max sagar: 99.0 cm/sec MV A max sagar: 55.7 cm/sec MV E/A: 1.8 MV dec time: 0.20 sec Ao V2 max: 105.0 cm/sec Ao max P.0 mmHg Ao V2 mean: 79.6 cm/sec Ao mean PG: 3.0 mmHg Ao V2 VTI: 20.4 cm ELIZABETH(I,D): 3.9 cm2 ELIZABETH(V,D): 3.8 cm2 LV V1 max PG: 3.6 mmHg LV V1 max: 95.3 cm/sec LV V1 VTI: 19.1 cm SV(LVOT): 79.4 ml SI(LVOT): 35.8 ml/m2 PA acc time: 0.09 sec PI end-d sagar: 138.0 cm/sec TR max sagar: 217.0 cm/sec TR max P.8 mmHg AV Sagar Ratio (DI): 0.91 ELIZABETH Index (cm2/m2): 1.8 E/E': 16.2 E/E' av.3  Lateral E/e': 16.4 Medial E/e': 18.2 Peak E' Sagar: 6.1 cm/sec RV S Sagar: 7.8 cm/sec  ______________________________________________________________________________ Report approved by: Davon Allen 2023 04:59 PM       XR Chest Port 1 View    Result Date: 2023  EXAM: XR CHEST PORT 1 VIEW LOCATION: [facility] DATE: 2023 INDICATION: Chest pain COMPARISON:  04/05/2021     IMPRESSION: Right costophrenic angle is clipped. Poststernotomy changes. Heart is magnified due to projection. Lungs are clear. No overt signs of failure or pneumonia.    US Low Ext Arterial Dop Seg Pres w/o Exercise    Result Date: 7/7/2023  Table formatting from the original result was not included. BILATERAL RESTING ANKLE-BRACHIAL INDICES (TRAE'S) (Date: 07/06/23) Indication: Surveillance TRAE's: PAD. S/P IR Left Balloon Angiogram ( Pedal Arch, DPA, ISAIAS) done 6/21/2023. Hx: Left Transmetatarsal Amputation done 9/3/2021. Previous: 2/7/2023 Bilateral ANDREINA/ TRAE's w/o, 4/7/2023 IR Left Angiogram. History: Previous Smoker, Hypertension, Diabetic, Hyperlipidemia, PAD, and Angioplasty  Resting TRAE's          Right: mmHg Index     Brachial: 158  Ankle-(PT): 157 0.99 Ankle-(DP): >254 NC          Digit: 47 0.30               Left: mmHg Index     Brachial: 157  Ankle-(PT): >254 NC Ankle-(DP): >254 NC          Digit: AMP N/A Resting ankle-brachial index of 0.99 on the right. Toe Pressures of 47 mmHg and TBI of 0.30 Resting ankle-brachial index is non compressible on the left.  VPR WAVEFORMS: The right volume plethysmography waveforms are mildly abnormal at the lower thigh level, mildly abnormal at the upper calf level and mildly abnormal at the ankle. The left volume plethysmography waveforms are mildly abnormal at the lower thigh level, mildly abnormal at the upper calf level and mildly abnormal at the ankle.  Impression:  1. RIGHT LOWER EXTREMITY: TRAE is Non-diagnostic indicating vessel calcification. Toe Pressures are Abnormal and impaired for wound healing with toe pressures of 47 mmHg. 2. LEFT LOWER EXTREMITY: TRAE is Non-diagnostic indicating vessel calcification. Toe Pressures are Un-interpretable and Non-diagnostic. Reference: Wound classification Grade TRAE Ankle Systolic Pressure Toe Pressures 0 > 0.80 > 100 mmHg > 60 mmHg 1 0.6 - 0.79 70 - 100 mmHg 40 - 59 mmHg 2 0.4 - 0.59 50-70 mmHg 30 - 39 mmHg 3 <  0.39 < 50 mmHg < 30 mmHg Digit Pressures DBI Disease Category > 0.70 Normal < 0.70 Abnormal > 30 mmHg Potential wound healing < 30 mmHg Impaired wound healing Ankle Brachial Pressures TRAE Disease Category > 1.3  Likely vessel calcification with monophasic waveforms, non-diagnostic 0.95-1.30 Normal with multiphasic waveforms 0.50-0.95 Single level disease 0.30-0.50 Multilevel disease < 0.30 Critical limb ischema Volume Plethysmography Recording (VPR) at all levels Normal Sharp systolic peak, fast upstroke, prominent dicrotic notch in wave Mild Sharp systolic peak, fast upstroke, absent dicrotic notch in wave Moderate Flattened systolic peak, slowed upstroke, absent dicrotic notch inwave Severe amplitude wave with = upslope and down slope Occluded Flat Line      US Lower Extremity Arterial Duplex Left    Result Date: 7/7/2023  Table formatting from the original result was not included. Arterial Duplex Ultrasound (Date: 07/06/23) Lower Extremity Artery Evaluation Indication: Surveillance Left Leg Arterial: PAD. S/P IR Left Balloon Angiogram ( Pedal Arch, DPA, ISAIAS) done 6/21/2023. Hx: Left Transmetatarsal Amputation done 9/3/2021. Previous: 2/7/2023 Bilateral ANDREINA/ TRAE's w/o, 4/7/2023 IR Left Angiogram. History: Previous Smoker, Hypertension, Diabetic, Hyperlipidemia, PAD, and Angioplasty  Technique: Duplex imaging is performed utilizing gray-scale, two-dimensional images, and color-flow imaging. Doppler waveform analysis and spectral Doppler imaging is also performed. LOWER EXTREMITY ARTERIAL DUPLEX EXAM WITH WAVEFORMS Right Leg:(cm/s) Location: Velocities Waveforms PTA:   23   M ISAIAS:   57  M DPA:   50  M Waveforms: T=Triphasic, M=Monophasic, B=Biphasic Left Leg:(cm/s) Location: Velocities Waveforms EIA:   141  T CFA:   201  T PFA:   138  B SFA Proximal:   158  T SFA Mid:   114  M SFA Distal:   209 /  145 M Popliteal Artery:   167 / 135   M PTA:   24   M ISAIAS:   112  M DPA:   135  M Waveforms: T=Triphasic, M=Monophasic,  B=Biphasic Stenotic Profile Ratio: 209 / 114 = 1.83  Impression: Right Lower Extremity: Not fully examined but monophasic flow in tibial vessels suggest more proximal disease Left Lower Extremity: Triphasic flow in common femoral artery suggesting no inflow disease.  Transition to monophasic flow within the level of mid SFA consistent with underlying hemodynamically significant disease.  Monophasic flow remains distally Reference: Category Normal 1-19% 20-49% 50-99% Occluded PSV <160 cm/sec without spectral broadening <160 cm/sec with spectral broadening Increased Increased Absent Flow Ratio N/A N/A < 2.0 >2.0 N/A Post-Stenotic Turbulence No No No Yes N/A      IR Lower Extremity Angiogram Left    Result Date: 6/22/2023  VASCULAR SURGERY ANGIOGRAM REPORT   [facility]  DATE: 06/21/23   PROCEDURES PERFORMED:  1.  Ultrasound-guided access of right common femoral artery 2.  Selective cannulation of the left common iliac artery, external iliac artery, common femoral artery, superficial femoral artery, popliteal artery, anterior tibial artery, dorsalis pedis artery, pedal arch, tibioperoneal trunk, and peroneal artery with selective left lower extremity angiography 3.  Plain balloon angioplasty of the left pedal arch and dorsalis pedis artery with 2 mm x 100 mm Crosstella balloon, followed by 2 mm x 40 mm Crosstella balloon 4. Plain balloon angioplasty of the left dorsalis pedis and anterior tibial artery with 3 mm x 200 mm Crosstella balloon 5. Attempted recannalization of left peroneal artery with balloon angioplasty using 3 mm x 200 mm Crosstella balloon 6.  Moderate sedation 7.  Arteriotomy closure with Angio-Seal device  SURGEON: Dr. Lynne  RESIDENT: Jayden Paniagua MD  INDICATION: Patient is a 72-year-old male with chronic limb threatening ischemia and left foot wounds after previous transmetatarsal amputation.  He underwent a left lower extremity angiogram in April 2023 that identified single-vessel  runoff via the left anterior tibial artery.  At that time his wound did appear to be making small amounts of progress and given the single-vessel runoff the decision was made not to treat.his to artery disease.  In the intervening months his left foot wounds have progressed and the decision was made to repeat arteriography today with the intention to treat his distal tibial disease.  SEDATION: The procedure was performed with administration of intravenous conscious sedation with appropriate preoperative, intraoperative, and postoperative evaluation.  124 minutes of supervised face to face intraservice time was provided by a radiology nurse under my direct supervision.  ANTIBIOTICS: None FLUOROSCOPIC TIME: 34.4 min RADIATION DOSE:  323 mGy CONTRAST: 80 ml visipaque   PROCEDURE: Ultrasound was used to evaluate the right common femoral artery. The selected vessel was patent. Ultrasound was then used for real-time ultrasound guided needle entry of the  right common femoral artery. Permanent images were recorded and saved to the patient's medical record.  Micropuncture sheath was inserted.  A glide advantage wire was advanced into the aorta.  5 [place] sheath was placed.  An Omni Flush catheter was advanced over the wire. Using up and over maneuver I was able to advance the wire into the left femoral artery.  Over the wire the Omni Flush catheter was advanced and positioned in the  left common femoral artery.  Left lower extremity angiogram was performed using CO2 which demonstrated mild stenosis in the distal SFA and popliteal artery.  An 035 angled Irizarry cross catheter was advanced over the wire and placed in the mid popliteal artery.  Lower extremity angiography was then performed with contrast which demonstrated the high-grade stenosis of the proximal left anterior tibial artery, and occlusion of the left posterior tibial and peroneal arteries.  There is high-grade stenosis in the left dorsalis pedis artery with an  incomplete pedal arch.  An 035 glide advantage wire was used to select the posterior tibial artery, the 5 Greek sheath was exchanged for a 90 cm 6 Greek [employer] destination sheath.  With accommodation of an 018 angled Irizarry cross catheter and 1 8 glide advantage wire, the left anterior tibial artery was selected.  Wire was advanced to the TP trunk and pedal arch.  Repeat angiography was performed demonstrating stenoses throughout dorsalis pedis pedal arch.  Next we proceeded with plain balloon angioplasty of the left pedal arch and dorsalis pedis trunk using a 2 mm x 100 mm Crosstella balloon.  Repeat angiography identified a persistent segment of stenosis in the very distal pedal arch, which was then treated with a 2 mm x 40 mm Crosstella plain balloon angioplasty.  This balloon was then exchanged for a 3 mm x 200 mm Crosstella plain balloon used to treat the dorsalis pedis and the entire length of the left anterior tibial artery.  At this point, angiography demonstrated brisk flow through the anterior tibial artery, dorsalis pedis, and pedal arch with significantly improved collateral sensation at the distal foot.  There was residual stenosis at the very proximal left anterior tibial artery, it was again treated with a 3 mm x 40 mm balloon.  There was only modest improvement with additional treatment, likely as a result of eccentric plaque.  We excepted this result and then attempted to recanalize the left peroneal artery.  This was patent to about the proximal third of the peroneal artery.  Using an 014 glide advantage wire and 018 Irizarry cross catheter, the peroneal artery was recanalized to the distal aspect of the artery.  A blush of contrast was used to confirm that the catheter tip was intraluminal.  A 3 mm x 200 mm Crosstella balloon was used for platelet angioplasty of the recanalize peroneal artery segment.  This was unsuccessful and the peroneal artery immediately reoccluded.  There was evidence of a  iatrogenic tibial arteriovenous fistula, which we decided not to treat.  Catheter was withdrawn to the mid SFA and angiogram again demonstrated nonflow-limiting stenosis of the distal SFA and above-knee popliteal arteries.  His disease were left untreated. At that point the procedure was concluded.  The arteriotomy closure was performed using an Angio-Seal device.  Patient tolerated procedure well and was transferred to recovery area in stable condition.  FINDINGS 1.  Mild, non-flow-limiting stenosis of the distal left superficial femoral artery and popliteal arteries. 2.  High-grade stenosis of the left proximal anterior tibial artery, proximal dorsalis pedis artery 3. Incomplete left pedal arch 4.  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  IMPRESSION Successful treatment of the left anterior tibial, dorsalis pedis, and distal pedal arch with balloon angioplasty.  Completion angiogram shows brisk flow through the AT to the distal left foot, much improved from prior.  Patient will be started on Plavix in addition to aspirin and will follow-up in vascular clinic.

## 2023-07-31 NOTE — CONSULTS
WOC Note    Attempted to see pt twice today. He was at procedure the first time and with therapy the second time.  Will continue OP plan of care per DPM. Patient's wife may do wound cares on days WOC doesn't see patient if she desires. Otherwise, nursing to do wound cares.    Epifix applied last week. Beginning today wound cares should be (per pt's wife):    L foot - Daily dressing change  Alginate to cover wound, then telfa no-stick followed by gauze and secure with tape.    WO will attempt in person assessment tomorrow.    Angy Pza MSN RN CWOCN  Pager no longer is use, please contact through [facility] group: [facility]

## 2023-07-31 NOTE — PROGRESS NOTES
[facility]   [address]  [place] [address]   [email or web address]   OFFICE: [phone]     CARDIOLOGY FOLLOW-UP NOTE      Impression and Plan     Impression:   NSTEMI - most likely secondary to graft failure of an OM vein graft. S/p PCI  Mild ischemic cardiomyopathy - with LVEF   Stroke, post PCI with some double vision. No valvular vegetations noted on SHAYLEE  Hypertension  PAD - on ASA and clopidogrel  CKD stage IV    Plan:  Okay for discharge from cardiac standpoint. Will sign off.  Follow up in post PCI clinic in 2 weeks  Follow up with Dr. Chopra in 6-8 weeks.    Primary Cardiologist: Dr. Robinson Chopra MD    Subjective     Feeling tired after procedures. Able to walk down the hallway.     Cardiac Diagnostics   Telemetry (personally reviewed): now in sinus rhythm.    Echocardiogram (results reviewed):   SHAYLEE 7/31/23  The left ventricle is normal in size.  The visual ejection fraction is 50-55%.  No regional wall motion abnormalities noted.  Normal right ventricle size and systolic function.  There is no color Doppler evidence of a PFO.  There is moderate (2+) mitral regurgitation.  The right ventricular systolic pressure is approximated at 46mmHg plus the right atrial pressure.  No valvular vegetations noted.    TTE 7/27/23  The left ventricle is normal in size. There is mild concentric left ventricular hypertrophy. The visual ejection fraction is 45-50%.   Diastolic Doppler findings (E/E' ratio and/or other parameters) suggest left ventricular filling pressures are increased.  Poor quality images but suspicion of inferior and apical hypokinesis.  Normal right ventricle size and systolic function.  The right ventricular systolic pressure is approximated at 19mmHg plus the right atrial pressure.  Sinus rhythm was noted.  Technically difficult, suboptimal study. Consider cardiac MRI for improved image quality.  Prior echo from 3/8/23 was of better quality.  Inferior wll was normal on that study.    Cardiac Cath (results reviewed):   7/27/23    Ost Cx to Prox Cx lesion is 100% stenosed.    Prox LAD lesion is 100% stenosed.    Ramus lesion is 50% stenosed.    Prox RCA to Dist RCA lesion is 30% stenosed.    Prox Graft to Mid Graft lesion is 100% stenosed.     1.  Severe diffuse disease of native vessels with small caliber vessels distally.  2.  Chronic occlusion of native LAD and native left circumflex.  3.  Left main fills small narrow caliber ramus branch only  4.  Right coronary artery has diffuse mild to moderate disease but no severe stenoses.  5.  Patent saphenous vein graft to high distal lateral wall branch (? Diagonal or marginal branch) which is diffusely diseased. Anastomosis appears normal.  6.  Occluded saphenous vein graft to distal diagonal/lateral wall branches, most likely the acute infarct vessel.  7.  Patent BENITEZ to LAD. Anastomosis appears normal.  8.  Successful PCI saphenous vein graft to distal diagonal/lateral with drug-eluting stent implant x1  9.  Recommend continuing Plavix therapy indefinitely, risk factor management for ASCVD.      Physical Examination       /63   Pulse 61   Temp 97.6  F (36.4  C) (Oral)   Resp 16   Ht 1.829 m (6')   Wt 104.9 kg (231 lb 3.2 oz)   SpO2 93%   BMI 31.36 kg/m            Intake/Output Summary (Last 24 hours) at 7/31/2023 1346  Last data filed at 7/31/2023 1253  Gross per 24 hour   Intake 442 ml   Output 1275 ml   Net -833 ml       General: pleasant male. No acute distress.   HENT: external ears normal. Nares patent. Mucous membranes moist.  Eyes: perrla, extraocular muscles intact. No scleral icterus.   Neck: No JVD  Lungs: clear to auscultation  COR: regular rate and rhythm, No murmurs, rubs, or gallops  Abd: nondistended, BS present  Extrem: No edema. Scattered ecchymoses.         Imaging      No new non-cardiac imaging.    Lab Results   Lab Results   Component Value Date    CHOL 96 06/16/2023    HDL  34 (L) 06/16/2023    TRIG 75 06/16/2023    BUN 93.5 (H) 07/31/2023     (L) 07/31/2023    CO2 22 07/31/2023       Lab Results   Component Value Date    WBC 4.8 07/31/2023    HGB 8.7 (L) 07/31/2023    HCT 27.1 (L) 07/31/2023    MCV 93 07/31/2023     (L) 07/31/2023       Lab Results   Component Value Date    TROPONINI 0.13 06/02/2021    TSH 1.68 06/16/2023    INR 1.00 06/21/2023               Current Inpatient Scheduled Medications   Scheduled Meds:   ALPRAZolam  0.25 mg Oral At Bedtime    aspirin  81 mg Oral Daily    atorvastatin  40 mg Oral Daily    cefTRIAXone  2 g Intravenous Q24H    clopidogrel  75 mg Oral Daily    ezetimibe  10 mg Oral QPM    ferrous sulfate  325 mg Oral Daily    fluticasone  2 spray Both Nostrils Daily    gabapentin  300 mg Oral BID    heparin ANTICOAGULANT  5,000 Units Subcutaneous Q12H    insulin aspart  1-10 Units Subcutaneous TID AC    insulin aspart   Subcutaneous TID AC    insulin glargine  30 Units Subcutaneous QAM    levothyroxine  125 mcg Oral Daily    [Held by provider] lisinopril  40 mg Oral Daily    metoprolol succinate ER  100 mg Oral Daily    oxymetazoline  2 spray Both Nostrils BID    sodium chloride (PF)  3 mL Intracatheter Q8H    sodium chloride (PF)  3 mL Intracatheter Q8H     Continuous Infusions:   - MEDICATION INSTRUCTIONS -      - MEDICATION INSTRUCTIONS -      - MEDICATION INSTRUCTIONS -      - MEDICATION INSTRUCTIONS -      Percutaneous Coronary Intervention orders placed (this is information for BPA alerting)      sodium chloride 30 mL/hr at 07/31/23 1039            Medications Prior to Admission   Prior to Admission medications    Medication Sig Start Date End Date Taking? Authorizing Provider   ALPRAZolam (XANAX) 0.5 MG tablet Take 0.5 tablets by mouth At Bedtime 10/10/16  Yes Provider, Historical   aspirin 81 MG EC tablet Take 1 tablet (81 mg) by mouth daily Start tomorrow. 7/28/23  Yes Maikel Tripathi MD   aspirin 81 MG EC tablet [ASPIRIN 81 MG EC  TABLET] Take 81 mg by mouth daily. 10/10/16  Yes Provider, Historical   atorvastatin (LIPITOR) 40 MG tablet Take 40 mg by mouth daily   Yes Unknown, Entered By History   clopidogrel (PLAVIX) 75 MG tablet Take 1 tablet (75 mg) by mouth daily Dose to start tomorrow. 7/28/23  Yes Maikel Tripathi MD   clopidogrel (PLAVIX) 75 MG tablet Take 1 tablet (75 mg) by mouth daily 6/21/23  Yes Edwin Lynne MD   ezetimibe (ZETIA) 10 MG tablet Take 1 tablet (10 mg) by mouth daily 8/12/22  Yes Robinson Chopra MD   fish oil-omega-3 fatty acids 1000 MG capsule Take 1 capsule by mouth daily   Yes Reported, Patient   furosemide (LASIX) 20 MG tablet Take 2 tablets by mouth daily 10/20/17  Yes Provider, Historical   gabapentin (NEURONTIN) 300 MG capsule [GABAPENTIN (NEURONTIN) 300 MG CAPSULE] Take 300 mg by mouth 3 (three) times a day.        11/18/15  Yes Provider, Historical   HUMALOG 100 unit/mL injection Inject Subcutaneous 3 times daily (before meals) Carb count: 3 g carb : 1 unit insulin 8/27/19  Yes Provider, Historical   insulin glargine (LANTUS) 100 unit/mL injection [INSULIN GLARGINE (LANTUS) 100 UNIT/ML INJECTION] Inject 30 Units under the skin every morning.  10/10/16  Yes Provider, Historical   levothyroxine (SYNTHROID/LEVOTHROID) 125 MCG tablet Take 125 mcg by mouth daily 7/27/22  Yes Reported, Patient   lisinopril (PRINIVIL,ZESTRIL) 40 MG tablet [LISINOPRIL (PRINIVIL,ZESTRIL) 40 MG TABLET] Take 40 mg by mouth daily. 8/11/16  Yes Provider, Historical   metoprolol succinate ER (TOPROL XL) 50 MG 24 hr tablet Take 1 tablet (50 mg) by mouth daily 11/22/22  Yes Robinson Chopra MD   multivitamin with iron (ONE DAILY WITH IRON) Tab tablet [MULTIVITAMIN WITH IRON (ONE DAILY WITH IRON) TAB TABLET] Take 1 tablet by mouth daily. 10/10/16  Yes Provider, Historical   nitroGLYcerin (NITROSTAT) 0.4 MG sublingual tablet Place 1 tablet (0.4 mg) under the tongue every 5 minutes as needed 2/13/23  Yes Robinson Chopra MD    TRIAMCINOLONE ACETONIDE (NASACORT NASL) [TRIAMCINOLONE ACETONIDE (NASACORT NASL)] 1 spray into each nostril daily. 10/10/16  Yes Provider, Historical   VITAMIN D3 2,000 unit capsule [VITAMIN D3 2,000 UNIT CAPSULE] Take 2,000 Units by mouth daily. 9/15/17  Yes Provider, Historical   Continuous Blood Gluc Sensor (FREESTYLE DAVID 14 DAY SENSOR) MISC  12/10/21   Reported, Patient   TECHLITE PEN NEEDLES 31G X 5 MM miscellaneous  12/20/21   Reported, Patient

## 2023-07-31 NOTE — PLAN OF CARE
"Goal Outcome Evaluation:               Patient is alert and oriented.  NPO for SHAYLEE in am.  Denied pain.  Patient reports double vision still present but is improving.  Heart rhythm is sinus bradycardia with BBB.  Contact in place for MRSA.  Dressing on left foot intact.  No complications noted with right groin angiogram site.  Will continue to monitor.      Problem: Plan of Care - These are the overarching goals to be used throughout the patient stay.    Goal: Plan of Care Review  Description: The Plan of Care Review/Shift note should be completed every shift.  The Outcome Evaluation is a brief statement about your assessment that the patient is improving, declining, or no change.  This information will be displayed automatically on your shift note.  Outcome: Progressing  Goal: Patient-Specific Goal (Individualized)  Description: You can add care plan individualizations to a care plan. Examples of Individualization might be:  \"Parent requests to be called daily at 9am for status\", \"I have a hard time hearing out of my right ear\", or \"Do not touch me to wake me up as it startles me\".  Outcome: Progressing  Goal: Absence of Hospital-Acquired Illness or Injury  Outcome: Progressing  Intervention: Identify and Manage Fall Risk  Recent Flowsheet Documentation  Taken 7/31/2023 0000 by Courtney Jones, RN  Safety Promotion/Fall Prevention: activity supervised  Intervention: Prevent Skin Injury  Recent Flowsheet Documentation  Taken 7/31/2023 0000 by Courtney Jones, RN  Body Position: position changed independently  Goal: Optimal Comfort and Wellbeing  Outcome: Progressing  Goal: Readiness for Transition of Care  Outcome: Progressing            "

## 2023-07-31 NOTE — PROGRESS NOTES
Care Management Follow Up    Length of Stay (days): 4    Expected Discharge Date: 08/01/2023     Concerns to be Addressed: IV abx  Patient plan of care discussed at interdisciplinary rounds: Yes    Anticipated Discharge Disposition:  home     Anticipated Discharge Services:   home care  Anticipated Discharge DME:  TBD    Patient/family educated on Medicare website which has current facility and service quality ratings:  yes  Education Provided on the Discharge Plan:   Per team  Patient/Family in Agreement with the Plan:  yes    Referrals Placed by CM/SW:  NA  Private pay costs discussed: NA    Additional Information:  Pt is a 72 year old male who was admitted with a NSTEMI and also found to have an  infection with group C streptococcus.  He will need long term antibiotics.      Wife does not want to do abx at home. Attempting to find OP infusion services for family that they can go into daily to have this done.  Have contacted [facility] in [place]. Waiting on call back    [facility] does not have availability. Wife not interested in going to [facility] on weekends. Once plan is known will try [facility].    1501. Pt and wife is interested in going to TCU for the IV abx and some rehab- PT and OT is home care therapy, but may be eligible for TCU with iv ABX which are not covered through home care per his insurance provider. Would be covered through TCU.     Jada Smith RN

## 2023-07-31 NOTE — PROGRESS NOTES
'    RENAL (KS) progress note  CC: F/U ASHLEY on CKD-4, NSTEMI  S: Doing better today.  Sitting at the edge of the bed having lunch.  Denies chest pain, no shortness of breath, no uremic symptoms, he offers no new complaints today.         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 Creatinine baseline 2.5-2.8 mg/dl when last seen by Dr. Bhatia in 5/2023. Has had spikes to mid-3's in  2022 and currently worsened function in setting of NSTEMI. Has had 2 angiograms of LLE since April that may be contributing to his worsened kidney function as well but no change in renal function noted after June procedure at least. No uremic symptoms.  Acute kidney injury seems beginning to improve, serum creatinine slightly better today.  Good urine output.  Creatinine 5.16 (7/30)--> 4.99 (7/31)  No dialysis indication.  Continue to hold lisinopril.  Due to persistent edema we will give furosemide 40 mg IV x1 today.  Discussed with with patient and wife at the bedside that he has advanced chronic kidney disease and he could easily progress to end-stage renal disease.  Continue to follow renal function closely  Group c strep bacteremia 7/27. Now on ceftriaxone. Trend subsequent cultures.  We will continue as per ID recommendations.  Type 1 diabetes mellitus with nephropathy, peripheral artery disease, neuropathy.  Diabetes has been under satisfactory control, most recent hemoglobin A1c 7.4%.  He has over 40-year history of type 1 diabetes mellitus.  Continue diabetes management as per primary service.  PAD with previous left TMA 2020: Peripheral angio noted 6/2023 with angioplasties and previously angioplasty in April 2023.  CKD-MBD: Stable calcium, PTH 66 (11/30/2022).  Continue cholecalciferol 2000 units daily.  Anemia due to CKD: Hgb decreased 2 g/dl since April.   Iron stores are low, holding IV iron supplementation in the setting of ongoing bacteremia.  Continue oral ferrous sulfate.   Hypertension.  Chronic kidney  disease.  Known hypertensive cardiomyopathy with left ventricular hypertrophy on cardiac echo.  He has chronic peripheral edema, furosemide as above.  Hyponatremia.  Secondary to a combination of acute on chronic kidney disease, peripheral edema.  Known hypertensive cardiomyopathy.  Diuretics as above.    Cayden Bhatia MD  Nephrology      /63   Pulse 61   Temp 97.6  F (36.4  C) (Oral)   Resp 16   Ht 1.829 m (6')   Wt 104.9 kg (231 lb 3.2 oz)   SpO2 93%   BMI 31.36 kg/m      I/O last 3 completed shifts:  In: 1302 [P.O.:1302]  Out: 725 [Urine:725]    Physical Exam:   GENERAL: calm and comfortable, alert  EYES: pupils equal, sclerae not icteric.  ENT: Hearing normal, oral mucosa moist.  RESP: Clear to auscultation bilaterally with no respiratory distress, normal effort.  CV: RRR, no murmurs. 1+  leg edema.    GI: Active BS, Soft, NT/ND, obese  SKIN: No rash, warm/ dry  MSK: S/P left TMA  NEURO: Moves all extremities, no tremor. Sensation grossly intact to LT  PSYCH: Appropriate mood and affect    Recent Labs   Lab 07/31/23 0429 07/30/23 0441 07/29/23 0436 07/28/23 0448 07/27/23  1222   * 130* 131* 136 137   POTASSIUM 4.1 4.6 4.1 5.2 4.7   CHLORIDE 96* 97* 98 100 101   CO2 22 22 21* 17* 13*   BUN 93.5* 90.9* 82.7* 60.9* 53.5*   CR 4.99* 5.16* 4.91* 3.64* 3.43*   GFRESTIMATED 12* 11* 12* 17* 18*   SVITLANA 8.1* 8.6* 8.7* 8.1* 8.5*   MAG  --   --   --   --  2.0   ALBUMIN 2.8* 2.9* 3.2* 3.2* 3.5         Recent Labs   Lab 07/31/23 0429 07/30/23 0441 07/29/23 0436 07/28/23 0448 07/27/23  1222   WBC 4.8 7.9 10.0 10.8 11.3*   HGB 8.7* 9.2* 9.5* 10.2* 10.6*   HCT 27.1* 28.2* 30.4* 32.2* 32.0*   MCV 93 92 94 94 92   * 126* 123* 135* 154           Cor Angio 7/27/23: (Visipaque 116 ml)  1.  Severe diffuse disease of native vessels with small caliber vessels distally.  2.  Chronic occlusion of native LAD and native left circumflex.  3.  Left main fills small narrow caliber ramus branch only  4.  Right  coronary artery has diffuse mild to moderate disease but no severe stenoses.  5.  Patent saphenous vein graft to high diagonal branch which is diffusely diseased. Anastomosis appears normal.  6.  Occluded saphenous vein graft to distal diagonal/lateral wall branches, most likely the acute infarct vessel.  7.  Patent BENITEZ to LAD. Anastomosis appears normal.  8.  Successful PCI saphenous vein graft to distal diagonal/lateral with drug-eluting stent implant x1  9.  Recommend continuing Plavix therapy indefinitely, risk factor management for ASCVD.        Current Facility-Administered Medications:     acetaminophen (TYLENOL) tablet 650 mg, 650 mg, Oral, Q4H PRN, Maikel Tripathi MD, 650 mg at 07/29/23 0108    ALPRAZolam (XANAX) tablet 0.25 mg, 0.25 mg, Oral, At Bedtime, Maikel Tripathi MD, 0.25 mg at 07/30/23 2108    aspirin EC tablet 81 mg, 81 mg, Oral, Daily, Maikel Tripathi MD, 81 mg at 07/31/23 0834    atorvastatin (LIPITOR) tablet 40 mg, 40 mg, Oral, Daily, Maikel Tripathi MD, 40 mg at 07/31/23 0834    benzocaine-menthol (CHLORASEPTIC) 6-10 MG lozenge 1 lozenge, 1 lozenge, Buccal, Q1H PRN, Jahaira Jimenez MD, 1 lozenge at 07/31/23 0704    bisacodyl (DULCOLAX) suppository 10 mg, 10 mg, Rectal, Daily PRN, Maikel Tripathi MD    cefTRIAXone (ROCEPHIN) 2 g vial to attach to  ml bag for ADULTS or NS 50 ml bag for PEDS, 2 g, Intravenous, Q24H, Miryam Quintana MD, 2 g at 07/30/23 1612    clopidogrel (PLAVIX) tablet 75 mg, 75 mg, Oral, Daily, Maikel Tripathi MD, 75 mg at 07/31/23 0834    Continuing antiplatelet from home medication list OR antiplatelet order already placed during this visit, , Does not apply, DOES NOT GO TO Bhumi LEAVITT Kenneth W, MD    Continuing beta blocker from home medication list OR beta blocker order already placed during this visit, , Does not apply, DOES NOT GO TO [facility]humi [place] Kenneth W, MD    Continuing statin from home medication list OR statin order already placed during this  visit, , Does not apply, DOES NOT GO TO Bhumi LE[place] Kenneth W, MD    glucose gel 15-30 g, 15-30 g, Oral, Q15 Min PRN **OR** dextrose 50 % injection 25-50 mL, 25-50 mL, Intravenous, Q15 Min PRN **OR** glucagon injection 1 mg, 1 mg, Subcutaneous, Q15 Min PRN, Maikel Tripathi MD    ezetimibe (ZETIA) tablet 10 mg, 10 mg, Oral, QPM, Maikel Tripathi MD, 10 mg at 07/30/23 2108    ferrous sulfate (FEROSUL) tablet 325 mg, 325 mg, Oral, Daily, Jenny Thomason MD, 325 mg at 07/31/23 0833    fluticasone (FLONASE) 50 MCG/ACT spray 2 spray, 2 spray, Both Nostrils, Daily, Remy Phipps DO, 2 spray at 07/31/23 0837    gabapentin (NEURONTIN) capsule 300 mg, 300 mg, Oral, BID, Maikel Tripathi MD, 300 mg at 07/31/23 0833    heparin ANTICOAGULANT injection 5,000 Units, 5,000 Units, Subcutaneous, Q12H, Remy Phipps DO, 5,000 Units at 07/31/23 0836    hydrALAZINE (APRESOLINE) injection 10 mg, 10 mg, Intravenous, Q4H PRN, Maikel Tripathi MD    insulin aspart (NovoLOG) injection (RAPID ACTING), 1-10 Units, Subcutaneous, TID Moise S[place], DO, 4 Units at 07/31/23 1308    insulin aspart (NovoLOG) injection (RAPID ACTING), , Subcutaneous, TID Moise S[place], DO, 22 Units at 07/30/23 1833    insulin glargine (LANTUS PEN) injection 30 Units, 30 Units, Subcutaneous, Bhumi [place] Kenneth W, MD, 30 Units at 07/30/23 0809    levothyroxine (SYNTHROID/LEVOTHROID) tablet 125 mcg, 125 mcg, Oral, Daily, Maikel Tripathi MD, 125 mcg at 07/31/23 0833    lidocaine (LMX4) cream, , Topical, Q1H PRN, Remy Phipps,     lidocaine (LMX4) cream, , Topical, Q1H PRN, Maikel Tripathi MD    lidocaine 1 % 0.1-1 mL, 0.1-1 mL, Other, Q1H PRN, Remy Phipps,     lidocaine 1 % 0.1-1 mL, 0.1-1 mL, Other, Q1H PRN, Maikel Tripathi MD    [Held by provider] lisinopril (ZESTRIL) tablet 40 mg, 40 mg, Oral, Daily, Maikel Tripathi MD    melatonin tablet 1 mg, 1 mg, Oral, At Bedtime PRN, Maikel Tripathi MD, 1  mg at 07/29/23 0108    metoprolol (LOPRESSOR) injection 5 mg, 5 mg, Intravenous, Q15 Min PRN, Maikel Tripathi MD    metoprolol succinate ER (TOPROL XL) 24 hr tablet 100 mg, 100 mg, Oral, Daily, Kike Valderrama MD, 100 mg at 07/30/23 0800    naloxone (NARCAN) injection 0.2 mg, 0.2 mg, Intravenous, Q2 Min PRN **OR** naloxone (NARCAN) injection 0.4 mg, 0.4 mg, Intravenous, Q2 Min PRN **OR** naloxone (NARCAN) injection 0.2 mg, 0.2 mg, Intramuscular, Q2 Min PRN **OR** naloxone (NARCAN) injection 0.4 mg, 0.4 mg, Intramuscular, Q2 Min PRN, Remy Phipps DO    nitroGLYcerin (NITROSTAT) sublingual tablet 0.4 mg, 0.4 mg, Sublingual, Q5 Min PRN, Maikel Tripathi MD    ondansetron (ZOFRAN ODT) ODT tab 4 mg, 4 mg, Oral, Q6H PRN **OR** ondansetron (ZOFRAN) injection 4 mg, 4 mg, Intravenous, Q6H PRN, Maikel Tripathi MD, 4 mg at 07/28/23 0148    oxyCODONE (ROXICODONE) tablet 5 mg, 5 mg, Oral, Q4H PRN **OR** oxyCODONE (ROXICODONE) tablet 10 mg, 10 mg, Oral, Q4H PRN, Maikel Tripathi MD    oxymetazoline (AFRIN) 0.05 % spray 2 spray, 2 spray, Both Nostrils, BID, Remy Phipps DO, 2 spray at 07/31/23 0845    Patient is already receiving anticoagulation with heparin, enoxaparin (LOVENOX), warfarin (COUMADIN)  or other anticoagulant medication, , Does not apply, Continuous PRN, Maikel Tripathi MD    Percutaneous Coronary Intervention orders placed (this is information for BPA alerting), , Does not apply, DOES NOT GO TO Bhumi LE[facility]ITT Kenneth W, MD    sodium chloride (PF) 0.9% PF flush 3 mL, 3 mL, Intracatheter, Q8H, Remy Phipps DO, 3 mL at 07/31/23 1310    sodium chloride (PF) 0.9% PF flush 3 mL, 3 mL, Intracatheter, q1 min prn, Remy Phipps DO    sodium chloride (PF) 0.9% PF flush 3 mL, 3 mL, Intracatheter, Q8H, Maikel Tripathi MD, 3 mL at 07/30/23 2350    sodium chloride (PF) 0.9% PF flush 3 mL, 3 mL, Intracatheter, q1 min prn, Maikel Tripathi MD    sodium chloride 0.9% infusion, ,  Intravenous, Continuous, Remy Phipps DO, Last Rate: 30 mL/hr at 07/31/23 1039, New Bag at 07/31/23 1039      Labs personally reviewed today during this evaluation at 10:54 AM

## 2023-07-31 NOTE — PLAN OF CARE
Problem: Cardiac Catheterization (Diagnostic/Interventional)  Goal: Acceptable Pain Control  Outcome: Progressing     Problem: Cardiac Catheterization (Diagnostic/Interventional)  Goal: Absence of Bleeding  Outcome: Progressing     Problem: Chronic Kidney Disease  Goal: Fluid Balance  Outcome: Progressing     Problem: Chronic Kidney Disease  Goal: Electrolyte Balance  Outcome: Progressing   Goal Outcome Evaluation:      Pt A/O. Denied pain and SOB. SHAYLEE completed this shift. VSS. Pt tolerating food/fluids well. Pt educated about diet restrictions after procedure and verbalized understanding. Pt and wife expressed interest in TCU placement, care management notified. BG this shift 207 and 230.

## 2023-07-31 NOTE — PROGRESS NOTES
NEUROLOGY INPATIENT PROGRESS NOTE       [facility] NEUROLOGY [place]  [address] [place] [address]  Tel: [phone]  Fax: [phone]  [email or web address]     ASSESSMENT & PLAN   Date: 07/31/2023  Hospital Day#: 4  Visit diagnosis: Cerebral embolic infarction    Left pontomedullary, left frontal) midline cerebellar infarction likely embolic  72-year-old male with history of HTN, HLD, CKD stage IV, DM, CAD s/p CABG who was admitted on 7/27/2023 and had successful PCI but following day complaint of diplopia  MRI brain with small infarct in the left pontomedullary junction, left frontal lobe and a questionable area in the right midline cerebellum.  Carotid ultrasound shows moderate plaque in the right ICA and the left ICA shows mild plaque.  Flow in both vertebral arteries antegrade  Echocardiogram shows 40 to 50% EF.  SHAYLEE scheduled later today  Diplopia associated with such cerebrovascular events tend to improve and in the meantime using an eye patch alternating between both eyes will be helpful  Nothing more to add from neurology standpoint, I will sign off.  Please call if any questions    Neurology Discharge Planning :   Discharge when okay with hospitalist    Pollo Hurtado MD  [facility]  (Formerly, [facility]     PROBLEM LIST      Patient Active Problem List   Diagnosis Code    CAD (coronary artery disease) I25.10    Hypertension I10    PVD (peripheral vascular disease) (H) I73.9    Hyperlipidemia LDL goal <70 E78.5    NSTEMI (non-ST elevated myocardial infarction) (H) I21.4    CKD (chronic kidney disease) stage 4, GFR 15-29 ml/min (H) N18.4    Type 1 diabetes mellitus with chronic kidney disease (H) E10.22    Diabetic neuropathy (H) E11.40    ED (erectile dysfunction) N52.9    Generalized anxiety disorder F41.1    Hypothyroidism E03.9    Obstructive sleep apnea G47.33    Cerebral infarction due to embolism of left carotid  artery (H) I63.132     Past Medical History:   Patient  has a past medical history of Chronic kidney disease, Coronary artery disease, Diabetes mellitus (H), Disorder of thyroid, Hyperlipidemia, and Hypertension.     SUBJECTIVE     Patient reports diplopia is improving.  Scheduled for SHAYLEE later today     OBJECTIVE     Vital signs in last 24 hours  Temp:  [97.5  F (36.4  C)-98.4  F (36.9  C)] 97.6  F (36.4  C)  Pulse:  [53-68] 61  Resp:  [18-19] 19  BP: ()/(49-60) 107/53  SpO2:  [92 %-97 %] 93 %    Review of Systems   Pertinent items are noted in HPI.    General Physical Exam: Patient is alert and oriented x 3. Vital signs were reviewed and are documented in EMR. Neck was supple, no carotid bruit, thyromegaly, JVD or lymphadenopathy noted.  Neurological Exam:  Mental status: Alert and oriented x3 no acute distress  Speech: Normal with no dysarthria or aphasia  Cranial nerves: Pupil equal round and reactive face symmetrical tongue midline he has horizontal diplopia on right extreme gaze  Motor: 5/5 he has left transmetatarsal amputation  Reflexes: Globally absent  Sensory: Decreased light touch pinprick vibratory position sensation  Coordination: No dysmetria noted on finger-nose testing  Gait: Deferred     DIAGNOSTIC STUDIES     Pertinent Radiology   Following imaging studies were reviewed:    MRI  1.  Small foci of acute to subacute infarction involving the dorsal left pontomedullary junction and left frontal lobe.  2.  Questionable punctate focus of infarction in the right para midline cerebellum seen on coronal diffusion weighted imaging only.  3.  Mild age-related changes as above.    Echocardiogram  Echo result w/o MOPS: Interpretation Summary The left ventricle is normal in size.There is mild concentric left ventricular hypertrophy.The visual ejection fraction is 45-50%.Diastolic Doppler findings (E/E' ratio and/or other parameters) suggest leftventricular filling pressures are increased.Poor quality  images but suspicion of inferior and apical hypokinesis.Normal right ventricle size and systolic function.The right ventricular systolic pressure is approximated at 19mmHg plus theright atrial pressure.Sinus rhythm was noted.Technically difficult, suboptimal study. Consider cardiac MRI for improvedimage quality.Prior echo from 3/8/23 was of better quality. Inferior wll was normal on thatstudy.    Carotid Ultrasound  1.  Right carotid: Moderate plaque remission. Progressive ICA velocity elevation consistent with 50-69% stenosis in the right internal carotid artery. Significant external carotid stenosis.  2.  Left carotid: Mild plaque formation, velocities consistent with less than 50% stenosis in the left internal carotid artery. Significant, progressive external carotid carotid stenosis.  3.  Flow within the vertebral arteries is antegrade.    Pertinent Labs   Lab Results: Personally Reviewed  Recent Results (from the past 24 hour(s))   Glucose by meter    Collection Time: 07/30/23  7:16 AM   Result Value Ref Range    GLUCOSE BY METER POCT 193 (H) 70 - 99 mg/dL   Glucose by meter    Collection Time: 07/30/23 11:42 AM   Result Value Ref Range    GLUCOSE BY METER POCT 311 (H) 70 - 99 mg/dL   Glucose by meter    Collection Time: 07/30/23  6:05 PM   Result Value Ref Range    GLUCOSE BY METER POCT 354 (H) 70 - 99 mg/dL   Glucose by meter    Collection Time: 07/30/23 10:14 PM   Result Value Ref Range    GLUCOSE BY METER POCT 309 (H) 70 - 99 mg/dL   CBC with platelets    Collection Time: 07/31/23  4:29 AM   Result Value Ref Range    WBC Count 4.8 4.0 - 11.0 10e3/uL    RBC Count 2.91 (L) 4.40 - 5.90 10e6/uL    Hemoglobin 8.7 (L) 13.3 - 17.7 g/dL    Hematocrit 27.1 (L) 40.0 - 53.0 %    MCV 93 78 - 100 fL    MCH 29.9 26.5 - 33.0 pg    MCHC 32.1 31.5 - 36.5 g/dL    RDW 14.4 10.0 - 15.0 %    Platelet Count 130 (L) 150 - 450 10e3/uL   Comprehensive metabolic panel    Collection Time: 07/31/23  4:29 AM   Result Value Ref Range     Sodium 128 (L) 136 - 145 mmol/L    Potassium 4.1 3.4 - 5.3 mmol/L    Chloride 96 (L) 98 - 107 mmol/L    Carbon Dioxide (CO2) 22 22 - 29 mmol/L    Anion Gap 10 7 - 15 mmol/L    Urea Nitrogen 93.5 (H) 8.0 - 23.0 mg/dL    Creatinine 4.99 (H) 0.67 - 1.17 mg/dL    Calcium 8.1 (L) 8.8 - 10.2 mg/dL    Glucose 227 (H) 70 - 99 mg/dL    Alkaline Phosphatase 50 40 - 129 U/L    AST 63 (H) 0 - 45 U/L    ALT 45 0 - 70 U/L    Protein Total 5.8 (L) 6.4 - 8.3 g/dL    Albumin 2.8 (L) 3.5 - 5.2 g/dL    Bilirubin Total 0.4 <=1.2 mg/dL    GFR Estimate 12 (L) >60 mL/min/1.73m2   Phosphorus    Collection Time: 07/31/23  4:29 AM   Result Value Ref Range    Phosphorus 4.5 2.5 - 4.5 mg/dL         HOSPITAL MEDICATIONS      ALPRAZolam  0.25 mg Oral At Bedtime    aspirin  81 mg Oral Daily    atorvastatin  40 mg Oral Daily    cefTRIAXone  2 g Intravenous Q24H    clopidogrel  75 mg Oral Daily    ezetimibe  10 mg Oral QPM    ferrous sulfate  325 mg Oral Daily    fluticasone  2 spray Both Nostrils Daily    gabapentin  300 mg Oral BID    heparin ANTICOAGULANT  5,000 Units Subcutaneous Q12H    insulin aspart  1-6 Units Subcutaneous 4 times per day    insulin aspart   Subcutaneous TID AC    insulin glargine  30 Units Subcutaneous QAM    levothyroxine  125 mcg Oral Daily    [Held by provider] lisinopril  40 mg Oral Daily    metoprolol succinate ER  100 mg Oral Daily    oxymetazoline  2 spray Both Nostrils BID    sodium chloride (PF)  3 mL Intracatheter Q8H        Total time spent for face to face visit, reviewing labs/imaging studies, counseling and coordination of care was: 45 Minutes More than 50% of this time was spent on counseling and coordination of care.      This note was dictated using voice recognition software.  Any grammatical or context distortions are unintentional and inherent to the software.

## 2023-07-31 NOTE — DISCHARGE INSTRUCTIONS
1. You are required to have someone accompany you home.    2. Rest today. Do not drive or operate machinery today. Over-activity may produce nausea and dizziness.    3. You should follow your normal diet. Drink plenty of fluids. Do not drink any alcoholic beverages for 24 hours. *(Alcohol may interact with the medications you received today).  Cold food and fluids at 1230 this afternoon.    4. NO HOT FOODS or LIQUIDS FOR 6 HOURS after the procedure.  Not until 1730 this evening.    5. You may have a sore throat or cough. This is normal. These symptoms should resolve in 24 hours.     6. If you have further questions call your doctor:          Will continue OP plan of care per DPM.   L foot - Daily dressing change  Alginate to cover wound, then telfa no-stick followed by gauze and secure with tape.    L Ankle - Daily dressing change  Iodasorb to cover wound, then telfa no-stick followed by gauze and secure with tape.

## 2023-07-31 NOTE — PLAN OF CARE
Problem: Plan of Care - These are the overarching goals to be used throughout the patient stay.    Goal: Plan of Care Review  Description: The Plan of Care Review/Shift note should be completed every shift.  The Outcome Evaluation is a brief statement about your assessment that the patient is improving, declining, or no change.  This information will be displayed automatically on your shift note.  Outcome: Progressing     Problem: Plan of Care - These are the overarching goals to be used throughout the patient stay.    Goal: Optimal Comfort and Wellbeing  Outcome: Progressing  Intervention: Monitor Pain and Promote Comfort  Recent Flowsheet Documentation  Taken 7/30/2023 1610 by Lidia Fuentes RN  Pain Management Interventions: medication offered but refused   Goal Outcome Evaluation:    MRI showed embolic stroke, SHAYLEE ordered tomorrow. Pt informed of this and NPO status. VSS, sinus andrés this evening in upper 50s, on room air until bedtime then was placed on 1 L NC because O2 sats were in upper 80s. No acute events.

## 2023-07-31 NOTE — PROGRESS NOTES
[facility]    Medicine Progress Note - Hospitalist Service    Date of Admission:  7/27/2023    Assessment & Plan   NSTEMI  CAD  -s/p coronary angiogram 7/27/23 with BINA to SVG-distal diagonal/lateral  -esvin asa/plavix PTA meds  -statin, BB  -TTE 7/27/23 showed LVEF 45-50%, increased LV filling pressures, inf/apical hypokinesis  -cardiology following  -SHAYLEE w/o veggies      Acute hypoxic respitatory failure  -wean O2 as able to goal sPO2 > 90%  -improving/stable/resolved    Strep dysgalactiae bacteremia: source unclear  -IV CTX per ID  -SHAYLEE w/o vegetations  -follow cultures    HLD:  -LDL 47 (6/2023)  -lipitor 40 at bedtime     DM 1  -lantus 30 U qam  -novology 1U:3g CHO ac tid, sliding scale insulin ac TID, accuchecks ac/hs     HTN  -BB, hold lisinopril for now     PAD, s/p PBA left pedal arch, DPA, ISAIAS (6/2023).     ASHLEY on CKD stage IV: slight improvement  -suspect KARLA  -monitor renal fnction closely  -appreciate nephrology assistance    Double vision: new. Horizontal and occurs only with both eyes open not unilateral.  -MRI brain acute/subacute infarcts dorsal left pontomedullary junction and left frontal lobe. Questionable punctate focus of infarction in the right para midline cerebellum   -asa, plavix, statin  -ophtho outpt    Acute/subacute stroke: suspect embolic from angio. No vegetations on SHAYLEE or clot.   -neurology s/o  -on asa/plavix/statin          Diet: Combination Diet Moderate Consistent Carb (60 g CHO per Meal) Diet; Low Saturated Fat Diet, Low Saturated Fat Na <2400mg Diet    DVT Prophylaxis: Heparin SQ  Mohan Catheter: Not present  Lines: None     Cardiac Monitoring: ACTIVE order. Indication: Procedural area  Code Status: Full Code      Clinically Significant Risk Factors         # Hyponatremia: Lowest Na = 128 mmol/L in last 2 days, will monitor as appropriate      # Hypoalbuminemia: Lowest albumin = 2.8 g/dL at 7/31/2023  4:29 AM, will monitor as appropriate     #  Thrombocytopenia: Lowest platelets = 126 in last 2 days, will monitor for bleeding    # Acute Kidney Injury, unspecified: based on a >150% or 0.3 mg/dL increase in last creatinine compared to past 90 day average, will monitor renal function    # Hypertension: Noted on problem list       # DMII: A1C = 7.4 % (Ref range: <5.7 %) within past 6 months   # Obesity: Estimated body mass index is 31.36 kg/m  as calculated from the following:    Height as of this encounter: 1.829 m (6').    Weight as of this encounter: 104.9 kg (231 lb 3.2 oz).           Disposition Plan      Expected Discharge Date: 08/01/2023        Discharge Comments: SHAYLEE this week, neuro and renal recs, continue IV abx  home          Remy Phipps DO, DO  Hospitalist Service  [facility]  Securely message with [employer] (more info)  Text page via [employer] Paging/Directory   ______________________________________________________________________    Interval History   Afebrile.    Physical Exam   Vital Signs: Temp: 97.6  F (36.4  C) Temp src: Oral BP: 134/65 Pulse: 61   Resp: 16 SpO2: 96 % O2 Device: None (Room air) Oxygen Delivery: 2 LPM  Weight: 231 lbs 3.2 oz    General appearance - alert, and in no distress  Eyes - pupils equal and reactive, extraocular eye movements intact, sclera anicteric  Lungs - clear to auscultation, no wheezes, rales or rhonchi, symmetric air entry  Heart - normal rate, regular rhythm, +murmur, No peripheral edema.  Abdomen - soft, nontender, nondistended, no masses or organomegaly, BS+  Neurological - alert, oriented, emainder cn ii-xii intact  Skin - no c/c/p.      Lab/imaging reviewed

## 2023-08-01 NOTE — PROGRESS NOTES
Care Management Follow Up    Length of Stay (days): 5    Expected Discharge Date: 08/02/2023     Concerns to be Addressed:       Patient plan of care discussed at interdisciplinary rounds: Yes    Anticipated Discharge Disposition:  Home      Referrals Placed by CM/SW:    Private pay costs discussed: Not applicable    Additional Information:  Met with pt and wife to discuss discharge plans.  Pt will likely not need IV abx at discharge therefore will not need TCU placement.  Pt and wife in agreement and plan is return home at discharge.  Discussed home care and pt declining need for home care at this time.   Care Management will continue to follow.    KIA Elias

## 2023-08-01 NOTE — PLAN OF CARE
"  Problem: Plan of Care - These are the overarching goals to be used throughout the patient stay.    Goal: Plan of Care Review  Description: The Plan of Care Review/Shift note should be completed every shift.  The Outcome Evaluation is a brief statement about your assessment that the patient is improving, declining, or no change.  This information will be displayed automatically on your shift note.  Outcome: Progressing     Problem: Plan of Care - These are the overarching goals to be used throughout the patient stay.    Goal: Patient-Specific Goal (Individualized)  Description: You can add care plan individualizations to a care plan. Examples of Individualization might be:  \"Parent requests to be called daily at 9am for status\", \"I have a hard time hearing out of my right ear\", or \"Do not touch me to wake me up as it startles me\".  Outcome: Progressing     Problem: Plan of Care - These are the overarching goals to be used throughout the patient stay.    Goal: Absence of Hospital-Acquired Illness or Injury  Outcome: Progressing  Intervention: Identify and Manage Fall Risk  Recent Flowsheet Documentation  Taken 8/1/2023 0405 by Arcelia Chinchilla, RN  Safety Promotion/Fall Prevention:   activity supervised   toileting scheduled   room near nurse's station   room door open   nonskid shoes/slippers when out of bed   lighting adjusted   clutter free environment maintained  Taken 7/31/2023 2354 by Arcelia Chinchilla, RN  Safety Promotion/Fall Prevention:   activity supervised   toileting scheduled   room near nurse's station   room door open   nonskid shoes/slippers when out of bed   lighting adjusted   clutter free environment maintained  Intervention: Prevent Skin Injury  Recent Flowsheet Documentation  Taken 8/1/2023 0405 by Arcelia Chinchilla, RN  Body Position: position changed independently  Taken 7/31/2023 2354 by Arcelia Chinchilla, RN  Body Position: position changed independently     Problem: Plan of Care - These " are the overarching goals to be used throughout the patient stay.    Goal: Absence of Hospital-Acquired Illness or Injury  Intervention: Identify and Manage Fall Risk  Recent Flowsheet Documentation  Taken 8/1/2023 0405 by Arcelia Chinchilla RN  Safety Promotion/Fall Prevention:   activity supervised   toileting scheduled   room near nurse's station   room door open   nonskid shoes/slippers when out of bed   lighting adjusted   clutter free environment maintained  Taken 7/31/2023 2354 by Arcelia Chinchilla RN  Safety Promotion/Fall Prevention:   activity supervised   toileting scheduled   room near nurse's station   room door open   nonskid shoes/slippers when out of bed   lighting adjusted   clutter free environment maintained     Problem: Plan of Care - These are the overarching goals to be used throughout the patient stay.    Goal: Absence of Hospital-Acquired Illness or Injury  Intervention: Prevent Skin Injury  Recent Flowsheet Documentation  Taken 8/1/2023 0405 by Arcelia Chinchilla RN  Body Position: position changed independently  Taken 7/31/2023 2354 by Arcelia Chinchilla RN  Body Position: position changed independently     Problem: Plan of Care - These are the overarching goals to be used throughout the patient stay.    Goal: Optimal Comfort and Wellbeing  Outcome: Progressing     Problem: Risk for Delirium  Goal: Optimal Coping  Outcome: Progressing     Problem: Diabetes Comorbidity  Goal: Blood Glucose Level Within Targeted Range  Outcome: Progressing     Problem: Chronic Kidney Disease  Goal: Optimal Coping with Chronic Illness  Outcome: Progressing     Problem: Cardiac Catheterization (Diagnostic/Interventional)  Goal: Stable Heart Rate and Rhythm  Outcome: Progressing   Goal Outcome Evaluation:       Patient is A&Ox4, but forgeful. Does not like the bed alarm on, encouraged patient to use call before getting up. Ambulated to bathroom with assist of 1 and walker. Voided 900 in urinal. Patient is  steady on feet.02 sat's dropped to low 80's during sleep, applied 3 liter 02 per nasal cannula, 02 sat's 95%. Post angiogram, right groin access, soft , no bruising and small dried blood on dressing. Patient had 1 stent placed.

## 2023-08-01 NOTE — PROGRESS NOTES
[facility]  WO Nurse Inpatient Assessment     Consulted for: L foot    Summary: wife does all wound care to LLE, requesting to continue routine from podiatrist.     Patient History (according to provider note(s):      Dakota Bauer is a 72 year old male who has a pmhx significant for  type 1 diabetes mellitus, coronary artery disease s/p CABG 18 years ago with last LHC in 2016 showing patent BENITEZ to LAD, patent SVG to diagonal, patent SVG to OM, and diffusely diseased distal vessels. Hypothyroidism, PAD with left transmetatarsal amputation in 2020, hypertension with left ventricular hypertrophy and chronic kidney disease stage IV baseline creatinine 2.5-2.8 mg/dL   Presented to the ED with chest pain, sob.  Troponin T HS elevated at 289, st depressions on ECG, case d/w Cardiology and initiated on heparin gtt/ntg gtt, currently taken to cath lab for angiography.     Assessment:      Skin Injury Location: Lfoot/ankle      8/1   plantar                                                          medial ankle    Last photo: 8/1  Skin injury due to:  DFU, edema blistering  Skin history and plan of care:   Chronic wounds that the patient's wife has been caring for. They regularly see their podiatrist for serial debridements and callus paring.   Affected area:      Skin assessment: Erosion of epidermis and callus     Measurements (length x width x depth, in cm) 0.4  x 1  x  0.1 cm      Color: normal and consistent with surrounding tissue     Temperature  normal      Drainage: scant .      Color: serous      Odor: none  Pain: absent, none  Pain interventions prior to dressing change: no significant pain present   Treatment goal: Heal  and Promote epidermal migration  STATUS: initial assessment  Supplies ordered: at bedside. Wife brought wound care products, does wound care for patient. WOC team is fine with wife providing wound care.     Ankle has two small blisters that are deroofed. Wife stated they  used iodasorb daily and requested the ointment. Will continue to allow wife to perform wound care.     Treatment Plan:     L foot - Daily dressing change  Alginate to cover wound, then telfa no-stick followed by gauze and secure with tape.    L Ankle - Daily dressing change  Iodasorb to cover wound, then telfa no-stick followed by gauze and secure with tape.    Orders: Reviewed, Written, and Updated    RECOMMEND PRIMARY TEAM ORDER: None, at this time  Education provided: plan of care  Discussed plan of care with: Patient and Family  [facility] nurse follow-up plan: every other week  Notify WO if wound(s) deteriorate.  Nursing to notify the Provider(s) and re-consult the [facility] Nurse if new skin concern.    DATA:     Current support surface: Standard  Standard gel/foam mattress (IsoFlex, Atmos air, etc)  Containment of urine/stool: Continent of bladder and Continent of bowel  BMI: Body mass index is 30.26 kg/m .   Active diet order: Orders Placed This Encounter      Combination Diet Moderate Consistent Carb (60 g CHO per Meal) Diet; Low Saturated Fat Diet, Low Saturated Fat Na <2400mg Diet     Output: I/O last 3 completed shifts:  In: 926 [P.O.:720; I.V.:206]  Out: 3550 [Urine:3550]     Labs:   Recent Labs   Lab 08/01/23  0439 07/28/23  0448 07/27/23  1222   ALBUMIN 3.0*   < > 3.5   HGB 9.8*   < > 10.6*   WBC 5.3   < > 11.3*   A1C  --   --  7.4*    < > = values in this interval not displayed.     Pressure injury risk assessment:   Sensory Perception: 4-->no impairment  Moisture: 4-->rarely moist  Activity: 3-->walks occasionally  Mobility: 3-->slightly limited  Nutrition: 3-->adequate  Friction and Shear: 3-->no apparent problem  Hector Score: 20    ADRIAN Bowles RN CWOCN  Pager no longer in use, please contact through [email or web address] group: [facility]

## 2023-08-01 NOTE — PLAN OF CARE
Problem: Plan of Care - These are the overarching goals to be used throughout the patient stay.    Goal: Absence of Hospital-Acquired Illness or Injury  Outcome: Progressing  Intervention: Identify and Manage Fall Risk  Recent Flowsheet Documentation  Taken 7/31/2023 2000 by Inés Keller, RN  Safety Promotion/Fall Prevention:   activity supervised   nonskid shoes/slippers when out of bed   lighting adjusted     Problem: Diabetes Comorbidity  Goal: Blood Glucose Level Within Targeted Range  Outcome: Progressing     Goal Outcome Evaluation:  A/O, Room Air. VSS. Sinus Rhythm/Sinus Bradycardia with BBB. Ac/HS Bg- 315 and 306. Sliding scale insulin and Insulin per Carb count given. Had a shower, non weight bearing on left foot. Pt declined wound dressings on left foot, patients wife changed the dressings as ordered. Pt ate, tolerated diet. Denied Nausea, no pain.

## 2023-08-01 NOTE — PROGRESS NOTES
[facility]    Medicine Progress Note - Hospitalist Service    Date of Admission:  7/27/2023    Assessment & Plan   NSTEMI  CAD  HFrEF w/o ADHF  -s/p coronary angiogram 7/27/23 with BINA to SVG-distal diagonal/lateral  -esvin asa/plavix PTA meds  -statin, BB  -TTE 7/27/23 showed LVEF 45-50%, increased LV filling pressures, inf/apical hypokinesis  -cardiology following  -SHAYLEE w/o veggies      Acute hypoxic respitatory failure  -wean O2 as able to goal sPO2 > 90%  -improving/stable/resolved    Strep dysgalactiae bacteremia: source unclear  -IV CTX per ID  -SHAYLEE w/o vegetations  -follow cultures    HLD:  -LDL 47 (6/2023)  -lipitor 40 at bedtime     DM 1  -lantus 30 U qam  -novology 1U:2.5g CHO ac tid, sliding scale insulin ac TID, accuchecks ac/hs     HTN  -BB, hold lisinopril for now     PAD, s/p PBA left pedal arch, DPA, ISAIAS (6/2023).     ASHLEY on CKD stage IV: slight improvement  -suspect KARLA  -monitor renal fnction closely  -appreciate nephrology assistance    Double vision: new. Horizontal and occurs only with both eyes open not unilateral.  -MRI brain acute/subacute infarcts dorsal left pontomedullary junction and left frontal lobe. Questionable punctate focus of infarction in the right para midline cerebellum   -asa, plavix, statin  -ophtho outpt    Acute/subacute stroke: suspect embolic from angio. No vegetations on SHAYLEE or clot.   -neurology s/o  -on asa/plavix/statin     Discharge tomorrow pending ID/Nephrology     Diet: Combination Diet Moderate Consistent Carb (60 g CHO per Meal) Diet; Low Saturated Fat Diet, Low Saturated Fat Na <2400mg Diet    DVT Prophylaxis: Heparin SQ  Mohan Catheter: Not present  Lines: None     Cardiac Monitoring: ACTIVE order. Indication: Procedural area  Code Status: Full Code      Clinically Significant Risk Factors         # Hyponatremia: Lowest Na = 128 mmol/L in last 2 days, will monitor as appropriate      # Hypoalbuminemia: Lowest albumin = 2.8 g/dL at  7/31/2023  4:29 AM, will monitor as appropriate           # Hypertension: Noted on problem list       # DMII: A1C = 7.4 % (Ref range: <5.7 %) within past 6 months   # Obesity: Estimated body mass index is 30.26 kg/m  as calculated from the following:    Height as of this encounter: 1.829 m (6').    Weight as of this encounter: 101.2 kg (223 lb 1.6 oz).           Disposition Plan      Expected Discharge Date: 08/02/2023        Discharge Comments: SHAYLEE this week, neuro and renal recs, continue IV abx, needs TCU no bed yet  home          Remy Phipps DO, DO  Hospitalist Service  [facility]  Securely message with [employer] (more info)  Text page via [employer] Paging/Directory   ______________________________________________________________________    Interval History   Afebrile.    Physical Exam   Vital Signs: Temp: 98.7  F (37.1  C) Temp src: Oral BP: (!) 145/67 Pulse: 65   Resp: 16 SpO2: 97 % O2 Device: None (Room air) Oxygen Delivery: 3 LPM  Weight: 223 lbs 1.6 oz    General appearance - alert, and in no distress  Eyes - pupils equal and reactive, extraocular eye movements intact, sclera anicteric  Lungs - clear to auscultation, no wheezes, rales or rhonchi, symmetric air entry  Heart - normal rate, regular rhythm, +murmur, No peripheral edema.  Abdomen - soft, nontender, nondistended, no masses or organomegaly, BS+  Neurological - alert, oriented, emainder cn ii-xii intact  Skin - no c/c/p.      Lab/imaging reviewed

## 2023-08-01 NOTE — PROGRESS NOTES
Diabetes Care:     Talked with patient about his BG pattern, rising in day, drops overnight.  Options are to change the I:C ratio to 1:2.5  or change the resistance of the correction scale to the Very High or change the correction scale to what he does at home, which is 6 units for 150-200 and 10 units for over 200.  He thought maybe trying the latter.  However, I wouldn't recommend that at hs, only premeal  Likely BG elevated here with change in activity, infection, stress.    Talked to provider who will review this option.    Bambi Caban RN, Certified Diabetes Care and     [address] [address]  [email or web address]   Office: [phone]  Pager: [phone]

## 2023-08-01 NOTE — PROGRESS NOTES
'    RENAL ([facility]) progress note  CC: F/U ASHLEY on CKD-4, NSTEMI  S: Doing better today.    No uremic symptoms are present, denies chest pain, no shortness of breath, peripheral edema improving.  Appetite remains adequate.  He wonders when he could go home.         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 Creatinine baseline 2.5-2.8 mg/dl when last seen by Dr. Bhatia in 5/2023. Has had spikes to mid-3's in  2022 and currently worsened function in setting of NSTEMI. Has had 2 angiograms of LLE since April that may be contributing to his worsened kidney function as well but no change in renal function noted after June procedure at least. No uremic symptoms.  Acute kidney injury seems beginning to improve, serum creatinine slightly better today.  Good urine output.  Creatinine 5.16 (7/30)--> 4.99 (7/31)--> 4.21 (8/1)  No dialysis indication.  Continue to hold lisinopril.  Due to persistent edema we will give furosemide 40 mg IV daily (PTA furosemide 40 mg p.o. daily)  Discussed with with patient and wife at the bedside that he has advanced chronic kidney disease and he could easily progress to end-stage renal disease.  Continue to follow renal function closely  Group C strep bacteremia 7/27. Now on ceftriaxone. Trend subsequent cultures.  We will continue as per ID recommendations.  Type 1 diabetes mellitus with nephropathy, peripheral artery disease, neuropathy.  Diabetes has been under satisfactory control, most recent hemoglobin A1c 7.4%.  He has over 40-year history of type 1 diabetes mellitus.  Continue diabetes management as per primary service.  PAD with previous left TMA 2020: Peripheral angio noted 6/2023 with angioplasties and previously angioplasty in April 2023.  CKD-MBD: Stable calcium, PTH 66 (11/30/2022).  Continue cholecalciferol 2000 units daily.  Anemia due to CKD: Hgb decreased 2 g/dl since April.   Iron stores are low, holding IV iron supplementation in the setting of ongoing  bacteremia.  Continue oral ferrous sulfate.   Hypertension.  Chronic kidney disease.  Known hypertensive cardiomyopathy with left ventricular hypertrophy on cardiac echo.  He has chronic peripheral edema, furosemide as above.  Hyponatremia.  Secondary to a combination of acute on chronic kidney disease, peripheral edema.  Known hypertensive cardiomyopathy.  Diuretics as above.    Cayden Bhatia MD  Nephrology      BP (!) 145/67 (BP Location: Left arm)   Pulse 65   Temp 98.7  F (37.1  C) (Oral)   Resp 16   Ht 1.829 m (6')   Wt 101.2 kg (223 lb 1.6 oz)   SpO2 97%   BMI 30.26 kg/m      I/O last 3 completed shifts:  In: 926 [P.O.:720; I.V.:206]  Out: 3550 [Urine:3550]    Physical Exam:   GENERAL: calm and comfortable, alert  EYES: pupils equal, sclerae not icteric.  ENT: Hearing normal, oral mucosa moist.  RESP: Clear to auscultation bilaterally with no respiratory distress, normal effort.  CV: RRR, no murmurs. 1+  leg edema.    GI: Active BS, Soft, NT/ND, obese  SKIN: No rash, warm/ dry  MSK: S/P left TMA  NEURO: Moves all extremities, no tremor. Sensation grossly intact to LT  PSYCH: Appropriate mood and affect    Recent Labs   Lab 08/01/23  0439 07/31/23  0429 07/30/23  0441 07/29/23  0436 07/28/23  0448 07/27/23  1222   * 128* 130* 131* 136 137   POTASSIUM 4.3 4.1 4.6 4.1 5.2 4.7   CHLORIDE 101 96* 97* 98 100 101   CO2 23 22 22 21* 17* 13*   BUN 85.9* 93.5* 90.9* 82.7* 60.9* 53.5*   CR 4.21* 4.99* 5.16* 4.91* 3.64* 3.43*   GFRESTIMATED 14* 12* 11* 12* 17* 18*   SVITLANA 9.1 8.1* 8.6* 8.7* 8.1* 8.5*   MAG  --   --   --   --   --  2.0   ALBUMIN 3.0* 2.8* 2.9* 3.2* 3.2* 3.5       Recent Labs   Lab 08/01/23  0439 07/31/23  0429 07/30/23  0441 07/29/23  0436 07/28/23  0448 07/27/23  1222   WBC 5.3 4.8 7.9 10.0 10.8 11.3*   HGB 9.8* 8.7* 9.2* 9.5* 10.2* 10.6*   HCT 29.5* 27.1* 28.2* 30.4* 32.2* 32.0*   MCV 91 93 92 94 94 92   * 130* 126* 123* 135* 154         Cor Angio 7/27/23: (Visipaque 116 ml)  1.   Severe diffuse disease of native vessels with small caliber vessels distally.  2.  Chronic occlusion of native LAD and native left circumflex.  3.  Left main fills small narrow caliber ramus branch only  4.  Right coronary artery has diffuse mild to moderate disease but no severe stenoses.  5.  Patent saphenous vein graft to high diagonal branch which is diffusely diseased. Anastomosis appears normal.  6.  Occluded saphenous vein graft to distal diagonal/lateral wall branches, most likely the acute infarct vessel.  7.  Patent BENITEZ to LAD. Anastomosis appears normal.  8.  Successful PCI saphenous vein graft to distal diagonal/lateral with drug-eluting stent implant x1  9.  Recommend continuing Plavix therapy indefinitely, risk factor management for ASCVD.        Current Facility-Administered Medications:     acetaminophen (TYLENOL) tablet 650 mg, 650 mg, Oral, Q4H PRN, Maikel Tripathi MD, 650 mg at 07/29/23 0108    ALPRAZolam (XANAX) tablet 0.25 mg, 0.25 mg, Oral, At Bedtime, Maikel Tripathi MD, 0.25 mg at 07/31/23 2225    aspirin EC tablet 81 mg, 81 mg, Oral, Daily, Maikel Tripathi MD, 81 mg at 08/01/23 0913    atorvastatin (LIPITOR) tablet 40 mg, 40 mg, Oral, Daily, Maikel Tripathi MD, 40 mg at 08/01/23 0914    benzocaine-menthol (CHLORASEPTIC) 6-10 MG lozenge 1 lozenge, 1 lozenge, Buccal, Q1H PRN, Jahaira Jimenez MD, 1 lozenge at 07/31/23 0704    bisacodyl (DULCOLAX) suppository 10 mg, 10 mg, Rectal, Daily PRN, Maikel Tripathi MD    cefTRIAXone (ROCEPHIN) 2 g vial to attach to  ml bag for ADULTS or NS 50 ml bag for PEDS, 2 g, Intravenous, Q24H, Miryam Quintana MD, 2 g at 07/31/23 1631    clopidogrel (PLAVIX) tablet 75 mg, 75 mg, Oral, Daily, Maikel Tripathi MD, 75 mg at 08/01/23 0914    Continuing antiplatelet from home medication list OR antiplatelet order already placed during this visit, , Does not apply, DOES NOT GO TO Bhumi LEAVITT Kenneth W, MD    Continuing beta blocker from home medication  list OR beta blocker order already placed during this visit, , Does not apply, DOES NOT GO TO Bhumi LEAVITT Kenneth W, MD    Continuing statin from home medication list OR statin order already placed during this visit, , Does not apply, DOES NOT GO TO Bhumi LEAVITT Kenneth W, MD    glucose gel 15-30 g, 15-30 g, Oral, Q15 Min PRN **OR** dextrose 50 % injection 25-50 mL, 25-50 mL, Intravenous, Q15 Min PRN **OR** glucagon injection 1 mg, 1 mg, Subcutaneous, Q15 Min PRN, Maikel Tripathi MD    ezetimibe (ZETIA) tablet 10 mg, 10 mg, Oral, QPM, Maikel Tripathi MD, 10 mg at 07/31/23 2038    ferrous sulfate (FEROSUL) tablet 325 mg, 325 mg, Oral, Daily, Jenny Thomason MD, 325 mg at 08/01/23 0913    fluticasone (FLONASE) 50 MCG/ACT spray 2 spray, 2 spray, Both Nostrils, Daily, Remy Phipps DO, 2 spray at 07/31/23 0837    furosemide (LASIX) injection 40 mg, 40 mg, Intravenous, Daily, Cayden Bhatia MD, 40 mg at 08/01/23 0920    gabapentin (NEURONTIN) capsule 300 mg, 300 mg, Oral, BID, Maikel Tripathi MD, 300 mg at 08/01/23 0914    heparin ANTICOAGULANT injection 5,000 Units, 5,000 Units, Subcutaneous, Q12H, Remy Phipps DO, 5,000 Units at 08/01/23 0915    hydrALAZINE (APRESOLINE) injection 10 mg, 10 mg, Intravenous, Q4H PRN, Maikel Tripathi MD    insulin aspart (NovoLOG) injection (RAPID ACTING), 1-10 Units, Subcutaneous, TID Moise SOUTH John Charles, DO, 6 Units at 08/01/23 1213    insulin aspart (NovoLOG) injection (RAPID ACTING), , Subcutaneous, TID Moise SOUTH John Charles, DO, 23 Units at 08/01/23 1307    insulin glargine (LANTUS PEN) injection 30 Units, 30 Units, Subcutaneous, QA, Maikel Tripathi MD, 30 Units at 08/01/23 0935    levothyroxine (SYNTHROID/LEVOTHROID) tablet 125 mcg, 125 mcg, Oral, Daily, Maikel Tripathi MD, 125 mcg at 08/01/23 0913    lidocaine (LMX4) cream, , Topical, Q1H PRN, Remy Phipps,     lidocaine (LMX4) cream, , Topical, Q1H PRN, Maikel Tripathi MD     lidocaine 1 % 0.1-1 mL, 0.1-1 mL, Other, Q1H PRN, Remy Phipps DO    lidocaine 1 % 0.1-1 mL, 0.1-1 mL, Other, Q1H PRN, Maikel Tripathi MD    [Held by provider] lisinopril (ZESTRIL) tablet 40 mg, 40 mg, Oral, Daily, Maikel Tripathi MD    melatonin tablet 1 mg, 1 mg, Oral, At Bedtime PRN, Maikel Tripathi MD, 1 mg at 07/29/23 0108    metoprolol (LOPRESSOR) injection 5 mg, 5 mg, Intravenous, Q15 Min PRN, Maikel Tripathi MD    metoprolol succinate ER (TOPROL XL) 24 hr tablet 100 mg, 100 mg, Oral, Daily, Kike Valderrama MD, 100 mg at 08/01/23 0914    naloxone (NARCAN) injection 0.2 mg, 0.2 mg, Intravenous, Q2 Min PRN **OR** naloxone (NARCAN) injection 0.4 mg, 0.4 mg, Intravenous, Q2 Min PRN **OR** naloxone (NARCAN) injection 0.2 mg, 0.2 mg, Intramuscular, Q2 Min PRN **OR** naloxone (NARCAN) injection 0.4 mg, 0.4 mg, Intramuscular, Q2 Min PRN, Remy Phipps DO    nitroGLYcerin (NITROSTAT) sublingual tablet 0.4 mg, 0.4 mg, Sublingual, Q5 Min PRN, Maikel Tripathi MD    ondansetron (ZOFRAN ODT) ODT tab 4 mg, 4 mg, Oral, Q6H PRN **OR** ondansetron (ZOFRAN) injection 4 mg, 4 mg, Intravenous, Q6H PRN, Maikel Tripathi MD, 4 mg at 07/28/23 0148    oxyCODONE (ROXICODONE) tablet 5 mg, 5 mg, Oral, Q4H PRN **OR** oxyCODONE (ROXICODONE) tablet 10 mg, 10 mg, Oral, Q4H PRN, Maikel Tripathi MD    Patient is already receiving anticoagulation with heparin, enoxaparin (LOVENOX), warfarin (COUMADIN)  or other anticoagulant medication, , Does not apply, Continuous Bhumi PAYTON Kenneth W, MD    Percutaneous Coronary Intervention orders placed (this is information for BPA alerting), , Does not apply, DOES NOT GO TO Bhumi LEAVITT Kenneth W, MD    sodium chloride (PF) 0.9% PF flush 3 mL, 3 mL, Intracatheter, Q8H, Remy Phipps DO, 3 mL at 08/01/23 1217    sodium chloride (PF) 0.9% PF flush 3 mL, 3 mL, Intracatheter, q1 min prn, Remy Phipps DO    sodium chloride (PF) 0.9% PF flush 3 mL, 3 mL,  Intracatheter, Q8H, Maikel Tripathi MD, 3 mL at 08/01/23 0608    sodium chloride (PF) 0.9% PF flush 3 mL, 3 mL, Intracatheter, q1 min prn, Maikel Tripathi MD      Labs personally reviewed today during this evaluation at 10:54 AM

## 2023-08-01 NOTE — PROGRESS NOTES
[facility]    Referral received  from HERNESTO Angel, for possible IV abx at home.    Benefits verified.   Pt does not have coverage at home for IV antibiotics under his Medicare plan.  The drug would be billed to the Part D plan and supplies would be self-pay.  Based on Ceftriaxone 2g q 24 hours, the total cost is $37.47/day for drug and supplies.    For nursing, the patient should have coverage for home nurse visits if he is homebound.  [facility] would need to secure a home care agency to service this patient.    It appears pt is looking at TCU or outpatient infusion center at this time.  [facility] Liaison will follow along just in case things change and pt ends up needing to discharge to home.    Thank you for the referral.    Iris Rossi, RN, BSN  [facility]  [phone] (Mon through Fri, 8:00 am-5:00 pm)  [phone] (Office)

## 2023-08-02 NOTE — PLAN OF CARE
"  Problem: Plan of Care - These are the overarching goals to be used throughout the patient stay.    Goal: Plan of Care Review  Description: The Plan of Care Review/Shift note should be completed every shift.  The Outcome Evaluation is a brief statement about your assessment that the patient is improving, declining, or no change.  This information will be displayed automatically on your shift note.  Outcome: Met  Goal: Patient-Specific Goal (Individualized)  Description: You can add care plan individualizations to a care plan. Examples of Individualization might be:  \"Parent requests to be called daily at 9am for status\", \"I have a hard time hearing out of my right ear\", or \"Do not touch me to wake me up as it startles me\".  Outcome: Met  Goal: Absence of Hospital-Acquired Illness or Injury  Outcome: Met  Intervention: Identify and Manage Fall Risk  Recent Flowsheet Documentation  Taken 8/2/2023 1400 by Kiarra Alex RN  Safety Promotion/Fall Prevention: activity supervised  Taken 8/2/2023 0830 by Kiarra Alex RN  Safety Promotion/Fall Prevention: activity supervised  Goal: Optimal Comfort and Wellbeing  Outcome: Met  Goal: Readiness for Transition of Care  Outcome: Met     Problem: Risk for Delirium  Goal: Optimal Coping  Outcome: Met  Goal: Improved Behavioral Control  Outcome: Met  Intervention: Prevent and Manage Agitation  Recent Flowsheet Documentation  Taken 8/2/2023 1400 by Kiarra Alex RN  Environment Familiarity/Consistency: daily routine followed  Taken 8/2/2023 0830 by Kiarra Alex RN  Environment Familiarity/Consistency: daily routine followed  Intervention: Minimize Safety Risk  Recent Flowsheet Documentation  Taken 8/2/2023 1400 by Kiarra Alex RN  Enhanced Safety Measures: room near unit station  Taken 8/2/2023 0830 by Kiarra Alex RN  Enhanced Safety Measures: room near unit station  Goal: Improved Attention and Thought Clarity  Outcome: " Met  Intervention: Maximize Cognitive Function  Recent Flowsheet Documentation  Taken 8/2/2023 1400 by Kiarra Alex RN  Sensory Stimulation Regulation: care clustered  Reorientation Measures:   clock in view   calendar in view  Taken 8/2/2023 0830 by Kiarra Alex RN  Sensory Stimulation Regulation: care clustered  Reorientation Measures:   clock in view   calendar in view  Goal: Improved Sleep  Outcome: Met     Problem: Diabetes Comorbidity  Goal: Blood Glucose Level Within Targeted Range  Outcome: Met     Problem: Chronic Kidney Disease  Goal: Optimal Coping with Chronic Illness  Outcome: Met  Goal: Electrolyte Balance  Outcome: Met  Goal: Fluid Balance  Outcome: Met  Goal: Optimal Functional Ability  Outcome: Met  Intervention: Optimize Functional Ability  Recent Flowsheet Documentation  Taken 8/2/2023 1400 by Kiarra Alex RN  Activity Management: activity adjusted per tolerance  Environment Familiarity/Consistency: daily routine followed  Taken 8/2/2023 0830 by Kiarra Alex RN  Activity Management: activity adjusted per tolerance  Environment Familiarity/Consistency: daily routine followed  Goal: Absence of Anemia Signs and Symptoms  Outcome: Met  Goal: Optimal Oral Intake  Outcome: Met  Goal: Acceptable Pain Control  Outcome: Met  Goal: Minimize Renal Failure Effects  Outcome: Met  Intervention: Monitor and Support Renal Function  Recent Flowsheet Documentation  Taken 8/2/2023 1400 by Kiarra Alex RN  Medication Review/Management: medications reviewed  Taken 8/2/2023 0830 by Kiarra Alex RN  Medication Review/Management: medications reviewed     Problem: Cardiac Catheterization (Diagnostic/Interventional)  Goal: Stable Heart Rate and Rhythm  Outcome: Met  Goal: Absence of Bleeding  Outcome: Met  Goal: Absence of Contrast-Induced Injury  Outcome: Met  Goal: Absence of Embolism Signs and Symptoms  Outcome: Met  Goal: Anesthesia/Sedation Recovery  Outcome:  Met  Intervention: Optimize Anesthesia Recovery  Recent Flowsheet Documentation  Taken 8/2/2023 1400 by Kiarra Alex RN  Safety Promotion/Fall Prevention: activity supervised  Reorientation Measures:   clock in view   calendar in view  Taken 8/2/2023 0830 by Kiarra Alex RN  Safety Promotion/Fall Prevention: activity supervised  Reorientation Measures:   clock in view   calendar in view  Goal: Acceptable Pain Control  Outcome: Met  Goal: Absence of Vascular Access Complication  Outcome: Met  Intervention: Prevent Access Site Complications  Recent Flowsheet Documentation  Taken 8/2/2023 1400 by Kiarra Alex RN  Activity Management: activity adjusted per tolerance  Taken 8/2/2023 0830 by Kiarra Alex RN  Activity Management: activity adjusted per tolerance   Goal Outcome Evaluation:       Patient discharged today at around 1400. Paperwork reviewed. Wife to transport.

## 2023-08-02 NOTE — DISCHARGE SUMMARY
[facility]  Hospitalist Discharge Summary      Date of Admission:  7/27/2023  Date of Discharge:  8/2/2023  Discharging Provider: Mode Hare MD  Discharge Service: Hospitalist Service    Discharge Diagnoses   NSTEMI  CAD  HFrEF w/o ADHF  Strep Bacteremia  ASHLEY on CKD Stage 4    Follow-ups Needed After Discharge   Follow-up Appointments     Follow-up and recommended labs and tests       Follow up with primary care provider, Jamal Gaffney, within 7 days for   hospital follow- up.  The following labs/tests are recommended: CBC, BMP.    Your kidney doctor in October.    Follow up with an eye doctor as outpatient for full evaluation.        Restart Lisinopril if creatinine closer to baseline of 2.5-3    Follow-up with cardiology as outpatient    Unresulted Labs Ordered in the Past 30 Days of this Admission       Date and Time Order Name Status Description    7/29/2023 12:01 AM Blood Culture Arm, Right Preliminary         These results will be followed up by PCP    Discharge Disposition   Discharged to home  Condition at discharge: Stable    Hospital Course   72 year old male was on 7/27. During the hospital stay the patient was treated for an NSTEMI and had an angiogram on 7/27 with BINA to SVG-distal diagonal/lateral.  Also had acute hypoxic respiratory failure which resolved.  Additionally was treated for strep dysgalactiae bacteremia with IV ceftriaxone and switched to cefdinir on discharge; was seen by ID.  SHAYLEE was negative for infective endocarditis.  Also had an ASHLEY based on CKD, presumed to be due to contrast-induced nephropathy and was seen by nephrology.  Additionally also was noted to have CVA which is thought to be embolic, had work-up done, and was seen by neurology.  Started on aspirin and Plavix both for CVA and and given recent stenting.    NSTEMI due to restenosis of graft   CAD  HFrEF w/o ADHF  -s/p coronary angiogram 7/27/23 with BINA to SVG-distal diagonal/lateral  -TTE  7/27/23 showed LVEF 45-50%, increased LV filling pressures, inf/apical hypokinesis  -ASA/plavix PTA meds on discharge  -Cardiology eval post discharge  -Statin, BB    Acute hypoxic respitatory failure (Resolved)    Strep dysgalactiae bacteremia: source unclear  -SHAYLEE negative for vegetation  -Was seen by infectious disease  -Ceftriaxone in-house and cefdinir on discharge for approximately 2-week total course of antibiotics    ASHLEY on CKD stage IV  Suspect KARLA  -Follow-up with nephrology as outpatient in October  -[facility] in 1 week to reassess renal function  -If renal function to baseline can resume lisinopril  -Was switched from Lasix to torsemide 40 mg daily on discharge    Acute/subacute stroke  Suspect embolic from angio. No vegetations on SHAYLEE or clot.     Double vision: Horizontal and occurs only with both eyes open not unilateral.  MRI brain acute/subacute infarcts dorsal left pontomedullary junction and left frontal lobe. Questionable punctate focus of infarction in the right para midline cerebellum   -Carotid ultrasound shows moderate plaque in the right ICA and the left ICA shows mild plaque.  Flow in both vertebral arteries antegrade  -Echocardiogram shows 40 to 50% EF.  T\\\\  -ASA, plavix, statin  -Ophtho as outpatient    HLD  -LDL 47 (6/2023)  -lipitor 40 at bedtime     DM 1  -lantus 30 U qam  -novolog 1:3 I:C on discharge (home med)     HTN  -BB  -Lisinopril held on discharge     PAD, s/p PBA left pedal arch, DPA, ISAIAS (6/2023)     Consultations This Hospital Stay   PHARMACY IP CONSULT  NEPHROLOGY IP CONSULT  NEPHROLOGY IP CONSULT  NUTRITION SERVICES ADULT IP CONSULT  CARDIAC REHAB IP CONSULT  PHARMACY IP CONSULT  PHARMACY TO DOSE VANCO  INFECTIOUS DISEASES IP CONSULT  CARE MANAGEMENT / SOCIAL WORK IP CONSULT  PHYSICAL THERAPY ADULT IP CONSULT  WOUND OSTOMY CONTINENCE NURSE  IP CONSULT  PODIATRY IP CONSULT  NEUROLOGY IP STROKE CONSULT  SMOKING CESSATION PROGRAM IP CONSULT    Code Status   Full Code    Time  Spent on this Encounter   I, Mode Hare MD, personally saw the patient today and spent greater than 30 minutes discharging this patient.       Mode Hare MD  [facility]  [address] [place] [id number]  Phone: [phone]  Fax: [phone]  ______________________________________________________________________    Physical Exam   Vital Signs: Temp: 98.7  F (37.1  C) Temp src: Oral BP: 125/60 Pulse: 59   Resp: 18 SpO2: 98 % O2 Device: None (Room air) Oxygen Delivery: 3 LPM  Weight: 221 lbs 6.4 oz    General: No acute distress  CV: +S1/S2, no pitting edema  Respiratory: clear to auscultation bilaterally  GI: soft, nontender, nondistended  Neuro: alert       Primary Care Physician   Jamal Gaffney    Discharge Orders      Ambulatory CARDIAC REHAB REFERRAL      Follow-Up with Cardiology FLOR      Home Care Referral      Reason Lipid Lowering Medications not prescribed from this order set     Activity    Your activity upon discharge: activity as tolerated     Reason for your hospital stay    You were hospitalized with a heart attack for which you had a stent placed. You also developed kidney failure on top of your baseline chronic kidney disease which was recovering on discharge. We will have you check labs in about a week time. They kidney specialist switched you from Lasix to Torsemide as your water pill. Hold off on taking your Lisinopril until you do your blood work and instructed to by your PCP. You were also treated for a Strep blood infection and you will need to take an antibiotic called Cefdinir, once a day starting tomorrow. Lastly during the hospital stay you had a stroke.     Follow-up and recommended labs and tests     Follow up with primary care provider, Jamal Gaffney, within 7 days for hospital follow- up.  The following labs/tests are recommended: CBC, BMP.    Your kidney doctor in October.    Follow up with an eye doctor as outpatient for  full evaluation.     Diet    Follow this diet upon discharge: Moderate Consistent Carb (60 g CHO per Meal) Diet; Low Saturated Fat Diet, Na <2400mg Diet       Significant Results and Procedures   Most Recent 3 CBC's:  Recent Labs   Lab Test 08/01/23 0439 07/31/23 0429 07/30/23 0441   WBC 5.3 4.8 7.9   HGB 9.8* 8.7* 9.2*   MCV 91 93 92   * 130* 126*     Most Recent 3 BMP's:  Recent Labs   Lab Test 08/02/23  1119 08/02/23  0716 08/02/23  0602 08/02/23 0429 08/01/23  0749 08/01/23  0439 07/31/23  0809 07/31/23 0429   NA  --   --   --  138  --  135*  --  128*   POTASSIUM  --   --   --  3.9  --  4.3  --  4.1   CHLORIDE  --   --   --  103  --  101  --  96*   CO2  --   --   --  23  --  23  --  22   BUN  --   --   --  76.5*  --  85.9*  --  93.5*   CR  --   --   --  3.71*  --  4.21*  --  4.99*   ANIONGAP  --   --   --  12  --  11  --  10   SVITLANA  --   --   --  9.1  --  9.1  --  8.1*   * 120* 87 70   < > 167*   < > 227*    < > = values in this interval not displayed.     Most Recent 2 LFT's:  Recent Labs   Lab Test 08/01/23 0439 07/31/23 0429   AST 59* 63*   ALT 45 45   ALKPHOS 49 50   BILITOTAL 0.4 0.4     Most Recent 3 INR's:  Recent Labs   Lab Test 06/21/23  0710   INR 1.00     Most Recent Cholesterol Panel:  Recent Labs   Lab Test 06/16/23  0909   CHOL 96   LDL 47   HDL 34*   TRIG 75     Most Recent TSH and T4:  Recent Labs   Lab Test 06/16/23  0909   TSH 1.68     Most Recent Hemoglobin A1c:  Recent Labs   Lab Test 07/27/23  1222   A1C 7.4*   ,   Results for orders placed or performed during the hospital encounter of 07/27/23   XR Chest Port 1 View    Narrative    EXAM: XR CHEST PORT 1 VIEW  LOCATION: [facility]  DATE: 7/27/2023    INDICATION: Chest pain  COMPARISON: 04/05/2021      Impression    IMPRESSION: Right costophrenic angle is clipped. Poststernotomy changes. Heart is magnified due to projection. Lungs are clear. No overt signs of failure or pneumonia.   MR Brain w/o  Contrast    Narrative    EXAM: MR BRAIN W/O CONTRAST  LOCATION: [facility]  DATE: 7/29/2023    INDICATION: double vision. post coronary angiogram. r o CVA. CKD stage IV.  COMPARISON: None.  TECHNIQUE: Routine multiplanar multisequence head MRI without intravenous contrast.    FINDINGS:  INTRACRANIAL CONTENTS: Small acute to subacute infarction along the dorsal left pontomedullary junction. A tiny, punctate focus of infarction in the left frontal lobe (series 7 image 41). On coronal diffusion weighted imaging there is a questionable area   of punctate infarction versus artifact in the right para midline cerebellum (series 14 image 34). No mass, acute hemorrhage, or extra-axial fluid collections. Scattered nonspecific T2/FLAIR hyperintensities within the cerebral white matter most   consistent with mild chronic microvascular ischemic change. Mild generalized cerebral atrophy. No hydrocephalus. Normal position of the cerebellar tonsils.     SELLA: No abnormality accounting for technique.    OSSEOUS STRUCTURES/SOFT TISSUES: Normal marrow signal. The major intracranial vascular flow voids are maintained.     ORBITS: No abnormality accounting for technique.     SINUSES/MASTOIDS: Moderate mucosal thickening of the left maxillary sinus. No middle ear or mastoid effusion.       Impression    IMPRESSION:  1.  Small foci of acute to subacute infarction involving the dorsal left pontomedullary junction and left frontal lobe.  2.  Questionable punctate focus of infarction in the right para midline cerebellum seen on coronal diffusion weighted imaging only.  3.  Mild age-related changes as above.   US Carotid Bilateral    Narrative    EXAM: US CAROTID BILATERAL  LOCATION: [facility]  DATE: 7/30/2023    INDICATION: cva  COMPARISON: MRI brain 07/29/2023, carotid duplex 11/25/2016  TECHNIQUE: Duplex exam performed utilizing 2D gray-scale imaging, Doppler interrogation with color-flow  and spectral waveform analysis. The percent diameter stenosis is determined using NASCET criteria and Society of Radiologists in Ultrasound Consensus   Criteria.    FINDINGS:    RIGHT: Moderate plaque at the bifurcation. The highest peak systolic velocity in the ICA is 153 cm/sec, consistent with 50-69% stenosis. Previously highest ICA velocity was 122 cm/s. Although there is not severe velocity elevation in the external carotid   artery, there is turbulence at the external carotid ostium and poststenotic waveforms in the distal external carotid, suggestive of significant ostial external carotid stenosis. Previous exams demonstrate significant external carotid velocity   elevations. Antegrade flow within the vertebral artery.     LEFT: Mild plaque at the bifurcation. The peak systolic velocity in the ICA is less than 125 cm/sec, consistent with less than 50% stenosis. Progressive external external carotid velocity elevation, suggestive of of significant external carotid stenosis.   Antegrade flow within the vertebral artery.    VELOCITY CHART:  CCA   Right: 58/17 cm/s   Left: 81/13 cm/s  ICA   Right: 153/36 cm/s   Left: 116/29 cm/s  ECA   Right: 180/23 cm/s   Left: 270 cm/s  ICA/CCA PSV Ratio   Right: 2.6   Left: 1.4      Impression    IMPRESSION:  1.  Right carotid: Moderate plaque remission. Progressive ICA velocity elevation consistent with 50-69% stenosis in the right internal carotid artery. Significant external carotid stenosis.  2.  Left carotid: Mild plaque formation, velocities consistent with less than 50% stenosis in the left internal carotid artery. Significant, progressive external carotid carotid stenosis.  3.  Flow within the vertebral arteries is antegrade.   Echocardiogram Complete     Value    LVEF  45-50%    Narrative    [id number]  [id number]  [id number]  [id number]^BRITTANY^GUME[place] [address]     Name: DENY MONTERO  MRN: [id number]  :  1950  Study Date: 07/27/2023 03:39 PM  Age: 72 yrs  Gender: Male  Patient Location: [facility]  Reason For Study: Acute Myocardial Infarction  Ordering Physician: GUME SOTO  Referring Physician: GUME SOTO  Performed By: BP     BSA: 2.2 m2  Height: 72 in  Weight: 220 lb  HR: 77  BP: 118/55 mmHg  ______________________________________________________________________________  Procedure  Complete Portable Echo Adult. Definity (NDC #20257-587) given intravenously.  Technically difficult study.Extremely difficult acoustic windows despite the  use of contrast for endcardial border definition.  ______________________________________________________________________________  Interpretation Summary     The left ventricle is normal in size.  There is mild concentric left ventricular hypertrophy.  The visual ejection fraction is 45-50%.  Diastolic Doppler findings (E/E' ratio and/or other parameters) suggest left  ventricular filling pressures are increased.  Poor quality images but suspicion of inferior and apical hypokinesis.  Normal right ventricle size and systolic function.  The right ventricular systolic pressure is approximated at 19mmHg plus the  right atrial pressure.  Sinus rhythm was noted.  Technically difficult, suboptimal study. Consider cardiac MRI for improved  image quality.  Prior echo from 3/8/23 was of better quality. Inferior wll was normal on that  study.  ______________________________________________________________________________  Left Ventricle  The left ventricle is normal in size. There is mild concentric left  ventricular hypertrophy. Diastolic Doppler findings (E/E' ratio and/or other  parameters) suggest left ventricular filling pressures are increased. The  visual ejection fraction is 45-50%. Poor quality images but suspicion of  inferior and apical hypokinesis.     Right Ventricle  Normal right ventricle size and systolic function.     Atria  The left atrium is moderately  dilated. Right atrial size is normal.     Mitral Valve  The mitral valve is not well visualized. There is mild to moderate (1-2+)  mitral regurgitation. There is no mitral valve stenosis.     Tricuspid Valve  The tricuspid valve is not well visualized. There is mild (1+) tricuspid  regurgitation. The right ventricular systolic pressure is approximated at  19mmHg plus the right atrial pressure.     Aortic Valve  Mild aortic valve calcification is present. The aortic valve is not well  visualized. No aortic regurgitation is present. No aortic stenosis is present.     Pulmonic Valve  The pulmonic valve is not well visualized. There is mild (1+) pulmonic  valvular regurgitation. Right ventricular diastolic pressure is approximated  at 8mmHg plus the right atrial pressure. There is no pulmonic valvular  stenosis.     Vessels  The aorta root is normal. IVC diameter and respiratory changes fall into an  intermediate range suggesting an RA pressure of 8 mmHg.     Pericardium  There is no pericardial effusion.     Rhythm  Sinus rhythm was noted.  ______________________________________________________________________________  MMode/2D Measurements & Calculations  IVSd: 1.2 cm     LVIDd: 5.4 cm  LVIDs: 4.8 cm  LVPWd: 1.1 cm  FS: 11.2 %  LV mass(C)d: 242.0 grams  LV mass(C)dI: 109.1 grams/m2  Ao root diam: 2.9 cm  LA dimension: 4.9 cm  LA/Ao: 1.7  LVOT diam: 2.3 cm  LVOT area: 4.2 cm2     EF(MOD-bp): 37.1 %  LA Volume Indexed (AL/bp): 38.1 ml/m2  RWT: 0.41  TAPSE: 1.8 cm     Time Measurements  MM HR: 77.0 BPM     Doppler Measurements & Calculations  MV E max raul: 99.0 cm/sec  MV A max raul: 55.7 cm/sec  MV E/A: 1.8  MV dec time: 0.20 sec  Ao V2 max: 105.0 cm/sec  Ao max P.0 mmHg  Ao V2 mean: 79.6 cm/sec  Ao mean PG: 3.0 mmHg  Ao V2 VTI: 20.4 cm  ELIZABETH(I,D): 3.9 cm2  ELIZABETH(V,D): 3.8 cm2  LV V1 max PG: 3.6 mmHg  LV V1 max: 95.3 cm/sec  LV V1 VTI: 19.1 cm  SV(LVOT): 79.4 ml  SI(LVOT): 35.8 ml/m2  PA acc time: 0.09 sec  PI end-d  sagar: 138.0 cm/sec  TR max sagar: 217.0 cm/sec  TR max P.8 mmHg  AV Sagar Ratio (DI): 0.91  ELIZABETH Index (cm2/m2): 1.8  E/E': 16.2  E/E' av.3     Lateral E/e': 16.4  Medial E/e': 18.2  Peak E' Sagar: 6.1 cm/sec  RV S Sagar: 7.8 cm/sec     ______________________________________________________________________________  Report approved by: Davon Allen 2023 04:59 PM         Echocardiogram SHAYLEE     Value    LVEF  50-55%    Narrative    [id number]  [facility]  [id number]  [id number]^APURVA^NAY^[place] [address]     Name: DENY MONTERO  MRN: [id number]  : 1950  Study Date: 2023 10:42 AM  Age: 72 yrs  Gender: Male  Patient Location: [facility]  Reason For Study: CVA, Endocarditis  Ordering Physician: NAY MIXON  Referring Physician: GUME SOTO  Performed By: FIDENCIO-BP     BSA: 2.3 m2  Height: 72 in  Weight: 231 lb  HR: 64  BP: 149/69 mmHg  ______________________________________________________________________________  Procedure  Complete SHAYLEE Adult.  ______________________________________________________________________________  Interpretation Summary     The left ventricle is normal in size.  The visual ejection fraction is 50-55%.  No regional wall motion abnormalities noted.  Normal right ventricle size and systolic function.  There is no color Doppler evidence of a PFO.  There is moderate (2+) mitral regurgitation.  The right ventricular systolic pressure is approximated at 46mmHg plus the  right atrial pressure.  No valvular vegetations noted.  ______________________________________________________________________________  SHAYLEE  Consent to the procedure was obtained prior to sedation. Prior to the exam,  the oral cavity was checked and no overcrowding was noted. The transesophageal  probe was passed without difficulty. There were no complications associated  with this procedure. Patient was sedated using Versed 1 mg. The heart  rate,  respiratory rate, oxygen saturations, blood pressure, and response to care  were monitored throughout the procedure with the assistance of the nurse.  Patient was sedated using Fentanyl 50 mcg. 15 ml viscous Lidocaine, 0.5 ml  Benzocaine spray. I determined this patient to be an appropriate candidate for  the planned sedation and procedure and have reassessed the patient immediately  prior to sedation and procedure. Total sedation time: 15 minutes of continuous  bedside 1:1 monitoring.     Left Ventricle  The left ventricle is normal in size. The visual ejection fraction is 50-55%.  No regional wall motion abnormalities noted.     Right Ventricle  Normal right ventricle size and systolic function.     Atria  The left atrium is mild to moderately dilated. A contrast injection (Bubble  Study) was performed that was negative for flow across the interatrial septum.  There is no color Doppler evidence of a PFO. No thrombus is detected in the  left atrial appendage.     Mitral Valve  The mitral valve leaflets are mildly thickened. There is moderate (2+) mitral  regurgitation. There is no mitral valve stenosis.     Tricuspid Valve  The tricuspid valve is normal in structure and function. There is mild (1+)  tricuspid regurgitation. The right ventricular systolic pressure is  approximated at 46mmHg plus the right atrial pressure.     Aortic Valve  There is moderate trileaflet aortic sclerosis. There is trace aortic  regurgitation. No aortic stenosis is present.     Pulmonic Valve  The pulmonic valve is not well visualized. There is mild (1+) pulmonic  valvular regurgitation.     Vessels  Inferior vena cava not well visualized for estimation of right atrial  pressure.     Pericardial/Pleural  No pericardial thickening.     Rhythm  The rhythm was sinus bradycardia.  ______________________________________________________________________________  Doppler Measurements & Calculations  MR PISA: 2.3 cm2  MR ERO: 0.15 cm2  MR  volume: 29.1 ml  TR max raul: 338.0 cm/sec  TR max P.7 mmHg     ______________________________________________________________________________  Report approved by: Davon Allen 2023 12:37 PM         Cardiac Catheterization    Addendum: 2023        Ost Cx to Prox Cx lesion is 100% stenosed.    Prox LAD lesion is 100% stenosed.    Ramus lesion is 50% stenosed.    Prox RCA to Dist RCA lesion is 30% stenosed.    Prox Graft to Mid Graft lesion is 100% stenosed.    1.  Severe diffuse disease of native vessels with small caliber vessels distally.  2.  Chronic occlusion of native LAD and native left circumflex.  3.  Left main fills small narrow caliber ramus branch only  4.  Right coronary artery has diffuse mild to moderate disease but no severe stenoses.  5.  Patent saphenous vein graft to high distal lateral wall branch (? Diagonal or marginal branch) which is diffusely diseased. Anastomosis appears normal.  6.  Occluded saphenous vein graft to distal diagonal/lateral wall branches, most likely the acute infarct vessel.  7.  Patent BENITEZ to LAD. Anastomosis appears normal.  8.  Successful PCI saphenous vein graft to distal diagonal/lateral with drug-eluting stent implant x1  9.  Recommend continuing Plavix therapy indefinitely, risk factor management for ASCVD.      Narrative      Ost Cx to Prox Cx lesion is 100% stenosed.    Prox LAD lesion is 100% stenosed.    Ramus lesion is 50% stenosed.    Prox RCA to Dist RCA lesion is 30% stenosed.    Prox Graft to Mid Graft lesion is 100% stenosed.    1.  Severe diffuse disease of native vessels with small caliber vessels   distally.  2.  Chronic occlusion of native LAD and native left circumflex.  3.  Left main fills small narrow caliber ramus branch only  4.  Right coronary artery has diffuse mild to moderate disease but no   severe stenoses.  5.  Patent saphenous vein graft to high diagonal branch which is diffusely   diseased. Anastomosis appears  normal.  6.  Occluded saphenous vein graft to distal diagonal/lateral wall   branches, most likely the acute infarct vessel.  7.  Patent BENITEZ to LAD. Anastomosis appears normal.  8.  Successful PCI saphenous vein graft to distal diagonal/lateral with   drug-eluting stent implant x1  9.  Recommend continuing Plavix therapy indefinitely, risk factor   management for ASCVD.         Discharge Medications   Current Discharge Medication List        START taking these medications    Details   cefdinir (OMNICEF) 300 MG capsule Take 1 capsule (300 mg) by mouth daily for 9 days  Qty: 9 capsule, Refills: 0    Associated Diagnoses: Streptococcal bacteremia      ferrous sulfate (FEROSUL) 325 (65 Fe) MG tablet Take 1 tablet (325 mg) by mouth daily  Qty: 30 tablet, Refills: 1    Associated Diagnoses: Iron deficiency anemia, unspecified iron deficiency anemia type      fluticasone (FLONASE) 50 MCG/ACT nasal spray Spray 2 sprays into both nostrils daily For nasal congestion  Qty: 16 g, Refills: 1    Associated Diagnoses: Obstructive sleep apnea      oxyCODONE (ROXICODONE) 5 MG tablet Take 0.5 tablets (2.5 mg) by mouth every 6 hours as needed for moderate to severe pain  Qty: 8 tablet, Refills: 0    Associated Diagnoses: Pain      torsemide 40 MG TABS Take 40 mg by mouth daily  Qty: 30 tablet, Refills: 1    Associated Diagnoses: CKD (chronic kidney disease) stage 4, GFR 15-29 ml/min (H)           CONTINUE these medications which have CHANGED    Details   ALPRAZolam (XANAX) 0.5 MG tablet Take 1 tablet (0.5 mg) by mouth At Bedtime  Qty: 6 tablet, Refills: 0    Associated Diagnoses: Anxiety      aspirin 81 MG EC tablet Take 1 tablet (81 mg) by mouth daily Start tomorrow.  Qty: 30 tablet, Refills: 3    Associated Diagnoses: NSTEMI (non-ST elevated myocardial infarction) (H)      clopidogrel (PLAVIX) 75 MG tablet Take 1 tablet (75 mg) by mouth daily Dose to start tomorrow.  Qty: 90 tablet, Refills: 3    Associated Diagnoses: NSTEMI  (non-ST elevated myocardial infarction) (H)      lisinopril (ZESTRIL) 40 MG tablet Take 1 tablet (40 mg) by mouth daily Hold off on taking until labs are done and advised to resume by PCP or nephrology    Associated Diagnoses: Renovascular hypertension           CONTINUE these medications which have NOT CHANGED    Details   atorvastatin (LIPITOR) 40 MG tablet Take 40 mg by mouth daily      ezetimibe (ZETIA) 10 MG tablet Take 1 tablet (10 mg) by mouth daily  Qty: 90 tablet, Refills: 4    Associated Diagnoses: Coronary artery disease due to lipid rich plaque      fish oil-omega-3 fatty acids 1000 MG capsule Take 1 capsule by mouth daily      gabapentin (NEURONTIN) 300 MG capsule [GABAPENTIN (NEURONTIN) 300 MG CAPSULE] Take 300 mg by mouth 3 (three) times a day.             HUMALOG 100 unit/mL injection Inject Subcutaneous 3 times daily (before meals) Carb count: 3 g carb : 1 unit insulin      insulin glargine (LANTUS) 100 unit/mL injection [INSULIN GLARGINE (LANTUS) 100 UNIT/ML INJECTION] Inject 30 Units under the skin every morning.       levothyroxine (SYNTHROID/LEVOTHROID) 125 MCG tablet Take 125 mcg by mouth daily      metoprolol succinate ER (TOPROL XL) 50 MG 24 hr tablet Take 1 tablet (50 mg) by mouth daily  Qty: 90 tablet, Refills: 2    Associated Diagnoses: CAD (coronary artery disease)      multivitamin with iron (ONE DAILY WITH IRON) Tab tablet [MULTIVITAMIN WITH IRON (ONE DAILY WITH IRON) TAB TABLET] Take 1 tablet by mouth daily.      nitroGLYcerin (NITROSTAT) 0.4 MG sublingual tablet Place 1 tablet (0.4 mg) under the tongue every 5 minutes as needed  Qty: 25 tablet, Refills: 3    Associated Diagnoses: Coronary artery disease due to lipid rich plaque      TRIAMCINOLONE ACETONIDE (NASACORT NASL) [TRIAMCINOLONE ACETONIDE (NASACORT NASL)] 1 spray into each nostril daily.      VITAMIN D3 2,000 unit capsule [VITAMIN D3 2,000 UNIT CAPSULE] Take 2,000 Units by mouth daily.  Refills: 3      Continuous Blood Gluc  Sensor (FREESTYLE DAVID 14 DAY SENSOR) [facility] [place]       TECHLITE PEN NEEDLES 31G X 5 MM miscellaneous            STOP taking these medications       furosemide (LASIX) 20 MG tablet Comments:   Reason for Stopping:             Allergies   Allergies   Allergen Reactions    Hydrochlorothiazide Unknown    Levofloxacin Diarrhea

## 2023-08-02 NOTE — PLAN OF CARE
"  Problem: Plan of Care - These are the overarching goals to be used throughout the patient stay.    Goal: Plan of Care Review  Description: The Plan of Care Review/Shift note should be completed every shift.  The Outcome Evaluation is a brief statement about your assessment that the patient is improving, declining, or no change.  This information will be displayed automatically on your shift note.  Outcome: Progressing  Goal: Patient-Specific Goal (Individualized)  Description: You can add care plan individualizations to a care plan. Examples of Individualization might be:  \"Parent requests to be called daily at 9am for status\", \"I have a hard time hearing out of my right ear\", or \"Do not touch me to wake me up as it startles me\".  Outcome: Progressing  Goal: Absence of Hospital-Acquired Illness or Injury  Outcome: Progressing  Intervention: Identify and Manage Fall Risk  Recent Flowsheet Documentation  Taken 8/2/2023 0400 by Imtiaz Cai, RN  Safety Promotion/Fall Prevention:   room near nurse's station   activity supervised   mobility aid in reach   nonskid shoes/slippers when out of bed   safety round/check completed   room organization consistent   room door open   patient and family education   lighting adjusted   increase visualization of patient   increased rounding and observation   clutter free environment maintained  Taken 8/2/2023 0000 by Imtiaz Cai, RN  Safety Promotion/Fall Prevention:   room near nurse's station   activity supervised   mobility aid in reach   nonskid shoes/slippers when out of bed   safety round/check completed   room organization consistent   room door open   patient and family education   lighting adjusted   increase visualization of patient   increased rounding and observation   clutter free environment maintained  Taken 8/1/2023 2000 by Imtiaz Cai, RN  Safety Promotion/Fall Prevention:   room near nurse's station   activity supervised   mobility " aid in reach   nonskid shoes/slippers when out of bed   safety round/check completed   room organization consistent   room door open   patient and family education   lighting adjusted   increase visualization of patient   increased rounding and observation   clutter free environment maintained  Intervention: Prevent Skin Injury  Recent Flowsheet Documentation  Taken 8/2/2023 0400 by Imtiaz Cai RN  Body Position: position changed independently  Taken 8/2/2023 0000 by Imtiaz Cai RN  Body Position: position changed independently  Taken 8/1/2023 2000 by Imtiaz Cai RN  Body Position: position changed independently  Goal: Optimal Comfort and Wellbeing  Outcome: Progressing  Goal: Readiness for Transition of Care  Outcome: Progressing   Goal Outcome Evaluation:    Cardiac: SB/NSR with 1st AVB and prolonged QT.  Rate 50-60's.  Respiratory:  Rate: 16-20, non-labored.  Sat's: Maintaining mid 90's at RA.  LS: Diminished in the bases, bilat.  Neuro:  WNL.  GI:  BS: Present X4 quadrants.  Passing flatus.  :  Voiding.  Good UOP (see I/O).  Pain:  Denies.  Ambulation: Up with SBA X1.  Labs:  AM labs pending.  Plan: IV Abx.  Monitor renal function.

## 2023-08-02 NOTE — PROGRESS NOTES
INFECTIOUS DISEASE FOLLOW UP NOTE      ASSESSMENT:  Non-ST elevation MI, mild LV dysfunction, status PCI 7/27/2023  Status post CABG 18 years ago, diffusely diseased distal vessels, LIMA to LAD, patent SVG to diagonal, SVG to OM  Peripheral arterial disease, vascular has been following in the past  Streptococcal bacteremia--Streptococcus dysgalactiae (Group C Streptococcus) 7/27/2023. Cultures repeated 7/28 prior to antibiotics -- also positive. Source often from skin, can seed bones, joints, valves.   Fever resolving.  Blurred vision, getting evaluation  Type 1 diabetes mellitus, chronic kidney disease stage IV  History of diabetic foot infection, transmetatarsal amputation in 2020, lower extremity wound, patient followed by Dr. Tinoco at [facility] podiatry  Diagnosis: 7/26/2023 -- had ulcer debrided in clinicon 7/26/23 -- no sign of infection at that time reported.   S/P Sinus tract left foot at TMA site with I & D and flowable , DOS 11/8/2022, healed  Diabetes with peripheral neuropathy  transmetatarsal amputation with varus deformity left foot  Deshpande 1 ulcer plantar lateral foot without signs of infection  PAD --S/P angioplasty ([place] system)--6/2023    Could be that he became bacteremic from wound from debridement 7/26 prior to admission. Wound without external sign of infection this admission.   Brain MRI shows acute/subacute stroke     PLAN:  cefdinir 300mg PO every day (due to renal fxn) as the Discharge abx, ten days.    Sign off thanks    ANUJ Lyman MD  [facility]  [phone] [facility]    ______________________________________________________________________    SUBJECTIVE / INTERVAL HISTORY: Eating good.  Kidneys slowly better.  Wife here.  Will put on oral abx today see orders  ROS: All other systems negative except as listed above.        OBJECTIVE:  /60 (BP Location: Left arm)   Pulse 59   Temp 98.7  F (37.1  C) (Oral)   Resp 18   Ht 1.829 m (6')   Wt  100.4 kg (221 lb 6.4 oz)   SpO2 98%   BMI 30.03 kg/m         Vital Signs  Temp: 98.1  F (36.7  C)  Temp src: Oral  Resp: 18  Pulse: 81  Pulse Rate Source: Monitor  BP: 118/57  BP Location: Left arm    Temp (24hrs), Av  F (37.2  C), Min:98.1  F (36.7  C), Max:99.9  F (37.7  C)      GEN: No acute distress.    RESPIRATORY:  Normal breathing pattern. Clear to auscultation  CARDIOVASCULAR:  Regular rate and rhythm. Normal S1 and S2. No murmur, click, gallop or rub. No dependent edema. No excess JVD.  ABDOMEN:  Soft, normal bowel sounds, non-tender, no masses  Angiogram access site R groin not tender, dressing in place.   EXTREMITIES: No edema. L foot wrapped.   SKIN/HAIR/NAILS:  No rashes  IV: peripheral        Antibiotics:  Ceftriaxone -  Vancomycin 7/28 x1 (2g)  Pip/tazo 7/28 x1    Pertinent labs:    Recent Labs   Lab 23  0439 23  0429 23  0441   WBC 5.3 4.8 7.9   HGB 9.8* 8.7* 9.2*   HCT 29.5* 27.1* 28.2*   * 130* 126*          Recent Labs   Lab 23  0429 23  0439 23  0429    135* 128*   CO2    BUN 76.5* 85.9* 93.5*         Lab Results   Component Value Date    ALT 45 2023    AST 59 (H) 2023    ALKPHOS 49 2023         MICROBIOLOGY DATA:   blood 1 of 2 sets Group C strep   blood both sets collected prior to antibiotics -- Group C strep    RADIOLOGY:  Echocardiogram SHAYLEE    Result Date: 2023  [id number] [facility] [id number]^APURVA^[place]Y^[place] [address]  Name: DENY MONTERO MRN: [id number] : 1950 Study Date: 2023 10:42 AM Age: 72 yrs Gender: Male Patient Location: [facility] Reason For Study: CVA, Endocarditis Ordering Physician: NAY MIXON Referring Physician: GUME SOTO Performed By: TJ-BP  BSA: 2.3 m2 Height: 72 in Weight: 231 lb HR: 64 BP: 149/69 mmHg ______________________________________________________________________________ Procedure Complete  SHAYLEE Adult. ______________________________________________________________________________ Interpretation Summary  The left ventricle is normal in size. The visual ejection fraction is 50-55%. No regional wall motion abnormalities noted. Normal right ventricle size and systolic function. There is no color Doppler evidence of a PFO. There is moderate (2+) mitral regurgitation. The right ventricular systolic pressure is approximated at 46mmHg plus the right atrial pressure. No valvular vegetations noted. ______________________________________________________________________________ SHAYLEE Consent to the procedure was obtained prior to sedation. Prior to the exam, the oral cavity was checked and no overcrowding was noted. The transesophageal probe was passed without difficulty. There were no complications associated with this procedure. Patient was sedated using Versed 1 mg. The heart rate, respiratory rate, oxygen saturations, blood pressure, and response to care were monitored throughout the procedure with the assistance of the nurse. Patient was sedated using Fentanyl 50 mcg. 15 ml viscous Lidocaine, 0.5 ml Benzocaine spray. I determined this patient to be an appropriate candidate for the planned sedation and procedure and have reassessed the patient immediately prior to sedation and procedure. Total sedation time: 15 minutes of continuous bedside 1:1 monitoring.  Left Ventricle The left ventricle is normal in size. The visual ejection fraction is 50-55%. No regional wall motion abnormalities noted.  Right Ventricle Normal right ventricle size and systolic function.  Atria The left atrium is mild to moderately dilated. A contrast injection (Bubble Study) was performed that was negative for flow across the interatrial septum. There is no color Doppler evidence of a PFO. No thrombus is detected in the left atrial appendage.  Mitral Valve The mitral valve leaflets are mildly thickened. There is moderate (2+) mitral  regurgitation. There is no mitral valve stenosis.  Tricuspid Valve The tricuspid valve is normal in structure and function. There is mild (1+) tricuspid regurgitation. The right ventricular systolic pressure is approximated at 46mmHg plus the right atrial pressure.  Aortic Valve There is moderate trileaflet aortic sclerosis. There is trace aortic regurgitation. No aortic stenosis is present.  Pulmonic Valve The pulmonic valve is not well visualized. There is mild (1+) pulmonic valvular regurgitation.  Vessels Inferior vena cava not well visualized for estimation of right atrial pressure.  Pericardial/Pleural No pericardial thickening.  Rhythm The rhythm was sinus bradycardia. ______________________________________________________________________________ Doppler Measurements & Calculations MR PISA: 2.3 cm2 MR ERO: 0.15 cm2 MR volume: 29.1 ml TR max raul: 338.0 cm/sec TR max P.7 mmHg  ______________________________________________________________________________ Report approved by: Davon Allen 2023 12:37 PM       US Carotid Bilateral    Result Date: 2023  EXAM: US CAROTID BILATERAL LOCATION: [facility] DATE: 2023 INDICATION: cva COMPARISON: MRI brain 2023, carotid duplex 2016 TECHNIQUE: Duplex exam performed utilizing 2D gray-scale imaging, Doppler interrogation with color-flow and spectral waveform analysis. The percent diameter stenosis is determined using NASCET criteria and Society of Radiologists in Ultrasound Consensus Criteria. FINDINGS: RIGHT: Moderate plaque at the bifurcation. The highest peak systolic velocity in the ICA is 153 cm/sec, consistent with 50-69% stenosis. Previously highest ICA velocity was 122 cm/s. Although there is not severe velocity elevation in the external carotid  artery, there is turbulence at the external carotid ostium and poststenotic waveforms in the distal external carotid, suggestive of significant ostial  external carotid stenosis. Previous exams demonstrate significant external carotid velocity elevations. Antegrade flow within the vertebral artery. LEFT: Mild plaque at the bifurcation. The peak systolic velocity in the ICA is less than 125 cm/sec, consistent with less than 50% stenosis. Progressive external external carotid velocity elevation, suggestive of of significant external carotid stenosis.  Antegrade flow within the vertebral artery. VELOCITY CHART: CCA   Right: 58/17 cm/s   Left: 81/13 cm/s ICA   Right: 153/36 cm/s   Left: 116/29 cm/s ECA   Right: 180/23 cm/s   Left: 270 cm/s ICA/CCA PSV Ratio   Right: 2.6   Left: 1.4     IMPRESSION: 1.  Right carotid: Moderate plaque remission. Progressive ICA velocity elevation consistent with 50-69% stenosis in the right internal carotid artery. Significant external carotid stenosis. 2.  Left carotid: Mild plaque formation, velocities consistent with less than 50% stenosis in the left internal carotid artery. Significant, progressive external carotid carotid stenosis. 3.  Flow within the vertebral arteries is antegrade.    MR Brain w/o Contrast    Result Date: 7/29/2023  EXAM: MR BRAIN W/O CONTRAST LOCATION: [facility] DATE: 7/29/2023 INDICATION: double vision. post coronary angiogram. r o CVA. CKD stage IV. COMPARISON: None. TECHNIQUE: Routine multiplanar multisequence head MRI without intravenous contrast. FINDINGS: INTRACRANIAL CONTENTS: Small acute to subacute infarction along the dorsal left pontomedullary junction. A tiny, punctate focus of infarction in the left frontal lobe (series 7 image 41). On coronal diffusion weighted imaging there is a questionable area  of punctate infarction versus artifact in the right para midline cerebellum (series 14 image 34). No mass, acute hemorrhage, or extra-axial fluid collections. Scattered nonspecific T2/FLAIR hyperintensities within the cerebral white matter most consistent with mild chronic  microvascular ischemic change. Mild generalized cerebral atrophy. No hydrocephalus. Normal position of the cerebellar tonsils. SELLA: No abnormality accounting for technique. OSSEOUS STRUCTURES/SOFT TISSUES: Normal marrow signal. The major intracranial vascular flow voids are maintained. ORBITS: No abnormality accounting for technique. SINUSES/MASTOIDS: Moderate mucosal thickening of the left maxillary sinus. No middle ear or mastoid effusion.     IMPRESSION: 1.  Small foci of acute to subacute infarction involving the dorsal left pontomedullary junction and left frontal lobe. 2.  Questionable punctate focus of infarction in the right para midline cerebellum seen on coronal diffusion weighted imaging only. 3.  Mild age-related changes as above.    Cardiac Catheterization    Addendum Date: 7/28/2023      Ost Cx to Prox Cx lesion is 100% stenosed.   Prox LAD lesion is 100% stenosed.   Ramus lesion is 50% stenosed.   Prox RCA to Dist RCA lesion is 30% stenosed.   Prox Graft to Mid Graft lesion is 100% stenosed. 1.  Severe diffuse disease of native vessels with small caliber vessels distally. 2.  Chronic occlusion of native LAD and native left circumflex. 3.  Left main fills small narrow caliber ramus branch only 4.  Right coronary artery has diffuse mild to moderate disease but no severe stenoses. 5.  Patent saphenous vein graft to high distal lateral wall branch (? Diagonal or marginal branch) which is diffusely diseased. Anastomosis appears normal. 6.  Occluded saphenous vein graft to distal diagonal/lateral wall branches, most likely the acute infarct vessel. 7.  Patent BENITEZ to LAD. Anastomosis appears normal. 8.  Successful PCI saphenous vein graft to distal diagonal/lateral with drug-eluting stent implant x1 9.  Recommend continuing Plavix therapy indefinitely, risk factor management for ASCVD.    Result Date: 7/28/2023    Ost Cx to Prox Cx lesion is 100% stenosed.   Prox LAD lesion is 100% stenosed.   Ramus  lesion is 50% stenosed.   Prox RCA to Dist RCA lesion is 30% stenosed.   Prox Graft to Mid Graft lesion is 100% stenosed. 1.  Severe diffuse disease of native vessels with small caliber vessels distally. 2.  Chronic occlusion of native LAD and native left circumflex. 3.  Left main fills small narrow caliber ramus branch only 4.  Right coronary artery has diffuse mild to moderate disease but no severe stenoses. 5.  Patent saphenous vein graft to high diagonal branch which is diffusely diseased. Anastomosis appears normal. 6.  Occluded saphenous vein graft to distal diagonal/lateral wall branches, most likely the acute infarct vessel. 7.  Patent BENITEZ to LAD. Anastomosis appears normal. 8.  Successful PCI saphenous vein graft to distal diagonal/lateral with drug-eluting stent implant x1 9.  Recommend continuing Plavix therapy indefinitely, risk factor management for ASCVD.     Echocardiogram Complete    Result Date: 2023  [id number]^BRITTANY[place] [address]  Name: DENY MONTERO MRN: [id number] : 1950 Study Date: 2023 03:39 PM Age: 72 yrs Gender: Male Patient Location: [facility] Reason For Study: Acute Myocardial Infarction Ordering Physician: GUME SOTO Referring Physician: GUME SOTO Performed By: BP  BSA: 2.2 m2 Height: 72 in Weight: 220 lb HR: 77 BP: 118/55 mmHg ______________________________________________________________________________ Procedure Complete Portable Echo Adult. Definity (NDC #89727-525) given intravenously. Technically difficult study.Extremely difficult acoustic windows despite the use of contrast for endcardial border definition. ______________________________________________________________________________ Interpretation Summary  The left ventricle is normal in size. There is mild concentric left ventricular hypertrophy. The visual ejection fraction is 45-50%. Diastolic Doppler findings  (E/E' ratio and/or other parameters) suggest left ventricular filling pressures are increased. Poor quality images but suspicion of inferior and apical hypokinesis. Normal right ventricle size and systolic function. The right ventricular systolic pressure is approximated at 19mmHg plus the right atrial pressure. Sinus rhythm was noted. Technically difficult, suboptimal study. Consider cardiac MRI for improved image quality. Prior echo from 3/8/23 was of better quality. Inferior wll was normal on that study. ______________________________________________________________________________ Left Ventricle The left ventricle is normal in size. There is mild concentric left ventricular hypertrophy. Diastolic Doppler findings (E/E' ratio and/or other parameters) suggest left ventricular filling pressures are increased. The visual ejection fraction is 45-50%. Poor quality images but suspicion of inferior and apical hypokinesis.  Right Ventricle Normal right ventricle size and systolic function.  Atria The left atrium is moderately dilated. Right atrial size is normal.  Mitral Valve The mitral valve is not well visualized. There is mild to moderate (1-2+) mitral regurgitation. There is no mitral valve stenosis.  Tricuspid Valve The tricuspid valve is not well visualized. There is mild (1+) tricuspid regurgitation. The right ventricular systolic pressure is approximated at 19mmHg plus the right atrial pressure.  Aortic Valve Mild aortic valve calcification is present. The aortic valve is not well visualized. No aortic regurgitation is present. No aortic stenosis is present.  Pulmonic Valve The pulmonic valve is not well visualized. There is mild (1+) pulmonic valvular regurgitation. Right ventricular diastolic pressure is approximated at 8mmHg plus the right atrial pressure. There is no pulmonic valvular stenosis.  Vessels The aorta root is normal. IVC diameter and respiratory changes fall into an intermediate range suggesting  an RA pressure of 8 mmHg.  Pericardium There is no pericardial effusion.  Rhythm Sinus rhythm was noted. ______________________________________________________________________________ MMode/2D Measurements & Calculations IVSd: 1.2 cm  LVIDd: 5.4 cm LVIDs: 4.8 cm LVPWd: 1.1 cm FS: 11.2 % LV mass(C)d: 242.0 grams LV mass(C)dI: 109.1 grams/m2 Ao root diam: 2.9 cm LA dimension: 4.9 cm LA/Ao: 1.7 LVOT diam: 2.3 cm LVOT area: 4.2 cm2  EF(MOD-bp): 37.1 % LA Volume Indexed (AL/bp): 38.1 ml/m2 RWT: 0.41 TAPSE: 1.8 cm  Time Measurements MM HR: 77.0 BPM  Doppler Measurements & Calculations MV E max sagar: 99.0 cm/sec MV A max sagar: 55.7 cm/sec MV E/A: 1.8 MV dec time: 0.20 sec Ao V2 max: 105.0 cm/sec Ao max P.0 mmHg Ao V2 mean: 79.6 cm/sec Ao mean PG: 3.0 mmHg Ao V2 VTI: 20.4 cm ELIZABETH(I,D): 3.9 cm2 ELIZABETH(V,D): 3.8 cm2 LV V1 max PG: 3.6 mmHg LV V1 max: 95.3 cm/sec LV V1 VTI: 19.1 cm SV(LVOT): 79.4 ml SI(LVOT): 35.8 ml/m2 PA acc time: 0.09 sec PI end-d sagar: 138.0 cm/sec TR max sagar: 217.0 cm/sec TR max P.8 mmHg AV Sagar Ratio (DI): 0.91 ELIZABETH Index (cm2/m2): 1.8 E/E': 16.2 E/E' av.3  Lateral E/e': 16.4 Medial E/e': 18.2 Peak E' Sagar: 6.1 cm/sec RV S Sagar: 7.8 cm/sec  ______________________________________________________________________________ Report approved by: Davon Allen 2023 04:59 PM       XR Chest Port 1 View    Result Date: 2023  EXAM: XR CHEST PORT 1 VIEW LOCATION: [facility] DATE: 2023 INDICATION: Chest pain COMPARISON: 2021     IMPRESSION: Right costophrenic angle is clipped. Poststernotomy changes. Heart is magnified due to projection. Lungs are clear. No overt signs of failure or pneumonia.    US Low Ext Arterial Dop Seg Pres w/o Exercise    Result Date: 2023  Table formatting from the original result was not included. BILATERAL RESTING ANKLE-BRACHIAL INDICES (TRAE'S) (Date: 23) Indication: Surveillance TRAE's: PAD. S/P IR Left Balloon Angiogram (  Pedal Arch, DPA, ISAIAS) done 6/21/2023. Hx: Left Transmetatarsal Amputation done 9/3/2021. Previous: 2/7/2023 Bilateral ANDREINA/ TRAE's w/o, 4/7/2023 IR Left Angiogram. History: Previous Smoker, Hypertension, Diabetic, Hyperlipidemia, PAD, and Angioplasty  Resting TRAE's          Right: mmHg Index     Brachial: 158  Ankle-(PT): 157 0.99 Ankle-(DP): >254 NC          Digit: 47 0.30               Left: mmHg Index     Brachial: 157  Ankle-(PT): >254 NC Ankle-(DP): >254 NC          Digit: AMP N/A Resting ankle-brachial index of 0.99 on the right. Toe Pressures of 47 mmHg and TBI of 0.30 Resting ankle-brachial index is non compressible on the left.  VPR WAVEFORMS: The right volume plethysmography waveforms are mildly abnormal at the lower thigh level, mildly abnormal at the upper calf level and mildly abnormal at the ankle. The left volume plethysmography waveforms are mildly abnormal at the lower thigh level, mildly abnormal at the upper calf level and mildly abnormal at the ankle.  Impression:  1. RIGHT LOWER EXTREMITY: TRAE is Non-diagnostic indicating vessel calcification. Toe Pressures are Abnormal and impaired for wound healing with toe pressures of 47 mmHg. 2. LEFT LOWER EXTREMITY: TRAE is Non-diagnostic indicating vessel calcification. Toe Pressures are Un-interpretable and Non-diagnostic. Reference: Wound classification Grade TRAE Ankle Systolic Pressure Toe Pressures 0 > 0.80 > 100 mmHg > 60 mmHg 1 0.6 - 0.79 70 - 100 mmHg 40 - 59 mmHg 2 0.4 - 0.59 50-70 mmHg 30 - 39 mmHg 3 < 0.39 < 50 mmHg < 30 mmHg Digit Pressures DBI Disease Category > 0.70 Normal < 0.70 Abnormal > 30 mmHg Potential wound healing < 30 mmHg Impaired wound healing Ankle Brachial Pressures TRAE Disease Category > 1.3  Likely vessel calcification with monophasic waveforms, non-diagnostic 0.95-1.30 Normal with multiphasic waveforms 0.50-0.95 Single level disease 0.30-0.50 Multilevel disease < 0.30 Critical limb ischema Volume Plethysmography Recording (VPR)  at all levels Normal Sharp systolic peak, fast upstroke, prominent dicrotic notch in wave Mild Sharp systolic peak, fast upstroke, absent dicrotic notch in wave Moderate Flattened systolic peak, slowed upstroke, absent dicrotic notch inwave Severe amplitude wave with = upslope and down slope Occluded Flat Line      US Lower Extremity Arterial Duplex Left    Result Date: 7/7/2023  Table formatting from the original result was not included. Arterial Duplex Ultrasound (Date: 07/06/23) Lower Extremity Artery Evaluation Indication: Surveillance Left Leg Arterial: PAD. S/P IR Left Balloon Angiogram ( Pedal Arch, DPA, ISAIAS) done 6/21/2023. Hx: Left Transmetatarsal Amputation done 9/3/2021. Previous: 2/7/2023 Bilateral ANDREINA/ TRAE's w/o, 4/7/2023 IR Left Angiogram. History: Previous Smoker, Hypertension, Diabetic, Hyperlipidemia, PAD, and Angioplasty  Technique: Duplex imaging is performed utilizing gray-scale, two-dimensional images, and color-flow imaging. Doppler waveform analysis and spectral Doppler imaging is also performed. LOWER EXTREMITY ARTERIAL DUPLEX EXAM WITH WAVEFORMS Right Leg:(cm/s) Location: Velocities Waveforms PTA:   23   M ISAIAS:   57  M DPA:   50  M Waveforms: T=Triphasic, M=Monophasic, B=Biphasic Left Leg:(cm/s) Location: Velocities Waveforms EIA:   141  T CFA:   201  T PFA:   138  B SFA Proximal:   158  T SFA Mid:   114  M SFA Distal:   209 /  145 M Popliteal Artery:   167 / 135   M PTA:   24   M ISAIAS:   112  M DPA:   135  M Waveforms: T=Triphasic, M=Monophasic, B=Biphasic Stenotic Profile Ratio: 209 / 114 = 1.83  Impression: Right Lower Extremity: Not fully examined but monophasic flow in tibial vessels suggest more proximal disease Left Lower Extremity: Triphasic flow in common femoral artery suggesting no inflow disease.  Transition to monophasic flow within the level of mid SFA consistent with underlying hemodynamically significant disease.  Monophasic flow remains distally Reference: Category Normal  1-19% 20-49% 50-99% Occluded PSV <160 cm/sec without spectral broadening <160 cm/sec with spectral broadening Increased Increased Absent Flow Ratio N/A N/A < 2.0 >2.0 N/A Post-Stenotic Turbulence No No No Yes N/A

## 2023-08-02 NOTE — PROGRESS NOTES
Cardiac Rehab Discharge Summary    Reason for therapy discharge:    Discharged to home with OP cardiac rehab    Progress towards therapy goal(s). See goals on Care Plan in [facility] electronic health record for goal details.  Goals met    Therapy recommendation(s):    Continued therapy is recommended.  Rationale/Recommendations:  OP cardiac rehab.

## 2023-08-02 NOTE — PROGRESS NOTES
'    [facility] ([facility]) progress note  CC: F/U ASHLEY on CKD-4, NSTEMI  S: Since last seen, still with double vision. Breathing better and feels stronger overall. No dizziness, dyspnea, nausea/vomiting. Will change back to PO diuretics. Renal function improving - discusssed with wife at bedside           No uremic symptoms are present, denies chest pain, no shortness of breath, peripheral edema improving.  Appetite remains adequate.  He wonders when he could go home.         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 Creatinine baseline 2.5-2.8 mg/dl when last seen by Dr. Bhatia in 5/2023. Has had spikes to mid-3's in 2022 and currently worsened function in setting of NSTEMI. Has had 2 angiograms of LLE since April that may be contributing to his worsened kidney function as well but no change in renal function noted after June procedure at least. No uremic symptoms.   Peak Creatinine 5.16 (7/30)--> --> 3.7 mg/dl (IMPROVING ASHLEY)  Continue to hold lisinopril.  Due to persistent edema we will give furosemide 40 mg IV daily (PTA furosemide 40 mg p.o. daily)  Discussed with with patient and wife at the bedside that he has advanced chronic kidney disease and he could easily progress to end-stage renal disease.  OK to discharge from Renal standpoint. Has appt with Dr. Bhatia in October but should have BMP in 1-2 weeks with PCP after discharge.     Group C strep bacteremia 7/27. Now on ceftriaxone. Trend subsequent cultures.  We will continue as per ID recommendations. SHAYLEE negative  Type 1 diabetes mellitus with nephropathy, peripheral artery disease, neuropathy.  Diabetes has been under satisfactory control, most recent hemoglobin A1c 7.4%.  He has over 40-year history of type 1 diabetes mellitus.  Continue diabetes management as per primary service.  PAD with previous left TMA 2020: Peripheral angio noted 6/2023 with angioplasties and previously angioplasty in April 2023.  CKD-MBD: Stable calcium, PTH 66 (11/30/2022).   Continue cholecalciferol 2000 units daily.  Anemia due to CKD: Hgb decreased 2 g/dl since April but relatively stable at present.    Low iron stores - Continue oral ferrous sulfate.   Hypertension with CKD.  Known hypertensive cardiomyopathy with left ventricular hypertrophy on cardiac echo.  He has chronic peripheral edema, furosemide as above.  Hyponatremia.  Secondary to a combination of acute on chronic kidney disease, peripheral edema.  Known hypertensive cardiomyopathy.  Diuretics as above.  CVA: New double vision during admit. MRI brain acute/subacute infarcts dorsal left pontomedullary junction and left frontal lobe. Questionable punctate focus of infarction in the right para midline cerebellum. On ASA/ Plavix/Statin      Teja Elam MD  [facility]  [phone] (off)           /60 (BP Location: Left arm)   Pulse 59   Temp 98.7  F (37.1  C) (Oral)   Resp 18   Ht 1.829 m (6')   Wt 100.4 kg (221 lb 6.4 oz)   SpO2 98%   BMI 30.03 kg/m      I/O last 3 completed shifts:  In: 960 [P.O.:960]  Out: 2600 [Urine:2600]    Physical Exam:   GENERAL: calm and comfortable, alert  EYES: pupils equal, sclerae not icteric.  ENT: Hearing normal, oral mucosa moist. Double vision reported with both eyes open  RESP: Clear to auscultation bilaterally with no respiratory distress, normal effort.  CV: RRR, no murmurs. 1+ leg edema.    GI: Active BS, Soft, NT/ND, obese  SKIN: No rash, warm/ dry  MSK: S/P left TMA  NEURO: Moves all extremities, no tremor. Sensation grossly intact to LT  PSYCH: Appropriate mood and affect    Recent Labs   Lab 08/02/23  0429 08/01/23  0439 07/31/23  0429 07/30/23  0441 07/29/23  0436 07/28/23  0448 07/27/23  1222    135* 128* 130* 131* 136 137   POTASSIUM 3.9 4.3 4.1 4.6 4.1 5.2 4.7   CHLORIDE 103 101 96* 97* 98 100 101   CO2 23 23 22 22 21* 17* 13*   BUN 76.5* 85.9* 93.5* 90.9* 82.7* 60.9* 53.5*   CR 3.71* 4.21* 4.99* 5.16* 4.91* 3.64* 3.43*    GFRESTIMATED 17* 14* 12* 11* 12* 17* 18*   SVITLANA 9.1 9.1 8.1* 8.6* 8.7* 8.1* 8.5*   MAG 2.2  --   --   --   --   --  2.0   ALBUMIN  --  3.0* 2.8* 2.9* 3.2* 3.2* 3.5         Recent Labs   Lab 08/01/23  0439 07/31/23  0429 07/30/23 0441 07/29/23  0436 07/28/23  0448 07/27/23  1222   WBC 5.3 4.8 7.9 10.0 10.8 11.3*   HGB 9.8* 8.7* 9.2* 9.5* 10.2* 10.6*   HCT 29.5* 27.1* 28.2* 30.4* 32.2* 32.0*   MCV 91 93 92 94 94 92   * 130* 126* 123* 135* 154           Cor Angio 7/27/23: (Visipaque 116 ml)  1.  Severe diffuse disease of native vessels with small caliber vessels distally.  2.  Chronic occlusion of native LAD and native left circumflex.  3.  Left main fills small narrow caliber ramus branch only  4.  Right coronary artery has diffuse mild to moderate disease but no severe stenoses.  5.  Patent saphenous vein graft to high diagonal branch which is diffusely diseased. Anastomosis appears normal.  6.  Occluded saphenous vein graft to distal diagonal/lateral wall branches, most likely the acute infarct vessel.  7.  Patent BENITEZ to LAD. Anastomosis appears normal.  8.  Successful PCI saphenous vein graft to distal diagonal/lateral with drug-eluting stent implant x1  9.  Recommend continuing Plavix therapy indefinitely, risk factor management for ASCVD.        Current Facility-Administered Medications:     acetaminophen (TYLENOL) tablet 650 mg, 650 mg, Oral, Q4H PRN, Maikel Tripathi MD, 650 mg at 07/29/23 0108    ALPRAZolam (XANAX) tablet 0.25 mg, 0.25 mg, Oral, At Bedtime, Maikel Tripathi MD, 0.25 mg at 08/01/23 2021    aspirin EC tablet 81 mg, 81 mg, Oral, Daily, Maikel Tripathi MD, 81 mg at 08/02/23 0825    atorvastatin (LIPITOR) tablet 40 mg, 40 mg, Oral, Daily, Maikel Tripathi MD, 40 mg at 08/02/23 0825    benzocaine-menthol (CHLORASEPTIC) 6-10 MG lozenge 1 lozenge, 1 lozenge, Buccal, Q1H PRN, Jahaira Jimenez MD, 1 lozenge at 07/31/23 0704    bisacodyl (DULCOLAX) suppository 10 mg, 10 mg, Rectal, Daily  PRN, Maikel Tripathi MD    cefdinir (OMNICEF) capsule 300 mg, 300 mg, Oral, Daily, Krunal Lyman MD    clopidogrel (PLAVIX) tablet 75 mg, 75 mg, Oral, Daily, Maikel Tripathi MD, 75 mg at 08/02/23 0825    Continuing antiplatelet from home medication list OR antiplatelet order already placed during this visit, , Does not apply, DOES NOT GO TO Bhumi LEAVITT Kenneth W, MD    Continuing beta blocker from home medication list OR beta blocker order already placed during this visit, , Does not apply, DOES NOT GO TO Bhumi LEAVITT Kenneth W, MD    Continuing statin from home medication list OR statin order already placed during this visit, , Does not apply, DOES NOT GO TO Bhumi LEAVITT Kenneth W, MD    glucose gel 15-30 g, 15-30 g, Oral, Q15 Min PRN **OR** dextrose 50 % injection 25-50 mL, 25-50 mL, Intravenous, Q15 Min PRN **OR** glucagon injection 1 mg, 1 mg, Subcutaneous, Q15 Min PRN, Maikel Tripathi MD    ezetimibe (ZETIA) tablet 10 mg, 10 mg, Oral, QPM, Maikel Tripathi MD, 10 mg at 08/01/23 2020    ferrous sulfate (FEROSUL) tablet 325 mg, 325 mg, Oral, Daily, Jenny Thomason MD, 325 mg at 08/02/23 0825    fluticasone (FLONASE) 50 MCG/ACT spray 2 spray, 2 spray, Both Nostrils, Daily, Remy Phipps DO, 2 spray at 07/31/23 0837    furosemide (LASIX) injection 40 mg, 40 mg, Intravenous, Daily, Cayden Bhatia MD, 40 mg at 08/02/23 0826    gabapentin (NEURONTIN) capsule 300 mg, 300 mg, Oral, BID, Maikel Tripathi MD, 300 mg at 08/02/23 0825    heparin ANTICOAGULANT injection 5,000 Units, 5,000 Units, Subcutaneous, Q12H, Remy Phipps DO, 5,000 Units at 08/02/23 0826    hydrALAZINE (APRESOLINE) injection 10 mg, 10 mg, Intravenous, Q4H PRN, Maikel Tripathi MD    insulin aspart (NovoLOG) injection (RAPID ACTING), 6-10 Units, Subcutaneous, TID Moise [place], DO, 10 Units at 08/02/23 1156    insulin aspart (NovoLOG) injection (RAPID ACTING), , Subcutaneous, TID Moise [place], , 15  Units at 08/02/23 1156    insulin glargine (LANTUS PEN) injection 30 Units, 30 Units, Subcutaneous, QAM, Maikel Tripathi MD, 30 Units at 08/02/23 0827    levothyroxine (SYNTHROID/LEVOTHROID) tablet 125 mcg, 125 mcg, Oral, Daily, Maikel Tripathi MD, 125 mcg at 08/02/23 0826    lidocaine (LMX4) cream, , Topical, Q1H PRN, Remy Phipps DO    lidocaine (LMX4) cream, , Topical, Q1H PRN, Maikel Tripathi MD    lidocaine 1 % 0.1-1 mL, 0.1-1 mL, Other, Q1H PRN, Remy Phipps DO    lidocaine 1 % 0.1-1 mL, 0.1-1 mL, Other, Q1H PRN, Maikel Tripathi MD    [Held by provider] lisinopril (ZESTRIL) tablet 40 mg, 40 mg, Oral, Daily, Maikel Tripathi MD    melatonin tablet 1 mg, 1 mg, Oral, At Bedtime PRN, Maikel Tripathi MD, 1 mg at 07/29/23 0108    metoprolol (LOPRESSOR) injection 5 mg, 5 mg, Intravenous, Q15 Min PRN, Maikel Tripathi MD    metoprolol succinate ER (TOPROL XL) 24 hr tablet 100 mg, 100 mg, Oral, Daily, Kike Valderrama MD, 100 mg at 08/02/23 0825    naloxone (NARCAN) injection 0.2 mg, 0.2 mg, Intravenous, Q2 Min PRN **OR** naloxone (NARCAN) injection 0.4 mg, 0.4 mg, Intravenous, Q2 Min PRN **OR** naloxone (NARCAN) injection 0.2 mg, 0.2 mg, Intramuscular, Q2 Min PRN **OR** naloxone (NARCAN) injection 0.4 mg, 0.4 mg, Intramuscular, Q2 Min PRN, Remy Phipps DO    nitroGLYcerin (NITROSTAT) sublingual tablet 0.4 mg, 0.4 mg, Sublingual, Q5 Min PRN, Maikel Tripathi MD    ondansetron (ZOFRAN ODT) ODT tab 4 mg, 4 mg, Oral, Q6H PRN **OR** ondansetron (ZOFRAN) injection 4 mg, 4 mg, Intravenous, Q6H PRN, Maikel Tripathi MD, 4 mg at 07/28/23 0148    oxyCODONE (ROXICODONE) tablet 5 mg, 5 mg, Oral, Q4H PRN **OR** oxyCODONE (ROXICODONE) tablet 10 mg, 10 mg, Oral, Q4H PRN, Maikel Tripathi MD    Patient is already receiving anticoagulation with heparin, enoxaparin (LOVENOX), warfarin (COUMADIN)  or other anticoagulant medication, , Does not apply, Continuous PRN, Maikel Tripathi MD     Percutaneous Coronary Intervention orders placed (this is information for BPA alerting), , Does not apply, DOES NOT GO TO [facility] LEAVITT Kenneth W, MD    sodium chloride (PF) 0.9% PF flush 3 mL, 3 mL, Intracatheter, Q8H, Remy Phipps DO, 3 mL at 08/02/23 0146    sodium chloride (PF) 0.9% PF flush 3 mL, 3 mL, Intracatheter, q1 min prn, Remy Phipps DO    sodium chloride (PF) 0.9% PF flush 3 mL, 3 mL, Intracatheter, Q8H, Maikel Tripathi MD, 3 mL at 08/01/23 2021    sodium chloride (PF) 0.9% PF flush 3 mL, 3 mL, Intracatheter, q1 min Bhumi lopez Kenneth W, MD      Labs personally reviewed today during this evaluation at 10:54 AM

## 2023-08-20 NOTE — PROGRESS NOTES
"  Assessment & Plan     Bleeding from wound    Wound cleaned with sterile water.  1 cm square of surgicel placed over wound from jose millardn puncture wound over right elbow and wrapped with Coban as pressure dressing.  Repeated x 2 before surgicel seemed to \"take\"-- advised close Follow-up for cautery if this fails today.  Continue on Plavix as prescribed (had questions if he should stop it).  Close Follow-up if any new or worsening sx prn.    Naheed Villalpando MD  [facility]    Erik Duncan is a 73 year old, presenting for the following health issues:  Wound Check (Have a small wound on R elbow from yesterday that won't stop bleeding, on blood thinner)      HPI     Here with wife.  On Plavix with CAD, PVD and CVA hx.  Type 1 DM.  Scratched by jose kaur yesterday- one small wound over RIGHT elbow will not stop oozing.    Review of Systems   Constitutional, HEENT, cardiovascular, pulmonary, GI, , musculoskeletal, neuro, skin, endocrine and psych systems are negative, except as otherwise noted.      Objective    BP (!) 155/66   Pulse 58   Temp 98.1  F (36.7  C) (Oral)   Resp 18   SpO2 100%   There is no height or weight on file to calculate BMI.  Physical Exam   GENERAL: healthy, alert and no distress  EYES: Eyes grossly normal to inspection, PERRL and conjunctivae and sclerae normal  SKIN: < 1cm circular site oozing from RIGHT elbow, no underlying erythema  PSYCH: mentation appears normal, affect normal/bright                    "

## 2023-08-21 NOTE — PROGRESS NOTES
VASCULAR SURGERY PROGRESS NOTE   VASCULAR SURGEON: Edwin Lynne MD, RPVI     LOCATION:  [facility]     Dakota Bauer   Medical Record #:  [id number]  YOB: 1950  Age:  73 year old     Date of Service: 8/21/2023    PRIMARY CARE PROVIDER: Jamal Gaffney      Reason for visit: Follow-up    IMPRESSION: 73-year-old male status post recent left lower extremity angiogram with AT and DP balloon angioplasty.  The duplex has improved.  Patient's ulcer is almost healed.    RECOMMENDATION/RISKS: Continue optimal medical management therapy.  Continue Plavix due to recent coronary stent placement.  I advised him to pause aspirin due to significant and prolonged skin bleeding.  Follow-up in 6 months with repeat studies    HPI:  Dakota Bauer is a 73 year old male who was seen today for follow-up.  Patient is doing well and denies any significant complaints  REVIEW OF SYSTEMS:    A 12 point ROS was reviewed and is negative     PHH:    Past Medical History:   Diagnosis Date    Chronic kidney disease     Coronary artery disease     Diabetes mellitus (H)     Disorder of thyroid     Hyperlipidemia     Hypertension           Past Surgical History:   Procedure Laterality Date    AMPUTATE TOE(S) Left     BYPASS GRAFT ARTERY CORONARY  01/01/2005    [facility] with Dr. Holter    CARDIAC CATHETERIZATION  01/01/2005    CARDIAC CATHETERIZATION  11/25/2016    no intervention    CERVICAL DISC SURGERY  01/01/2014    CV ANGIOGRAM CORONARY GRAFT N/A 7/27/2023    Procedure: Coronary Angiogram Graft;  Surgeon: Maikel Tripathi MD;  Location: [facility] CV    CV PCI N/A 7/27/2023    Procedure: Percutaneous Coronary Intervention;  Surgeon: Maikel Tripathi MD;  Location: [facility] CV    IR LOWER EXTREMITY ANGIOGRAM LEFT  4/7/2023    IR LOWER EXTREMITY ANGIOGRAM LEFT  4/8/2020    IR LOWER EXTREMITY ANGIOGRAM LEFT  6/21/2023       ALLERGIES:  Hydrochlorothiazide and Levofloxacin    MEDS:    Current  Outpatient Medications:     ALPRAZolam (XANAX) 0.5 MG tablet, Take 1 tablet (0.5 mg) by mouth At Bedtime, Disp: 6 tablet, Rfl: 0    aspirin 81 MG EC tablet, Take 1 tablet (81 mg) by mouth daily Start tomorrow., Disp: 30 tablet, Rfl: 3    atorvastatin (LIPITOR) 40 MG tablet, Take 40 mg by mouth daily, Disp: , Rfl:     clopidogrel (PLAVIX) 75 MG tablet, Take 1 tablet (75 mg) by mouth daily Dose to start tomorrow., Disp: 90 tablet, Rfl: 3    Continuous Blood Gluc Sensor (FREESTYLE DAVID 14 DAY SENSOR) MISC, , Disp: , Rfl:     ezetimibe (ZETIA) 10 MG tablet, Take 1 tablet (10 mg) by mouth daily, Disp: 90 tablet, Rfl: 4    ferrous sulfate (FEROSUL) 325 (65 Fe) MG tablet, Take 1 tablet (325 mg) by mouth daily, Disp: 30 tablet, Rfl: 1    fish oil-omega-3 fatty acids 1000 MG capsule, Take 1 capsule by mouth daily, Disp: , Rfl:     fluticasone (FLONASE) 50 MCG/ACT nasal spray, Spray 2 sprays into both nostrils daily For nasal congestion, Disp: 16 g, Rfl: 1    gabapentin (NEURONTIN) 300 MG capsule, [GABAPENTIN (NEURONTIN) 300 MG CAPSULE] Take 300 mg by mouth 3 (three) times a day.       , Disp: , Rfl:     HUMALOG 100 unit/mL injection, Inject Subcutaneous 3 times daily (before meals) Carb count: 3 g carb : 1 unit insulin, Disp: , Rfl:     insulin glargine (LANTUS) 100 unit/mL injection, [INSULIN GLARGINE (LANTUS) 100 UNIT/ML INJECTION] Inject 30 Units under the skin every morning. , Disp: , Rfl:     levothyroxine (SYNTHROID/LEVOTHROID) 125 MCG tablet, Take 125 mcg by mouth daily, Disp: , Rfl:     metoprolol succinate ER (TOPROL XL) 50 MG 24 hr tablet, Take 1 tablet (50 mg) by mouth daily, Disp: 90 tablet, Rfl: 2    multivitamin with iron (ONE DAILY WITH IRON) Tab tablet, [MULTIVITAMIN WITH IRON (ONE DAILY WITH IRON) TAB TABLET] Take 1 tablet by mouth daily., Disp: , Rfl:     nitroGLYcerin (NITROSTAT) 0.4 MG sublingual tablet, Place 1 tablet (0.4 mg) under the tongue every 5 minutes as needed, Disp: 25 tablet, Rfl: 3     TECHLITE PEN NEEDLES 31G X 5 MM miscellaneous, , Disp: , Rfl:     torsemide 40 MG TABS, Take 40 mg by mouth daily, Disp: 30 tablet, Rfl: 1    VITAMIN D3 2,000 unit capsule, [VITAMIN D3 2,000 UNIT CAPSULE] Take 2,000 Units by mouth daily., Disp: , Rfl: 3    lisinopril (ZESTRIL) 40 MG tablet, Take 1 tablet (40 mg) by mouth daily Hold off on taking until labs are done and advised to resume by PCP or nephrology, Disp: , Rfl:     oxyCODONE (ROXICODONE) 5 MG tablet, Take 0.5 tablets (2.5 mg) by mouth every 6 hours as needed for moderate to severe pain, Disp: 8 tablet, Rfl: 0    TRIAMCINOLONE ACETONIDE (NASACORT NASL), [TRIAMCINOLONE ACETONIDE (NASACORT NASL)] 1 spray into each nostril daily., Disp: , Rfl:     SOCIAL HABITS:    History   Smoking Status    Former    Types: Cigarettes    Quit date: 10/10/1984   Smokeless Tobacco    Never     Social History    Substance and Sexual Activity      Alcohol use: Not Currently      History   Drug Use Unknown       FAMILY HISTORY:    Family History   Problem Relation Age of Onset    Coronary Artery Disease Father     Coronary Artery Disease Brother        PE:  BP (!) 140/58   Pulse 56   Temp 97.6  F (36.4  C)   Resp 16   Wt Readings from Last 1 Encounters:   08/02/23 100.4 kg (221 lb 6.4 oz)     There is no height or weight on file to calculate BMI.    EXAM:  GENERAL: This is a well-developed 73 year old male who appears his stated age  EYES: Grossly normal.  MOUTH: Buccal mucosa normal   CARDIAC: Normal   CHEST/LUNG: Clear to auscultation bilaterally  GASTROINTESINAL soft nontender nondistended  MUSCULOSKELETAL: Grossly normal and both lower extremities are intact.  HEME/LYMPH: No lymphedema  NEUROLOGIC: Focally intact, Alert and oriented x 3.   PSYCH: appropriate affect            DIAGNOSTIC STUDIES:     Images:  US Lower Extremity Arterial Duplex Left    Result Date: 8/9/2023  Table formatting from the original result was not included. Arterial Duplex Ultrasound (Date:  08/07/23) Lower Extremity Artery Evaluation IIndication: Surveillance Left Leg Arterial: 1 month follow up. PAD. Recent MI.      Hx: 7/27/2023 Coronary Angiogram ( 1 stent).,            6/21/2023 IR Left Balloon Angiogram ( Pedal Arch/ DPA/ ISAIAS). ,            9/3/2021 Left Transmetatarsal Amputation. Previous: 7/6/2023 History: Previous Smoker, Hypertension, Diabetic, Hyperlipidemia, PAD, CAD, MI, and Angioplasty Technique: Duplex imaging is performed utilizing gray-scale, two-dimensional images, and color-flow imaging. Doppler waveform analysis and spectral Doppler imaging is also performed. LOWER EXTREMITY ARTERIAL DUPLEX EXAM WITH WAVEFORMS Right Leg:(cm/s) Location: Velocities Waveforms EIA:   128  B CFA:   129  B PFA:   110  B SFA Proximal:   108 / 562 / 424 / 86  M PTA:   26   M ISAIAS:   49  M DPA:   32  M Waveforms: T=Triphasic, M=Monophasic, B=Biphasic Stenotic Profile Ratio: 562 / 108 = 5.20 Left Leg:(cm/s) Location: Velocities Waveforms EIA:   151  T CFA:   200  M PFA:   196  B SFA Proximal:   162  B SFA Mid:   116  M SFA Distal:   227 / 107 M Popliteal Artery:   190 /140 M PTA:   29   M ISAIAS:   99  M DPA:   66  M Waveforms: T=Triphasic, M=Monophasic, B=Biphasic Stenotic Profile Ratio: 227 / 116 = 1.95 Comments: No Pseudoaneurysm seen in Right Groin post Coronary Angiogram 7/27/23.  Impression: Right Lower Extremity: Patent aorto iliac system. High grade superficial femoral artery stenosis. Low flow velocities with monophasic waveforms in the infrapopliteal segment most likely a combination of femoral-popliteal inflow disease and native tibial vessel disease. Left Lower Extremity: Patent left femoral-popliteal segment with transition to monophasic waveforms in the tibial vasculature.  Monophasic waveforms are noted in the dorsalis pedis and posterior tibial arteries.  Low flow velocities are noted in the posterior tibial artery suggesting it is most likely being filled by peroneal collaterals. Reference:  Category Normal 1-19% 20-49% 50-99% Occluded PSV <160 cm/sec without spectral broadening <160 cm/sec with spectral broadening Increased Increased Absent Flow Ratio N/A N/A < 2.0 >2.0 N/A Post-Stenotic Turbulence No No No Yes N/A  LORY PRITCHETT M.D., FACS, RPVI     US Low Ext Arterial Dop Seg Pres w/o Exercise    Result Date: 8/8/2023  Table formatting from the original result was not included. BILATERAL RESTING ANKLE-BRACHIAL INDICES (TRAE'S) (Date: 08/07/23) Indication: Surveillance TRAE's: 1 month follow up. PAD. Recent MI.      Hx: 7/27/2023 Coronary Angiogram ( 1 stent).,            6/21/2023 IR Left Balloon Angiogram ( Pedal Arch/ DPA/ ISAIAS). ,            9/3/2021 Left Transmetatarsal Amputation. Previous: 7/6/2023 History: Previous Smoker, Hypertension, Diabetic, Hyperlipidemia, PAD, CAD, MI, and Angioplasty  Resting TRAE's          Right: mmHg Index     Brachial: 120  Ankle-(PT): 87 0.70 Ankle-(DP): >254 NC          Digit: 32 0.26               Left: mmHg Index     Brachial: 124  Ankle-(PT): >254 NC Ankle-(DP): >254 NC          Digit: AMP N/A Resting ankle-brachial index of 0.70 on the right. Toe Pressures of 32 mmHg and TBI of 0.26 Resting ankle-brachial index is non compressible on the left.  VPR WAVEFORMS: The right volume plethysmography waveforms are mildly abnormal at the lower thigh level, mildly abnormal at the upper calf level and moderately abnormal at the ankle. The left volume plethysmography waveforms are mildly abnormal at the lower thigh level, mildly abnormal at the upper calf level and mildly abnormal at the ankle. Comments: Previous left TMA.  Impression:  1. RIGHT LOWER EXTREMITY: TRAE is Abnormal with an TRAE of 0.7 indicating multilevel disease. Toe Pressures are Mildly abnormal but adequate for wound healing with toe pressures of 32 mmHg. 2. LEFT LOWER EXTREMITY: TRAE is Non-diagnostic indicating vessel calcification. Toe Pressures cannot be obtained due to previous transmetatarsal  amputation. 3. Consider obtaining trans cutaneous oximetry in the left lower extremity to assess wound healing potential. Reference: Wound classification Grade TRAE Ankle Systolic Pressure Toe Pressures 0 > 0.80 > 100 mmHg > 60 mmHg 1 0.6 - 0.79 70 - 100 mmHg 40 - 59 mmHg 2 0.4 - 0.59 50-70 mmHg 30 - 39 mmHg 3 < 0.39 < 50 mmHg < 30 mmHg Digit Pressures DBI Disease Category > 0.70 Normal < 0.70 Abnormal > 30 mmHg Potential wound healing < 30 mmHg Impaired wound healing Ankle Brachial Pressures TRAE Disease Category > 1.3  Likely vessel calcification with monophasic waveforms, non-diagnostic 0.95-1.30 Normal with multiphasic waveforms 0.50-0.95 Single level disease 0.30-0.50 Multilevel disease < 0.30 Critical limb ischema Volume Plethysmography Recording (VPR) at all levels Normal Sharp systolic peak, fast upstroke, prominent dicrotic notch in wave Mild Sharp systolic peak, fast upstroke, absent dicrotic notch in wave Moderate Flattened systolic peak, slowed upstroke, absent dicrotic notch inwave Severe amplitude wave with = upslope and down slope Occluded Flat Line  LORY PRITCHETT M.D., FACS, RPVI     Echocardiogram SHAYLEE    Result Date: 2023  [id number] [facility] [id number]^APURVA^NAY^[place] [address]  Name: DENY MONTERO MRN: [id number] : 1950 Study Date: 2023 10:42 AM Age: 72 yrs Gender: Male Patient Location: [facility] Reason For Study: CVA, Endocarditis Ordering Physician: NAY MIXON Referring Physician: GUME SOTO Performed By: TJ-BP  BSA: 2.3 m2 Height: 72 in Weight: 231 lb HR: 64 BP: 149/69 mmHg ______________________________________________________________________________ Procedure Complete SHAYLEE Adult. ______________________________________________________________________________ Interpretation Summary  The left ventricle is normal in size. The visual ejection fraction is 50-55%. No regional wall motion abnormalities noted.  Normal right ventricle size and systolic function. There is no color Doppler evidence of a PFO. There is moderate (2+) mitral regurgitation. The right ventricular systolic pressure is approximated at 46mmHg plus the right atrial pressure. No valvular vegetations noted. ______________________________________________________________________________ SHAYLEE Consent to the procedure was obtained prior to sedation. Prior to the exam, the oral cavity was checked and no overcrowding was noted. The transesophageal probe was passed without difficulty. There were no complications associated with this procedure. Patient was sedated using Versed 1 mg. The heart rate, respiratory rate, oxygen saturations, blood pressure, and response to care were monitored throughout the procedure with the assistance of the nurse. Patient was sedated using Fentanyl 50 mcg. 15 ml viscous Lidocaine, 0.5 ml Benzocaine spray. I determined this patient to be an appropriate candidate for the planned sedation and procedure and have reassessed the patient immediately prior to sedation and procedure. Total sedation time: 15 minutes of continuous bedside 1:1 monitoring.  Left Ventricle The left ventricle is normal in size. The visual ejection fraction is 50-55%. No regional wall motion abnormalities noted.  Right Ventricle Normal right ventricle size and systolic function.  Atria The left atrium is mild to moderately dilated. A contrast injection (Bubble Study) was performed that was negative for flow across the interatrial septum. There is no color Doppler evidence of a PFO. No thrombus is detected in the left atrial appendage.  Mitral Valve The mitral valve leaflets are mildly thickened. There is moderate (2+) mitral regurgitation. There is no mitral valve stenosis.  Tricuspid Valve The tricuspid valve is normal in structure and function. There is mild (1+) tricuspid regurgitation. The right ventricular systolic pressure is approximated at 46mmHg plus the  right atrial pressure.  Aortic Valve There is moderate trileaflet aortic sclerosis. There is trace aortic regurgitation. No aortic stenosis is present.  Pulmonic Valve The pulmonic valve is not well visualized. There is mild (1+) pulmonic valvular regurgitation.  Vessels Inferior vena cava not well visualized for estimation of right atrial pressure.  Pericardial/Pleural No pericardial thickening.  Rhythm The rhythm was sinus bradycardia. ______________________________________________________________________________ Doppler Measurements & Calculations MR PISA: 2.3 cm2 MR ERO: 0.15 cm2 MR volume: 29.1 ml TR max raul: 338.0 cm/sec TR max P.7 mmHg  ______________________________________________________________________________ Report approved by: Davon Allen 2023 12:37 PM       US Carotid Bilateral    Result Date: 2023  EXAM: US CAROTID BILATERAL LOCATION: [facility] DATE: 2023 INDICATION: cva COMPARISON: MRI brain 2023, carotid duplex 2016 TECHNIQUE: Duplex exam performed utilizing 2D gray-scale imaging, Doppler interrogation with color-flow and spectral waveform analysis. The percent diameter stenosis is determined using NASCET criteria and Society of Radiologists in Ultrasound Consensus Criteria. FINDINGS: RIGHT: Moderate plaque at the bifurcation. The highest peak systolic velocity in the ICA is 153 cm/sec, consistent with 50-69% stenosis. Previously highest ICA velocity was 122 cm/s. Although there is not severe velocity elevation in the external carotid  artery, there is turbulence at the external carotid ostium and poststenotic waveforms in the distal external carotid, suggestive of significant ostial external carotid stenosis. Previous exams demonstrate significant external carotid velocity elevations. Antegrade flow within the vertebral artery. LEFT: Mild plaque at the bifurcation. The peak systolic velocity in the ICA is less than 125 cm/sec,  consistent with less than 50% stenosis. Progressive external external carotid velocity elevation, suggestive of of significant external carotid stenosis.  Antegrade flow within the vertebral artery. VELOCITY CHART: CCA   Right: 58/17 cm/s   Left: 81/13 cm/s ICA   Right: 153/36 cm/s   Left: 116/29 cm/s ECA   Right: 180/23 cm/s   Left: 270 cm/s ICA/CCA PSV Ratio   Right: 2.6   Left: 1.4     IMPRESSION: 1.  Right carotid: Moderate plaque remission. Progressive ICA velocity elevation consistent with 50-69% stenosis in the right internal carotid artery. Significant external carotid stenosis. 2.  Left carotid: Mild plaque formation, velocities consistent with less than 50% stenosis in the left internal carotid artery. Significant, progressive external carotid carotid stenosis. 3.  Flow within the vertebral arteries is antegrade.    MR Brain w/o Contrast    Result Date: 7/29/2023  EXAM: MR BRAIN W/O CONTRAST LOCATION: [facility] DATE: 7/29/2023 INDICATION: double vision. post coronary angiogram. r o CVA. CKD stage IV. COMPARISON: None. TECHNIQUE: Routine multiplanar multisequence head MRI without intravenous contrast. FINDINGS: INTRACRANIAL CONTENTS: Small acute to subacute infarction along the dorsal left pontomedullary junction. A tiny, punctate focus of infarction in the left frontal lobe (series 7 image 41). On coronal diffusion weighted imaging there is a questionable area  of punctate infarction versus artifact in the right para midline cerebellum (series 14 image 34). No mass, acute hemorrhage, or extra-axial fluid collections. Scattered nonspecific T2/FLAIR hyperintensities within the cerebral white matter most consistent with mild chronic microvascular ischemic change. Mild generalized cerebral atrophy. No hydrocephalus. Normal position of the cerebellar tonsils. SELLA: No abnormality accounting for technique. OSSEOUS STRUCTURES/SOFT TISSUES: Normal marrow signal. The major intracranial  vascular flow voids are maintained. ORBITS: No abnormality accounting for technique. SINUSES/MASTOIDS: Moderate mucosal thickening of the left maxillary sinus. No middle ear or mastoid effusion.     IMPRESSION: 1.  Small foci of acute to subacute infarction involving the dorsal left pontomedullary junction and left frontal lobe. 2.  Questionable punctate focus of infarction in the right para midline cerebellum seen on coronal diffusion weighted imaging only. 3.  Mild age-related changes as above.    Cardiac Catheterization    Addendum Date: 7/28/2023      Ost Cx to Prox Cx lesion is 100% stenosed.   Prox LAD lesion is 100% stenosed.   Ramus lesion is 50% stenosed.   Prox RCA to Dist RCA lesion is 30% stenosed.   Prox Graft to Mid Graft lesion is 100% stenosed. 1.  Severe diffuse disease of native vessels with small caliber vessels distally. 2.  Chronic occlusion of native LAD and native left circumflex. 3.  Left main fills small narrow caliber ramus branch only 4.  Right coronary artery has diffuse mild to moderate disease but no severe stenoses. 5.  Patent saphenous vein graft to high distal lateral wall branch (? Diagonal or marginal branch) which is diffusely diseased. Anastomosis appears normal. 6.  Occluded saphenous vein graft to distal diagonal/lateral wall branches, most likely the acute infarct vessel. 7.  Patent BENITEZ to LAD. Anastomosis appears normal. 8.  Successful PCI saphenous vein graft to distal diagonal/lateral with drug-eluting stent implant x1 9.  Recommend continuing Plavix therapy indefinitely, risk factor management for ASCVD.    Result Date: 7/28/2023    Ost Cx to Prox Cx lesion is 100% stenosed.   Prox LAD lesion is 100% stenosed.   Ramus lesion is 50% stenosed.   Prox RCA to Dist RCA lesion is 30% stenosed.   Prox Graft to Mid Graft lesion is 100% stenosed. 1.  Severe diffuse disease of native vessels with small caliber vessels distally. 2.  Chronic occlusion of native LAD and native left  circumflex. 3.  Left main fills small narrow caliber ramus branch only 4.  Right coronary artery has diffuse mild to moderate disease but no severe stenoses. 5.  Patent saphenous vein graft to high diagonal branch which is diffusely diseased. Anastomosis appears normal. 6.  Occluded saphenous vein graft to distal diagonal/lateral wall branches, most likely the acute infarct vessel. 7.  Patent BENITEZ to LAD. Anastomosis appears normal. 8.  Successful PCI saphenous vein graft to distal diagonal/lateral with drug-eluting stent implant x1 9.  Recommend continuing Plavix therapy indefinitely, risk factor management for ASCVD.     Echocardiogram Complete    Result Date: 2023  [id number]^BRITTANY^GUME^[place] [address]  Name: DENY MONTERO MRN: [id number] : 1950 Study Date: 2023 03:39 PM Age: 72 yrs Gender: Male Patient Location: [facility] Reason For Study: Acute Myocardial Infarction Ordering Physician: GUME SOTO Referring Physician: GUME SOTO Performed By: BP  BSA: 2.2 m2 Height: 72 in Weight: 220 lb HR: 77 BP: 118/55 mmHg ______________________________________________________________________________ Procedure Complete Portable Echo Adult. Definity (NDC #94816-860) given intravenously. Technically difficult study.Extremely difficult acoustic windows despite the use of contrast for endcardial border definition. ______________________________________________________________________________ Interpretation Summary  The left ventricle is normal in size. There is mild concentric left ventricular hypertrophy. The visual ejection fraction is 45-50%. Diastolic Doppler findings (E/E' ratio and/or other parameters) suggest left ventricular filling pressures are increased. Poor quality images but suspicion of inferior and apical hypokinesis. Normal right ventricle size and systolic function. The right ventricular systolic pressure is  approximated at 19mmHg plus the right atrial pressure. Sinus rhythm was noted. Technically difficult, suboptimal study. Consider cardiac MRI for improved image quality. Prior echo from 3/8/23 was of better quality. Inferior wll was normal on that study. ______________________________________________________________________________ Left Ventricle The left ventricle is normal in size. There is mild concentric left ventricular hypertrophy. Diastolic Doppler findings (E/E' ratio and/or other parameters) suggest left ventricular filling pressures are increased. The visual ejection fraction is 45-50%. Poor quality images but suspicion of inferior and apical hypokinesis.  Right Ventricle Normal right ventricle size and systolic function.  Atria The left atrium is moderately dilated. Right atrial size is normal.  Mitral Valve The mitral valve is not well visualized. There is mild to moderate (1-2+) mitral regurgitation. There is no mitral valve stenosis.  Tricuspid Valve The tricuspid valve is not well visualized. There is mild (1+) tricuspid regurgitation. The right ventricular systolic pressure is approximated at 19mmHg plus the right atrial pressure.  Aortic Valve Mild aortic valve calcification is present. The aortic valve is not well visualized. No aortic regurgitation is present. No aortic stenosis is present.  Pulmonic Valve The pulmonic valve is not well visualized. There is mild (1+) pulmonic valvular regurgitation. Right ventricular diastolic pressure is approximated at 8mmHg plus the right atrial pressure. There is no pulmonic valvular stenosis.  Vessels The aorta root is normal. IVC diameter and respiratory changes fall into an intermediate range suggesting an RA pressure of 8 mmHg.  Pericardium There is no pericardial effusion.  Rhythm Sinus rhythm was noted. ______________________________________________________________________________ MMode/2D Measurements & Calculations IVSd: 1.2 cm  LVIDd: 5.4 cm LVIDs: 4.8  cm LVPWd: 1.1 cm FS: 11.2 % LV mass(C)d: 242.0 grams LV mass(C)dI: 109.1 grams/m2 Ao root diam: 2.9 cm LA dimension: 4.9 cm LA/Ao: 1.7 LVOT diam: 2.3 cm LVOT area: 4.2 cm2  EF(MOD-bp): 37.1 % LA Volume Indexed (AL/bp): 38.1 ml/m2 RWT: 0.41 TAPSE: 1.8 cm  Time Measurements MM HR: 77.0 BPM  Doppler Measurements & Calculations MV E max sagar: 99.0 cm/sec MV A max sagar: 55.7 cm/sec MV E/A: 1.8 MV dec time: 0.20 sec Ao V2 max: 105.0 cm/sec Ao max P.0 mmHg Ao V2 mean: 79.6 cm/sec Ao mean PG: 3.0 mmHg Ao V2 VTI: 20.4 cm ELIZABETH(I,D): 3.9 cm2 ELIZABETH(V,D): 3.8 cm2 LV V1 max PG: 3.6 mmHg LV V1 max: 95.3 cm/sec LV V1 VTI: 19.1 cm SV(LVOT): 79.4 ml SI(LVOT): 35.8 ml/m2 PA acc time: 0.09 sec PI end-d sagar: 138.0 cm/sec TR max sagar: 217.0 cm/sec TR max P.8 mmHg AV Sagar Ratio (DI): 0.91 ELIZABETH Index (cm2/m2): 1.8 E/E': 16.2 E/E' av.3  Lateral E/e': 16.4 Medial E/e': 18.2 Peak E' Sagar: 6.1 cm/sec RV S Sagar: 7.8 cm/sec  ______________________________________________________________________________ Report approved by: Davon Allen 2023 04:59 PM       XR Chest Port 1 View    Result Date: 2023  EXAM: XR CHEST PORT 1 VIEW LOCATION: [facility] DATE: 2023 INDICATION: Chest pain COMPARISON: 2021     IMPRESSION: Right costophrenic angle is clipped. Poststernotomy changes. Heart is magnified due to projection. Lungs are clear. No overt signs of failure or pneumonia.        LABS:      Sodium   Date Value Ref Range Status   2023 134 (L) 136 - 145 mmol/L Final   2023 138 136 - 145 mmol/L Final   2023 135 (L) 136 - 145 mmol/L Final     Urea Nitrogen   Date Value Ref Range Status   2023 61.7 (H) 8.0 - 23.0 mg/dL Final   2023 76.5 (H) 8.0 - 23.0 mg/dL Final   2023 85.9 (H) 8.0 - 23.0 mg/dL Final   2023 38 (H) 8 - 28 mg/dL Final   2022 48 (H) 8 - 28 mg/dL Final   2021 40 (H) 8 - 28 mg/dL Final     Hemoglobin   Date Value Ref Range Status    08/01/2023 9.8 (L) 13.3 - 17.7 g/dL Final   07/31/2023 8.7 (L) 13.3 - 17.7 g/dL Final   07/30/2023 9.2 (L) 13.3 - 17.7 g/dL Final     Platelet Count   Date Value Ref Range Status   08/01/2023 139 (L) 150 - 450 10e3/uL Final   07/31/2023 130 (L) 150 - 450 10e3/uL Final   07/30/2023 126 (L) 150 - 450 10e3/uL Final     INR   Date Value Ref Range Status   06/21/2023 1.00 0.85 - 1.15 Final       30 minutes spent on the day of encounter doing chart review, history and exam, documentation, and further activities as noted.        Edwin Lynne MD, [facility]  VASCULAR SURGERY

## 2023-08-21 NOTE — PATIENT INSTRUCTIONS
Fanta Duncan,    Thank you for entrusting your care with us today. After your visit today with MD Edwin Lynne this is the plan that was discussed at your appointment.    We will follow up in 6 months with repeat ultrasounds.         I am including additional information on these things and our contact information if you have any questions or concerns.   Please do not hesitate to reach out to us if you felt we did not answer your questions or you are unsure of the treatment plan after your visit today. Our number is [phone].Thank you for trusting us with your care.         Again thank you for your time.

## 2023-08-21 NOTE — PROGRESS NOTES
[facility]        Patient is here for a 1 month follow up  to discuss Peripheral artery disease (PAD).   LLE angio on 6/21.   Left LE wounds.     Pt is currently taking Aspirin, Statin, and Plavix.      The provider has been notified that the patient has no concerns.     Questions patient would like addressed today are: Does he still need plavix? He had to go to urgent care for bleeding.     Refills are needed: N/A    Has homecare services and agency name:  No

## 2023-08-22 NOTE — LETTER
8/22/2023    Jamal Gaffney MD  [address]  [facility] [address]    RE: Dakota Bauer       Dear Colleague,     I had the pleasure of seeing Dakoat Bauer in the [facility].          Assessment/Recommendations   Assessment:    1.  Coronary artery disease: Patient was hospitalized from July 27 through second with left-sided chest pain secondary to non-STEMI.      Coronary angiogram on 7/27/2023 showed 100% occlusion in proximal graft to mid graft lesion to second diagonal.  Lesion was successfully treated with a drug-eluting stent.  Coronary angiogram also showed patent LIMA to LAD and  Patent SVG to high distal lateral wall branch, 30% stenosis in proximal to distal RCA and 50% stenosis in ramus.  (see angio result for detail).    On Clopidogrel (Plavix) 75 mg daily.  Patient was recommended to stay on Plavix indefinitely.  Patient denies any recurrent left-sided chest pain or nausea since recent stent placement.    Patient had issue with bleeding from his right arm injury resulted from a jose thorn.  Patient went to urgent care.  Patient saw vascular team yesterday and was taken off of aspirin and recommend stay on clopidogrel.    Cardiac rehab has been ordered/scheduled    2.  Dyslipidemia with LDL goal <70/Obesity with a BMI of: Dakota Bauer is on high intensity statin therapy with 40 mg daily and Zetia 10 mg daily.. Most recent LDL is 47 in June 2023-at goal    3.  Heart failure with mildly reduced ejection fraction with LVEF of 45 to 50%:  Patient is well compensated on exam except noted mild left lower extremity edema.  He wears compression stocking at home.    Current home weight is 209 pounds.  He lost approximately 10 pounds.    On beta-blocker therapy with metoprolol succinate 50 mg daily  ACE inhibitor therapy-lisinopril is on hold due to recent ASHLEY.  On diuretic therapy with torsemide 40 mg daily    4.  CVA: Patient developed diplopia post PCI.  Suspected thromboembolic.   Returned call to pt wife, states pt having right leg numbness, sharp pain. States she reached out to palliative provider, suggested pt be seen by onc provider since they don't want to go to ED. PA will see pt today with labs prior, all at the [facility]. Pt wife states they are on their way now. Expressed appreciation for calling her back and getting him an appt.   Oncology Navigation   Intake  Date of Diagnosis:  (negative path)  Cancer Type: GI  Internal / External Referral: Internal  Date of Referral: 23  Initial Nurse Navigator Contact: 23  Referral to Initial Contact Timeline (days): 0  Date Worked: 23  First Appointment Available: 23  Appointment Date: 23  First Available Date vs. Scheduled Date (days): 3  Reason for Treatment Delay: -- (seeking 2nd opinion @ [facility])     Treatment  Current Status: Staging work-up  Date Presented to Tumor Board: 23    Surgical Oncologist: Nikunj  Consult Date: 23    Medical Oncologist: Dr. Shell  Consult Date: 23  Chemotherapy: Planned  Chemotherapy Regimen: Gemcitabine/Cisplatin       Procedures: Genetic test  Biopsy Schedule Date: 22  CT Schedule Date: 23  EUS / EGD: EUS- 2022  Genetic Testing Date Sent: 23  MRI Schedule Date: 23  Port / PICC Schedule Date: 23             Support Systems: Family members     Acuity  Stage: 2  Systemic Treatment - predicted or initiated: Chemotherapy Regimen with Multiple drugs (+1)  ECO  Comorbidities in Medical History: 2  Support: 0  Transportation: 0  Verbalizes the need for more education: 1  Navigation Acuity: 0     Follow Up  No follow-ups on file.           Cardiac MRI showed acute/subacute infarction dorsolateral pontomedullary and left frontal lobe.  Carotid ultrasound showed moderate plaque in right ICA and mild plaque in left ICA.  Neurology was consulted.    On Plavix and atorvastatin.  Patient is asymptomatic.  Double vision has resolved.    5.  Acute on chronic kidney disease stage IV: Creatinine was up at 5.16.  Suspected contrast-induced nephropathy.  BMP today showed creatinine of 3.21-improving.  Patient was followed by nephrology in the hospital.  Per nephrology note, his baseline creatinine is in 2's.  Lisinopril was discontinued and recommended to resume once creatinine is back to baseline.  Patient was also discussed about progression of his kidney disease to end-stage renal disease and possible dialysis.     6. Hypertension: His blood pressure is well controlled. Currently on metoprolol succinate and torsemide    7.  Diabetes: On  insulin therapy.  Patient has left foot ulcer.  He follows up with podiatrist.  He has left boot on    Plan:  - We discussed the importance of antiplatelet therapy and talking with his cardiologist prior to stopping these medications for any reason.  We discussed about utilization of as needed nitroglycerin.     - Encouraged to seek medical attention if recurrent chest pain or shortness of breath.    - Risk factor modification and lifestyle management topics were discussed including managing comorbidities, weight loss, heart healthy diet, exercise, and stress reduction.      - Cardiac rehab as scheduled/schedule cardiac rehab    - We discussed a diet low in saturated fat, weight loss, and exercise along with medication for better control of cholesterol.  Highly encouraged to participate in nutrition class in cardiac rehab.    - Continue current hypertension regimen    -Given weight loss with significant renal disease, we discussed about weaning down on diuretic therapy.  Patient prefers to stay on current regimen and follow-up  with Dr. Chopra and nephrology.    Follow up with Dr. Chopra as scheduled in September.     History of Present Illness/Subjective    Mr. Dakota Bauer is a 73 year old male with a past medical history of type 1 diabetes mellitus, generalized anxiety disorder, hypothyroidism, obstructive sleep apnea, diabetic neuropathy, chronic kidney disease stage IV, peripheral vascular disease, hypertension, dyslipidemia, and coronary disease with previous CABG and recent hospitalization with left-sided chest pain and nausea secondary to non-STEMI with status post PCI/BINA to proximal to mid graft to distal diagonal who is seen at [facility] for post coronary intervention follow up.     Patient was also found to have strep bacteremia with recent hospitalization in July.  SHAYLEE was negative for infective endocarditis.    Today, Dakota is here accompanied by his spouse.  He denies further chest pain or nausea since recent stent placement.  His double vision has resolved.  He denies fatigue, lightheadedness, shortness of breath, dyspnea on exertion, orthopnea, PND, palpitations, chest pain, abdominal fullness/bloating, and lower extremity edema.  He had bleeding issue from his left arm that was injured from jose thorn.  He stopped aspirin as recommended by vascular team.  He denies recurrent bleeding.  He follows low-sodium diet.  He reports adequate fluid intake.    Coronary Angiogram from 7/27/23: reviewed:    Conclusion    Ost Cx to Prox Cx lesion is 100% stenosed.    Prox LAD lesion is 100% stenosed.    Ramus lesion is 50% stenosed.    Prox RCA to Dist RCA lesion is 30% stenosed.    Prox Graft to Mid Graft lesion is 100% stenosed.     1.  Severe diffuse disease of native vessels with small caliber vessels distally.  2.  Chronic occlusion of native LAD and native left circumflex.  3.  Left main fills small narrow caliber ramus branch only  4.  Right coronary artery has diffuse mild to moderate disease  but no severe stenoses.  5.  Patent saphenous vein graft to high distal lateral wall branch (? Diagonal or marginal branch) which is diffusely diseased. Anastomosis appears normal.  6.  Occluded saphenous vein graft to distal diagonal/lateral wall branches, most likely the acute infarct vessel.  7.  Patent BENITEZ to LAD. Anastomosis appears normal.  8.  Successful PCI saphenous vein graft to distal diagonal/lateral with drug-eluting stent implant x1  9.  Recommend continuing Plavix therapy indefinitely, risk factor management for ASCVD.         Recommendations    General Recommendations:  - Patient given specific instructions regarding care of arteriotomy site, activity restrictions, signs and symptoms of cardiac or vascular complications and to seek immediate medical evaluation should they occur.   - Follow up visit with Nurse Practitioner in 1-2 weeks.     ECHO from 7/27/23-Reviewed:     Interpretation Summary     The left ventricle is normal in size.  There is mild concentric left ventricular hypertrophy.  The visual ejection fraction is 45-50%.  Diastolic Doppler findings (E/E' ratio and/or other parameters) suggest left  ventricular filling pressures are increased.  Poor quality images but suspicion of inferior and apical hypokinesis.  Normal right ventricle size and systolic function.  The right ventricular systolic pressure is approximated at 19mmHg plus the  right atrial pressure.  Sinus rhythm was noted.  Technically difficult, suboptimal study. Consider cardiac MRI for improved  image quality.  Prior echo from 3/8/23 was of better quality. Inferior wll was normal on that  study.     Physical Examination Review of Systems   /70 (BP Location: Right arm, Patient Position: Sitting, Cuff Size: Adult Large)   Pulse 62   Resp 16   Ht 1.829 m (6')   Wt 98.4 kg (217 lb)   SpO2 100%   BMI 29.43 kg/m    Body mass index is 29.43 kg/m .  Wt Readings from Last 3 Encounters:   08/22/23 98.4 kg (217 lb)    08/02/23 100.4 kg (221 lb 6.4 oz)   06/05/23 103.4 kg (228 lb)     General Appearance:   no distress, normal body habitus   ENT/Mouth: membranes moist, no oral lesions or bleeding gums.      EYES:  no scleral icterus, normal conjunctivae   Neck: no carotid bruits or thyromegaly   Chest/Lungs:   lungs are clear to auscultation, no rales or wheezing, equal chest wall expansion    Cardiovascular:   Heart rate regular. Normal first and second heart sounds with no murmurs, rubs, or gallops; the carotid, radial and posterior tibial pulses are intact, JVP is difficult to assess due to the patient's body habitus, 1+ pitting LLE    Abdomen:  no organomegaly, masses, bruits, or tenderness; bowel sounds are present   Extremities  Puncture Site: no cyanosis or clubbing  Right radial site is soft with no bruising.  Radial pulses intact and symmetrical.  CMS intact.   Skin: no xanthelasma, warm.    Neurologic: normal  bilateral, no tremors     Psychiatric: alert and oriented x3, calm                                                        Negative unless noted in HPI     Medical History  Surgical History Family History Social History   Past Medical History:   Diagnosis Date    Chronic kidney disease     Coronary artery disease     Diabetes mellitus (H)     Disorder of thyroid     Hyperlipidemia     Hypertension     Past Surgical History:   Procedure Laterality Date    AMPUTATE TOE(S) Left     BYPASS GRAFT ARTERY CORONARY  01/01/2005    [place] [facility] with Dr. Holter    CARDIAC CATHETERIZATION  01/01/2005    CARDIAC CATHETERIZATION  11/25/2016    no intervention    CERVICAL DISC SURGERY  01/01/2014    CV ANGIOGRAM CORONARY GRAFT N/A 7/27/2023    Procedure: Coronary Angiogram Graft;  Surgeon: Maikel Tripathi MD;  Location: [facility] CATH [facility] CV    CV PCI N/A 7/27/2023    Procedure: Percutaneous Coronary Intervention;  Surgeon: Maikel Tripathi MD;  Location: [facility] CATH LAB CV    IR LOWER EXTREMITY ANGIOGRAM LEFT  4/7/2023    IR  LOWER EXTREMITY ANGIOGRAM LEFT  2020    IR LOWER EXTREMITY ANGIOGRAM LEFT  2023    Family History   Problem Relation Age of Onset    Coronary Artery Disease Father     Coronary Artery Disease Brother     Social History     Socioeconomic History    Marital status:      Spouse name: Not on file    Number of children: Not on file    Years of education: Not on file    Highest education level: Not on file   Occupational History    Not on file   Tobacco Use    Smoking status: Former     Types: Cigarettes     Quit date: 10/10/1984     Years since quittin.8    Smokeless tobacco: Never   Vaping Use    Vaping Use: Never used   Substance and Sexual Activity    Alcohol use: Not Currently    Drug use: Never    Sexual activity: Not on file   Other Topics Concern    Not on file   Social History Narrative    Not on file     Social Determinants of Health     Financial Resource Strain: Not on file   Food Insecurity: Not on file   Transportation Needs: Not on file   Physical Activity: Not on file   Stress: Not on file   Social Connections: Not on file   Intimate Partner Violence: Not on file   Housing Stability: Not on file          Medications  Allergies   Current Outpatient Medications   Medication Sig Dispense Refill    ALPRAZolam (XANAX) 0.5 MG tablet Take 1 tablet (0.5 mg) by mouth At Bedtime 6 tablet 0    atorvastatin (LIPITOR) 40 MG tablet Take 40 mg by mouth daily      clopidogrel (PLAVIX) 75 MG tablet Take 1 tablet (75 mg) by mouth daily Dose to start tomorrow. 90 tablet 3    Continuous Blood Gluc Sensor (FREESTYLE DAVID 14 DAY SENSOR) MISC       ezetimibe (ZETIA) 10 MG tablet Take 1 tablet (10 mg) by mouth daily 90 tablet 4    fish oil-omega-3 fatty acids 1000 MG capsule Take 1 capsule by mouth daily      fluticasone (FLONASE) 50 MCG/ACT nasal spray Spray 2 sprays into both nostrils daily For nasal congestion 16 g 1    gabapentin (NEURONTIN) 300 MG capsule [GABAPENTIN (NEURONTIN) 300 MG CAPSULE] Take 300  mg by mouth 3 (three) times a day.             HUMALOG 100 unit/mL injection Inject Subcutaneous 3 times daily (before meals) Carb count: 3 g carb : 1 unit insulin      insulin glargine (LANTUS) 100 unit/mL injection [INSULIN GLARGINE (LANTUS) 100 UNIT/ML INJECTION] Inject 30 Units under the skin every morning.       levothyroxine (SYNTHROID/LEVOTHROID) 125 MCG tablet Take 125 mcg by mouth daily      metoprolol succinate ER (TOPROL XL) 50 MG 24 hr tablet Take 1 tablet (50 mg) by mouth daily 90 tablet 2    multivitamin with iron (ONE DAILY WITH IRON) Tab tablet [MULTIVITAMIN WITH IRON (ONE DAILY WITH IRON) TAB TABLET] Take 1 tablet by mouth daily.      TECHLITE PEN NEEDLES 31G X 5 MM miscellaneous       torsemide 40 MG TABS Take 40 mg by mouth daily 30 tablet 1    VITAMIN D3 2,000 unit capsule [VITAMIN D3 2,000 UNIT CAPSULE] Take 2,000 Units by mouth daily.  3    aspirin 81 MG EC tablet Take 1 tablet (81 mg) by mouth daily Start tomorrow. (Patient not taking: Reported on 8/22/2023) 30 tablet 3    nitroGLYcerin (NITROSTAT) 0.4 MG sublingual tablet Place 1 tablet (0.4 mg) under the tongue every 5 minutes as needed (Patient not taking: Reported on 8/22/2023) 25 tablet 3    Allergies   Allergen Reactions    Hydrochlorothiazide Unknown    Levofloxacin Diarrhea         Lab Results    Chemistry/lipid CBC Cardiac Enzymes/BNP/TSH/INR   Lab Results   Component Value Date    CHOL 96 06/16/2023    HDL 34 (L) 06/16/2023    TRIG 75 06/16/2023    BUN 42.6 (H) 08/22/2023     (L) 08/22/2023    CO2 24 08/22/2023    Lab Results   Component Value Date    WBC 5.3 08/01/2023    HGB 9.8 (L) 08/01/2023    HCT 29.5 (L) 08/01/2023    MCV 91 08/01/2023     (L) 08/01/2023    Lab Results   Component Value Date    TROPONINI 0.13 06/02/2021    TSH 1.68 06/16/2023    INR 1.00 06/21/2023        50 minutes spent on the date of encounter doing chart review, review of test results, interpretation with above tests, patient visit,  documentation, and discussion with family.        This note has been dictated using voice recognition software. Any grammatical, typographical, or context distortions are unintentional and inherent to the software           Thank you for allowing me to participate in the care of your patient.      Sincerely,     RA[facility]  cc:   Geronimo Montoya MD  [address], [address]  [place] [address]

## 2023-08-22 NOTE — PRE-PROCEDURE
Chart reviewed, recent physical exams reviewed.    Posterior pharynx only partially visible.  Chest clear to auscultation.    Low risk for moderate sedation.   18

## 2023-08-22 NOTE — PROGRESS NOTES
Assessment/Recommendations   Assessment:    1.  Coronary artery disease: Patient was hospitalized from July 27 through second with left-sided chest pain secondary to non-STEMI.      Coronary angiogram on 7/27/2023 showed 100% occlusion in proximal graft to mid graft lesion to second diagonal.  Lesion was successfully treated with a drug-eluting stent.  Coronary angiogram also showed patent LIMA to LAD and  Patent SVG to high distal lateral wall branch, 30% stenosis in proximal to distal RCA and 50% stenosis in ramus.  (see angio result for detail).    On Clopidogrel (Plavix) 75 mg daily.  Patient was recommended to stay on Plavix indefinitely.  Patient denies any recurrent left-sided chest pain or nausea since recent stent placement.    Patient had issue with bleeding from his right arm injury resulted from a jose thorn.  Patient went to urgent care.  Patient saw vascular team yesterday and was taken off of aspirin and recommend stay on clopidogrel.    Cardiac rehab has been ordered/scheduled    2.  Dyslipidemia with LDL goal <70/Obesity with a BMI of: Dakota Bauer is on high intensity statin therapy with 40 mg daily and Zetia 10 mg daily.. Most recent LDL is 47 in June 2023-at goal    3.  Heart failure with mildly reduced ejection fraction with LVEF of 45 to 50%:  Patient is well compensated on exam except noted mild left lower extremity edema.  He wears compression stocking at home.    Current home weight is 209 pounds.  He lost approximately 10 pounds.    On beta-blocker therapy with metoprolol succinate 50 mg daily  ACE inhibitor therapy-lisinopril is on hold due to recent ASHLEY.  On diuretic therapy with torsemide 40 mg daily    4.  CVA: Patient developed diplopia post PCI.  Suspected thromboembolic.  Cardiac MRI showed acute/subacute infarction dorsolateral pontomedullary and left frontal lobe.  Carotid ultrasound showed moderate plaque in right ICA and mild plaque in left ICA.  Neurology was  consulted.    On Plavix and atorvastatin.  Patient is asymptomatic.  Double vision has resolved.    5.  Acute on chronic kidney disease stage IV: Creatinine was up at 5.16.  Suspected contrast-induced nephropathy.  BMP today showed creatinine of 3.21-improving.  Patient was followed by nephrology in the hospital.  Per nephrology note, his baseline creatinine is in 2's.  Lisinopril was discontinued and recommended to resume once creatinine is back to baseline.  Patient was also discussed about progression of his kidney disease to end-stage renal disease and possible dialysis.     6. Hypertension: His blood pressure is well controlled. Currently on metoprolol succinate and torsemide    7.  Diabetes: On  insulin therapy.  Patient has left foot ulcer.  He follows up with podiatrist.  He has left boot on    Plan:  - We discussed the importance of antiplatelet therapy and talking with his cardiologist prior to stopping these medications for any reason.  We discussed about utilization of as needed nitroglycerin.     - Encouraged to seek medical attention if recurrent chest pain or shortness of breath.    - Risk factor modification and lifestyle management topics were discussed including managing comorbidities, weight loss, heart healthy diet, exercise, and stress reduction.      - Cardiac rehab as scheduled/schedule cardiac rehab    - We discussed a diet low in saturated fat, weight loss, and exercise along with medication for better control of cholesterol.  Highly encouraged to participate in nutrition class in cardiac rehab.    - Continue current hypertension regimen    -Given weight loss with significant renal disease, we discussed about weaning down on diuretic therapy.  Patient prefers to stay on current regimen and follow-up with Dr. Chopra and nephrology.    Follow up with Dr. Chopra as scheduled in September.     History of Present Illness/Subjective    Mr. Dakota Bauer is a 73 year old male with a past medical  history of type 1 diabetes mellitus, generalized anxiety disorder, hypothyroidism, obstructive sleep apnea, diabetic neuropathy, chronic kidney disease stage IV, peripheral vascular disease, hypertension, dyslipidemia, and coronary disease with previous CABG and recent hospitalization with left-sided chest pain and nausea secondary to non-STEMI with status post PCI/BINA to proximal to mid graft to distal diagonal who is seen at [facility] for post coronary intervention follow up.     Patient was also found to have strep bacteremia with recent hospitalization in July.  SHAYLEE was negative for infective endocarditis.    Today, Dakota is here accompanied by his spouse.  He denies further chest pain or nausea since recent stent placement.  His double vision has resolved.  He denies fatigue, lightheadedness, shortness of breath, dyspnea on exertion, orthopnea, PND, palpitations, chest pain, abdominal fullness/bloating, and lower extremity edema.  He had bleeding issue from his left arm that was injured from jose thorn.  He stopped aspirin as recommended by vascular team.  He denies recurrent bleeding.  He follows low-sodium diet.  He reports adequate fluid intake.    Coronary Angiogram from 7/27/23: reviewed:    Conclusion    Ost Cx to Prox Cx lesion is 100% stenosed.    Prox LAD lesion is 100% stenosed.    Ramus lesion is 50% stenosed.    Prox RCA to Dist RCA lesion is 30% stenosed.    Prox Graft to Mid Graft lesion is 100% stenosed.     1.  Severe diffuse disease of native vessels with small caliber vessels distally.  2.  Chronic occlusion of native LAD and native left circumflex.  3.  Left main fills small narrow caliber ramus branch only  4.  Right coronary artery has diffuse mild to moderate disease but no severe stenoses.  5.  Patent saphenous vein graft to high distal lateral wall branch (? Diagonal or marginal branch) which is diffusely diseased. Anastomosis appears normal.  6.   Occluded saphenous vein graft to distal diagonal/lateral wall branches, most likely the acute infarct vessel.  7.  Patent BENITEZ to LAD. Anastomosis appears normal.  8.  Successful PCI saphenous vein graft to distal diagonal/lateral with drug-eluting stent implant x1  9.  Recommend continuing Plavix therapy indefinitely, risk factor management for ASCVD.         Recommendations    General Recommendations:  - Patient given specific instructions regarding care of arteriotomy site, activity restrictions, signs and symptoms of cardiac or vascular complications and to seek immediate medical evaluation should they occur.   - Follow up visit with Nurse Practitioner in 1-2 weeks.     ECHO from 7/27/23-Reviewed:     Interpretation Summary     The left ventricle is normal in size.  There is mild concentric left ventricular hypertrophy.  The visual ejection fraction is 45-50%.  Diastolic Doppler findings (E/E' ratio and/or other parameters) suggest left  ventricular filling pressures are increased.  Poor quality images but suspicion of inferior and apical hypokinesis.  Normal right ventricle size and systolic function.  The right ventricular systolic pressure is approximated at 19mmHg plus the  right atrial pressure.  Sinus rhythm was noted.  Technically difficult, suboptimal study. Consider cardiac MRI for improved  image quality.  Prior echo from 3/8/23 was of better quality. Inferior wll was normal on that  study.     Physical Examination Review of Systems   /70 (BP Location: Right arm, Patient Position: Sitting, Cuff Size: Adult Large)   Pulse 62   Resp 16   Ht 1.829 m (6')   Wt 98.4 kg (217 lb)   SpO2 100%   BMI 29.43 kg/m    Body mass index is 29.43 kg/m .  Wt Readings from Last 3 Encounters:   08/22/23 98.4 kg (217 lb)   08/02/23 100.4 kg (221 lb 6.4 oz)   06/05/23 103.4 kg (228 lb)     General Appearance:   no distress, normal body habitus   ENT/Mouth: membranes moist, no oral lesions or bleeding gums.       EYES:  no scleral icterus, normal conjunctivae   Neck: no carotid bruits or thyromegaly   Chest/Lungs:   lungs are clear to auscultation, no rales or wheezing, equal chest wall expansion    Cardiovascular:   Heart rate regular. Normal first and second heart sounds with no murmurs, rubs, or gallops; the carotid, radial and posterior tibial pulses are intact, JVP is difficult to assess due to the patient's body habitus, 1+ pitting LLE    Abdomen:  no organomegaly, masses, bruits, or tenderness; bowel sounds are present   Extremities  Puncture Site: no cyanosis or clubbing  Right radial site is soft with no bruising.  Radial pulses intact and symmetrical.  CMS intact.   Skin: no xanthelasma, warm.    Neurologic: normal  bilateral, no tremors     Psychiatric: alert and oriented x3, calm                                                        Negative unless noted in HPI     Medical History  Surgical History Family History Social History   Past Medical History:   Diagnosis Date    Chronic kidney disease     Coronary artery disease     Diabetes mellitus (H)     Disorder of thyroid     Hyperlipidemia     Hypertension     Past Surgical History:   Procedure Laterality Date    AMPUTATE TOE(S) Left     BYPASS GRAFT ARTERY CORONARY  01/01/2005    [place] with Dr. Holter    CARDIAC CATHETERIZATION  01/01/2005    CARDIAC CATHETERIZATION  11/25/2016    no intervention    CERVICAL DISC SURGERY  01/01/2014    CV ANGIOGRAM CORONARY GRAFT N/A 7/27/2023    Procedure: Coronary Angiogram Graft;  Surgeon: Maikel Tripathi MD;  Location: [facility] CATH [facility] CV    CV PCI N/A 7/27/2023    Procedure: Percutaneous Coronary Intervention;  Surgeon: Maikel Tripathi MD;  Location: [facility] CATH [facility] CV    IR LOWER EXTREMITY ANGIOGRAM LEFT  4/7/2023    IR LOWER EXTREMITY ANGIOGRAM LEFT  4/8/2020    IR LOWER EXTREMITY ANGIOGRAM LEFT  6/21/2023    Family History   Problem Relation Age of Onset    Coronary Artery Disease Father     Coronary  Artery Disease Brother     Social History     Socioeconomic History    Marital status:      Spouse name: Not on file    Number of children: Not on file    Years of education: Not on file    Highest education level: Not on file   Occupational History    Not on file   Tobacco Use    Smoking status: Former     Types: Cigarettes     Quit date: 10/10/1984     Years since quittin.8    Smokeless tobacco: Never   Vaping Use    Vaping Use: Never used   Substance and Sexual Activity    Alcohol use: Not Currently    Drug use: Never    Sexual activity: Not on file   Other Topics Concern    Not on file   Social History Narrative    Not on file     Social Determinants of Health     Financial Resource Strain: Not on file   Food Insecurity: Not on file   Transportation Needs: Not on file   Physical Activity: Not on file   Stress: Not on file   Social Connections: Not on file   Intimate Partner Violence: Not on file   Housing Stability: Not on file          Medications  Allergies   Current Outpatient Medications   Medication Sig Dispense Refill    ALPRAZolam (XANAX) 0.5 MG tablet Take 1 tablet (0.5 mg) by mouth At Bedtime 6 tablet 0    atorvastatin (LIPITOR) 40 MG tablet Take 40 mg by mouth daily      clopidogrel (PLAVIX) 75 MG tablet Take 1 tablet (75 mg) by mouth daily Dose to start tomorrow. 90 tablet 3    Continuous Blood Gluc Sensor (FREESTYLE DAVID 14 DAY SENSOR) MISC       ezetimibe (ZETIA) 10 MG tablet Take 1 tablet (10 mg) by mouth daily 90 tablet 4    fish oil-omega-3 fatty acids 1000 MG capsule Take 1 capsule by mouth daily      fluticasone (FLONASE) 50 MCG/ACT nasal spray Spray 2 sprays into both nostrils daily For nasal congestion 16 g 1    gabapentin (NEURONTIN) 300 MG capsule [GABAPENTIN (NEURONTIN) 300 MG CAPSULE] Take 300 mg by mouth 3 (three) times a day.             HUMALOG 100 unit/mL injection Inject Subcutaneous 3 times daily (before meals) Carb count: 3 g carb : 1 unit insulin      insulin glargine  (LANTUS) 100 unit/mL injection [INSULIN GLARGINE (LANTUS) 100 UNIT/ML INJECTION] Inject 30 Units under the skin every morning.       levothyroxine (SYNTHROID/LEVOTHROID) 125 MCG tablet Take 125 mcg by mouth daily      metoprolol succinate ER (TOPROL XL) 50 MG 24 hr tablet Take 1 tablet (50 mg) by mouth daily 90 tablet 2    multivitamin with iron (ONE DAILY WITH IRON) Tab tablet [MULTIVITAMIN WITH IRON (ONE DAILY WITH IRON) TAB TABLET] Take 1 tablet by mouth daily.      TECHLITE PEN NEEDLES 31G X 5 MM miscellaneous       torsemide 40 MG TABS Take 40 mg by mouth daily 30 tablet 1    VITAMIN D3 2,000 unit capsule [VITAMIN D3 2,000 UNIT CAPSULE] Take 2,000 Units by mouth daily.  3    aspirin 81 MG EC tablet Take 1 tablet (81 mg) by mouth daily Start tomorrow. (Patient not taking: Reported on 8/22/2023) 30 tablet 3    nitroGLYcerin (NITROSTAT) 0.4 MG sublingual tablet Place 1 tablet (0.4 mg) under the tongue every 5 minutes as needed (Patient not taking: Reported on 8/22/2023) 25 tablet 3    Allergies   Allergen Reactions    Hydrochlorothiazide Unknown    Levofloxacin Diarrhea         Lab Results    Chemistry/lipid CBC Cardiac Enzymes/BNP/TSH/INR   Lab Results   Component Value Date    CHOL 96 06/16/2023    HDL 34 (L) 06/16/2023    TRIG 75 06/16/2023    BUN 42.6 (H) 08/22/2023     (L) 08/22/2023    CO2 24 08/22/2023    Lab Results   Component Value Date    WBC 5.3 08/01/2023    HGB 9.8 (L) 08/01/2023    HCT 29.5 (L) 08/01/2023    MCV 91 08/01/2023     (L) 08/01/2023    Lab Results   Component Value Date    TROPONINI 0.13 06/02/2021    TSH 1.68 06/16/2023    INR 1.00 06/21/2023        50 minutes spent on the date of encounter doing chart review, review of test results, interpretation with above tests, patient visit, documentation, and discussion with family.        This note has been dictated using voice recognition software. Any grammatical, typographical, or context distortions are unintentional and  inherent to the software

## 2023-08-22 NOTE — PATIENT INSTRUCTIONS
Dakota Bauer,    It was a pleasure to see you today at the [facility].     My recommendations after this visit include:    - May stop fish oil due to blood thinning effect and recent bleeding issue    - We will follow up with you once your lab result is back     - Please seek medical attention if you develop recurrent chest pain or shortness of breath or similar symptoms you experienced prior to recent cardiac event    -Continue on a low-sodium diet less than 2000 mg/day, daily weight monitoring, and maintain fluid intake at 50-60 ounces per day    - Cardiac rehab as scheduled    - Follow up with Dr. Chopra in September     - Please call [phone], if you have any questions or concerns    Huma Negrete CNP    Medication     Take all your medications as prescribed  Do not stop any medications without talking with a healthcare provider    Exercise      Physical activity is important for overall health  Set a goal of 150 minutes of exercise each week  For example, 30 minutes of exercise 5 days each week.    These 30 minutes can be broken into shorter periods of 15 minutes twice daily or 10 minutes three times daily  Start any exercise program slowly and work towards the goal of 150 minutes each week  For example, you may start with 10 minutes and plan to add a few minutes each week as you get stronger   Examples of exercise include walking, swimming, or biking  Remember to stretch and stay hydrated with exercise    Diet     A heart healthy diet includes:  A variety of fruits and vegetables  Whole grains  Low-fat dairy (fat-free, 1% fat, and low-fat)  Lean meats and poultry without skin   Fish (eat fish 2 times each week)  Nuts  Limit saturated fat to about 13 grams each day (based on a 2000 calorie diet)  Limit red meat  Limit sugars (sweets and sugary beverages)  Limit your portion sizes  Do not add salt to your food when cooking or at the table  Limit alcohol intake (no more than 1 drink  each day for women or 2 drinks each day for men)    Weight Loss     Work on losing weight with diet and exercise  You BMI (body mass index) should be between 18.5-24.9  This is a calculation of your weight and height  Please ask your healthcare provider for your BMI    Manage Other Chronic Health Conditions     Control cholesterol  Eat a diet low in saturated fat  Exercise   Take a statin medication as prescribed  Manage blood pressure  Eat a diet low in sodium  Exercise  Reduce stress  Lose weight   Take blood pressure medications as prescribed  Control blood sugars if diabetic  Monitor sugars and carbohydrates in your diet  Lose weight   Take diabetes medications as prescribed  Follow-up with your primary care provider to make sure your blood sugars are well controlled    Stress Reduction     Find time each day to relax  Reading, listening to music, yoga, meditation, exercise, spending time with friends and family, volunteering   Get 6-8 hours of sleep each night    Smoking Cessation     Smoking causes numerous health problems including coronary artery disease  It is never too late to quit  Set realistic goals for quitting  Decrease the number of cigarettes used each week  Use nicotine gum or patches to help you quit    Information from the American Heart Association.  Please visit their website at www.heart.org      What is the [facility]?     The [facility] is aheart failure specialty clinic within [facility].  You will work with your cardiologist, nurse practitioner, and nurses to carefully adjust medications and learn how to live with heart failure.  The Heart FailureProgram will help you:    Better understand your chronic heart condition  Feel better and avoid hospital stays    Monitoring for Symptoms      Call the Heart Failure Phone Line ([phone]) if you have any of these symptoms:   Increased shortness of breath/shortness ofbreath at rest  Waking  up at night with difficulty breathing  Unable to lie down for sleep due to symptoms or needing to sit uprightfor sleep  Weight gain of 2 pounds a day for 2 days in a row OR 5 pounds in 1 week  Increased swelling in your ankles or legs  Dizziness or lightheadedness    Medications     Take your medications as prescribed  Bring all your medications in their original bottles to every appointment  Avoid non-steroidal anti-inflammatory medications (Advil,Aleve, Ibuprofen, Naprosyn, Naproxen, Celebrex)  Do not stop taking your medications or begin taking over-the-counter or herbal medications without first talking to your doctor ornurse practitioner    Diet and Lifestyle     Limit sodium/salt to 2000 mg daily   Read food labels for sodium content  Do not add salt when cooking or add salt at the table  Weigh yourself every day and record in your daily weight log   Call if you gain 2 pounds a day for 2 days in a row OR 5 pounds in 1 week  Bring daily weight log to every appointment  Stay active, pace yourself, listen to yourbody, and rest when tired  Elevate your legs if they are swollen. Ask about using compression/support stockings  Stop smoking  Lose weight if you are overweight  Avoid drinking alcohol or limit amount  Stay updated on your immunizations including flu and pneumonia vaccines

## 2023-09-06 NOTE — LETTER
9/6/2023    Jamal Gaffney MD  [address]  [facility] [address]    RE: Dakota Lizette       Dear Colleague,     I had the pleasure of [facility].    HEART CARE ENCOUNTER CONSULTATON NOTE      [facility]  [phone]      Assessment/Recommendations   Assessment/Plan:  1.  Coronary artery disease.  Patient hospitalized in July 2023 with non-ST segment myocardial infarction.  Coronary angiogram revealed 100% occlusion in the proximal graft with a second diagonal branch which was successfully treated with drug-eluting stent.  Coronary angiogram showed patent LIMA to the LAD and patent vein graft to the high distal lateral wall branch, 30% stenosis in the proximal distal RCA and 50% in the ramus intermedius.  Course complicated as outlined below with strep  bacteremia and CVA.  He is doing well without anginal chest discomfort with plans to initiate cardiac rehab in the near future.    2.  Dyslipidemia.  Cholesterol numbers are from June 2023 at which time total cholesterol was 96, triglycerides 75, LDL 47.  3.  Renal insufficiency.  He is followed by nephrology.  Most recent laboratory studies are reviewed.  Creatinine August 22, 2023 showed a downward trend with a creatinine now 3.21 compared to 4.011 previous.  He has follow-up next month with nephrology.  4.  Heart failure with mildly reduced ejection fraction.  Echocardiogram completed in July revealed an ejection fraction of 45 to 50%.  The transesophageal echocardiogram reported EF of 50 to 55%.  Study was said to be technically difficult.  Inferior wall motion abnormality was noted.  No symptoms of heart failure.  He is off lisinopril and will discuss with nephrology next month the potential for resumption of ACE inhibitor or ARB.    Plan 1.  ECG today.  2.  Blood pressure monitoring and report  3.  Cardiac rehabilitation  4.  Follow-up in 2 to 3 months.       History of Present  Illness/Subjective    HPI: Dakota Bauer is a 73 year old male who is seen in follow up.  Chart records are reviewed from recent hospitalization with a hospital course discharge summary outlined below.  He had been experiencing some progressive chest discomfort and shortness of breath and presented increasing symptoms of exertional dyspnea and chest tightness.  He underwent coronary angiography as outlined below with drug-eluting stent to a saphenous vein graft as outlined in the cath report.  Course was complicated by strep bacteremia with SHAYLEE performed during the admission and CVA.  He was seen August 22 and the nurse practitioner clinic    He reports that he has been feeling well.  No reoccurrence of anginal symptoms and symptoms of shortness of breath continue to be improved.  Mild occasional lightheadedness upon standing but stable.  Symptoms of double vision have resolved.  His wife notices some slight slurred speech when he is tired but otherwise stable symptoms.  He has been active despite the boot on the left lower extremity.  He will be participating in cardiac rehabilitation in the near future.  I reviewed the recent test results and medications with him today.  Hospital Course  72 year old male was on 7/27. During the hospital stay the patient was treated for an NSTEMI and had an angiogram on 7/27 with BINA to SVG-distal diagonal/lateral.  Also had acute hypoxic respiratory failure which resolved.  Additionally was treated for strep dysgalactiae bacteremia with IV ceftriaxone and switched to cefdinir on discharge; was seen by ID.  SHAYLEE was negative for infective endocarditis.  Also had an ASHLEY based on CKD, presumed to be due to contrast-induced nephropathy and was seen by nephrology.  Additionally also was noted to have CVA which is thought to be embolic, had work-up done, and was seen by neurology.  Started on aspirin and Plavix both for CVA and and given recent stenting.       Recent Echocardiogram  Results:  Reading Physician Reading Date Result Priority   Rony Sloan MD  [phone] 2023      Narrative & Impression  [id number]  [facility]  [id number]  [id number]^APURVA^NAY^[place] [address]     Name: DENY MONTERO  MRN: [id number]  : 1950  Study Date: 2023 10:42 AM  Age: 72 yrs  Gender: Male  Patient Location: [facility]  Reason For Study: CVA, Endocarditis  Ordering Physician: NAY MIXON  Referring Physician: GUME SOTO  Performed By: TJ-BP     BSA: 2.3 m2  Height: 72 in  Weight: 231 lb  HR: 64  BP: 149/69 mmHg  ______________________________________________________________________________  Procedure  Complete SHAYLEE Adult.  ______________________________________________________________________________  Interpretation Summary     The left ventricle is normal in size.  The visual ejection fraction is 50-55%.  No regional wall motion abnormalities noted.  Normal right ventricle size and systolic function.  There is no color Doppler evidence of a PFO.  There is moderate (2+) mitral regurgitation.  The right ventricular systolic pressure is approximated at 46mmHg plus the  right atrial pressure.  No valvular vegetations noted.  ______________________________________________________________________________  SHAYLEE  Consent to the procedure was obtained prior to sedation. Prior to the exam,  the oral cavity was checked and no overcrowding was noted. The transesophageal  probe was passed without difficulty. There were no complications associated  with this procedure. Patient was sedated using Versed 1 mg. The heart rate,  respiratory rate, oxygen saturations, blood pressure, and response to care  were monitored throughout the procedure with the assistance of the nurse.  Patient was sedated using Fentanyl 50 mcg. 15 ml viscous Lidocaine, 0.5 ml  Benzocaine spray. I determined this patient to be an appropriate candidate for  the planned  sedation and procedure and have reassessed the patient immediately  prior to sedation and procedure. Total sedation time: 15 minutes of continuous  bedside 1:1 monitoring.     Left Ventricle  The left ventricle is normal in size. The visual ejection fraction is 50-55%.  No regional wall motion abnormalities noted.     Right Ventricle  Normal right ventricle size and systolic function.     Atria  The left atrium is mild to moderately dilated. A contrast injection (Bubble  Study) was performed that was negative for flow across the interatrial septum.  There is no color Doppler evidence of a PFO. No thrombus is detected in the  left atrial appendage.     Mitral Valve  The mitral valve leaflets are mildly thickened. There is moderate (2+) mitral  regurgitation. There is no mitral valve stenosis.     Tricuspid Valve  The tricuspid valve is normal in structure and function. There is mild (1+)  tricuspid regurgitation. The right ventricular systolic pressure is  approximated at 46mmHg plus the right atrial pressure.     Aortic Valve  There is moderate trileaflet aortic sclerosis. There is trace aortic  regurgitation. No aortic stenosis is present.     Pulmonic Valve  The pulmonic valve is not well visualized. There is mild (1+) pulmonic  valvular regurgitation.     Vessels  Inferior vena cava not well visualized for estimation of right atrial  pressure.     Pericardial/Pleural  No pericardial thickening.     Rhythm  The rhythm was sinus bradycardia.    Study Result    Narrative & Impression   EXAM: US CAROTID BILATERAL  LOCATION: [facility]  DATE: 7/30/2023     INDICATION: cva  COMPARISON: MRI brain 07/29/2023, carotid duplex 11/25/2016  TECHNIQUE: Duplex exam performed utilizing 2D gray-scale imaging, Doppler interrogation with color-flow and spectral waveform analysis. The percent diameter stenosis is determined using NASCET criteria and Society of Radiologists in Ultrasound Consensus    Criteria.     FINDINGS:     RIGHT: Moderate plaque at the bifurcation. The highest peak systolic velocity in the ICA is 153 cm/sec, consistent with 50-69% stenosis. Previously highest ICA velocity was 122 cm/s. Although there is not severe velocity elevation in the external carotid   artery, there is turbulence at the external carotid ostium and poststenotic waveforms in the distal external carotid, suggestive of significant ostial external carotid stenosis. Previous exams demonstrate significant external carotid velocity   elevations. Antegrade flow within the vertebral artery.      LEFT: Mild plaque at the bifurcation. The peak systolic velocity in the ICA is less than 125 cm/sec, consistent with less than 50% stenosis. Progressive external external carotid velocity elevation, suggestive of of significant external carotid stenosis.   Antegrade flow within the vertebral artery.     VELOCITY CHART:  CCA   Right: 58/17 cm/s   Left: 81/13 cm/s  ICA   Right: 153/36 cm/s   Left: 116/29 cm/s  ECA   Right: 180/23 cm/s   Left: 270 cm/s  ICA/CCA PSV Ratio   Right: 2.6   Left: 1.4                                                                      IMPRESSION:  1.  Right carotid: Moderate plaque remission. Progressive ICA velocity elevation consistent with 50-69% stenosis in the right internal carotid artery. Significant external carotid stenosis.  2.  Left carotid: Mild plaque formation, velocities consistent with less than 50% stenosis in the left internal carotid artery. Significant, progressive external carotid carotid stenosis.  3.  Flow within the vertebral arteries is antegrade.     Recent Coronary Angiogram Results:  Panel Physicians Referring Physician Case Authorizing Physician   Maikel Tripathi MD (Primary) Deonte Solorzano MD Cliffe, Charles M, MD     Procedures    Panel 1    Primary Surgeon: Maikel Tripathi MD   Procedure: Coronary Angiogram Graft    Percutaneous Coronary Intervention         Indications    NSTEMI (non-ST elevated myocardial infarction) (H) [I21.4 (ICD-10-CM)]   CKD (chronic kidney disease) stage 4, GFR 15-29 ml/min (H) [N18.4 (ICD-10-CM)]     Comments/Patient Narrative    ED admit with unstable angina/NSTEMI     Pre Procedure Diagnosis    nstemi    Post Procedure Diagnosis    same      Conclusion         Ost Cx to Prox Cx lesion is 100% stenosed.    Prox LAD lesion is 100% stenosed.    Ramus lesion is 50% stenosed.    Prox RCA to Dist RCA lesion is 30% stenosed.    Prox Graft to Mid Graft lesion is 100% stenosed.     1.  Severe diffuse disease of native vessels with small caliber vessels distally.  2.  Chronic occlusion of native LAD and native left circumflex.  3.  Left main fills small narrow caliber ramus branch only  4.  Right coronary artery has diffuse mild to moderate disease but no severe stenoses.  5.  Patent saphenous vein graft to high distal lateral wall branch (? Diagonal or marginal branch) which is diffusely diseased. Anastomosis appears normal.  6.  Occluded saphenous vein graft to distal diagonal/lateral wall branches, most likely the acute infarct vessel.  7.  Patent BENITEZ to LAD. Anastomosis appears normal.  8.  Successful PCI saphenous vein graft to distal diagonal/lateral with drug-eluting stent implant x1  9.  Recommend continuing Plavix therapy indefinitely, risk factor management for ASCVD.       Narrative & Impression   EXAM: MR BRAIN W/O CONTRAST  LOCATION: [facility]  DATE: 7/29/2023     INDICATION: double vision. post coronary angiogram. r o CVA. CKD stage IV.  COMPARISON: None.  TECHNIQUE: Routine multiplanar multisequence head MRI without intravenous contrast.     FINDINGS:  INTRACRANIAL CONTENTS: Small acute to subacute infarction along the dorsal left pontomedullary junction. A tiny, punctate focus of infarction in the left frontal lobe (series 7 image 41). On coronal diffusion weighted imaging there is a questionable area   of  punctate infarction versus artifact in the right para midline cerebellum (series 14 image 34). No mass, acute hemorrhage, or extra-axial fluid collections. Scattered nonspecific T2/FLAIR hyperintensities within the cerebral white matter most   consistent with mild chronic microvascular ischemic change. Mild generalized cerebral atrophy. No hydrocephalus. Normal position of the cerebellar tonsils.      SELLA: No abnormality accounting for technique.     OSSEOUS STRUCTURES/SOFT TISSUES: Normal marrow signal. The major intracranial vascular flow voids are maintained.      ORBITS: No abnormality accounting for technique.      SINUSES/MASTOIDS: Moderate mucosal thickening of the left maxillary sinus. No middle ear or mastoid effusion.    wh                                                                   IMPRESSION:  1.  Small foci of acute to subacute infarction involving the dorsal left pontomedullary junction and left frontal lobe.  2.  Questionable punctate focus of infarction in the right para midline cerebellum seen on coronal diffusion weighted imaging only.  3.  Mild age-related changes as above.        Physical Examination  Review of Systems   Vitals: 150/58 upon arrival, 134/62 during my examination, weight 216 pounds, heart rate of 60 and regular.  Wt Readings from Last 3 Encounters:   08/22/23 98.4 kg (217 lb)   08/02/23 100.4 kg (221 lb 6.4 oz)   06/05/23 103.4 kg (228 lb)       General Appearance:   no distress, normal body habitus   ENT/Mouth: membranes moist, no oral lesions or bleeding gums.      EYES:  no scleral icterus, normal conjunctivae   Neck: no carotid bruits    Chest/Lungs:   lungs are clear to auscultation, no rales or wheezing, equal chest wall expansion    Cardiovascular:   Regular. Normal first and second heart sounds with soft systolic murmur rubs, or gallops; the carotid, radial and posterior tibial pulses are intact, Jugular venous pressure is not increased.,  Mild edema bilaterally,  boot on the left lower extremity   Abdomen:  no , bruits, or tenderness; bowel sounds are present   Extremities: no cyanosis or clubbing, boot on the left lower extremity   Skin: no xanthelasma, warm.    Neurologic: , no tremors     Psychiatric: alert and oriented x3, calm        Please refer above for cardiac ROS details.        Medical History  Surgical History Family History Social History   Past Medical History:   Diagnosis Date    Chronic kidney disease     Coronary artery disease     Diabetes mellitus (H)     Disorder of thyroid     Hyperlipidemia     Hypertension      Past Surgical History:   Procedure Laterality Date    AMPUTATE TOE(S) Left     BYPASS GRAFT ARTERY CORONARY  2005    [place] with Dr. Holter    CARDIAC CATHETERIZATION  2005    CARDIAC CATHETERIZATION  2016    no intervention    CERVICAL DISC SURGERY  2014    CV ANGIOGRAM CORONARY GRAFT N/A 2023    Procedure: Coronary Angiogram Graft;  Surgeon: Maikel Tripathi MD;  Location: [facility] CATH LAB CV    CV PCI N/A 2023    Procedure: Percutaneous Coronary Intervention;  Surgeon: Maikel Tripathi MD;  Location: [facility] CATH LAB CV    IR LOWER EXTREMITY ANGIOGRAM LEFT  2023    IR LOWER EXTREMITY ANGIOGRAM LEFT  2020    IR LOWER EXTREMITY ANGIOGRAM LEFT  2023     Family History   Problem Relation Age of Onset    Coronary Artery Disease Father     Coronary Artery Disease Brother         Social History     Socioeconomic History    Marital status:      Spouse name: Not on file    Number of children: Not on file    Years of education: Not on file    Highest education level: Not on file   Occupational History    Not on file   Tobacco Use    Smoking status: Former     Types: Cigarettes     Quit date: 10/10/1984     Years since quittin.9    Smokeless tobacco: Never   Vaping Use    Vaping Use: Never used   Substance and Sexual Activity    Alcohol use: Not Currently    Drug use: Never    Sexual  activity: Not on file   Other Topics Concern    Not on file   Social History Narrative    Not on file     Social Determinants of Health     Financial Resource Strain: Not on file   Food Insecurity: Not on file   Transportation Needs: Not on file   Physical Activity: Not on file   Stress: Not on file   Social Connections: Not on file   Intimate Partner Violence: Not on file   Housing Stability: Not on file           Medications  Allergies   Current Outpatient Medications   Medication Sig Dispense Refill    ALPRAZolam (XANAX) 0.5 MG tablet Take 1 tablet (0.5 mg) by mouth At Bedtime 6 tablet 0    aspirin 81 MG EC tablet Take 1 tablet (81 mg) by mouth daily Start tomorrow. (Patient not taking: Reported on 8/22/2023) 30 tablet 3    atorvastatin (LIPITOR) 40 MG tablet Take 40 mg by mouth daily      clopidogrel (PLAVIX) 75 MG tablet Take 1 tablet (75 mg) by mouth daily Dose to start tomorrow. 90 tablet 3    Continuous Blood Gluc Sensor (FREESTYLE DAVID 14 DAY SENSOR) MISC       ezetimibe (ZETIA) 10 MG tablet Take 1 tablet (10 mg) by mouth daily 90 tablet 4    fish oil-omega-3 fatty acids 1000 MG capsule Take 1 capsule by mouth daily      fluticasone (FLONASE) 50 MCG/ACT nasal spray Spray 2 sprays into both nostrils daily For nasal congestion 16 g 1    gabapentin (NEURONTIN) 300 MG capsule [GABAPENTIN (NEURONTIN) 300 MG CAPSULE] Take 300 mg by mouth 3 (three) times a day.             HUMALOG 100 unit/mL injection Inject Subcutaneous 3 times daily (before meals) Carb count: 3 g carb : 1 unit insulin      insulin glargine (LANTUS) 100 unit/mL injection [INSULIN GLARGINE (LANTUS) 100 UNIT/ML INJECTION] Inject 30 Units under the skin every morning.       levothyroxine (SYNTHROID/LEVOTHROID) 125 MCG tablet Take 125 mcg by mouth daily      metoprolol succinate ER (TOPROL XL) 50 MG 24 hr tablet Take 1 tablet (50 mg) by mouth daily 90 tablet 2    multivitamin with iron (ONE DAILY WITH IRON) Tab tablet [MULTIVITAMIN WITH IRON (ONE  DAILY WITH IRON) TAB TABLET] Take 1 tablet by mouth daily.      nitroGLYcerin (NITROSTAT) 0.4 MG sublingual tablet Place 1 tablet (0.4 mg) under the tongue every 5 minutes as needed (Patient not taking: Reported on 8/22/2023) 25 tablet 3    TECHLITE PEN NEEDLES 31G X 5 MM miscellaneous       torsemide 40 MG TABS Take 40 mg by mouth daily 30 tablet 1    VITAMIN D3 2,000 unit capsule [VITAMIN D3 2,000 UNIT CAPSULE] Take 2,000 Units by mouth daily.  3       Allergies   Allergen Reactions    Hydrochlorothiazide Unknown    Levofloxacin Diarrhea          Lab Results    Chemistry/lipid CBC Cardiac Enzymes/BNP/TSH/INR   Recent Labs   Lab Test 06/16/23  0909   CHOL 96   HDL 34*   LDL 47   TRIG 75     Recent Labs   Lab Test 06/16/23  0909 12/16/22  0919 10/12/22  0819   LDL 47 60 41     Recent Labs   Lab Test 08/22/23  1127   *   POTASSIUM 4.0   CHLORIDE 97*   CO2 24   *   BUN 42.6*   CR 3.21*   GFRESTIMATED 20*   SVITLANA 9.1     Recent Labs   Lab Test 08/22/23  1127 08/09/23  1222 08/02/23  0429   CR 3.21* 4.11* 3.71*     Recent Labs   Lab Test 07/27/23  1222   A1C 7.4*          Recent Labs   Lab Test 08/01/23  0439   WBC 5.3   HGB 9.8*   HCT 29.5*   MCV 91   *     Recent Labs   Lab Test 08/01/23  0439 07/31/23  0429 07/30/23  0441   HGB 9.8* 8.7* 9.2*    Recent Labs   Lab Test 06/02/21  1016   TROPONINI 0.13     Recent Labs   Lab Test 07/27/23  1222   NTBNPI 4,719*     Recent Labs   Lab Test 06/16/23  0909   TSH 1.68     Recent Labs   Lab Test 06/21/23  0710   INR 1.00        Robinson Chopra MD      Thank you for allowing me to participate in the care of your patient.      Sincerely,     Robinson Chopra MD     [facility]  cc:   Robinson Chopra MD  [address]  [address]  [place] [address]

## 2023-09-06 NOTE — PROGRESS NOTES
HEART CARE ENCOUNTER CONSULTATON NOTE      [facility]  [phone]      Assessment/Recommendations   Assessment/Plan:  1.  Coronary artery disease.  Patient hospitalized in July 2023 with non-ST segment myocardial infarction.  Coronary angiogram revealed 100% occlusion in the proximal graft with a second diagonal branch which was successfully treated with drug-eluting stent.  Coronary angiogram showed patent LIMA to the LAD and patent vein graft to the high distal lateral wall branch, 30% stenosis in the proximal distal RCA and 50% in the ramus intermedius.  Course complicated as outlined below with strep  bacteremia and CVA.  He is doing well without anginal chest discomfort with plans to initiate cardiac rehab in the near future.    2.  Dyslipidemia.  Cholesterol numbers are from June 2023 at which time total cholesterol was 96, triglycerides 75, LDL 47.  3.  Renal insufficiency.  He is followed by nephrology.  Most recent laboratory studies are reviewed.  Creatinine August 22, 2023 showed a downward trend with a creatinine now 3.21 compared to 4.011 previous.  He has follow-up next month with nephrology.  4.  Heart failure with mildly reduced ejection fraction.  Echocardiogram completed in July revealed an ejection fraction of 45 to 50%.  The transesophageal echocardiogram reported EF of 50 to 55%.  Study was said to be technically difficult.  Inferior wall motion abnormality was noted.  No symptoms of heart failure.  He is off lisinopril and will discuss with nephrology next month the potential for resumption of ACE inhibitor or ARB.    Plan 1.  ECG today.  2.  Blood pressure monitoring and report  3.  Cardiac rehabilitation  4.  Follow-up in 2 to 3 months.       History of Present Illness/Subjective    HPI: Dakota Bauer is a 73 year old male who is seen in follow up.  Chart records are reviewed from recent hospitalization with a hospital course discharge summary outlined below.  He had  been experiencing some progressive chest discomfort and shortness of breath and presented increasing symptoms of exertional dyspnea and chest tightness.  He underwent coronary angiography as outlined below with drug-eluting stent to a saphenous vein graft as outlined in the cath report.  Course was complicated by strep bacteremia with SHAYLEE performed during the admission and CVA.  He was seen August 22 and the nurse practitioner clinic    He reports that he has been feeling well.  No reoccurrence of anginal symptoms and symptoms of shortness of breath continue to be improved.  Mild occasional lightheadedness upon standing but stable.  Symptoms of double vision have resolved.  His wife notices some slight slurred speech when he is tired but otherwise stable symptoms.  He has been active despite the boot on the left lower extremity.  He will be participating in cardiac rehabilitation in the near future.  I reviewed the recent test results and medications with him today.  Hospital Course  72 year old male was on 7/27. During the hospital stay the patient was treated for an NSTEMI and had an angiogram on 7/27 with BINA to SVG-distal diagonal/lateral.  Also had acute hypoxic respiratory failure which resolved.  Additionally was treated for strep dysgalactiae bacteremia with IV ceftriaxone and switched to cefdinir on discharge; was seen by ID.  SHAYLEE was negative for infective endocarditis.  Also had an ASHLEY based on CKD, presumed to be due to contrast-induced nephropathy and was seen by nephrology.  Additionally also was noted to have CVA which is thought to be embolic, had work-up done, and was seen by neurology.  Started on aspirin and Plavix both for CVA and and given recent stenting.       Recent Echocardiogram Results:  Reading Physician Reading Date Result Priority   Rony Sloan MD  [phone] 7/31/2023      Narrative & Impression  [id number]  [facility]  [id number]  [id number]^APURVA^NAY^[place] [address]     Name: DENY MONTERO  MRN: [id number]  : 1950  Study Date: 2023 10:42 AM  Age: 72 yrs  Gender: Male  Patient Location: [facility]  Reason For Study: CVA, Endocarditis  Ordering Physician: NAY MIXON  Referring Physician: GUME SOTO  Performed By: TJ-BP     BSA: 2.3 m2  Height: 72 in  Weight: 231 lb  HR: 64  BP: 149/69 mmHg  ______________________________________________________________________________  Procedure  Complete SHAYLEE Adult.  ______________________________________________________________________________  Interpretation Summary     The left ventricle is normal in size.  The visual ejection fraction is 50-55%.  No regional wall motion abnormalities noted.  Normal right ventricle size and systolic function.  There is no color Doppler evidence of a PFO.  There is moderate (2+) mitral regurgitation.  The right ventricular systolic pressure is approximated at 46mmHg plus the  right atrial pressure.  No valvular vegetations noted.  ______________________________________________________________________________  SHAYLEE  Consent to the procedure was obtained prior to sedation. Prior to the exam,  the oral cavity was checked and no overcrowding was noted. The transesophageal  probe was passed without difficulty. There were no complications associated  with this procedure. Patient was sedated using Versed 1 mg. The heart rate,  respiratory rate, oxygen saturations, blood pressure, and response to care  were monitored throughout the procedure with the assistance of the nurse.  Patient was sedated using Fentanyl 50 mcg. 15 ml viscous Lidocaine, 0.5 ml  Benzocaine spray. I determined this patient to be an appropriate candidate for  the planned sedation and procedure and have reassessed the patient immediately  prior to sedation and procedure. Total sedation time: 15 minutes of continuous  bedside 1:1 monitoring.     Left Ventricle  The left ventricle  is normal in size. The visual ejection fraction is 50-55%.  No regional wall motion abnormalities noted.     Right Ventricle  Normal right ventricle size and systolic function.     Atria  The left atrium is mild to moderately dilated. A contrast injection (Bubble  Study) was performed that was negative for flow across the interatrial septum.  There is no color Doppler evidence of a PFO. No thrombus is detected in the  left atrial appendage.     Mitral Valve  The mitral valve leaflets are mildly thickened. There is moderate (2+) mitral  regurgitation. There is no mitral valve stenosis.     Tricuspid Valve  The tricuspid valve is normal in structure and function. There is mild (1+)  tricuspid regurgitation. The right ventricular systolic pressure is  approximated at 46mmHg plus the right atrial pressure.     Aortic Valve  There is moderate trileaflet aortic sclerosis. There is trace aortic  regurgitation. No aortic stenosis is present.     Pulmonic Valve  The pulmonic valve is not well visualized. There is mild (1+) pulmonic  valvular regurgitation.     Vessels  Inferior vena cava not well visualized for estimation of right atrial  pressure.     Pericardial/Pleural  No pericardial thickening.     Rhythm  The rhythm was sinus bradycardia.    Study Result    Narrative & Impression   EXAM: US CAROTID BILATERAL  LOCATION: [facility]  DATE: 7/30/2023     INDICATION: cva  COMPARISON: MRI brain 07/29/2023, carotid duplex 11/25/2016  TECHNIQUE: Duplex exam performed utilizing 2D gray-scale imaging, Doppler interrogation with color-flow and spectral waveform analysis. The percent diameter stenosis is determined using NASCET criteria and Society of Radiologists in Ultrasound Consensus   Criteria.     FINDINGS:     RIGHT: Moderate plaque at the bifurcation. The highest peak systolic velocity in the ICA is 153 cm/sec, consistent with 50-69% stenosis. Previously highest ICA velocity was 122 cm/s. Although  there is not severe velocity elevation in the external carotid   artery, there is turbulence at the external carotid ostium and poststenotic waveforms in the distal external carotid, suggestive of significant ostial external carotid stenosis. Previous exams demonstrate significant external carotid velocity   elevations. Antegrade flow within the vertebral artery.      LEFT: Mild plaque at the bifurcation. The peak systolic velocity in the ICA is less than 125 cm/sec, consistent with less than 50% stenosis. Progressive external external carotid velocity elevation, suggestive of of significant external carotid stenosis.   Antegrade flow within the vertebral artery.     VELOCITY CHART:  CCA   Right: 58/17 cm/s   Left: 81/13 cm/s  ICA   Right: 153/36 cm/s   Left: 116/29 cm/s  ECA   Right: 180/23 cm/s   Left: 270 cm/s  ICA/CCA PSV Ratio   Right: 2.6   Left: 1.4                                                                      IMPRESSION:  1.  Right carotid: Moderate plaque remission. Progressive ICA velocity elevation consistent with 50-69% stenosis in the right internal carotid artery. Significant external carotid stenosis.  2.  Left carotid: Mild plaque formation, velocities consistent with less than 50% stenosis in the left internal carotid artery. Significant, progressive external carotid carotid stenosis.  3.  Flow within the vertebral arteries is antegrade.     Recent Coronary Angiogram Results:  Panel Physicians Referring Physician Case Authorizing Physician   Maikel Tripathi MD (Primary) Deonte Solorzano MD Cliffe, Charles M, MD     Procedures    Panel 1    Primary Surgeon: Maikel Tripathi MD   Procedure: Coronary Angiogram Graft    Percutaneous Coronary Intervention        Indications    NSTEMI (non-ST elevated myocardial infarction) (H) [I21.4 (ICD-10-CM)]   CKD (chronic kidney disease) stage 4, GFR 15-29 ml/min (H) [N18.4 (ICD-10-CM)]     Comments/Patient Narrative    ED admit with unstable  angina/NSTEMI     Pre Procedure Diagnosis    nstemi    Post Procedure Diagnosis    same      Conclusion         Ost Cx to Prox Cx lesion is 100% stenosed.    Prox LAD lesion is 100% stenosed.    Ramus lesion is 50% stenosed.    Prox RCA to Dist RCA lesion is 30% stenosed.    Prox Graft to Mid Graft lesion is 100% stenosed.     1.  Severe diffuse disease of native vessels with small caliber vessels distally.  2.  Chronic occlusion of native LAD and native left circumflex.  3.  Left main fills small narrow caliber ramus branch only  4.  Right coronary artery has diffuse mild to moderate disease but no severe stenoses.  5.  Patent saphenous vein graft to high distal lateral wall branch (? Diagonal or marginal branch) which is diffusely diseased. Anastomosis appears normal.  6.  Occluded saphenous vein graft to distal diagonal/lateral wall branches, most likely the acute infarct vessel.  7.  Patent BENITEZ to LAD. Anastomosis appears normal.  8.  Successful PCI saphenous vein graft to distal diagonal/lateral with drug-eluting stent implant x1  9.  Recommend continuing Plavix therapy indefinitely, risk factor management for ASCVD.       Narrative & Impression   EXAM: MR BRAIN W/O CONTRAST  LOCATION: [facility]  DATE: 7/29/2023     INDICATION: double vision. post coronary angiogram. r o CVA. CKD stage IV.  COMPARISON: None.  TECHNIQUE: Routine multiplanar multisequence head MRI without intravenous contrast.     FINDINGS:  INTRACRANIAL CONTENTS: Small acute to subacute infarction along the dorsal left pontomedullary junction. A tiny, punctate focus of infarction in the left frontal lobe (series 7 image 41). On coronal diffusion weighted imaging there is a questionable area   of punctate infarction versus artifact in the right para midline cerebellum (series 14 image 34). No mass, acute hemorrhage, or extra-axial fluid collections. Scattered nonspecific T2/FLAIR hyperintensities within the cerebral  white matter most   consistent with mild chronic microvascular ischemic change. Mild generalized cerebral atrophy. No hydrocephalus. Normal position of the cerebellar tonsils.      SELLA: No abnormality accounting for technique.     OSSEOUS STRUCTURES/SOFT TISSUES: Normal marrow signal. The major intracranial vascular flow voids are maintained.      ORBITS: No abnormality accounting for technique.      SINUSES/MASTOIDS: Moderate mucosal thickening of the left maxillary sinus. No middle ear or mastoid effusion.    wh                                                                   IMPRESSION:  1.  Small foci of acute to subacute infarction involving the dorsal left pontomedullary junction and left frontal lobe.  2.  Questionable punctate focus of infarction in the right para midline cerebellum seen on coronal diffusion weighted imaging only.  3.  Mild age-related changes as above.        Physical Examination  Review of Systems   Vitals: 150/58 upon arrival, 134/62 during my examination, weight 216 pounds, heart rate of 60 and regular.  Wt Readings from Last 3 Encounters:   08/22/23 98.4 kg (217 lb)   08/02/23 100.4 kg (221 lb 6.4 oz)   06/05/23 103.4 kg (228 lb)       General Appearance:   no distress, normal body habitus   ENT/Mouth: membranes moist, no oral lesions or bleeding gums.      EYES:  no scleral icterus, normal conjunctivae   Neck: no carotid bruits    Chest/Lungs:   lungs are clear to auscultation, no rales or wheezing, equal chest wall expansion    Cardiovascular:   Regular. Normal first and second heart sounds with soft systolic murmur rubs, or gallops; the carotid, radial and posterior tibial pulses are intact, Jugular venous pressure is not increased.,  Mild edema bilaterally, boot on the left lower extremity   Abdomen:  no , bruits, or tenderness; bowel sounds are present   Extremities: no cyanosis or clubbing, boot on the left lower extremity   Skin: no xanthelasma, warm.    Neurologic: , no  tremors     Psychiatric: alert and oriented x3, calm        Please refer above for cardiac ROS details.        Medical History  Surgical History Family History Social History   Past Medical History:   Diagnosis Date    Chronic kidney disease     Coronary artery disease     Diabetes mellitus (H)     Disorder of thyroid     Hyperlipidemia     Hypertension      Past Surgical History:   Procedure Laterality Date    AMPUTATE TOE(S) Left     BYPASS GRAFT ARTERY CORONARY  2005    [place] with Dr. Holter    CARDIAC CATHETERIZATION  2005    CARDIAC CATHETERIZATION  2016    no intervention    CERVICAL DISC SURGERY  2014    CV ANGIOGRAM CORONARY GRAFT N/A 2023    Procedure: Coronary Angiogram Graft;  Surgeon: Maikel Tripathi MD;  Location: [facility] CATH LAB CV    CV PCI N/A 2023    Procedure: Percutaneous Coronary Intervention;  Surgeon: Maikel Tripathi MD;  Location: [facility] CATH LAB CV    IR LOWER EXTREMITY ANGIOGRAM LEFT  2023    IR LOWER EXTREMITY ANGIOGRAM LEFT  2020    IR LOWER EXTREMITY ANGIOGRAM LEFT  2023     Family History   Problem Relation Age of Onset    Coronary Artery Disease Father     Coronary Artery Disease Brother         Social History     Socioeconomic History    Marital status:      Spouse name: Not on file    Number of children: Not on file    Years of education: Not on file    Highest education level: Not on file   Occupational History    Not on file   Tobacco Use    Smoking status: Former     Types: Cigarettes     Quit date: 10/10/1984     Years since quittin.9    Smokeless tobacco: Never   Vaping Use    Vaping Use: Never used   Substance and Sexual Activity    Alcohol use: Not Currently    Drug use: Never    Sexual activity: Not on file   Other Topics Concern    Not on file   Social History Narrative    Not on file     Social Determinants of Health     Financial Resource Strain: Not on file   Food Insecurity: Not on file   Transportation  Needs: Not on file   Physical Activity: Not on file   Stress: Not on file   Social Connections: Not on file   Intimate Partner Violence: Not on file   Housing Stability: Not on file           Medications  Allergies   Current Outpatient Medications   Medication Sig Dispense Refill    ALPRAZolam (XANAX) 0.5 MG tablet Take 1 tablet (0.5 mg) by mouth At Bedtime 6 tablet 0    aspirin 81 MG EC tablet Take 1 tablet (81 mg) by mouth daily Start tomorrow. (Patient not taking: Reported on 8/22/2023) 30 tablet 3    atorvastatin (LIPITOR) 40 MG tablet Take 40 mg by mouth daily      clopidogrel (PLAVIX) 75 MG tablet Take 1 tablet (75 mg) by mouth daily Dose to start tomorrow. 90 tablet 3    Continuous Blood Gluc Sensor (FREESTYLE DAVID 14 DAY SENSOR) MISC       ezetimibe (ZETIA) 10 MG tablet Take 1 tablet (10 mg) by mouth daily 90 tablet 4    fish oil-omega-3 fatty acids 1000 MG capsule Take 1 capsule by mouth daily      fluticasone (FLONASE) 50 MCG/ACT nasal spray Spray 2 sprays into both nostrils daily For nasal congestion 16 g 1    gabapentin (NEURONTIN) 300 MG capsule [GABAPENTIN (NEURONTIN) 300 MG CAPSULE] Take 300 mg by mouth 3 (three) times a day.             HUMALOG 100 unit/mL injection Inject Subcutaneous 3 times daily (before meals) Carb count: 3 g carb : 1 unit insulin      insulin glargine (LANTUS) 100 unit/mL injection [INSULIN GLARGINE (LANTUS) 100 UNIT/ML INJECTION] Inject 30 Units under the skin every morning.       levothyroxine (SYNTHROID/LEVOTHROID) 125 MCG tablet Take 125 mcg by mouth daily      metoprolol succinate ER (TOPROL XL) 50 MG 24 hr tablet Take 1 tablet (50 mg) by mouth daily 90 tablet 2    multivitamin with iron (ONE DAILY WITH IRON) Tab tablet [MULTIVITAMIN WITH IRON (ONE DAILY WITH IRON) TAB TABLET] Take 1 tablet by mouth daily.      nitroGLYcerin (NITROSTAT) 0.4 MG sublingual tablet Place 1 tablet (0.4 mg) under the tongue every 5 minutes as needed (Patient not taking: Reported on 8/22/2023) 25  tablet 3    TECHLITE PEN NEEDLES 31G X 5 MM miscellaneous       torsemide 40 MG TABS Take 40 mg by mouth daily 30 tablet 1    VITAMIN D3 2,000 unit capsule [VITAMIN D3 2,000 UNIT CAPSULE] Take 2,000 Units by mouth daily.  3       Allergies   Allergen Reactions    Hydrochlorothiazide Unknown    Levofloxacin Diarrhea          Lab Results    Chemistry/lipid CBC Cardiac Enzymes/BNP/TSH/INR   Recent Labs   Lab Test 06/16/23  0909   CHOL 96   HDL 34*   LDL 47   TRIG 75     Recent Labs   Lab Test 06/16/23  0909 12/16/22  0919 10/12/22  0819   LDL 47 60 41     Recent Labs   Lab Test 08/22/23  1127   *   POTASSIUM 4.0   CHLORIDE 97*   CO2 24   *   BUN 42.6*   CR 3.21*   GFRESTIMATED 20*   SVITLANA 9.1     Recent Labs   Lab Test 08/22/23  1127 08/09/23  1222 08/02/23  0429   CR 3.21* 4.11* 3.71*     Recent Labs   Lab Test 07/27/23  1222   A1C 7.4*          Recent Labs   Lab Test 08/01/23  0439   WBC 5.3   HGB 9.8*   HCT 29.5*   MCV 91   *     Recent Labs   Lab Test 08/01/23  0439 07/31/23  0429 07/30/23  0441   HGB 9.8* 8.7* 9.2*    Recent Labs   Lab Test 06/02/21  1016   TROPONINI 0.13     Recent Labs   Lab Test 07/27/23  1222   NTBNPI 4,719*     Recent Labs   Lab Test 06/16/23  0909   TSH 1.68     Recent Labs   Lab Test 06/21/23  0710   INR 1.00        Robinson Chopra MD

## 2023-09-06 NOTE — PATIENT INSTRUCTIONS
Please discuss with Dr Bhatia the Lisinopril.Please discuss with Dr Mcpherson the carotid ultrasound findings.Please update me with blood pressure readings and any increased dizziness, chest discomfort shortness of breath.My nurse is Clari and her number is [phone].If your interested please write to me towards the end of October about what my plans are next year.

## 2023-10-13 NOTE — TELEPHONE ENCOUNTER
[place] Community Measures Blood Pressure guideline reviewed.  Patients blood pressure is outside of guideline parameters. Dr. Chopra rechecked patients blood pressure during his examination on 9/6/23 and BP was 134/62.

## 2023-11-21 NOTE — TELEPHONE ENCOUNTER
[facility] and to see either Lilia Fox or [facility].  [phone]

## 2023-12-01 NOTE — TELEPHONE ENCOUNTER
Spoke to wife; pt scheduled. Wife did mention that Dr. Chopra may be putting in additional orders for carotid imaging that may be added to follow up.

## 2023-12-05 NOTE — PATIENT INSTRUCTIONS
Please call my nurse Clari with any heart related questions.Her number is [phone].I will be in touch with the blood work and we plan follow up in the clinic in 3 months.

## 2023-12-05 NOTE — PROGRESS NOTES
HEART CARE ENCOUNTER CONSULTATON NOTE      [facility]  [phone]      Assessment/Recommendations   Assessment/Plan:  1.  Coronary artery disease.  Patient hospitalized in July 2023 with non-ST segment myocardial infarction.  Coronary angiogram revealed 100% occlusion in the proximal graft to a diagonal branch which was successfully treated with drug-eluting stent.  Coronary angiogram showed patent LIMA to the LAD and patent vein graft to the high distal lateral wall branch, 30% stenosis in the proximal distal RCA and 50% in the ramus intermedius.  Course complicated as outlined below with strep  bacteremia and CVA.  Angiographic report indicated severe diffuse disease with chronic occlusion of the native LAD and native left circumflex.  The left main was described as small in caliber and filling via the ramus intermedius.  There was diffuse mild to moderate disease in the right coronary artery.  Patent LIMA to the LAD.  He is not describing any recurrent angina.  He has some occasional nosebleeds but is tolerating the aspirin and clopidogrel.    2.  Dyslipidemia.  Cholesterol numbers are from June 2023 at which time total cholesterol was 96, triglycerides 75, LDL 47.    3.  Renal insufficiency.  He is followed by nephrology.  Most recent creatinine in the chart record is from September 2023 with creatinine of 3.13.  I have reviewed notes from nephrology October 2023.  Creatinine peaked at 4.11 after undergoing coronary angiogram.  He was started on losartan when he saw nephrology with plans to recheck chemistries.  4.  Heart failure with mildly reduced ejection fraction.  Echocardiogram completed in July revealed an ejection fraction of 45 to 50%    Plan 1.  Chemistry and CBC  2.  Follow-up in 3 to 4 months     History of Present Illness/Subjective    HPI: Dakota Bauer is a 73 year old male who is seen in follow-up.  We last visited September 2023.  He had an experiencing some progressive  chest discomfort and shortness of breath and presented with increasing symptoms of exertional dyspnea and chest tightness.  He underwent a coronary angiogram and drug-eluting stent to the saphenous vein graft in 2023.  The vein graft was to the distal diagonal branch with a drug-eluting stent x 1.  The angiographic results are reviewed below.  Course was complicated by CVA.  During our last visit he reported that he had been feeling well.  He had a transesophageal echocardiogram that was negative for infective endocarditis after being treated for strep dygalactiae.  He has a history of renal insufficiency with acute on chronic and worsening during the hospitalization.    He reports that he has been feeling well.  He has had no specific anginal symptoms.  He denies shortness of breath, dizziness or palpitations.  He is tolerating the low-dose losartan was added after seeing .    Recent Echocardiogram Results:  Narrative & Impression  [id number]  Formerly [facility]  [id number]  [id number]^APURVA^NAY^[place] [address]     Name: DENY MONTERO  MRN: [id number]  : 1950  Study Date: 2023 10:42 AM  Age: 72 yrs  Gender: Male  Patient Location: [facility]  Reason For Study: CVA, Endocarditis  Ordering Physician: NAY MIXON  Referring Physician: GUME SOTO  Performed By: [id number]-BP     BSA: 2.3 m2  Height: 72 in  Weight: 231 lb  HR: 64  BP: 149/69 mmHg  ______________________________________________________________________________  Procedure  Complete SHAYLEE Adult.  ______________________________________________________________________________  Interpretation Summary     The left ventricle is normal in size.  The visual ejection fraction is 50-55%.  No regional wall motion abnormalities noted.  Normal right ventricle size and systolic function.  There is no color Doppler evidence of a PFO.  There is moderate (2+) mitral regurgitation.  The right ventricular  systolic pressure is approximated at 46mmHg plus the  right atrial pressure.  No valvular vegetations noted.  ______________________________________________________________________________  SHAYLEE  Consent to the procedure was obtained prior to sedation. Prior to the exam,  the oral cavity was checked and no overcrowding was noted. The transesophageal  probe was passed without difficulty. There were no complications associated  with this procedure. Patient was sedated using Versed 1 mg. The heart rate,  respiratory rate, oxygen saturations, blood pressure, and response to care  were monitored throughout the procedure with the assistance of the nurse.  Patient was sedated using Fentanyl 50 mcg. 15 ml viscous Lidocaine, 0.5 ml  Benzocaine spray. I determined this patient to be an appropriate candidate for  the planned sedation and procedure and have reassessed the patient immediately  prior to sedation and procedure. Total sedation time: 15 minutes of continuous  bedside 1:1 monitoring.     Left Ventricle  The left ventricle is normal in size. The visual ejection fraction is 50-55%.  No regional wall motion abnormalities noted.     Right Ventricle  Normal right ventricle size and systolic function.     Atria  The left atrium is mild to moderately dilated. A contrast injection (Bubble  Study) was performed that was negative for flow across the interatrial septum.  There is no color Doppler evidence of a PFO. No thrombus is detected in the  left atrial appendage.     Mitral Valve  The mitral valve leaflets are mildly thickened. There is moderate (2+) mitral  regurgitation. There is no mitral valve stenosis.     Tricuspid Valve  The tricuspid valve is normal in structure and function. There is mild (1+)  tricuspid regurgitation. The right ventricular systolic pressure is  approximated at 46mmHg plus the right atrial pressure.     Aortic Valve  There is moderate trileaflet aortic sclerosis. There is trace  aortic  regurgitation. No aortic stenosis is present.     Pulmonic Valve  The pulmonic valve is not well visualized. There is mild (1+) pulmonic  valvular regurgitation.     Vessels  Inferior vena cava not well visualized for estimation of right atrial  pressure.     Pericardial/Pleural  No pericardial thickening.     Rhythm  The rhythm was sinus bradycardia.  _____________________________________      Recent Coronary Angiogram Results:    NSTEMI (non-ST elevated myocardial infarction) (H) [I21.4 (ICD-10-CM)]   CKD (chronic kidney disease) stage 4, GFR 15-29 ml/min (H) [N18.4 (ICD-10-CM)]     Comments/Patient Narrative    ED admit with unstable angina/NSTEMI     Pre Procedure Diagnosis    nstemi    Post Procedure Diagnosis    same      Conclusion         Ost Cx to Prox Cx lesion is 100% stenosed.    Prox LAD lesion is 100% stenosed.    Ramus lesion is 50% stenosed.    Prox RCA to Dist RCA lesion is 30% stenosed.    Prox Graft to Mid Graft lesion is 100% stenosed.     1.  Severe diffuse disease of native vessels with small caliber vessels distally.  2.  Chronic occlusion of native LAD and native left circumflex.  3.  Left main fills small narrow caliber ramus branch only  4.  Right coronary artery has diffuse mild to moderate disease but no severe stenoses.  5.  Patent saphenous vein graft to high distal lateral wall branch (? Diagonal or marginal branch) which is diffusely diseased. Anastomosis appears normal.  6.  Occluded saphenous vein graft to distal diagonal/lateral wall branches, most likely the acute infarct vessel.  7.  Patent BENITEZ to LAD. Anastomosis appears normal.  8.  Successful PCI saphenous vein graft to distal diagonal/lateral with drug-eluting stent implant x1  9.  Recommend continuing Plavix therapy indefinitely, risk factor management for ASCVD.         Sinus rhythm   Left axis deviation   Left ventricular hypertrophy with QRS widening and repolarization abnormality   Abnormal ECG   When compared  with ECG of 28-JUL-2023 06:00,   ST no longer depressed in Anterolateral leads   Confirmed by MYRNA IBARRA MD LOC:WW ([id number]) on 9/7/2023 7:43:40 AM       EXAM: US CAROTID BILATERAL  LOCATION: [facility]  DATE: 7/30/2023     INDICATION: cva  COMPARISON: MRI brain 07/29/2023, carotid duplex 11/25/2016  TECHNIQUE: Duplex exam performed utilizing 2D gray-scale imaging, Doppler interrogation with color-flow and spectral waveform analysis. The percent diameter stenosis is determined using NASCET criteria and Society of Radiologists in Ultrasound Consensus   Criteria.     FINDINGS:     RIGHT: Moderate plaque at the bifurcation. The highest peak systolic velocity in the ICA is 153 cm/sec, consistent with 50-69% stenosis. Previously highest ICA velocity was 122 cm/s. Although there is not severe velocity elevation in the external carotid   artery, there is turbulence at the external carotid ostium and poststenotic waveforms in the distal external carotid, suggestive of significant ostial external carotid stenosis. Previous exams demonstrate significant external carotid velocity   elevations. Antegrade flow within the vertebral artery.      LEFT: Mild plaque at the bifurcation. The peak systolic velocity in the ICA is less than 125 cm/sec, consistent with less than 50% stenosis. Progressive external external carotid velocity elevation, suggestive of of significant external carotid stenosis.   Antegrade flow within the vertebral artery.     VELOCITY CHART:  CCA   Right: 58/17 cm/s   Left: 81/13 cm/s  ICA   Right: 153/36 cm/s   Left: 116/29 cm/s  ECA   Right: 180/23 cm/s   Left: 270 cm/s  ICA/CCA PSV Ratio   Right: 2.6   Left: 1.4                                                                      IMPRESSION:  1.  Right carotid: Moderate plaque remission. Progressive ICA velocity elevation consistent with 50-69% stenosis in the right internal carotid artery. Significant external carotid stenosis.  2.   Left carotid: Mild plaque formation, velocities consistent with less than 50% stenosis in the left internal carotid artery. Significant, progressive external carotid carotid stenosis.  3.  Flow within the vertebral arteries is antegrade.     Physical Examination  Review of Systems   Vitals: 156/62 upon arrival, 130/60 during my examination, pulse of 57, weight 221 pounds  Wt Readings from Last 3 Encounters:   09/06/23 98 kg (216 lb)   08/22/23 98.4 kg (217 lb)   08/02/23 100.4 kg (221 lb 6.4 oz)       General Appearance:   no distress, normal body habitus   ENT/Mouth: membranes moist, no oral lesions or bleeding gums.      EYES:  no scleral icterus, normal conjunctivae   Neck: no carotid bruits    Chest/Lungs:   lungs are clear to auscultation, no rales or wheezing, equal chest wall expansion    Cardiovascular:   Distant, regular. Normal first and second heart sounds with soft systolic murmur rubs, or gallops; the carotid, radial and posterior tibial pulses are intact, Jugular venous pressure within normal limits, no significant edema bilaterally, chronic venous stasis changes   Abdomen:  no bruits, or tenderness; bowel sounds are present   Extremities: no cyanosis or clubbing, left foot in a boot.   Skin: no xanthelasma, warm.    Neurologic: no tremors     Psychiatric: alert and oriented x3, calm        Please refer above for cardiac ROS details.        Medical History  Surgical History Family History Social History   Past Medical History:   Diagnosis Date    Chronic kidney disease     Coronary artery disease     Diabetes mellitus (H)     Disorder of thyroid     Hyperlipidemia     Hypertension      Past Surgical History:   Procedure Laterality Date    AMPUTATE TOE(S) Left     BYPASS GRAFT ARTERY CORONARY  01/01/2005    [place] with Dr. Holter    CARDIAC CATHETERIZATION  01/01/2005    CARDIAC CATHETERIZATION  11/25/2016    no intervention    CERVICAL DISC SURGERY  01/01/2014    CV ANGIOGRAM CORONARY GRAFT N/A  2023    Procedure: Coronary Angiogram Graft;  Surgeon: Maikel Tripathi MD;  Location: [facility] CATH LAB CV    CV PCI N/A 2023    Procedure: Percutaneous Coronary Intervention;  Surgeon: Maikel Tripathi MD;  Location: [facility] CATH LAB CV    IR LOWER EXTREMITY ANGIOGRAM LEFT  2023    IR LOWER EXTREMITY ANGIOGRAM LEFT  2020    IR LOWER EXTREMITY ANGIOGRAM LEFT  2023     Family History   Problem Relation Age of Onset    Coronary Artery Disease Father     Coronary Artery Disease Brother         Social History     Socioeconomic History    Marital status:      Spouse name: Not on file    Number of children: Not on file    Years of education: Not on file    Highest education level: Not on file   Occupational History    Not on file   Tobacco Use    Smoking status: Former     Types: Cigarettes     Quit date: 10/10/1984     Years since quittin.1    Smokeless tobacco: Never   Vaping Use    Vaping Use: Never used   Substance and Sexual Activity    Alcohol use: Not Currently    Drug use: Never    Sexual activity: Not on file   Other Topics Concern    Not on file   Social History Narrative    Not on file     Social Determinants of Health     Financial Resource Strain: Not on file   Food Insecurity: Not on file   Transportation Needs: Not on file   Physical Activity: Not on file   Stress: Not on file   Social Connections: Not on file   Interpersonal Safety: Not on file   Housing Stability: Not on file           Medications  Allergies   Current Outpatient Medications   Medication Sig Dispense Refill    ALPRAZolam (XANAX) 0.5 MG tablet Take 1 tablet (0.5 mg) by mouth At Bedtime 6 tablet 0    aspirin 81 MG EC tablet Take 1 tablet (81 mg) by mouth daily Start tomorrow. 30 tablet 3    atorvastatin (LIPITOR) 40 MG tablet Take 40 mg by mouth daily      clopidogrel (PLAVIX) 75 MG tablet Take 1 tablet (75 mg) by mouth daily Dose to start tomorrow. 90 tablet 3    Continuous Blood Gluc Sensor (FREESTYLE  DAVID 14 DAY SENSOR) MISC       ezetimibe (ZETIA) 10 MG tablet Take 1 tablet (10 mg) by mouth daily 90 tablet 4    fluticasone (FLONASE) 50 MCG/ACT nasal spray Spray 2 sprays into both nostrils daily For nasal congestion 16 g 1    gabapentin (NEURONTIN) 300 MG capsule [GABAPENTIN (NEURONTIN) 300 MG CAPSULE] Take 300 mg by mouth 3 (three) times a day.             HUMALOG 100 unit/mL injection Inject Subcutaneous 3 times daily (before meals) Carb count: 3 g carb : 1 unit insulin      insulin glargine (LANTUS) 100 unit/mL injection [INSULIN GLARGINE (LANTUS) 100 UNIT/ML INJECTION] Inject 30 Units under the skin every morning.       levothyroxine (SYNTHROID/LEVOTHROID) 125 MCG tablet Take 125 mcg by mouth daily      metoprolol succinate ER (TOPROL XL) 50 MG 24 hr tablet Take 1 tablet (50 mg) by mouth daily 90 tablet 2    multivitamin with iron (ONE DAILY WITH IRON) Tab tablet [MULTIVITAMIN WITH IRON (ONE DAILY WITH IRON) TAB TABLET] Take 1 tablet by mouth daily.      nitroGLYcerin (NITROSTAT) 0.4 MG sublingual tablet Place 1 tablet (0.4 mg) under the tongue every 5 minutes as needed 25 tablet 3    TECHLITE PEN NEEDLES 31G X 5 MM miscellaneous       torsemide (DEMADEX) 20 MG tablet Take 40 mg by mouth daily      VITAMIN D3 2,000 unit capsule [VITAMIN D3 2,000 UNIT CAPSULE] Take 2,000 Units by mouth daily.  3       Allergies   Allergen Reactions    Hydrochlorothiazide Unknown    Levofloxacin Diarrhea          Lab Results    Chemistry/lipid CBC Cardiac Enzymes/BNP/TSH/INR   Recent Labs   Lab Test 06/16/23  0909   CHOL 96   HDL 34*   LDL 47   TRIG 75     Recent Labs   Lab Test 06/16/23  0909 12/16/22  0919 10/12/22  0819   LDL 47 60 41     Recent Labs   Lab Test 08/22/23  1127   *   POTASSIUM 4.0   CHLORIDE 97*   CO2 24   *   BUN 42.6*   CR 3.21*   GFRESTIMATED 20*   SVITLANA 9.1     Recent Labs   Lab Test 08/22/23  1127 08/09/23  1222 08/02/23  0429   CR 3.21* 4.11* 3.71*     Recent Labs   Lab Test 07/27/23  1222    A1C 7.4*          Recent Labs   Lab Test 08/01/23  0439   WBC 5.3   HGB 9.8*   HCT 29.5*   MCV 91   *     Recent Labs   Lab Test 08/01/23  0439 07/31/23  0429 07/30/23  0441   HGB 9.8* 8.7* 9.2*    Recent Labs   Lab Test 06/02/21  1016   TROPONINI 0.13     Recent Labs   Lab Test 07/27/23  1222   NTBNPI 4,719*     Recent Labs   Lab Test 06/16/23  0909   TSH 1.68     Recent Labs   Lab Test 06/21/23  0710   INR 1.00        Robinson Chopra MD

## 2023-12-05 NOTE — LETTER
12/5/2023    Jamal Gaffney MD  [address] [address]    RE: Dakota Bauer       Dear Colleague,     I had the pleasure of seeing Dakota Lizette in the [facility].    HEART CARE ENCOUNTER CONSULTATON NOTE      [facility]  [phone]      Assessment/Recommendations   Assessment/Plan:  1.  Coronary artery disease.  Patient hospitalized in July 2023 with non-ST segment myocardial infarction.  Coronary angiogram revealed 100% occlusion in the proximal graft to a diagonal branch which was successfully treated with drug-eluting stent.  Coronary angiogram showed patent LIMA to the LAD and patent vein graft to the high distal lateral wall branch, 30% stenosis in the proximal distal RCA and 50% in the ramus intermedius.  Course complicated as outlined below with strep  bacteremia and CVA.  Angiographic report indicated severe diffuse disease with chronic occlusion of the native LAD and native left circumflex.  The left main was described as small in caliber and filling via the ramus intermedius.  There was diffuse mild to moderate disease in the right coronary artery.  Patent LIMA to the LAD.  He is not describing any recurrent angina.  He has some occasional nosebleeds but is tolerating the aspirin and clopidogrel.    2.  Dyslipidemia.  Cholesterol numbers are from June 2023 at which time total cholesterol was 96, triglycerides 75, LDL 47.    3.  Renal insufficiency.  He is followed by nephrology.  Most recent creatinine in the chart record is from September 2023 with creatinine of 3.13.  I have reviewed notes from nephrology October 2023.  Creatinine peaked at 4.11 after undergoing coronary angiogram.  He was started on losartan when he saw nephrology with plans to recheck chemistries.  4.  Heart failure with mildly reduced ejection fraction.  Echocardiogram completed in July revealed an ejection fraction of 45 to 50%    Plan 1.  Chemistry and CBC  2.  Follow-up in  3 to 4 months     History of Present Illness/Subjective    HPI: Dakota Montero is a 73 year old male who is seen in follow-up.  We last visited 2023.  He had an experiencing some progressive chest discomfort and shortness of breath and presented with increasing symptoms of exertional dyspnea and chest tightness.  He underwent a coronary angiogram and drug-eluting stent to the saphenous vein graft in 2023.  The vein graft was to the distal diagonal branch with a drug-eluting stent x 1.  The angiographic results are reviewed below.  Course was complicated by CVA.  During our last visit he reported that he had been feeling well.  He had a transesophageal echocardiogram that was negative for infective endocarditis after being treated for strep dygalactiae.  He has a history of renal insufficiency with acute on chronic and worsening during the hospitalization.    He reports that he has been feeling well.  He has had no specific anginal symptoms.  He denies shortness of breath, dizziness or palpitations.  He is tolerating the low-dose losartan was added after seeing .    Recent Echocardiogram Results:  Narrative & Impression  [id number]  [id number]  [id number] [address]     Name: DAKOTA MONTERO  MRN: [id number]  : 1950  Study Date: 2023 10:42 AM  Age: 72 yrs  Gender: Male  Patient Location: [facility]  Reason For Study: CVA, Endocarditis  Ordering Physician: NAY MIXON  Referring Physician: GUME SOTO  Performed By: TJ-BP     BSA: 2.3 m2  Height: 72 in  Weight: 231 lb  HR: 64  BP: 149/69 mmHg  ______________________________________________________________________________  Procedure  Complete SHAYLEE Adult.  ______________________________________________________________________________  Interpretation Summary     The left ventricle is normal in size.  The visual ejection fraction is 50-55%.  No regional  wall motion abnormalities noted.  Normal right ventricle size and systolic function.  There is no color Doppler evidence of a PFO.  There is moderate (2+) mitral regurgitation.  The right ventricular systolic pressure is approximated at 46mmHg plus the  right atrial pressure.  No valvular vegetations noted.  ______________________________________________________________________________  SHAYLEE  Consent to the procedure was obtained prior to sedation. Prior to the exam,  the oral cavity was checked and no overcrowding was noted. The transesophageal  probe was passed without difficulty. There were no complications associated  with this procedure. Patient was sedated using Versed 1 mg. The heart rate,  respiratory rate, oxygen saturations, blood pressure, and response to care  were monitored throughout the procedure with the assistance of the nurse.  Patient was sedated using Fentanyl 50 mcg. 15 ml viscous Lidocaine, 0.5 ml  Benzocaine spray. I determined this patient to be an appropriate candidate for  the planned sedation and procedure and have reassessed the patient immediately  prior to sedation and procedure. Total sedation time: 15 minutes of continuous  bedside 1:1 monitoring.     Left Ventricle  The left ventricle is normal in size. The visual ejection fraction is 50-55%.  No regional wall motion abnormalities noted.     Right Ventricle  Normal right ventricle size and systolic function.     Atria  The left atrium is mild to moderately dilated. A contrast injection (Bubble  Study) was performed that was negative for flow across the interatrial septum.  There is no color Doppler evidence of a PFO. No thrombus is detected in the  left atrial appendage.     Mitral Valve  The mitral valve leaflets are mildly thickened. There is moderate (2+) mitral  regurgitation. There is no mitral valve stenosis.     Tricuspid Valve  The tricuspid valve is normal in structure and function. There is mild (1+)  tricuspid  regurgitation. The right ventricular systolic pressure is  approximated at 46mmHg plus the right atrial pressure.     Aortic Valve  There is moderate trileaflet aortic sclerosis. There is trace aortic  regurgitation. No aortic stenosis is present.     Pulmonic Valve  The pulmonic valve is not well visualized. There is mild (1+) pulmonic  valvular regurgitation.     Vessels  Inferior vena cava not well visualized for estimation of right atrial  pressure.     Pericardial/Pleural  No pericardial thickening.     Rhythm  The rhythm was sinus bradycardia.  _____________________________________      Recent Coronary Angiogram Results:    NSTEMI (non-ST elevated myocardial infarction) (H) [I21.4 (ICD-10-CM)]   CKD (chronic kidney disease) stage 4, GFR 15-29 ml/min (H) [N18.4 (ICD-10-CM)]     Comments/Patient Narrative    ED admit with unstable angina/NSTEMI     Pre Procedure Diagnosis    nstemi    Post Procedure Diagnosis    same      Conclusion         Ost Cx to Prox Cx lesion is 100% stenosed.    Prox LAD lesion is 100% stenosed.    Ramus lesion is 50% stenosed.    Prox RCA to Dist RCA lesion is 30% stenosed.    Prox Graft to Mid Graft lesion is 100% stenosed.     1.  Severe diffuse disease of native vessels with small caliber vessels distally.  2.  Chronic occlusion of native LAD and native left circumflex.  3.  Left main fills small narrow caliber ramus branch only  4.  Right coronary artery has diffuse mild to moderate disease but no severe stenoses.  5.  Patent saphenous vein graft to high distal lateral wall branch (? Diagonal or marginal branch) which is diffusely diseased. Anastomosis appears normal.  6.  Occluded saphenous vein graft to distal diagonal/lateral wall branches, most likely the acute infarct vessel.  7.  Patent BENITEZ to LAD. Anastomosis appears normal.  8.  Successful PCI saphenous vein graft to distal diagonal/lateral with drug-eluting stent implant x1  9.  Recommend continuing Plavix therapy  indefinitely, risk factor management for ASCVD.         Sinus rhythm   Left axis deviation   Left ventricular hypertrophy with QRS widening and repolarization abnormality   Abnormal ECG   When compared with ECG of 28-JUL-2023 06:00,   ST no longer depressed in Anterolateral leads   Confirmed by MYRNA IBARRA MD LOC:WW ([id number]) on 9/7/2023 7:43:40 AM       EXAM: US CAROTID BILATERAL  LOCATION: [facility]  DATE: 7/30/2023     INDICATION: cva  COMPARISON: MRI brain 07/29/2023, carotid duplex 11/25/2016  TECHNIQUE: Duplex exam performed utilizing 2D gray-scale imaging, Doppler interrogation with color-flow and spectral waveform analysis. The percent diameter stenosis is determined using NASCET criteria and Society of Radiologists in Ultrasound Consensus   Criteria.     FINDINGS:     RIGHT: Moderate plaque at the bifurcation. The highest peak systolic velocity in the ICA is 153 cm/sec, consistent with 50-69% stenosis. Previously highest ICA velocity was 122 cm/s. Although there is not severe velocity elevation in the external carotid   artery, there is turbulence at the external carotid ostium and poststenotic waveforms in the distal external carotid, suggestive of significant ostial external carotid stenosis. Previous exams demonstrate significant external carotid velocity   elevations. Antegrade flow within the vertebral artery.      LEFT: Mild plaque at the bifurcation. The peak systolic velocity in the ICA is less than 125 cm/sec, consistent with less than 50% stenosis. Progressive external external carotid velocity elevation, suggestive of of significant external carotid stenosis.   Antegrade flow within the vertebral artery.     VELOCITY CHART:  CCA   Right: 58/17 cm/s   Left: 81/13 cm/s  ICA   Right: 153/36 cm/s   Left: 116/29 cm/s  ECA   Right: 180/23 cm/s   Left: 270 cm/s  ICA/CCA PSV Ratio   Right: 2.6   Left: 1.4                                                                       IMPRESSION:  1.  Right carotid: Moderate plaque remission. Progressive ICA velocity elevation consistent with 50-69% stenosis in the right internal carotid artery. Significant external carotid stenosis.  2.  Left carotid: Mild plaque formation, velocities consistent with less than 50% stenosis in the left internal carotid artery. Significant, progressive external carotid carotid stenosis.  3.  Flow within the vertebral arteries is antegrade.     Physical Examination  Review of Systems   Vitals: 156/62 upon arrival, 130/60 during my examination, pulse of 57, weight 221 pounds  Wt Readings from Last 3 Encounters:   09/06/23 98 kg (216 lb)   08/22/23 98.4 kg (217 lb)   08/02/23 100.4 kg (221 lb 6.4 oz)       General Appearance:   no distress, normal body habitus   ENT/Mouth: membranes moist, no oral lesions or bleeding gums.      EYES:  no scleral icterus, normal conjunctivae   Neck: no carotid bruits    Chest/Lungs:   lungs are clear to auscultation, no rales or wheezing, equal chest wall expansion    Cardiovascular:   Distant, regular. Normal first and second heart sounds with soft systolic murmur rubs, or gallops; the carotid, radial and posterior tibial pulses are intact, Jugular venous pressure within normal limits, no significant edema bilaterally, chronic venous stasis changes   Abdomen:  no bruits, or tenderness; bowel sounds are present   Extremities: no cyanosis or clubbing, left foot in a boot.   Skin: no xanthelasma, warm.    Neurologic: no tremors     Psychiatric: alert and oriented x3, calm        Please refer above for cardiac ROS details.        Medical History  Surgical History Family History Social History   Past Medical History:   Diagnosis Date    Chronic kidney disease     Coronary artery disease     Diabetes mellitus (H)     Disorder of thyroid     Hyperlipidemia     Hypertension      Past Surgical History:   Procedure Laterality Date    AMPUTATE TOE(S) Left     BYPASS GRAFT ARTERY CORONARY   2005    [facility] with Dr. Holter    CARDIAC CATHETERIZATION  2005    CARDIAC CATHETERIZATION  2016    no intervention    CERVICAL DISC SURGERY  2014    CV ANGIOGRAM CORONARY GRAFT N/A 2023    Procedure: Coronary Angiogram Graft;  Surgeon: Maikel Tripathi MD;  Location: [facility] CATH LAB CV    CV PCI N/A 2023    Procedure: Percutaneous Coronary Intervention;  Surgeon: Maikel Tripathi MD;  Location: [facility] CATH LAB CV    IR LOWER EXTREMITY ANGIOGRAM LEFT  2023    IR LOWER EXTREMITY ANGIOGRAM LEFT  2020    IR LOWER EXTREMITY ANGIOGRAM LEFT  2023     Family History   Problem Relation Age of Onset    Coronary Artery Disease Father     Coronary Artery Disease Brother         Social History     Socioeconomic History    Marital status:      Spouse name: Not on file    Number of children: Not on file    Years of education: Not on file    Highest education level: Not on file   Occupational History    Not on file   Tobacco Use    Smoking status: Former     Types: Cigarettes     Quit date: 10/10/1984     Years since quittin.1    Smokeless tobacco: Never   Vaping Use    Vaping Use: Never used   Substance and Sexual Activity    Alcohol use: Not Currently    Drug use: Never    Sexual activity: Not on file   Other Topics Concern    Not on file   Social History Narrative    Not on file     Social Determinants of Health     Financial Resource Strain: Not on file   Food Insecurity: Not on file   Transportation Needs: Not on file   Physical Activity: Not on file   Stress: Not on file   Social Connections: Not on file   Interpersonal Safety: Not on file   Housing Stability: Not on file           Medications  Allergies   Current Outpatient Medications   Medication Sig Dispense Refill    ALPRAZolam (XANAX) 0.5 MG tablet Take 1 tablet (0.5 mg) by mouth At Bedtime 6 tablet 0    aspirin 81 MG EC tablet Take 1 tablet (81 mg) by mouth daily Start tomorrow. 30 tablet 3     atorvastatin (LIPITOR) 40 MG tablet Take 40 mg by mouth daily      clopidogrel (PLAVIX) 75 MG tablet Take 1 tablet (75 mg) by mouth daily Dose to start tomorrow. 90 tablet 3    Continuous Blood Gluc Sensor (FREESTYLE DAVID 14 DAY SENSOR) MISC       ezetimibe (ZETIA) 10 MG tablet Take 1 tablet (10 mg) by mouth daily 90 tablet 4    fluticasone (FLONASE) 50 MCG/ACT nasal spray Spray 2 sprays into both nostrils daily For nasal congestion 16 g 1    gabapentin (NEURONTIN) 300 MG capsule [GABAPENTIN (NEURONTIN) 300 MG CAPSULE] Take 300 mg by mouth 3 (three) times a day.             HUMALOG 100 unit/mL injection Inject Subcutaneous 3 times daily (before meals) Carb count: 3 g carb : 1 unit insulin      insulin glargine (LANTUS) 100 unit/mL injection [INSULIN GLARGINE (LANTUS) 100 UNIT/ML INJECTION] Inject 30 Units under the skin every morning.       levothyroxine (SYNTHROID/LEVOTHROID) 125 MCG tablet Take 125 mcg by mouth daily      metoprolol succinate ER (TOPROL XL) 50 MG 24 hr tablet Take 1 tablet (50 mg) by mouth daily 90 tablet 2    multivitamin with iron (ONE DAILY WITH IRON) Tab tablet [MULTIVITAMIN WITH IRON (ONE DAILY WITH IRON) TAB TABLET] Take 1 tablet by mouth daily.      nitroGLYcerin (NITROSTAT) 0.4 MG sublingual tablet Place 1 tablet (0.4 mg) under the tongue every 5 minutes as needed 25 tablet 3    TECHLITE PEN NEEDLES 31G X 5 MM miscellaneous       torsemide (DEMADEX) 20 MG tablet Take 40 mg by mouth daily      VITAMIN D3 2,000 unit capsule [VITAMIN D3 2,000 UNIT CAPSULE] Take 2,000 Units by mouth daily.  3       Allergies   Allergen Reactions    Hydrochlorothiazide Unknown    Levofloxacin Diarrhea          Lab Results    Chemistry/lipid CBC Cardiac Enzymes/BNP/TSH/INR   Recent Labs   Lab Test 06/16/23  0909   CHOL 96   HDL 34*   LDL 47   TRIG 75     Recent Labs   Lab Test 06/16/23  0909 12/16/22  0919 10/12/22  0819   LDL 47 60 41     Recent Labs   Lab Test 08/22/23  1127   *   POTASSIUM 4.0    CHLORIDE 97*   CO2 24   *   BUN 42.6*   CR 3.21*   GFRESTIMATED 20*   SVITLANA 9.1     Recent Labs   Lab Test 08/22/23  1127 08/09/23  1222 08/02/23  0429   CR 3.21* 4.11* 3.71*     Recent Labs   Lab Test 07/27/23  1222   A1C 7.4*          Recent Labs   Lab Test 08/01/23  0439   WBC 5.3   HGB 9.8*   HCT 29.5*   MCV 91   *     Recent Labs   Lab Test 08/01/23  0439 07/31/23  0429 07/30/23  0441   HGB 9.8* 8.7* 9.2*    Recent Labs   Lab Test 06/02/21  1016   TROPONINI 0.13     Recent Labs   Lab Test 07/27/23  1222   NTBNPI 4,719*     Recent Labs   Lab Test 06/16/23  0909   TSH 1.68     Recent Labs   Lab Test 06/21/23  0710   INR 1.00        Robinson Chopra MD                  Thank you for allowing me to participate in the care of your patient.      Sincerely,     Robinson Chopra MD     [facility]  cc:   Robinson Chopra MD  [address]  [address]  [place] [address]

## 2024-01-01 ENCOUNTER — APPOINTMENT (OUTPATIENT)
Dept: RADIOLOGY | Facility: HOSPITAL | Age: 74
DRG: 291 | End: 2024-01-01
Attending: EMERGENCY MEDICINE
Payer: COMMERCIAL

## 2024-01-01 ENCOUNTER — ALLIED HEALTH/NURSE VISIT (OUTPATIENT)
Dept: CARDIOLOGY | Facility: CLINIC | Age: 74
End: 2024-01-01
Payer: COMMERCIAL

## 2024-01-01 ENCOUNTER — OFFICE VISIT (OUTPATIENT)
Dept: CARDIOLOGY | Facility: CLINIC | Age: 74
End: 2024-01-01
Payer: COMMERCIAL

## 2024-01-01 ENCOUNTER — ANCILLARY PROCEDURE (OUTPATIENT)
Dept: VASCULAR ULTRASOUND | Facility: CLINIC | Age: 74
End: 2024-01-01
Attending: SURGERY
Payer: COMMERCIAL

## 2024-01-01 ENCOUNTER — ANCILLARY PROCEDURE (OUTPATIENT)
Dept: VASCULAR ULTRASOUND | Facility: CLINIC | Age: 74
End: 2024-01-01
Attending: INTERNAL MEDICINE
Payer: COMMERCIAL

## 2024-01-01 ENCOUNTER — APPOINTMENT (OUTPATIENT)
Dept: NUCLEAR MEDICINE | Facility: HOSPITAL | Age: 74
DRG: 281 | End: 2024-01-01
Attending: GENERAL ACUTE CARE HOSPITAL
Payer: COMMERCIAL

## 2024-01-01 ENCOUNTER — ANESTHESIA (OUTPATIENT)
Dept: SURGERY | Facility: HOSPITAL | Age: 74
End: 2024-01-01
Payer: COMMERCIAL

## 2024-01-01 ENCOUNTER — MYC MEDICAL ADVICE (OUTPATIENT)
Dept: CARDIOLOGY | Facility: CLINIC | Age: 74
End: 2024-01-01

## 2024-01-01 ENCOUNTER — APPOINTMENT (OUTPATIENT)
Dept: PHYSICAL THERAPY | Facility: HOSPITAL | Age: 74
DRG: 291 | End: 2024-01-01
Attending: INTERNAL MEDICINE
Payer: COMMERCIAL

## 2024-01-01 ENCOUNTER — LAB (OUTPATIENT)
Dept: LAB | Facility: HOSPITAL | Age: 74
End: 2024-01-01
Attending: INTERNAL MEDICINE
Payer: COMMERCIAL

## 2024-01-01 ENCOUNTER — HOSPITAL ENCOUNTER (INPATIENT)
Facility: HOSPITAL | Age: 74
LOS: 1 days | Discharge: HOME | DRG: 281 | End: 2024-04-12
Attending: EMERGENCY MEDICINE | Admitting: HOSPITALIST
Payer: COMMERCIAL

## 2024-01-01 ENCOUNTER — APPOINTMENT (OUTPATIENT)
Dept: CARDIOLOGY | Facility: HOSPITAL | Age: 74
DRG: 291 | End: 2024-01-01
Payer: COMMERCIAL

## 2024-01-01 ENCOUNTER — TRANSCRIBE ORDERS (OUTPATIENT)
Dept: VASCULAR SURGERY | Facility: CLINIC | Age: 74
End: 2024-01-01
Payer: COMMERCIAL

## 2024-01-01 ENCOUNTER — TELEPHONE (OUTPATIENT)
Dept: CARDIOLOGY | Facility: CLINIC | Age: 74
End: 2024-01-01

## 2024-01-01 ENCOUNTER — HOSPITAL ENCOUNTER (OUTPATIENT)
Facility: HOSPITAL | Age: 74
Discharge: HOME | End: 2024-04-02
Attending: SURGERY | Admitting: SURGERY
Payer: COMMERCIAL

## 2024-01-01 ENCOUNTER — APPOINTMENT (OUTPATIENT)
Dept: RADIOLOGY | Facility: HOSPITAL | Age: 74
DRG: 281 | End: 2024-01-01
Attending: EMERGENCY MEDICINE
Payer: COMMERCIAL

## 2024-01-01 ENCOUNTER — DOCUMENTATION ONLY (OUTPATIENT)
Dept: VASCULAR SURGERY | Facility: CLINIC | Age: 74
End: 2024-01-01
Payer: COMMERCIAL

## 2024-01-01 ENCOUNTER — APPOINTMENT (OUTPATIENT)
Dept: CARDIOLOGY | Facility: HOSPITAL | Age: 74
DRG: 281 | End: 2024-01-01
Attending: HOSPITALIST
Payer: COMMERCIAL

## 2024-01-01 ENCOUNTER — TRANSFERRED RECORDS (OUTPATIENT)
Dept: HEALTH INFORMATION MANAGEMENT | Facility: CLINIC | Age: 74
End: 2024-01-01
Payer: COMMERCIAL

## 2024-01-01 ENCOUNTER — OFFICE VISIT (OUTPATIENT)
Dept: VASCULAR SURGERY | Facility: CLINIC | Age: 74
End: 2024-01-01
Attending: INTERNAL MEDICINE
Payer: COMMERCIAL

## 2024-01-01 ENCOUNTER — APPOINTMENT (OUTPATIENT)
Dept: CARDIOLOGY | Facility: HOSPITAL | Age: 74
DRG: 281 | End: 2024-01-01
Attending: GENERAL ACUTE CARE HOSPITAL
Payer: COMMERCIAL

## 2024-01-01 ENCOUNTER — APPOINTMENT (OUTPATIENT)
Dept: INTERVENTIONAL RADIOLOGY/VASCULAR | Facility: HOSPITAL | Age: 74
DRG: 291 | End: 2024-01-01
Attending: NURSE PRACTITIONER
Payer: COMMERCIAL

## 2024-01-01 ENCOUNTER — APPOINTMENT (OUTPATIENT)
Dept: OCCUPATIONAL THERAPY | Facility: HOSPITAL | Age: 74
DRG: 291 | End: 2024-01-01
Payer: COMMERCIAL

## 2024-01-01 ENCOUNTER — LAB (OUTPATIENT)
Dept: LAB | Facility: HOSPITAL | Age: 74
End: 2024-01-01
Payer: COMMERCIAL

## 2024-01-01 ENCOUNTER — ANESTHESIA EVENT (OUTPATIENT)
Dept: MEDSURG UNIT | Facility: HOSPITAL | Age: 74
DRG: 291 | End: 2024-01-01
Payer: COMMERCIAL

## 2024-01-01 ENCOUNTER — TELEPHONE (OUTPATIENT)
Dept: CARDIOLOGY | Facility: CLINIC | Age: 74
End: 2024-01-01
Payer: COMMERCIAL

## 2024-01-01 ENCOUNTER — TELEPHONE (OUTPATIENT)
Dept: VASCULAR SURGERY | Facility: CLINIC | Age: 74
End: 2024-01-01
Payer: COMMERCIAL

## 2024-01-01 ENCOUNTER — APPOINTMENT (OUTPATIENT)
Dept: ULTRASOUND IMAGING | Facility: HOSPITAL | Age: 74
DRG: 291 | End: 2024-01-01
Attending: INTERNAL MEDICINE
Payer: COMMERCIAL

## 2024-01-01 ENCOUNTER — TRANSFERRED RECORDS (OUTPATIENT)
Dept: HEALTH INFORMATION MANAGEMENT | Facility: CLINIC | Age: 74
End: 2024-01-01

## 2024-01-01 ENCOUNTER — ANESTHESIA EVENT (OUTPATIENT)
Dept: SURGERY | Facility: HOSPITAL | Age: 74
End: 2024-01-01
Payer: COMMERCIAL

## 2024-01-01 ENCOUNTER — HOSPITAL ENCOUNTER (INPATIENT)
Facility: HOSPITAL | Age: 74
LOS: 3 days | DRG: 291 | End: 2024-05-20
Attending: EMERGENCY MEDICINE | Admitting: INTERNAL MEDICINE
Payer: COMMERCIAL

## 2024-01-01 ENCOUNTER — TRANSFERRED RECORDS (OUTPATIENT)
Dept: MULTI SPECIALTY CLINIC | Facility: CLINIC | Age: 74
End: 2024-01-01

## 2024-01-01 ENCOUNTER — ANESTHESIA (OUTPATIENT)
Dept: MEDSURG UNIT | Facility: HOSPITAL | Age: 74
DRG: 291 | End: 2024-01-01
Payer: COMMERCIAL

## 2024-01-01 ENCOUNTER — ANCILLARY PROCEDURE (OUTPATIENT)
Dept: VASCULAR ULTRASOUND | Facility: CLINIC | Age: 74
End: 2024-01-01
Payer: COMMERCIAL

## 2024-01-01 VITALS
OXYGEN SATURATION: 100 % | SYSTOLIC BLOOD PRESSURE: 130 MMHG | BODY MASS INDEX: 29.46 KG/M2 | HEART RATE: 60 BPM | HEIGHT: 72 IN | RESPIRATION RATE: 18 BRPM | DIASTOLIC BLOOD PRESSURE: 58 MMHG | TEMPERATURE: 97.6 F | WEIGHT: 217.5 LBS

## 2024-01-01 VITALS
SYSTOLIC BLOOD PRESSURE: 126 MMHG | WEIGHT: 210.6 LBS | DIASTOLIC BLOOD PRESSURE: 60 MMHG | RESPIRATION RATE: 20 BRPM | TEMPERATURE: 98.1 F | HEART RATE: 56 BPM | OXYGEN SATURATION: 96 % | BODY MASS INDEX: 28.56 KG/M2

## 2024-01-01 VITALS
RESPIRATION RATE: 16 BRPM | WEIGHT: 224 LBS | HEIGHT: 72 IN | BODY MASS INDEX: 30.34 KG/M2 | SYSTOLIC BLOOD PRESSURE: 136 MMHG | DIASTOLIC BLOOD PRESSURE: 50 MMHG | HEART RATE: 68 BPM

## 2024-01-01 VITALS
BODY MASS INDEX: 30.2 KG/M2 | SYSTOLIC BLOOD PRESSURE: 135 MMHG | DIASTOLIC BLOOD PRESSURE: 64 MMHG | HEART RATE: 61 BPM | WEIGHT: 223 LBS | OXYGEN SATURATION: 99 % | HEIGHT: 72 IN

## 2024-01-01 VITALS
BODY MASS INDEX: 29.39 KG/M2 | HEIGHT: 72 IN | HEART RATE: 54 BPM | WEIGHT: 217 LBS | OXYGEN SATURATION: 99 % | RESPIRATION RATE: 16 BRPM | SYSTOLIC BLOOD PRESSURE: 167 MMHG | DIASTOLIC BLOOD PRESSURE: 69 MMHG | TEMPERATURE: 97.9 F

## 2024-01-01 VITALS
RESPIRATION RATE: 18 BRPM | HEART RATE: 54 BPM | SYSTOLIC BLOOD PRESSURE: 134 MMHG | BODY MASS INDEX: 31.33 KG/M2 | OXYGEN SATURATION: 99 % | DIASTOLIC BLOOD PRESSURE: 54 MMHG | WEIGHT: 231 LBS

## 2024-01-01 VITALS — SYSTOLIC BLOOD PRESSURE: 142 MMHG | HEART RATE: 64 BPM | RESPIRATION RATE: 16 BRPM | DIASTOLIC BLOOD PRESSURE: 70 MMHG

## 2024-01-01 VITALS
SYSTOLIC BLOOD PRESSURE: 128 MMHG | BODY MASS INDEX: 31.29 KG/M2 | HEART RATE: 70 BPM | HEIGHT: 72 IN | WEIGHT: 231 LBS | DIASTOLIC BLOOD PRESSURE: 56 MMHG | RESPIRATION RATE: 16 BRPM

## 2024-01-01 VITALS — SYSTOLIC BLOOD PRESSURE: 137 MMHG | HEART RATE: 58 BPM | DIASTOLIC BLOOD PRESSURE: 67 MMHG

## 2024-01-01 DIAGNOSIS — I50.9 ACUTE DECOMPENSATED HEART FAILURE (H): ICD-10-CM

## 2024-01-01 DIAGNOSIS — I25.110 CORONARY ARTERY DISEASE INVOLVING NATIVE CORONARY ARTERY OF NATIVE HEART WITH UNSTABLE ANGINA PECTORIS (H): ICD-10-CM

## 2024-01-01 DIAGNOSIS — I50.23 ACUTE ON CHRONIC HFREF (HEART FAILURE WITH REDUCED EJECTION FRACTION) (H): Primary | ICD-10-CM

## 2024-01-01 DIAGNOSIS — I50.9 ACUTE DECOMPENSATED HEART FAILURE (H): Primary | ICD-10-CM

## 2024-01-01 DIAGNOSIS — I73.9 PERIPHERAL VASCULAR DISEASE, UNSPECIFIED (H): ICD-10-CM

## 2024-01-01 DIAGNOSIS — I50.9 CONGESTIVE HEART FAILURE, UNSPECIFIED HF CHRONICITY, UNSPECIFIED HEART FAILURE TYPE (H): ICD-10-CM

## 2024-01-01 DIAGNOSIS — I50.42 CHRONIC COMBINED SYSTOLIC (CONGESTIVE) AND DIASTOLIC (CONGESTIVE) HEART FAILURE (H): ICD-10-CM

## 2024-01-01 DIAGNOSIS — N18.4 TYPE 1 DIABETES MELLITUS WITH STAGE 4 CHRONIC KIDNEY DISEASE (H): ICD-10-CM

## 2024-01-01 DIAGNOSIS — L97.509 DIABETIC FOOT ULCER (H): Primary | ICD-10-CM

## 2024-01-01 DIAGNOSIS — R07.9 EXERTIONAL CHEST PAIN: ICD-10-CM

## 2024-01-01 DIAGNOSIS — N18.4 CHRONIC KIDNEY DISEASE, STAGE IV (SEVERE) (H): ICD-10-CM

## 2024-01-01 DIAGNOSIS — E10.22 TYPE 1 DM WITH END-STAGE RENAL DISEASE (H): ICD-10-CM

## 2024-01-01 DIAGNOSIS — N17.9 AKI (ACUTE KIDNEY INJURY) (H): ICD-10-CM

## 2024-01-01 DIAGNOSIS — N18.9 CHRONIC KIDNEY DISEASE, UNSPECIFIED CKD STAGE: ICD-10-CM

## 2024-01-01 DIAGNOSIS — I73.9 PAD (PERIPHERAL ARTERY DISEASE) (H): ICD-10-CM

## 2024-01-01 DIAGNOSIS — I73.9 PVD (PERIPHERAL VASCULAR DISEASE) (H): ICD-10-CM

## 2024-01-01 DIAGNOSIS — Z01.818 ENCOUNTER FOR OTHER PREPROCEDURAL EXAMINATION: ICD-10-CM

## 2024-01-01 DIAGNOSIS — N18.4 CKD (CHRONIC KIDNEY DISEASE) STAGE 4, GFR 15-29 ML/MIN (H): ICD-10-CM

## 2024-01-01 DIAGNOSIS — D64.9 ANEMIA, UNSPECIFIED TYPE: ICD-10-CM

## 2024-01-01 DIAGNOSIS — I70.248 ATHEROSCLEROSIS OF NATIVE ARTERIES OF LEFT LEG WITH ULCERATION OF OTHER PART OF LOWER LEG (H): ICD-10-CM

## 2024-01-01 DIAGNOSIS — E78.5 HYPERLIPIDEMIA LDL GOAL <70: ICD-10-CM

## 2024-01-01 DIAGNOSIS — I50.23 ACUTE ON CHRONIC HFREF (HEART FAILURE WITH REDUCED EJECTION FRACTION) (H): ICD-10-CM

## 2024-01-01 DIAGNOSIS — R06.09 DYSPNEA ON EXERTION: Primary | ICD-10-CM

## 2024-01-01 DIAGNOSIS — N18.6 TYPE 1 DM WITH END-STAGE RENAL DISEASE (H): ICD-10-CM

## 2024-01-01 DIAGNOSIS — E10.22 TYPE 1 DIABETES MELLITUS WITH STAGE 4 CHRONIC KIDNEY DISEASE (H): ICD-10-CM

## 2024-01-01 DIAGNOSIS — I21.4 NSTEMI (NON-ST ELEVATED MYOCARDIAL INFARCTION) (H): ICD-10-CM

## 2024-01-01 DIAGNOSIS — I73.9 PVD (PERIPHERAL VASCULAR DISEASE) (H): Primary | ICD-10-CM

## 2024-01-01 DIAGNOSIS — I15.0 RENOVASCULAR HYPERTENSION: ICD-10-CM

## 2024-01-01 DIAGNOSIS — N18.4 CHRONIC KIDNEY DISEASE, STAGE IV (SEVERE) (H): Primary | ICD-10-CM

## 2024-01-01 DIAGNOSIS — I25.10 CAD (CORONARY ARTERY DISEASE): ICD-10-CM

## 2024-01-01 DIAGNOSIS — I10 BENIGN ESSENTIAL HYPERTENSION: ICD-10-CM

## 2024-01-01 DIAGNOSIS — I25.10 CORONARY ARTERY DISEASE INVOLVING NATIVE CORONARY ARTERY OF NATIVE HEART WITHOUT ANGINA PECTORIS: Primary | ICD-10-CM

## 2024-01-01 DIAGNOSIS — R79.89 ELEVATED TROPONIN: ICD-10-CM

## 2024-01-01 DIAGNOSIS — T82.898A OTHER SPECIFIED COMPLICATION OF VASCULAR PROSTHETIC DEVICES, IMPLANTS AND GRAFTS, INITIAL ENCOUNTER (H): ICD-10-CM

## 2024-01-01 DIAGNOSIS — E11.621 DIABETIC FOOT ULCER (H): Primary | ICD-10-CM

## 2024-01-01 DIAGNOSIS — R06.09 DYSPNEA ON EXERTION: ICD-10-CM

## 2024-01-01 DIAGNOSIS — R79.89 ELEVATED BRAIN NATRIURETIC PEPTIDE (BNP) LEVEL: ICD-10-CM

## 2024-01-01 DIAGNOSIS — I25.709 CORONARY ARTERY DISEASE INVOLVING CORONARY BYPASS GRAFT OF NATIVE HEART WITH ANGINA PECTORIS (H): Primary | ICD-10-CM

## 2024-01-01 DIAGNOSIS — E10.52 TYPE 1 DIABETES MELLITUS WITH DIABETIC PERIPHERAL ANGIOPATHY WITH GANGRENE (H): ICD-10-CM

## 2024-01-01 LAB
ABO/RH(D): NORMAL
ALBUMIN SERPL BCG-MCNC: 3.5 G/DL (ref 3.5–5.2)
ALBUMIN UR-MCNC: 20 MG/DL
ALP SERPL-CCNC: 55 U/L (ref 40–150)
ALT SERPL W P-5'-P-CCNC: 42 U/L (ref 0–70)
ANION GAP SERPL CALCULATED.3IONS-SCNC: 11 MMOL/L (ref 7–15)
ANION GAP SERPL CALCULATED.3IONS-SCNC: 12 MMOL/L (ref 7–15)
ANION GAP SERPL CALCULATED.3IONS-SCNC: 13 MMOL/L (ref 7–15)
ANION GAP SERPL CALCULATED.3IONS-SCNC: 13 MMOL/L (ref 7–15)
ANION GAP SERPL CALCULATED.3IONS-SCNC: 15 MMOL/L (ref 7–15)
ANION GAP SERPL CALCULATED.3IONS-SCNC: 15 MMOL/L (ref 7–15)
ANION GAP SERPL CALCULATED.3IONS-SCNC: 17 MMOL/L (ref 7–15)
ANION GAP SERPL CALCULATED.3IONS-SCNC: 18 MMOL/L (ref 7–15)
ANION GAP SERPL CALCULATED.3IONS-SCNC: 19 MMOL/L (ref 7–15)
ANION GAP SERPL CALCULATED.3IONS-SCNC: 20 MMOL/L (ref 7–15)
ANTIBODY SCREEN: NEGATIVE
APPEARANCE UR: CLEAR
AST SERPL W P-5'-P-CCNC: 48 U/L (ref 0–45)
ATRIAL RATE - MUSE: 86 BPM
B-OH-BUTYR SERPL-SCNC: 0.3 MMOL/L
BASOPHILS # BLD AUTO: 0 10E3/UL (ref 0–0.2)
BASOPHILS NFR BLD AUTO: 0 %
BASOPHILS NFR BLD AUTO: 1 %
BASOPHILS NFR BLD AUTO: 1 %
BILIRUB SERPL-MCNC: 0.6 MG/DL
BILIRUB UR QL STRIP: NEGATIVE
BUN SERPL-MCNC: 117.2 MG/DL (ref 8–23)
BUN SERPL-MCNC: 120.8 MG/DL (ref 8–23)
BUN SERPL-MCNC: 47 MG/DL (ref 8–23)
BUN SERPL-MCNC: 54 MG/DL (ref 8–23)
BUN SERPL-MCNC: 56.6 MG/DL (ref 8–23)
BUN SERPL-MCNC: 61 MG/DL (ref 8–23)
BUN SERPL-MCNC: 61.3 MG/DL (ref 8–23)
BUN SERPL-MCNC: 69.3 MG/DL (ref 8–23)
BUN SERPL-MCNC: 77.9 MG/DL (ref 8–23)
BUN SERPL-MCNC: 97.7 MG/DL (ref 8–23)
CALCIUM SERPL-MCNC: 8.4 MG/DL (ref 8.8–10.2)
CALCIUM SERPL-MCNC: 8.5 MG/DL (ref 8.8–10.2)
CALCIUM SERPL-MCNC: 8.7 MG/DL (ref 8.8–10.2)
CALCIUM SERPL-MCNC: 8.7 MG/DL (ref 8.8–10.2)
CALCIUM SERPL-MCNC: 8.8 MG/DL (ref 8.8–10.2)
CALCIUM SERPL-MCNC: 8.8 MG/DL (ref 8.8–10.2)
CALCIUM SERPL-MCNC: 8.9 MG/DL (ref 8.8–10.2)
CALCIUM SERPL-MCNC: 9.2 MG/DL (ref 8.8–10.2)
CALCIUM SERPL-MCNC: 9.2 MG/DL (ref 8.8–10.2)
CALCIUM SERPL-MCNC: 9.3 MG/DL (ref 8.8–10.2)
CHLORIDE SERPL-SCNC: 93 MMOL/L (ref 98–107)
CHLORIDE SERPL-SCNC: 95 MMOL/L (ref 98–107)
CHLORIDE SERPL-SCNC: 95 MMOL/L (ref 98–107)
CHLORIDE SERPL-SCNC: 96 MMOL/L (ref 98–107)
CHLORIDE SERPL-SCNC: 97 MMOL/L (ref 98–107)
CHLORIDE SERPL-SCNC: 98 MMOL/L (ref 98–107)
COLOR UR AUTO: ABNORMAL
CREAT SERPL-MCNC: 2.86 MG/DL (ref 0.67–1.17)
CREAT SERPL-MCNC: 3.38 MG/DL (ref 0.67–1.17)
CREAT SERPL-MCNC: 3.41 MG/DL (ref 0.67–1.17)
CREAT SERPL-MCNC: 3.45 MG/DL (ref 0.67–1.17)
CREAT SERPL-MCNC: 3.52 MG/DL (ref 0.67–1.17)
CREAT SERPL-MCNC: 3.62 MG/DL (ref 0.67–1.17)
CREAT SERPL-MCNC: 3.94 MG/DL (ref 0.67–1.17)
CREAT SERPL-MCNC: 4.05 MG/DL (ref 0.67–1.17)
CREAT SERPL-MCNC: 4.35 MG/DL (ref 0.67–1.17)
CREAT SERPL-MCNC: 4.79 MG/DL (ref 0.67–1.17)
CREAT SERPL-MCNC: 4.79 MG/DL (ref 0.67–1.17)
DEPRECATED HCO3 PLAS-SCNC: 17 MMOL/L (ref 22–29)
DEPRECATED HCO3 PLAS-SCNC: 17 MMOL/L (ref 22–29)
DEPRECATED HCO3 PLAS-SCNC: 21 MMOL/L (ref 22–29)
DEPRECATED HCO3 PLAS-SCNC: 21 MMOL/L (ref 22–29)
DEPRECATED HCO3 PLAS-SCNC: 23 MMOL/L (ref 22–29)
DEPRECATED HCO3 PLAS-SCNC: 23 MMOL/L (ref 22–29)
DEPRECATED HCO3 PLAS-SCNC: 25 MMOL/L (ref 22–29)
DIASTOLIC BLOOD PRESSURE - MUSE: NORMAL MMHG
EGFRCR SERPLBLD CKD-EPI 2021: 12 ML/MIN/1.73M2
EGFRCR SERPLBLD CKD-EPI 2021: 12 ML/MIN/1.73M2
EGFRCR SERPLBLD CKD-EPI 2021: 14 ML/MIN/1.73M2
EGFRCR SERPLBLD CKD-EPI 2021: 15 ML/MIN/1.73M2
EGFRCR SERPLBLD CKD-EPI 2021: 15 ML/MIN/1.73M2
EGFRCR SERPLBLD CKD-EPI 2021: 17 ML/MIN/1.73M2
EGFRCR SERPLBLD CKD-EPI 2021: 18 ML/MIN/1.73M2
EGFRCR SERPLBLD CKD-EPI 2021: 23 ML/MIN/1.73M2
EOSINOPHIL # BLD AUTO: 0.1 10E3/UL (ref 0–0.7)
EOSINOPHIL # BLD AUTO: 0.2 10E3/UL (ref 0–0.7)
EOSINOPHIL # BLD AUTO: 0.3 10E3/UL (ref 0–0.7)
EOSINOPHIL NFR BLD AUTO: 2 %
EOSINOPHIL NFR BLD AUTO: 4 %
EOSINOPHIL NFR BLD AUTO: 5 %
ERYTHROCYTE [DISTWIDTH] IN BLOOD BY AUTOMATED COUNT: 13.6 % (ref 10–15)
ERYTHROCYTE [DISTWIDTH] IN BLOOD BY AUTOMATED COUNT: 13.8 % (ref 10–15)
ERYTHROCYTE [DISTWIDTH] IN BLOOD BY AUTOMATED COUNT: 14.1 % (ref 10–15)
ERYTHROCYTE [DISTWIDTH] IN BLOOD BY AUTOMATED COUNT: 14.1 % (ref 10–15)
ERYTHROCYTE [DISTWIDTH] IN BLOOD BY AUTOMATED COUNT: 14.3 % (ref 10–15)
ERYTHROCYTE [DISTWIDTH] IN BLOOD BY AUTOMATED COUNT: 14.6 % (ref 10–15)
FASTING STATUS PATIENT QL REPORTED: NO
FERRITIN SERPL-MCNC: 313 NG/ML (ref 31–409)
FLUAV RNA SPEC QL NAA+PROBE: NEGATIVE
FLUBV RNA RESP QL NAA+PROBE: NEGATIVE
GLUCOSE BLDC GLUCOMTR-MCNC: 112 MG/DL (ref 70–99)
GLUCOSE BLDC GLUCOMTR-MCNC: 194 MG/DL (ref 70–99)
GLUCOSE BLDC GLUCOMTR-MCNC: 197 MG/DL (ref 70–99)
GLUCOSE BLDC GLUCOMTR-MCNC: 198 MG/DL (ref 70–99)
GLUCOSE BLDC GLUCOMTR-MCNC: 200 MG/DL (ref 70–99)
GLUCOSE BLDC GLUCOMTR-MCNC: 211 MG/DL (ref 70–99)
GLUCOSE BLDC GLUCOMTR-MCNC: 214 MG/DL (ref 70–99)
GLUCOSE BLDC GLUCOMTR-MCNC: 239 MG/DL (ref 70–99)
GLUCOSE BLDC GLUCOMTR-MCNC: 239 MG/DL (ref 70–99)
GLUCOSE BLDC GLUCOMTR-MCNC: 243 MG/DL (ref 70–99)
GLUCOSE BLDC GLUCOMTR-MCNC: 254 MG/DL (ref 70–99)
GLUCOSE BLDC GLUCOMTR-MCNC: 256 MG/DL (ref 70–99)
GLUCOSE BLDC GLUCOMTR-MCNC: 262 MG/DL (ref 70–99)
GLUCOSE BLDC GLUCOMTR-MCNC: 264 MG/DL (ref 70–99)
GLUCOSE BLDC GLUCOMTR-MCNC: 272 MG/DL (ref 70–99)
GLUCOSE BLDC GLUCOMTR-MCNC: 274 MG/DL (ref 70–99)
GLUCOSE BLDC GLUCOMTR-MCNC: 293 MG/DL (ref 70–99)
GLUCOSE BLDC GLUCOMTR-MCNC: 296 MG/DL (ref 70–99)
GLUCOSE BLDC GLUCOMTR-MCNC: 303 MG/DL (ref 70–99)
GLUCOSE BLDC GLUCOMTR-MCNC: 304 MG/DL (ref 70–99)
GLUCOSE BLDC GLUCOMTR-MCNC: 311 MG/DL (ref 70–99)
GLUCOSE BLDC GLUCOMTR-MCNC: 316 MG/DL (ref 70–99)
GLUCOSE BLDC GLUCOMTR-MCNC: 324 MG/DL (ref 70–99)
GLUCOSE BLDC GLUCOMTR-MCNC: 334 MG/DL (ref 70–99)
GLUCOSE BLDC GLUCOMTR-MCNC: 341 MG/DL (ref 70–99)
GLUCOSE BLDC GLUCOMTR-MCNC: 345 MG/DL (ref 70–99)
GLUCOSE BLDC GLUCOMTR-MCNC: 363 MG/DL (ref 70–99)
GLUCOSE BLDC GLUCOMTR-MCNC: 376 MG/DL (ref 70–99)
GLUCOSE BLDC GLUCOMTR-MCNC: 393 MG/DL (ref 70–99)
GLUCOSE BLDC GLUCOMTR-MCNC: 417 MG/DL (ref 70–99)
GLUCOSE SERPL-MCNC: 103 MG/DL (ref 70–99)
GLUCOSE SERPL-MCNC: 134 MG/DL (ref 70–99)
GLUCOSE SERPL-MCNC: 143 MG/DL (ref 70–99)
GLUCOSE SERPL-MCNC: 191 MG/DL (ref 70–99)
GLUCOSE SERPL-MCNC: 206 MG/DL (ref 70–99)
GLUCOSE SERPL-MCNC: 256 MG/DL (ref 70–99)
GLUCOSE SERPL-MCNC: 265 MG/DL (ref 70–99)
GLUCOSE SERPL-MCNC: 266 MG/DL (ref 70–99)
GLUCOSE SERPL-MCNC: 317 MG/DL (ref 70–99)
GLUCOSE SERPL-MCNC: 372 MG/DL (ref 70–99)
GLUCOSE SERPL-MCNC: 68 MG/DL (ref 70–99)
GLUCOSE UR STRIP-MCNC: NEGATIVE MG/DL
HBA1C MFR BLD: 7 %
HBA1C MFR BLD: 7.1 % (ref 4.2–6.1)
HBV SURFACE AG SERPL QL IA: NONREACTIVE
HCT VFR BLD AUTO: 23 % (ref 40–53)
HCT VFR BLD AUTO: 23.5 % (ref 40–53)
HCT VFR BLD AUTO: 25.9 % (ref 40–53)
HCT VFR BLD AUTO: 27.9 % (ref 40–53)
HCT VFR BLD AUTO: 33.3 % (ref 40–53)
HCT VFR BLD AUTO: 33.7 % (ref 40–53)
HEMOCCULT STL QL: POSITIVE
HGB BLD-MCNC: 10.8 G/DL (ref 13.3–17.7)
HGB BLD-MCNC: 10.9 G/DL (ref 13.3–17.7)
HGB BLD-MCNC: 7.2 G/DL (ref 13.3–17.7)
HGB BLD-MCNC: 7.2 G/DL (ref 13.3–17.7)
HGB BLD-MCNC: 7.3 G/DL (ref 13.3–17.7)
HGB BLD-MCNC: 7.4 G/DL (ref 13.3–17.7)
HGB BLD-MCNC: 7.4 G/DL (ref 13.3–17.7)
HGB BLD-MCNC: 8.2 G/DL (ref 13.3–17.7)
HGB BLD-MCNC: 8.8 G/DL (ref 13.3–17.7)
HGB UR QL STRIP: NEGATIVE
HOLD SPECIMEN: NORMAL
HYALINE CASTS: 1 /LPF
IMM GRANULOCYTES # BLD: 0 10E3/UL
IMM GRANULOCYTES NFR BLD: 0 %
INTERPRETATION ECG - MUSE: NORMAL
IRON BINDING CAPACITY (ROCHE): 262 UG/DL (ref 240–430)
IRON SATN MFR SERPL: 18 % (ref 15–46)
IRON SERPL-MCNC: 47 UG/DL (ref 61–157)
KETONES UR STRIP-MCNC: NEGATIVE MG/DL
LEUKOCYTE ESTERASE UR QL STRIP: NEGATIVE
LVEF ECHO: NORMAL
LVEF ECHO: NORMAL
LYMPHOCYTES # BLD AUTO: 0.7 10E3/UL (ref 0.8–5.3)
LYMPHOCYTES # BLD AUTO: 0.9 10E3/UL (ref 0.8–5.3)
LYMPHOCYTES # BLD AUTO: 1.2 10E3/UL (ref 0.8–5.3)
LYMPHOCYTES NFR BLD AUTO: 15 %
LYMPHOCYTES NFR BLD AUTO: 17 %
LYMPHOCYTES NFR BLD AUTO: 19 %
MAGNESIUM SERPL-MCNC: 2.6 MG/DL (ref 1.7–2.3)
MAGNESIUM SERPL-MCNC: 2.7 MG/DL (ref 1.7–2.3)
MCH RBC QN AUTO: 29.1 PG (ref 26.5–33)
MCH RBC QN AUTO: 29.7 PG (ref 26.5–33)
MCH RBC QN AUTO: 29.8 PG (ref 26.5–33)
MCH RBC QN AUTO: 29.9 PG (ref 26.5–33)
MCH RBC QN AUTO: 30 PG (ref 26.5–33)
MCH RBC QN AUTO: 30.1 PG (ref 26.5–33)
MCHC RBC AUTO-ENTMCNC: 30.6 G/DL (ref 31.5–36.5)
MCHC RBC AUTO-ENTMCNC: 31.5 G/DL (ref 31.5–36.5)
MCHC RBC AUTO-ENTMCNC: 31.7 G/DL (ref 31.5–36.5)
MCHC RBC AUTO-ENTMCNC: 32.2 G/DL (ref 31.5–36.5)
MCHC RBC AUTO-ENTMCNC: 32.3 G/DL (ref 31.5–36.5)
MCHC RBC AUTO-ENTMCNC: 32.4 G/DL (ref 31.5–36.5)
MCV RBC AUTO: 92 FL (ref 78–100)
MCV RBC AUTO: 93 FL (ref 78–100)
MCV RBC AUTO: 94 FL (ref 78–100)
MCV RBC AUTO: 94 FL (ref 78–100)
MCV RBC AUTO: 95 FL (ref 78–100)
MCV RBC AUTO: 95 FL (ref 78–100)
MONOCYTES # BLD AUTO: 0.5 10E3/UL (ref 0–1.3)
MONOCYTES # BLD AUTO: 0.5 10E3/UL (ref 0–1.3)
MONOCYTES # BLD AUTO: 0.7 10E3/UL (ref 0–1.3)
MONOCYTES NFR BLD AUTO: 10 %
MONOCYTES NFR BLD AUTO: 11 %
MONOCYTES NFR BLD AUTO: 9 %
NEUTROPHILS # BLD AUTO: 3.4 10E3/UL (ref 1.6–8.3)
NEUTROPHILS # BLD AUTO: 3.9 10E3/UL (ref 1.6–8.3)
NEUTROPHILS # BLD AUTO: 4.3 10E3/UL (ref 1.6–8.3)
NEUTROPHILS NFR BLD AUTO: 64 %
NEUTROPHILS NFR BLD AUTO: 69 %
NEUTROPHILS NFR BLD AUTO: 73 %
NITRATE UR QL: NEGATIVE
NRBC # BLD AUTO: 0 10E3/UL
NRBC BLD AUTO-RTO: 0 /100
NRBC BLD AUTO-RTO: 0 /100
NRBC BLD AUTO-RTO: 1 /100
NT-PROBNP SERPL-MCNC: 7812 PG/ML (ref 0–900)
NT-PROBNP SERPL-MCNC: ABNORMAL PG/ML (ref 0–900)
NUC STRESS EJECTION FRACTION: 70 %
P AXIS - MUSE: 68 DEGREES
PH UR STRIP: 6 [PH] (ref 5–7)
PLATELET # BLD AUTO: 129 10E3/UL (ref 150–450)
PLATELET # BLD AUTO: 130 10E3/UL (ref 150–450)
PLATELET # BLD AUTO: 130 10E3/UL (ref 150–450)
PLATELET # BLD AUTO: 134 10E3/UL (ref 150–450)
PLATELET # BLD AUTO: 149 10E3/UL (ref 150–450)
PLATELET # BLD AUTO: 155 10E3/UL (ref 150–450)
POTASSIUM SERPL-SCNC: 4 MMOL/L (ref 3.4–5.3)
POTASSIUM SERPL-SCNC: 4.3 MMOL/L (ref 3.4–5.3)
POTASSIUM SERPL-SCNC: 4.3 MMOL/L (ref 3.4–5.3)
POTASSIUM SERPL-SCNC: 4.5 MMOL/L (ref 3.4–5.3)
POTASSIUM SERPL-SCNC: 4.7 MMOL/L (ref 3.4–5.3)
POTASSIUM SERPL-SCNC: 4.8 MMOL/L (ref 3.4–5.3)
POTASSIUM SERPL-SCNC: 4.8 MMOL/L (ref 3.4–5.3)
PR INTERVAL - MUSE: 216 MS
PROCALCITONIN SERPL IA-MCNC: 0.15 NG/ML
PROT SERPL-MCNC: 6.2 G/DL (ref 6.4–8.3)
QRS DURATION - MUSE: 138 MS
QT - MUSE: 414 MS
QTC - MUSE: 495 MS
R AXIS - MUSE: -66 DEGREES
RBC # BLD AUTO: 2.46 10E6/UL (ref 4.4–5.9)
RBC # BLD AUTO: 2.47 10E6/UL (ref 4.4–5.9)
RBC # BLD AUTO: 2.76 10E6/UL (ref 4.4–5.9)
RBC # BLD AUTO: 2.95 10E6/UL (ref 4.4–5.9)
RBC # BLD AUTO: 3.61 10E6/UL (ref 4.4–5.9)
RBC # BLD AUTO: 3.63 10E6/UL (ref 4.4–5.9)
RBC URINE: 0 /HPF
RSV RNA SPEC NAA+PROBE: NEGATIVE
SARS-COV-2 RNA RESP QL NAA+PROBE: NEGATIVE
SODIUM SERPL-SCNC: 131 MMOL/L (ref 135–145)
SODIUM SERPL-SCNC: 131 MMOL/L (ref 135–145)
SODIUM SERPL-SCNC: 132 MMOL/L (ref 135–145)
SODIUM SERPL-SCNC: 133 MMOL/L (ref 135–145)
SODIUM SERPL-SCNC: 134 MMOL/L (ref 135–145)
SODIUM SERPL-SCNC: 135 MMOL/L (ref 135–145)
SODIUM SERPL-SCNC: 136 MMOL/L (ref 135–145)
SODIUM SERPL-SCNC: 139 MMOL/L (ref 135–145)
SP GR UR STRIP: 1.01 (ref 1–1.03)
SPECIMEN EXPIRATION DATE: NORMAL
STRESS ECHO TARGET HR: 147
SYSTOLIC BLOOD PRESSURE - MUSE: NORMAL MMHG
T AXIS - MUSE: 96 DEGREES
TROPONIN T SERPL HS-MCNC: 103 NG/L
TROPONIN T SERPL HS-MCNC: 129 NG/L
TROPONIN T SERPL HS-MCNC: 82 NG/L
TROPONIN T SERPL HS-MCNC: 97 NG/L
TSH SERPL DL<=0.005 MIU/L-ACNC: 2.21 UIU/ML (ref 0.3–4.2)
UFH PPP CHRO-ACNC: 0.27 IU/ML
UFH PPP CHRO-ACNC: 0.32 IU/ML
UFH PPP CHRO-ACNC: 0.61 IU/ML
UROBILINOGEN UR STRIP-MCNC: <2 MG/DL
VENTRICULAR RATE- MUSE: 86 BPM
WBC # BLD AUTO: 11.4 10E3/UL (ref 4–11)
WBC # BLD AUTO: 4.6 10E3/UL (ref 4–11)
WBC # BLD AUTO: 5.5 10E3/UL (ref 4–11)
WBC # BLD AUTO: 6 10E3/UL (ref 4–11)
WBC # BLD AUTO: 6.6 10E3/UL (ref 4–11)
WBC # BLD AUTO: 7.8 10E3/UL (ref 4–11)
WBC URINE: <1 /HPF

## 2024-01-01 PROCEDURE — 82962 GLUCOSE BLOOD TEST: CPT

## 2024-01-01 PROCEDURE — 93923 UPR/LXTR ART STDY 3+ LVLS: CPT | Mod: 26 | Performed by: SURGERY

## 2024-01-01 PROCEDURE — 99233 SBSQ HOSP IP/OBS HIGH 50: CPT | Performed by: INTERNAL MEDICINE

## 2024-01-01 PROCEDURE — 250N000011 HC RX IP 250 OP 636: Performed by: SURGERY

## 2024-01-01 PROCEDURE — 83880 ASSAY OF NATRIURETIC PEPTIDE: CPT | Performed by: EMERGENCY MEDICINE

## 2024-01-01 PROCEDURE — 36415 COLL VENOUS BLD VENIPUNCTURE: CPT | Performed by: INTERNAL MEDICINE

## 2024-01-01 PROCEDURE — 85018 HEMOGLOBIN: CPT | Performed by: NURSE PRACTITIONER

## 2024-01-01 PROCEDURE — 250N000011 HC RX IP 250 OP 636

## 2024-01-01 PROCEDURE — 99205 OFFICE O/P NEW HI 60 MIN: CPT | Performed by: INTERNAL MEDICINE

## 2024-01-01 PROCEDURE — 97162 PT EVAL MOD COMPLEX 30 MIN: CPT | Mod: GP | Performed by: PHYSICAL THERAPIST

## 2024-01-01 PROCEDURE — 250N000011 HC RX IP 250 OP 636: Performed by: INTERNAL MEDICINE

## 2024-01-01 PROCEDURE — 85004 AUTOMATED DIFF WBC COUNT: CPT | Performed by: SURGERY

## 2024-01-01 PROCEDURE — 99232 SBSQ HOSP IP/OBS MODERATE 35: CPT | Performed by: INTERNAL MEDICINE

## 2024-01-01 PROCEDURE — 99024 POSTOP FOLLOW-UP VISIT: CPT | Performed by: SURGERY

## 2024-01-01 PROCEDURE — 85520 HEPARIN ASSAY: CPT | Performed by: INTERNAL MEDICINE

## 2024-01-01 PROCEDURE — 250N000013 HC RX MED GY IP 250 OP 250 PS 637

## 2024-01-01 PROCEDURE — 250N000009 HC RX 250

## 2024-01-01 PROCEDURE — 80053 COMPREHEN METABOLIC PANEL: CPT | Performed by: INTERNAL MEDICINE

## 2024-01-01 PROCEDURE — 83735 ASSAY OF MAGNESIUM: CPT

## 2024-01-01 PROCEDURE — 99223 1ST HOSP IP/OBS HIGH 75: CPT | Performed by: GENERAL ACUTE CARE HOSPITAL

## 2024-01-01 PROCEDURE — 93923 UPR/LXTR ART STDY 3+ LVLS: CPT

## 2024-01-01 PROCEDURE — 999N000141 HC STATISTIC PRE-PROCEDURE NURSING ASSESSMENT: Performed by: SURGERY

## 2024-01-01 PROCEDURE — 272N000001 HC OR GENERAL SUPPLY STERILE: Performed by: SURGERY

## 2024-01-01 PROCEDURE — 80048 BASIC METABOLIC PNL TOTAL CA: CPT | Performed by: EMERGENCY MEDICINE

## 2024-01-01 PROCEDURE — 250N000009 HC RX 250: Performed by: SURGERY

## 2024-01-01 PROCEDURE — A9500 TC99M SESTAMIBI: HCPCS | Performed by: INTERNAL MEDICINE

## 2024-01-01 PROCEDURE — 36415 COLL VENOUS BLD VENIPUNCTURE: CPT

## 2024-01-01 PROCEDURE — 85018 HEMOGLOBIN: CPT | Performed by: INTERNAL MEDICINE

## 2024-01-01 PROCEDURE — 99285 EMERGENCY DEPT VISIT HI MDM: CPT

## 2024-01-01 PROCEDURE — 36415 COLL VENOUS BLD VENIPUNCTURE: CPT | Performed by: SURGERY

## 2024-01-01 PROCEDURE — 81001 URINALYSIS AUTO W/SCOPE: CPT | Performed by: EMERGENCY MEDICINE

## 2024-01-01 PROCEDURE — 71045 X-RAY EXAM CHEST 1 VIEW: CPT

## 2024-01-01 PROCEDURE — 84484 ASSAY OF TROPONIN QUANT: CPT | Performed by: EMERGENCY MEDICINE

## 2024-01-01 PROCEDURE — 99215 OFFICE O/P EST HI 40 MIN: CPT | Performed by: NURSE PRACTITIONER

## 2024-01-01 PROCEDURE — 90935 HEMODIALYSIS ONE EVALUATION: CPT

## 2024-01-01 PROCEDURE — 120N000004 HC R&B MS OVERFLOW

## 2024-01-01 PROCEDURE — 83550 IRON BINDING TEST: CPT | Performed by: NURSE PRACTITIONER

## 2024-01-01 PROCEDURE — 258N000003 HC RX IP 258 OP 636: Performed by: NURSE PRACTITIONER

## 2024-01-01 PROCEDURE — 93970 EXTREMITY STUDY: CPT

## 2024-01-01 PROCEDURE — 80048 BASIC METABOLIC PNL TOTAL CA: CPT | Performed by: INTERNAL MEDICINE

## 2024-01-01 PROCEDURE — G0463 HOSPITAL OUTPT CLINIC VISIT: HCPCS

## 2024-01-01 PROCEDURE — 80048 BASIC METABOLIC PNL TOTAL CA: CPT | Performed by: NURSE PRACTITIONER

## 2024-01-01 PROCEDURE — 250N000013 HC RX MED GY IP 250 OP 250 PS 637: Performed by: GENERAL ACUTE CARE HOSPITAL

## 2024-01-01 PROCEDURE — 250N000011 HC RX IP 250 OP 636: Performed by: STUDENT IN AN ORGANIZED HEALTH CARE EDUCATION/TRAINING PROGRAM

## 2024-01-01 PROCEDURE — 99214 OFFICE O/P EST MOD 30 MIN: CPT

## 2024-01-01 PROCEDURE — 250N000011 HC RX IP 250 OP 636: Performed by: EMERGENCY MEDICINE

## 2024-01-01 PROCEDURE — 255N000002 HC RX 255 OP 636: Performed by: INTERNAL MEDICINE

## 2024-01-01 PROCEDURE — G0463 HOSPITAL OUTPT CLINIC VISIT: HCPCS | Mod: 25

## 2024-01-01 PROCEDURE — 84132 ASSAY OF SERUM POTASSIUM: CPT

## 2024-01-01 PROCEDURE — 02H633Z INSERTION OF INFUSION DEVICE INTO RIGHT ATRIUM, PERCUTANEOUS APPROACH: ICD-10-PCS | Performed by: RADIOLOGY

## 2024-01-01 PROCEDURE — 250N000013 HC RX MED GY IP 250 OP 250 PS 637: Performed by: INTERNAL MEDICINE

## 2024-01-01 PROCEDURE — 78452 HT MUSCLE IMAGE SPECT MULT: CPT | Mod: 26 | Performed by: INTERNAL MEDICINE

## 2024-01-01 PROCEDURE — 99223 1ST HOSP IP/OBS HIGH 75: CPT | Mod: AI | Performed by: INTERNAL MEDICINE

## 2024-01-01 PROCEDURE — 258N000003 HC RX IP 258 OP 636: Performed by: STUDENT IN AN ORGANIZED HEALTH CARE EDUCATION/TRAINING PROGRAM

## 2024-01-01 PROCEDURE — 84132 ASSAY OF SERUM POTASSIUM: CPT | Performed by: SURGERY

## 2024-01-01 PROCEDURE — 93005 ELECTROCARDIOGRAM TRACING: CPT | Performed by: EMERGENCY MEDICINE

## 2024-01-01 PROCEDURE — 78452 HT MUSCLE IMAGE SPECT MULT: CPT

## 2024-01-01 PROCEDURE — 80048 BASIC METABOLIC PNL TOTAL CA: CPT

## 2024-01-01 PROCEDURE — 250N000013 HC RX MED GY IP 250 OP 250 PS 637: Performed by: EMERGENCY MEDICINE

## 2024-01-01 PROCEDURE — 84145 PROCALCITONIN (PCT): CPT | Performed by: HOSPITALIST

## 2024-01-01 PROCEDURE — 82947 ASSAY GLUCOSE BLOOD QUANT: CPT | Mod: 91 | Performed by: SURGERY

## 2024-01-01 PROCEDURE — 93990 DOPPLER FLOW TESTING: CPT

## 2024-01-01 PROCEDURE — 99223 1ST HOSP IP/OBS HIGH 75: CPT | Mod: AI | Performed by: HOSPITALIST

## 2024-01-01 PROCEDURE — 93306 TTE W/DOPPLER COMPLETE: CPT | Mod: 26 | Performed by: STUDENT IN AN ORGANIZED HEALTH CARE EDUCATION/TRAINING PROGRAM

## 2024-01-01 PROCEDURE — 370N000017 HC ANESTHESIA TECHNICAL FEE, PER MIN: Performed by: SURGERY

## 2024-01-01 PROCEDURE — 84443 ASSAY THYROID STIM HORMONE: CPT | Performed by: EMERGENCY MEDICINE

## 2024-01-01 PROCEDURE — 710N000012 HC RECOVERY PHASE 2, PER MINUTE: Performed by: SURGERY

## 2024-01-01 PROCEDURE — 97165 OT EVAL LOW COMPLEX 30 MIN: CPT | Mod: GO

## 2024-01-01 PROCEDURE — 93018 CV STRESS TEST I&R ONLY: CPT | Performed by: INTERNAL MEDICINE

## 2024-01-01 PROCEDURE — 250N000012 HC RX MED GY IP 250 OP 636 PS 637: Performed by: STUDENT IN AN ORGANIZED HEALTH CARE EDUCATION/TRAINING PROGRAM

## 2024-01-01 PROCEDURE — 99222 1ST HOSP IP/OBS MODERATE 55: CPT | Performed by: STUDENT IN AN ORGANIZED HEALTH CARE EDUCATION/TRAINING PROGRAM

## 2024-01-01 PROCEDURE — 82010 KETONE BODYS QUAN: CPT | Performed by: INTERNAL MEDICINE

## 2024-01-01 PROCEDURE — 87340 HEPATITIS B SURFACE AG IA: CPT | Performed by: NURSE PRACTITIONER

## 2024-01-01 PROCEDURE — 85027 COMPLETE CBC AUTOMATED: CPT

## 2024-01-01 PROCEDURE — 76937 US GUIDE VASCULAR ACCESS: CPT

## 2024-01-01 PROCEDURE — C9113 INJ PANTOPRAZOLE SODIUM, VIA: HCPCS | Performed by: INTERNAL MEDICINE

## 2024-01-01 PROCEDURE — 272N000500 HC NEEDLE CR2

## 2024-01-01 PROCEDURE — 85014 HEMATOCRIT: CPT | Performed by: INTERNAL MEDICINE

## 2024-01-01 PROCEDURE — 36415 COLL VENOUS BLD VENIPUNCTURE: CPT | Performed by: EMERGENCY MEDICINE

## 2024-01-01 PROCEDURE — 71046 X-RAY EXAM CHEST 2 VIEWS: CPT

## 2024-01-01 PROCEDURE — 250N000013 HC RX MED GY IP 250 OP 250 PS 637: Performed by: STUDENT IN AN ORGANIZED HEALTH CARE EDUCATION/TRAINING PROGRAM

## 2024-01-01 PROCEDURE — 250N000011 HC RX IP 250 OP 636: Performed by: ANESTHESIOLOGY

## 2024-01-01 PROCEDURE — 255N000002 HC RX 255 OP 636: Performed by: HOSPITALIST

## 2024-01-01 PROCEDURE — 250N000012 HC RX MED GY IP 250 OP 636 PS 637: Performed by: INTERNAL MEDICINE

## 2024-01-01 PROCEDURE — C8929 TTE W OR WO FOL WCON,DOPPLER: HCPCS

## 2024-01-01 PROCEDURE — 250N000009 HC RX 250: Performed by: NURSE PRACTITIONER

## 2024-01-01 PROCEDURE — 82374 ASSAY BLOOD CARBON DIOXIDE: CPT

## 2024-01-01 PROCEDURE — C1769 GUIDE WIRE: HCPCS

## 2024-01-01 PROCEDURE — 93970 EXTREMITY STUDY: CPT | Mod: XS

## 2024-01-01 PROCEDURE — 76770 US EXAM ABDO BACK WALL COMP: CPT

## 2024-01-01 PROCEDURE — 99207 PR APP CREDIT; MD BILLING SHARED VISIT: CPT | Mod: FS

## 2024-01-01 PROCEDURE — 99232 SBSQ HOSP IP/OBS MODERATE 35: CPT | Performed by: STUDENT IN AN ORGANIZED HEALTH CARE EDUCATION/TRAINING PROGRAM

## 2024-01-01 PROCEDURE — 250N000009 HC RX 250: Performed by: STUDENT IN AN ORGANIZED HEALTH CARE EDUCATION/TRAINING PROGRAM

## 2024-01-01 PROCEDURE — 250N000011 HC RX IP 250 OP 636: Performed by: NURSE PRACTITIONER

## 2024-01-01 PROCEDURE — C1750 CATH, HEMODIALYSIS,LONG-TERM: HCPCS

## 2024-01-01 PROCEDURE — 250N000011 HC RX IP 250 OP 636: Performed by: RADIOLOGY

## 2024-01-01 PROCEDURE — 5A1D70Z PERFORMANCE OF URINARY FILTRATION, INTERMITTENT, LESS THAN 6 HOURS PER DAY: ICD-10-PCS | Performed by: NURSE PRACTITIONER

## 2024-01-01 PROCEDURE — 99207 PR NO CHARGE LOS: CPT

## 2024-01-01 PROCEDURE — 250N000013 HC RX MED GY IP 250 OP 250 PS 637: Performed by: HOSPITALIST

## 2024-01-01 PROCEDURE — 250N000012 HC RX MED GY IP 250 OP 636 PS 637: Performed by: HOSPITALIST

## 2024-01-01 PROCEDURE — 82728 ASSAY OF FERRITIN: CPT | Performed by: NURSE PRACTITIONER

## 2024-01-01 PROCEDURE — 250N000011 HC RX IP 250 OP 636: Performed by: HOSPITALIST

## 2024-01-01 PROCEDURE — 93017 CV STRESS TEST TRACING ONLY: CPT

## 2024-01-01 PROCEDURE — 85025 COMPLETE CBC W/AUTO DIFF WBC: CPT | Performed by: EMERGENCY MEDICINE

## 2024-01-01 PROCEDURE — 82565 ASSAY OF CREATININE: CPT | Performed by: SURGERY

## 2024-01-01 PROCEDURE — 0JH63XZ INSERTION OF TUNNELED VASCULAR ACCESS DEVICE INTO CHEST SUBCUTANEOUS TISSUE AND FASCIA, PERCUTANEOUS APPROACH: ICD-10-PCS | Performed by: RADIOLOGY

## 2024-01-01 PROCEDURE — 93306 TTE W/DOPPLER COMPLETE: CPT | Mod: 26 | Performed by: INTERNAL MEDICINE

## 2024-01-01 PROCEDURE — G2211 COMPLEX E/M VISIT ADD ON: HCPCS | Performed by: INTERNAL MEDICINE

## 2024-01-01 PROCEDURE — 0BH17EZ INSERTION OF ENDOTRACHEAL AIRWAY INTO TRACHEA, VIA NATURAL OR ARTIFICIAL OPENING: ICD-10-PCS | Performed by: INTERNAL MEDICINE

## 2024-01-01 PROCEDURE — 82272 OCCULT BLD FECES 1-3 TESTS: CPT | Performed by: INTERNAL MEDICINE

## 2024-01-01 PROCEDURE — 84484 ASSAY OF TROPONIN QUANT: CPT | Performed by: HOSPITALIST

## 2024-01-01 PROCEDURE — 99232 SBSQ HOSP IP/OBS MODERATE 35: CPT | Performed by: GENERAL ACUTE CARE HOSPITAL

## 2024-01-01 PROCEDURE — 250N000012 HC RX MED GY IP 250 OP 636 PS 637

## 2024-01-01 PROCEDURE — 83735 ASSAY OF MAGNESIUM: CPT | Performed by: EMERGENCY MEDICINE

## 2024-01-01 PROCEDURE — 36415 COLL VENOUS BLD VENIPUNCTURE: CPT | Performed by: HOSPITALIST

## 2024-01-01 PROCEDURE — 97116 GAIT TRAINING THERAPY: CPT | Mod: GP | Performed by: PHYSICAL THERAPIST

## 2024-01-01 PROCEDURE — 87637 SARSCOV2&INF A&B&RSV AMP PRB: CPT | Performed by: EMERGENCY MEDICINE

## 2024-01-01 PROCEDURE — 97535 SELF CARE MNGMENT TRAINING: CPT | Mod: GO

## 2024-01-01 PROCEDURE — 93990 DOPPLER FLOW TESTING: CPT | Mod: 26 | Performed by: SURGERY

## 2024-01-01 PROCEDURE — 93970 EXTREMITY STUDY: CPT | Mod: 26 | Performed by: SURGERY

## 2024-01-01 PROCEDURE — 343N000001 HC RX 343: Performed by: INTERNAL MEDICINE

## 2024-01-01 PROCEDURE — 96374 THER/PROPH/DIAG INJ IV PUSH: CPT

## 2024-01-01 PROCEDURE — 99214 OFFICE O/P EST MOD 30 MIN: CPT | Performed by: INTERNAL MEDICINE

## 2024-01-01 PROCEDURE — 36821 AV FUSION DIRECT ANY SITE: CPT | Mod: LT | Performed by: SURGERY

## 2024-01-01 PROCEDURE — 83036 HEMOGLOBIN GLYCOSYLATED A1C: CPT | Performed by: HOSPITALIST

## 2024-01-01 PROCEDURE — 360N000077 HC SURGERY LEVEL 4, PER MIN: Performed by: SURGERY

## 2024-01-01 PROCEDURE — 36415 COLL VENOUS BLD VENIPUNCTURE: CPT | Performed by: NURSE PRACTITIONER

## 2024-01-01 PROCEDURE — 86900 BLOOD TYPING SEROLOGIC ABO: CPT | Performed by: INTERNAL MEDICINE

## 2024-01-01 PROCEDURE — 250N000011 HC RX IP 250 OP 636: Performed by: GENERAL ACUTE CARE HOSPITAL

## 2024-01-01 PROCEDURE — 96375 TX/PRO/DX INJ NEW DRUG ADDON: CPT

## 2024-01-01 PROCEDURE — 93925 LOWER EXTREMITY STUDY: CPT | Mod: 26 | Performed by: SURGERY

## 2024-01-01 PROCEDURE — 93925 LOWER EXTREMITY STUDY: CPT

## 2024-01-01 RX ORDER — LOSARTAN POTASSIUM 25 MG/1
25 TABLET ORAL EVERY EVENING
Status: DISCONTINUED | OUTPATIENT
Start: 2024-01-01 | End: 2024-05-21 | Stop reason: HOSPADM

## 2024-01-01 RX ORDER — AMOXICILLIN 250 MG
2 CAPSULE ORAL 2 TIMES DAILY PRN
Status: DISCONTINUED | OUTPATIENT
Start: 2024-01-01 | End: 2024-05-21 | Stop reason: HOSPADM

## 2024-01-01 RX ORDER — DOXYCYCLINE 100 MG/1
100 TABLET ORAL 2 TIMES DAILY
Status: DISCONTINUED | OUTPATIENT
Start: 2024-01-01 | End: 2024-01-01

## 2024-01-01 RX ORDER — CEFAZOLIN SODIUM/WATER 2 G/20 ML
2 SYRINGE (ML) INTRAVENOUS
Status: CANCELLED | OUTPATIENT
Start: 2024-01-01

## 2024-01-01 RX ORDER — HEPARIN SODIUM 1000 [USP'U]/ML
4500 INJECTION, SOLUTION INTRAVENOUS; SUBCUTANEOUS ONCE
Status: COMPLETED | OUTPATIENT
Start: 2024-01-01 | End: 2024-01-01

## 2024-01-01 RX ORDER — ONDANSETRON 4 MG/1
4 TABLET, ORALLY DISINTEGRATING ORAL EVERY 30 MIN PRN
Status: DISCONTINUED | OUTPATIENT
Start: 2024-01-01 | End: 2024-01-01 | Stop reason: HOSPADM

## 2024-01-01 RX ORDER — NICOTINE POLACRILEX 4 MG
15-30 LOZENGE BUCCAL
Status: DISCONTINUED | OUTPATIENT
Start: 2024-01-01 | End: 2024-05-21 | Stop reason: HOSPADM

## 2024-01-01 RX ORDER — CEFAZOLIN SODIUM/WATER 2 G/20 ML
2 SYRINGE (ML) INTRAVENOUS SEE ADMIN INSTRUCTIONS
Status: DISCONTINUED | OUTPATIENT
Start: 2024-01-01 | End: 2024-01-01 | Stop reason: HOSPADM

## 2024-01-01 RX ORDER — ONDANSETRON 2 MG/ML
4 INJECTION INTRAMUSCULAR; INTRAVENOUS EVERY 6 HOURS PRN
Status: DISCONTINUED | OUTPATIENT
Start: 2024-01-01 | End: 2024-01-01 | Stop reason: HOSPADM

## 2024-01-01 RX ORDER — SALIVA STIMULANT COMB. NO.3
1 SPRAY, NON-AEROSOL (ML) MUCOUS MEMBRANE 4 TIMES DAILY PRN
Status: DISCONTINUED | OUTPATIENT
Start: 2024-01-01 | End: 2024-01-01 | Stop reason: HOSPADM

## 2024-01-01 RX ORDER — TORSEMIDE 20 MG/1
80 TABLET ORAL DAILY
Status: DISCONTINUED | OUTPATIENT
Start: 2024-01-01 | End: 2024-05-21 | Stop reason: HOSPADM

## 2024-01-01 RX ORDER — SODIUM CHLORIDE, SODIUM LACTATE, POTASSIUM CHLORIDE, CALCIUM CHLORIDE 600; 310; 30; 20 MG/100ML; MG/100ML; MG/100ML; MG/100ML
INJECTION, SOLUTION INTRAVENOUS CONTINUOUS
Status: DISCONTINUED | OUTPATIENT
Start: 2024-01-01 | End: 2024-01-01 | Stop reason: HOSPADM

## 2024-01-01 RX ORDER — NICOTINE POLACRILEX 4 MG
15-30 LOZENGE BUCCAL
Status: DISCONTINUED | OUTPATIENT
Start: 2024-01-01 | End: 2024-01-01 | Stop reason: HOSPADM

## 2024-01-01 RX ORDER — DEXTROSE MONOHYDRATE 25 G/50ML
25-50 INJECTION, SOLUTION INTRAVENOUS
Status: DISCONTINUED | OUTPATIENT
Start: 2024-01-01 | End: 2024-01-01

## 2024-01-01 RX ORDER — METOPROLOL SUCCINATE 50 MG/1
50 TABLET, EXTENDED RELEASE ORAL EVERY EVENING
Status: DISCONTINUED | OUTPATIENT
Start: 2024-01-01 | End: 2024-01-01 | Stop reason: HOSPADM

## 2024-01-01 RX ORDER — ACETAMINOPHEN 325 MG/1
650 TABLET ORAL EVERY 4 HOURS PRN
Status: DISCONTINUED | OUTPATIENT
Start: 2024-01-01 | End: 2024-05-21 | Stop reason: HOSPADM

## 2024-01-01 RX ORDER — CHLORAL HYDRATE 500 MG
1 CAPSULE ORAL DAILY
COMMUNITY

## 2024-01-01 RX ORDER — ASPIRIN 81 MG/1
81 TABLET ORAL DAILY
Status: DISCONTINUED | OUTPATIENT
Start: 2024-01-01 | End: 2024-05-21 | Stop reason: HOSPADM

## 2024-01-01 RX ORDER — MEPERIDINE HYDROCHLORIDE 25 MG/ML
12.5 INJECTION INTRAMUSCULAR; INTRAVENOUS; SUBCUTANEOUS EVERY 5 MIN PRN
Status: DISCONTINUED | OUTPATIENT
Start: 2024-01-01 | End: 2024-01-01 | Stop reason: HOSPADM

## 2024-01-01 RX ORDER — NALOXONE HYDROCHLORIDE 0.4 MG/ML
0.1 INJECTION, SOLUTION INTRAMUSCULAR; INTRAVENOUS; SUBCUTANEOUS
Status: DISCONTINUED | OUTPATIENT
Start: 2024-01-01 | End: 2024-01-01 | Stop reason: HOSPADM

## 2024-01-01 RX ORDER — HYDROMORPHONE HYDROCHLORIDE 1 MG/ML
0.3 INJECTION, SOLUTION INTRAMUSCULAR; INTRAVENOUS; SUBCUTANEOUS
Status: ACTIVE | OUTPATIENT
Start: 2024-01-01 | End: 2024-01-01

## 2024-01-01 RX ORDER — CEFAZOLIN SODIUM/WATER 2 G/20 ML
2 SYRINGE (ML) INTRAVENOUS SEE ADMIN INSTRUCTIONS
Status: CANCELLED | OUTPATIENT
Start: 2024-01-01

## 2024-01-01 RX ORDER — DEXTROSE MONOHYDRATE 25 G/50ML
25-50 INJECTION, SOLUTION INTRAVENOUS
Status: DISCONTINUED | OUTPATIENT
Start: 2024-01-01 | End: 2024-01-01 | Stop reason: HOSPADM

## 2024-01-01 RX ORDER — SODIUM CHLORIDE, SODIUM LACTATE, POTASSIUM CHLORIDE, CALCIUM CHLORIDE 600; 310; 30; 20 MG/100ML; MG/100ML; MG/100ML; MG/100ML
INJECTION, SOLUTION INTRAVENOUS CONTINUOUS PRN
Status: DISCONTINUED | OUTPATIENT
Start: 2024-01-01 | End: 2024-01-01

## 2024-01-01 RX ORDER — PROPOFOL 10 MG/ML
INJECTION, EMULSION INTRAVENOUS CONTINUOUS PRN
Status: DISCONTINUED | OUTPATIENT
Start: 2024-01-01 | End: 2024-01-01

## 2024-01-01 RX ORDER — LIDOCAINE 40 MG/G
CREAM TOPICAL
Status: DISCONTINUED | OUTPATIENT
Start: 2024-01-01 | End: 2024-01-01 | Stop reason: HOSPADM

## 2024-01-01 RX ORDER — NALOXONE HYDROCHLORIDE 0.4 MG/ML
0.2 INJECTION, SOLUTION INTRAMUSCULAR; INTRAVENOUS; SUBCUTANEOUS
Status: DISCONTINUED | OUTPATIENT
Start: 2024-01-01 | End: 2024-01-01 | Stop reason: HOSPADM

## 2024-01-01 RX ORDER — HYDRALAZINE HYDROCHLORIDE 20 MG/ML
10 INJECTION INTRAMUSCULAR; INTRAVENOUS EVERY 4 HOURS PRN
Status: DISCONTINUED | OUTPATIENT
Start: 2024-01-01 | End: 2024-05-21 | Stop reason: HOSPADM

## 2024-01-01 RX ORDER — HYDRALAZINE HYDROCHLORIDE 10 MG/1
10 TABLET, FILM COATED ORAL EVERY 4 HOURS PRN
Status: DISCONTINUED | OUTPATIENT
Start: 2024-01-01 | End: 2024-05-21 | Stop reason: HOSPADM

## 2024-01-01 RX ORDER — GABAPENTIN 300 MG/1
300 CAPSULE ORAL DAILY
Status: DISCONTINUED | OUTPATIENT
Start: 2024-01-01 | End: 2024-05-21 | Stop reason: HOSPADM

## 2024-01-01 RX ORDER — HEPARIN SODIUM 1000 [USP'U]/ML
INJECTION, SOLUTION INTRAVENOUS; SUBCUTANEOUS PRN
Status: DISCONTINUED | OUTPATIENT
Start: 2024-01-01 | End: 2024-01-01 | Stop reason: HOSPADM

## 2024-01-01 RX ORDER — HALOPERIDOL 5 MG/ML
1 INJECTION INTRAMUSCULAR
Status: DISCONTINUED | OUTPATIENT
Start: 2024-01-01 | End: 2024-01-01 | Stop reason: HOSPADM

## 2024-01-01 RX ORDER — HYDROMORPHONE HYDROCHLORIDE 1 MG/ML
0.5 INJECTION, SOLUTION INTRAMUSCULAR; INTRAVENOUS; SUBCUTANEOUS
Status: ACTIVE | OUTPATIENT
Start: 2024-01-01 | End: 2024-01-01

## 2024-01-01 RX ORDER — ASPIRIN 81 MG/1
81 TABLET, CHEWABLE ORAL DAILY
Status: DISCONTINUED | OUTPATIENT
Start: 2024-01-01 | End: 2024-01-01 | Stop reason: HOSPADM

## 2024-01-01 RX ORDER — NICOTINE POLACRILEX 4 MG
15-30 LOZENGE BUCCAL
Status: DISCONTINUED | OUTPATIENT
Start: 2024-01-01 | End: 2024-01-01

## 2024-01-01 RX ORDER — HYDROMORPHONE HYDROCHLORIDE 1 MG/ML
0.2 INJECTION, SOLUTION INTRAMUSCULAR; INTRAVENOUS; SUBCUTANEOUS EVERY 5 MIN PRN
Status: DISCONTINUED | OUTPATIENT
Start: 2024-01-01 | End: 2024-01-01 | Stop reason: HOSPADM

## 2024-01-01 RX ORDER — PROPOFOL 10 MG/ML
INJECTION, EMULSION INTRAVENOUS PRN
Status: DISCONTINUED | OUTPATIENT
Start: 2024-01-01 | End: 2024-01-01

## 2024-01-01 RX ORDER — LIDOCAINE HYDROCHLORIDE 10 MG/ML
INJECTION, SOLUTION INFILTRATION; PERINEURAL PRN
Status: DISCONTINUED | OUTPATIENT
Start: 2024-01-01 | End: 2024-01-01

## 2024-01-01 RX ORDER — ONDANSETRON 4 MG/1
4 TABLET, ORALLY DISINTEGRATING ORAL EVERY 6 HOURS PRN
Status: DISCONTINUED | OUTPATIENT
Start: 2024-01-01 | End: 2024-05-21 | Stop reason: HOSPADM

## 2024-01-01 RX ORDER — CEFAZOLIN SODIUM/WATER 2 G/20 ML
2 SYRINGE (ML) INTRAVENOUS
Status: COMPLETED | OUTPATIENT
Start: 2024-05-21 | End: 2024-01-01

## 2024-01-01 RX ORDER — TORSEMIDE 20 MG/1
40 TABLET ORAL DAILY
Status: DISCONTINUED | OUTPATIENT
Start: 2024-01-01 | End: 2024-01-01 | Stop reason: HOSPADM

## 2024-01-01 RX ORDER — NALOXONE HYDROCHLORIDE 0.4 MG/ML
0.4 INJECTION, SOLUTION INTRAMUSCULAR; INTRAVENOUS; SUBCUTANEOUS
Status: DISCONTINUED | OUTPATIENT
Start: 2024-01-01 | End: 2024-01-01 | Stop reason: HOSPADM

## 2024-01-01 RX ORDER — ONDANSETRON 2 MG/ML
4 INJECTION INTRAMUSCULAR; INTRAVENOUS EVERY 30 MIN PRN
Status: DISCONTINUED | OUTPATIENT
Start: 2024-01-01 | End: 2024-01-01 | Stop reason: HOSPADM

## 2024-01-01 RX ORDER — TORSEMIDE 20 MG/1
80 TABLET ORAL DAILY
Qty: 360 TABLET | Refills: 1 | Status: SHIPPED | OUTPATIENT
Start: 2024-01-01

## 2024-01-01 RX ORDER — FLUTICASONE PROPIONATE 50 MCG
2 SPRAY, SUSPENSION (ML) NASAL 2 TIMES DAILY
Status: DISCONTINUED | OUTPATIENT
Start: 2024-01-01 | End: 2024-05-21 | Stop reason: HOSPADM

## 2024-01-01 RX ORDER — METHYLPREDNISOLONE SODIUM SUCCINATE 40 MG/ML
40 INJECTION, POWDER, LYOPHILIZED, FOR SOLUTION INTRAMUSCULAR; INTRAVENOUS ONCE
Status: DISCONTINUED | OUTPATIENT
Start: 2024-05-21 | End: 2024-05-21 | Stop reason: HOSPADM

## 2024-01-01 RX ORDER — HYDRALAZINE HYDROCHLORIDE 10 MG/1
10 TABLET, FILM COATED ORAL EVERY 4 HOURS PRN
Status: DISCONTINUED | OUTPATIENT
Start: 2024-01-01 | End: 2024-01-01 | Stop reason: HOSPADM

## 2024-01-01 RX ORDER — CLOPIDOGREL BISULFATE 75 MG/1
75 TABLET ORAL DAILY
Status: DISCONTINUED | OUTPATIENT
Start: 2024-01-01 | End: 2024-05-21 | Stop reason: HOSPADM

## 2024-01-01 RX ORDER — GABAPENTIN 300 MG/1
300 CAPSULE ORAL 3 TIMES DAILY
Status: DISCONTINUED | OUTPATIENT
Start: 2024-01-01 | End: 2024-01-01

## 2024-01-01 RX ORDER — FLUTICASONE PROPIONATE 50 MCG
2 SPRAY, SUSPENSION (ML) NASAL 2 TIMES DAILY PRN
Status: DISCONTINUED | OUTPATIENT
Start: 2024-01-01 | End: 2024-01-01 | Stop reason: HOSPADM

## 2024-01-01 RX ORDER — AMOXICILLIN 500 MG/1
1 CAPSULE ORAL 2 TIMES DAILY
Status: ON HOLD | COMMUNITY
End: 2024-01-01

## 2024-01-01 RX ORDER — OXYCODONE HYDROCHLORIDE 5 MG/1
2.5 TABLET ORAL EVERY 6 HOURS PRN
COMMUNITY
Start: 2023-01-01 | End: 2024-01-01

## 2024-01-01 RX ORDER — ROPIVACAINE HYDROCHLORIDE 5 MG/ML
INJECTION, SOLUTION EPIDURAL; INFILTRATION; PERINEURAL
Status: COMPLETED | OUTPATIENT
Start: 2024-01-01 | End: 2024-01-01

## 2024-01-01 RX ORDER — FENTANYL CITRATE 50 UG/ML
25 INJECTION, SOLUTION INTRAMUSCULAR; INTRAVENOUS EVERY 5 MIN PRN
Status: DISCONTINUED | OUTPATIENT
Start: 2024-01-01 | End: 2024-01-01 | Stop reason: HOSPADM

## 2024-01-01 RX ORDER — REGADENOSON 0.08 MG/ML
0.4 INJECTION, SOLUTION INTRAVENOUS ONCE
Status: COMPLETED | OUTPATIENT
Start: 2024-01-01 | End: 2024-01-01

## 2024-01-01 RX ORDER — FENTANYL CITRATE 50 UG/ML
50 INJECTION, SOLUTION INTRAMUSCULAR; INTRAVENOUS EVERY 5 MIN PRN
Status: DISCONTINUED | OUTPATIENT
Start: 2024-01-01 | End: 2024-01-01 | Stop reason: HOSPADM

## 2024-01-01 RX ORDER — LEVOTHYROXINE SODIUM 125 UG/1
125 TABLET ORAL
Status: DISCONTINUED | OUTPATIENT
Start: 2024-01-01 | End: 2024-05-21 | Stop reason: HOSPADM

## 2024-01-01 RX ORDER — LORAZEPAM 2 MG/ML
0.5 INJECTION INTRAMUSCULAR EVERY 6 HOURS PRN
Status: DISCONTINUED | OUTPATIENT
Start: 2024-01-01 | End: 2024-01-01 | Stop reason: HOSPADM

## 2024-01-01 RX ORDER — HYDRALAZINE HYDROCHLORIDE 20 MG/ML
10 INJECTION INTRAMUSCULAR; INTRAVENOUS EVERY 4 HOURS PRN
Status: DISCONTINUED | OUTPATIENT
Start: 2024-01-01 | End: 2024-01-01 | Stop reason: HOSPADM

## 2024-01-01 RX ORDER — CLOPIDOGREL BISULFATE 75 MG/1
75 TABLET ORAL ONCE
Status: COMPLETED | OUTPATIENT
Start: 2024-01-01 | End: 2024-01-01

## 2024-01-01 RX ORDER — METOPROLOL SUCCINATE 25 MG/1
25 TABLET, EXTENDED RELEASE ORAL EVERY EVENING
Status: DISCONTINUED | OUTPATIENT
Start: 2024-01-01 | End: 2024-01-01

## 2024-01-01 RX ORDER — FENTANYL CITRATE 50 UG/ML
25-100 INJECTION, SOLUTION INTRAMUSCULAR; INTRAVENOUS
Status: DISCONTINUED | OUTPATIENT
Start: 2024-01-01 | End: 2024-01-01 | Stop reason: HOSPADM

## 2024-01-01 RX ORDER — ACETAMINOPHEN 650 MG/1
650 SUPPOSITORY RECTAL EVERY 4 HOURS PRN
Status: DISCONTINUED | OUTPATIENT
Start: 2024-01-01 | End: 2024-05-21 | Stop reason: HOSPADM

## 2024-01-01 RX ORDER — ISOSORBIDE MONONITRATE 30 MG/1
30 TABLET, EXTENDED RELEASE ORAL DAILY
Qty: 60 TABLET | Refills: 1 | Status: SHIPPED | OUTPATIENT
Start: 2024-01-01 | End: 2024-01-01

## 2024-01-01 RX ORDER — ACETAMINOPHEN 650 MG/1
650 SUPPOSITORY RECTAL EVERY 4 HOURS PRN
Status: DISCONTINUED | OUTPATIENT
Start: 2024-01-01 | End: 2024-01-01 | Stop reason: HOSPADM

## 2024-01-01 RX ORDER — HEPARIN SODIUM 10000 [USP'U]/100ML
0-5000 INJECTION, SOLUTION INTRAVENOUS CONTINUOUS
Status: DISCONTINUED | OUTPATIENT
Start: 2024-01-01 | End: 2024-01-01 | Stop reason: HOSPADM

## 2024-01-01 RX ORDER — HEPARIN SODIUM 10000 [USP'U]/100ML
0-5000 INJECTION, SOLUTION INTRAVENOUS CONTINUOUS
Status: DISCONTINUED | OUTPATIENT
Start: 2024-01-01 | End: 2024-01-01

## 2024-01-01 RX ORDER — AMINOPHYLLINE 25 MG/ML
50 INJECTION, SOLUTION INTRAVENOUS
Status: DISCONTINUED | OUTPATIENT
Start: 2024-01-01 | End: 2024-01-01 | Stop reason: HOSPADM

## 2024-01-01 RX ORDER — DOXYCYCLINE 100 MG/1
100 CAPSULE ORAL EVERY 12 HOURS SCHEDULED
Status: DISCONTINUED | OUTPATIENT
Start: 2024-01-01 | End: 2024-01-01 | Stop reason: HOSPADM

## 2024-01-01 RX ORDER — EZETIMIBE 10 MG/1
10 TABLET ORAL EVERY EVENING
Status: DISCONTINUED | OUTPATIENT
Start: 2024-01-01 | End: 2024-05-21 | Stop reason: HOSPADM

## 2024-01-01 RX ORDER — ACETAMINOPHEN 325 MG/1
650 TABLET ORAL EVERY 4 HOURS PRN
Status: DISCONTINUED | OUTPATIENT
Start: 2024-01-01 | End: 2024-01-01 | Stop reason: HOSPADM

## 2024-01-01 RX ORDER — DEXTROSE MONOHYDRATE 25 G/50ML
25-50 INJECTION, SOLUTION INTRAVENOUS
Status: DISCONTINUED | OUTPATIENT
Start: 2024-01-01 | End: 2024-05-21 | Stop reason: HOSPADM

## 2024-01-01 RX ORDER — GUAIFENESIN 200 MG/10ML
200 LIQUID ORAL EVERY 4 HOURS PRN
Status: DISCONTINUED | OUTPATIENT
Start: 2024-01-01 | End: 2024-01-01 | Stop reason: HOSPADM

## 2024-01-01 RX ORDER — INSULIN LISPRO 100 [IU]/ML
INJECTION, SOLUTION INTRAVENOUS; SUBCUTANEOUS
COMMUNITY

## 2024-01-01 RX ORDER — LORAZEPAM 2 MG/ML
.5-1 INJECTION INTRAMUSCULAR
Status: DISCONTINUED | OUTPATIENT
Start: 2024-01-01 | End: 2024-01-01 | Stop reason: HOSPADM

## 2024-01-01 RX ORDER — MAGNESIUM HYDROXIDE 1200 MG/15ML
LIQUID ORAL PRN
Status: DISCONTINUED | OUTPATIENT
Start: 2024-01-01 | End: 2024-01-01 | Stop reason: HOSPADM

## 2024-01-01 RX ORDER — AMOXICILLIN 250 MG
1 CAPSULE ORAL 2 TIMES DAILY PRN
Status: DISCONTINUED | OUTPATIENT
Start: 2024-01-01 | End: 2024-01-01 | Stop reason: HOSPADM

## 2024-01-01 RX ORDER — ISOSORBIDE MONONITRATE 30 MG/1
30 TABLET, EXTENDED RELEASE ORAL DAILY
Status: DISCONTINUED | OUTPATIENT
Start: 2024-01-01 | End: 2024-01-01 | Stop reason: HOSPADM

## 2024-01-01 RX ORDER — CALCIUM CARBONATE 500 MG/1
1000 TABLET, CHEWABLE ORAL 4 TIMES DAILY PRN
Status: DISCONTINUED | OUTPATIENT
Start: 2024-01-01 | End: 2024-05-21 | Stop reason: HOSPADM

## 2024-01-01 RX ORDER — LIDOCAINE 40 MG/G
CREAM TOPICAL
Status: DISCONTINUED | OUTPATIENT
Start: 2024-01-01 | End: 2024-05-21 | Stop reason: HOSPADM

## 2024-01-01 RX ORDER — OXYCODONE HYDROCHLORIDE 5 MG/1
10 TABLET ORAL
Status: DISCONTINUED | OUTPATIENT
Start: 2024-01-01 | End: 2024-01-01 | Stop reason: HOSPADM

## 2024-01-01 RX ORDER — NITROGLYCERIN 0.4 MG/1
0.4 TABLET SUBLINGUAL EVERY 5 MIN PRN
Status: DISCONTINUED | OUTPATIENT
Start: 2024-01-01 | End: 2024-01-01 | Stop reason: HOSPADM

## 2024-01-01 RX ORDER — HYDRALAZINE HYDROCHLORIDE 20 MG/ML
10 INJECTION INTRAMUSCULAR; INTRAVENOUS EVERY 4 HOURS PRN
Status: DISCONTINUED | OUTPATIENT
Start: 2024-01-01 | End: 2024-01-01

## 2024-01-01 RX ORDER — TORSEMIDE 20 MG/1
20 TABLET ORAL DAILY
Status: DISCONTINUED | OUTPATIENT
Start: 2024-01-01 | End: 2024-01-01

## 2024-01-01 RX ORDER — AMOXICILLIN 250 MG
2 CAPSULE ORAL 2 TIMES DAILY PRN
Status: DISCONTINUED | OUTPATIENT
Start: 2024-01-01 | End: 2024-01-01 | Stop reason: HOSPADM

## 2024-01-01 RX ORDER — FERROUS SULFATE 325(65) MG
1 TABLET ORAL DAILY
Status: ON HOLD | COMMUNITY
End: 2024-01-01

## 2024-01-01 RX ORDER — CEFAZOLIN SODIUM/WATER 2 G/20 ML
2 SYRINGE (ML) INTRAVENOUS
Status: COMPLETED | OUTPATIENT
Start: 2024-01-01 | End: 2024-01-01

## 2024-01-01 RX ORDER — LORAZEPAM 0.5 MG/1
0.5 TABLET ORAL EVERY 6 HOURS PRN
Status: DISCONTINUED | OUTPATIENT
Start: 2024-01-01 | End: 2024-01-01 | Stop reason: HOSPADM

## 2024-01-01 RX ORDER — DOXYCYCLINE 100 MG/1
100 TABLET ORAL 2 TIMES DAILY
COMMUNITY
Start: 2024-01-01 | End: 2024-01-01

## 2024-01-01 RX ORDER — ISOSORBIDE MONONITRATE 30 MG/1
15 TABLET, EXTENDED RELEASE ORAL DAILY
Qty: 45 TABLET | Refills: 3 | Status: SHIPPED | OUTPATIENT
Start: 2024-01-01 | End: 2024-01-01

## 2024-01-01 RX ORDER — FUROSEMIDE 10 MG/ML
80 INJECTION INTRAMUSCULAR; INTRAVENOUS EVERY 8 HOURS
Status: DISCONTINUED | OUTPATIENT
Start: 2024-01-01 | End: 2024-01-01

## 2024-01-01 RX ORDER — METOPROLOL SUCCINATE 25 MG/1
25 TABLET, EXTENDED RELEASE ORAL DAILY
Qty: 90 TABLET | Refills: 3 | Status: SHIPPED | OUTPATIENT
Start: 2024-01-01

## 2024-01-01 RX ORDER — FENTANYL CITRATE 50 UG/ML
25-50 INJECTION, SOLUTION INTRAMUSCULAR; INTRAVENOUS EVERY 5 MIN PRN
Status: DISCONTINUED | OUTPATIENT
Start: 2024-01-01 | End: 2024-01-01 | Stop reason: HOSPADM

## 2024-01-01 RX ORDER — GABAPENTIN 300 MG/1
300 CAPSULE ORAL 3 TIMES DAILY
Status: DISCONTINUED | OUTPATIENT
Start: 2024-01-01 | End: 2024-01-01 | Stop reason: HOSPADM

## 2024-01-01 RX ORDER — ONDANSETRON 4 MG/1
4 TABLET, ORALLY DISINTEGRATING ORAL EVERY 6 HOURS PRN
Status: DISCONTINUED | OUTPATIENT
Start: 2024-01-01 | End: 2024-01-01 | Stop reason: HOSPADM

## 2024-01-01 RX ORDER — KETOROLAC TROMETHAMINE 15 MG/ML
15 INJECTION, SOLUTION INTRAMUSCULAR; INTRAVENOUS
Status: DISCONTINUED | OUTPATIENT
Start: 2024-01-01 | End: 2024-01-01 | Stop reason: HOSPADM

## 2024-01-01 RX ORDER — AMOXICILLIN 250 MG
1 CAPSULE ORAL 2 TIMES DAILY PRN
Status: DISCONTINUED | OUTPATIENT
Start: 2024-01-01 | End: 2024-05-21 | Stop reason: HOSPADM

## 2024-01-01 RX ORDER — ASPIRIN 81 MG/1
162 TABLET, CHEWABLE ORAL ONCE
Status: COMPLETED | OUTPATIENT
Start: 2024-01-01 | End: 2024-01-01

## 2024-01-01 RX ORDER — CLOPIDOGREL BISULFATE 75 MG/1
75 TABLET ORAL DAILY
Status: DISCONTINUED | OUTPATIENT
Start: 2024-01-01 | End: 2024-01-01 | Stop reason: HOSPADM

## 2024-01-01 RX ORDER — CALCIUM CARBONATE 500 MG/1
1000 TABLET, CHEWABLE ORAL 4 TIMES DAILY PRN
Status: DISCONTINUED | OUTPATIENT
Start: 2024-01-01 | End: 2024-01-01 | Stop reason: HOSPADM

## 2024-01-01 RX ORDER — HEPARIN SODIUM 1000 [USP'U]/ML
INJECTION, SOLUTION INTRAVENOUS; SUBCUTANEOUS PRN
Status: DISCONTINUED | OUTPATIENT
Start: 2024-01-01 | End: 2024-01-01

## 2024-01-01 RX ORDER — ATORVASTATIN CALCIUM 40 MG/1
40 TABLET, FILM COATED ORAL EVERY EVENING
Status: DISCONTINUED | OUTPATIENT
Start: 2024-01-01 | End: 2024-01-01 | Stop reason: HOSPADM

## 2024-01-01 RX ORDER — HYDROMORPHONE HYDROCHLORIDE 1 MG/ML
0.4 INJECTION, SOLUTION INTRAMUSCULAR; INTRAVENOUS; SUBCUTANEOUS EVERY 5 MIN PRN
Status: DISCONTINUED | OUTPATIENT
Start: 2024-01-01 | End: 2024-01-01 | Stop reason: HOSPADM

## 2024-01-01 RX ORDER — FLUTICASONE PROPIONATE 50 MCG
2 SPRAY, SUSPENSION (ML) NASAL 2 TIMES DAILY
COMMUNITY

## 2024-01-01 RX ORDER — FUROSEMIDE 10 MG/ML
40 INJECTION INTRAMUSCULAR; INTRAVENOUS ONCE
Status: COMPLETED | OUTPATIENT
Start: 2024-01-01 | End: 2024-01-01

## 2024-01-01 RX ORDER — OXYCODONE HYDROCHLORIDE 5 MG/1
5 TABLET ORAL
Status: DISCONTINUED | OUTPATIENT
Start: 2024-01-01 | End: 2024-01-01 | Stop reason: HOSPADM

## 2024-01-01 RX ORDER — ATORVASTATIN CALCIUM 40 MG/1
40 TABLET, FILM COATED ORAL EVERY EVENING
Status: DISCONTINUED | OUTPATIENT
Start: 2024-01-01 | End: 2024-05-21 | Stop reason: HOSPADM

## 2024-01-01 RX ORDER — ONDANSETRON 2 MG/ML
4 INJECTION INTRAMUSCULAR; INTRAVENOUS EVERY 6 HOURS PRN
Status: DISCONTINUED | OUTPATIENT
Start: 2024-01-01 | End: 2024-05-21 | Stop reason: HOSPADM

## 2024-01-01 RX ADMIN — ROPIVACAINE HYDROCHLORIDE 10 ML: 5 INJECTION, SOLUTION EPIDURAL; INFILTRATION; PERINEURAL at 13:20

## 2024-01-01 RX ADMIN — CLOPIDOGREL BISULFATE 75 MG: 75 TABLET ORAL at 04:11

## 2024-01-01 RX ADMIN — HEPARIN SODIUM 7000 UNITS: 1000 INJECTION INTRAVENOUS; SUBCUTANEOUS at 14:37

## 2024-01-01 RX ADMIN — CLOPIDOGREL BISULFATE 75 MG: 75 TABLET ORAL at 08:51

## 2024-01-01 RX ADMIN — INSULIN ASPART 4 UNITS: 100 INJECTION, SOLUTION INTRAVENOUS; SUBCUTANEOUS at 07:52

## 2024-01-01 RX ADMIN — FLUTICASONE PROPIONATE 2 SPRAY: 50 SPRAY, METERED NASAL at 20:35

## 2024-01-01 RX ADMIN — ASPIRIN 81 MG: 81 TABLET, COATED ORAL at 20:11

## 2024-01-01 RX ADMIN — ASPIRIN 81 MG: 81 TABLET, COATED ORAL at 08:50

## 2024-01-01 RX ADMIN — INSULIN GLARGINE 30 UNITS: 100 INJECTION, SOLUTION SUBCUTANEOUS at 14:04

## 2024-01-01 RX ADMIN — FLUTICASONE PROPIONATE 2 SPRAY: 50 SPRAY, METERED NASAL at 09:08

## 2024-01-01 RX ADMIN — INSULIN GLARGINE 20 UNITS: 100 INJECTION, SOLUTION SUBCUTANEOUS at 08:53

## 2024-01-01 RX ADMIN — METOPROLOL SUCCINATE 12.5 MG: 25 TABLET, EXTENDED RELEASE ORAL at 20:35

## 2024-01-01 RX ADMIN — CLOPIDOGREL BISULFATE 75 MG: 75 TABLET ORAL at 20:11

## 2024-01-01 RX ADMIN — HYDRALAZINE HYDROCHLORIDE 10 MG: 20 INJECTION INTRAMUSCULAR; INTRAVENOUS at 18:31

## 2024-01-01 RX ADMIN — GABAPENTIN 300 MG: 300 CAPSULE ORAL at 08:53

## 2024-01-01 RX ADMIN — PERFLUTREN 2 ML: 6.52 INJECTION, SUSPENSION INTRAVENOUS at 10:00

## 2024-01-01 RX ADMIN — FUROSEMIDE 80 MG: 10 INJECTION, SOLUTION INTRAVENOUS at 21:40

## 2024-01-01 RX ADMIN — FUROSEMIDE 80 MG: 10 INJECTION, SOLUTION INTRAVENOUS at 06:33

## 2024-01-01 RX ADMIN — ATORVASTATIN CALCIUM 40 MG: 40 TABLET, FILM COATED ORAL at 20:51

## 2024-01-01 RX ADMIN — CLOPIDOGREL BISULFATE 75 MG: 75 TABLET ORAL at 14:12

## 2024-01-01 RX ADMIN — GABAPENTIN 300 MG: 300 CAPSULE ORAL at 09:06

## 2024-01-01 RX ADMIN — METOPROLOL SUCCINATE 25 MG: 25 TABLET, EXTENDED RELEASE ORAL at 20:11

## 2024-01-01 RX ADMIN — MIDAZOLAM HYDROCHLORIDE 1 MG: 1 INJECTION, SOLUTION INTRAMUSCULAR; INTRAVENOUS at 13:05

## 2024-01-01 RX ADMIN — MIDAZOLAM HYDROCHLORIDE 1 MG: 1 INJECTION, SOLUTION INTRAMUSCULAR; INTRAVENOUS at 13:01

## 2024-01-01 RX ADMIN — EZETIMIBE 10 MG: 10 TABLET ORAL at 20:51

## 2024-01-01 RX ADMIN — PROPOFOL 30 MG: 10 INJECTION, EMULSION INTRAVENOUS at 13:41

## 2024-01-01 RX ADMIN — NITROGLYCERIN 15 MG: 20 OINTMENT TOPICAL at 06:40

## 2024-01-01 RX ADMIN — FLUTICASONE PROPIONATE 2 SPRAY: 50 SPRAY, METERED NASAL at 08:29

## 2024-01-01 RX ADMIN — METOPROLOL SUCCINATE 12.5 MG: 25 TABLET, EXTENDED RELEASE ORAL at 22:24

## 2024-01-01 RX ADMIN — ONDANSETRON 4 MG: 2 INJECTION INTRAMUSCULAR; INTRAVENOUS at 13:30

## 2024-01-01 RX ADMIN — ONDANSETRON 4 MG: 2 INJECTION INTRAMUSCULAR; INTRAVENOUS at 05:09

## 2024-01-01 RX ADMIN — FLUTICASONE PROPIONATE 2 SPRAY: 50 SPRAY, METERED NASAL at 21:58

## 2024-01-01 RX ADMIN — INSULIN ASPART 14 UNITS: 100 INJECTION, SOLUTION INTRAVENOUS; SUBCUTANEOUS at 11:35

## 2024-01-01 RX ADMIN — SODIUM CHLORIDE, POTASSIUM CHLORIDE, SODIUM LACTATE AND CALCIUM CHLORIDE: 600; 310; 30; 20 INJECTION, SOLUTION INTRAVENOUS at 12:38

## 2024-01-01 RX ADMIN — ONDANSETRON 4 MG: 2 INJECTION INTRAMUSCULAR; INTRAVENOUS at 03:46

## 2024-01-01 RX ADMIN — CLOPIDOGREL BISULFATE 75 MG: 75 TABLET ORAL at 08:26

## 2024-01-01 RX ADMIN — ATORVASTATIN CALCIUM 40 MG: 40 TABLET, FILM COATED ORAL at 20:35

## 2024-01-01 RX ADMIN — PROPOFOL 50 MCG/KG/MIN: 10 INJECTION, EMULSION INTRAVENOUS at 13:41

## 2024-01-01 RX ADMIN — Medication 9.4 MILLICURIE: at 10:11

## 2024-01-01 RX ADMIN — PANTOPRAZOLE SODIUM 40 MG: 40 INJECTION, POWDER, FOR SOLUTION INTRAVENOUS at 06:45

## 2024-01-01 RX ADMIN — EZETIMIBE 10 MG: 10 TABLET ORAL at 21:53

## 2024-01-01 RX ADMIN — ATORVASTATIN CALCIUM 40 MG: 40 TABLET, FILM COATED ORAL at 20:11

## 2024-01-01 RX ADMIN — ONDANSETRON 4 MG: 2 INJECTION INTRAMUSCULAR; INTRAVENOUS at 12:29

## 2024-01-01 RX ADMIN — ISOSORBIDE MONONITRATE 30 MG: 30 TABLET, EXTENDED RELEASE ORAL at 08:52

## 2024-01-01 RX ADMIN — PERFLUTREN 3 ML: 6.52 INJECTION, SUSPENSION INTRAVENOUS at 10:29

## 2024-01-01 RX ADMIN — Medication 36.3 MILLICURIE: at 13:00

## 2024-01-01 RX ADMIN — IRON SUCROSE 200 MG: 20 INJECTION, SOLUTION INTRAVENOUS at 09:08

## 2024-01-01 RX ADMIN — ISOSORBIDE MONONITRATE 30 MG: 30 TABLET, EXTENDED RELEASE ORAL at 17:08

## 2024-01-01 RX ADMIN — ATORVASTATIN CALCIUM 40 MG: 40 TABLET, FILM COATED ORAL at 20:30

## 2024-01-01 RX ADMIN — GABAPENTIN 300 MG: 300 CAPSULE ORAL at 20:11

## 2024-01-01 RX ADMIN — INSULIN ASPART 3 UNITS: 100 INJECTION, SOLUTION INTRAVENOUS; SUBCUTANEOUS at 15:41

## 2024-01-01 RX ADMIN — ASPIRIN 81 MG CHEWABLE TABLET 81 MG: 81 TABLET CHEWABLE at 08:43

## 2024-01-01 RX ADMIN — Medication 2 G: at 13:45

## 2024-01-01 RX ADMIN — HEPARIN SODIUM AND DEXTROSE 1200 UNITS/HR: 10000; 5 INJECTION INTRAVENOUS at 06:27

## 2024-01-01 RX ADMIN — INSULIN ASPART 5 UNITS: 100 INJECTION, SOLUTION INTRAVENOUS; SUBCUTANEOUS at 12:40

## 2024-01-01 RX ADMIN — GABAPENTIN 300 MG: 300 CAPSULE ORAL at 14:12

## 2024-01-01 RX ADMIN — FUROSEMIDE 80 MG: 10 INJECTION, SOLUTION INTRAVENOUS at 15:40

## 2024-01-01 RX ADMIN — REGADENOSON 0.4 MG: 0.08 INJECTION, SOLUTION INTRAVENOUS at 10:53

## 2024-01-01 RX ADMIN — ACETAMINOPHEN 650 MG: 325 TABLET ORAL at 21:52

## 2024-01-01 RX ADMIN — LIDOCAINE HYDROCHLORIDE 3 ML: 10 INJECTION, SOLUTION INFILTRATION; PERINEURAL at 13:41

## 2024-01-01 RX ADMIN — IRON SUCROSE 200 MG: 20 INJECTION, SOLUTION INTRAVENOUS at 14:10

## 2024-01-01 RX ADMIN — CEFAZOLIN 2 G: 10 INJECTION, POWDER, FOR SOLUTION INTRAVENOUS at 17:18

## 2024-01-01 RX ADMIN — HEPARIN SODIUM 4500 UNITS: 1000 INJECTION INTRAVENOUS; SUBCUTANEOUS at 17:39

## 2024-01-01 RX ADMIN — FENTANYL CITRATE 50 MCG: 50 INJECTION, SOLUTION INTRAMUSCULAR; INTRAVENOUS at 13:01

## 2024-01-01 RX ADMIN — LEVOTHYROXINE SODIUM 125 MCG: 125 TABLET ORAL at 06:45

## 2024-01-01 RX ADMIN — FLUTICASONE PROPIONATE 2 SPRAY: 50 SPRAY, METERED NASAL at 20:52

## 2024-01-01 RX ADMIN — HEPARIN SODIUM 1200 UNITS/HR: 10000 INJECTION, SOLUTION INTRAVENOUS at 11:11

## 2024-01-01 RX ADMIN — METOPROLOL SUCCINATE 12.5 MG: 25 TABLET, EXTENDED RELEASE ORAL at 20:51

## 2024-01-01 RX ADMIN — ATORVASTATIN CALCIUM 40 MG: 40 TABLET, FILM COATED ORAL at 21:53

## 2024-01-01 RX ADMIN — INSULIN ASPART 4 UNITS: 100 INJECTION, SOLUTION INTRAVENOUS; SUBCUTANEOUS at 11:15

## 2024-01-01 RX ADMIN — ASPIRIN 81 MG CHEWABLE TABLET 162 MG: 81 TABLET CHEWABLE at 04:11

## 2024-01-01 RX ADMIN — GABAPENTIN 300 MG: 300 CAPSULE ORAL at 21:39

## 2024-01-01 RX ADMIN — INSULIN ASPART 6 UNITS: 100 INJECTION, SOLUTION INTRAVENOUS; SUBCUTANEOUS at 11:27

## 2024-01-01 RX ADMIN — EZETIMIBE 10 MG: 10 TABLET ORAL at 21:59

## 2024-01-01 RX ADMIN — FENTANYL CITRATE 50 MCG: 50 INJECTION, SOLUTION INTRAMUSCULAR; INTRAVENOUS at 17:30

## 2024-01-01 RX ADMIN — SODIUM CHLORIDE, POTASSIUM CHLORIDE, SODIUM LACTATE AND CALCIUM CHLORIDE: 600; 310; 30; 20 INJECTION, SOLUTION INTRAVENOUS at 13:33

## 2024-01-01 RX ADMIN — EZETIMIBE 10 MG: 10 TABLET ORAL at 20:35

## 2024-01-01 RX ADMIN — ROPIVACAINE HYDROCHLORIDE 25 ML: 5 INJECTION, SOLUTION EPIDURAL; INFILTRATION; PERINEURAL at 13:10

## 2024-01-01 RX ADMIN — PANTOPRAZOLE SODIUM 40 MG: 40 INJECTION, POWDER, FOR SOLUTION INTRAVENOUS at 20:33

## 2024-01-01 RX ADMIN — METOPROLOL SUCCINATE 50 MG: 50 TABLET, EXTENDED RELEASE ORAL at 20:30

## 2024-01-01 RX ADMIN — PANTOPRAZOLE SODIUM 40 MG: 40 INJECTION, POWDER, FOR SOLUTION INTRAVENOUS at 22:09

## 2024-01-01 RX ADMIN — INSULIN GLARGINE 30 UNITS: 100 INJECTION, SOLUTION SUBCUTANEOUS at 06:38

## 2024-01-01 RX ADMIN — GABAPENTIN 300 MG: 300 CAPSULE ORAL at 08:26

## 2024-01-01 RX ADMIN — FUROSEMIDE 80 MG: 10 INJECTION, SOLUTION INTRAVENOUS at 06:52

## 2024-01-01 RX ADMIN — FUROSEMIDE 40 MG: 10 INJECTION, SOLUTION INTRAMUSCULAR; INTRAVENOUS at 12:50

## 2024-01-01 RX ADMIN — INSULIN ASPART 3 UNITS: 100 INJECTION, SOLUTION INTRAVENOUS; SUBCUTANEOUS at 08:51

## 2024-01-01 RX ADMIN — CALCIUM CARBONATE (ANTACID) CHEW TAB 500 MG 1000 MG: 500 CHEW TAB at 06:45

## 2024-01-01 RX ADMIN — GABAPENTIN 300 MG: 300 CAPSULE ORAL at 08:43

## 2024-01-01 RX ADMIN — FLUTICASONE PROPIONATE 2 SPRAY: 50 SPRAY, METERED NASAL at 08:51

## 2024-01-01 RX ADMIN — PROCHLORPERAZINE EDISYLATE 5 MG: 5 INJECTION INTRAMUSCULAR; INTRAVENOUS at 09:07

## 2024-01-01 RX ADMIN — LIDOCAINE HYDROCHLORIDE 20 ML: 10 INJECTION, SOLUTION INFILTRATION; PERINEURAL at 17:40

## 2024-01-01 RX ADMIN — PROCHLORPERAZINE EDISYLATE 5 MG: 5 INJECTION INTRAMUSCULAR; INTRAVENOUS at 09:25

## 2024-01-01 RX ADMIN — ASPIRIN 81 MG: 81 TABLET, COATED ORAL at 08:27

## 2024-01-01 RX ADMIN — LEVOTHYROXINE SODIUM 125 MCG: 125 TABLET ORAL at 08:26

## 2024-01-01 ASSESSMENT — ACTIVITIES OF DAILY LIVING (ADL)
ADLS_ACUITY_SCORE: 37
ADLS_ACUITY_SCORE: 37
ADLS_ACUITY_SCORE: 22
ADLS_ACUITY_SCORE: 37
ADLS_ACUITY_SCORE: 32
ADLS_ACUITY_SCORE: 26
ADLS_ACUITY_SCORE: 26
ADLS_ACUITY_SCORE: 37
ADLS_ACUITY_SCORE: 22
ADLS_ACUITY_SCORE: 26
ADLS_ACUITY_SCORE: 37
ADLS_ACUITY_SCORE: 37
ADLS_ACUITY_SCORE: 32
ADLS_ACUITY_SCORE: 32
ADLS_ACUITY_SCORE: 31
ADLS_ACUITY_SCORE: 31
ADLS_ACUITY_SCORE: 26
ADLS_ACUITY_SCORE: 22
ADLS_ACUITY_SCORE: 37
ADLS_ACUITY_SCORE: 37
ADLS_ACUITY_SCORE: 26
ADLS_ACUITY_SCORE: 37
ADLS_ACUITY_SCORE: 37
ADLS_ACUITY_SCORE: 39
ADLS_ACUITY_SCORE: 31
ADLS_ACUITY_SCORE: 26
DEPENDENT_IADLS:: INDEPENDENT
ADLS_ACUITY_SCORE: 26
ADLS_ACUITY_SCORE: 37
ADLS_ACUITY_SCORE: 26
ADLS_ACUITY_SCORE: 37
ADLS_ACUITY_SCORE: 32
ADLS_ACUITY_SCORE: 31
ADLS_ACUITY_SCORE: 37
ADLS_ACUITY_SCORE: 23
ADLS_ACUITY_SCORE: 26
ADLS_ACUITY_SCORE: 39
ADLS_ACUITY_SCORE: 37
ADLS_ACUITY_SCORE: 39
ADLS_ACUITY_SCORE: 26
ADLS_ACUITY_SCORE: 37
ADLS_ACUITY_SCORE: 37
ADLS_ACUITY_SCORE: 26
ADLS_ACUITY_SCORE: 37
ADLS_ACUITY_SCORE: 31
ADLS_ACUITY_SCORE: 26
ADLS_ACUITY_SCORE: 23
ADLS_ACUITY_SCORE: 36
ADLS_ACUITY_SCORE: 37
ADLS_ACUITY_SCORE: 26
ADLS_ACUITY_SCORE: 37
ADLS_ACUITY_SCORE: 37
ADLS_ACUITY_SCORE: 24
ADLS_ACUITY_SCORE: 39
ADLS_ACUITY_SCORE: 31
ADLS_ACUITY_SCORE: 37
ADLS_ACUITY_SCORE: 26
ADLS_ACUITY_SCORE: 31
ADLS_ACUITY_SCORE: 37
ADLS_ACUITY_SCORE: 32
ADLS_ACUITY_SCORE: 32
ADLS_ACUITY_SCORE: 26
ADLS_ACUITY_SCORE: 26
ADLS_ACUITY_SCORE: 31
ADLS_ACUITY_SCORE: 37
ADLS_ACUITY_SCORE: 24
ADLS_ACUITY_SCORE: 24
ADLS_ACUITY_SCORE: 37
ADLS_ACUITY_SCORE: 31
ADLS_ACUITY_SCORE: 37
ADLS_ACUITY_SCORE: 26
ADLS_ACUITY_SCORE: 37
ADLS_ACUITY_SCORE: 31
ADLS_ACUITY_SCORE: 24
ADLS_ACUITY_SCORE: 37
ADLS_ACUITY_SCORE: 31
ADLS_ACUITY_SCORE: 37
ADLS_ACUITY_SCORE: 31
ADLS_ACUITY_SCORE: 26
DEPENDENT_IADLS:: TRANSPORTATION
ADLS_ACUITY_SCORE: 26
ADLS_ACUITY_SCORE: 37
ADLS_ACUITY_SCORE: 26
ADLS_ACUITY_SCORE: 26
ADLS_ACUITY_SCORE: 31
ADLS_ACUITY_SCORE: 31
ADLS_ACUITY_SCORE: 39
ADLS_ACUITY_SCORE: 26
ADLS_ACUITY_SCORE: 37
ADLS_ACUITY_SCORE: 26
ADLS_ACUITY_SCORE: 37
ADLS_ACUITY_SCORE: 31
ADLS_ACUITY_SCORE: 26
ADLS_ACUITY_SCORE: 26
ADLS_ACUITY_SCORE: 32
ADLS_ACUITY_SCORE: 37
ADLS_ACUITY_SCORE: 31
ADLS_ACUITY_SCORE: 37
ADLS_ACUITY_SCORE: 26
ADLS_ACUITY_SCORE: 37
ADLS_ACUITY_SCORE: 32
ADLS_ACUITY_SCORE: 24
ADLS_ACUITY_SCORE: 31
ADLS_ACUITY_SCORE: 37
ADLS_ACUITY_SCORE: 31
ADLS_ACUITY_SCORE: 24
ADLS_ACUITY_SCORE: 26
ADLS_ACUITY_SCORE: 37
ADLS_ACUITY_SCORE: 31
ADLS_ACUITY_SCORE: 39
ADLS_ACUITY_SCORE: 37
ADLS_ACUITY_SCORE: 37

## 2024-01-01 ASSESSMENT — COLUMBIA-SUICIDE SEVERITY RATING SCALE - C-SSRS
1. IN THE PAST MONTH, HAVE YOU WISHED YOU WERE DEAD OR WISHED YOU COULD GO TO SLEEP AND NOT WAKE UP?: NO
1. IN THE PAST MONTH, HAVE YOU WISHED YOU WERE DEAD OR WISHED YOU COULD GO TO SLEEP AND NOT WAKE UP?: NO
2. HAVE YOU ACTUALLY HAD ANY THOUGHTS OF KILLING YOURSELF IN THE PAST MONTH?: NO
6. HAVE YOU EVER DONE ANYTHING, STARTED TO DO ANYTHING, OR PREPARED TO DO ANYTHING TO END YOUR LIFE?: NO
2. HAVE YOU ACTUALLY HAD ANY THOUGHTS OF KILLING YOURSELF IN THE PAST MONTH?: NO
6. HAVE YOU EVER DONE ANYTHING, STARTED TO DO ANYTHING, OR PREPARED TO DO ANYTHING TO END YOUR LIFE?: NO

## 2024-01-01 ASSESSMENT — PAIN SCALES - GENERAL: PAINLEVEL: NO PAIN (0)

## 2024-01-01 ASSESSMENT — ENCOUNTER SYMPTOMS
SHORTNESS OF BREATH: 1
NAUSEA: 0
DYSURIA: 0
FEVER: 0
ABDOMINAL PAIN: 0
COUGH: 0
DIARRHEA: 0
LIGHT-HEADEDNESS: 1
VOMITING: 0

## 2024-02-15 NOTE — PROGRESS NOTES
[facility]        Patient is here for a 6 month follow up  to discuss Peripheral artery disease (PAD). Symptoms include cramping at night in left lower extremity. HX L leg angio. Also here for consult for AVF placement.     Pt is currently taking Aspirin, Statin, and Plavix.    The provider has been notified that the patient has no concerns.     Questions patient would like addressed today are: N/A.    Refills are needed: No    Has homecare services and agency name:  No

## 2024-02-15 NOTE — PATIENT INSTRUCTIONS
Fanta Duncan,    Thank you for entrusting your care with us today. After your visit today with MD Edwin Lynne this is the plan that was discussed at your appointment.    We will contact you to schedule AV fistula surgery, see instructions below    You will be contacted to schedule 6 month follow-up for PAD with ultrasound        I am including additional information on these things and our contact information if you have any questions or concerns.   Please do not hesitate to reach out to us if you felt we did not answer your questions or you are unsure of the treatment plan after your visit today. Our number is [phone].Thank you for trusting us with your care.         Dakota    Your visit to [facility] for your surgery is coming soon and we look forward to seeing you! This friendly reminder and pre-procedure checklist will help to ensure your surgery goes smoothly and meets your expectations. At [facility], our goal is to provide you with a great patient experience and to deliver genuine, professional care to every patient.     Please complete all the steps in advance of your visit. If you have any questions about the items listed below, please give our office a call. We can be reached at [phone] or visit our website at [email or web address]  for more information.     Procedure: Left Fistula Surgery    Procedure Date :  TBD    Arrival Time:  Tentative time and subject to change. The pre-admission nurse will call you 1-2 days before surgery to tell you time of arrival.     Procedure Location: [facility], [place]    Surgeon: Dr. Edwin Lynne    Admission Type: Outpatient    COVID-19 Test: is no longer required. If you are experiencing COVID symptoms or have tested positive for COVID-19 within 14 days of procedure date, reach out to the care team to reschedule please.     Post Operative Appointment: TBD      If you take blood thinners:   PLEASE DO NOT  STOP YOUR ASPIRIN OR PLAVIX UNLESS SPECIFICALLY DIRECTED BY THE VASCULAR SURGEON TO STOP!  In most cases Vascular surgeons want you to continue these. This is different from most NON vascular surgeries and may not be well known by     If you take these diabetic medications, please discuss with your primary doctor and follow the hold instructions:   Hold seven (7) days prior for once weekly injectable doses [semaglutide (Ozempic, Wegovy), dulaglutide (Trulicity), exenatide ER (Bydureon), tirzepatide (Mounjaro)]  Hold the day before and day of for once daily injectable GLP-1 agonists [exenatide (Byetta), liraglutide (Saxenda, Victoza)]  Hold seven (7) days for oral semaglutide (Rybelsus)       Notify our office right away if you have any changes in your health status or if you develop a cold, flu, diarrhea, infection, fever or sore throat before your scheduled surgery date.   We can be reached at [phone] Monday-Friday 8 am-4:30 pm if you have any questions.       Complete the following checklist before your surgery    []  You will need a Pre-op Physical within 30 days before surgery with your primary care provider. This exam is required by the anesthesiologist to ensure a safe surgical experience.    Failure  to obtain your pre-op physical will lead to cancellation of your surgery  Call them right away to schedule this. Please ensure your Preoperative Physical is faxed to the Hospital if done outside of [facility] system.     [] Preoperative Medication Instructions  Contact your prescribing provider and/or vascular surgeon for instructions before discontinuing any medications especially anticoagulants. (Examples: Coumadin, Plavix, Xarelto, Eliquis, Pradaxa, Effient, Brilinta)   Hold Ibuprofen, Herbal Supplements and Vitamin E 7 days before  Stop Cialis, Levitra and Viagra 2-3 days before surgery    [] Fasting Requirements  Nothing to eat for 8 hours before surgery unless instructed differently by the  surgery nurse.   You may have clear liquids up to two hours before your arrival time (coffee, tea, water, or Gatorade. No cream or milk)  If your primary care provider has instructed you to continue oral medications, you may take them on the morning of surgery with a small sip of water.    No alcohol or smoking 24 hours before surgery.     [] Contact your insurance regarding coverage  If you would like a Good [place] Estimate for your upcoming procedure at [facility] Location, contact Cost of Care Estimates   Advocates are available Monday through Friday 8am - 5pm   [phone]  You may also submit a request online through your [employer] account.   If your procedure is at [facility] please contact the numbers below for Cost of Care Estimates.   - Facility Charge: [phone]    Anesthesiology charge:  [phone]      [] DO NOT BRING FMLA WITH TO SURGERY.  These should be sent to the provider's office by fax to [phone].     [] Day of Surgery  Medications - Take as indicated with sips of water.   Wear comfortable loose fitting clothes. Wear your glasses-Not contacts. Do not wear jewelry including rings and body piercings.   You may have 1 family member wait in the lobby during your surgery. Visitor restrictions are subject to change. Please verify with the surgery nurse when they call.   If same day surgery-Have an adult  come with you to surgery that can help you understand the surgeon's instructions, drive you home and stay with you overnight the first night.    [] You will receive a call from a surgery nurse 1-3 days prior to surgery. They will go over more details with you.     [] If the hospital is at [place] capacity and does not have available inpatient beds, your procedure may need to be postponed. We will contact you if this happens.      Use Chlorhexidine Gluconate 4% soap to help prevent surgical site infections    You play an important part in helping to prevent a surgical  site infection. Let s review what you will need to do.    Chlorhexidine Gluconate 4% is a powerful antiseptic that will help make sure your skin is free of germs. It looks and feels just like liquid soap and should be used liked a shower gel.    **[place] patients can receive free Chlorhexidine Gluconate 4% soap for surgery at any outpatient [place] pharmacy. You can also buy this over the counter at any pharmacy.**    Do not shave within 12 inches of your incision (surgical cut) area for at least 3 days before surgery. Shaving can make small cuts in the skin. This puts you at a higher risk of infection.    Items you will need for each shower:   1 newly washed towel   4 ounces of one of the above soaps   Clean pajamas or clothes to change into    Follow these steps the evening before surgery and the morning of surgery.  Remove all body piercings and jewelry, and leave them off until after your surgery.  Wash your hair and body with your regular shampoo and soap. Make sure you rinse the shampoo and soap from your hair and body.  Using clean hands, apply about 2 ounces of soap gently on your skin from your ear lobes to your toes. You don t need to scrub your skin, but make sure you liberally wash the area where your surgery will be done. Use on your groin area last. Do not use this soap on your face or head. If you get any soap in your eyes, ears or mouth, rinse right away. It is very important to let the soap stay on your skin for at least 1 minute.  Repeat step 2.   Rinse well and dry off using a clean towel. If you feel any tingling, itching or other irritation, rinse right away. It is normal to feel some coolness on the skin after using the antiseptic soap. Your skin may feel a bit dry after the shower, but do not use any lotions, creams or moisturizers. Do not use hair spray or other products in your hair. Also, do not wash with your regular soap after using this.  Dress in freshly washed clothes or pajamas.  Use fresh pillowcases and sheets on your bed.  Repeat these steps the morning of surgery.    If you are allergic or sensitive to Chlorhexidine Gluconate, use an antibacterial soap instead, following the same steps.    If you cannot use the shower, wash yourself with this soap at the sink. Make sure the sink is clean before you begin, and do the best you can to clean your entire body.           What to Expect After Your Dialysis Access Surgery  Hospital Stay  You can expect to go home the same day as your surgery.  Medications  Take all medications exactly as directed. You may need to stop some taking some blood thinners prior to surgery. Your specific medications will be discussed with you prior going home.    Incision Care   Your incision sites may have some bruising and minor swelling for about one week.  If your incision is sealed with Dermabond (glue) you may shower and leave the incision open to air the following day. When showering do not rub incision, let the warm soapy water run over it and pat dry. DO NOT SOAK INCISION IN A TUB/POOL/Etc until healed. Keep incisions dry and covered until they begin to heal.     Restrictions  You may use the arm freely after the site heals. Keep the following in mind:  Avoid pressure on the arm, lifting heavy objects with the arm, sleeping on the arm with the graft or fistula, and wearing tight-sleeved shirts or jewelry around the graft or fistula.  Do not allow blood pressure monitoring or needle punctures (other than dialysis) on the side where the graft or fistula is located.  Fistula/Graft Use - Fistulas need to be assessed at a follow up appointment with your surgeon prior to it being used for dialysis.     Follow-Up  If a follow up appointment was not scheduled prior to your procedure please call [phone]. Follow up appointments are scheduled with either your Physician or one a member of our care team.     Risks and Possible Complications   Steal  Syndrome - You  may initially feel some coolness or numbness in the hand with the fistula. These sensations usually go away in a few weeks as your circulation compensates for the fistula. However, if these sensations are severe or don't disappear, tell your physician as soon as possible, because the fistula may be causing too much blood to flow away from your hand, a condition physicians call a  steal.   Infection/ Drainage/Bleeding/ Excessive Swelling - If there is any drainage or bleeding it should be a very small amount. If you have excessive bleeding, any milky drainage, or redness from the incisions call your doctor right away. Minor swelling is normal, if your experience excessive swelling call right away.   Graft/Fistula Narrowing or Occlusion - Grafts and fistulas may develop thrombosis or stenosis, essentially blocking or partially blocking blood flow within the created graft or fistula. To assess blood flow place your fingers over the graft or fistula and feel for a vibration or a  thrill . THIS SHOULD BE DONE EVERY DAY! IF THRILL CANNOT BE FELT PLEASE CONTACT YOUR SURGEON AS SOON AS POSSIBLE.      Exercising for your fistula after surgery    An AV fistula must mature for several weeks or months before it can be used for hemodialysis, so after it is surgically created, your doctor will ask you to work on strengthening it. The more access arm exercises you do to help strengthen it, the sooner you ll be able to use your fistula. Your doctor may recommend certain arm and finger exercises that will strengthen the fistula. The exercises your doctor recommends will depend on where your fistula is located. Fistulas are usually located in the forearm or upper arm. Before you start any exercise, it s important to consult your doctor.    Use your arm for all of your usual activities. After seven days from the date of your surgery apply a tourniquet around your arm above your fistula tight enough to make the veins distend but not so  tight that your hand goes to sleep or becomes extremely uncomfortable. Lightly squeeze a stress ball and hold for 10 seconds. Do a total of 5 repetitions. It is recommended to do this 6 times per day. Keep doing this until the fistula is used for dialysis.      Keeping your fistula clean  Once your AV fistula is strong enough to be used for hemodialysis, it is crucial that you keep it clean. Although a fistula is less prone to infection than other dialysis types, proper hygiene is still important:  Look for redness or swelling around the fistula area.   If you experience any pain in the fistula area, tell your doctor immediately.   If you get a fever, this can be a sign of infection.   Wash and pat dry your fistula arm thoroughly right before each treatment. Your dialysis facility will provide you with supplies.

## 2024-02-19 NOTE — PROGRESS NOTES
VASCULAR SURGERY CLINIC CONSULTATION    VASCULAR SURGEON: Donell Lynne MD, RPVI     LOCATION:  [facility]    Dakota Bauer   Medical Record #:  [id number]  YOB: 1950  Age:  73 year old     Date of Service: 2/19/2024    PRIMARY CARE PROVIDER: Jamal Gaffnye      Reason for visit:  PAD follow up as well as eval for creation of AVF    IMPRESSION:  Mr. Bauer is a pleasant 73 year old male with PMH of ESRD and CLTI of LLE s/p TMA w/  pedal angioplasty in June 2023 which demonstrated main runoff to the foot is AT. TRAE on LLE is 1.08, and  LLE has wounds which are now healing .  Today we are evaluating him for possible AVF creation, as he is expected to need to initiate dialysis in coming months. Vein mapping shows may have suitable L cephalic vein in antecubital fossa .   Will plan for LUE AVF vs. AVG creation with evaluation of cephalic and basilic veins on day of procedure for suitability.     RECOMMENDATION/RISKS:    - Will plan to continue to monitor LLE, return visit in 6 months with repeat ABIs to monitor.   - Schedule for creation AVF vs. AVG on LUE.    HPI:  Dakota Bauer is a 73 year old male who was seen today for follow up of LLE CLTI. He had angioplasty of his L pedal arch and DP with attempted crossing of peroneal artery in June of this year. He had initially had healing of his left foot wounds however they had somewhat reopened a few weeks ago, now healing again.. Denies fever, chills. NSTEMI in August 2023 s/p PCI  Anticipated to need dialysis in the coming months.   He is right handed, no history of central lines he is aware of.     REVIEW OF SYSTEMS:    A 12 point ROS was reviewed and is negative except for the above..     PHH:    Past Medical History:   Diagnosis Date    Chronic kidney disease     Coronary artery disease     Diabetes mellitus (H)     Disorder of thyroid     Hyperlipidemia     Hypertension          Past Surgical History:   Procedure Laterality Date     AMPUTATE TOE(S) Left     BYPASS GRAFT ARTERY CORONARY  2005    [facility] with Dr. Holter    CARDIAC CATHETERIZATION  2005    CARDIAC CATHETERIZATION  2016    no intervention    CERVICAL DISC SURGERY  2014    CV ANGIOGRAM CORONARY GRAFT N/A 2023    Procedure: Coronary Angiogram Graft;  Surgeon: Maikel Tripathi MD;  Location: [facility] CATH LAB CV    CV PCI N/A 2023    Procedure: Percutaneous Coronary Intervention;  Surgeon: Maikel Tripathi MD;  Location: [facility] CATH LAB CV    IR LOWER EXTREMITY ANGIOGRAM LEFT  2023    IR LOWER EXTREMITY ANGIOGRAM LEFT  2020    IR LOWER EXTREMITY ANGIOGRAM LEFT  2023        ALLERGIES:     Allergies   Allergen Reactions    Hydrochlorothiazide Unknown    Levofloxacin Diarrhea        MEDS:    Current Outpatient Medications   Medication    ALPRAZolam (XANAX) 0.5 MG tablet    aspirin 81 MG EC tablet    atorvastatin (LIPITOR) 40 MG tablet    clopidogrel (PLAVIX) 75 MG tablet    Continuous Blood Gluc Sensor (itBitSTYLE DAVID 14 DAY SENSOR) MISC    ezetimibe (ZETIA) 10 MG tablet    fluticasone (FLONASE) 50 MCG/ACT nasal spray    gabapentin (NEURONTIN) 300 MG capsule    HUMALOG 100 unit/mL injection    insulin glargine (LANTUS) 100 unit/mL injection    levothyroxine (SYNTHROID/LEVOTHROID) 125 MCG tablet    losartan (COZAAR) 25 MG tablet    metoprolol succinate ER (TOPROL XL) 50 MG 24 hr tablet    multivitamin with iron (ONE DAILY WITH IRON) Tab tablet    nitroGLYcerin (NITROSTAT) 0.4 MG sublingual tablet    TECHLITE PEN NEEDLES 31G X 5 MM miscellaneous    torsemide (DEMADEX) 20 MG tablet    VITAMIN D3 2,000 unit capsule     No current facility-administered medications for this visit.        SOCIAL HABITS:    Tobacco Use      Smoking status: Former        Types: Cigarettes        Quit date: 10/10/1984        Years since quittin.3      Smokeless tobacco: Never       Alcohol Use: Not on file       History   Drug Use Unknown        FAMILY  HISTORY:    Family History   Problem Relation Age of Onset    Coronary Artery Disease Father     Coronary Artery Disease Brother        PE:  There were no vitals taken for this visit.  Wt Readings from Last 1 Encounters:   12/05/23 100.2 kg (221 lb)     There is no height or weight on file to calculate BMI.    EXAM:  GENERAL: This is a well-developed 73 year old male who appears his stated age  EYES: Grossly normal.  MOUTH: Buccal mucosa grossly normal  CARDIAC:  Not assessed  CHEST/LUNG:  Nonlabored breathing on room air. No venous varicosities or collaterals of bilateral chest walls.   GASTROINTESINAL (ABDOMEN):Not Assessed   MUSCULOSKELETAL: Grossly normal and both lower extremities are intact.  HEME/LYMPH: No lymphedema  NEUROLOGIC: Focally intact, Alert and oriented x 3.   PSYCH: appropriate affect  INTEGUMENT: No open lesions or ulcers  Pulse Exam    Radial: Left +2   Right  +2    DIAGNOSTIC STUDIES:     Images:  No results found.    I personally reviewed the images and my interpretation is LUE cephalic vein with appropriate sized vein at level of antecubital fossa however does decrease in size centrally. Basilic with more proximal area in upper arm of appropriate size as well. Will plan for LUE AVF vs. AVG with evaluation of both veins on day of surgery. LLE TRAE now 1.08 on L, 0.76 on right.     LABS:      Sodium   Date Value Ref Range Status   12/05/2023 136 135 - 145 mmol/L Final     Comment:     Reference intervals for this test were updated on 09/26/2023 to more accurately reflect our healthy population. There may be differences in the flagging of prior results with similar values performed with this method. Interpretation of those prior results can be made in the context of the updated reference intervals.    08/22/2023 135 (L) 136 - 145 mmol/L Final   08/09/2023 134 (L) 136 - 145 mmol/L Final     Urea Nitrogen   Date Value Ref Range Status   12/05/2023 50.4 (H) 8.0 - 23.0 mg/dL Final   08/22/2023 42.6  (H) 8.0 - 23.0 mg/dL Final   08/09/2023 61.7 (H) 8.0 - 23.0 mg/dL Final   06/05/2023 38 (H) 8 - 28 mg/dL Final   06/17/2022 48 (H) 8 - 28 mg/dL Final   12/17/2021 40 (H) 8 - 28 mg/dL Final     Hemoglobin   Date Value Ref Range Status   12/05/2023 11.7 (L) 13.3 - 17.7 g/dL Final   08/01/2023 9.8 (L) 13.3 - 17.7 g/dL Final   07/31/2023 8.7 (L) 13.3 - 17.7 g/dL Final     Platelet Count   Date Value Ref Range Status   12/05/2023 146 (L) 150 - 450 10e3/uL Final   08/01/2023 139 (L) 150 - 450 10e3/uL Final   07/31/2023 130 (L) 150 - 450 10e3/uL Final     INR   Date Value Ref Range Status   06/21/2023 1.00 0.85 - 1.15 Final       45 minutes spent on the day of encounter doing chart review, history and exam, documentation, and further activities as noted.     Jenny Cain MD  VASCULAR SURGERY PGY3

## 2024-02-22 NOTE — PROGRESS NOTES
Surgery Scheduled    Confirmed surgery date with pt.  Writer sending instructions to pt via [employer] message.    Surgery/Procedure: CREATION, ARTERIOVENOUS FISTULA, UPPER EXTREMITY - LEFT    Special Equipment: Yun Yun    Location: [facility]:  [address] [place] [address] (phone: [phone], Fax: [phone])    Date: 4/2/2024    Time: 1015 AM    Admission Type: Inpatient    Surgeon: Dr. Lynne    OR Confirmed & :  Yes with Tere on 2/22/2024    Entered on provider calendar:  Yes    Post Op:  see appt desk    Wound Vac Needed: No    Home Care Needed: No    Blood Thinners Addressed: N/A    Stress Test Clearance: NO

## 2024-04-01 NOTE — TELEPHONE ENCOUNTER
Pt's surgery was moved to earlier start time of 1:30pm.  Confirmed arrival time with pt's spouse and OR scheduling (Tere).

## 2024-04-01 NOTE — TELEPHONE ENCOUNTER
Caller: Funmilayo Schroeder    Provider: MD Edwin Lynne    Detailed reason for call: Spouse calling upset that they were notified that the surgery time has changed to the afternoon for tomorrow.  She states they can not make that work and would like a call back.     Best phone number to contact: [phone]    Best time to contact: any    Ok to leave a detailed message: Yes    Ok to speak to authorized person if needed: Yes: patient and spouse      (Noted to patient if reason is related to wound or incision, to please send a photo via email or [employer].)

## 2024-04-02 NOTE — ANESTHESIA PROCEDURE NOTES
"Other (ICB) Procedure Note    Pre-Procedure   Staff -        Anesthesiologist:  Curt Gupta MD       Performed By: anesthesiologist       Location: pre-op       Procedure Start/Stop Times: 4/2/2024 1:15 PM and 4/2/2024 1:20 PM       Pre-Anesthestic Checklist: patient identified, IV checked, site marked, risks and benefits discussed, informed consent, monitors and equipment checked, pre-op evaluation, at physician/surgeon's request and post-op pain management  Timeout:       Correct Patient: Yes        Correct Procedure: Yes        Correct Site: Yes        Correct Position: Yes        Correct Laterality: Yes        Site Marked: Yes  Procedure Documentation  Procedure: Other (ICB)       Laterality: left       Patient Position: supine       Patient Prep/Sterile Barriers: sterile gloves, mask       Skin prep: Chloraprep       Needle Gauge: 20.        Needle Length (Inches): 4        1. Ultrasound was used to identify targeted nerve, plexus, vascular marker, or fascial plane and place a needle adjacent to it in real-time.       2. Ultrasound was used to visualize the spread of anesthetic in close proximity to the above referenced structure.       3. A permanent image is entered into the patient's record.    Assessment/Narrative         The placement was negative for: blood aspirated, painful injection and site bleeding       Paresthesias: No.    Medication(s) Administered   Ropivacaine 0.5% PF (Infiltration) - Infiltration   10 mL - 4/2/2024 1:20:00 PM  Medication Administration Time: 4/2/2024 1:15 PM      FOR [facility] ([facility]) ONLY:   Pain Team Contact information: please page the Pain Team Via [facility]. Search \"Pain\". During daytime hours, please page the attending first. At night please page the resident first.      "

## 2024-04-02 NOTE — ANESTHESIA POSTPROCEDURE EVALUATION
Patient: Dakota Bauer    Procedure: Procedure(s):  CREATION, ARTERIOVENOUS FISTULA, UPPER EXTREMITY       Anesthesia Type:  MAC    Note:  Disposition: Outpatient   Postop Pain Control: Uneventful            Sign Out: Well controlled pain   PONV: No   Neuro/Psych: Uneventful            Sign Out: Acceptable/Baseline neuro status   Airway/Respiratory: Uneventful            Sign Out: Acceptable/Baseline resp. status   CV/Hemodynamics: Uneventful            Sign Out: Acceptable CV status; No obvious hypovolemia; No obvious fluid overload   Other NRE: NONE   DID A NON-ROUTINE EVENT OCCUR? No           Last vitals:  Vitals Value Taken Time   /74 04/02/24 1625   Temp 36.6  C (97.9  F) 04/02/24 1539   Pulse 52 04/02/24 1625   Resp 16 04/02/24 1622   SpO2 98 % 04/02/24 1625   Vitals shown include unfiled device data.    Electronically Signed By: Lindsey Rojas MD  April 2, 2024  4:27 PM

## 2024-04-02 NOTE — INTERVAL H&P NOTE
I have reviewed the surgical (or preoperative) H&P that is linked to this encounter, and examined the patient. There are no significant changes    Clinical Conditions Present on Arrival:  Clinically Significant Risk Factors Present on Admission                 # Drug Induced Platelet Defect: home medication list includes an antiplatelet medication  # Overweight: Estimated body mass index is 29.43 kg/m  as calculated from the following:    Height as of this encounter: 1.829 m (6').    Weight as of this encounter: 98.4 kg (217 lb).

## 2024-04-02 NOTE — ANESTHESIA CARE TRANSFER NOTE
Patient: Dakota Bauer    Procedure: Procedure(s):  CREATION, ARTERIOVENOUS FISTULA, UPPER EXTREMITY       Diagnosis: Chronic kidney disease, stage IV (severe) (H) [N18.4]  Diagnosis Additional Information: No value filed.    Anesthesia Type:   General     Note:    Oropharynx: oropharynx clear of all foreign objects  Level of Consciousness: awake  Oxygen Supplementation: room air    Independent Airway: airway patency satisfactory and stable    Vital Signs Stable: post-procedure vital signs reviewed and stable  Report to RN Given: handoff report given  Patient transferred to: Phase II    Handoff Report: Identifed the Patient, Identified the Reponsible Provider, Reviewed the pertinent medical history, Discussed the surgical course, Reviewed Intra-OP anesthesia mangement and issues during anesthesia, Set expectations for post-procedure period and Allowed opportunity for questions and acknowledgement of understanding        Electronically Signed By: RAO Landeros CRNA  April 2, 2024  3:39 PM

## 2024-04-02 NOTE — ANESTHESIA PROCEDURE NOTES
"Brachial plexus Procedure Note    Pre-Procedure   Staff -        Anesthesiologist:  Curt Gupta MD       Performed By: anesthesiologist       Location: pre-op       Procedure Start/Stop Times: 4/2/2024 1:00 PM and 4/2/2024 1:10 PM       Pre-Anesthestic Checklist: patient identified, IV checked, site marked, risks and benefits discussed, informed consent, monitors and equipment checked, pre-op evaluation, at physician/surgeon's request and post-op pain management  Timeout:       Correct Patient: Yes        Correct Procedure: Yes        Correct Site: Yes        Correct Position: Yes        Correct Laterality: Yes        Site Marked: Yes  Procedure Documentation  Procedure: Brachial plexus       Laterality: left       Patient Position: supine       Patient Prep/Sterile Barriers: sterile gloves, mask       Skin prep: Chloraprep (axillary approach).       Needle Gauge: 20.        Needle Length (Inches): 4        1. Ultrasound was used to identify targeted nerve, plexus, vascular marker, or fascial plane and place a needle adjacent to it in real-time.       2. Ultrasound was used to visualize the spread of anesthetic in close proximity to the above referenced structure.       3. A permanent image is entered into the patient's record.    Assessment/Narrative         The placement was negative for: blood aspirated, painful injection and site bleeding       Paresthesias: No.    Medication(s) Administered   Ropivacaine 0.5% PF (Infiltration) - Infiltration   25 mL - 4/2/2024 1:10:00 PM  Medication Administration Time: 4/2/2024 1:00 PM      FOR [facility] (Psychiatric/[facility]) ONLY:   Pain Team Contact information: please page the Pain Team Via [employer]. Search \"Pain\". During daytime hours, please page the attending first. At night please page the resident first.      "

## 2024-04-02 NOTE — ANESTHESIA PREPROCEDURE EVALUATION
Anesthesia Pre-Procedure Evaluation    Patient: Dakota Bauer   MRN: [id number] : 1950        Procedure : Procedure(s):  CREATION, ARTERIOVENOUS FISTULA, UPPER EXTREMITY          Past Medical History:   Diagnosis Date    Anxiety     Chronic kidney disease     Coronary artery disease     Diabetes mellitus (H)     Diabetic ulcer of toe (H)     Disorder of thyroid     Hyperlipidemia     Hypertension     Hypothyroidism     SHERRELL (obstructive sleep apnea)     no cpap      Past Surgical History:   Procedure Laterality Date    AMPUTATE TOE(S) Left     BYPASS GRAFT ARTERY CORONARY  2005    [place] with Dr. Holter    CARDIAC CATHETERIZATION  2005    CARDIAC CATHETERIZATION  2016    no intervention    CATARACT EXTRACTION      CERVICAL DISC SURGERY  2014    CV ANGIOGRAM CORONARY GRAFT N/A 2023    Procedure: Coronary Angiogram Graft;  Surgeon: Maikel Tripathi MD;  Location: [facility] CATH LAB CV    CV PCI N/A 2023    Procedure: Percutaneous Coronary Intervention;  Surgeon: Maikel Tripathi MD;  Location: [facility] CATH LAB CV    IR LOWER EXTREMITY ANGIOGRAM LEFT  2023    IR LOWER EXTREMITY ANGIOGRAM LEFT  2020    IR LOWER EXTREMITY ANGIOGRAM LEFT  2023      Allergies   Allergen Reactions    Hydrochlorothiazide Unknown    Levofloxacin Other (See Comments)     Kidney pain      Social History     Tobacco Use    Smoking status: Former     Types: Cigarettes     Quit date: 10/10/1984     Years since quittin.5    Smokeless tobacco: Never   Substance Use Topics    Alcohol use: Not Currently      Wt Readings from Last 1 Encounters:   24 98.4 kg (217 lb)        Anesthesia Evaluation            ROS/MED HX  ENT/Pulmonary:  - neg pulmonary ROS   (+) sleep apnea, doesn't use CPAP,                                      Neurologic:  - neg neurologic ROS     Cardiovascular: Comment: 2023  The left ventricle is normal in size.  The visual ejection fraction is 50-55%.  No  regional wall motion abnormalities noted.  Normal right ventricle size and systolic function.  There is no color Doppler evidence of a PFO.  There is moderate (2+) mitral regurgitation.  The right ventricular systolic pressure is approximated at 46mmHg plus the  right atrial pressure.  No valvular vegetations noted. - neg cardiovascular ROS   (+)  hypertension- -  CAD -  - -                           valvular problems/murmurs type: MR    pulmonary hypertension,      METS/Exercise Tolerance: >4 METS    Hematologic:  - neg hematologic  ROS     Musculoskeletal:  - neg musculoskeletal ROS     GI/Hepatic:  - neg GI/hepatic ROS     Renal/Genitourinary:  - neg Renal ROS   (+) renal disease, type: CRI and ESRD, Pt does not require dialysis,           Endo:  - neg endo ROS   (+) type I DM,                     Psychiatric/Substance Use:  - neg psychiatric ROS     Infectious Disease:  - neg infectious disease ROS     Malignancy:  - neg malignancy ROS     Other:  - neg other ROS          Physical Exam    Airway  airway exam normal      Mallampati: II   TM distance: > 3 FB   Neck ROM: full   Mouth opening: > 3 cm    Respiratory Devices and Support         Dental           Cardiovascular   cardiovascular exam normal          Pulmonary   pulmonary exam normal                OUTSIDE LABS:  CBC:   Lab Results   Component Value Date    WBC 5.3 12/05/2023    WBC 5.3 08/01/2023    HGB 11.7 (L) 12/05/2023    HGB 9.8 (L) 08/01/2023    HCT 35.6 (L) 12/05/2023    HCT 29.5 (L) 08/01/2023     (L) 12/05/2023     (L) 08/01/2023     BMP:   Lab Results   Component Value Date     12/05/2023     (L) 08/22/2023    POTASSIUM 4.1 12/05/2023    POTASSIUM 4.0 08/22/2023    CHLORIDE 98 12/05/2023    CHLORIDE 97 (L) 08/22/2023    CO2 30 (H) 12/05/2023    CO2 24 08/22/2023    BUN 50.4 (H) 12/05/2023    BUN 42.6 (H) 08/22/2023    CR 3.05 (H) 12/05/2023    CR 3.21 (H) 08/22/2023     (H) 04/02/2024     (H) 12/05/2023  "    COAGS:   Lab Results   Component Value Date    INR 1.00 06/21/2023     POC: No results found for: \"BGM\", \"HCG\", \"HCGS\"  HEPATIC:   Lab Results   Component Value Date    ALBUMIN 3.0 (L) 08/01/2023    PROTTOTAL 6.2 (L) 08/01/2023    ALT 45 08/01/2023    AST 59 (H) 08/01/2023    ALKPHOS 49 08/01/2023    BILITOTAL 0.4 08/01/2023     OTHER:   Lab Results   Component Value Date    LACT 1.8 07/27/2023    A1C 7.4 (H) 07/27/2023    SVITLANA 9.4 12/05/2023    PHOS 4.5 07/31/2023    MAG 2.2 08/02/2023    TSH 1.68 06/16/2023     (H) 07/30/2023       Anesthesia Plan    ASA Status:  3       Anesthesia Type: General.     - Airway: Mask Only   Induction: Intravenous, Propofol.   Maintenance: TIVA.        Consents    Anesthesia Plan(s) and associated risks, benefits, and realistic alternatives discussed. Questions answered and patient/representative(s) expressed understanding.     - Discussed:     - Discussed with:  Patient            Postoperative Care    Pain management: IV analgesics, Peripheral nerve block (Single Shot).   PONV prophylaxis: Ondansetron (or other 5HT-3)     Comments:    Other Comments: Patient history and physical exam reviewed. NPO status appropriate. Focused physical and history performed, pre-op evaluation updated. RBA discussed re: anesthesia and patients questions answered.              Curt Gupta MD    I have reviewed the pertinent notes and labs in the chart from the past 30 days and (re)examined the patient.  Any updates or changes from those notes are reflected in this note.             # Drug Induced Platelet Defect: home medication list includes an antiplatelet medication      "

## 2024-04-02 NOTE — BRIEF OP NOTE
[facility]    Brief Operative Note    Pre-operative diagnosis: Chronic kidney disease, stage IV (severe) (H) [N18.4]  Post-operative diagnosis Same as pre-operative diagnosis    Procedure: CREATION, ARTERIOVENOUS FISTULA, UPPER EXTREMITY, Left - Arm    Surgeon: Surgeon(s) and Role:     * Edwin Lynne MD - Primary  Anesthesia: MAC with Block   Estimated Blood Loss: 5cc    Drains: None  Specimens: * No specimens in log *  Findings:   Palpable left radial pulse at end of case, excellent thrill in fistula.  .  Complications: None.  Implants: * No implants in log *

## 2024-04-05 NOTE — LETTER
4/5/2024    Jamal Gaffney MD  [address]  [facility] [address]    RE: Dakota Bauer       Dear Colleague,     I had the pleasure of seeing Daokta Bauer in the [facility].    [facility]   [address]  [place] [address]   [email or web address]   OFFICE: [phone]     CARDIOLOGY CLINIC NOTE     Thank you, Dr. Gaffney, Jamal ESPINOZA, for asking the [facility] team to see Mr. Dakota Bauer to evaluate Follow Up (CAD)         Assessment/Recommendations   Assessment:    Coronary artery disease - with prior CABG and STEMI of a graft to diagonal coronary artery.   Chronic heart failure with mildly reduced LVEF  Dyslipidemia - appropriate statin  Chronic kidney disease stage IV - recently had a fistula placed to prepare for HD.  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 Will continue DAPT indefinitely due to PAD and to help with coronary vascular patency.  Follow up in 1 year or sooner if needed.         History of Present Illness   Mr. Dakota Bauer is a 73 year old male who presents for follow-up.    Mr. Bauer has a history of coronary artery disease with prior bypass surgery back in the year 2000.  More recently he had an ST elevation MI in July 2023 that was treated with a drug-eluting stent to a vein graft to his diagonal coronary artery.  He also has type 1 diabetes that is complicated by peripheral arterial disease as well as CKD stage IV.  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    From a cardiac standpoint he says that he is doing quite well.  He denies any anginal symptoms.  His fluid status is well-controlled with torsemide.  He expresses no acute cardiac concerns.      Other than noted above, Mr. Bauer denies any chest pain/pressure/tightness, shortness of breath at rest or with  exertion, light headedness/dizziness, pre-syncope, syncope, lower extremity swelling, palpitations, paroxysmal nocturnal dyspnea (PND), or orthopnea.     Cardiac Problems and Cardiac Diagnostics     Most Recent Cardiac testing:  ECG dated 9/6/23 (personaly reviewed and interpreted): sinus rhythm with left axis deviation and LVH.    ECHO (report reviewed):   SHAYLEE 7/31/23  The left ventricle is normal in size.  The visual ejection fraction is 50-55%.  No regional wall motion abnormalities noted.  Normal right ventricle size and systolic function.  There is no color Doppler evidence of a PFO.  There is moderate (2+) mitral regurgitation.  The right ventricular systolic pressure is approximated at 46mmHg plus the right atrial pressure.  No valvular vegetations noted.      Cardiac cath: from 7/27/23 demonstrated     Ost Cx to Prox Cx lesion is 100% stenosed.    Prox LAD lesion is 100% stenosed.    Ramus lesion is 50% stenosed.    Prox RCA to Dist RCA lesion is 30% stenosed.    Prox Graft to Mid Graft lesion is 100% stenosed.     1.  Severe diffuse disease of native vessels with small caliber vessels distally.  2.  Chronic occlusion of native LAD and native left circumflex.  3.  Left main fills small narrow caliber ramus branch only  4.  Right coronary artery has diffuse mild to moderate disease but no severe stenoses.  5.  Patent saphenous vein graft to high distal lateral wall branch (? Diagonal or marginal branch) which is diffusely diseased. Anastomosis appears normal.  6.  Occluded saphenous vein graft to distal diagonal/lateral wall branches, most likely the acute infarct vessel.  7.  Patent BENITEZ to LAD. Anastomosis appears normal.  8.  Successful PCI saphenous vein graft to distal diagonal/lateral with drug-eluting stent implant x1  9.  Recommend continuing Plavix therapy indefinitely, risk factor management for ASCVD.       Medications  Allergies   Current Outpatient Medications   Medication Sig Dispense Refill     aspirin 81 MG EC tablet Take 1 tablet (81 mg) by mouth daily Start tomorrow. 30 tablet 3    atorvastatin (LIPITOR) 40 MG tablet Take 40 mg by mouth daily      clopidogrel (PLAVIX) 75 MG tablet Take 1 tablet (75 mg) by mouth daily Dose to start tomorrow. 90 tablet 3    Continuous Blood Gluc Sensor (FREESTYLE DAVID 14 DAY SENSOR) MISC       ezetimibe (ZETIA) 10 MG tablet Take 1 tablet (10 mg) by mouth daily 90 tablet 4    fish oil-omega-3 fatty acids 1000 MG capsule Take 2 g by mouth daily      fluticasone (FLONASE) 50 MCG/ACT nasal spray Spray 2 sprays into both nostrils daily For nasal congestion 16 g 1    gabapentin (NEURONTIN) 300 MG capsule [GABAPENTIN (NEURONTIN) 300 MG CAPSULE] Take 300 mg by mouth 3 (three) times a day.             HUMALOG 100 unit/mL injection Inject Subcutaneous 3 times daily (before meals) Carb count: 3 g carb : 1 unit insulin      insulin glargine (LANTUS) 100 unit/mL injection [INSULIN GLARGINE (LANTUS) 100 UNIT/ML INJECTION] Inject 30 Units under the skin every morning.       levothyroxine (SYNTHROID/LEVOTHROID) 125 MCG tablet Take 125 mcg by mouth daily      losartan (COZAAR) 25 MG tablet Take 25 mg by mouth      metoprolol succinate ER (TOPROL XL) 50 MG 24 hr tablet Take 1 tablet (50 mg) by mouth daily 90 tablet 2    multivitamin with iron (ONE DAILY WITH IRON) Tab tablet [MULTIVITAMIN WITH IRON (ONE DAILY WITH IRON) TAB TABLET] Take 1 tablet by mouth daily.      nitroGLYcerin (NITROSTAT) 0.4 MG sublingual tablet Place 1 tablet (0.4 mg) under the tongue every 5 minutes as needed 25 tablet 3    TECHLITE PEN NEEDLES 31G X 5 MM miscellaneous       torsemide (DEMADEX) 20 MG tablet Take 40 mg by mouth daily      VITAMIN D3 2,000 unit capsule [VITAMIN D3 2,000 UNIT CAPSULE] Take 2,000 Units by mouth daily.  3    oxyCODONE (ROXICODONE) 5 MG tablet Take 2.5 mg by mouth every 6 hours as needed for moderate pain or moderate to severe pain        Allergies   Allergen Reactions     Hydrochlorothiazide Unknown    Levofloxacin Other (See Comments)     Kidney pain        Physical Examination Review of Systems   Vitals: /50 (BP Location: Left arm, Patient Position: Sitting, Cuff Size: Adult Large)   Pulse 68   Resp 16   Ht 1.829 m (6')   Wt 101.6 kg (224 lb)   BMI 30.38 kg/m    BMI= Body mass index is 30.38 kg/m .  Wt Readings from Last 3 Encounters:   24 101.6 kg (224 lb)   24 98.4 kg (217 lb)   23 100.2 kg (221 lb)       General: pleasant male. No acute distress.   Neck: No JVD  Lungs: clear to auscultation  COR: normal rate, regular rhythm, 2/6 holosystolic murmur  Extrem: No edema. L foot in a boot and s/p distal L foot amputation.        Please refer above for cardiac ROS details.       Past History     Family History:   Family History   Problem Relation Age of Onset    Diabetes Mother     Coronary Artery Disease Father     Coronary Artery Disease Brother         Social History:   Social History     Socioeconomic History    Marital status:      Spouse name: Not on file    Number of children: Not on file    Years of education: Not on file    Highest education level: Not on file   Occupational History    Not on file   Tobacco Use    Smoking status: Former     Types: Cigarettes     Quit date: 10/10/1984     Years since quittin.5    Smokeless tobacco: Never   Vaping Use    Vaping Use: Never used   Substance and Sexual Activity    Alcohol use: Not Currently    Drug use: Never    Sexual activity: Not on file   Other Topics Concern    Not on file   Social History Narrative    Not on file     Social Determinants of Health     Financial Resource Strain: Not on file   Food Insecurity: Not on file   Transportation Needs: Not on file   Physical Activity: Not on file   Stress: Not on file   Social Connections: Not on file   Interpersonal Safety: Not on file   Housing Stability: Not on file            Lab Results    Chemistry/lipid CBC Cardiac Enzymes/BNP/TSH/INR    Lab Results   Component Value Date    CHOL 122 12/08/2023    HDL 40 12/08/2023    TRIG 92 12/08/2023    BUN 50.4 (H) 12/05/2023     12/05/2023    CO2 30 (H) 12/05/2023    Lab Results   Component Value Date    WBC 5.5 04/02/2024    HGB 10.8 (L) 04/02/2024    HCT 33.3 (L) 04/02/2024    MCV 92 04/02/2024     (L) 04/02/2024    Lab Results   Component Value Date    TROPONINI 0.13 06/02/2021    TSH 1.68 06/16/2023    INR 1.00 06/21/2023                Thank you for allowing me to participate in the care of your patient.      Sincerely,     Geronimo Montoya MD     [facility]  cc:   Robinson Chopra MD  [address] [facility]  [address]  [place] [address]

## 2024-04-05 NOTE — PROGRESS NOTES
[facility] HEART CARE   [address]  [place] [address]   [email or web address]   OFFICE: [phone]     CARDIOLOGY CLINIC NOTE     Thank you, Jamal Rick, for asking the [facility] Heart Care team to see Mr. Dakota Bauer to evaluate Follow Up (CAD)         Assessment/Recommendations   Assessment:    Coronary artery disease - with prior CABG and STEMI of a graft to diagonal coronary artery.   Chronic heart failure with mildly reduced LVEF  Dyslipidemia - appropriate statin  Chronic kidney disease stage IV - recently had a fistula placed to prepare for HD.  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 Will continue DAPT indefinitely due to PAD and to help with coronary vascular patency.  Follow up in 1 year or sooner if needed.         History of Present Illness   Mr. Dakota Bauer is a 73 year old male who presents for follow-up.    Mr. Bauer has a history of coronary artery disease with prior bypass surgery back in the year 2000.  More recently he had an ST elevation MI in July 2023 that was treated with a drug-eluting stent to a vein graft to his diagonal coronary artery.  He also has type 1 diabetes that is complicated by peripheral arterial disease as well as CKD stage IV.  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    From a cardiac standpoint he says that he is doing quite well.  He denies any anginal symptoms.  His fluid status is well-controlled with torsemide.  He expresses no acute cardiac concerns.      Other than noted above, Mr. Bauer denies any chest pain/pressure/tightness, shortness of breath at rest or with exertion, light headedness/dizziness, pre-syncope, syncope, lower extremity swelling, palpitations, paroxysmal nocturnal dyspnea (PND), or orthopnea.     Cardiac Problems and Cardiac Diagnostics     Most  Recent Cardiac testing:  ECG dated 9/6/23 (personaly reviewed and interpreted): sinus rhythm with left axis deviation and LVH.    ECHO (report reviewed):   SHAYLEE 7/31/23  The left ventricle is normal in size.  The visual ejection fraction is 50-55%.  No regional wall motion abnormalities noted.  Normal right ventricle size and systolic function.  There is no color Doppler evidence of a PFO.  There is moderate (2+) mitral regurgitation.  The right ventricular systolic pressure is approximated at 46mmHg plus the right atrial pressure.  No valvular vegetations noted.      Cardiac cath: from 7/27/23 demonstrated     Ost Cx to Prox Cx lesion is 100% stenosed.    Prox LAD lesion is 100% stenosed.    Ramus lesion is 50% stenosed.    Prox RCA to Dist RCA lesion is 30% stenosed.    Prox Graft to Mid Graft lesion is 100% stenosed.     1.  Severe diffuse disease of native vessels with small caliber vessels distally.  2.  Chronic occlusion of native LAD and native left circumflex.  3.  Left main fills small narrow caliber ramus branch only  4.  Right coronary artery has diffuse mild to moderate disease but no severe stenoses.  5.  Patent saphenous vein graft to high distal lateral wall branch (? Diagonal or marginal branch) which is diffusely diseased. Anastomosis appears normal.  6.  Occluded saphenous vein graft to distal diagonal/lateral wall branches, most likely the acute infarct vessel.  7.  Patent BENITEZ to LAD. Anastomosis appears normal.  8.  Successful PCI saphenous vein graft to distal diagonal/lateral with drug-eluting stent implant x1  9.  Recommend continuing Plavix therapy indefinitely, risk factor management for ASCVD.       Medications  Allergies   Current Outpatient Medications   Medication Sig Dispense Refill    aspirin 81 MG EC tablet Take 1 tablet (81 mg) by mouth daily Start tomorrow. 30 tablet 3    atorvastatin (LIPITOR) 40 MG tablet Take 40 mg by mouth daily      clopidogrel (PLAVIX) 75 MG tablet Take 1  tablet (75 mg) by mouth daily Dose to start tomorrow. 90 tablet 3    Continuous Blood Gluc Sensor (FREESTYLE DAVID 14 DAY SENSOR) MISC       ezetimibe (ZETIA) 10 MG tablet Take 1 tablet (10 mg) by mouth daily 90 tablet 4    fish oil-omega-3 fatty acids 1000 MG capsule Take 2 g by mouth daily      fluticasone (FLONASE) 50 MCG/ACT nasal spray Spray 2 sprays into both nostrils daily For nasal congestion 16 g 1    gabapentin (NEURONTIN) 300 MG capsule [GABAPENTIN (NEURONTIN) 300 MG CAPSULE] Take 300 mg by mouth 3 (three) times a day.             HUMALOG 100 unit/mL injection Inject Subcutaneous 3 times daily (before meals) Carb count: 3 g carb : 1 unit insulin      insulin glargine (LANTUS) 100 unit/mL injection [INSULIN GLARGINE (LANTUS) 100 UNIT/ML INJECTION] Inject 30 Units under the skin every morning.       levothyroxine (SYNTHROID/LEVOTHROID) 125 MCG tablet Take 125 mcg by mouth daily      losartan (COZAAR) 25 MG tablet Take 25 mg by mouth      metoprolol succinate ER (TOPROL XL) 50 MG 24 hr tablet Take 1 tablet (50 mg) by mouth daily 90 tablet 2    multivitamin with iron (ONE DAILY WITH IRON) Tab tablet [MULTIVITAMIN WITH IRON (ONE DAILY WITH IRON) TAB TABLET] Take 1 tablet by mouth daily.      nitroGLYcerin (NITROSTAT) 0.4 MG sublingual tablet Place 1 tablet (0.4 mg) under the tongue every 5 minutes as needed 25 tablet 3    TECHLITE PEN NEEDLES 31G X 5 MM miscellaneous       torsemide (DEMADEX) 20 MG tablet Take 40 mg by mouth daily      VITAMIN D3 2,000 unit capsule [VITAMIN D3 2,000 UNIT CAPSULE] Take 2,000 Units by mouth daily.  3    oxyCODONE (ROXICODONE) 5 MG tablet Take 2.5 mg by mouth every 6 hours as needed for moderate pain or moderate to severe pain        Allergies   Allergen Reactions    Hydrochlorothiazide Unknown    Levofloxacin Other (See Comments)     Kidney pain        Physical Examination Review of Systems   Vitals: /50 (BP Location: Left arm, Patient Position: Sitting, Cuff Size: Adult  Large)   Pulse 68   Resp 16   Ht 1.829 m (6')   Wt 101.6 kg (224 lb)   BMI 30.38 kg/m    BMI= Body mass index is 30.38 kg/m .  Wt Readings from Last 3 Encounters:   24 101.6 kg (224 lb)   24 98.4 kg (217 lb)   23 100.2 kg (221 lb)       General: pleasant male. No acute distress.   Neck: No JVD  Lungs: clear to auscultation  COR: normal rate, regular rhythm, 2/6 holosystolic murmur  Extrem: No edema. L foot in a boot and s/p distal L foot amputation.        Please refer above for cardiac ROS details.       Past History     Family History:   Family History   Problem Relation Age of Onset    Diabetes Mother     Coronary Artery Disease Father     Coronary Artery Disease Brother         Social History:   Social History     Socioeconomic History    Marital status:      Spouse name: Not on file    Number of children: Not on file    Years of education: Not on file    Highest education level: Not on file   Occupational History    Not on file   Tobacco Use    Smoking status: Former     Types: Cigarettes     Quit date: 10/10/1984     Years since quittin.5    Smokeless tobacco: Never   Vaping Use    Vaping Use: Never used   Substance and Sexual Activity    Alcohol use: Not Currently    Drug use: Never    Sexual activity: Not on file   Other Topics Concern    Not on file   Social History Narrative    Not on file     Social Determinants of Health     Financial Resource Strain: Not on file   Food Insecurity: Not on file   Transportation Needs: Not on file   Physical Activity: Not on file   Stress: Not on file   Social Connections: Not on file   Interpersonal Safety: Not on file   Housing Stability: Not on file            Lab Results    Chemistry/lipid CBC Cardiac Enzymes/BNP/TSH/INR   Lab Results   Component Value Date    CHOL 122 2023    HDL 40 2023    TRIG 92 2023    BUN 50.4 (H) 2023     2023    CO2 30 (H) 2023    Lab Results   Component Value Date     WBC 5.5 04/02/2024    HGB 10.8 (L) 04/02/2024    HCT 33.3 (L) 04/02/2024    MCV 92 04/02/2024     (L) 04/02/2024    Lab Results   Component Value Date    TROPONINI 0.13 06/02/2021    TSH 1.68 06/16/2023    INR 1.00 06/21/2023

## 2024-04-05 NOTE — PATIENT INSTRUCTIONS
It was a pleasure to meet with you today.      Below is a summary of your visit.   Continue your medications without changes  Continue to follow up with all your other doctors per their recommendations, but I can wait to see you in a year unless you have new cardiac symptoms or problems  I encourage activity as tolerated.      Please do not hesitate to call the [facility] with any questions or concerns at [phone]. You can also reach my nurse, Melissa, at [phone].    Sincerely,

## 2024-04-11 PROBLEM — I21.4 NSTEMI (NON-ST ELEVATED MYOCARDIAL INFARCTION) (H): Status: ACTIVE | Noted: 2023-01-01

## 2024-04-11 PROBLEM — G47.33 OBSTRUCTIVE SLEEP APNEA: Status: ACTIVE | Noted: 2020-04-07

## 2024-04-11 PROBLEM — N18.4 CKD (CHRONIC KIDNEY DISEASE) STAGE 4, GFR 15-29 ML/MIN (H): Status: ACTIVE | Noted: 2023-01-01

## 2024-04-11 PROBLEM — E10.22 TYPE 1 DIABETES MELLITUS WITH CHRONIC KIDNEY DISEASE (H): Chronic | Status: ACTIVE | Noted: 2023-05-09

## 2024-04-11 PROBLEM — I50.20 HEART FAILURE WITH REDUCED EJECTION FRACTION, NYHA CLASS I (H): Status: ACTIVE | Noted: 2023-01-01

## 2024-04-11 NOTE — ED TRIAGE NOTES
Dakota presents to triage with chest pain. Not feeling well since Tuesday. Yesterday was moving a saw when he had an episode of chest tightness, and then had chest tightness again around 0100 that was relieved by nitroglycerin. Tonight is having shortness of breath, some tightness, and sweating.     Triage Assessment (Adult)       Row Name 04/11/24 0335          Triage Assessment    Airway WDL WDL        Respiratory WDL    Respiratory WDL X;rhythm/pattern     Rhythm/Pattern, Respiratory shortness of breath        Skin Circulation/Temperature WDL    Skin Circulation/Temperature WDL WDL        Cardiac WDL    Cardiac WDL X;chest pain        Chest Pain Assessment    Chest Pain Location midsternal     Character tightness

## 2024-04-11 NOTE — OP NOTE
VASCULAR SURGERY OPERATIVE REPORT        LOCATION:    [facility]    Dakota Bauer   Medical Record #:  [id number]  YOB: 1950  Age:  73 year old     Date of Service: April 2, 2024    PRIMARY CARE PROVIDER: Jamal Gaffney    Preoperative diagnosis    ESRD    Postoperative diagnosis    Same       Surgeon: Edwin Lynne MD, RPVI     Assistant: Jenny Cain MD, vascular surgery resident                         Name of the procedure    Creation of  left brachiobasilic arteriovenous fistula     Anesthesia:    MAC with block    Indication for procedure:    73 old male who is scheduled for left upper extremity artery venous access creation for future dialysis.    Description of procedure:    The patient was positioned supine with the arm outstretched to near 90 . The procedure was performed under axillary block. The right upper extremity was prepped and draped in the usual sterile fashion.  The skin over the antecubital fossa was infiltrated with 0.5% lidocaine.  A 3-cm transverse skin incision was then performed over the antecubital fossa. The basilic vein was identified and encircled with a vessel loop. The vein was dissected for a segment of 5 cm. The dissection was carried as distally as possible through that incision. Attention was then directed to the brachial artery. The tendonous aponeurosis of the biceps muscle was incised. The location of the brachial artery was identified by palpation. The soft tissue over the brachial artery was then incised and the brachial artery was identified and encircled with a vessel loop.The patient was given 3,000 U of heparin intravenously.  The basilic vein was then ligated at its most distal end. Yasargil clips were applied on the brachial artery and a 7-mm incision in the anterior wall of the brachial artery was performed. The cephalic vein was then gently curved and allowed to lie over the arteriotomy. The end of the vein was spatulated to  match the size of the arteriotomy.The anastomosis was then performed using a 6-0 prolene running suture. At the completion of the suture line, the brachial artery was forward-flushed and then allowed to backbleed. The basilic vein was also allowed to backbleed. The anastomosis was irrigated with heparinized solution. The sutures were then tied and the suture line evaluated for hemostasis, which was adequate.There was evidence of an excellent thrill in the cephalic vein. There was evidence of strong palpable pulses in the brachial, radial, and ulnar arteries at the wrist. There was no evidence of any kinks.The wound was then irrigated with antibiotic solution.The subcutaneous tissue was closed with 3-0 Vicryl and the skin closed with 4-0 subcuticular monocryl sutures. There was evidence of an excellent thrill in the cephalic vein after wound closure.  The patient tolerated the procedure well and was taken to the postanesthesia care unit in stable condition.    Estimated blood loss: 10 cc    Specimens: none     Complications: None    Edwin Lynne MD,  [facility]  VASCULAR SURGERY

## 2024-04-11 NOTE — CONSULTS
RENAL CONSULTATION:     Date of Consultation:  4/11/2024    Requesting Physician: Dr Moore  Reason for Consult:  CKD stage 4    Assessment/ Recommendations:  CKD stage 4: Baseline of 3.3 most recently, Creatinine of 3.5 on admission.  Follow with Dr Bhatia. Noted gradual progression o CKD over the last two years.   Daily labs  No acute indication for dialysis.   Had AVF placed last week - not ready to be used  Discussed risk for KARLA with patient. Would not delay or hold procedure if indication with patient already planning for HD in the near future.       2. NSTEMI: per cards. On heparin. Risk for KARLA discussed with patient.   3. Type 1 DM : per primary.   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   5. Anemia: monitor. Check iron studies.   6. HTN: continue PTA mds.   7. CHF: hypervolemic. ongoing diuresis.       Fritz Salvador,   [facility]  [phone] (off)       History of present illness:  MR Bauer is a 74 y/o male with PMH of CKD, CAD, CHF, type 1 Dm, HLD, HTN, SHERRELL. Patient presents with chest pain. Notes pain improved with nitroclycerin. Patient also noted sob and diaphoresis. Patient presented to the ED and found to have elevated troponin. Started on ASA and clopidogrel, IV heparin. Volume up on exam. Started on IV lasix. Patient currently reports chest pain resolved Currently. Had not had this type of chest pain/sob before. Patient had AVF placed last week. Was to seen Dr Bhatia today. Patient is planning for HD in the future. Discussed risk of KARLA if angio needed. Answered all questions.      Past Medical History:   Diagnosis Date    Anxiety     Chronic kidney disease     Coronary artery disease     Diabetes mellitus (H)     Diabetic ulcer of toe (H)     Disorder of thyroid     Hyperlipidemia     Hypertension     Hypothyroidism     SHERRELL (obstructive sleep apnea)     no cpap       Drug and lactation database from the [place] [employer] of  Medicine:  http://toxnet.nlm.nih.gov/cgi-bin/sis/htmlgen?LACT      Allergies   Allergen Reactions    Hydrochlorothiazide Other (See Comments)     Hyponatremia     Levofloxacin Other (See Comments)     Kidney pain       Social History     Socioeconomic History    Marital status:      Spouse name: None    Number of children: None    Years of education: None    Highest education level: None   Tobacco Use    Smoking status: Former     Current packs/day: 0.00     Types: Cigarettes     Quit date: 10/10/1984     Years since quittin.5    Smokeless tobacco: Never   Vaping Use    Vaping status: Never Used   Substance and Sexual Activity    Alcohol use: Not Currently    Drug use: Never     Social Determinants of Health      Received from [employer]    Financial Resource Strain    Received from [employer]    Social Connections       Family History   Problem Relation Age of Onset    Diabetes Mother     Coronary Artery Disease Father     Coronary Artery Disease Brother        Review of Systems:The remainder of 10 point review of systems is negative except as noted in HPI above.     /56   Pulse 59   Temp 98.2  F (36.8  C) (Oral)   Resp 20   Wt 101.6 kg (224 lb)   SpO2 95%   BMI 30.38 kg/m      Intake/Output Summary (Last 24 hours) at 2024 1147  Last data filed at 2024 1030  Gross per 24 hour   Intake 48.6 ml   Output --   Net 48.6 ml     Physical Exam:   GENERAL: calm and comfortable, alert  EYES: No scleral icterus, conjunctiva clear  ENT: Hearing normal,  RESP: Anterior clear.   CV: RRR, no murmurs. ++ leg edema.    GI: Active BS, Soft, NT/ND  Musculoskeletal: Normal muscle bulk/ tone; No gross joint abnormalities  SKIN: No rash, warm/ dry  PSYCH:  Appropriate mood and affect  Let AVF- incision clean and dry.     LABS:  Most Recent 3  CBC's:  Recent Labs   Lab Test 04/11/24  0347 04/02/24  1215 12/05/23  1005   WBC 6.6 5.5 5.3   HGB 10.9* 10.8* 11.7*   MCV 93 92 92   * 130* 146*     Most Recent 3 BMP's:  Recent Labs   Lab Test 04/11/24  1107 04/11/24  0742 04/11/24  0347 04/02/24  1554 04/02/24  1215 04/02/24  1117 12/05/23  1005 08/22/23  1127   NA  --   --  134*  --   --   --  136 135*   POTASSIUM  --   --  4.7  --  4.3  --  4.1 4.0   CHLORIDE  --   --  98  --   --   --  98 97*   CO2  --   --  25  --   --   --  30* 24   BUN  --   --  54.0*  --   --   --  50.4* 42.6*   CR  --   --  3.52*  --  2.86*  --  3.05* 3.21*   ANIONGAP  --   --  11  --   --   --  8 14   SVITLANA  --   --  8.5*  --   --   --  9.4 9.1   * 293* 206*   < > 256*   < > 192* 152*    < > = values in this interval not displayed.     Most Recent 2 LFT's:  Recent Labs   Lab Test 08/01/23  0439 07/31/23  0429   AST 59* 63*   ALT 45 45   ALKPHOS 49 50   BILITOTAL 0.4 0.4     Most Recent 3 INR's:  Recent Labs   Lab Test 06/21/23  0710   INR 1.00     Most Recent 3 Creatinines:  Recent Labs   Lab Test 04/11/24  0347 04/02/24  1215 12/05/23  1005   CR 3.52* 2.86* 3.05*     Most Recent 3 Hemoglobins:  Recent Labs   Lab Test 04/11/24  0347 04/02/24  1215 12/05/23  1005   HGB 10.9* 10.8* 11.7*     Most Recent 3 Troponin's:No lab results found.  Most Recent 3 BNP's:  Recent Labs   Lab Test 04/11/24 0347 07/27/23  1222   NTBNPI 7,812* 4,719*         All lab data was reviewed at 11:47 AM

## 2024-04-11 NOTE — PHARMACY-ADMISSION MEDICATION HISTORY
Pharmacist Admission Medication History    Admission medication history is complete. The information provided in this note is only as accurate as the sources available at the time of the update.    Information Source(s): Patient, Clinic records, and CareEverywhere/SureScripts via in-person    Pertinent Information: none    Changes made to PTA medication list:  Added: doxycyline   Deleted: oxycodone   Changed: Flonase, Humalog to pen, Lantus to pen, fish oil    Allergies reviewed with patient and updates made in EHR: yes    Medication History Completed By: César Gaming [facility] 4/11/2024 8:02 AM    PTA Med List   Medication Sig Last Dose    aspirin 81 MG EC tablet Take 1 tablet (81 mg) by mouth daily Start tomorrow. 4/10/2024 at am    atorvastatin (LIPITOR) 40 MG tablet Take 40 mg by mouth daily 4/10/2024 at HS    clopidogrel (PLAVIX) 75 MG tablet Take 1 tablet (75 mg) by mouth daily Dose to start tomorrow. 4/10/2024 at am    Continuous Blood Gluc Sensor (FREESTYLE DAVID 14 DAY SENSOR) MISC      doxycycline monohydrate (ADOXA) 100 MG tablet Take 100 mg by mouth 2 times daily Ulcer on bottom of left foot 4/10/2024 at pm    ezetimibe (ZETIA) 10 MG tablet Take 1 tablet (10 mg) by mouth daily 4/10/2024 at HS    fish oil-omega-3 fatty acids 1000 MG capsule Take 1 g by mouth daily 4/10/2024 at am    fluticasone (FLONASE) 50 MCG/ACT nasal spray Spray 2 sprays into both nostrils 2 times daily 4/10/2024 at PM    gabapentin (NEURONTIN) 300 MG capsule [GABAPENTIN (NEURONTIN) 300 MG CAPSULE] Take 300 mg by mouth 3 (three) times a day.        4/10/2024 at hs    insulin glargine (LANTUS PEN) 100 UNIT/ML pen Inject 30 Units Subcutaneous every morning 4/10/2024    insulin lispro (HUMALOG KWIKPEN) 100 UNIT/ML (1 unit dial) KWIKPEN Inject Subcutaneous 3 times daily (before meals) Carb count: 3 g carb : 1 unit insulin  Maximum 25 units per meal; 75 units per day 4/10/2024 at pm    levothyroxine (SYNTHROID/LEVOTHROID) 125 MCG tablet  Take 125 mcg by mouth daily 4/10/2024 at am    losartan (COZAAR) 25 MG tablet Take 25 mg by mouth daily 4/10/2024 at am    metoprolol succinate ER (TOPROL XL) 50 MG 24 hr tablet Take 1 tablet (50 mg) by mouth daily 4/10/2024 at PM    multivitamin with iron (ONE DAILY WITH IRON) Tab tablet [MULTIVITAMIN WITH IRON (ONE DAILY WITH IRON) TAB TABLET] Take 1 tablet by mouth daily. 4/10/2024 at am    nitroGLYcerin (NITROSTAT) 0.4 MG sublingual tablet Place 1 tablet (0.4 mg) under the tongue every 5 minutes as needed     torsemide (DEMADEX) 20 MG tablet Take 40 mg by mouth daily 4/10/2024 at am    VITAMIN D3 2,000 unit capsule [VITAMIN D3 2,000 UNIT CAPSULE] Take 2,000 Units by mouth daily. 4/10/2024 at am

## 2024-04-11 NOTE — H&P
[facility]    History and Physical - Hospitalist Service       Date of Admission:  4/11/2024    Assessment & Plan      Dakota Bauer is a 73 year old male admitted on 4/11/2024. He came to the ED for evaluation of chest tightness, not feeling well for 2 days after lifting a saw with associated shortness of breath    #Non-STEMI  #Coronary artery disease of native arteries and grafts  -High-sensitivity troponin T 103  -ECG reviewed: Sinus rhythm with first-degree AV block at 86 bpm, new ST wave abnormalities lateral leads when compared to ECG from 9/6/2023  -Known  of proximal LAD and ostial circumflex; status post stent to SVG to distal diagonal/lateral wall on 7/27/2023  -Treated with aspirin 162 mg and clopidogrel 75 mg in the ED  -IV heparin  -Nitroglycerin paste  -Continue PTA metoprolol  -Cardiology consult    #Acute on chronic heart failure with reduced ejection fraction  -Volume overloaded on exam: Pitting edema and basilar crackles  -Chest x-ray showed basilar interstitial infiltrates asymmetrically greater on the right; fluid overload versus possible infectious process in the right lung  -Check procalcitonin, hold off antibiotics for now given afebrile and normal WBC  -IV furosemide 80 mg every 8 hours  -Monitor daily weights, I's and O's    #Chronic kidney disease stage IV  -Avoid nephrotoxins  -Nephrology consult    #Type 1 diabetes mellitus with polyneuropathy  -Lantus 30 units x 1 now  -Medium insulin resistance scale every 4 hours  -Reconcile PTA medications    #Left foot wound, present on admission  -WOC consult    #Hyponatremia  -Secondary to volume overload    #Chronic anemia  #Chronic mild thrombocytopenia  -Stable cell counts, monitor    #Drug-induced platelet defect  -On PTA aspirin and clopidogrel    #Essential hypertension  -Monitor blood pressure, reconcile PTA medications  -IV hydralazine as needed    #Hyperlipidemia  #Hypothyroidism  #Neuropathy  #Peripheral  vascular disease  #Anxiety  -Reconcile PTA medications    #Obesity  -BMI 30.38        Diet: NPO for Medical/Clinical Reasons Except for: Meds    DVT Prophylaxis: Pneumatic Compression Devices  Mohan Catheter: Not present  Lines: None     Cardiac Monitoring: ACTIVE order. Indication: Acute decompensated heart failure (48 hours)  Code Status: Full Code          Disposition Plan     Medically Ready for Discharge: Anticipated in 2-4 Days           Ash Fernandez MD  Hospitalist Service  [facility]  Securely message with [employer] (more info)  Text page via [employer] Paging/Directory     ______________________________________________________________________    Chief Complaint   Chest pain, shortness of breath    History is obtained from the patient, electronic health record, emergency department physician, and patient's spouse    History of Present Illness   Dakota Bauer is a 73 year old male who came to the ED for evaluation of chest pain and shortness of breath.  Past medical history of CAD, CABG, HFrEF, moderate mitral regurgitation, PVD, CVA, type 1 diabetes, CKD 4, hypertension, hyperlipidemia, hypothyroidism, SHERRELL, anxiety, anemia, thrombocytopenia, left foot wound.  Patient had a stent in an occluded SVG on 7/27/2023.  He lives at home with wife, denies tobacco, alcohol or drugs.  He had some chest pain after lifting a saw 2 days prior to arrival.  He woke up with substernal chest pressure that improved with nitroglycerin.  However, he became diaphoretic and had shortness of breath.  He denied nausea, palpitations, fevers or chills.  Symptoms are similar to previous heart injury.      Past Medical History    Past Medical History:   Diagnosis Date    Anxiety     Chronic kidney disease     Coronary artery disease     Diabetes mellitus (H)     Diabetic ulcer of toe (H)     Disorder of thyroid     Hyperlipidemia     Hypertension     Hypothyroidism     SHERRELL (obstructive sleep apnea)     no cpap        Past Surgical History   Past Surgical History:   Procedure Laterality Date    AMPUTATE TOE(S) Left     BYPASS GRAFT ARTERY CORONARY  01/01/2005    [place] with Dr. Holter    CARDIAC CATHETERIZATION  01/01/2005    CARDIAC CATHETERIZATION  11/25/2016    no intervention    CATARACT EXTRACTION      CERVICAL DISC SURGERY  01/01/2014    CREATE FISTULA ARTERIOVENOUS UPPER EXTREMITY Left 4/2/2024    Procedure: CREATION, ARTERIOVENOUS FISTULA, UPPER EXTREMITY;  Surgeon: Edwin Lynne MD;  Location: [facility] Main OR    CV ANGIOGRAM CORONARY GRAFT N/A 07/27/2023    Procedure: Coronary Angiogram Graft;  Surgeon: Maikel Tripathi MD;  Location: [facility] CATH LAB CV    CV PCI N/A 07/27/2023    Procedure: Percutaneous Coronary Intervention;  Surgeon: Maikel Tripathi MD;  Location: [facility] CATH LAB CV    IR LOWER EXTREMITY ANGIOGRAM LEFT  04/07/2023    IR LOWER EXTREMITY ANGIOGRAM LEFT  04/08/2020    IR LOWER EXTREMITY ANGIOGRAM LEFT  06/21/2023       Prior to Admission Medications   Prior to Admission Medications   Prescriptions Last Dose Informant Patient Reported? Taking?   Continuous Blood Gluc Sensor (FREESTYLE DAVID 14 DAY SENSOR) MISC   Yes No   HUMALOG 100 unit/mL injection   Yes No   Sig: Inject Subcutaneous 3 times daily (before meals) Carb count: 3 g carb : 1 unit insulin   TECHLITE PEN NEEDLES 31G X 5 MM miscellaneous   Yes No   VITAMIN D3 2,000 unit capsule   Yes No   Sig: [VITAMIN D3 2,000 UNIT CAPSULE] Take 2,000 Units by mouth daily.   aspirin 81 MG EC tablet   No No   Sig: Take 1 tablet (81 mg) by mouth daily Start tomorrow.   atorvastatin (LIPITOR) 40 MG tablet   Yes No   Sig: Take 40 mg by mouth daily   clopidogrel (PLAVIX) 75 MG tablet   No No   Sig: Take 1 tablet (75 mg) by mouth daily Dose to start tomorrow.   ezetimibe (ZETIA) 10 MG tablet   No No   Sig: Take 1 tablet (10 mg) by mouth daily   fish oil-omega-3 fatty acids 1000 MG capsule   Yes No   Sig: Take 2 g by mouth daily    fluticasone (FLONASE) 50 MCG/ACT nasal spray   No No   Sig: Spray 2 sprays into both nostrils daily For nasal congestion   gabapentin (NEURONTIN) 300 MG capsule   Yes No   Sig: [GABAPENTIN (NEURONTIN) 300 MG CAPSULE] Take 300 mg by mouth 3 (three) times a day.          insulin glargine (LANTUS) 100 unit/mL injection   Yes No   Sig: [INSULIN GLARGINE (LANTUS) 100 UNIT/ML INJECTION] Inject 30 Units under the skin every morning.    levothyroxine (SYNTHROID/LEVOTHROID) 125 MCG tablet   Yes No   Sig: Take 125 mcg by mouth daily   losartan (COZAAR) 25 MG tablet   Yes No   Sig: Take 25 mg by mouth   metoprolol succinate ER (TOPROL XL) 50 MG 24 hr tablet   No No   Sig: Take 1 tablet (50 mg) by mouth daily   multivitamin with iron (ONE DAILY WITH IRON) Tab tablet   Yes No   Sig: [MULTIVITAMIN WITH IRON (ONE DAILY WITH IRON) TAB TABLET] Take 1 tablet by mouth daily.   nitroGLYcerin (NITROSTAT) 0.4 MG sublingual tablet   No No   Sig: Place 1 tablet (0.4 mg) under the tongue every 5 minutes as needed   oxyCODONE (ROXICODONE) 5 MG tablet   Yes No   Sig: Take 2.5 mg by mouth every 6 hours as needed for moderate pain or moderate to severe pain   torsemide (DEMADEX) 20 MG tablet   Yes No   Sig: Take 40 mg by mouth daily      Facility-Administered Medications: None           Physical Exam   Vital Signs: Temp: 98.3  F (36.8  C) Temp src: Oral BP: 138/65 Pulse: 74   Resp: 23 SpO2: 92 % O2 Device: Nasal cannula Oxygen Delivery: 1 LPM  Weight: 224 lbs 0 oz    Constitutional: mild distress  Respiratory: tachypneic and crackles right base and left base  Cardiovascular: regular rate and rhythm  GI: normal bowel sounds  Skin: no bruising or bleeding  Musculoskeletal: Left forefoot amputation, bilateral pitting edema  Neurologic: Mental Status Exam:  Level of Alertness:   awake  Orientation:   person, place, time    Medical Decision Making       MANAGEMENT DISCUSSED with the following over the past 24 hours: Patient and wife       Data      I have personally reviewed the following data over the past 24 hrs:    6.6  \   10.9 (L)   / 149 (L)     134 (L) 98 54.0 (H) /  206 (H)   4.7 25 3.52 (H) \     Trop: 103 (HH) BNP: N/A       Imaging results reviewed over the past 24 hrs:   Recent Results (from the past 24 hour(s))   XR Chest Port 1 View    Narrative    EXAM: XR CHEST PORT 1 VIEW  LOCATION: [facility]  DATE: 4/11/2024    INDICATION: cp  COMPARISON: 07/27/2023      Impression    IMPRESSION: Cardiac silhouette at upper limits normal status post CABG. Basilar interstitial infiltrates, though asymmetrically greater on the right. Findings could reflect combination of fluid overload and possible infectious process in the right lung   base. No visible pneumothorax.

## 2024-04-11 NOTE — CONSULTS
We have been asked to see Dakota Bauer at [facility] by Dr. Ash Fernandez for evaluation of chest pain.     Impression and Plan     Assessment:  Non-ST elevation myocardial infarction. Minimal troponin elevation with no new regional wall motion abnormalities seen on transthoracic echocardiogram 4/11/2023 and currently pain-free which suggests a lower risk presentation.  Coronary artery disease with a history of three-vessel coronary artery bypass grafting (left internal mammary artery to the left anterior descending artery, saphenous venous grafts to the 1st diagonal and 1st obtuse marginal branch arteries) 10/24/2005 and drug-eluting stenting x1 to the saphenous venous graft to the 1st obtuse marginal artery 7/27/2023. His other two bypass grafts were noted to be patents and no significant disease was present in the native right coronary artery at the time of his last angiogram 7/27/2023.  Chronic congestive heart failure with preserved left ventricular ejection fraction of 50-55%. He may be a bit hypervolemic.  Peripheral arterial disease status post balloon angioplasty of the left pedal arch, left dorsalis pedis artery and left anterior tibial artery 6/21/2023. Lower extremity arterial ultrasound 8/7/2023 suggested high-grade superficial femoral artery stenosis.  Multifocal cerebrovascular accident 7/27/2023 felt to be embolic from cardiac catheterization.  Moderate asymptomatic right internal carotid artery stenosis noted on carotid ultrasound 7/30/2023.  Essential hypertension. Elevated.  Hyperlipidemia. Last LDL 64 mg/dL.  Insulin-dependent diabetes mellitus type 2. Last hemoglobin A1c 7.0%.  Obstructive sleep apnea not on CPAP.  End stage renal disease not yet on hemodialysis status post creation of a left brachial basilic arteriovenous fistula 4/2/2024.  Morbid obesity with body mass index 30.38 kg/m .    Plan:  We discussed invasive coronary angiography but the patient is hesitant to do this as  he does not want to initiate hemodialysis any sooner than necessary and is also wary of sustaining another stroke which happened after his last cardiac catheterization.  We will first try a conservative strategy of medical therapy and performing a pharmacologic nuclear stress test tomorrow 4/12/2024 to assess for high-risk ischemic features. If he has recurrent symptoms or high-risk features on stress testing, I would then strongly recommend invasive coronary angiography  Continue intravenous heparin infusion  Replace topical nitroglycerin with oral isosorbide mononitrate 30 mg daily  Metoprolol succinate 50 mg daily  Continue dual antiplatelet therapy with aspirin 81 mg and clopidogrel 75 mg daily for at least another 12 months (end date 4/11/2025)  Agree with intravenous furosemide    Primary Cardiologist: Dr. Geronimo Ma    Clinically Significant Risk Factors Present on Admission     # Drug Induced Platelet Defect: home medication list includes an antiplatelet medication  # Acute Kidney Injury, unspecified: based on a >150% or 0.3 mg/dL increase in last creatinine compared to past 90 day average, will monitor renal function  # Hypertension: Noted on problem list     # DMII: A1C = 7.0 % (Ref range: <5.7 %) within past 6 months   # Obesity: Estimated body mass index is 30.38 kg/m  as calculated from the following:    Height as of 4/5/24: 1.829 m (6').    Weight as of this encounter: 101.6 kg (224 lb).        # History of CABG: noted on surgical history      History of Present Illness      Mr. Dakota Bauer is a 73 year old male with a history of CAD s/p 3V CABG in 2005 then BINA to an SVG in 2023, HFpEF, PAD, IDDM2 and ESRD not yet on HD admitted this morning with chest pain. 2 days ago, he did some heavy lifting and afterwards has felt chest tightness radiating to his back along with diaphoresis. He took a sublingual nitroglycerin which helped but he did have some recurrence of symptoms prompting him to come to  the ED.    Vital signs have been stable since arrival to the ED. ECG showed SR with new lateral ST depressions. Hs-troponin was mildly elevated at 103 ->192. NT-proBNP was elevated at 7812. CXR suggested fluid overload. He was started on IV heparin and topical nitroglycerin and currently denies symptoms. No light headedness/dizziness, pre-syncope, syncope, lower extremity swelling, palpitations, paroxysmal nocturnal dyspnea (PND), or orthopnea.    He has not yet started dialysis but did have a left arm AVF placed last week which is not yet ready for use.     Review of Systems:  Further review of systems is otherwise negative/noncontributory (based on review of medical record (admission H&P) and 13 point review of systems reviewed. Pertinent positives noted).    Cardiac Diagnostics     ECG 4/11/2024 (personally reviewed and interpreted): SR, anterolateral ST depressions, IVCD    Telemetry (personally reviewed): SR, no arrhythmias    Holter monitor 6/9/2023 (report reviewed):  Holter monitoring (duration 24hrs).   Predominant underlying rhythm was sinus rhythm, 48 to 75bpm, average 57bpm.   No nonsustained or sustained tachyarrhythmias.   No atrial fibrillation.   There were no pauses of greater than 3 seconds.   Rare supraventricular ectopic beats (<1%).   Rare premature ventricular contractions (<1%).   Symptom triggers none.    Echocardiogram 4/11/2024 (results reviewed):   The left ventricle is normal in size.  There is mild concentric left ventricular hypertrophy.  Grade II or moderate diastolic dysfunction.  The visual ejection fraction is 50-55%.  There is hypokinesis in inferolateral wall.  Normal right ventricle size and systolic function.  The left atrium is moderately dilated.  No obvious valvular disease.  When compared to previous study on 7-, there is no significant interval change.    SHAYLEE 7/31/2023 (report reviewed):  The left ventricle is normal in size.  The visual ejection fraction is  50-55%.  No regional wall motion abnormalities noted.  Normal right ventricle size and systolic function.  There is no color Doppler evidence of a PFO.  There is moderate (2+) mitral regurgitation.  The right ventricular systolic pressure is approximated at 46mmHg plus the right atrial pressure.  No valvular vegetations noted.    Lower extremity arterial ultrasound 8/7/2023 (report reviewed):  Right Lower Extremity: Patent aorto iliac system. High grade superficial femoral artery stenosis. Low flow velocities with monophasic waveforms in the infrapopliteal segment most likely a combination of femoral-popliteal inflow disease and native tibial vessel disease.  Left Lower Extremity: Patent left femoral-popliteal segment with transition to monophasic waveforms in the tibial vasculature.  Monophasic waveforms are noted in the dorsalis pedis and posterior tibial arteries.  Low flow velocities are noted in the posterior tibial artery suggesting it is most likely being filled by peroneal collaterals.    Carotid ultrasound 7/30/2023 (report reviewed):  1.  Right carotid: Moderate plaque remission. Progressive ICA velocity elevation consistent with 50-69% stenosis in the right internal carotid artery. Significant external carotid stenosis.  2.  Left carotid: Mild plaque formation, velocities consistent with less than 50% stenosis in the left internal carotid artery. Significant, progressive external carotid carotid stenosis.  3.  Flow within the vertebral arteries is antegrade.    Cardiac Cath 7/27/2023 (results reviewed):   1.  Severe diffuse disease of native vessels with small caliber vessels distally.  2.  Chronic occlusion of native LAD and native left circumflex.  3.  Left main fills small narrow caliber ramus branch only  4.  Right coronary artery has diffuse mild to moderate disease but no severe stenoses.  5.  Patent saphenous vein graft to high distal lateral wall branch (? Diagonal or marginal branch) which is  diffusely diseased. Anastomosis appears normal.  6.  Occluded saphenous vein graft to distal diagonal/lateral wall branches, most likely the acute infarct vessel.  7.  Patent BENITEZ to LAD. Anastomosis appears normal.  8.  Successful PCI saphenous vein graft to distal diagonal/lateral with drug-eluting stent implant x1  9.  Recommend continuing Plavix therapy indefinitely, risk factor management for ASCVD.    Cardiac Stress Testing 6/9/2023 (results reviewed):     The nuclear stress test is abnormal.    There is a small area of moderate ischemia in the mid to basal inferolateral segment(s) of the left ventricle.    There is also a small area of a moderate degree of nontransmural infarction in the apical segment(s) of the left ventricle.    The left ventricular ejection fraction at stress is 67%.    The patient is at a low risk of future cardiac ischemic events.    A prior study was conducted on 6/16/2021.  This study has no change when compared with the prior study.    Medical History  Surgical History Family History Social History   Past Medical History:   Diagnosis Date    Anxiety     Chronic kidney disease     Coronary artery disease     Diabetes mellitus (H)     Diabetic ulcer of toe (H)     Disorder of thyroid     Hyperlipidemia     Hypertension     Hypothyroidism     SHERRELL (obstructive sleep apnea)     no cpap     Past Surgical History:   Procedure Laterality Date    AMPUTATE TOE(S) Left     BYPASS GRAFT ARTERY CORONARY  01/01/2005    [place] with Dr. Holter    CARDIAC CATHETERIZATION  01/01/2005    CARDIAC CATHETERIZATION  11/25/2016    no intervention    CATARACT EXTRACTION      CERVICAL DISC SURGERY  01/01/2014    CREATE FISTULA ARTERIOVENOUS UPPER EXTREMITY Left 4/2/2024    Procedure: CREATION, ARTERIOVENOUS FISTULA, UPPER EXTREMITY;  Surgeon: Edwin Lynne MD;  [place] [facility] [place]    CV ANGIOGRAM CORONARY GRAFT N/A 07/27/2023    Procedure: Coronary Angiogram Graft;  Surgeon: Bhumi  Maikel DICKERSON MD;  Location: [facility] CATH LAB CV    CV PCI N/A 2023    Procedure: Percutaneous Coronary Intervention;  Surgeon: Maikel Tripathi MD;  Location: [facility] CATH LAB CV    IR LOWER EXTREMITY ANGIOGRAM LEFT  2023    IR LOWER EXTREMITY ANGIOGRAM LEFT  2020    IR LOWER EXTREMITY ANGIOGRAM LEFT  2023     Family History   Problem Relation Age of Onset    Diabetes Mother     Coronary Artery Disease Father     Coronary Artery Disease Brother            Social History     Socioeconomic History    Marital status:      Spouse name: Not on file    Number of children: Not on file    Years of education: Not on file    Highest education level: Not on file   Occupational History    Not on file   Tobacco Use    Smoking status: Former     Current packs/day: 0.00     Types: Cigarettes     Quit date: 10/10/1984     Years since quittin.5    Smokeless tobacco: Never   Vaping Use    Vaping status: Never Used   Substance and Sexual Activity    Alcohol use: Not Currently    Drug use: Never    Sexual activity: Not on file   Other Topics Concern    Not on file   Social History Narrative    Not on file     Social Determinants of Health     Financial Resource Strain: High Risk (2021)    [facility]    Financial Resource Strain     Difficulty of Paying Living Expenses: Not on file     Difficulty of Paying Living Expenses: Not on file   Food Insecurity: Not on file   Transportation Needs: Not on file   Physical Activity: Not on file   Stress: Not on file   Social Connections: Unknown (2021)    [email or web address], [facility]    Social Connections     Frequency of Communication with Friends and Family: Not on file   Interpersonal Safety: Not on file   Housing Stability: Not on file             Physical Examination   VITALS:  BP (!) 150/67   Pulse 68   Temp 98.2  F (36.8  C) (Oral)   Resp 21   Wt 101.6 kg (224 lb)   SpO2 96%   BMI 30.38 kg/m    BMI: Body mass index is 30.38 kg/m .  Wt Readings from Last 3 Encounters:   04/11/24 101.6 kg (224 lb)   04/05/24 101.6 kg (224 lb)   04/02/24 98.4 kg (217 lb)       Intake/Output Summary (Last 24 hours) at 4/11/2024 1458  Last data filed at 4/11/2024 1236  Gross per 24 hour   Intake 71.6 ml   Output --   Net 71.6 ml       General Appearance:  Alert, cooperative, no distress, appears stated age   Head:  Normocephalic, without obvious abnormality, atraumatic   Eyes:  PERRL, conjunctiva/corneas clear, EOM's intact   Ears:  Normal external ear canals bilaterally   Nose: Nares normal, septum midline, no drainage   Throat: Lips, mucosa, and tongue normal; teeth and gums normal   Neck: Supple, symmetrical, trachea midline, no adenopathy, no carotid bruit or JVD   Back:   Symmetric, no abnormal curvature, ROM normal   Lungs:   Diminished breath sounds, respirations unlabored   Chest Wall:  No tenderness or deformity   Heart:  Regular rate and rhythm, S1, S2 normal,no murmur, rub or gallop   Abdomen:   Soft, non-tender, bowel sounds active all four quadrants,  no masses, no organomegaly   Extremities: Extremities normal, atraumatic, no cyanosis or edema   Skin: Skin color, texture, turgor normal, no rashes or lesions   Psychiatric: Normal affect, pleasant and cooperative   Neurologic: Alert and oriented X 3, Moves all 4 extremities            Imaging      CXR 4/11/2024 (report reviewed):  Cardiac silhouette at upper limits normal status post CABG.   Basilar interstitial infiltrates, though asymmetrically greater on the right.   Findings could reflect combination of fluid overload and possible infectious process in the right lung base.   No visible pneumothorax.    MRI brain 7/27/2023 (report reviewed):  1.  Small foci of acute to subacute infarction involving the dorsal left pontomedullary junction  and left frontal lobe.  2.  Questionable punctate focus of infarction in the right para midline cerebellum seen on coronal diffusion weighted imaging only.  3.  Mild age-related changes as above.     Lab Results    Chemistry/lipid CBC Cardiac Enzymes/BNP/TSH/INR   Recent Labs   Lab Test 12/08/23  0934   CHOL 122   HDL 40   LDL 64   TRIG 92     Recent Labs   Lab Test 12/08/23  0934 06/16/23  0909 12/16/22  0919   LDL 64 47 60     Recent Labs   Lab Test 04/11/24  1107 04/11/24  0742 04/11/24  0347   NA  --   --  134*   POTASSIUM  --   --  4.7   CHLORIDE  --   --  98   CO2  --   --  25   *   < > 206*   BUN  --   --  54.0*   CR  --   --  3.52*   GFRESTIMATED  --   --  18*   SVITLANA  --   --  8.5*    < > = values in this interval not displayed.     Recent Labs   Lab Test 04/11/24  0347 04/02/24  1215 12/05/23  1005   CR 3.52* 2.86* 3.05*     Recent Labs   Lab Test 04/11/24  0347 07/27/23  1222   A1C 7.0* 7.4*          Recent Labs   Lab Test 04/11/24 0347   WBC 6.6   HGB 10.9*   HCT 33.7*   MCV 93   *     Recent Labs   Lab Test 04/11/24  0347 04/02/24  1215 12/05/23  1005   HGB 10.9* 10.8* 11.7*    Recent Labs   Lab Test 06/02/21  1016   TROPONINI 0.13     Recent Labs   Lab Test 04/11/24  0347 07/27/23  1222   NTBNPI 7,812* 4,719*     Recent Labs   Lab Test 06/16/23  0909   TSH 1.68     Recent Labs   Lab Test 06/21/23  0710   INR 1.00           Current Inpatient Scheduled Medications   Scheduled Meds:  Current Facility-Administered Medications   Medication Dose Route Frequency Provider Last Rate Last Admin    [START ON 4/12/2024] aspirin (ASA) chewable tablet 81 mg  81 mg Oral Daily Ash Fernandez MD        furosemide (LASIX) injection 80 mg  80 mg Intravenous Q8H Ash Fernandez MD   80 mg at 04/11/24 0652    insulin aspart (NovoLOG) injection (RAPID ACTING)  1-7 Units Subcutaneous Q4H Ash Fernandez MD   4 Units at 04/11/24 1115    nitroGLYcerin (NITRO-BID) 2 % ointment 15 mg  1 inch Transdermal  Once Lake Stoner MD   15 mg at 04/11/24 0640    sodium chloride (PF) 0.9% PF flush 3 mL  3 mL Intracatheter Q8H Ash Fernandez MD         Current Facility-Administered Medications   Medication Dose Route Frequency Provider Last Rate Last Admin    Continuing ACE inhibitor/ARB/ARNI from home medication list OR ACE inhibitor/ARB order already placed during this visit   Does not apply DOES NOT GO TO Ash Samano MD        Continuing beta blocker from home medication list OR beta blocker order already placed during this visit   Does not apply DOES NOT GO TO Ash Samano MD        Continuing statin from home medication list OR statin order already placed during this visit   Does not apply DOES NOT GO TO Ash Samano MD        heparin 25,000 units in 0.45% NaCl 250 mL ANTICOAGULANT infusion  0-5,000 Units/hr Intravenous Continuous Owen Renteria MD 10 mL/hr at 04/11/24 1340 1,000 Units/hr at 04/11/24 1340    medication instruction   Does not apply Continuous PRAsh Koch MD        Patient is already receiving anticoagulation with heparin, enoxaparin (LOVENOX), warfarin (COUMADIN)  or other anticoagulant medication   Does not apply Continuous PRN Ash Fernandez MD           Current Outpatient Medications   Medication Sig Dispense Refill    aspirin 81 MG EC tablet Take 1 tablet (81 mg) by mouth daily Start tomorrow. 30 tablet 3    atorvastatin (LIPITOR) 40 MG tablet Take 40 mg by mouth daily      clopidogrel (PLAVIX) 75 MG tablet Take 1 tablet (75 mg) by mouth daily Dose to start tomorrow. 90 tablet 3    Continuous Blood Gluc Sensor (FREESTYLE DAVID 14 DAY SENSOR) MISC       doxycycline monohydrate (ADOXA) 100 MG tablet Take 100 mg by mouth 2 times daily Ulcer on bottom of left foot      ezetimibe (ZETIA) 10 MG tablet Take 1 tablet (10 mg) by mouth daily 90 tablet 4    fish oil-omega-3 fatty acids 1000 MG capsule Take 1 g by mouth daily      fluticasone (FLONASE)  50 MCG/ACT nasal spray Spray 2 sprays into both nostrils 2 times daily      gabapentin (NEURONTIN) 300 MG capsule [GABAPENTIN (NEURONTIN) 300 MG CAPSULE] Take 300 mg by mouth 3 (three) times a day.             insulin glargine (LANTUS PEN) 100 UNIT/ML pen Inject 30 Units Subcutaneous every morning      insulin lispro (HUMALOG KWIKPEN) 100 UNIT/ML (1 unit dial) KWIKPEN Inject Subcutaneous 3 times daily (before meals) Carb count: 3 g carb : 1 unit insulin  Maximum 25 units per meal; 75 units per day      levothyroxine (SYNTHROID/LEVOTHROID) 125 MCG tablet Take 125 mcg by mouth daily      losartan (COZAAR) 25 MG tablet Take 25 mg by mouth daily      metoprolol succinate ER (TOPROL XL) 50 MG 24 hr tablet Take 1 tablet (50 mg) by mouth daily 90 tablet 2    multivitamin with iron (ONE DAILY WITH IRON) Tab tablet [MULTIVITAMIN WITH IRON (ONE DAILY WITH IRON) TAB TABLET] Take 1 tablet by mouth daily.      nitroGLYcerin (NITROSTAT) 0.4 MG sublingual tablet Place 1 tablet (0.4 mg) under the tongue every 5 minutes as needed 25 tablet 3    torsemide (DEMADEX) 20 MG tablet Take 40 mg by mouth daily      VITAMIN D3 2,000 unit capsule [VITAMIN D3 2,000 UNIT CAPSULE] Take 2,000 Units by mouth daily.  3    TECHLITE PEN NEEDLES 31G X 5 MM miscellaneous             Medications Prior to Admission   Prior to Admission medications    Medication Sig Start Date End Date Taking? Authorizing Provider   aspirin 81 MG EC tablet Take 1 tablet (81 mg) by mouth daily Start tomorrow. 7/28/23  Yes Maikel Tripathi MD   atorvastatin (LIPITOR) 40 MG tablet Take 40 mg by mouth daily   Yes Unknown, Entered By History   clopidogrel (PLAVIX) 75 MG tablet Take 1 tablet (75 mg) by mouth daily Dose to start tomorrow. 7/28/23  Yes Maikel Tripathi MD   Continuous Blood Gluc Sensor (FREESTYLE DAVID 14 DAY SENSOR) [facility]  12/10/21  Yes Reported, Patient   doxycycline monohydrate (ADOXA) 100 MG tablet Take 100 mg by mouth 2 times daily Ulcer on bottom of left  foot 4/4/24 4/14/24 Yes Unknown, Entered By History   ezetimibe (ZETIA) 10 MG tablet Take 1 tablet (10 mg) by mouth daily 8/12/22  Yes Robinson Chopra MD   fish oil-omega-3 fatty acids 1000 MG capsule Take 1 g by mouth daily   Yes Reported, Patient   fluticasone (FLONASE) 50 MCG/ACT nasal spray Spray 2 sprays into both nostrils 2 times daily   Yes Unknown, Entered By History   gabapentin (NEURONTIN) 300 MG capsule [GABAPENTIN (NEURONTIN) 300 MG CAPSULE] Take 300 mg by mouth 3 (three) times a day.        11/18/15  Yes Provider, Historical   insulin glargine (LANTUS PEN) 100 UNIT/ML pen Inject 30 Units Subcutaneous every morning 10/10/16  Yes Provider, Historical   insulin lispro (HUMALOG KWIKPEN) 100 UNIT/ML (1 unit dial) KWIKPEN Inject Subcutaneous 3 times daily (before meals) Carb count: 3 g carb : 1 unit insulin  Maximum 25 units per meal; 75 units per day   Yes Unknown, Entered By History   levothyroxine (SYNTHROID/LEVOTHROID) 125 MCG tablet Take 125 mcg by mouth daily 7/27/22  Yes Reported, Patient   losartan (COZAAR) 25 MG tablet Take 25 mg by mouth daily 10/5/23 10/4/24 Yes Reported, Patient   metoprolol succinate ER (TOPROL XL) 50 MG 24 hr tablet Take 1 tablet (50 mg) by mouth daily 11/22/22  Yes Robinson Chopra MD   multivitamin with iron (ONE DAILY WITH IRON) Tab tablet [MULTIVITAMIN WITH IRON (ONE DAILY WITH IRON) TAB TABLET] Take 1 tablet by mouth daily. 10/10/16  Yes Provider, Historical   nitroGLYcerin (NITROSTAT) 0.4 MG sublingual tablet Place 1 tablet (0.4 mg) under the tongue every 5 minutes as needed 2/13/23  Yes Robinson Chopra MD   torsemide (DEMADEX) 20 MG tablet Take 40 mg by mouth daily 8/28/23  Yes Reported, Patient   VITAMIN D3 2,000 unit capsule [VITAMIN D3 2,000 UNIT CAPSULE] Take 2,000 Units by mouth daily. 9/15/17  Yes Provider, Historical   TECHLITE PEN NEEDLES 31G X 5 MM miscellaneous  12/20/21   Reported, Patient          Mic Valerio MD [facility]  Non-Invasive Cardiologist  COLE Gong  [facility]  Pager [phone]

## 2024-04-11 NOTE — CONSULTS
"Care Management Initial Consult    General Information  Assessment completed with: Patient, Spouse or significant other, Dakota and wife Funmilayo Schroeder  Type of CM/SW Visit: Initial Assessment    Primary Care Provider verified and updated as needed: Yes   Readmission within the last 30 days: no previous admission in last 30 days      Reason for Consult: discharge planning  Advance Care Planning: Advance Care Planning Reviewed: no concerns identified          Communication Assessment  Patient's communication style: spoken language (English or Bilingual)             Cognitive  Cognitive/Neuro/Behavioral: WDL                      Living Environment:   People in home: spouse  Dakota and wife Funmilayo Schroeder  Current living Arrangements: house (\"2 level house and I normally use the steps fine\")      Able to return to prior arrangements: yes       Family/Social Support:  Care provided by: self  Provides care for: no one  Marital Status:   Wife  Funmilayo Schroeder       Description of Support System: Supportive, Involved    Support Assessment: Adequate family and caregiver support, Adequate social supports, Patient communicates needs well met    Current Resources:   Patient receiving home care services: No     Community Resources: DME  Equipment currently used at home: glucometer  Supplies currently used at home: Wound Care Supplies, Diabetic Supplies, Other (\"glasses\")    Employment/Financial:  Employment Status: retired     Employment/ Comments: \"no  history\"  Financial Concerns:     Referral to Financial Worker: No       Does the patient's insurance plan have a 3 day qualifying hospital stay waiver?  No    Functional Status:  Prior to admission patient needed assistance:   Dependent ADLs:: Independent, Ambulation-no assistive device  Dependent IADLs:: Independent (\"I can drive, but I haven't been driving as much lately\")  Assesssment of Functional Status: At functional baseline    Mental Health Status:          Chemical " "Dependency Status:                Values/Beliefs:  Spiritual, Cultural Beliefs, Quaker Practices, Values that affect care:                 Additional Information:  Dakota lives in a 2 level house with his wife. He is independent with ADLs and IADLs. \"I can drive, but I haven't been driving as much lately. My wife helps.\"     Unknown discharge needs at this time.  Wound nurse to see patient.    Wife to transport at discharge.    CM to follow for medical progression of care, discharge recommendations, and final discharge plan.    Maine Gomez RN    "

## 2024-04-11 NOTE — ED PROVIDER NOTES
Emergency Department Encounter      NAME: Dakota Bauer  AGE: 73 year old male  YOB: 1950  MRN: [id number]  EVALUATION DATE & TIME: 4/11/2024  3:39 AM    PCP: Jamal Gaffney    ED PROVIDER: Lake Stoner M.D.      Chief Complaint   Patient presents with    Chest Pain         FINAL IMPRESSION:  1. NSTEMI (non-ST elevated myocardial infarction) (H)        MEDICAL DECISION MAKING:    3:48 AM I met with the patient, obtained history, performed an initial exam, and discussed options and plan for diagnostics and treatment here in the ED.     This patient is a 73-year-old male with a history of coronary artery disease who presents with chest pain.  He says that that his chest pain began yesterday at 10am.  He says that the pain is a tightness across his chest and he currently is pain-free.  He did have some diaphoresis and shortness of breath as well as some fatigue with this.  He tried nitro and it made the pain better.  He did have PTCA with stenting in July 2023 and previous to this he had a a three-vessel coronary artery bypass operation.  On exam the patient had clear lungs and no pedal edema.  His EKG showed some T wave inversion laterally but no ST elevation.  I was called by the lab that his troponin was 103.  Patient was given his aspirin and Plavix initially.  Then he was given nitroglycerin ointment as he was pain-free and started on a heparin infusion.  His chest x-ray showed some possible fluid overload and his BNP was found eventually to be over 7000.  I admitted the patient to the hospitalist service to further workup his NSTEMI.    Pertinent Labs & Imaging studies reviewed. (See chart for details)    Critical care time 35 minutes not including procedures    Medical Decision Making     History:  Supplemental history from: Documented in chart, if applicable  External Record(s) reviewed: Documented in chart, if applicable.     Work Up:  Chart documentation includes differential considered  and any EKGs or imaging independently interpreted by provider, where specified.  In additional to work up documented, I considered the following work up: Documented in chart, if applicable.     External consultation:  Discussion of management with another provider: Documented in chart, if applicable     Complicating factors:  Care impacted by chronic illness: coronary artery disease, peripheral vascular disease, hypertension, hyperlipidemia, diabetes mellitus type I, and chronic kidney disease stage 4   Care affected by social determinants of health: Access to Medical Care     Disposition considerations: Admit      MEDICATIONS GIVEN IN THE EMERGENCY:  Medications   heparin infusion 25,000 units in D5W 250 mL ANTICOAGULANT (1,200 Units/hr Intravenous $New Bag 4/11/24 0628)   nitroGLYcerin (NITRO-BID) 2 % ointment 15 mg (15 mg Transdermal $Patch/Med Applied 4/11/24 0640)   lidocaine 1 % 0.1-1 mL (has no administration in time range)   lidocaine (LMX4) cream (has no administration in time range)   sodium chloride (PF) 0.9% PF flush 3 mL (3 mLs Intracatheter Not Given 4/11/24 0642)   sodium chloride (PF) 0.9% PF flush 3 mL (has no administration in time range)   senna-docusate (SENOKOT-S/PERICOLACE) 8.6-50 MG per tablet 1 tablet (has no administration in time range)     Or   senna-docusate (SENOKOT-S/PERICOLACE) 8.6-50 MG per tablet 2 tablet (has no administration in time range)   calcium carbonate (TUMS) chewable tablet 1,000 mg (has no administration in time range)   medication instruction (has no administration in time range)   aspirin (ASA) chewable tablet 81 mg (has no administration in time range)   HOLD: nitroGLYcerin IF (has no administration in time range)   glucose gel 15-30 g (has no administration in time range)     Or   dextrose 50 % injection 25-50 mL (has no administration in time range)     Or   glucagon injection 1 mg (has no administration in time range)   Patient is already receiving anticoagulation  with heparin, enoxaparin (LOVENOX), warfarin (COUMADIN)  or other anticoagulant medication (has no administration in time range)   hydrALAZINE (APRESOLINE) tablet 10 mg (has no administration in time range)     Or   hydrALAZINE (APRESOLINE) injection 10 mg (has no administration in time range)   acetaminophen (TYLENOL) tablet 650 mg (has no administration in time range)     Or   acetaminophen (TYLENOL) Suppository 650 mg (has no administration in time range)   HYDROmorphone (PF) (DILAUDID) injection 0.3 mg (has no administration in time range)   HYDROmorphone (PF) (DILAUDID) injection 0.5 mg (has no administration in time range)   melatonin tablet 1 mg (has no administration in time range)   ondansetron (ZOFRAN ODT) ODT tab 4 mg (has no administration in time range)     Or   ondansetron (ZOFRAN) injection 4 mg (has no administration in time range)   artificial saliva (BIOTENE MT) solution 1 spray (has no administration in time range)   miconazole (MICATIN) 2 % powder (has no administration in time range)   guaiFENesin (ROBITUSSIN) 20 mg/mL solution 200 mg (has no administration in time range)   furosemide (LASIX) injection 80 mg (80 mg Intravenous $Given 4/11/24 0652)   nitroGLYcerin (NITROSTAT) sublingual tablet 0.4 mg (has no administration in time range)   Continuing beta blocker from home medication list OR beta blocker order already placed during this visit (has no administration in time range)   Continuing ACE inhibitor/ARB/ARNI from home medication list OR ACE inhibitor/ARB order already placed during this visit (has no administration in time range)   Continuing statin from home medication list OR statin order already placed during this visit (has no administration in time range)   LORazepam (ATIVAN) injection 0.5 mg (has no administration in time range)     Or   LORazepam (ATIVAN) tablet 0.5 mg (has no administration in time range)   insulin aspart (NovoLOG) injection (RAPID ACTING) (has no administration in  time range)   aspirin (ASA) chewable tablet 162 mg (162 mg Oral $Given 4/11/24 0411)   clopidogrel (PLAVIX) tablet 75 mg (75 mg Oral $Given 4/11/24 0411)   heparin ANTICOAGULANT loading dose for  LOW INTENSITY TREATMENT* Give BEFORE starting heparin infusion (6,000 Units Intravenous $Given 4/11/24 0626)   insulin glargine (LANTUS PEN) injection 30 Units (30 Units Subcutaneous $Given 4/11/24 06)       NEW PRESCRIPTIONS STARTED AT TODAY'S ER VISIT:  New Prescriptions    No medications on file          =================================================================    HPI      Dakota Bauer is a 73 year old male with a past medical history of chronic anticoagulation, coronary artery disease, hypertension, hyperlipidemia, peripheral vascular disease, NSTEMI, s/p coronary artery bypass graft, cerebral infarction, diabetes mellitus type I, chronic kidney disease stage 4, who presents via walk-in for evaluation of chest pain.      REVIEW OF SYSTEMS   Review of Systems     PAST MEDICAL HISTORY:  Past Medical History:   Diagnosis Date    Anxiety     Chronic kidney disease     Coronary artery disease     Diabetes mellitus (H)     Diabetic ulcer of toe (H)     Disorder of thyroid     Hyperlipidemia     Hypertension     Hypothyroidism     SHERRELL (obstructive sleep apnea)     no cpap       PAST SURGICAL HISTORY:  Past Surgical History:   Procedure Laterality Date    AMPUTATE TOE(S) Left     BYPASS GRAFT ARTERY CORONARY  01/01/2005    [facility] with Dr. Holter    CARDIAC CATHETERIZATION  01/01/2005    CARDIAC CATHETERIZATION  11/25/2016    no intervention    CATARACT EXTRACTION      CERVICAL DISC SURGERY  01/01/2014    CREATE FISTULA ARTERIOVENOUS UPPER EXTREMITY Left 4/2/2024    Procedure: CREATION, ARTERIOVENOUS FISTULA, UPPER EXTREMITY;  Surgeon: Edwin Lynne MD;  Location: [facility] [place] OR    CV ANGIOGRAM CORONARY GRAFT N/A 07/27/2023    Procedure: Coronary Angiogram Graft;  Surgeon: Maikel Tripathi MD;   Location: [facility] CV    CV PCI N/A 07/27/2023    Procedure: Percutaneous Coronary Intervention;  Surgeon: Maikel Tripathi MD;  Location: [facility] CV    IR LOWER EXTREMITY ANGIOGRAM LEFT  04/07/2023    IR LOWER EXTREMITY ANGIOGRAM LEFT  04/08/2020    IR LOWER EXTREMITY ANGIOGRAM LEFT  06/21/2023       CURRENT MEDICATIONS:      Current Facility-Administered Medications:     acetaminophen (TYLENOL) tablet 650 mg, 650 mg, Oral, Q4H PRN **OR** acetaminophen (TYLENOL) Suppository 650 mg, 650 mg, Rectal, Q4H PRN, Ash Fernandez MD    artificial saliva (BIOTENE MT) solution 1 spray, 1 spray, Mouth/Throat, 4x Daily PRN, Ash Fernandez MD    [START ON 4/12/2024] aspirin (ASA) chewable tablet 81 mg, 81 mg, Oral, Daily, Ash Fernandez MD    calcium carbonate (TUMS) chewable tablet 1,000 mg, 1,000 mg, Oral, 4x Daily PRN, Ash Fernandez MD    Continuing ACE inhibitor/ARB/ARNI from home medication list OR ACE inhibitor/ARB order already placed during this visit, , Does not apply, DOES NOT GO TO Rebecca LEAVITT Angel Luis, MD    Continuing beta blocker from home medication list OR beta blocker order already placed during this visit, , Does not apply, DOES NOT GO TO Rebecca LEAVITT Angel Luis, MD    Continuing statin from home medication list OR statin order already placed during this visit, , Does not apply, DOES NOT GO TO Rebecca LEAVITT Angel Luis, MD    glucose gel 15-30 g, 15-30 g, Oral, Q15 Min PRN **OR** dextrose 50 % injection 25-50 mL, 25-50 mL, Intravenous, Q15 Min PRN **OR** glucagon injection 1 mg, 1 mg, Subcutaneous, Q15 Min PRN, Ash Fernandez MD    furosemide (LASIX) injection 80 mg, 80 mg, Intravenous, Q8H, Ash Fernandez MD, 80 mg at 04/11/24 0652    guaiFENesin (ROBITUSSIN) 20 mg/mL solution 200 mg, 200 mg, Oral, Q4H PRN, Ash Fernandez MD    heparin infusion 25,000 units in D5W 250 mL ANTICOAGULANT, 0-5,000 Units/hr, Intravenous, Continuous, Lake Stoner MD,  Last Rate: 12 mL/hr at 04/11/24 0627, 1,200 Units/hr at 04/11/24 0627    HOLD: nitroGLYcerin IF, , Does not apply, HOLD, Ash Fernandez MD    hydrALAZINE (APRESOLINE) tablet 10 mg, 10 mg, Oral, Q4H PRN **OR** hydrALAZINE (APRESOLINE) injection 10 mg, 10 mg, Intravenous, Q4H PRN, Ash Fernandez MD    HYDROmorphone (PF) (DILAUDID) injection 0.3 mg, 0.3 mg, Intravenous, Q2H PRN, Ash Fernandez MD    HYDROmorphone (PF) (DILAUDID) injection 0.5 mg, 0.5 mg, Intravenous, Q2H PRN, Ash Fernandez MD    insulin aspart (NovoLOG) injection (RAPID ACTING), 1-7 Units, Subcutaneous, Q4H, Ash Fernandez MD    lidocaine (LMX4) cream, , Topical, Q1H PRN, Ash Fernandez MD    lidocaine 1 % 0.1-1 mL, 0.1-1 mL, Other, Q1H PRN, Ash Fernandez MD    LORazepam (ATIVAN) injection 0.5 mg, 0.5 mg, Intravenous, Q6H PRN **OR** LORazepam (ATIVAN) tablet 0.5 mg, 0.5 mg, Oral, Q6H PRN, Ash Fernandez MD    medication instruction, , Does not apply, Continuous PRN, Ash Fernandez MD    melatonin tablet 1 mg, 1 mg, Oral, At Bedtime PRN, Ash Fernandez MD    miconazole (MICATIN) 2 % powder, , Topical, BID PRN, Ash Fernandez MD    nitroGLYcerin (NITRO-BID) 2 % ointment 15 mg, 1 inch, Transdermal, Once, Lake Stoner MD, 15 mg at 04/11/24 0640    nitroGLYcerin (NITROSTAT) sublingual tablet 0.4 mg, 0.4 mg, Sublingual, Q5 Min PRN, Ash Fernandez MD    ondansetron (ZOFRAN ODT) ODT tab 4 mg, 4 mg, Oral, Q6H PRN **OR** ondansetron (ZOFRAN) injection 4 mg, 4 mg, Intravenous, Q6H PRN, Ash Fernandez MD    Patient is already receiving anticoagulation with heparin, enoxaparin (LOVENOX), warfarin (COUMADIN)  or other anticoagulant medication, , Does not apply, Continuous PRN, Ash Fernandez MD    senna-docusate (SENOKOT-S/PERICOLACE) 8.6-50 MG per tablet 1 tablet, 1 tablet, Oral, BID PRN **OR** senna-docusate (SENOKOT-S/PERICOLACE) 8.6-50 MG per tablet 2 tablet, 2 tablet, Oral, BID PRN,  Ash Fernandez MD    sodium chloride (PF) 0.9% PF flush 3 mL, 3 mL, Intracatheter, Q8H, Ash Fernandez MD    sodium chloride (PF) 0.9% PF flush 3 mL, 3 mL, Intracatheter, q1 min prn, Ash Fernandez MD    Current Outpatient Medications:     aspirin 81 MG EC tablet, Take 1 tablet (81 mg) by mouth daily Start tomorrow., Disp: 30 tablet, Rfl: 3    atorvastatin (LIPITOR) 40 MG tablet, Take 40 mg by mouth daily, Disp: , Rfl:     clopidogrel (PLAVIX) 75 MG tablet, Take 1 tablet (75 mg) by mouth daily Dose to start tomorrow., Disp: 90 tablet, Rfl: 3    Continuous Blood Gluc Sensor (FREESTYLE DAVID 14 DAY SENSOR) MISC, , Disp: , Rfl:     ezetimibe (ZETIA) 10 MG tablet, Take 1 tablet (10 mg) by mouth daily, Disp: 90 tablet, Rfl: 4    fish oil-omega-3 fatty acids 1000 MG capsule, Take 2 g by mouth daily, Disp: , Rfl:     fluticasone (FLONASE) 50 MCG/ACT nasal spray, Spray 2 sprays into both nostrils daily For nasal congestion, Disp: 16 g, Rfl: 1    gabapentin (NEURONTIN) 300 MG capsule, [GABAPENTIN (NEURONTIN) 300 MG CAPSULE] Take 300 mg by mouth 3 (three) times a day.       , Disp: , Rfl:     HUMALOG 100 unit/mL injection, Inject Subcutaneous 3 times daily (before meals) Carb count: 3 g carb : 1 unit insulin, Disp: , Rfl:     insulin glargine (LANTUS) 100 unit/mL injection, [INSULIN GLARGINE (LANTUS) 100 UNIT/ML INJECTION] Inject 30 Units under the skin every morning. , Disp: , Rfl:     levothyroxine (SYNTHROID/LEVOTHROID) 125 MCG tablet, Take 125 mcg by mouth daily, Disp: , Rfl:     losartan (COZAAR) 25 MG tablet, Take 25 mg by mouth, Disp: , Rfl:     metoprolol succinate ER (TOPROL XL) 50 MG 24 hr tablet, Take 1 tablet (50 mg) by mouth daily, Disp: 90 tablet, Rfl: 2    multivitamin with iron (ONE DAILY WITH IRON) Tab tablet, [MULTIVITAMIN WITH IRON (ONE DAILY WITH IRON) TAB TABLET] Take 1 tablet by mouth daily., Disp: , Rfl:     nitroGLYcerin (NITROSTAT) 0.4 MG sublingual tablet, Place 1 tablet (0.4 mg) under  the tongue every 5 minutes as needed, Disp: 25 tablet, Rfl: 3    oxyCODONE (ROXICODONE) 5 MG tablet, Take 2.5 mg by mouth every 6 hours as needed for moderate pain or moderate to severe pain, Disp: , Rfl:     TECHLITE PEN NEEDLES 31G X 5 MM miscellaneous, , Disp: , Rfl:     torsemide (DEMADEX) 20 MG tablet, Take 40 mg by mouth daily, Disp: , Rfl:     VITAMIN D3 2,000 unit capsule, [VITAMIN D3 2,000 UNIT CAPSULE] Take 2,000 Units by mouth daily., Disp: , Rfl: 3    ALLERGIES:  Allergies   Allergen Reactions    Hydrochlorothiazide Unknown    Levofloxacin Other (See Comments)     Kidney pain       FAMILY HISTORY:  Family History   Problem Relation Age of Onset    Diabetes Mother     Coronary Artery Disease Father     Coronary Artery Disease Brother        SOCIAL HISTORY:   Social History     Socioeconomic History    Marital status:    Tobacco Use    Smoking status: Former     Current packs/day: 0.00     Types: Cigarettes     Quit date: 10/10/1984     Years since quittin.5    Smokeless tobacco: Never   Vaping Use    Vaping status: Never Used   Substance and Sexual Activity    Alcohol use: Not Currently    Drug use: Never     Social Determinants of Health      [facility]    Financial Resource Strain    Received from [facility]    Social Connections       PHYSICAL EXAM:    Vitals: /58   Pulse 66   Temp 98.3  F (36.8  C) (Oral)   Resp 24   Wt 101.6 kg (224 lb)   SpO2 92%   BMI 30.38 kg/m     Constitutional: Well developed, well nourished. Comfortable appearing.  HEAD:Normocephalic, atraumatic,   ENT: mucous membranes moist, nose normal.   Neck- Supple, gross ROM intact.  No JVD.  No palpable nodes.  Pulmonary: Clear to auscultation bilaterally, no respiratory distress, no wheezing, speaks full sentences easily.  Cardiovascular:  Normal heart rate, regular rhythm, no murmurs. No lower extremity edema, 2+ DP pulses.   Musculoskeletal: Moving all 4 extremities intentionally and without pain. No obvious deformity.  Back: No CVA tenderness  Skin: Warm, dry, no rash.  Neurologic: Alert & oriented x 3, speech clear, moving all extremities spontaneously   Psychiatric: Affect normal, cooperative.     LAB:  All pertinent labs reviewed and interpreted.  Labs Ordered and Resulted from Time of ED Arrival to Time of ED Departure   BASIC METABOLIC PANEL - Abnormal       Result Value    Sodium 134 (*)     Potassium 4.7      Chloride 98      Carbon Dioxide (CO2) 25      Anion Gap 11      Urea Nitrogen 54.0 (*)     Creatinine 3.52 (*)     GFR Estimate 18 (*)     Calcium 8.5 (*)     Glucose 206 (*)    TROPONIN T, HIGH SENSITIVITY - Abnormal    Troponin T, High Sensitivity 103 (*)    CBC WITH PLATELETS AND DIFFERENTIAL - Abnormal    WBC Count 6.6      RBC Count 3.63 (*)     Hemoglobin 10.9 (*)     Hematocrit 33.7 (*)     MCV 93      MCH 30.0      MCHC 32.3      RDW 13.8      Platelet Count 149 (*)     % Neutrophils 64      % Lymphocytes 19      % Monocytes 11      % Eosinophils 5      % Basophils 1      % Immature Granulocytes 0      NRBCs per 100 WBC 0      Absolute Neutrophils 4.3      Absolute Lymphocytes 1.2      Absolute Monocytes 0.7      Absolute Eosinophils 0.3      Absolute Basophils 0.0      Absolute Immature Granulocytes 0.0      Absolute NRBCs 0.0     NT PROBNP INPATIENT - Abnormal    N terminal Pro BNP Inpatient 7,812 (*)    HEMOGLOBIN A1C - Abnormal    Hemoglobin A1C 7.0 (*)    HEPARIN UNFRACTIONATED ANTI XA LEVEL   TROPONIN T, HIGH SENSITIVITY   GLUCOSE MONITOR NURSING POCT   GLUCOSE MONITOR NURSING POCT   PROCALCITONIN       RADIOLOGY:  XR Chest Port 1 View   Final Result   IMPRESSION: Cardiac silhouette at upper limits normal status post CABG. Basilar interstitial infiltrates, though asymmetrically greater on the right. Findings could reflect  combination of fluid overload and possible infectious process in the right lung    base. No visible pneumothorax.      Echocardiogram Complete    (Results Pending)       EKG:   Performed at: 03:45:17  Impression: 1st degree A-V block, Left axis deviation, Non-specific intra-ventricular conduction block, T wave abnormality, consider lateral ischemia.  Rate: 86  Rhythm: Sinus  QRS Interval: 138  QTc Interval: 495  Comparison: When compared with ECG of 06-SEP-2023 08:57, Nonspecific T wave abnormality no longer evident in Inferior leads, T wave inversion now evident in Lateral leads.  I have independently reviewed and interpreted the EKG(s) documented above.     PROCEDURES:   Procedures       I, Amanda Julian, am serving as a scribe to document services personally performed by Dr. Lake Stoner based on my observation and the provider's statements to me. I, Lake Stoner M.D. attest that Amanda Julian is acting in a scribe capacity, has observed my performance of the services and has documented them in accordance with my direction.      Lake Stoner M.D.  Emergency Medicine  [facility] EMERGENCY DEPARTMENT  [facility] [facility] [place] [address]  [phone]  Dept: [phone]     Lake Stoner MD  04/11/24 4460

## 2024-04-11 NOTE — CONSULTS
[facility]  WO Nurse Inpatient Assessment     Consulted for: L foot    Summary:     Patient History (according to provider note(s):      Dakota Bauer is a 73 year old male who came to the ED for evaluation of chest pain and shortness of breath.  Past medical history of CAD, CABG, HFrEF, moderate mitral regurgitation, PVD, CVA, type 1 diabetes, CKD 4, hypertension, hyperlipidemia, hypothyroidism, SHERRELL, anxiety, anemia, thrombocytopenia, left foot wound.  Patient had a stent in an occluded SVG on 7/27/2023.  He lives at home with wife, denies tobacco, alcohol or drugs.  He had some chest pain after lifting a saw 2 days prior to arrival.  He woke up with substernal chest pressure that improved with nitroglycerin.  However, he became diaphoretic and had shortness of breath.  He denied nausea, palpitations, fevers or chills.  Symptoms are similar to previous heart injury.       Assessment:      Wound location: L foot      4/11    Last photo: 4/11  Wound due to: Diabetic Ulcer  Wound history/plan of care: patient sees an outpatient podiatrist and explained plan of care. We do not carry one of the products but will get as close to his current plan of care. Wife is able to do wound care.  Wound base: 100 % non-granular tissue     Palpation of the wound bed: boggy      Drainage: small     Description of drainage: serosanguinous and milky - possibly from breakdown of elijah, unclear if it is dressing remnants or purulence     Measurements (length x width x depth, in cm): 0.2  x 0.5  x  0.3 cm      Tunneling: N/A     Undermining: cannot determine how much undermining as the wound is so small but large callus to christina wound  Periwound skin:  callus      Color: normal and consistent with surrounding tissue      Temperature: normal   Odor: none  Pain: denies , none  Pain interventions prior to dressing change: no significant pain present   Treatment goal: Heal   STATUS: initial assessment  Supplies  ordered: ordered elijah & vashe       Treatment Plan:     L foot  1. Soak wound with vashe moistened gauze for 5 minutes, pat dry  2. Apply piece of elijah into wound space (PS#[id number])  3. Cover with mepilex dressing  4. Change every other day    Orders: Written    RECOMMEND PRIMARY TEAM ORDER: None, at this time  Education provided: plan of care, wound progress, and Off-loading pressure  Discussed plan of care with: Patient, Family, and Nurse  WO nurse follow-up plan: weekly  Notify WOC if wound(s) deteriorate.  Nursing to notify the Provider(s) and re-consult the WO Nurse if new skin concern.    DATA:     Current support surface: Standard  ED cart  Containment of urine/stool: Continent of bladder and Continent of bowel  BMI: Body mass index is 30.38 kg/m .   Active diet order: Orders Placed This Encounter      NPO for Medical/Clinical Reasons Except for: Meds     Output: No intake/output data recorded.     Labs:   Recent Labs   Lab 04/11/24  0347   HGB 10.9*   WBC 6.6   A1C 7.0*     Pressure injury risk assessment:   Sensory Perception: 3-->slightly limited  Moisture: 4-->rarely moist  Activity: 3-->walks occasionally  Mobility: 3-->slightly limited  Nutrition: 3-->adequate  Friction and Shear: 2-->potential problem  Hector Score: 18    SANDRA BowlesN RN CWOCN  Pager no longer in use, please contact through [employer] group: [facility] [employer]

## 2024-04-12 NOTE — CONSULTS
NUTRITION EDUCATION      REASON FOR ASSESSMENT:  Consulted to educate pt on 2 gram Na diet    NUTRITION HISTORY:  Information obtained from pt's wife    Pt's wife states that they have already been educated on a low sodium diet and have written information.  Noted C.OBRANDO. saw pt today    CURRENT DIET:  Low saturated fat < 2400 mg Na    INTERVENTIONS:    Nutrition Prescription:  2 gram Na    Implementation:      *  Nutrition Education (Content): Did not educate today as they have already had low sodium diet ed    Follow Up/Monitoring:      * follow for LOS

## 2024-04-12 NOTE — PLAN OF CARE
Goal Outcome Evaluation:      Plan of Care Reviewed With: patient, spouse    Overall Patient Progress: improvingOverall Patient Progress: improving    Outcome Evaluation: No chest pain free for 24 hours. Stress test complete, low risk. Patient going home with wife.

## 2024-04-12 NOTE — PROGRESS NOTES
RESPIRATORY CARE NOTE     Pt declined CPAP.  Pt states that he does not tolerate CPAP and does not use at home.  Will discontinue order.      Sebastian Romo, RT

## 2024-04-12 NOTE — PROGRESS NOTES
'    [facility] ([facility]) progress note  CC: F/U CKD  S: Since last visit, reports doing alright this am. No sob or chest pain. Getting stress test today. Labs stable. Swelling improved.     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  Follow with Dr Bhatia. Noted gradual progression o CKD over the last two years.   Daily labs  No acute indication for dialysis.   Had AVF placed last week - not ready to be used  Discussed risk for KARLA with patient. Would not delay or hold procedure if indication with patient already planning for HD in the near future.         2. NSTEMI: per cards. On heparin. Risk for KARLA discussed with patient. Getting stress test and deciding on intervention.   3. Type 1 DM : per primary.   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   5. Anemia: monitor. Check iron studies.   6. HTN: continue PTA mds.   7. CHF: volume status improved. Back to PO torsemide tomorrow.           Fritz Salvador,   [facility]  [phone] (off)       No interval changes to past medical history, social history or family history to report.    /60 (BP Location: Right arm)   Pulse 56   Temp 98.1  F (36.7  C) (Oral)   Resp 20   Wt 95.5 kg (210 lb 9.6 oz)   SpO2 96%   BMI 28.56 kg/m      I/O last 3 completed shifts:  In: 336.76 [P.O.:240; I.V.:96.76]  Out: 4200 [Urine:4200]    Physical Exam:   GENERAL: calm and comfortable, alert  EYES: No scleral icterus, conjunctiva clear  ENT: Hearing normal,  RESP: Anterior clear.   CV: RRR, no murmurs.  GI: ND  Musculoskeletal: Normal muscle bulk/ tone; No gross joint  abnormalities  SKIN: No rash, warm/ dry  PSYCH:  Appropriate mood and affect  Let AVF- incision clean and dry.     Recent Labs   Lab 04/12/24  0436 04/11/24  0347    134*   POTASSIUM 4.0 4.7   CHLORIDE 98 98   CO2 23 25   BUN 61.3* 54.0*   CR 3.41* 3.52*   GFRESTIMATED 18* 18*   SVITLANA 8.8 8.5*       Recent Labs   Lab 04/11/24  0347   WBC 6.6   HGB 10.9*   HCT 33.7*   MCV 93   *             Current Facility-Administered Medications:     acetaminophen (TYLENOL) tablet 650 mg, 650 mg, Oral, Q4H PRN **OR** acetaminophen (TYLENOL) Suppository 650 mg, 650 mg, Rectal, Q4H PRN, Ash Fernandez MD    aminophylline 50 mg, 50 mg, Intravenous, Once PRN, Mic Valerio MD    artificial saliva (BIOTENE MT) solution 1 spray, 1 spray, Mouth/Throat, 4x Daily PRN, Ash Fernandez MD    aspirin (ASA) chewable tablet 81 mg, 81 mg, Oral, Daily, Ash Fernandez MD, 81 mg at 04/12/24 0843    atorvastatin (LIPITOR) tablet 40 mg, 40 mg, Oral, QPM, Mic Valerio MD, 40 mg at 04/11/24 2030    calcium carbonate (TUMS) chewable tablet 1,000 mg, 1,000 mg, Oral, 4x Daily PRN, Ash Fernandez MD    clopidogrel (PLAVIX) tablet 75 mg, 75 mg, Oral, Daily, Owen Renteria MD    Continuing ACE inhibitor/ARB/ARNI from home medication list OR ACE inhibitor/ARB order already placed during this visit, , Does not apply, DOES NOT GO TO Rebecca LEAVITT Angel Luis, MD    Continuing beta blocker from home medication list OR beta blocker order already placed during this visit, , Does not apply, DOES NOT GO TO Rebecca LEAVITT Angel Luis, MD    Continuing statin from home medication list OR statin order already placed during this visit, , Does not apply, DOES NOT GO TO Rebecca LEAVITT Angel Luis, MD    glucose gel 15-30 g, 15-30 g, Oral, Q15 Min PRN **OR** dextrose 50 % injection 25-50 mL, 25-50 mL, Intravenous, Q15 Min PRN **OR** glucagon injection 1 mg, 1 mg, Subcutaneous, Q15 Min PRN, Owen Renteria MD    glucose gel 15-30 g, 15-30 g, Oral,  Q15 Min PRN **OR** dextrose 50 % injection 25-50 mL, 25-50 mL, Intravenous, Q15 Min PRN **OR** glucagon injection 1 mg, 1 mg, Subcutaneous, Q15 Min PRN, Ash Fernandez MD    fluticasone (FLONASE) 50 MCG/ACT spray 2 spray, 2 spray, Both Nostrils, BID PRN, Minesh Colby MD    furosemide (LASIX) injection 80 mg, 80 mg, Intravenous, Q8H, Ash Fernandez MD, 80 mg at 04/12/24 0633    gabapentin (NEURONTIN) capsule 300 mg, 300 mg, Oral, TID, Minesh Colby MD, 300 mg at 04/12/24 0843    guaiFENesin (ROBITUSSIN) 20 mg/mL solution 200 mg, 200 mg, Oral, Q4H PRN, Ash Fernandez MD    heparin 25,000 units in 0.45% NaCl 250 mL ANTICOAGULANT infusion, 0-5,000 Units/hr, Intravenous, Continuous, Owen Renteria MD, Last Rate: 10 mL/hr at 04/12/24 0654, 1,000 Units/hr at 04/12/24 0654    HOLD: nitroGLYcerin IF, , Does not apply, HOLD, Ash Fernandez MD    hydrALAZINE (APRESOLINE) tablet 10 mg, 10 mg, Oral, Q4H PRN **OR** hydrALAZINE (APRESOLINE) injection 10 mg, 10 mg, Intravenous, Q4H PRN, Ash Fernandez MD, 10 mg at 04/11/24 1831    insulin aspart (NovoLOG) injection (RAPID ACTING), 1-22 Units, Subcutaneous, TID Myra SOUTH Rahul, MD    insulin aspart (NovoLOG) injection (RAPID ACTING), 1-16 Units, Subcutaneous, At Bedtime, Owen Renteria MD    insulin aspart (NovoLOG) injection (RAPID ACTING), , Subcutaneous, TID Nancy SOUTH Deepak B, MBBS, 23 Units at 04/12/24 1228    insulin glargine (LANTUS PEN) injection 30 Units, 30 Units, Subcutaneous, QAM Myra SOUTH Rahul, MD    isosorbide mononitrate (IMDUR) 24 hr tablet 30 mg, 30 mg, Oral, Daily, White, MD Mic, 30 mg at 04/12/24 0852    lidocaine (LMX4) cream, , Topical, Q1H PRN, Ash Fernandez MD    lidocaine 1 % 0.1-1 mL, 0.1-1 mL, Other, Q1H PRN, Ash Fernandez MD    LORazepam (ATIVAN) injection 0.5 mg, 0.5 mg, Intravenous, Q6H PRN **OR** LORazepam (ATIVAN) tablet 0.5 mg, 0.5 mg, Oral, Q6H PRN, Ash Fernandez MD    medication  instruction, , Does not apply, Continuous PRN, Ash Fernandez MD    melatonin tablet 1 mg, 1 mg, Oral, At Bedtime PRN, Ash Fernandez MD    metoprolol succinate ER (TOPROL XL) 24 hr tablet 50 mg, 50 mg, Oral, QPM, Mic Valerio MD, 50 mg at 04/11/24 2030    miconazole (MICATIN) 2 % powder, , Topical, BID PRN, Ash Fernandez MD    nitroGLYcerin (NITROSTAT) sublingual tablet 0.4 mg, 0.4 mg, Sublingual, Q5 Min PRN, Ash Fernandez MD    ondansetron (ZOFRAN ODT) ODT tab 4 mg, 4 mg, Oral, Q6H PRN **OR** ondansetron (ZOFRAN) injection 4 mg, 4 mg, Intravenous, Q6H PRN, Ash Fernandez MD    Patient is already receiving anticoagulation with heparin, enoxaparin (LOVENOX), warfarin (COUMADIN)  or other anticoagulant medication, , Does not apply, Continuous PRN, Ash Fernandez MD    senna-docusate (SENOKOT-S/PERICOLACE) 8.6-50 MG per tablet 1 tablet, 1 tablet, Oral, BID PRN **OR** senna-docusate (SENOKOT-S/PERICOLACE) 8.6-50 MG per tablet 2 tablet, 2 tablet, Oral, BID PRN, Ash Fernandez MD    sodium chloride (PF) 0.9% PF flush 3 mL, 3 mL, Intracatheter, Q8H, Ash Fernandez MD, 3 mL at 04/12/24 0832    sodium chloride (PF) 0.9% PF flush 3 mL, 3 mL, Intracatheter, q1 min prn, Ash Fernandez MD      Labs personally reviewed today during this evaluation at 1:57 PM

## 2024-04-12 NOTE — PROGRESS NOTES
Nuclear Lexiscan Stress Test  Lexiscan 0.4mg IV was injected over 15 seconds   Typical vasodilator side effects noted, including dyspnea, nausea, and hypotension that resolved by test end.   Interpretation pending.

## 2024-04-12 NOTE — PLAN OF CARE
Problem: Adult Inpatient Plan of Care  Goal: Plan of Care Review  Description: The Plan of Care Review/Shift note should be completed every shift.  The Outcome Evaluation is a brief statement about your assessment that the patient is improving, declining, or no change.  This information will be displayed automatically on your shift  note.  Outcome: Progressing     Problem: Adult Inpatient Plan of Care  Goal: Absence of Hospital-Acquired Illness or Injury  Intervention: Identify and Manage Fall Risk  Recent Flowsheet Documentation  Taken 4/12/2024 0000 by Ike Hagen RN  Safety Promotion/Fall Prevention:   activity supervised   clutter free environment maintained   increased rounding and observation   increase visualization of patient   room organization consistent   safety round/check completed  Taken 4/11/2024 2033 by Ike Hagen RN  Safety Promotion/Fall Prevention:   activity supervised   clutter free environment maintained   increased rounding and observation   increase visualization of patient   room organization consistent   safety round/check completed  Intervention: Prevent Skin Injury  Recent Flowsheet Documentation  Taken 4/12/2024 0000 by Ike Hagen RN  Body Position: position changed independently  Taken 4/11/2024 2033 by Ike Hagen RN  Body Position: position changed independently  Intervention: Prevent and Manage VTE (Venous Thromboembolism) Risk  Recent Flowsheet Documentation  Taken 4/12/2024 0000 by Ike Hagen RN  VTE Prevention/Management: compression stockings on  Taken 4/11/2024 2033 by Ike Hagen RN  VTE Prevention/Management: compression stockings on  Intervention: Prevent Infection  Recent Flowsheet Documentation  Taken 4/12/2024 0000 by Ike Hagen RN  Infection Prevention: hand hygiene promoted  Taken 4/11/2024 2033 by Ike Hagen RN  Infection Prevention: hand hygiene promoted  Goal: Optimal Comfort and  Wellbeing  Intervention: Provide Person-Centered Care  Recent Flowsheet Documentation  Taken 4/12/2024 0000 by Ike Hagen RN  Trust Relationship/Rapport:   care explained   questions answered   questions encouraged   reassurance provided   thoughts/feelings acknowledged  Taken 4/11/2024 2033 by Ike Hagen RN  Trust Relationship/Rapport:   care explained   questions answered   questions encouraged   reassurance provided   thoughts/feelings acknowledged     Problem: Risk for Delirium  Goal: Optimal Coping  Intervention: Optimize Psychosocial Adjustment to Delirium  Recent Flowsheet Documentation  Taken 4/12/2024 0000 by Ike Hagen RN  Supportive Measures:   active listening utilized   positive reinforcement provided   self-care encouraged   self-responsibility promoted   verbalization of feelings encouraged  Family/Support System Care: self-care encouraged  Taken 4/11/2024 2033 by Ike Hagen RN  Supportive Measures:   active listening utilized   positive reinforcement provided   self-care encouraged   self-responsibility promoted   verbalization of feelings encouraged  Family/Support System Care: self-care encouraged  Goal: Improved Behavioral Control  Intervention: Prevent and Manage Agitation  Recent Flowsheet Documentation  Taken 4/12/2024 0000 by Ike Hagen RN  Environment Familiarity/Consistency: daily routine followed  Taken 4/11/2024 2033 by Ike Hagen RN  Environment Familiarity/Consistency: daily routine followed  Intervention: Minimize Safety Risk  Recent Flowsheet Documentation  Taken 4/12/2024 0000 by Ike Hagen RN  Communication Enhancement Strategies: call light answered in person  Trust Relationship/Rapport:   care explained   questions answered   questions encouraged   reassurance provided   thoughts/feelings acknowledged  Taken 4/11/2024 2033 by Ike Hagen RN  Communication Enhancement Strategies: call light  answered in person  Trust Relationship/Rapport:   care explained   questions answered   questions encouraged   reassurance provided   thoughts/feelings acknowledged  Goal: Improved Attention and Thought Clarity  Intervention: Maximize Cognitive Function  Recent Flowsheet Documentation  Taken 4/12/2024 0000 by Ike Hagen RN  Sensory Stimulation Regulation:   care clustered   lighting decreased  Reorientation Measures: clock in view  Taken 4/11/2024 2033 by Ike Hagen RN  Sensory Stimulation Regulation:   care clustered   lighting decreased  Reorientation Measures: clock in view     Problem: Skin Injury Risk Increased  Goal: Skin Health and Integrity  Intervention: Plan: Nurse Driven Intervention: Moisture Management  Recent Flowsheet Documentation  Taken 4/12/2024 0000 by Ike Hagen RN  Moisture Interventions: Encourage regular toileting  Taken 4/11/2024 2033 by Ike Hagen RN  Moisture Interventions: Encourage regular toileting  Taken 4/11/2024 2000 by Ike Hagen RN  Moisture Interventions:   Encourage regular toileting   No brief in bed  Intervention: Plan: Nurse Driven Intervention: Friction and Shear  Recent Flowsheet Documentation  Taken 4/12/2024 0000 by Ike Hagen RN  Friction/Shear Interventions: Pad bony prominence (elbow pads, heel pads, ear protectors)  Taken 4/11/2024 2033 by Ike Hagen RN  Friction/Shear Interventions: Pad bony prominence (elbow pads, heel pads, ear protectors)  Intervention: Optimize Skin Protection  Recent Flowsheet Documentation  Taken 4/12/2024 0000 by Ike Hagen RN  Activity Management: activity adjusted per tolerance  Head of Bed (HOB) Positioning: HOB at 30-45 degrees  Taken 4/11/2024 2033 by Ike Hagen RN  Activity Management: activity adjusted per tolerance  Head of Bed (HOB) Positioning: HOB at 30-45 degrees   Goal Outcome Evaluation:      Pt is alert and oriented x 4, denies chest  pain nor any discomfort.Pt is independent in the room. On Iv lasix with great out put. Anti xa at bed time was.32 which happened to be the second one within normal. Next anti xa will be at 0600. No rate =change at this time.  Pt is concern about his BG management. Said he does take more insuline at home than he has been getting her. STANLEY, NSR on tele. NPO for a stress test in the morning. Pt requested his Gabapentin and Flonase should be ordered.

## 2024-04-12 NOTE — PROGRESS NOTES
[facility] was introduced to the patient's wife and daughter in law today including our role in the home management of their heart failure. Pt was away at testing at the time.  Heart Failure Education Materials were shared today with the patient's wife. Dakota has a lot of higher sodium dietary patterns related to his [place] heritage.   This will pose some challenges to his wife to appease him.  Introduction to the expectations including daily weight tracking using the Daily Weight Log  Home B/P monitoring as appropriate( to bring in home monitoring cuff to the clinic appointment for verification)  Low Sodium diet of 2000mg or less daily, review of label reading, aim for fresh and avoid processed items. Daughter in law was a former Diabetic Educator in before they cut this role from staffing. She is very helpful to label reading and recipe ideas to reduce sodium intake. Sauerkraut is something the patient makes himself and was discouraged from consuming too often.      Daily Fluid restriction of 2000ml or 50-60 oz per day considerations. He drinks 3-4 cans of decaffeinated diet soda per day and then a bottle of water. Encouraged moving towards more water and less soda.     Exercise importance as able explained  Monitor and report s/s of heart failure. How to report these changes. [facility] phone numbers were reviewed to reach out with any concerns.  Follow up appointments and testing expectations.    Oma WEISS RN BSN, CHFN, PCCN-K    [facility]   [phone] Nurse Line  [facility]   Heart [facility]   [address] [place] [address]

## 2024-04-12 NOTE — PROGRESS NOTES
Impression and Plan     Impression:   Non-ST elevation myocardial infarction. Minimal troponin elevation with no new regional wall motion abnormalities seen on transthoracic echocardiogram 4/11/2023. Pharmacologic nuclear stress testing today suggests a low risk presentation. He denies chest pain.  Coronary artery disease with a history of three-vessel coronary artery bypass grafting (left internal mammary artery to the left anterior descending artery, saphenous venous grafts to the 1st diagonal and 1st obtuse marginal branch arteries) 10/24/2005 and drug-eluting stenting x1 to the saphenous venous graft to the 1st obtuse marginal artery 7/27/2023. His other two bypass grafts were noted to be patents and no significant disease was present in the native right coronary artery at the time of his last angiogram 7/27/2023.  Chronic congestive heart failure with preserved left ventricular ejection fraction of 50-55%. He seems euvolemic.  Peripheral arterial disease status post balloon angioplasty of the left pedal arch, left dorsalis pedis artery and left anterior tibial artery 6/21/2023. Lower extremity arterial ultrasound 8/7/2023 suggested high-grade superficial femoral artery stenosis.  Multifocal cerebrovascular accident 7/27/2023 felt to be embolic from cardiac catheterization.  Moderate asymptomatic right internal carotid artery stenosis noted on carotid ultrasound 7/30/2023.  Essential hypertension. Elevated.  Hyperlipidemia. Last LDL 64 mg/dL.  Insulin-dependent diabetes mellitus type 2. Last hemoglobin A1c 7.0%.  Obstructive sleep apnea not on CPAP.  End stage renal disease not yet on hemodialysis status post creation of a left brachial basilic arteriovenous fistula 4/2/2024.  Morbid obesity with body mass index 30.38 kg/m .    Plan:  Okay to discharge home today from a cardiac perspective  Continue isosorbide mononitrate 30 mg daily  Resume oral torsemide 40 mg daily  Metoprolol succinate 50 mg  daily  Continue dual antiplatelet therapy with aspirin 81 mg and clopidogrel 75 mg daily for at least another 12 months (end date 4/11/2025)  We will arrange for follow-up with general cardiology advanced practitioner Mayte Tolentino in 2 weeks    Primary Cardiologist: Dr. Geronimo Ma     Subjective     - no chest pain or shortness of breath    Cardiac Diagnostics   Telemetry (personally reviewed): SR, no arrhythmias    ECG 4/11/2024 (personally reviewed and interpreted): SR, anterolateral ST depressions, IVCD     Holter monitor 6/9/2023 (report reviewed):  Holter monitoring (duration 24hrs).   Predominant underlying rhythm was sinus rhythm, 48 to 75bpm, average 57bpm.   No nonsustained or sustained tachyarrhythmias.   No atrial fibrillation.   There were no pauses of greater than 3 seconds.   Rare supraventricular ectopic beats (<1%).   Rare premature ventricular contractions (<1%).   Symptom triggers none.     Echocardiogram 4/11/2024 (results reviewed):   The left ventricle is normal in size.  There is mild concentric left ventricular hypertrophy.  Grade II or moderate diastolic dysfunction.  The visual ejection fraction is 50-55%.  There is hypokinesis in inferolateral wall.  Normal right ventricle size and systolic function.  The left atrium is moderately dilated.  No obvious valvular disease.  When compared to previous study on 7-, there is no significant interval change.     SHAYLEE 7/31/2023 (report reviewed):  The left ventricle is normal in size.  The visual ejection fraction is 50-55%.  No regional wall motion abnormalities noted.  Normal right ventricle size and systolic function.  There is no color Doppler evidence of a PFO.  There is moderate (2+) mitral regurgitation.  The right ventricular systolic pressure is approximated at 46mmHg plus the right atrial pressure.  No valvular vegetations noted.     Lower extremity arterial ultrasound 8/7/2023 (report reviewed):  Right Lower Extremity: Patent  aorto iliac system. High grade superficial femoral artery stenosis. Low flow velocities with monophasic waveforms in the infrapopliteal segment most likely a combination of femoral-popliteal inflow disease and native tibial vessel disease.  Left Lower Extremity: Patent left femoral-popliteal segment with transition to monophasic waveforms in the tibial vasculature.  Monophasic waveforms are noted in the dorsalis pedis and posterior tibial arteries.  Low flow velocities are noted in the posterior tibial artery suggesting it is most likely being filled by peroneal collaterals.     Carotid ultrasound 7/30/2023 (report reviewed):  1.  Right carotid: Moderate plaque remission. Progressive ICA velocity elevation consistent with 50-69% stenosis in the right internal carotid artery. Significant external carotid stenosis.  2.  Left carotid: Mild plaque formation, velocities consistent with less than 50% stenosis in the left internal carotid artery. Significant, progressive external carotid carotid stenosis.  3.  Flow within the vertebral arteries is antegrade.     Cardiac Cath 7/27/2023 (results reviewed):   1.  Severe diffuse disease of native vessels with small caliber vessels distally.  2.  Chronic occlusion of native LAD and native left circumflex.  3.  Left main fills small narrow caliber ramus branch only  4.  Right coronary artery has diffuse mild to moderate disease but no severe stenoses.  5.  Patent saphenous vein graft to high distal lateral wall branch (? Diagonal or marginal branch) which is diffusely diseased. Anastomosis appears normal.  6.  Occluded saphenous vein graft to distal diagonal/lateral wall branches, most likely the acute infarct vessel.  7.  Patent BENITEZ to LAD. Anastomosis appears normal.  8.  Successful PCI saphenous vein graft to distal diagonal/lateral with drug-eluting stent implant x1  9.  Recommend continuing Plavix therapy indefinitely, risk factor management for ASCVD.     Cardiac stress  testing 4/12/2024 (report reviewed):    1.The nuclear stress test is abnormal.    2.Negative pharmacological regadenoson ECG for ischemia.    3.There is a medium sized area of a moderate degree of transmural infarction in the mid to basal inferior segment(s) of the left ventricle.  No ischemia seen.    4.The left ventricular ejection fraction at stress is 70%.  Postthoracotomy septal hypokinesis noted.    5.The findings of this examination were communicated to the nursing staff at [facility] and Dr. Mic Valerio.    A prior study was conducted on 6/9/2023.  The inferior scar appears to be new.    Cardiac Stress Testing 6/9/2023 (results reviewed):     The nuclear stress test is abnormal.    There is a small area of moderate ischemia in the mid to basal inferolateral segment(s) of the left ventricle.    There is also a small area of a moderate degree of nontransmural infarction in the apical segment(s) of the left ventricle.    The left ventricular ejection fraction at stress is 67%.    The patient is at a low risk of future cardiac ischemic events.    A prior study was conducted on 6/16/2021.  This study has no change when compared with the prior study.    Physical Examination       /60 (BP Location: Right arm)   Pulse 56   Temp 98.1  F (36.7  C) (Oral)   Resp 20   Wt 95.5 kg (210 lb 9.6 oz)   SpO2 96%   BMI 28.56 kg/m          Wt Readings from Last 3 Encounters:   04/12/24 95.5 kg (210 lb 9.6 oz)   04/05/24 101.6 kg (224 lb)   04/02/24 98.4 kg (217 lb)          Intake/Output Summary (Last 24 hours) at 4/12/2024 1437  Last data filed at 4/12/2024 1200  Gross per 24 hour   Intake 487.16 ml   Output 4825 ml   Net -4337.84 ml       General: pleasant male. No acute distress.   HENT: external ears normal. Nares patent. Mucous membranes moist.  Eyes: perrla, extraocular muscles intact. No scleral icterus.   Neck: No JVD  Lungs: clear to auscultation  COR:  regular rate and rhythm, No murmurs, rubs, or  gallops  Abd: nondistended, BS present  Extrem: No edema         Imaging      CXR 4/11/2024 (report reviewed):  Cardiac silhouette at upper limits normal status post CABG.   Basilar interstitial infiltrates, though asymmetrically greater on the right.   Findings could reflect combination of fluid overload and possible infectious process in the right lung base.   No visible pneumothorax.     MRI brain 7/27/2023 (report reviewed):  1.  Small foci of acute to subacute infarction involving the dorsal left pontomedullary junction and left frontal lobe.  2.  Questionable punctate focus of infarction in the right para midline cerebellum seen on coronal diffusion weighted imaging only.  3.  Mild age-related changes as above    Lab Results   Lab Results   Component Value Date     04/12/2024    CO2 23 04/12/2024    CO2 30 06/05/2023    BUN 61.3 04/12/2024    BUN 38 06/05/2023     Lab Results   Component Value Date    WBC 6.6 04/11/2024    HGB 10.9 04/11/2024    HCT 33.7 04/11/2024    MCV 93 04/11/2024     04/11/2024     Lab Results   Component Value Date    CHOL 122 12/08/2023    TRIG 92 12/08/2023    HDL 40 12/08/2023     Lab Results   Component Value Date    INR 1.00 06/21/2023     Lab Results   Component Value Date    TROPONINI 0.13 06/02/2021     Lab Results   Component Value Date    TSH 1.68 06/16/2023    TSH 2.48 06/05/2023           Current Inpatient Scheduled Medications   Scheduled Meds:  Current Facility-Administered Medications   Medication Dose Route Frequency Provider Last Rate Last Admin    aspirin (ASA) chewable tablet 81 mg  81 mg Oral Daily Ash Fenrandez MD   81 mg at 04/12/24 0843    atorvastatin (LIPITOR) tablet 40 mg  40 mg Oral QPM Mic Valerio MD   40 mg at 04/11/24 2030    clopidogrel (PLAVIX) tablet 75 mg  75 mg Oral Daily Owen Renteria MD   75 mg at 04/12/24 1412    doxycycline monohydrate (MONODOX) capsule 100 mg  100 mg Oral Q12H [facility] Owen Renteria MD        gabapentin  (NEURONTIN) capsule 300 mg  300 mg Oral TID Minesh Colby MD   300 mg at 04/12/24 1412    insulin aspart (NovoLOG) injection (RAPID ACTING)  1-22 Units Subcutaneous TID  Owen Renteria MD        insulin aspart (NovoLOG) injection (RAPID ACTING)  1-16 Units Subcutaneous At Bedtime Owen Renteria MD        insulin aspart (NovoLOG) injection (RAPID ACTING)   Subcutaneous TID  Harlan Cruz MBBS   23 Units at 04/12/24 1228    insulin glargine (LANTUS PEN) injection 30 Units  30 Units Subcutaneous QAM  Owen Renteria MD   30 Units at 04/12/24 1404    isosorbide mononitrate (IMDUR) 24 hr tablet 30 mg  30 mg Oral Daily Mic Valerio MD   30 mg at 04/12/24 0852    metoprolol succinate ER (TOPROL XL) 24 hr tablet 50 mg  50 mg Oral QPM Mic Valerio MD   50 mg at 04/11/24 2030    sodium chloride (PF) 0.9% PF flush 3 mL  3 mL Intracatheter Q8H Ash Fernandez MD   3 mL at 04/12/24 0832    [START ON 4/13/2024] torsemide (DEMADEX) tablet 40 mg  40 mg Oral Daily Fritz Salvador DO         Current Facility-Administered Medications   Medication Dose Route Frequency Provider Last Rate Last Admin    Continuing ACE inhibitor/ARB/ARNI from home medication list OR ACE inhibitor/ARB order already placed during this visit   Does not apply DOES NOT GO TO Ash Samano MD        Continuing beta blocker from home medication list OR beta blocker order already placed during this visit   Does not apply DOES NOT GO TO Ash Samano MD        Continuing statin from home medication list OR statin order already placed during this visit   Does not apply DOES NOT GO TO Ash Samano MD        heparin 25,000 units in 0.45% NaCl 250 mL ANTICOAGULANT infusion  0-5,000 Units/hr Intravenous Continuous Owen Renteria MD 10 mL/hr at 04/12/24 0654 1,000 Units/hr at 04/12/24 0654    medication instruction   Does not apply Continuous PRN Ash Fernandez MD        Patient is already receiving anticoagulation  with heparin, enoxaparin (LOVENOX), warfarin (COUMADIN)  or other anticoagulant medication   Does not apply Continuous Ash Griffin MD                Medications Prior to Admission   Prior to Admission medications    Medication Sig Start Date End Date Taking? Authorizing Provider   aspirin 81 MG EC tablet Take 1 tablet (81 mg) by mouth daily Start tomorrow. 7/28/23  Yes Maikel Tripathi MD   atorvastatin (LIPITOR) 40 MG tablet Take 40 mg by mouth daily   Yes Unknown, Entered By History   clopidogrel (PLAVIX) 75 MG tablet Take 1 tablet (75 mg) by mouth daily Dose to start tomorrow. 7/28/23  Yes Maikel Tripathi MD   Continuous Blood Gluc Sensor (FREESTYLE DAVID 14 DAY SENSOR) [facility] [place]  12/10/21  Yes Reported, Patient   doxycycline monohydrate (ADOXA) 100 MG tablet Take 100 mg by mouth 2 times daily Ulcer on bottom of left foot 4/4/24 4/14/24 Yes Unknown, Entered By History   ezetimibe (ZETIA) 10 MG tablet Take 1 tablet (10 mg) by mouth daily 8/12/22  Yes Robinson Chopra MD   fish oil-omega-3 fatty acids 1000 MG capsule Take 1 g by mouth daily   Yes Reported, Patient   fluticasone (FLONASE) 50 MCG/ACT nasal spray Spray 2 sprays into both nostrils 2 times daily   Yes Unknown, Entered By History   gabapentin (NEURONTIN) 300 MG capsule [GABAPENTIN (NEURONTIN) 300 MG CAPSULE] Take 300 mg by mouth 3 (three) times a day.        11/18/15  Yes Provider, Historical   insulin glargine (LANTUS PEN) 100 UNIT/ML pen Inject 30 Units Subcutaneous every morning 10/10/16  Yes Provider, Historical   insulin lispro (HUMALOG KWIKPEN) 100 UNIT/ML (1 unit dial) KWIKPEN Inject Subcutaneous 3 times daily (before meals) Carb count: 3 g carb : 1 unit insulin  Maximum 25 units per meal; 75 units per day   Yes Unknown, Entered By History   levothyroxine (SYNTHROID/LEVOTHROID) 125 MCG tablet Take 125 mcg by mouth daily 7/27/22  Yes Reported, Patient   losartan (COZAAR) 25 MG tablet Take 25 mg by mouth daily 10/5/23 10/4/24 Yes Reported,  Patient   metoprolol succinate ER (TOPROL XL) 50 MG 24 hr tablet Take 1 tablet (50 mg) by mouth daily 11/22/22  Yes Robinson Chopra MD   multivitamin with iron (ONE DAILY WITH IRON) Tab tablet [MULTIVITAMIN WITH IRON (ONE DAILY WITH IRON) TAB TABLET] Take 1 tablet by mouth daily. 10/10/16  Yes Provider, Historical   nitroGLYcerin (NITROSTAT) 0.4 MG sublingual tablet Place 1 tablet (0.4 mg) under the tongue every 5 minutes as needed 2/13/23  Yes Robinson Chopra MD   torsemide (DEMADEX) 20 MG tablet Take 40 mg by mouth daily 8/28/23  Yes Reported, Patient   VITAMIN D3 2,000 unit capsule [VITAMIN D3 2,000 UNIT CAPSULE] Take 2,000 Units by mouth daily. 9/15/17  Yes Provider, Historical   TECHLITE PEN NEEDLES 31G X 5 MM miscellaneous  12/20/21   Reported, Patient          Mic Valerio MD [facility]  Non-Invasive Cardiologist  [facility]  Pager [phone]

## 2024-04-24 NOTE — PROGRESS NOTES
Assessment/Recommendations   Assessment:    1.  Acute on chronic heart failure with mildly reduced ejection fraction with LVEF of 45 to 50%: Patient was recently hospitalized early April with non-STEMI with minimal elevated troponin and acute heart failure exacerbation.    NT proBNP was found elevated in 7000's.  Previously elevated in 4000's.    Echocardiogram showed improved LVEF of 50 to 55%    Discharge weight was 210 pounds  Patient reports 18 pounds weight gain since he left the hospital.  He is hypervolemic on exam.    Current regimen includes   -Beta-blocker therapy with metoprolol succinate 50 mg daily   - ARB therapy with losartan 25 mg daily.   - Diuretic therapy with torsemide 40 mg daily-reports making urine    Patient met with [facility] heart failure core nurse clinician in the hospital for heart failure education.    2. Coronary artery disease with status post non-STEMI and PCI/BINA to proximal to mid graft lesion to second diagonal on 7/27/2023 with  patent LIMA to LAD and patent SVG to high distal lateral wall branch, 30% stenosis in proximal to distal RCA and 50% stenosis in ramus.  (see angio result for detail).    Recent hospitalization with non-STEMI with mild elevated troponin.  Patient declined coronary angiogram.  Nuclear stress test on 04/12/2024 showed negative for pharmacological regadenoson EKG for ischemia except noted medium sized area of moderate degree of transmural infarction in mid to basal inferior segments of the LV with no ischemia seen.  Inferior scar was appeared to be new.  Stress test was reviewed by Dr. Valerio and stated that his stress testing suggests a low risk presentation.    Recommended DAPT for another 12 months.  No chest pain.    2.  Dyslipidemia with LDL goal <70: On  atorvastatin 40 mg daily, Zetia 10 mg daily, and fish oil.      3. Acute on chronic kidney disease stage IV: Patient is currently not on hemodialysis.  BMP on 4/12/2024 showed potassium 4.0, BUN  61.3 and creatinine 3.41 -slight improved   Follows up with nephrology.  Last seen in January 2024.    Patient states he is still making urine and it is clear in color on torsemide.    Dialysis fistula in place.    6. Hypertension/bradycardia: His blood pressure is well controlled.  He reports orthostatic hypotension after he was started on isosorbide and he felt dizzy and passed out for seconds.  He reduced the isosorbide mononitrate from 30 mg to 15 mg daily which helped improve some dizziness.    Heart rate on the low side at 50s.    7.  Diabetes: On  insulin therapy.      Plan:  -Patient was recommended ED visit for further evaluation management of heart failure likely admission to the hospital with IV diuretic with close monitoring.  Patient declined.  -BMP pending.  If stable, will consider IV diuretic push as an outpatient.  Patient is agreeable.  -If further decline in renal function with today's lab, will fax lab results to his primary nephrologist office and request to address.  Likely will need to initiate on hemodialysis.  -Reduce metoprolol succinate from 50 mg to 25 mg daily given fatigue, orthostatic hypotension and bradycardia.  -Continue Imdur and torsemide in the morning  -Take metoprolol and losartan at bedtime.  Consider cutting back the dose further if continues to have issues with orthostatic hypotension.  Follow-up with Dr. Lieberman in 2 to 4 weeks in HF CORE clinic    Follow up with  LEONARDO Tovar as scheduled in 8 weeks.  Follow-up with Dr. Valerio in 3 to 4 months-per patient's request and preference.     History of Present Illness/Subjective    Mr. Dakota Bauer is a 73 year old male with a past medical history of type 1 diabetes mellitus, generalized anxiety disorder, hypothyroidism, obstructive sleep apnea, diabetic neuropathy, chronic kidney disease stage IV, peripheral vascular disease, hypertension, dyslipidemia, and coronary disease with previous CABG and recent hospitalization  with left-sided chest pain and nausea secondary to non-STEMI with status post PCI/BINA to proximal to mid graft to distal diagonal who is seen at [facility] for posthospitalization follow-up.    Patient was also found to have strep bacteremia with recent hospitalization in July.  SHAYLEE was negative for infective endocarditis.  Patient was hospitalized recently in April with non-STEMI with mild troponin elevation with stress test suggesting low risk.    Patient was seen by me in August 2023 for post PCI follow-up.    Today, Dakota reports 18 pounds weight gain with shortness of breath with some PND and orthopnea, abdominal bloating, fatigue and lower extremity edema.  He also has been experiencing orthostatic hypotension which resulted in passing out for seconds.  This improved after cutting back on isosorbide mononitrate.   He denies shortness of breath, palpitations, chest pain, and abdominal fullness/bloating.   He denies chest pain.  He denies bleeding complications.     Per Dr. Salvador's note from recent hospitalization,  dialysis AV fistula not ready for dialysis yet but would not delay or hold procedure if indicated for patient.    He states he is following low-sodium diet and following fluid restriction.     Coronary Angiogram from 7/27/23: reviewed:    Conclusion    Ost Cx to Prox Cx lesion is 100% stenosed.    Prox LAD lesion is 100% stenosed.    Ramus lesion is 50% stenosed.    Prox RCA to Dist RCA lesion is 30% stenosed.    Prox Graft to Mid Graft lesion is 100% stenosed.     1.  Severe diffuse disease of native vessels with small caliber vessels distally.  2.  Chronic occlusion of native LAD and native left circumflex.  3.  Left main fills small narrow caliber ramus branch only  4.  Right coronary artery has diffuse mild to moderate disease but no severe stenoses.  5.  Patent saphenous vein graft to high distal lateral wall branch (? Diagonal or marginal branch) which is  diffusely diseased. Anastomosis appears normal.  6.  Occluded saphenous vein graft to distal diagonal/lateral wall branches, most likely the acute infarct vessel.  7.  Patent BENITEZ to LAD. Anastomosis appears normal.  8.  Successful PCI saphenous vein graft to distal diagonal/lateral with drug-eluting stent implant x1  9.  Recommend continuing Plavix therapy indefinitely, risk factor management for ASCVD.     Recommendations  General Recommendations:  - Patient given specific instructions regarding care of arteriotomy site, activity restrictions, signs and symptoms of cardiac or vascular complications and to seek immediate medical evaluation should they occur.   - Follow up visit with Nurse Practitioner in 1-2 weeks.     ECHO from 7/27/23-Reviewed:   Interpretation Summary   The left ventricle is normal in size.  There is mild concentric left ventricular hypertrophy.  The visual ejection fraction is 45-50%.  Diastolic Doppler findings (E/E' ratio and/or other parameters) suggest left  ventricular filling pressures are increased.  Poor quality images but suspicion of inferior and apical hypokinesis.  Normal right ventricle size and systolic function.  The right ventricular systolic pressure is approximated at 19mmHg plus the  right atrial pressure.  Sinus rhythm was noted.  Technically difficult, suboptimal study. Consider cardiac MRI for improved  image quality.  Prior echo from 3/8/23 was of better quality. Inferior wll was normal on that  study.     Physical Examination Review of Systems   /54 (BP Location: Right arm, Patient Position: Sitting, Cuff Size: Adult Large)   Pulse 54   Resp 18   Wt 104.8 kg (231 lb)   SpO2 99%   BMI 31.33 kg/m    Body mass index is 31.33 kg/m .  Wt Readings from Last 3 Encounters:   04/25/24 104.8 kg (231 lb)   04/12/24 95.5 kg (210 lb 9.6 oz)   04/05/24 101.6 kg (224 lb)     General Appearance:   no distress, normal body habitus   ENT/Mouth: membranes moist, no oral lesions  or bleeding gums.      EYES:  no scleral icterus, normal conjunctivae   Neck: no thyromegaly   Chest/Lungs:   lungs are clear to auscultation, no rales or wheezing, equal chest wall expansion    Cardiovascular:   Heart rate regular. Normal first and second heart sounds with no murmurs, rubs, or gallops;JVP is difficult to assess due to the patient's body habitus, 2+ pitting LLE    Abdomen:  no organomegaly, masses, bruits, or tenderness; bowel sounds are present   Extremities   no cyanosis or clubbing; CMS intact   Skin: no xanthelasma, warm.    Neurologic: no tremors   Psychiatric: alert and oriented x3, calm                                                   Negative unless noted in HPI     Medical History  Surgical History Family History Social History   Past Medical History:   Diagnosis Date    Anxiety     Chronic kidney disease     Coronary artery disease     Diabetes mellitus (H)     Diabetic ulcer of toe (H)     Disorder of thyroid     Hyperlipidemia     Hypertension     Hypothyroidism     SHERRELL (obstructive sleep apnea)     no cpap    Past Surgical History:   Procedure Laterality Date    AMPUTATE TOE(S) Left     BYPASS GRAFT ARTERY CORONARY  01/01/2005    [facility] with Dr. Holter    CARDIAC CATHETERIZATION  01/01/2005    CARDIAC CATHETERIZATION  11/25/2016    no intervention    CATARACT EXTRACTION      CERVICAL DISC SURGERY  01/01/2014    CREATE FISTULA ARTERIOVENOUS UPPER EXTREMITY Left 4/2/2024    Procedure: CREATION, ARTERIOVENOUS FISTULA, UPPER EXTREMITY;  Surgeon: Edwin Lynne MD;  Location: [facility] Main OR    CV ANGIOGRAM CORONARY GRAFT N/A 07/27/2023    Procedure: Coronary Angiogram Graft;  Surgeon: Maikel Tripathi MD;  Location: [facility] CATH LAB CV    CV PCI N/A 07/27/2023    Procedure: Percutaneous Coronary Intervention;  Surgeon: Maikel Tripathi MD;  Location: [facility] CATH LAB CV    IR LOWER EXTREMITY ANGIOGRAM LEFT  04/07/2023    IR LOWER EXTREMITY ANGIOGRAM LEFT  04/08/2020     IR LOWER EXTREMITY ANGIOGRAM LEFT  2023    Family History   Problem Relation Age of Onset    Diabetes Mother     Coronary Artery Disease Father     Coronary Artery Disease Brother     Social History     Socioeconomic History    Marital status:      Spouse name: Not on file    Number of children: Not on file    Years of education: Not on file    Highest education level: Not on file   Occupational History    Not on file   Tobacco Use    Smoking status: Former     Current packs/day: 0.00     Types: Cigarettes     Quit date: 10/10/1984     Years since quittin.5    Smokeless tobacco: Never   Vaping Use    Vaping status: Never Used   Substance and Sexual Activity    Alcohol use: Not Currently    Drug use: Never    Sexual activity: Not on file   Other Topics Concern    Not on file   Social History Narrative    Not on file     Social Determinants of Health     Financial Resource Strain: High Risk (2021)    [facility]    Financial Resource Strain     Difficulty of Paying Living Expenses: Not on file     Difficulty of Paying Living Expenses: Not on file   Food Insecurity: Not on file   Transportation Needs: Not on file   Physical Activity: Not on file   Stress: Not on file   Social Connections: Unknown (2021)    [facility]    Social Connections     Frequency of Communication with Friends and Family: Not on file   Interpersonal Safety: Not on file   Housing Stability: Not on file          Medications  Allergies   Current Outpatient Medications   Medication Sig Dispense Refill    aspirin 81 MG EC tablet Take 1 tablet (81 mg) by mouth daily Start tomorrow. 30 tablet 3    atorvastatin (LIPITOR) 40 MG tablet Take 40 mg by mouth daily      clopidogrel (PLAVIX) 75 MG tablet Take 1 tablet (75 mg) by mouth daily Dose to  start tomorrow. 90 tablet 3    Continuous Blood Gluc Sensor (FREESTYLE DAVID 14 DAY SENSOR) MISC       ezetimibe (ZETIA) 10 MG tablet Take 1 tablet (10 mg) by mouth daily 90 tablet 4    fish oil-omega-3 fatty acids 1000 MG capsule Take 1 g by mouth daily      fluticasone (FLONASE) 50 MCG/ACT nasal spray Spray 2 sprays into both nostrils 2 times daily      gabapentin (NEURONTIN) 300 MG capsule [GABAPENTIN (NEURONTIN) 300 MG CAPSULE] Take 300 mg by mouth 3 (three) times a day.             insulin glargine (LANTUS PEN) 100 UNIT/ML pen Inject 30 Units Subcutaneous every morning      insulin lispro (HUMALOG KWIKPEN) 100 UNIT/ML (1 unit dial) KWIKPEN Inject Subcutaneous 3 times daily (before meals) Carb count: 3 g carb : 1 unit insulin  Maximum 25 units per meal; 75 units per day      isosorbide mononitrate (IMDUR) 30 MG 24 hr tablet Take 1 tablet (30 mg) by mouth daily (Patient taking differently: Take 0.5 tablets by mouth daily) 60 tablet 1    levothyroxine (SYNTHROID/LEVOTHROID) 125 MCG tablet Take 125 mcg by mouth daily      losartan (COZAAR) 25 MG tablet Take 25 mg by mouth daily      metoprolol succinate ER (TOPROL XL) 50 MG 24 hr tablet Take 1 tablet (50 mg) by mouth daily 90 tablet 2    multivitamin with iron (ONE DAILY WITH IRON) Tab tablet [MULTIVITAMIN WITH IRON (ONE DAILY WITH IRON) TAB TABLET] Take 1 tablet by mouth daily.      nitroGLYcerin (NITROSTAT) 0.4 MG sublingual tablet Place 1 tablet (0.4 mg) under the tongue every 5 minutes as needed 25 tablet 3    TECHLITE PEN NEEDLES 31G X 5 MM miscellaneous       torsemide (DEMADEX) 20 MG tablet Take 40 mg by mouth daily      VITAMIN D3 2,000 unit capsule [VITAMIN D3 2,000 UNIT CAPSULE] Take 2,000 Units by mouth daily.  3    Allergies   Allergen Reactions    Hydrochlorothiazide Other (See Comments)     Hyponatremia     Levofloxacin Other (See Comments)     Kidney pain         Lab Results    Chemistry/lipid CBC Cardiac Enzymes/BNP/TSH/INR   Lab Results   Component  Value Date    CHOL 122 12/08/2023    HDL 40 12/08/2023    TRIG 92 12/08/2023    BUN 61.3 (H) 04/12/2024     04/12/2024    CO2 23 04/12/2024    Lab Results   Component Value Date    WBC 6.6 04/11/2024    HGB 10.9 (L) 04/11/2024    HCT 33.7 (L) 04/11/2024    MCV 93 04/11/2024     (L) 04/11/2024    Lab Results   Component Value Date    TROPONINI 0.13 06/02/2021    TSH 1.68 06/16/2023    INR 1.00 06/21/2023        45  minutes spent on the date of encounter doing chart review, review of test results, interpretation with above tests, patient visit, documentation, and discussion with family.        This note has been dictated using voice recognition software. Any grammatical, typographical, or context distortions are unintentional and inherent to the software

## 2024-04-25 NOTE — TELEPHONE ENCOUNTER
----- Message from RAO Benson CNP sent at 4/25/2024  2:53 PM CDT -----  Stable renal function.  Offer IV Furosemide 80 mg X 1 today and BMP tomorrow and increase oral Torsemide from 40 mg to 80 mg daily.  If patient is not able to come in for IV diuretic, may increase torsemide from 40 mg to 80 mg daily and BMP on Monday.  Visit ER if worsening HF symptoms.  Thank you!  CY

## 2024-04-25 NOTE — TELEPHONE ENCOUNTER
Spoke w/ pt. Updated him w/ lab results and recommendations from Huma Negrete, RAO TOMLINSON. He verbalized understanding. He will come into the [facility] today for IVP furosemide 80mg x1 dose. BMP tomorrow 4/26. Will discuss increased PO torsemide recommendation per Huma in the [facility].      BRODY Burrell RN

## 2024-04-25 NOTE — PATIENT INSTRUCTIONS
Dakota Bauer,    It was a pleasure to see you today at the [facility].     My recommendations after this visit include:    - BMP pending     - Reduce Metoprolol from 50 mg to 25 mg daily to see if this help with lightheaded and dizziness with low BP    - Continue taking Torsemide and Imdur in the morning    - May take losartan at bed time     - Follow up with LEONARDO Chow in 8 weeks     - Follow up with Dr. Lieberman in 2-4 weeks     - Dr. Valerio in 3-4 months     - Please call Heart Failure Nurse Line at [phone], if you have any questions or concerns

## 2024-04-25 NOTE — LETTER
4/25/2024    Jamal Gaffney MD  [address]  [facility] [address]    RE: Dakota Lizette       Dear Colleague,     I had the pleasure of seeing Dakota Lizette in the [facility].          Assessment/Recommendations   Assessment:    1.  Acute on chronic heart failure with mildly reduced ejection fraction with LVEF of 45 to 50%: Patient was recently hospitalized early April with non-STEMI with minimal elevated troponin and acute heart failure exacerbation.    NT proBNP was found elevated in 7000's.  Previously elevated in 4000's.    Echocardiogram showed improved LVEF of 50 to 55%    Discharge weight was 210 pounds  Patient reports 18 pounds weight gain since he left the hospital.  He is hypervolemic on exam.    Current regimen includes   -Beta-blocker therapy with metoprolol succinate 50 mg daily   - ARB therapy with losartan 25 mg daily.   - Diuretic therapy with torsemide 40 mg daily-reports making urine    Patient met with [facility] heart failure core nurse clinician in the hospital for heart failure education.    2. Coronary artery disease with status post non-STEMI and PCI/BINA to proximal to mid graft lesion to second diagonal on 7/27/2023 with  patent LIMA to LAD and patent SVG to high distal lateral wall branch, 30% stenosis in proximal to distal RCA and 50% stenosis in ramus.  (see angio result for detail).    Recent hospitalization with non-STEMI with mild elevated troponin.  Patient declined coronary angiogram.  Nuclear stress test on 04/12/2024 showed negative for pharmacological regadenoson EKG for ischemia except noted medium sized area of moderate degree of transmural infarction in mid to basal inferior segments of the LV with no ischemia seen.  Inferior scar was appeared to be new.  Stress test was reviewed by Dr. Valerio and stated that his stress testing suggests a low risk presentation.    Recommended DAPT for another 12 months.  No chest pain.    2.  Dyslipidemia with LDL goal <70:  On  atorvastatin 40 mg daily, Zetia 10 mg daily, and fish oil.      3. Acute on chronic kidney disease stage IV: Patient is currently not on hemodialysis.  BMP on 4/12/2024 showed potassium 4.0, BUN 61.3 and creatinine 3.41 -slight improved   Follows up with nephrology.  Last seen in January 2024.    Patient states he is still making urine and it is clear in color on torsemide.    Dialysis fistula in place.    6. Hypertension/bradycardia: His blood pressure is well controlled.  He reports orthostatic hypotension after he was started on isosorbide and he felt dizzy and passed out for seconds.  He reduced the isosorbide mononitrate from 30 mg to 15 mg daily which helped improve some dizziness.    Heart rate on the low side at 50s.    7.  Diabetes: On  insulin therapy.      Plan:  -Patient was recommended ED visit for further evaluation management of heart failure likely admission to the hospital with IV diuretic with close monitoring.  Patient declined.  -BMP pending.  If stable, will consider IV diuretic push as an outpatient.  Patient is agreeable.  -If further decline in renal function with today's lab, will fax lab results to his primary nephrologist office and request to address.  Likely will need to initiate on hemodialysis.  -Reduce metoprolol succinate from 50 mg to 25 mg daily given fatigue, orthostatic hypotension and bradycardia.  -Continue Imdur and torsemide in the morning  -Take metoprolol and losartan at bedtime.  Consider cutting back the dose further if continues to have issues with orthostatic hypotension.  Follow-up with Dr. Lieberman in 2 to 4 weeks in HF CORE clinic    Follow up with  LEONARDO Tovar as scheduled in 8 weeks.  Follow-up with Dr. Valerio in 3 to 4 months-per patient's request and preference.     History of Present Illness/Subjective    Mr. Dakota Bauer is a 73 year old male with a past medical history of type 1 diabetes mellitus, generalized anxiety disorder, hypothyroidism,  obstructive sleep apnea, diabetic neuropathy, chronic kidney disease stage IV, peripheral vascular disease, hypertension, dyslipidemia, and coronary disease with previous CABG and recent hospitalization with left-sided chest pain and nausea secondary to non-STEMI with status post PCI/BINA to proximal to mid graft to distal diagonal who is seen at [facility] for posthospitalization follow-up.    Patient was also found to have strep bacteremia with recent hospitalization in July.  SHAYLEE was negative for infective endocarditis.  Patient was hospitalized recently in April with non-STEMI with mild troponin elevation with stress test suggesting low risk.    Patient was seen by me in August 2023 for post PCI follow-up.    Today, Dakota reports 18 pounds weight gain with shortness of breath with some PND and orthopnea, abdominal bloating, fatigue and lower extremity edema.  He also has been experiencing orthostatic hypotension which resulted in passing out for seconds.  This improved after cutting back on isosorbide mononitrate.   He denies shortness of breath, palpitations, chest pain, and abdominal fullness/bloating.   He denies chest pain.  He denies bleeding complications.     Per Dr. Salvador's note from recent hospitalization,  dialysis AV fistula not ready for dialysis yet but would not delay or hold procedure if indicated for patient.    He states he is following low-sodium diet and following fluid restriction.     Coronary Angiogram from 7/27/23: reviewed:    Conclusion    Ost Cx to Prox Cx lesion is 100% stenosed.    Prox LAD lesion is 100% stenosed.    Ramus lesion is 50% stenosed.    Prox RCA to Dist RCA lesion is 30% stenosed.    Prox Graft to Mid Graft lesion is 100% stenosed.     1.  Severe diffuse disease of native vessels with small caliber vessels distally.  2.  Chronic occlusion of native LAD and native left circumflex.  3.  Left main fills small narrow caliber ramus branch  only  4.  Right coronary artery has diffuse mild to moderate disease but no severe stenoses.  5.  Patent saphenous vein graft to high distal lateral wall branch (? Diagonal or marginal branch) which is diffusely diseased. Anastomosis appears normal.  6.  Occluded saphenous vein graft to distal diagonal/lateral wall branches, most likely the acute infarct vessel.  7.  Patent BENITEZ to LAD. Anastomosis appears normal.  8.  Successful PCI saphenous vein graft to distal diagonal/lateral with drug-eluting stent implant x1  9.  Recommend continuing Plavix therapy indefinitely, risk factor management for ASCVD.     Recommendations  General Recommendations:  - Patient given specific instructions regarding care of arteriotomy site, activity restrictions, signs and symptoms of cardiac or vascular complications and to seek immediate medical evaluation should they occur.   - Follow up visit with Nurse Practitioner in 1-2 weeks.     ECHO from 7/27/23-Reviewed:   Interpretation Summary   The left ventricle is normal in size.  There is mild concentric left ventricular hypertrophy.  The visual ejection fraction is 45-50%.  Diastolic Doppler findings (E/E' ratio and/or other parameters) suggest left  ventricular filling pressures are increased.  Poor quality images but suspicion of inferior and apical hypokinesis.  Normal right ventricle size and systolic function.  The right ventricular systolic pressure is approximated at 19mmHg plus the  right atrial pressure.  Sinus rhythm was noted.  Technically difficult, suboptimal study. Consider cardiac MRI for improved  image quality.  Prior echo from 3/8/23 was of better quality. Inferior wll was normal on that  study.     Physical Examination Review of Systems   /54 (BP Location: Right arm, Patient Position: Sitting, Cuff Size: Adult Large)   Pulse 54   Resp 18   Wt 104.8 kg (231 lb)   SpO2 99%   BMI 31.33 kg/m    Body mass index is 31.33 kg/m .  Wt Readings from Last 3  Encounters:   04/25/24 104.8 kg (231 lb)   04/12/24 95.5 kg (210 lb 9.6 oz)   04/05/24 101.6 kg (224 lb)     General Appearance:   no distress, normal body habitus   ENT/Mouth: membranes moist, no oral lesions or bleeding gums.      EYES:  no scleral icterus, normal conjunctivae   Neck: no thyromegaly   Chest/Lungs:   lungs are clear to auscultation, no rales or wheezing, equal chest wall expansion    Cardiovascular:   Heart rate regular. Normal first and second heart sounds with no murmurs, rubs, or gallops;JVP is difficult to assess due to the patient's body habitus, 2+ pitting LLE    Abdomen:  no organomegaly, masses, bruits, or tenderness; bowel sounds are present   Extremities   no cyanosis or clubbing; CMS intact   Skin: no xanthelasma, warm.    Neurologic: no tremors   Psychiatric: alert and oriented x3, calm                                                   Negative unless noted in HPI     Medical History  Surgical History Family History Social History   Past Medical History:   Diagnosis Date    Anxiety     Chronic kidney disease     Coronary artery disease     Diabetes mellitus (H)     Diabetic ulcer of toe (H)     Disorder of thyroid     Hyperlipidemia     Hypertension     Hypothyroidism     SHERRELL (obstructive sleep apnea)     no cpap    Past Surgical History:   Procedure Laterality Date    AMPUTATE TOE(S) Left     BYPASS GRAFT ARTERY CORONARY  01/01/2005    [place] [facility] with Dr. Holter    CARDIAC CATHETERIZATION  01/01/2005    CARDIAC CATHETERIZATION  11/25/2016    no intervention    CATARACT EXTRACTION      CERVICAL DISC SURGERY  01/01/2014    CREATE FISTULA ARTERIOVENOUS UPPER EXTREMITY Left 4/2/2024    Procedure: CREATION, ARTERIOVENOUS FISTULA, UPPER EXTREMITY;  Surgeon: Edwin Lynne MD;  Location: [facility] Main OR    CV ANGIOGRAM CORONARY GRAFT N/A 07/27/2023    Procedure: Coronary Angiogram Graft;  Surgeon: Maikel Tripathi MD;  Location: [facility] CATH LAB CV    CV PCI N/A 07/27/2023     Procedure: Percutaneous Coronary Intervention;  Surgeon: Maikel Tripathi MD;  Location: [facility] CATH LAB CV    IR LOWER EXTREMITY ANGIOGRAM LEFT  2023    IR LOWER EXTREMITY ANGIOGRAM LEFT  2020    IR LOWER EXTREMITY ANGIOGRAM LEFT  2023    Family History   Problem Relation Age of Onset    Diabetes Mother     Coronary Artery Disease Father     Coronary Artery Disease Brother     Social History     Socioeconomic History    Marital status:      Spouse name: Not on file    Number of children: Not on file    Years of education: Not on file    Highest education level: Not on file   Occupational History    Not on file   Tobacco Use    Smoking status: Former     Current packs/day: 0.00     Types: Cigarettes     Quit date: 10/10/1984     Years since quittin.5    Smokeless tobacco: Never   Vaping Use    Vaping status: Never Used   Substance and Sexual Activity    Alcohol use: Not Currently    Drug use: Never    Sexual activity: Not on file   Other Topics Concern    Not on file   Social History Narrative    Not on file     Social Determinants of Health     Financial Resource Strain: High Risk (2021)    Received from [facility], [facility]    Financial Resource Strain     Difficulty of Paying Living Expenses: Not on file     Difficulty of Paying Living Expenses: Not on file   Food Insecurity: Not on file   Transportation Needs: Not on file   Physical Activity: Not on file   Stress: Not on file   Social Connections: Unknown (2021)    [facility], [facility]    Social Connections     Frequency of Communication with Friends and Family: Not on file   Interpersonal Safety: Not on file   Housing Stability: Not on file          Medications  Allergies   Current Outpatient Medications   Medication Sig Dispense Refill    aspirin 81 MG EC tablet  Take 1 tablet (81 mg) by mouth daily Start tomorrow. 30 tablet 3    atorvastatin (LIPITOR) 40 MG tablet Take 40 mg by mouth daily      clopidogrel (PLAVIX) 75 MG tablet Take 1 tablet (75 mg) by mouth daily Dose to start tomorrow. 90 tablet 3    Continuous Blood Gluc Sensor (FREESTYLE DAVID 14 DAY SENSOR) MISC       ezetimibe (ZETIA) 10 MG tablet Take 1 tablet (10 mg) by mouth daily 90 tablet 4    fish oil-omega-3 fatty acids 1000 MG capsule Take 1 g by mouth daily      fluticasone (FLONASE) 50 MCG/ACT nasal spray Spray 2 sprays into both nostrils 2 times daily      gabapentin (NEURONTIN) 300 MG capsule [GABAPENTIN (NEURONTIN) 300 MG CAPSULE] Take 300 mg by mouth 3 (three) times a day.             insulin glargine (LANTUS PEN) 100 UNIT/ML pen Inject 30 Units Subcutaneous every morning      insulin lispro (HUMALOG KWIKPEN) 100 UNIT/ML (1 unit dial) KWIKPEN Inject Subcutaneous 3 times daily (before meals) Carb count: 3 g carb : 1 unit insulin  Maximum 25 units per meal; 75 units per day      isosorbide mononitrate (IMDUR) 30 MG 24 hr tablet Take 1 tablet (30 mg) by mouth daily (Patient taking differently: Take 0.5 tablets by mouth daily) 60 tablet 1    levothyroxine (SYNTHROID/LEVOTHROID) 125 MCG tablet Take 125 mcg by mouth daily      losartan (COZAAR) 25 MG tablet Take 25 mg by mouth daily      metoprolol succinate ER (TOPROL XL) 50 MG 24 hr tablet Take 1 tablet (50 mg) by mouth daily 90 tablet 2    multivitamin with iron (ONE DAILY WITH IRON) Tab tablet [MULTIVITAMIN WITH IRON (ONE DAILY WITH IRON) TAB TABLET] Take 1 tablet by mouth daily.      nitroGLYcerin (NITROSTAT) 0.4 MG sublingual tablet Place 1 tablet (0.4 mg) under the tongue every 5 minutes as needed 25 tablet 3    TECHLITE PEN NEEDLES 31G X 5 MM miscellaneous       torsemide (DEMADEX) 20 MG tablet Take 40 mg by mouth daily      VITAMIN D3 2,000 unit capsule [VITAMIN D3 2,000 UNIT CAPSULE] Take 2,000 Units by mouth daily.  3    Allergies   Allergen  Reactions    Hydrochlorothiazide Other (See Comments)     Hyponatremia     Levofloxacin Other (See Comments)     Kidney pain         Lab Results    Chemistry/lipid CBC Cardiac Enzymes/BNP/TSH/INR   Lab Results   Component Value Date    CHOL 122 12/08/2023    HDL 40 12/08/2023    TRIG 92 12/08/2023    BUN 61.3 (H) 04/12/2024     04/12/2024    CO2 23 04/12/2024    Lab Results   Component Value Date    WBC 6.6 04/11/2024    HGB 10.9 (L) 04/11/2024    HCT 33.7 (L) 04/11/2024    MCV 93 04/11/2024     (L) 04/11/2024    Lab Results   Component Value Date    TROPONINI 0.13 06/02/2021    TSH 1.68 06/16/2023    INR 1.00 06/21/2023        45  minutes spent on the date of encounter doing chart review, review of test results, interpretation with above tests, patient visit, documentation, and discussion with family.        This note has been dictated using voice recognition software. Any grammatical, typographical, or context distortions are unintentional and inherent to the software           Thank you for allowing me to participate in the care of your patient.      Sincerely,     RAO Benson [facility]  cc:   Mercedez Lieberman MD  [address]  [place] [address]

## 2024-04-25 NOTE — TELEPHONE ENCOUNTER
Unable to administer furosemide 80mg IVP x1 dose in [facility] today, multiple unsuccessful PIV attempts. Pt will increase his torsemide from 40mg daily to 80mg daily per Huma, he will take an additional 40mg this evening. Pt will go to the OP lab for a BMP on Monday 4/29.    Pt instructed to present to the ER if worsening HF symptoms or lack of significant improvement. Pt verbalized understanding.     Huma was updated in person of inability to administer IV diuretics and plan going forward at this time per her recommendations.     BRODY Burrell RN

## 2024-04-29 NOTE — PROGRESS NOTES
[facility]        Patient is here for a 4 week follow up  to discuss 4/2 L AVF creation. when does patient need PAD follow up? 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    The provider has been notified that the patient has no concerns.     Questions patient would like addressed today are: N/A.    Refills are needed: No    Has homecare services and agency name:  Grisel Beard RN

## 2024-04-29 NOTE — TELEPHONE ENCOUNTER
Spoke w/ pt regarding lab results and recommendations from RAO Benson CNP. Pt verbalized understanding. Pt is feeling/seeing much improvement from last week. Will fax lab results to pt's nephrology clinic.    Huma-  Pt reports quite a bit of improvement over the weekend. Clinic weight on 4/25 was 231lbs w/ shoes, today it is 224lbs at home. His breathing is feeling much better, just a little bit of SOB w/ exertion still, but not like it was. LE swelling is improved, it is still in his LLE, but better by quite a bit. Abdominal distention is also improved and dizziness is gone. Pt notices an increase in his urination w/ torsemide 80mg daily.    Given improvement, what are your recommendations at this time? Pt unsure when he sees nephrology next. Continue torsemide 80mg daily? BMP? Scheduled to see Dr. Lieberman on 5/10. Thank you.      BRODY Burrell RN

## 2024-04-29 NOTE — TELEPHONE ENCOUNTER
Huma Negrete, RAO CNP  Santy Sampson RN; P [facility]  Caller: Unspecified (Today, 11:53 AM)  Thank you Santy!    Glad he is feeling better and responding to increased dose of torsemide.  Lets continue on current dose and plan repeat BMP in a week.  Still should consider ER visit if worsening symptoms.  Would highly recommend limiting salt intake to < 1500 mg/day and limit fluid intake <1500 mL/day  CY

## 2024-04-29 NOTE — TELEPHONE ENCOUNTER
Noted. Updated pt w/ further recommendations from Huma. He verbalized understanding, has been trying to limit sodium and fluid intake. Agreeable to considering ER visit if symptoms worsen. Will place BMP order & pt will go to the OP lab in 1 week.    BRODY Burrell RN

## 2024-04-29 NOTE — TELEPHONE ENCOUNTER
----- Message from RAO Benson CNP sent at 4/29/2024 10:29 AM CDT -----  Renal function has worsened.  Torsemide dose was increased to 80 mg on Friday. Wt was up 18 lbs when I saw last Friday.  If wt is still up with HF symptoms, should consider ED visit for further evaluation of HF and management with close monitoring of renal function with nephrology consult.  Please fax today's lab result to pt's nephrology team.  Thank you!  CY

## 2024-04-29 NOTE — PATIENT INSTRUCTIONS
Fanta Duncan,    Thank you for entrusting your care with us today. After your visit today with MD Edwin Lynne this is the plan that was discussed at your appointment.    We will see you in 6 weeks to discuss fistula surgery       I am including additional information on these things and our contact information if you have any questions or concerns.   Please do not hesitate to reach out to us if you felt we did not answer your questions or you are unsure of the treatment plan after your visit today. Our number is [phone].Thank you for trusting us with your care.         Again thank you for your time.       What to Expect After Your Dialysis Access Surgery  Hospital Stay  You can expect to go home the same day as your surgery.    Medications  Take all medications exactly as directed. You may need to stop some taking some blood thinners prior to surgery. Your specific medications will be discussed with you prior going home.    Incision Care   Your incision sites may have some bruising and minor swelling for about one week.  If your incision is sealed with Dermabond (glue) you may shower and leave the incision open to air the following day. When showering do not rub incision, let the warm soapy water run over it and pat dry. DO NOT SOAK INCISION IN A TUB/POOL/Etc until healed. Keep incisions dry and covered until they begin to heal.     Restrictions   You may use the arm freely after the site heals. Keep the following in mind:   Avoid pressure on the arm, lifting heavy objects with the arm, sleeping on the arm with the graft or fistula, and wearing tight-sleeved shirts or jewelry around the graft or fistula.   Do not allow blood pressure monitoring or needle punctures (other than dialysis) on the side where the graft or fistula is located.   Fistula/Graft Use - Fistulas need to be assessed at a follow up appointment with your surgeon prior to it being used for dialysis.     Follow-Up  If a follow up appointment  was not scheduled prior to your procedure please call [phone]. Follow up appointments are scheduled with either your Physician or one a member of our care team.     Risks and Possible Complications   Steal  Syndrome - You may initially feel some coolness or numbness in the hand with the fistula. These sensations usually go away in a few weeks as your circulation compensates for the fistula. However, if these sensations are severe or don't disappear, tell your physician as soon as possible, because the fistula may be causing too much blood to flow away from your hand, a condition physicians call a  steal.   Infection/ Drainage/Bleeding/ Excessive Swelling - If there is any drainage or bleeding it should be a very small amount. If you have excessive bleeding, any milky drainage, or redness from the incisions call your doctor right away. Minor swelling is normal, if your experience excessive swelling call right away.   Graft/Fistula Narrowing or Occlusion - Grafts and fistulas may develop thrombosis or stenosis, essentially blocking or partially blocking blood flow within the created graft or fistula. To assess blood flow place your fingers over the graft or fistula and feel for a vibration or a  thrill .   THIS SHOULD BE DONE EVERY DAY! IF THRILL CANNOT BE FELT PLEASE CONTACT YOUR SURGEON AS SOON AS POSSIBLE.    *Exercising for your fistula after surgery*    An AV fistula must mature for several weeks or months before it can be used for hemodialysis, so after it is surgically created, your doctor will ask you to work on strengthening it. The more access arm exercises you do to help strengthen it, the sooner you ll be able to use your fistula. Your doctor may recommend certain arm and finger exercises that will strengthen the fistula. The exercises your doctor recommends will depend on where your fistula is located. Fistulas are usually located in the forearm or upper arm. Before you start any exercise, it s  important to consult your doctor.    Use your arm for all of your usual activities. After seven days from the date of your surgery apply a tourniquet around your arm above your fistula tight enough to make the veins distend but not so tight that your hand goes to sleep or becomes extremely uncomfortable. Lightly squeeze a stress ball and hold for 10 seconds. Do a total of 5 repetitions. It is recommended to do this 6 times per day. Keep doing this until the fistula is used for dialysis.      Keeping your fistula clean  Once your AV fistula is strong enough to be used for hemodialysis, it is crucial that you keep it clean. Although a fistula is less prone to infection than other dialysis types, proper hygiene is still important:  Look for redness or swelling around the fistula area.   If you experience any pain in the fistula area, tell your doctor immediately.   If you get a fever, this can be a sign of infection.   Wash and pat dry your fistula arm thoroughly right before each treatment. Your dialysis facility will provide you with supplies.      Learning About Hemodialysis and Vascular Access Surgery    What are hemodialysis and vascular access?  Before you can start dialysis, your doctor will need to create a vascular access. This is a place where the blood can flow in and out of your body during your dialysis sessions. Your doctor will prepare the vascular access weeks to months before dialysis starts. It's important to get your access as soon as your doctor says to. This allows your access to heal before you use it.    For dialysis to work best, the access needs to have a good, steady blood flow. It also must be sturdy since it will be used often, usually 3 times every week.    Hemodialysis is a way to remove wastes from the blood when your kidneys can no longer do the job. It's a lifesaving treatment when you have kidney failure. Hemodialysis is often called dialysis.    Learning about vascular access and  dialysis can help you take an active role in your treatment. Dialysis doesn't cure kidney disease. But it can help you live longer and feel better. You will need to follow your diet and treatment schedule carefully.    How is vascular access surgery done?  The vascular access is where the needles are put that draw the blood from your body and send it through tubes to the dialysis machine. This access is also used to return the clean blood that is sent back into your body. There are two basic types of permanent vascular access:    AV fistula.  To make a fistula, your doctor will connect an artery to a vein in your arm. After the fistula heals, the dialysis needles can be put into it. Fistulas tend to be stronger and less likely to get infected than grafts. But they need to be prepared at least several months ahead of time.    AV graft.  To make a graft, your doctor will put a tube under the skin in your arm. The tube, or graft, connects an artery and a vein. The dialysis needles can then be put into the graft for dialysis. A graft is a good choice if you have small veins or other problems. A graft can sometimes be used as soon as 1 to 3 weeks after placement.    You will be asleep or get medicine to feel relaxed during the surgery. You will not feel pain. Your doctor will make a cut (incision) on the arm you use the least. If you are right-handed, the fistula or graft will probably be put in your left arm. If you are left-handed, it will probably be put in your right arm. Your doctor will close the incision with stitches. The incision will leave a scar that fades with time.    If you need to start hemodialysis right away, your doctor may place a tube in a vein in your neck, chest, or arm. This is called a central vascular access device (CVAD). It can be used while your permanent access heals. CVADs have more problems than an AV fistula or AV graft, so they aren't the best choice for permanent access.    If you get an  AV fistula, you will probably go home the same day as the surgery. If you get an AV graft, you may spend 1 night at the hospital. You will probably need to take 1 or 2 days off from work.    What happens during hemodialysis?  Hemodialysis uses a man-made membrane called a dialyzer to clean your blood. You are connected to the dialyzer by tubes. They are attached to your blood vessels through your vascular access.    Blood flows from your body into the dialysis machine through one of the tubes.  In the machine, your blood is filtered. When your blood is in the filter, dialysate solution also goes through the filter. This solution takes waste out of your blood.  The used solution is pumped out of the machine. Your clean blood returns to your body through the other tube.    How can you care for yourself at home?  Be sure to have all of your dialysis sessions. Do not try to shorten or skip your sessions. You have a better chance of a longer and healthier life by getting your full treatment.  Your doctor or health care team will show you the steps you need to go through each day before, during, and after dialysis. Be sure to follow these steps. If you do not understand a step, talk to your team.  Your doctor and dietitian will help you design menus that follow your diet. Be sure to follow your diet guidelines.  You will need to limit fluids and certain foods that contain salt (sodium), potassium, and phosphorus.  You may need higher levels of protein in your diet.  Your doctor may recommend certain vitamins. But do not take any other medicine, including over-the-counter medicines, vitamins, and herbal products, without talking to your doctor first.  Do not smoke. Smoking raises your risk of many health problems, including more kidney damage. If you need help quitting, talk to your doctor about stop-smoking programs and medicines. These can increase your chances of quitting for good.  Do not take ibuprofen (Advil,  Motrin), naproxen (Aleve), or similar medicines, unless your doctor tells you to. These medicines may make kidney problems worse.    Follow-up care is a key part of your treatment and safety. Be sure to make and go to all appointments, and call your doctor if you are having problems. It's also a good idea to know your test results and keep a list of the medicines you take.        Current as of: March 1, 2023  Author: [employer] Staff  Medical Review:FRANCISCO Gomez MD - Internal Medicine & Latrell Joshua MD - Family Medicine & Rosi Perez MD - Family Medicine & Tim Jama MD - Nephrology    Please scan the QR code to watch a video about dialysis and vascular access. There is a link provided if you are unable to use the code.

## 2024-04-30 NOTE — TELEPHONE ENCOUNTER
Patient's wife contacted with clarification of fluid restriction of 50-60 oz per day.   Oma WEISS RN BSN, CHFN, PCCN-K

## 2024-04-30 NOTE — TELEPHONE ENCOUNTER
The patient's wife called and asked that the nurse call them back to verify the fluid restriction as there is a bit of confusion on the patient's behalf    Thank you!

## 2024-05-06 NOTE — TELEPHONE ENCOUNTER
"Noted.    Huma-  Updated pt and his wife w/ your review of my note and your recommendations for ED visit w/ admission for close monitoring, management, and safety. Pt \"does not want to go\" and feels strongly about this. Pt reports that \"for years\" his weight was 218lbs, and that getting down to 210lbs in the hospital was too low for him. I did review that even if we went off of goal weight being 218lbs, he is still up about 5lbs above baseline. Pt would like to continue to try to manage care OP at this time.     Given this, do you have recommendations at this time until pt see's Dr. Lieberman on Friday for dizziness, cramping? Thank you.    BRODY Burrell RN     "

## 2024-05-06 NOTE — TELEPHONE ENCOUNTER
----- Message from RAO Benson CNP sent at 5/6/2024 10:06 AM CDT -----  Stable lab.  If stable wt/HF status, continue current diuretic therapy.  Thank you!  CY

## 2024-05-06 NOTE — TELEPHONE ENCOUNTER
"Spoke w/ pt and his wife regarding stable lab results and recommendations from RAO Benson CNP. They verbalized understanding, but have some concerns.    Huma-  Pt reports a weight today and yesterday of 223lbs. 1 week ago it was 224lbs, staying steady. Pt hasn't been experiencing SOB often. LLE still w/ edema that could improve, but they report it has gone down a little bit.     Pt & his wife's biggest concern is his dizziness. Last week it had improved when we spoke, but they report it has been persisting over the last week again. It is happening when he changes position, from lying to sitting or sitting to standing. He wears compression socks. Today he had an appt w/ vascular and he had to sit and rest multiple times d/t the dizziness. His wife is concerned he will pass out again like he did a few weeks ago (looks like they told you at your recent visit, seems to have started around starting imdur). His BP at the appt today was 135/64. At home when he's taken it it has been 130s/50s-60s. Last week it was 105/49 once when he was feeling dizzy. He has been drinking at least 50-60oz daily. HR's per pt at home have been 60-65bpm.     Pt has also had increase in \"moises horses\" or leg cramping over the last week. Used to be once every 2-3 weeks, now has been nearly nightly. Related to increased diuretic?    Pt has also been having low blood sugars more frequently, but for the most part they haven't been correlating w/ his dizziness. I did advise them to reach out to whoever manages his diabetes to see if any changes need to be made. Pt's wife is wondering if any of the cardiac meds could be dropping his blood sugar too?     BP related meds: torsemide 80mg daily in AM, imdur 15mg daily in AM. Metoprolol succinate 25mg daily at HS, losartan 25mg daily at HS.     What are your recommendations at this time? Add on Mg to BMP d/t cramps  and/or OTC supplement? Adjust any cardiac meds? Let me know. Thank you. Pt " will see Dr. Lieberman on Fri 5/10.     BRODY Burrell RN

## 2024-05-06 NOTE — PROGRESS NOTES
VASCULAR SURGERY CLINIC CONSULTATION    VASCULAR SURGEON: Donell Lynne MD, RPVI     LOCATION:  [facility]    Dakota Bauer   Medical Record #:  [id number]  YOB: 1950  Age:  73 year old     Date of Service: 5/6/24    PRIMARY CARE PROVIDER: Jamal Gaffney      Reason for visit:  AVF follow up left brachial basilic fistula placed 4/2    IMPRESSION:  Mr. Bauer is a pleasant 73 year old male with PMH of ESRD and CLTI of LLE s/p TMA w/  pedal angioplasty in June 2023 with recent placement of left brachiobasilic AV fistula, healing well.  Flow volumes over 600 cc/min and most of fistula, noted to be around 400 cc/min and distal portion of basilic vein likely secondary to large venous branches noted on ultrasound.  Diameter approximately 6 mm in majority of vein, appears to be maturing well.  Mild numbness in fingers that is intermittent and not lifestyle limiting, no other signs of ischemia.  Will monitor.  Of note patient reports recent MI 2 weeks ago requiring hospital admission, has plans for cardiology follow-up this week with medication adjustment and continued monitoring of labs.  He remains off dialysis with close surveillance of kidney function at this time.  Wife reports that they are continuing to monitor left lower extremity wounds which are very slowly healing, patient refused exam today due to jeans and thigh-high compression socks in place.      RECOMMENDATION/RISKS:    -Left brachial basilic fistula appears mature and ready for superficialization and ligation of large branches.  We will delay this in setting of recent MI and no current need for dialysis.  -Follow-up in 6 weeks to monitor cardiac function and review cardiology notes.  Patient stable at that time we will schedule superficialization after that date.  -Follow-up in August 2024 with repeat noninvasives of bilateral lower extremities as last exam was in February 2024, advised patient to reach back out to  vascular team if wounds worsened in that interim  -Continue aspirin, Plavix, high-dose statin daily with close monitoring of blood pressure      HPI:  Dakota Bauer is a 73 year old male who was seen today for follow up of left upper extremity aVF, status post left brachiobasilic for stage fistula placement on April 2.  Recovering well from the fistula perspective, notes mild intermittent numbness in third and fourth finger but denies any hand weakness, coolness of fingertips, discoloration, or pain in hand.  He does note pain in joints but says this is longstanding and has occurred in bilateral hands, not worse since fistula placement.  He does report recent shortness of breath and was admitted to the hospital 2 weeks ago with concern for MI.  His last cardiac stent was placed in 2023 and there was discussion if this was spasm around the stent versus new MI.  He does have follow-up with cardiology this Friday and does not note any new worsening chest pain or shortness of breath.  He remains off dialysis at this time and has close surveillance with nephrology, next visit in June 2024.  He continues on p.o. diuretics and limits salt intake regularly.  He is also noted to have left lower extremity wound and has undergone pedal angioplasty in the past.  He is followed closely with wound care and his wife reports this wound is intermittently healing but she is without concerns today.  Patient refused physical exam or pulse exam due to clothing limitations.  Denies any new claudication, rest pain, or new wounds on bilateral lower extremities.    REVIEW OF SYSTEMS:    A 12 point ROS was reviewed and is negative except for the above..     PHH:    Past Medical History:   Diagnosis Date    Anxiety     Chronic kidney disease     Coronary artery disease     Diabetes mellitus (H)     Diabetic ulcer of toe (H)     Disorder of thyroid     Hyperlipidemia     Hypertension     Hypothyroidism     SHERRELL (obstructive sleep apnea)     no  cpap         Past Surgical History:   Procedure Laterality Date    AMPUTATE TOE(S) Left     BYPASS GRAFT ARTERY CORONARY  01/01/2005    [facility] with Dr. Holter    CARDIAC CATHETERIZATION  01/01/2005    CARDIAC CATHETERIZATION  11/25/2016    no intervention    CATARACT EXTRACTION      CERVICAL DISC SURGERY  01/01/2014    CREATE FISTULA ARTERIOVENOUS UPPER EXTREMITY Left 4/2/2024    Procedure: CREATION, ARTERIOVENOUS FISTULA, UPPER EXTREMITY;  Surgeon: Edwin Lynne MD;  Location: [facility] Main OR    CV ANGIOGRAM CORONARY GRAFT N/A 07/27/2023    Procedure: Coronary Angiogram Graft;  Surgeon: Maikel Tripathi MD;  Location: [facility] CATH LAB CV    CV PCI N/A 07/27/2023    Procedure: Percutaneous Coronary Intervention;  Surgeon: Maikel Tripathi MD;  Location: [facility] CATH LAB CV    IR LOWER EXTREMITY ANGIOGRAM LEFT  04/07/2023    IR LOWER EXTREMITY ANGIOGRAM LEFT  04/08/2020    IR LOWER EXTREMITY ANGIOGRAM LEFT  06/21/2023        ALLERGIES:     Allergies   Allergen Reactions    Hydrochlorothiazide Other (See Comments)     Hyponatremia     Levofloxacin Other (See Comments)     Kidney pain        MEDS:    Current Outpatient Medications   Medication Sig Dispense Refill    aspirin 81 MG EC tablet Take 1 tablet (81 mg) by mouth daily Start tomorrow. 30 tablet 3    atorvastatin (LIPITOR) 40 MG tablet Take 40 mg by mouth daily      clopidogrel (PLAVIX) 75 MG tablet Take 1 tablet (75 mg) by mouth daily Dose to start tomorrow. 90 tablet 3    Continuous Blood Gluc Sensor (FREESTYLE DAVID 14 DAY SENSOR) MISC       ezetimibe (ZETIA) 10 MG tablet Take 1 tablet (10 mg) by mouth daily 90 tablet 4    fish oil-omega-3 fatty acids 1000 MG capsule Take 1 g by mouth daily      fluticasone (FLONASE) 50 MCG/ACT nasal spray Spray 2 sprays into both nostrils 2 times daily      gabapentin (NEURONTIN) 300 MG capsule [GABAPENTIN (NEURONTIN) 300 MG CAPSULE] Take 300 mg by mouth 3 (three) times a day.             insulin glargine  (LANTUS PEN) 100 UNIT/ML pen Inject 30 Units Subcutaneous every morning      insulin lispro (HUMALOG KWIKPEN) 100 UNIT/ML (1 unit dial) KWIKPEN Inject Subcutaneous 3 times daily (before meals) Carb count: 3 g carb : 1 unit insulin  Maximum 25 units per meal; 75 units per day      isosorbide mononitrate (IMDUR) 30 MG 24 hr tablet Take 1 tablet (30 mg) by mouth daily (Patient taking differently: Take 0.5 tablets by mouth daily) 60 tablet 1    levothyroxine (SYNTHROID/LEVOTHROID) 125 MCG tablet Take 125 mcg by mouth daily      losartan (COZAAR) 25 MG tablet Take 25 mg by mouth daily      metoprolol succinate ER (TOPROL XL) 50 MG 24 hr tablet Take 1 tablet (50 mg) by mouth daily 90 tablet 2    multivitamin with iron (ONE DAILY WITH IRON) Tab tablet [MULTIVITAMIN WITH IRON (ONE DAILY WITH IRON) TAB TABLET] Take 1 tablet by mouth daily.      nitroGLYcerin (NITROSTAT) 0.4 MG sublingual tablet Place 1 tablet (0.4 mg) under the tongue every 5 minutes as needed 25 tablet 3    TECHLITE PEN NEEDLES 31G X 5 MM miscellaneous       torsemide (DEMADEX) 20 MG tablet Take 4 tablets (80 mg) by mouth daily 360 tablet 1    VITAMIN D3 2,000 unit capsule [VITAMIN D3 2,000 UNIT CAPSULE] Take 2,000 Units by mouth daily.  3     No current facility-administered medications for this visit.        SOCIAL HABITS:    Tobacco Use      Smoking status: Former        Packs/day: 0.00        Types: Cigarettes        Quit date: 10/10/1984        Years since quittin.5      Smokeless tobacco: Never       Alcohol Use: Not on file       History   Drug Use Unknown        FAMILY HISTORY:    Family History   Problem Relation Age of Onset    Diabetes Mother     Coronary Artery Disease Father     Coronary Artery Disease Brother        PE:  /64   Pulse 61   Ht 1.829 m (6')   Wt 101.2 kg (223 lb)   SpO2 99%   BMI 30.24 kg/m    Wt Readings from Last 1 Encounters:   24 101.2 kg (223 lb)     Body mass index is 30.24 kg/m .    EXAM:  GENERAL:  This is a well-developed 73 year old male who appears his stated age  EYES: Grossly normal.  MOUTH: Buccal mucosa grossly normal  CARDIAC: Regular rate by palpation  CHEST/LUNG:  Nonlabored breathing on room air.   GASTROINTESINAL (ABDOMEN):Not Assessed   MUSCULOSKELETAL: Grossly normal and both lower extremities are intact.  Left upper extremity AV fistula in place, antecubital incision well-healed, strong palpable pulse and thrill in left basilic vein.  No discoloration in left hand, strength 5 out of 5 in bilateral upper extremities.  HEME/LYMPH: No lymphedema  NEUROLOGIC: Focally intact, Alert and oriented x 3.   PSYCH: appropriate affect  INTEGUMENT: No open lesions or ulcers  Pulse Exam    Radial: Left +2   Right  +2    DIAGNOSTIC STUDIES:     Images:  Flow volumes range from 400 cc to minute to 700 cc/min, diameter approximately 5 to 6 mm with at least 2 large venous branches noted along basilic vein, no areas of stenosis identified on my review.    LABS:      Sodium   Date Value Ref Range Status   05/06/2024 133 (L) 135 - 145 mmol/L Final     Comment:     Reference intervals for this test were updated on 09/26/2023 to more accurately reflect our healthy population. There may be differences in the flagging of prior results with similar values performed with this method. Interpretation of those prior results can be made in the context of the updated reference intervals.    04/29/2024 131 (L) 135 - 145 mmol/L Final     Comment:     Reference intervals for this test were updated on 09/26/2023 to more accurately reflect our healthy population. There may be differences in the flagging of prior results with similar values performed with this method. Interpretation of those prior results can be made in the context of the updated reference intervals.    04/25/2024 134 (L) 135 - 145 mmol/L Final     Comment:     Reference intervals for this test were updated on 09/26/2023 to more accurately reflect our healthy  population. There may be differences in the flagging of prior results with similar values performed with this method. Interpretation of those prior results can be made in the context of the updated reference intervals.      Urea Nitrogen   Date Value Ref Range Status   05/06/2024 56.6 (H) 8.0 - 23.0 mg/dL Final   04/29/2024 47.0 (H) 8.0 - 23.0 mg/dL Final   04/25/2024 61.0 (H) 8.0 - 23.0 mg/dL Final   06/05/2023 38 (H) 8 - 28 mg/dL Final   06/17/2022 48 (H) 8 - 28 mg/dL Final   12/17/2021 40 (H) 8 - 28 mg/dL Final     Hemoglobin   Date Value Ref Range Status   04/11/2024 10.9 (L) 13.3 - 17.7 g/dL Final   04/02/2024 10.8 (L) 13.3 - 17.7 g/dL Final   12/05/2023 11.7 (L) 13.3 - 17.7 g/dL Final     Platelet Count   Date Value Ref Range Status   04/11/2024 149 (L) 150 - 450 10e3/uL Final   04/02/2024 130 (L) 150 - 450 10e3/uL Final   12/05/2023 146 (L) 150 - 450 10e3/uL Final     INR   Date Value Ref Range Status   06/21/2023 1.00 0.85 - 1.15 Final       30 minutes spent on the day of encounter doing chart review, history and exam, documentation, and further activities as noted.     Jenny Louis DO  Fellow  Vascular Surgery

## 2024-05-06 NOTE — TELEPHONE ENCOUNTER
LM for patient to return call to discuss current recommendations as per BUSHRA Benson Tayva M, RN routed conversation to [facility] now (4:48 PM)     Santy Sampson RN21 minutes ago (4:27 PM)     Huma Aguilar, APRN Santy Julien, RN  Caller: Unspecified (Today, 11:39 AM)  Thanks Santy!  His recent K+ is stable.  I wouldn't starting on supplement given his renal disease.    Increase food rich in potassium and magnesium.  No further recommendation at this time.  SOURAV WEISS RN BSN, CHFN, PCCN-K

## 2024-05-06 NOTE — TELEPHONE ENCOUNTER
Huma Negrete, Santy Hamilton CNP, RN; P [facility] Core  Caller: Unspecified (Today, 11:39 AM)  His wt is still up 12 lbs since he left the hospital.  I am hesitant to increase his diuretic given his dizziness.  Safest would be visit ED and get admitted with close monitoring.  CY

## 2024-05-06 NOTE — TELEPHONE ENCOUNTER
Huma Negrete, RAO CNP  Santy Sampson RN  Caller: Unspecified (Today, 11:39 AM)  Thanks Santy!  His recent K+ is stable.  I wouldn't starting on supplement given his renal disease.    Increase food rich in potassium and magnesium.  No further recommendation at this time.  CY

## 2024-05-08 NOTE — TELEPHONE ENCOUNTER
[facility] Call Center    Phone Message    May a detailed message be left on voicemail: yes     Reason for Call: Other: Pt's wife is calling to ask if Isosorbide and metoprolol can be order in a smaller dose so they don't have to be cut in half.  Please call Funmilayo Schroeder to discuss.     Action Taken: Other: cardio    Travel Screening: Not Applicable    Thank you!  [facility]

## 2024-05-08 NOTE — TELEPHONE ENCOUNTER
Able to call pharmacy and provide assistance with new tablet doses. Refill sent.  Wife informed.     Bob Campos RN [facility]

## 2024-05-10 NOTE — PROGRESS NOTES
HEART CARE NOTE        Thank you, Dr. Negrete, for asking the [facility] Heart Care team to see Dakota Lizette to evaluate CHF.      Assessment/Recommendations     1. HFpEF   Assessment / Plan  Mildly hypervolemic on physical exam; discussed further possibly adjusting diuretic regimen, but patient was patient is understandably reluctant given renal dysfunction; he will try to adhere more strictly to Na and fluid restrictions and continue to monitor for signs of progressive fluid retention more closely  Patient is high risk for adverse cardiac events 2/2 advanced age, frailty, CAD, DM2, renal dysfunction, HTN  GDMT as detailed below; mainstay of treatment for HFpEF includes diuretics and adequate BP control (class I) and SGLT2-I (class 2a); additional medical therapy (ARNI, MRA, ARB) demonstrated less robust evidence for indication but may be considered per guideline recommendations (2b); no indication for BBlockers     Current Pharmacotherapy AHA Guideline-Directed Medical Therapy   Losartan  25 mg daily ARNI/ARB   Spironolactone not started  MRA   SGLT2 inhibitor: not started SGLT2-I    Torsemide 80 mg daily Loop diuretic      2. DM1  Assessment / Plan  Management per PMD    3. SHERRELL  Assessment / Plan      4. CKD stage IV  Assessment / Plan  Follows with Dr. Mclaughlin; Over all stable GFR on serial labs - no changes to regimen at this time    5. HLP  Assessment / Plan  Continue atorvastatin    6. HTN  Assessment / Plan  Adequately controlled on current regimen - no changes at this time    7. CAD s/p CABG  Assessment / Plan  With subsequent PCI to mid graft- distal Diag  Denies chest pain or anginal equivalents; repeat stress low probability   Continue ASA, atorvastatin, metoprolol, clopidogrel    65 minutes spent reviewing prior records (including documentation, laboratory studies, cardiac testing/imaging), history and physical exam, planning, and subsequent documentation.    The longitudinal plan of care for  HFpEF was addressed during this visit. Due to the added complexity in care, I will continue to support Mr. Dakota Bauer in the subsequent management of this condition(s) and with the ongoing continuity of care of this condition(s).    History of Present Illness/Subjective    Mr. Dakota Bauer is a 73 year old male with a PMHx significant for (per Epic notation) type 1 diabetes mellitus, generalized anxiety disorder, hypothyroidism, obstructive sleep apnea, diabetic neuropathy, chronic kidney disease stage IV, peripheral vascular disease, hypertension, dyslipidemia, and coronary disease with previous CABG and recent hospitalization with left-sided chest pain and nausea secondary to non-STEMI with status post PCI/BINA to proximal to mid graft to distal diagonal who presents to CORE clinic to establish care.    Today, Mr. Bauer denies acute cardiac events or complaints; denies HF symptoms of orthopnea, PND, chest pain/anginal equivalents - noted to be hypervolemic on physical exam; Management plan as detailed above    ECG: Personally reviewed. normal sinus rhythm, nonspecific ST and T waves changes, left axis deviation, 1st degree AV block.    ECHO (personnaly Reviewed on 5/10/24):  The left ventricle is normal in size.  There is mild concentric left ventricular hypertrophy.  Grade II or moderate diastolic dysfunction.  The visual ejection fraction is 50-55%.  There is hypokinesis in inferolateral wall.  Normal right ventricle size and systolic function.  The left atrium is moderately dilated.  No obvious valvular disease.     When compared to previous study on 7-, there is no significant interval  change.     Lab results: personally reviewed May 10, 2024; notable for CKD - overall stable renal function     Medical history and pertinent documents reviewed in Care Everywhere please where applicable see details above          Physical Examination Review of Systems   /56 (BP Location: Right arm, Patient  Position: Sitting, Cuff Size: Adult Regular)   Pulse 70   Resp 16   Ht 1.829 m (6')   Wt 104.8 kg (231 lb)   BMI 31.33 kg/m    Body mass index is 31.33 kg/m .  Wt Readings from Last 3 Encounters:   05/10/24 104.8 kg (231 lb)   05/06/24 101.2 kg (223 lb)   04/25/24 104.8 kg (231 lb)     General Appearance:   no distress, normal body habitus   ENT/Mouth: membranes moist, no oral lesions or bleeding gums.      EYES:  no scleral icterus, normal conjunctivae   Neck: no carotid bruits or thyromegaly   Chest/Lungs:   lungs are clear to auscultation, no rales or wheezing, equal chest wall expansion    Cardiovascular:   Regular. Normal first and second heart sounds with no murmurs, rubs, or gallops; the carotid, radial and posterior tibial pulses are intact, + JVD and LE edema bilaterally    Abdomen:  no organomegaly, masses, bruits, or tenderness; bowel sounds are present   Extremities: no cyanosis or clubbing   Skin: no xanthelasma, warm.    Neurologic: NAD     Psychiatric: alert and oriented x3, calm     A complete 10 systems ROS was reviewed  And is negative except what is listed in the HPI.          Medical History  Surgical History Family History Social History   Past Medical History:   Diagnosis Date    Anxiety     Chronic kidney disease     Coronary artery disease     Diabetes mellitus (H)     Diabetic ulcer of toe (H)     Disorder of thyroid     Hyperlipidemia     Hypertension     Hypothyroidism     SHERRELL (obstructive sleep apnea)     no cpap    Past Surgical History:   Procedure Laterality Date    AMPUTATE TOE(S) Left     BYPASS GRAFT ARTERY CORONARY  01/01/2005    [facility] with Dr. Holter    CARDIAC CATHETERIZATION  01/01/2005    CARDIAC CATHETERIZATION  11/25/2016    no intervention    CATARACT EXTRACTION      CERVICAL DISC SURGERY  01/01/2014    CREATE FISTULA ARTERIOVENOUS UPPER EXTREMITY Left 4/2/2024    Procedure: CREATION, ARTERIOVENOUS FISTULA, UPPER EXTREMITY;  Surgeon: Edwin Lynne MD;   Location: [facility] Main OR    CV ANGIOGRAM CORONARY GRAFT N/A 2023    Procedure: Coronary Angiogram Graft;  Surgeon: Maikel Tripathi MD;  Location: [facility] CATH LAB CV    CV PCI N/A 2023    Procedure: Percutaneous Coronary Intervention;  Surgeon: Maikel Tripathi MD;  Location: [facility] CATH LAB CV    IR LOWER EXTREMITY ANGIOGRAM LEFT  2023    IR LOWER EXTREMITY ANGIOGRAM LEFT  2020    IR LOWER EXTREMITY ANGIOGRAM LEFT  2023    no family history of premature coronary artery disease Social History     Socioeconomic History    Marital status:      Spouse name: Not on file    Number of children: Not on file    Years of education: Not on file    Highest education level: Not on file   Occupational History    Not on file   Tobacco Use    Smoking status: Former     Current packs/day: 0.00     Types: Cigarettes     Quit date: 10/10/1984     Years since quittin.6    Smokeless tobacco: Never   Vaping Use    Vaping status: Never Used   Substance and Sexual Activity    Alcohol use: Not Currently    Drug use: Never    Sexual activity: Not on file   Other Topics Concern    Not on file   Social History Narrative    Not on file     Social Determinants of Health     Financial Resource Strain: High Risk (2021)    [facility]    Financial Resource Strain     Difficulty of Paying Living Expenses: Not on file     Difficulty of Paying Living Expenses: Not on file   Food Insecurity: Not on file   Transportation Needs: Not on file   Physical Activity: Not on file   Stress: Not on file   Social Connections: Unknown (2021)    [facility]    Social Connections     Frequency of Communication with Friends and Family: Not on file   Interpersonal Safety: Not on file   Housing Stability: Not on file            Lab Results    Chemistry/lipid CBC Cardiac Enzymes/BNP/TSH/INR   Lab Results   Component Value Date    CHOL 122 12/08/2023    HDL 40 12/08/2023    TRIG 92 12/08/2023    BUN 56.6 (H) 05/06/2024     (L) 05/06/2024    CO2 25 05/06/2024    Lab Results   Component Value Date    WBC 6.6 04/11/2024    HGB 10.9 (L) 04/11/2024    HCT 33.7 (L) 04/11/2024    MCV 93 04/11/2024     (L) 04/11/2024    Lab Results   Component Value Date    TROPONINI 0.13 06/02/2021    TSH 1.68 06/16/2023    INR 1.00 06/21/2023     Lab Results   Component Value Date    TROPONINI 0.13 06/02/2021          Weight:    Wt Readings from Last 3 Encounters:   05/06/24 101.2 kg (223 lb)   04/25/24 104.8 kg (231 lb)   04/12/24 95.5 kg (210 lb 9.6 oz)       Allergies  Allergies   Allergen Reactions    Hydrochlorothiazide Other (See Comments)     Hyponatremia     Levofloxacin Other (See Comments)     Kidney pain         Surgical History  Past Surgical History:   Procedure Laterality Date    AMPUTATE TOE(S) Left     BYPASS GRAFT ARTERY CORONARY  01/01/2005    [facility] with Dr. Holter    CARDIAC CATHETERIZATION  01/01/2005    CARDIAC CATHETERIZATION  11/25/2016    no intervention    CATARACT EXTRACTION      CERVICAL DISC SURGERY  01/01/2014    CREATE FISTULA ARTERIOVENOUS UPPER EXTREMITY Left 4/2/2024    Procedure: CREATION, ARTERIOVENOUS FISTULA, UPPER EXTREMITY;  Surgeon: Edwin Lynne MD;  Location: [facility] Main OR    CV ANGIOGRAM CORONARY GRAFT N/A 07/27/2023    Procedure: Coronary Angiogram Graft;  Surgeon: Maikel Tripathi MD;  Location: [facility] CATH LAB CV    CV PCI N/A 07/27/2023    Procedure: Percutaneous Coronary Intervention;  Surgeon: Maikel Tripathi MD;  Location: [facility] CATH LAB CV    IR LOWER EXTREMITY ANGIOGRAM LEFT  04/07/2023    IR LOWER EXTREMITY ANGIOGRAM LEFT  04/08/2020    IR LOWER EXTREMITY ANGIOGRAM LEFT  06/21/2023       Social History  Tobacco:   History   Smoking Status    Former    Types:  Cigarettes   Smokeless Tobacco    Never    Alcohol:   Social History    Substance and Sexual Activity      Alcohol use: Not Currently   Illicit Drugs:   History   Drug Use Unknown       Family History  Family History   Problem Relation Age of Onset    Diabetes Mother     Coronary Artery Disease Father     Coronary Artery Disease Brother           Mercedez Lieberman MD on 5/10/2024      cc: Jamal Gaffney,

## 2024-05-10 NOTE — LETTER
5/10/2024    Jamal Gaffney MD  [address] [facility] [address]  [facility] [address]    RE: Dakota Lizette       Dear Colleague,     I had the pleasure of seeing Dakota Lizette in the [facility].  HEART CARE NOTE        Thank you, Dr. Negrete, for asking the [facility] team to see Dakota Lizette to evaluate CHF.      Assessment/Recommendations     1. HFpEF   Assessment / Plan  Mildly hypervolemic on physical exam; discussed further possibly adjusting diuretic regimen, but patient was patient is understandably reluctant given renal dysfunction; he will try to adhere more strictly to Na and fluid restrictions and continue to monitor for signs of progressive fluid retention more closely  Patient is high risk for adverse cardiac events 2/2 advanced age, frailty, CAD, DM2, renal dysfunction, HTN  GDMT as detailed below; mainstay of treatment for HFpEF includes diuretics and adequate BP control (class I) and SGLT2-I (class 2a); additional medical therapy (ARNI, MRA, ARB) demonstrated less robust evidence for indication but may be considered per guideline recommendations (2b); no indication for BBlockers     Current Pharmacotherapy AHA Guideline-Directed Medical Therapy   Losartan  25 mg daily ARNI/ARB   Spironolactone not started  MRA   SGLT2 inhibitor: not started SGLT2-I    Torsemide 80 mg daily Loop diuretic      2. DM1  Assessment / Plan  Management per PMD    3. SHERRELL  Assessment / Plan      4. CKD stage IV  Assessment / Plan  Follows with Dr. Mclaughlin; Over all stable GFR on serial labs - no changes to regimen at this time    5. HLP  Assessment / Plan  Continue atorvastatin    6. HTN  Assessment / Plan  Adequately controlled on current regimen - no changes at this time    7. CAD s/p CABG  Assessment / Plan  With subsequent PCI to mid graft- distal Diag  Denies chest pain or anginal equivalents; repeat stress low probability   Continue ASA, atorvastatin, metoprolol, clopidogrel    65 minutes spent  reviewing prior records (including documentation, laboratory studies, cardiac testing/imaging), history and physical exam, planning, and subsequent documentation.    The longitudinal plan of care for HFpEF was addressed during this visit. Due to the added complexity in care, I will continue to support Mr. Dakota Bauer in the subsequent management of this condition(s) and with the ongoing continuity of care of this condition(s).    History of Present Illness/Subjective    Mr. Dakota Bauer is a 73 year old male with a PMHx significant for (per Epic notation) type 1 diabetes mellitus, generalized anxiety disorder, hypothyroidism, obstructive sleep apnea, diabetic neuropathy, chronic kidney disease stage IV, peripheral vascular disease, hypertension, dyslipidemia, and coronary disease with previous CABG and recent hospitalization with left-sided chest pain and nausea secondary to non-STEMI with status post PCI/BINA to proximal to mid graft to distal diagonal who presents to [facility] clinic to establish care.    Today, Mr. Bauer denies acute cardiac events or complaints; denies HF symptoms of orthopnea, PND, chest pain/anginal equivalents - noted to be hypervolemic on physical exam; Management plan as detailed above    ECG: Personally reviewed. normal sinus rhythm, nonspecific ST and T waves changes, left axis deviation, 1st degree AV block.    ECHO (personnaly Reviewed on 5/10/24):  The left ventricle is normal in size.  There is mild concentric left ventricular hypertrophy.  Grade II or moderate diastolic dysfunction.  The visual ejection fraction is 50-55%.  There is hypokinesis in inferolateral wall.  Normal right ventricle size and systolic function.  The left atrium is moderately dilated.  No obvious valvular disease.     When compared to previous study on 7-, there is no significant interval  change.     Lab results: personally reviewed May 10, 2024; notable for CKD - overall stable renal function      Medical history and pertinent documents reviewed in Care Everywhere please where applicable see details above          Physical Examination Review of Systems   /56 (BP Location: Right arm, Patient Position: Sitting, Cuff Size: Adult Regular)   Pulse 70   Resp 16   Ht 1.829 m (6')   Wt 104.8 kg (231 lb)   BMI 31.33 kg/m    Body mass index is 31.33 kg/m .  Wt Readings from Last 3 Encounters:   05/10/24 104.8 kg (231 lb)   05/06/24 101.2 kg (223 lb)   04/25/24 104.8 kg (231 lb)     General Appearance:   no distress, normal body habitus   ENT/Mouth: membranes moist, no oral lesions or bleeding gums.      EYES:  no scleral icterus, normal conjunctivae   Neck: no carotid bruits or thyromegaly   Chest/Lungs:   lungs are clear to auscultation, no rales or wheezing, equal chest wall expansion    Cardiovascular:   Regular. Normal first and second heart sounds with no murmurs, rubs, or gallops; the carotid, radial and posterior tibial pulses are intact, + JVD and LE edema bilaterally    Abdomen:  no organomegaly, masses, bruits, or tenderness; bowel sounds are present   Extremities: no cyanosis or clubbing   Skin: no xanthelasma, warm.    Neurologic: NAD     Psychiatric: alert and oriented x3, calm     A complete 10 systems ROS was reviewed  And is negative except what is listed in the HPI.          Medical History  Surgical History Family History Social History   Past Medical History:   Diagnosis Date    Anxiety     Chronic kidney disease     Coronary artery disease     Diabetes mellitus (H)     Diabetic ulcer of toe (H)     Disorder of thyroid     Hyperlipidemia     Hypertension     Hypothyroidism     SHERRELL (obstructive sleep apnea)     no cpap    Past Surgical History:   Procedure Laterality Date    AMPUTATE TOE(S) Left     BYPASS GRAFT ARTERY CORONARY  01/01/2005    [place] with Dr. Holter    CARDIAC CATHETERIZATION  01/01/2005    CARDIAC CATHETERIZATION  11/25/2016    no intervention    CATARACT EXTRACTION       CERVICAL DISC SURGERY  2014    CREATE FISTULA ARTERIOVENOUS UPPER EXTREMITY Left 2024    Procedure: CREATION, ARTERIOVENOUS FISTULA, UPPER EXTREMITY;  Surgeon: Edwin Lynne MD;  Location: [facility] Main OR    CV ANGIOGRAM CORONARY GRAFT N/A 2023    Procedure: Coronary Angiogram Graft;  Surgeon: Maikel Tripathi MD;  Location: [facility] CATH LAB CV    CV PCI N/A 2023    Procedure: Percutaneous Coronary Intervention;  Surgeon: Maikel Tripathi MD;  Location: [facility] CATH LAB CV    IR LOWER EXTREMITY ANGIOGRAM LEFT  2023    IR LOWER EXTREMITY ANGIOGRAM LEFT  2020    IR LOWER EXTREMITY ANGIOGRAM LEFT  2023    no family history of premature coronary artery disease Social History     Socioeconomic History    Marital status:      Spouse name: Not on file    Number of children: Not on file    Years of education: Not on file    Highest education level: Not on file   Occupational History    Not on file   Tobacco Use    Smoking status: Former     Current packs/day: 0.00     Types: Cigarettes     Quit date: 10/10/1984     Years since quittin.6    Smokeless tobacco: Never   Vaping Use    Vaping status: Never Used   Substance and Sexual Activity    Alcohol use: Not Currently    Drug use: Never    Sexual activity: Not on file   Other Topics Concern    Not on file   Social History Narrative    Not on file     Social Determinants of Health     Financial Resource Strain: High Risk (2021)    Received from [facility], [facility]    Financial Resource Strain     Difficulty of Paying Living Expenses: Not on file     Difficulty of Paying Living Expenses: Not on file   Food Insecurity: Not on file   Transportation Needs: Not on file   Physical Activity: Not on file   Stress: Not on file   Social Connections: Unknown (2021)    Received from [facility], [facility]    Social Connections     Frequency of Communication with Friends and Family: Not on file   Interpersonal Safety: Not on file   Housing Stability: Not on file           Lab Results    Chemistry/lipid CBC Cardiac Enzymes/BNP/TSH/INR   Lab Results   Component Value Date    CHOL 122 12/08/2023    HDL 40 12/08/2023    TRIG 92 12/08/2023    BUN 56.6 (H) 05/06/2024     (L) 05/06/2024    CO2 25 05/06/2024    Lab Results   Component Value Date    WBC 6.6 04/11/2024    HGB 10.9 (L) 04/11/2024    HCT 33.7 (L) 04/11/2024    MCV 93 04/11/2024     (L) 04/11/2024    Lab Results   Component Value Date    TROPONINI 0.13 06/02/2021    TSH 1.68 06/16/2023    INR 1.00 06/21/2023     Lab Results   Component Value Date    TROPONINI 0.13 06/02/2021          Weight:    Wt Readings from Last 3 Encounters:   05/06/24 101.2 kg (223 lb)   04/25/24 104.8 kg (231 lb)   04/12/24 95.5 kg (210 lb 9.6 oz)       Allergies  Allergies   Allergen Reactions    Hydrochlorothiazide Other (See Comments)     Hyponatremia     Levofloxacin Other (See Comments)     Kidney pain         Surgical History  Past Surgical History:   Procedure Laterality Date    AMPUTATE TOE(S) Left     BYPASS GRAFT ARTERY CORONARY  01/01/2005    [place] with Dr. Holter    CARDIAC CATHETERIZATION  01/01/2005    CARDIAC CATHETERIZATION  11/25/2016    no intervention    CATARACT EXTRACTION      CERVICAL DISC SURGERY  01/01/2014    CREATE FISTULA ARTERIOVENOUS UPPER EXTREMITY Left 4/2/2024    Procedure: CREATION, ARTERIOVENOUS FISTULA, UPPER EXTREMITY;  Surgeon: Edwin Lynne MD;  Location: [facility] Main OR    CV ANGIOGRAM CORONARY GRAFT N/A 07/27/2023    Procedure: Coronary Angiogram Graft;  Surgeon: Maikel Tripathi MD;  Location: [facility] CATH LAB CV    CV PCI N/A 07/27/2023    Procedure: Percutaneous Coronary Intervention;  Surgeon: Maikel Tripathi MD;  Location: [facility] CATH LAB CV    IR LOWER EXTREMITY ANGIOGRAM  LEFT  04/07/2023    IR LOWER EXTREMITY ANGIOGRAM LEFT  04/08/2020    IR LOWER EXTREMITY ANGIOGRAM LEFT  06/21/2023       Social History  Tobacco:   History   Smoking Status    Former    Types: Cigarettes   Smokeless Tobacco    Never    Alcohol:   Social History    Substance and Sexual Activity      Alcohol use: Not Currently   Illicit Drugs:   History   Drug Use Unknown       Family History  Family History   Problem Relation Age of Onset    Diabetes Mother     Coronary Artery Disease Father     Coronary Artery Disease Brother           Mercedez Lieberman MD on 5/10/2024      cc: Jamal Gaffney,       Thank you for allowing me to participate in the care of your patient.      Sincerely,     Mercedez Lieberman MD     [facility]  cc:   RAO Benson CNP  [address]  [place] [address]

## 2024-05-13 NOTE — DISCHARGE SUMMARY
[facility]  Hospitalist Discharge Summary      Date of Admission:  4/11/2024  Date of Discharge:  4/12/2024  4:13 PM  Discharging Provider: Owen Renteria MD  Discharge Service: Hospitalist Service    Discharge Diagnoses         Dakota Bauer is a 73 year old male admitted on 4/11/2024. He came to the ED for evaluation of chest tightness, not feeling well for 2 days after lifting a saw with associated shortness of breath     #Non-STEMI  #Coronary artery disease of native arteries and grafts  -High-sensitivity troponin T 103  -ECG reviewed: Sinus rhythm with first-degree AV block at 86 bpm, new ST wave abnormalities lateral leads when compared to ECG from 9/6/2023  -Known  of proximal LAD and ostial circumflex; status post stent to SVG to distal diagonal/lateral wall on 7/27/2023  -Treated with aspirin 162 mg and clopidogrel 75 mg in the ED  -IV heparin  -Nitroglycerin paste  -Continue PTA metoprolol  -Cardiology consult     #Acute on chronic heart failure with reduced ejection fraction  -Volume overloaded on exam: Pitting edema and basilar crackles  -Chest x-ray showed basilar interstitial infiltrates asymmetrically greater on the right; fluid overload versus possible infectious process in the right lung  -Check procalcitonin, hold off antibiotics for now given afebrile and normal WBC  -IV furosemide 80 mg every 8 hours  -Monitor daily weights, I's and O's     #Chronic kidney disease stage IV  -Avoid nephrotoxins  -Nephrology consult     #Type 1 diabetes mellitus with polyneuropathy  -Lantus 30 units x 1 now  -Medium insulin resistance scale every 4 hours  -Reconcile PTA medications     #Left foot wound, present on admission  -WOC consult     #Hyponatremia  -Secondary to volume overload     #Chronic anemia  #Chronic mild thrombocytopenia  -Stable cell counts, monitor     #Drug-induced platelet defect  -On PTA aspirin and clopidogrel     #Essential hypertension  -Monitor blood pressure,  reconcile PTA medications  -IV hydralazine as needed     #Hyperlipidemia  #Hypothyroidism  #Neuropathy  #Peripheral vascular disease  #Anxiety  -Reconcile PTA medications     #Obesity  -BMI 30.38        Clinically Significant Risk Factors     # DMII: A1C = 7.0 % (Ref range: <5.7 %) within past 6 months  # Obesity: Estimated body mass index is 28.56 kg/m  as calculated from the following:    Height as of 4/5/24: 1.829 m (6').    Weight as of this encounter: 95.5 kg (210 lb 9.6 oz).       Follow-ups Needed After Discharge   Follow-up Appointments     Follow-up and recommended labs and tests       Follow up with primary care provider, Jamal Gaffney, within 7 days for   hospital follow- up.  No follow up labs or test are needed.    Follow up with Cardio       As advised        {Additional follow-up instructions/to-do's for PCP    :     Unresulted Labs Ordered in the Past 30 Days of this Admission       No orders found from 3/12/2024 to 4/12/2024.        These results will be followed up by pcp    Discharge Disposition   Discharged to home  Condition at discharge: Stable        Consultations This Hospital Stay   PHARMACY IP CONSULT  [facility] CLINIC EVALUATION IP CONSULT  OCCUPATIONAL THERAPY ADULT IP CONSULT  NUTRITION SERVICES ADULT IP CONSULT  CARE MANAGEMENT / SOCIAL WORK IP CONSULT  CARDIAC REHAB IP CONSULT  WOUND OSTOMY CONTINENCE NURSE  IP CONSULT  CARE MANAGEMENT / SOCIAL WORK IP CONSULT  CARDIOLOGY IP CONSULT  NEPHROLOGY IP CONSULT  CARDIOLOGY IP CONSULT    Code Status   Prior    Time Spent on this Encounter   I, Owen Renteria MD, personally saw the patient today and spent greater than 30 minutes discharging this patient.       Owen Renteria MD  [facility]  [address] [place] [id number]  Phone: [phone]  Fax: [phone]  ______________________________________________________________________    Physical Exam   Vital Signs:                    Weight: 210 lbs  9.6 oz       GENERAL: The patient is not in any acute distressed. Awake and alert.  HEENT: Nonicteric sclerae, PERRLA, EOMI. Oropharynx clear. Moist mucous membranes. Conjunctivae appear well perfused.  HEART: Regular rate and rhythm without murmurs.  LUNGS: Clear to auscultation bilaterally. No wheezing or crackles.  ABDOMEN: Soft, positive bowel sounds, nontender.  SKIN: No rash, no excessive bruising, petechiae, or purpura.  EXTREMITIES : no rashes, no swelling in legs.  NEUROLOGIC: conscious and oriented, follows commands, no obvious focal deficits.  ROS: All other systems negative          Primary Care Physician   Jamal Gaffney    Discharge Orders      Reason for your hospital stay    cp     Follow-up and recommended labs and tests     Follow up with primary care provider, Jamal Gaffney, within 7 days for hospital follow- up.  No follow up labs or test are needed.    Follow up with Cardio       As advised     Activity    Your activity upon discharge: activity as tolerated     Diet    Follow this diet upon discharge: Orders Placed This Encounter      Low Saturated Fat Na <2400 mg       Significant Results and Procedures   Most Recent 3 CBC's:  Recent Labs   Lab Test 04/11/24  0347 04/02/24  1215 12/05/23  1005   WBC 6.6 5.5 5.3   HGB 10.9* 10.8* 11.7*   MCV 93 92 92   * 130* 146*     Most Recent 3 BMP's:  Recent Labs   Lab Test 05/06/24  0924 04/29/24  1000 04/25/24  1352   * 131* 134*   POTASSIUM 4.0 4.7 4.3   CHLORIDE 95* 93* 97*   CO2 25 25 25   BUN 56.6* 47.0* 61.0*   CR 3.45* 3.62* 3.38*   ANIONGAP 13 13 12   SVITLANA 8.9 8.8 8.7*   GLC 68* 266* 103*     Most Recent 2 LFT's:  Recent Labs   Lab Test 08/01/23  0439 07/31/23  0429   AST 59* 63*   ALT 45 45   ALKPHOS 49 50   BILITOTAL 0.4 0.4     Most Recent 3 INR's:  Recent Labs   Lab Test 06/21/23  0710   INR 1.00       Discharge Medications   Discharge Medication List as of 4/12/2024  3:43 PM        START taking these medications    Details    isosorbide mononitrate (IMDUR) 30 MG 24 hr tablet Take 1 tablet (30 mg) by mouth daily, Disp-60 tablet, R-1, E-Prescribe           CONTINUE these medications which have NOT CHANGED    Details   aspirin 81 MG EC tablet Take 1 tablet (81 mg) by mouth daily Start tomorrow., Disp-30 tablet, R-3, E-Prescribe      atorvastatin (LIPITOR) 40 MG tablet Take 40 mg by mouth daily, Historical      clopidogrel (PLAVIX) 75 MG tablet Take 1 tablet (75 mg) by mouth daily Dose to start tomorrow., Disp-90 tablet, R-3, E-Prescribe      Continuous Blood Gluc Sensor (FREESTYLE DAVID 14 DAY SENSOR) MISC Historical      doxycycline monohydrate (ADOXA) 100 MG tablet Take 100 mg by mouth 2 times daily Ulcer on bottom of left foot, Historical      ezetimibe (ZETIA) 10 MG tablet Take 1 tablet (10 mg) by mouth daily, Disp-90 tablet, R-4, E-Prescribe      fish oil-omega-3 fatty acids 1000 MG capsule Take 1 g by mouth daily, Historical      fluticasone (FLONASE) 50 MCG/ACT nasal spray Spray 2 sprays into both nostrils 2 times daily, Historical      gabapentin (NEURONTIN) 300 MG capsule [GABAPENTIN (NEURONTIN) 300 MG CAPSULE] Take 300 mg by mouth 3 (three) times a day.       , Historical      insulin glargine (LANTUS PEN) 100 UNIT/ML pen Inject 30 Units Subcutaneous every morning, Historical      insulin lispro (HUMALOG KWIKPEN) 100 UNIT/ML (1 unit dial) KWIKPEN Inject Subcutaneous 3 times daily (before meals) Carb count: 3 g carb : 1 unit insulin  Maximum 25 units per meal; 75 units per day, Historical      levothyroxine (SYNTHROID/LEVOTHROID) 125 MCG tablet Take 125 mcg by mouth daily, Historical      losartan (COZAAR) 25 MG tablet Take 25 mg by mouth daily, Historical      multivitamin with iron (ONE DAILY WITH IRON) Tab tablet [MULTIVITAMIN WITH IRON (ONE DAILY WITH IRON) TAB TABLET] Take 1 tablet by mouth daily., Historical      nitroGLYcerin (NITROSTAT) 0.4 MG sublingual tablet Place 1 tablet (0.4 mg) under the tongue every 5 minutes as  needed, Disp-25 tablet, R-3, E-Prescribe      VITAMIN D3 2,000 unit capsule [VITAMIN D3 2,000 UNIT CAPSULE] Take 2,000 Units by mouth daily., R-3, JEANNETTE, Historical      metoprolol succinate ER (TOPROL XL) 50 MG 24 hr tablet Take 1 tablet (50 mg) by mouth daily, Disp-90 tablet, R-2, E-Prescribe      torsemide (DEMADEX) 20 MG tablet Take 40 mg by mouth daily, Historical      TECHLITE PEN NEEDLES 31G X 5 MM miscellaneous JEANNETTE, Historical           Allergies   Allergies   Allergen Reactions    Hydrochlorothiazide Other (See Comments)     Hyponatremia     Levofloxacin Other (See Comments)     Kidney pain

## 2024-05-17 PROBLEM — D64.9 ANEMIA, UNSPECIFIED TYPE: Status: ACTIVE | Noted: 2024-01-01

## 2024-05-17 PROBLEM — R07.9 EXERTIONAL CHEST PAIN: Status: ACTIVE | Noted: 2024-01-01

## 2024-05-17 PROBLEM — R06.09 DYSPNEA ON EXERTION: Status: ACTIVE | Noted: 2024-01-01

## 2024-05-17 PROBLEM — N17.9 AKI (ACUTE KIDNEY INJURY) (H): Status: ACTIVE | Noted: 2024-01-01

## 2024-05-17 PROBLEM — R79.89 ELEVATED BRAIN NATRIURETIC PEPTIDE (BNP) LEVEL: Status: ACTIVE | Noted: 2024-01-01

## 2024-05-17 PROBLEM — R79.89 ELEVATED TROPONIN: Status: ACTIVE | Noted: 2024-01-01

## 2024-05-17 NOTE — ED TRIAGE NOTES
Pt states that he has had chest tightness, shortness of breath, and light-headedness with any sort of exertion since this morning. He has been similar episodes intermittently for the last few weeks, but none have been this consistent with every bit of activity. Pt denies any symptoms at rest.      Triage Assessment (Adult)       Row Name 05/17/24 1504          Triage Assessment    Airway WDL WDL        Respiratory WDL    Respiratory WDL rhythm/pattern     Rhythm/Pattern, Respiratory shortness of breath;unlabored        Skin Circulation/Temperature WDL    Skin Circulation/Temperature WDL WDL        Cardiac WDL    Cardiac WDL chest pain        Chest Pain Assessment    Chest Pain Location midsternal     Character tightness     Precipitating Factors activity        Peripheral/Neurovascular WDL    Peripheral Neurovascular WDL WDL        Cognitive/Neuro/Behavioral WDL    Cognitive/Neuro/Behavioral WDL WDL

## 2024-05-17 NOTE — PHARMACY-ADMISSION MEDICATION HISTORY
Pharmacist Admission Medication History    Admission medication history is complete. The information provided in this note is only as accurate as the sources available at the time of the update.    Information Source(s): Patient via in-person    Pertinent Information: Patient took home morning medications today PTA 5/17/24.    Changes made to PTA medication list:  Added: None  Deleted: None  Changed: None    Allergies reviewed with patient and updates made in EHR: yes    Medication History Completed By: KAYKAY PERKINS RPH 5/17/2024 5:05 PM    PTA Med List   Medication Sig Last Dose    aspirin 81 MG EC tablet Take 1 tablet (81 mg) by mouth daily Start tomorrow. 5/17/2024 at am    atorvastatin (LIPITOR) 40 MG tablet Take 40 mg by mouth every evening 5/16/2024 at hs    clopidogrel (PLAVIX) 75 MG tablet Take 1 tablet (75 mg) by mouth daily Dose to start tomorrow. 5/17/2024 at am    ezetimibe (ZETIA) 10 MG tablet Take 1 tablet (10 mg) by mouth daily 5/16/2024 at hs    fish oil-omega-3 fatty acids 1000 MG capsule Take 1 g by mouth daily 5/17/2024 at am    fluticasone (FLONASE) 50 MCG/ACT nasal spray Spray 2 sprays into both nostrils 2 times daily 5/17/2024 at am    gabapentin (NEURONTIN) 300 MG capsule [GABAPENTIN (NEURONTIN) 300 MG CAPSULE] Take 300 mg by mouth 3 (three) times a day.        5/17/2024 at x1 dose    insulin glargine (LANTUS PEN) 100 UNIT/ML pen Inject 30 Units Subcutaneous every morning 5/17/2024 at am    insulin lispro (HUMALOG KWIKPEN) 100 UNIT/ML (1 unit dial) KWIKPEN Inject Subcutaneous 3 times daily (before meals) Carb count: 3 g carb : 1 unit insulin  Maximum 25 units per meal; 75 units per day 5/17/2024 at breakfast and lunch dose    levothyroxine (SYNTHROID/LEVOTHROID) 125 MCG tablet Take 125 mcg by mouth daily 5/17/2024 at am    losartan (COZAAR) 25 MG tablet Take 25 mg by mouth every evening 5/16/2024 at evening    metoprolol succinate ER (TOPROL XL) 25 MG 24 hr tablet Take 1 tablet (25 mg) by  mouth daily 5/16/2024 at evening    multivitamin with iron (ONE DAILY WITH IRON) Tab tablet [MULTIVITAMIN WITH IRON (ONE DAILY WITH IRON) TAB TABLET] Take 1 tablet by mouth daily. 5/17/2024 at am    nitroGLYcerin (NITROSTAT) 0.4 MG sublingual tablet Place 1 tablet (0.4 mg) under the tongue every 5 minutes as needed Unknown at prn    torsemide (DEMADEX) 20 MG tablet Take 4 tablets (80 mg) by mouth daily 5/17/2024 at am    VITAMIN D3 2,000 unit capsule [VITAMIN D3 2,000 UNIT CAPSULE] Take 2,000 Units by mouth daily. 5/17/2024 at am

## 2024-05-17 NOTE — ED PROVIDER NOTES
EMERGENCY DEPARTMENT ENCOUNTER      NAME: Dakota Bauer  AGE: 73 year old male  YOB: 1950  MRN: [id number]  EVALUATION DATE & TIME: No admission date for patient encounter.    PCP: Jamal Gaffney    ED PROVIDER: Phoebe Cervantes M.D.        Chief Complaint   Patient presents with    Chest Pain    Dizziness         FINAL IMPRESSION:    1. Exertional chest pain    2. Dyspnea on exertion    3. Anemia, unspecified type    4. ASHLEY (acute kidney injury) (H24)    5. Elevated troponin    6. Elevated brain natriuretic peptide (BNP) level            MEDICAL DECISION MAKING:    Dakota Bauer is a 73 year old male with history of diabetes, CAD, hypertension, NSTEMI, CKD, CHF, CVA, who presents to the ER with complaints of fatigue, chest pain, shortness of breath, and lightheadedness.     He has had chest pain with exertion as well as shortness of breath with exertion.  This is been going on for 1.5 months though seems to be worsening.  He had a near syncopal episode today because of generalized fatigue and the symptoms.  Troponin elevated at 97 but BNP also 11,000.  Chest x-ray overall looks pretty good.  Creatinine is slightly worse than his baseline and up to 4.  Plan at this time is admission to the hospital for CHF exacerbations.  Spoke with cardiology who recommends holding off on heparin at this time unless troponin increases significantly.  Hemoglobin also decreased from baseline.  Cardiology recommends getting nephrology consult tomorrow with regards to his kidney disease.  They recommend holding off on diuresis tonight.    Pt has been accepted to the hospital by the hospitalist will go to cardiac telemetry under inpatient status.      ED COURSE:  3:20 PM   I met with the patient to gather history and perform my exam. ED course and treatment discussed. This patient was seen in a private exam room in the Waiting Room during ED overcrowding. Patient gave verbal permission to be seen.     3:32  PM  Hemoglobin  13.3 - 17.7 g/dL 8.8 Low  10.9 Low  10.8 Low  11.7 Low  9.8 Low  8.7 Low  9.2 Low    Chart review shows that his hemoglobin is lower today than it has been over the past several months, though he has had hemoglobins in the 8 in the past.    4:53 PM  Spoke with Dr. Valerio of cardiology.  At this time again to hold off on heparin due to his hemoglobin at 8.8.  Unless his troponin becomes significantly more elevated in the next 1 then we would consider starting heparin.  He would like to hold off on diuresis given his kidney disease and would rather have nephrology see him tomorrow first since the patient does not in any extremis at this time.  Patient aware of the plan for admission and agrees.  At this moment at rest he is asymptomatic.    5:21 PM  Patient has been accepted to the hospital and the hospitalist will go to cardiac telemetry under inpatient status.  This patient has been admitted under inpatient status as I believe that they will require hospitalization and medical care spanning TWO midnights based on evaluation while in the Emergency Department.  Think it CHF is the primary cause of his symptoms and that elevated troponin.  I do think that PE is less likely but I cannot rule that out at this time due to the fact that he has an elevated creatinine and unable to do a CT PE.  I do not feel strongly enough that this represents a PE to automatically heparinize him especially with a mildly low hemoglobin and I do not think he requires emergent nuc med study for a VQ scan.  I do feel that he can have an echo first before proceeding with other imaging is necessary.    I do not think that this represents rib fractures, allergic reaction, COPD exacerbation, asthma exacerbation, pneumonia, CHF, myocarditis, pericarditis, endocarditis, ACS, PE, ruptured AAA, pneumothorax, aortic dissection, bowel obstruction, or other such etiologies at this time.    At the conclusion of the encounter I discussed the  results of all of the tests and the disposition. Their questions were answered. The patient (and any family present) acknowledged understanding and were agreeable with the care plan.      CONSULTANTS:  Hospitalist - Dr. Ahn  Cardiology - Dr. Valerio        MEDICATIONS GIVEN IN THE EMERGENCY:  Medications - No data to display        NEW PRESCRIPTIONS STARTED AT TODAY'S ER VISIT     Medication List      There are no discharge medications for this visit.             CONDITION:  stable        DISPOSITION:  Card tele ip as accepted by Dr. Ahn, hospitalist         =================================================================  =================================================================  TRIAGE ASSESSMENT:  Pt states that he has had chest tightness, shortness of breath, and light-headedness with any sort of exertion since this morning. He has been similar episodes intermittently for the last few weeks, but none have been this consistent with every bit of activity. Pt denies any symptoms at rest.      Triage Assessment (Adult)       Row Name 05/17/24 1452          Triage Assessment    Airway WDL WDL        Respiratory WDL    Respiratory WDL rhythm/pattern     Rhythm/Pattern, Respiratory shortness of breath;unlabored        Skin Circulation/Temperature WDL    Skin Circulation/Temperature WDL WDL        Cardiac WDL    Cardiac WDL chest pain        Chest Pain Assessment    Chest Pain Location midsternal     Character tightness     Precipitating Factors activity        Peripheral/Neurovascular WDL    Peripheral Neurovascular WDL WDL        Cognitive/Neuro/Behavioral WDL    Cognitive/Neuro/Behavioral WDL WDL                   ED Triage Vitals [05/17/24 1504]   Enc Vitals Group      /46      Pulse 84      Resp 16      Temp 98.9  F (37.2  C)      Temp src Oral      SpO2 95 %      Weight 99.3 kg (219 lb)      Height 1.829 m (6')            ================================================================  ================================================================    HPI    Patient information was obtained from: patient and wife    Use of Intrepreter: N/A      Dakota Bauer is a 73 year old male with history of diabetes, CAD, hypertension, NSTEMI, CKD, CHF, CVA, who presents to the ER with complaints of fatigue, chest pain, shortness of breath, and lightheadedness.    The past 1.5 months patient has been complaining of increased fatigue, shortness of breath with exertion and lightheadedness.  Also complains of some chest discomfort with exertion.  At rest he actually feels quite well.  He was seen by Dr. Lieberman, his cardiologist, who recommended outpatient workup but this has not occurred yet.    Today patient reports that he has had increasing chest discomfort with exertion and had a near syncope episode today.  When he stands up he gets really weak in the knees and collapsed but did not actually lose consciousness or injure himself.      Otherwise denies fevers, cough, abdominal pain, vomiting or diarrhea.  He states he has been eating and drinking well.  He has been adhering to a low-salt diet.    Denies any history of DVT or PE.  Denies any recent travel.    CHART REVIEW:  Chart review shows that he was discharged from the hospital on April 12, 2024 at which time he was being seen for NSTEMI with a troponin of 103.  He also had fluid overload with CHF exacerbation.      REVIEW OF SYSTEMS  Review of Systems   Constitutional:  Negative for fever.   Respiratory:  Positive for shortness of breath. Negative for cough.    Cardiovascular:  Positive for chest pain (with exertion) and leg swelling.   Gastrointestinal:  Negative for abdominal pain, diarrhea, nausea and vomiting.   Genitourinary:  Negative for dysuria.   Neurological:  Positive for light-headedness. Negative for syncope (near syncope after standing today).   All other systems  reviewed and are negative.        PAST MEDICAL HISTORY:  Past Medical History:   Diagnosis Date    Anxiety     Chronic kidney disease     Coronary artery disease     Diabetes mellitus (H)     Diabetic ulcer of toe (H)     Disorder of thyroid     Hyperlipidemia     Hypertension     Hypothyroidism     SHERRELL (obstructive sleep apnea)     no cpap         PAST SURGICAL HISTORY:  Past Surgical History:   Procedure Laterality Date    AMPUTATE TOE(S) Left     BYPASS GRAFT ARTERY CORONARY  01/01/2005    [facility] with Dr. Holter    CARDIAC CATHETERIZATION  01/01/2005    CARDIAC CATHETERIZATION  11/25/2016    no intervention    CATARACT EXTRACTION      CERVICAL DISC SURGERY  01/01/2014    CREATE FISTULA ARTERIOVENOUS UPPER EXTREMITY Left 4/2/2024    Procedure: CREATION, ARTERIOVENOUS FISTULA, UPPER EXTREMITY;  Surgeon: Edwin Lynne MD;  Location: [facility] Main OR    CV ANGIOGRAM CORONARY GRAFT N/A 07/27/2023    Procedure: Coronary Angiogram Graft;  Surgeon: Maikel Tripathi MD;  Location: [facility] CATH LAB CV    CV PCI N/A 07/27/2023    Procedure: Percutaneous Coronary Intervention;  Surgeon: Maikel Tripathi MD;  Location: [facility] CATH LAB CV    IR LOWER EXTREMITY ANGIOGRAM LEFT  04/07/2023    IR LOWER EXTREMITY ANGIOGRAM LEFT  04/08/2020    IR LOWER EXTREMITY ANGIOGRAM LEFT  06/21/2023         CURRENT MEDICATIONS:    Prior to Admission medications    Medication Sig Start Date End Date Taking? Authorizing Provider   aspirin 81 MG EC tablet Take 1 tablet (81 mg) by mouth daily Start tomorrow. 7/28/23   Maikel Tripathi MD   atorvastatin (LIPITOR) 40 MG tablet Take 40 mg by mouth daily    Unknown, Entered By History   clopidogrel (PLAVIX) 75 MG tablet Take 1 tablet (75 mg) by mouth daily Dose to start tomorrow. 7/28/23   Maikel Tripathi MD   Continuous Blood Gluc Sensor (FREESTYLE DAVID 14 DAY SENSOR) [facility]  12/10/21   Reported, Patient   ezetimibe (ZETIA) 10 MG tablet Take 1 tablet (10 mg) by mouth daily  8/12/22   Robinson Chopra MD   fish oil-omega-3 fatty acids 1000 MG capsule Take 1 g by mouth daily    Reported, Patient   fluticasone (FLONASE) 50 MCG/ACT nasal spray Spray 2 sprays into both nostrils 2 times daily    Unknown, Entered By History   gabapentin (NEURONTIN) 300 MG capsule [GABAPENTIN (NEURONTIN) 300 MG CAPSULE] Take 300 mg by mouth 3 (three) times a day.        11/18/15   Provider, Historical   insulin glargine (LANTUS PEN) 100 UNIT/ML pen Inject 30 Units Subcutaneous every morning 10/10/16   Provider, Historical   insulin lispro (HUMALOG KWIKPEN) 100 UNIT/ML (1 unit dial) KWIKPEN Inject Subcutaneous 3 times daily (before meals) Carb count: 3 g carb : 1 unit insulin  Maximum 25 units per meal; 75 units per day    Unknown, Entered By History   levothyroxine (SYNTHROID/LEVOTHROID) 125 MCG tablet Take 125 mcg by mouth daily 7/27/22   Reported, Patient   losartan (COZAAR) 25 MG tablet Take 25 mg by mouth daily 10/5/23 10/4/24  Reported, Patient   metoprolol succinate ER (TOPROL XL) 25 MG 24 hr tablet Take 1 tablet (25 mg) by mouth daily 5/8/24   Huma Negrete APRN CNP   multivitamin with iron (ONE DAILY WITH IRON) Tab tablet [MULTIVITAMIN WITH IRON (ONE DAILY WITH IRON) TAB TABLET] Take 1 tablet by mouth daily. 10/10/16   Provider, Historical   nitroGLYcerin (NITROSTAT) 0.4 MG sublingual tablet Place 1 tablet (0.4 mg) under the tongue every 5 minutes as needed 2/13/23   Robinson Chopra MD   TECHLITE PEN NEEDLES 31G X 5 MM miscellaneous  12/20/21   Reported, Patient   torsemide (DEMADEX) 20 MG tablet Take 4 tablets (80 mg) by mouth daily 4/25/24   Huma Negrete APRN CNP   VITAMIN D3 2,000 unit capsule [VITAMIN D3 2,000 UNIT CAPSULE] Take 2,000 Units by mouth daily. 9/15/17   Provider, Historical         ALLERGIES:  Allergies   Allergen Reactions    Hydrochlorothiazide Other (See Comments)     Hyponatremia     Levofloxacin Other (See Comments)     Kidney pain         FAMILY HISTORY:  Family History    Problem Relation Age of Onset    Diabetes Mother     Coronary Artery Disease Father     Coronary Artery Disease Brother          SOCIAL HISTORY:  Social History     Socioeconomic History    Marital status:    Tobacco Use    Smoking status: Former     Current packs/day: 0.00     Types: Cigarettes     Quit date: 10/10/1984     Years since quittin.6    Smokeless tobacco: Never   Vaping Use    Vaping status: Never Used   Substance and Sexual Activity    Alcohol use: Not Currently    Drug use: Never     Social Determinants of Health      [facility] [employer] [facility]    Financial Resource Strain    Received from [employer] [facility] [employer] [facility], [facility] [employer] [facility]    Social Connections         VITALS:  Patient Vitals for the past 24 hrs:   BP Temp Temp src Pulse Resp SpO2 Height Weight   24 1700 123/58 -- -- 79 17 92 % -- --   24 1645 126/58 -- -- 80 17 91 % -- --   24 1643 127/60 -- -- 81 20 94 % -- --   24 1600 121/56 -- -- 77 18 95 % -- --   24 1556 127/60 -- -- 77 -- 97 % -- --   24 1555 128/60 -- -- 76 -- 96 % -- --   24 1504 105/46 98.9  F (37.2  C) Oral 84 16 95 % 1.829 m (6') 99.3 kg (219 lb)       Wt Readings from Last 3 Encounters:   24 99.3 kg (219 lb)   05/10/24 104.8 kg (231 lb)   24 101.2 kg (223 lb)       Estimated Creatinine Clearance: 19.8 mL/min (A) (based on SCr of 4.05 mg/dL (H)).    PHYSICAL EXAM    Constitutional:  Well developed, Well nourished, NAD  HENT:  Normocephalic, Atraumatic, Bilateral external ears normal, Nose normal. Neck- Supple, No stridor.   Eyes:  PERRL, EOMI, Conjunctiva normal, No discharge.  Respiratory:  Normal breath sounds, No respiratory distress, No wheezing, Speaks full sentences easily. No cough.   Cardiovascular:  Normal heart rate, Regular rhythm, No rubs, No gallops.   GI:  +obesity.  Bowel  sounds normal, Soft, No tenderness, No masses, No flank tenderness. No rebound or guarding.   : deferred  Musculoskeletal: +BLE edema.   No cyanosis, No clubbing. Good range of motion in all major joints. No major deformities noted.   Integument:  Warm, Dry, No erythema, No rash.  No petechiae.   Neurologic:  Alert & oriented x 3  Psychiatric:  Affect normal, Cooperative         LAB:  All pertinent labs reviewed and interpreted.  Recent Results (from the past 24 hour(s))   Basic metabolic panel    Collection Time: 05/17/24  3:17 PM   Result Value Ref Range    Sodium 135 135 - 145 mmol/L    Potassium 4.5 3.4 - 5.3 mmol/L    Chloride 97 (L) 98 - 107 mmol/L    Carbon Dioxide (CO2) 23 22 - 29 mmol/L    Anion Gap 15 7 - 15 mmol/L    Urea Nitrogen 69.3 (H) 8.0 - 23.0 mg/dL    Creatinine 4.05 (H) 0.67 - 1.17 mg/dL    GFR Estimate 15 (L) >60 mL/min/1.73m2    Calcium 8.7 (L) 8.8 - 10.2 mg/dL    Glucose 134 (H) 70 - 99 mg/dL   Troponin T, High Sensitivity (now)    Collection Time: 05/17/24  3:17 PM   Result Value Ref Range    Troponin T, High Sensitivity 97 (H) <=22 ng/L   Magnesium    Collection Time: 05/17/24  3:17 PM   Result Value Ref Range    Magnesium 2.6 (H) 1.7 - 2.3 mg/dL   TSH with free T4 reflex    Collection Time: 05/17/24  3:17 PM   Result Value Ref Range    TSH 2.21 0.30 - 4.20 uIU/mL   Nt probnp inpatient    Collection Time: 05/17/24  3:17 PM   Result Value Ref Range    N terminal Pro BNP Inpatient 11,451 (H) 0 - 900 pg/mL   CBC with platelets and differential    Collection Time: 05/17/24  3:17 PM   Result Value Ref Range    WBC Count 4.6 4.0 - 11.0 10e3/uL    RBC Count 2.95 (L) 4.40 - 5.90 10e6/uL    Hemoglobin 8.8 (L) 13.3 - 17.7 g/dL    Hematocrit 27.9 (L) 40.0 - 53.0 %    MCV 95 78 - 100 fL    MCH 29.8 26.5 - 33.0 pg    MCHC 31.5 31.5 - 36.5 g/dL    RDW 14.3 10.0 - 15.0 %    Platelet Count 130 (L) 150 - 450 10e3/uL    % Neutrophils 73 %    % Lymphocytes 15 %    % Monocytes 10 %    % Eosinophils 2 %    %  Basophils 0 %    % Immature Granulocytes 0 %    NRBCs per 100 WBC 1 (H) <1 /100    Absolute Neutrophils 3.4 1.6 - 8.3 10e3/uL    Absolute Lymphocytes 0.7 (L) 0.8 - 5.3 10e3/uL    Absolute Monocytes 0.5 0.0 - 1.3 10e3/uL    Absolute Eosinophils 0.1 0.0 - 0.7 10e3/uL    Absolute Basophils 0.0 0.0 - 0.2 10e3/uL    Absolute Immature Granulocytes 0.0 <=0.4 10e3/uL    Absolute NRBCs 0.0 10e3/uL   Extra Blue Top Tube    Collection Time: 05/17/24  3:17 PM   Result Value Ref Range    Hold Specimen [facility]    Extra Red Top Tube    Collection Time: 05/17/24  3:17 PM   Result Value Ref Range    Hold Specimen [facility]    Extra Blood Bank Purple Top Tube    Collection Time: 05/17/24  3:17 PM   Result Value Ref Range    Hold Specimen [facility]    Symptomatic Influenza A/B, RSV, & SARS-CoV2 PCR (COVID-19) Nasopharyngeal    Collection Time: 05/17/24  3:19 PM    Specimen: Nasopharyngeal; Swab   Result Value Ref Range    Influenza A PCR Negative Negative    Influenza B PCR Negative Negative    RSV PCR Negative Negative    SARS CoV2 PCR Negative Negative   UA with Microscopic reflex to Culture    Collection Time: 05/17/24  4:40 PM    Specimen: Urine, Midstream   Result Value Ref Range    Color Urine Light Yellow Colorless, Straw, Light Yellow, Yellow    Appearance Urine Clear Clear    Glucose Urine Negative Negative mg/dL    Bilirubin Urine Negative Negative    Ketones Urine Negative Negative mg/dL    Specific Gravity Urine 1.013 1.001 - 1.030    Blood Urine Negative Negative    pH Urine 6.0 5.0 - 7.0    Protein Albumin Urine 20 (A) Negative mg/dL    Urobilinogen Urine <2.0 <2.0 mg/dL    Nitrite Urine Negative Negative    Leukocyte Esterase Urine Negative Negative    RBC Urine 0 <=2 /HPF    WBC Urine <1 <=5 /HPF    Hyaline Casts Urine 1 <=2 /LPF       Lab Results   Component Value Date    ABORH O POS 07/27/2023           RADIOLOGY:  Reviewed all pertinent imaging. Please see official radiology report.    Chest XR,  PA & LAT   Final Result    IMPRESSION:       Cardiac silhouette is normal in size. Previous median sternotomy and coronary revascularization. The vascular pedicle is normal. Normal aortic arch and descending aortic contours.      Normal lung vascularity. No interstitial or alveolar opacities. Diaphragmatic curvature is preserved. No pleural effusion.      Unchanged small mid/lower thoracic spine degenerative osteophytes.            EKG:    Indication: Chest pain    Performed at: 15:05p  Impression: Rhythm at 84 bpm.  Flipped T waves noted in lead I, aVL and V1.  ST depression in scooping in V2-V6.  MO interval 214 ms consistent with AV block.   ms and QTc 496 ms.  Nonspecific ST changes compared to April 11, 2024.      I have independently reviewed and interpreted the EKG(s) documented above.        PROCEDURES:  none    Medical Decision Making  Obtained supplemental history:Supplemental history obtained?: Documented in chart and Family Member/Significant Other  Reviewed external records: External records reviewed?: Documented in chart and Inpatient Record: see HPI  Care impacted by chronic illness:Chronic Kidney Disease, Diabetes, Heart Disease, Hyperlipidemia, and Hypertension  Care significantly affected by social determinants of health:N/A  Did you consider but not order tests?: Work up considered but not performed and documented in chart, if applicable  Did you interpret images independently?: Independent interpretation of ECG and images noted in documentation, when applicable.  Consultation discussion with other provider:Did you involve another provider (consultant, , pharmacy, etc.)?: I discussed the care with another health care provider, see documentation for details.  Admit.      Phoebe Cervantes M.D. [facility]  Emergency Medicine and Medical Toxicology  [facility]ly [facility] EMERGENCY DEPARTMENT  [facility] [facility] [id number] [phone]  Dept: [phone]            Phoebe Cervantes MD  05/17/24 8565     The patient is a 53y Male complaining of urinary symptoms.

## 2024-05-17 NOTE — ED NOTES
Orthostatic BP.     Sitting 126/60, MAP 86, HR 76    Standing 127/60, MAP 86, HR 77.     Pt did not become symptomatic with position change.

## 2024-05-17 NOTE — ED PROVIDER NOTES
Repeat troponin has decreased.  Will hold off on heparin at this time.  Will defer ongoing management to the hospitalist service.     Phoebe Cervantes MD  05/17/24 3045

## 2024-05-18 NOTE — PLAN OF CARE
Problem: Diabetes  Goal: Optimal Coping  Outcome: Progressing  Goal: Optimal Functional Ability  Outcome: Progressing  Goal: Blood Glucose Level Within Target Range  Outcome: Progressing  Goal: Minimize Risk of Hypoglycemia  Outcome: Progressing     Goal Outcome Evaluation:       Pt is A/O x 4. Assist x 1 with walker. RA. VSS. Tele: SR with first degree AVB. Pt BG has been as high as 417. Symptomatic with tingling present in BLEs and diaphoretic. Dr. King notified and verbal orders to give 14 units of insulin novolog. BG still 376 after 1 hour. 10 extra units, sliding scale and CHO count given before late lunch. Pt also reported nausea, Zofran PRN given and medication was effective. Contact precautions maintained.

## 2024-05-18 NOTE — PROGRESS NOTES
Consult request received. Patient follows with [facility] nephrologist , last seen on 04/26/24. I will defer consult to [facility] group for continuity of care.    Coty Andersen MD  [facility]  [address] [place] [address], [address]  [place] [address]  Phone# [phone]  Fax# [phone]

## 2024-05-18 NOTE — CONSULTS
NUTRITION EDUCATION      REASON FOR ASSESSMENT:  Consulted to educate pt on 2 gram Na diet    NUTRITION HISTORY:  Information obtained from pt and his wife    Pt and wife states they follow a low sodium diet at home.  They just had low sodium diet education 3 weeks ago. Just had 1 question about TV dinners-recommended healthy choice or [employer] and provided low sodium shopping guidelines    CURRENT DIET:  2 gram Na, no caffeine with FR of 1800 ml/day    INTERVENTIONS:    Nutrition Prescription:  2 gram Na    Implementation:      *  Nutrition Education (Content):   A)  Provided handout low sodium shopping guidelines, low sodium nutrition therapy and sodium free seasonings.   B)  Discussed appropriate TV dinners      *  Nutrition Education (Application):   A)  Discussed current eating habits and recommended alternative food choices      *  Anticipate good compliance ( did no do full 2 gram Na ed as he has been educated recently)      *  Diet Education - refer to Education Flowsheet    Goals:      *  Patient will verbalize understanding of diet met      *  All of the above goals met during the education session    Follow Up/Monitoring:      *  Provided RD contact information for future questions      *  Recommended Out-Patient Nutrition Referral, if further diet instructions are needed

## 2024-05-18 NOTE — PLAN OF CARE
Goal Outcome Evaluation:    Pt alert and oriented x4. No pain overnight.  Sr w/ 1AVB on tele monitor.  Awaiting ECHO today.

## 2024-05-18 NOTE — PLAN OF CARE
Problem: Adult Inpatient Plan of Care  Goal: Plan of Care Review  Description: The Plan of Care Review/Shift note should be completed every shift.  The Outcome Evaluation is a brief statement about your assessment that the patient is improving, declining, or no change.  This information will be displayed automatically on your shift  note.  Outcome: Progressing  Flowsheets (Taken 5/17/2024 2233)  Plan of Care Reviewed With: patient     Problem: Chest Pain  Goal: Resolution of Chest Pain Symptoms  Outcome: Progressing   Goal Outcome Evaluation:      Plan of Care Reviewed With: patient      Writer received this pt at 2050 from ED nurse. Pt alert and oriented times 4. Vitals stable. Denied any chest pain, SOB or dizziness. He did mentioned that he gets dizziness while standing. He stood up and used urinal and also while doing orthostatic BP. He denied any dizziness during those activities. Orthostatic BP done. Tele NSR with BBB. Pt's blood sugar was 112 mg/dl. Gave snack at bedtime. He has blister and redness on his left shin. All cares explained to pt.

## 2024-05-18 NOTE — PROGRESS NOTES
BG slowly improved this evening, patient took insulin for carb counting this evening but refused corrective coverage. He C/O frequently getting low at night. Patient more fatigued this evening, falling asleep sitting up, had to woken to eat.

## 2024-05-18 NOTE — CONSULTS
Care Management Initial Consult    General Information  Assessment completed with: Patient, Spouse or significant other, Patient, spouse  Type of CM/SW Visit: Initial Assessment    Primary Care Provider verified and updated as needed: Yes   Readmission within the last 30 days: no previous admission in last 30 days      Reason for Consult: discharge planning  Advance Care Planning: Advance Care Planning Reviewed: verified with patient        Communication Assessment  Patient's communication style: spoken language (English or Bilingual)    Hearing Difficulty or Deaf: no   Wear Glasses or Blind: yes    Cognitive  Cognitive/Neuro/Behavioral: WDL  Level of Consciousness: alert  Arousal Level: opens eyes spontaneously  Orientation: oriented x 4     Best Language: 0 - No aphasia  Speech: clear    Living Environment:   People in home: spouse  Funmilayo Schroeder  Current living Arrangements: house      Able to return to prior arrangements: yes     Family/Social Support:  Care provided by: self, spouse/significant other  Provides care for: no one  Marital Status:   Wife  Funmilayo Schroeder       Description of Support System: Supportive, Involved    Support Assessment: Adequate family and caregiver support    Current Resources:   Patient receiving home care services: No     Community Resources: None  Equipment currently used at home: glucometer  Supplies currently used at home: Diabetic Supplies, Wound Care Supplies    Employment/Financial:  Employment Status: retired        Financial Concerns: none   Referral to Financial Worker: No     Does the patient's insurance plan have a 3 day qualifying hospital stay waiver?  No    Lifestyle & Psychosocial Needs:  Social Determinants of Health     Food Insecurity: Not on file   Depression: Not at risk (11/7/2022)    [facility], [facility]    PHQ-2     PHQ-2 TOTAL SCORE: 0   Housing Stability: Not on file   Tobacco Use:  Medium Risk (5/17/2024)    Patient History     Smoking Tobacco Use: Former     Smokeless Tobacco Use: Never     Passive Exposure: Not on file   Financial Resource Strain: High Risk (12/23/2021)    Received from [facility], [facility]    Financial Resource Strain     Difficulty of Paying Living Expenses: Not on file     Difficulty of Paying Living Expenses: Not on file   Alcohol Use: Not on file   Transportation Needs: Not on file   Physical Activity: Not on file   Interpersonal Safety: Not on file   Stress: Not on file   Social Connections: Unknown (12/23/2021)    Received from [facility], [facility]    Social Connections     Frequency of Communication with Friends and Family: Not on file   Health Literacy: Not on file     Functional Status:  Prior to admission patient needed assistance:   Dependent ADLs:: Independent  Dependent IADLs:: Transportation    Additional Information:  Writer met with patient and his spouse at bedside to review role of care management services, discuss goals of care and assess need for any possible services at discharge. Patient alert, answering questions appropriately and engaged in the conversation. Patient has a HCD, but no papers available. Lives with spouse in a house. Report independent with ADLs/IADLs, but needs help with transportation. Has DM supplies. No community resources. Goal is to return home. Family will transport.       Diane Thao RN

## 2024-05-18 NOTE — CONSULTS
5/18/2024  May 18, 2024  10:37 AM    Impression and Plan:     1. Acute Kidney Injury/CKD- CKD thought to be from longstanding DM-type 1 and hypertension.  Normal renal ultrasound in the past.  Baseline creatinine is 3.13 to 3.45, progressive.  Presented with serum creatinine of 4.05, down to 3.94 today. K and bicarb normal.   His urine output has been okay.  Urinalysis with protein 20 and no blood.  Last UPC on 1/3/2024 was 0.67.   Scr improving with holding torsemide and losartan today. Reports some hypotension and dizziness but no uremia symptom.      Patient is followed by Dr. Padgett in renal clinic, last inpatient visit was 01/10/2024.  Has had dialysis discussion with Dr. Bhatia, AVF was placed on 1/10/2024, still needs a second step surgery for AVF.         Plan is to avoid any further renal insult by renally dosing all medications and avoiding nephrotoxic medications.  Avoid ACE inhibitors/ARB, Aminoglycosides/ IV constrast/  NSAID if possible.  -Continue holding ARB, I see no urgent need to restart Losartan, I would not restart this.        -NO acute need for dialysis today, will monitor closely      3. Blood Pressure/Volume/HFpEF:  Patient with long standing history of hypertension.  Blood pressure okay, some soft bp, echo in process.     -ARB on hold, seen by cardiology who decreased metoprolol to 12.5 mg.    -Appears Euvolemic.       -Avoid over-correction of blood pressure, allow mild hyper-tension to help with renal perfusion.      4. Anemia: ASHLEY, and chronic illness all possibly contributing (target hgb of 10-11).  Hgb at 8.2(8.8)               - Check iron studies in AM.            - Monitor CBC     5. Coronary Artery Disease: history of CABG x4 vessels.    6. Diabetes: type 1 since age 19.        7. PAD: s/p left toes amputation in June of 2020.  S/p left leg angiogram with angioplasties and 2 stents in April of 2023.  Followed by Dr. Serrato at [place] [facility].         CC:  CKD    HPI: I was asked to consult on this 73 year old male.  Mr. Bauer past medical history is significant for Diabetes type 1 since age 19, hypertension, NSTEMI, CKD, CHF, coronary artery disease, CABG x4 vessels in 2006, hypothyroidism, hyperlipidemia and back pain.  He presented to the ED yesterday with fatigue, chest pain,shortness of breath, and lightheadedness.  Chest pain was with exertion.  These symptoms has been ongoing for about 1 month, had a near syncopal episode and felt dizzy therefore came to the ED.  Trop was 97 and BNP was 11,000.  Chest x-ray was unremarkable.     Upon evaluation today, the patient was in bed resting with his SO by the bedside.  Admits to orthstatic hypotension, dizziness, syncope, fatigue and weakness.  He is making good urine.  Denies nausea, vomiting, diarrhea, bleeding, infection, recent antibiotic use, NSAID, edema, ascites, change of taste, fever and chills.         Medical History:   Past Medical History:   Diagnosis Date    Anxiety     Chronic kidney disease     Coronary artery disease     Diabetes mellitus (H)     Diabetic ulcer of toe (H)     Disorder of thyroid     Hyperlipidemia     Hypertension     Hypothyroidism     SHERRELL (obstructive sleep apnea)     no cpap       Surgical History:   Past Surgical History:   Procedure Laterality Date    AMPUTATE TOE(S) Left     BYPASS GRAFT ARTERY CORONARY  01/01/2005    [place] with Dr. Holter    CARDIAC CATHETERIZATION  01/01/2005    CARDIAC CATHETERIZATION  11/25/2016    no intervention    CATARACT EXTRACTION      CERVICAL DISC SURGERY  01/01/2014    CREATE FISTULA ARTERIOVENOUS UPPER EXTREMITY Left 4/2/2024    Procedure: CREATION, ARTERIOVENOUS FISTULA, UPPER EXTREMITY;  Surgeon: Edwin Lynne MD;  Location: [facility] Main OR    CV ANGIOGRAM CORONARY GRAFT N/A 07/27/2023    Procedure: Coronary Angiogram Graft;  Surgeon: Maikel Tripathi MD;  Location: [facility] CATH LAB CV    CV PCI N/A 07/27/2023    Procedure:  Percutaneous Coronary Intervention;  Surgeon: Maikel Tripathi MD;  Location: [facility] CATH LAB CV    IR LOWER EXTREMITY ANGIOGRAM LEFT  04/07/2023    IR LOWER EXTREMITY ANGIOGRAM LEFT  04/08/2020    IR LOWER EXTREMITY ANGIOGRAM LEFT  06/21/2023       Medications:    Prior to admission:   Medications Prior to Admission   Medication Sig Dispense Refill Last Dose    aspirin 81 MG EC tablet Take 1 tablet (81 mg) by mouth daily Start tomorrow. 30 tablet 3 5/17/2024 at am    atorvastatin (LIPITOR) 40 MG tablet Take 40 mg by mouth every evening   5/16/2024 at hs    clopidogrel (PLAVIX) 75 MG tablet Take 1 tablet (75 mg) by mouth daily Dose to start tomorrow. 90 tablet 3 5/17/2024 at am    ezetimibe (ZETIA) 10 MG tablet Take 1 tablet (10 mg) by mouth daily 90 tablet 4 5/16/2024 at hs    fish oil-omega-3 fatty acids 1000 MG capsule Take 1 g by mouth daily   5/17/2024 at am    fluticasone (FLONASE) 50 MCG/ACT nasal spray Spray 2 sprays into both nostrils 2 times daily   5/17/2024 at am    gabapentin (NEURONTIN) 300 MG capsule [GABAPENTIN (NEURONTIN) 300 MG CAPSULE] Take 300 mg by mouth 3 (three) times a day.          5/17/2024 at x1 dose    insulin glargine (LANTUS PEN) 100 UNIT/ML pen Inject 30 Units Subcutaneous every morning   5/17/2024 at am    insulin lispro (HUMALOG KWIKPEN) 100 UNIT/ML (1 unit dial) KWIKPEN Inject Subcutaneous 3 times daily (before meals) Carb count: 3 g carb : 1 unit insulin  Maximum 25 units per meal; 75 units per day   5/17/2024 at breakfast and lunch dose    levothyroxine (SYNTHROID/LEVOTHROID) 125 MCG tablet Take 125 mcg by mouth daily   5/17/2024 at am    losartan (COZAAR) 25 MG tablet Take 25 mg by mouth every evening   5/16/2024 at evening    metoprolol succinate ER (TOPROL XL) 25 MG 24 hr tablet Take 1 tablet (25 mg) by mouth daily 90 tablet 3 5/16/2024 at evening    multivitamin with iron (ONE DAILY WITH IRON) Tab tablet [MULTIVITAMIN WITH IRON (ONE DAILY WITH IRON) TAB TABLET] Take 1  tablet by mouth daily.   5/17/2024 at am    nitroGLYcerin (NITROSTAT) 0.4 MG sublingual tablet Place 1 tablet (0.4 mg) under the tongue every 5 minutes as needed 25 tablet 3 Unknown at prn    torsemide (DEMADEX) 20 MG tablet Take 4 tablets (80 mg) by mouth daily 360 tablet 1 5/17/2024 at am    VITAMIN D3 2,000 unit capsule [VITAMIN D3 2,000 UNIT CAPSULE] Take 2,000 Units by mouth daily.  3 5/17/2024 at am    Continuous Blood Gluc Sensor (FREESTYLE DAVID 14 DAY SENSOR) [facility]        TECHLITE PEN NEEDLES 31G X 5 MM miscellaneous          Current:   Current Facility-Administered Medications:     acetaminophen (TYLENOL) tablet 650 mg, 650 mg, Oral, Q4H PRN **OR** acetaminophen (TYLENOL) Suppository 650 mg, 650 mg, Rectal, Q4H PRN, Britney Bennett NP    aspirin EC tablet 81 mg, 81 mg, Oral, Daily, Britney Bennett NP, 81 mg at 05/18/24 0850    atorvastatin (LIPITOR) tablet 40 mg, 40 mg, Oral, QPM, Britney Bennett NP, 40 mg at 05/17/24 2011    calcium carbonate (TUMS) chewable tablet 1,000 mg, 1,000 mg, Oral, 4x Daily PRN, Britney Bennett NP, 1,000 mg at 05/18/24 0645    clopidogrel (PLAVIX) tablet 75 mg, 75 mg, Oral, Daily, Britney Bennett NP, 75 mg at 05/18/24 0851    glucose gel 15-30 g, 15-30 g, Oral, Q15 Min PRN **OR** dextrose 50 % injection 25-50 mL, 25-50 mL, Intravenous, Q15 Min PRN **OR** glucagon injection 1 mg, 1 mg, Subcutaneous, Q15 Min PRN, Torsten King MD    ezetimibe (ZETIA) tablet 10 mg, 10 mg, Oral, QPM, Britney Bennett NP, 10 mg at 05/17/24 2159    fluticasone (FLONASE) 50 MCG/ACT spray 2 spray, 2 spray, Both Nostrils, BID, Britney Bennett NP, 2 spray at 05/18/24 0851    gabapentin (NEURONTIN) capsule 300 mg, 300 mg, Oral, Daily, Britney Bennett, NP, 300 mg at 05/18/24 0853    hydrALAZINE (APRESOLINE) tablet 10 mg, 10 mg, Oral, Q4H PRN **OR** hydrALAZINE (APRESOLINE) injection 10 mg, 10 mg, Intravenous, Q4H PRN, Britney Bennett, NP    insulin aspart (NovoLOG) injection (RAPID ACTING), , Subcutaneous, TID w/meals,  Torsten King MD    insulin aspart (NovoLOG) injection (RAPID ACTING), 1-7 Units, Subcutaneous, TID AC, Britney Bennett NP, 3 Units at 24 0851    insulin aspart (NovoLOG) injection (RAPID ACTING), 1-5 Units, Subcutaneous, At Bedtime, Britney Bennett NP    [START ON 2024] insulin glargine (LANTUS PEN) injection 30 Units, 30 Units, Subcutaneous, QAM, Torsten King MD    levothyroxine (SYNTHROID/LEVOTHROID) tablet 125 mcg, 125 mcg, Oral, QAM AC, Britney Bennett NP, 125 mcg at 24 0645    lidocaine (LMX4) cream, , Topical, Q1H PRN, Britney Bennett NP    lidocaine 1 % 0.1-1 mL, 0.1-1 mL, Other, Q1H PRN, Britney Bennett NP    [Held by provider] losartan (COZAAR) tablet 25 mg, 25 mg, Oral, QPM, Britney Bennett NP    metoprolol succinate ER (TOPROL XL) 24 hr tablet 25 mg, 25 mg, Oral, QPM, Britney Bennett NP, 25 mg at 24    ondansetron (ZOFRAN ODT) ODT tab 4 mg, 4 mg, Oral, Q6H PRN **OR** ondansetron (ZOFRAN) injection 4 mg, 4 mg, Intravenous, Q6H PRN, Britney Bennett NP    senna-docusate (SENOKOT-S/PERICOLACE) 8.6-50 MG per tablet 1 tablet, 1 tablet, Oral, BID PRN **OR** senna-docusate (SENOKOT-S/PERICOLACE) 8.6-50 MG per tablet 2 tablet, 2 tablet, Oral, BID PRN, Britney Bennett NP    sodium chloride (PF) 0.9% PF flush 3 mL, 3 mL, Intracatheter, Q8H, Britney Bennett NP, 3 mL at 24    sodium chloride (PF) 0.9% PF flush 3 mL, 3 mL, Intracatheter, q1 min prn, Britney Bennett NP    [Held by provider] torsemide (DEMADEX) tablet 80 mg, 80 mg, Oral, Daily, Britney Bennett NP    Allergies: Hydrochlorothiazide and Levofloxacin    Social History:   Social History     Socioeconomic History    Marital status:      Spouse name: None    Number of children: None    Years of education: None    Highest education level: None   Tobacco Use    Smoking status: Former     Current packs/day: 0.00     Types: Cigarettes     Quit date: 10/10/1984     Years since quittin.6    Smokeless tobacco: Never    Vaping Use    Vaping status: Never Used   Substance and Sexual Activity    Alcohol use: Not Currently    Drug use: Never     Social Determinants of Health      Received from [facility], [facility]    Financial Resource Strain    Received from [facility], [employer] [facility]    Social Connections       Family History:   Family History   Problem Relation Age of Onset    Diabetes Mother     Coronary Artery Disease Father     Coronary Artery Disease Brother        Review of Systems: :   ROS: Comprehensive ROS was negative other than as noted in the HPI.      Physical Exam:   /53 (BP Location: Right arm)   Pulse 75   Temp 98.1  F (36.7  C) (Oral)   Resp 18   Ht 1.829 m (6')   Wt 100.7 kg (222 lb)   SpO2 93%   BMI 30.11 kg/m    In general this is a 73 year old male in no acute distress.   General: Calm & comfortable   Eyes: Pupils equal, sclerae not icteric   ENT: Mucus membranes moist, no lesions noted   Resp: CTA bilaterally, no distress, normal effort   CV: RRR without murmur, no hip/leg edema   GI: Soft NT NG   Psych: A&Ox3, not depressed   Neuro: Moves all extremities.     Skin: No rash noted     Labs and XRays: Reviewed    Recent Labs   Lab Test 05/18/24  0448 05/17/24  1722 05/17/24  1517 05/06/24  0924   POTASSIUM 4.8 4.0 4.5 4.0   CHLORIDE 95*  --  97* 95*   ANIONGAP 15  --  15 13       Recent Labs   Lab Test 05/18/24  0448 05/17/24  1517 04/11/24  0347   WBC 6.0 4.6 6.6   RBC 2.76* 2.95* 3.63*   MCV 94 95 93   MCH 29.7 29.8 30.0   RDW 14.1 14.3 13.8       Recent Labs   Lab Test 06/21/23  0710   INR 1.00       Thank you for allowing me to participate in @@ Lizette s care.    Juan Manuel Taylor, DNP, CNP, FLOR  [facility]  Office # [phone]

## 2024-05-18 NOTE — PROGRESS NOTES
05/18/24 1205   Appointment Info   Signing Clinician's Name / Credentials (PT) NURIA Major   Living Environment   People in Home spouse   Current Living Arrangements house   Home Accessibility stairs to enter home;stairs within home   Number of Stairs, Main Entrance 3   Number of Stairs, Within Home, Primary greater than 10 stairs   Stair Railings, Within Home, Primary railing on left side (ascending)   Transportation Anticipated family or friend will provide   Living Environment Comments Pt can live on main level.  Wife is able to assist as needed.   Self-Care   Usual Activity Tolerance good   Current Activity Tolerance fair   Equipment Currently Used at Home glucometer   Fall history within last six months yes   Number of times patient has fallen within last six months 3   Activity/Exercise/Self-Care Comment Pt IND with ADL's at baseline.  Reports feeling shaking then collaposing several times in the last few weeks.   General Information   Onset of Illness/Injury or Date of Surgery 05/17/24   Referring Physician Torsten King MD   Patient/Family Therapy Goals Statement (PT) Pt would like to get blood glucose and BP under control and return home.   Pertinent History of Current Problem (include personal factors and/or comorbidities that impact the POC) Per chart reveiw: 73 year old male with history of diabetes, CAD, hypertension, NSTEMI, CKD, CHF, CVA, who presents to the ER with complaints of fatigue, chest pain, shortness of breath, and lightheadedness. He has had chest pain with exertion as well as shortness of breath with exertion.  This is been going on for over a month though seems to be worsening.  He had a near syncopal episode today patient states he felt dizziness and had to bring himself to the ground.  Patient states he had a similar episode of dizziness a month ago where he had fallen   Existing Precautions/Restrictions cardiac;fall   Cognition   Affect/Mental Status (Cognition) WNL    Orientation Status (Cognition) oriented x 4   Follows Commands (Cognition) WNL   Posture    Posture Not impaired   Range of Motion (ROM)   Range of Motion ROM deficits secondary to swelling   Strength (Manual Muscle Testing)   Strength (Manual Muscle Testing) Deficits observed during functional mobility   Bed Mobility   Bed Mobility scooting/bridging;supine-sit   Scooting/Bridging Schuyler (Bed Mobility) modified independence   Supine-Sit [place] (Bed Mobility) modified independence;supervision;verbal cues   Bed Mobility Limitations decreased ability to use arms for pushing/pulling;decreased ability to use legs for bridging/pushing   Impairments Contributing to Impaired Bed Mobility decreased strength   Assistive Device (Bed Mobility) bed rails   Transfers   Transfers sit-stand transfer   Transfer Safety Concerns Noted decreased weight-shifting ability   Impairments Contributing to Impaired Transfers decreased strength   Sit-Stand Transfer   Sit-Stand [place]uyler (Transfers) set up;contact guard   Assistive Device (Sit-Stand Transfers) walker, front-wheeled   Gait/Stairs (Locomotion)   [place]uyler Level (Gait) contact guard   Assistive Device (Gait) walker, front-wheeled   Distance in Feet (Gait) 5   Pattern (Gait) swing-through   Deviations/Abnormal Patterns (Gait) vish decreased   Clinical Impression   Criteria for Skilled Therapeutic Intervention Yes, treatment indicated   PT Diagnosis (PT) Impaired functional mobility, difficulty with gait   Influenced by the following impairments weakness   Functional limitations due to impairments gait, stairs, transfers   Clinical Presentation (PT Evaluation Complexity) evolving   Clinical Presentation Rationale Pt presents as clinically diagnosed.   Clinical Decision Making (Complexity) moderate complexity   Planned Therapy Interventions (PT) gait training;patient/family education;stair training;transfer training   Risk & Benefits of therapy have been  explained evaluation/treatment results reviewed;patient   PT Total Evaluation Time   PT Eval, Moderate Complexity Minutes (36472) 10   Physical Therapy Goals   PT Frequency Daily   PT Predicted Duration/Target Date for Goal Attainment 05/25/24   PT Goals Transfers;Gait;Stairs   PT: Transfers Modified independent;Sit to/from stand;Bed to/from chair;Assistive device   PT: Gait Supervision/stand-by assist;Rolling walker;Greater than 200 feet   PT: Stairs Supervision/stand-by assist;7 stairs;Rail on left   Interventions   Interventions Quick Adds Gait Training   Gait Training   Gait Training Minutes (10670) 25   Symptoms Noted During/After Treatment (Gait Training) fatigue;dizziness   Treatment Detail/Skilled Intervention pt seated at start of session.  Nurse came to check blood glucose.  It was 369.  Nurse and pt wished to continue PT to help bring glucose down.  Pt IND don shoes.  Pt dizzy after donning shoe and move sit to stand.  Pt needed exteneded breaks to allow dizziness to pass.  Pt amb 120 ft with FWW and CGA.  Pt needed one rest break for shaking.  Pt move sit <> stand with CGA.  Pt left seated at EOB with nurse.   Distance in Feet 120   [place] Level (Gait Training) contact guard   Physical Assistance Level (Gait Training) supervision;1 person assist   Assistive Device (Gait Training) rolling walker   Pattern Analysis (Gait Training) swing-to gait   Gait Analysis Deviations decreased vish   Impairments (Gait Analysis/Training) strength decreased   PT Discharge Planning   PT Plan gait, stairs, transfers   PT Discharge Recommendation (DC Rec) home with assist   PT Rationale for DC Rec Pt is moving with CGA or Mod IND.  Wife will be able to assist with ADLs as needed.   PT Brief overview of current status amb 120 ft with FWW CGA.  Mod IND bed mobility.  shaky and dizzy with transitions.   PT Equipment Needed at Discharge walker, rolling

## 2024-05-18 NOTE — PROGRESS NOTES
OT [place]     05/18/24 1500   Appointment Info   Signing Clinician's Name / Credentials (OT) Marixa Curran OTR/L   Rehab Comments (OT) Monitor BP! and BG   Living Environment   People in Home spouse   Current Living Arrangements house   Home Accessibility stairs to enter home;stairs within home   Number of Stairs, Main Entrance 3   Number of Stairs, Within Home, Primary greater than 10 stairs   Stair Railings, Within Home, Primary railing on left side (ascending)   Living Environment Comments Pt can live on main level - Bedroom, BR, etc on main level. Walk-in shower, no bench/chair.   Self-Care   Usual Activity Tolerance good   Current Activity Tolerance fair   Equipment Currently Used at Home glucometer   Fall history within last six months yes   Number of times patient has fallen within last six months 3   Activity/Exercise/Self-Care Comment Pt IND w ADLs at baseline   Instrumental Activities of Daily Living (IADL)   IADL Comments Pt reports IND IADLs - unclear if pt drives   General Information   Onset of Illness/Injury or Date of Surgery 05/17/24   Referring Physician Britney Bennett NP   Additional Occupational Profile Info/Pertinent History of Current Problem Dakota Bauer is a 73 year old male with history of diabetes, CAD, hypertension, NSTEMI, CKD, CHF, CVA, who presents to the ER with complaints of fatigue, chest pain, shortness of breath, and lightheadedness. He has had chest pain with exertion as well as shortness of breath with exertion.  This is been going on for over a month though seems to be worsening.  He had a near syncopal episode today patient states he felt dizziness and had to bring himself to the ground.  Patient states he had a similar episode of dizziness a month ago where he had fallen.   Existing Precautions/Restrictions fall   Cognitive Status Examination   Orientation Status orientation to person, place and time   Affect/Mental Status (Cognitive) WFL;flat/blunted affect   Cognitive  Status Comments Pt appearing slightly confused throughout session, flat affect, demo'ing mild impulsivity. Seemingly decreased awareness towards deficits/symptoms / decreased safety awareness   Sensory   Sensory Quick Adds sensation intact   Sensory Comments No UB changes in sensation   Range of Motion Comprehensive   General Range of Motion no range of motion deficits identified   Strength Comprehensive (MMT)   General Manual Muscle Testing (MMT) Assessment no strength deficits identified   Muscle Tone Assessment   Muscle Tone Quick Adds No deficits were identified   Coordination   Upper Extremity Coordination No deficits were identified   Bed Mobility   Bed Mobility supine-sit;sit-supine   Supine-Sit [place] (Bed Mobility) supervision   Sit-Supine [place] (Bed Mobility) supervision   Transfers   Transfers sit-stand transfer   Sit-Stand Transfer   Sit-Stand [place] (Transfers) contact guard   Activities of Daily Living   BADL Assessment/Intervention lower body dressing;toileting   Lower Body Dressing Assessment/Training   [place] Level (Lower Body Dressing) don;socks;independent   Toileting   Comment, (Toileting) Did not directly observe during eval; per clinical reasoning, pt likely CGA w/ transfer, SBA clothing/hygiene   Clinical Impression   Criteria for Skilled Therapeutic Interventions Met (OT) Yes, treatment indicated   OT Diagnosis Decreased activity tolerance impacting ADLs   Influenced by the following impairments Dyspnea, dizziness, medical complexity   OT Problem List-Impairments impacting ADL problems related to;activity tolerance impaired;balance;mobility   Assessment of Occupational Performance 1-3 Performance Deficits   Identified Performance Deficits Functional ambulation, activity tolerance, toileting   Planned Therapy Interventions (OT) ADL retraining;IADL retraining;home program guidelines;progressive activity/exercise   Clinical Decision Making Complexity (OT) problem focused  assessment/low complexity   Risk & Benefits of therapy have been explained evaluation/treatment results reviewed;care plan/treatment goals reviewed   OT Total Evaluation Time   OT Eval, Low Complexity Minutes (72998) 15   OT Goals   Therapy Frequency (OT) 6 times/week   OT Predicted Duration/Target Date for Goal Attainment 05/25/24   OT Goals Toilet Transfer/Toileting;Aerobic Activity;OT Goal 1   OT: Toilet Transfer/Toileting Modified independent   OT: Perform aerobic activity with stable cardiovascular response continuous activity;5 minutes;ambulation   OT: Goal 1 Patient will demonstrate and verbalize understanding towards therapist recommendations and home program guidelines   Self-Care/Home Management   Self-Care/Home Mgmt/ADL, Compensatory, Meal Prep Minutes (24665) 15   Symptoms Noted During/After Treatment (Meal Preparation/Planning Training) dizziness   Treatment Detail/Skilled Intervention Pt CGA STS from recliner, ambulated ~10' to BR w/ CGA, no device, extra time. Pt CGA for toilet transfer using GBs, OT and pt discussed GBs for home, setup. Pt ambulated 5' to EOB CGA-Min A for support as pt reported feeling mildly dizzy. Pt on room air, pt sat EOB, vitals and BP re-monitored, handoff via Vocera to RN. OT educated pt to listen to body when it feels dizzy; find supported place to sit and take break. OT educated on benefits of using a walker, will bring one next session. Pt receptive to ideas, maintaining flat affect. Session ended w/ pt sitting up in bed, call button near, handoff to RN.   OT Discharge Planning   OT Plan Bring walker. Check-in CHF home management, progress endurance w/ ADL routine, activity tolerance - w/c follow if outside room. Trial 4WW vs FWW, energy conservation, fall prevention. Recommend shower chair.   OT Discharge Recommendation (DC Rec) Transitional Care Facility   OT Rationale for DC Rec Pt is limited by dizziness/BP at this time, but maintains strength for ADLs and transfers;  DC rec pending symptom management - at this time, pt's dizziness is a falls risk and pt may not be safe to return home w/o supervision (pt has accessible home), could only ambulate ~15' today before becoming dizzy/ having symptoms. If pt's dizziness becomes medically managed /improves, pt may be safe to return home pending stairs progress w/ PT.   OT Brief overview of current status CGA-Min A w/o device; Bring walker next session   Total Session Time   Timed Code Treatment Minutes 15   Total Session Time (sum of timed and untimed services) 30

## 2024-05-18 NOTE — PROGRESS NOTES
[facility]    PROGRESS NOTE - Hospitalist Service    Assessment and Plan   73 year old male with past medical history of diabetes, CAD, hypertension, NSTEMI, CKD, CHF, CVA, who presents to the ER with complaints of fatigue, chest pain, shortness of breath, and lightheadedness.  Admitted with acute on chronic CHF and ASHLEY.    Acute on chronic CHF  - Patient presented with progressive shortness of breath  -Elevated BNP on admission, chest x-ray is negative for pleural effusion.  - Hold home oral torsemide  - Cardiology consult, appreciate input  - Received IV diuresis today.,   - Caution with IV diuresis because of his worsening kidney disease  - Echo shows EF of 50 to 55% with moderate to severe mitral regurgitation  - Continue to monitor telemetry    Dizziness with near syncope  - Suspect secondary to orthostatic drop in blood pressure  - Holding losartan and decrease metoprolol dose to  -PT/OT evaluation  -Echo as above    Elevated troponin  - Of low clinical value because of his advanced kidney disease.  - Patient had stress test done as ago which was mildly abnormal.  - Extensive history of coronary artery disease.  - Cardiology consult, appreciate input  - No need for further evaluation.    Acute on chronic kidney disease  - Concern for cardiorenal syndrome  - Nephrology consult, appreciate input  - Hold losartan  - Check renal ultrasound  - caution with IV diuresis.  - Continue to monitor renal function.    Diabetes mellitus type 2  - Restart home Lantus  - Cover with sliding scale   - Add carb counting insulin coverage  - Hemoglobin A1c 7.1    Chronic anemia  - Of chronic kidney disease  - Stable hemoglobin   - No sign or symptom of acute bleeding-   -Continue to monitor hemoglobin    Hypothyroidism  - Resume levothyroxine  - TSH 2.21    Hyperlipidemia  -Continue statin and Zetia    Weakness and deconditioning  - S/P recurrent fall  -PT/OT evaluation    50 MINUTES SPENT BY ME on the  date of service doing chart review, history, exam, documentation & further activities per the note    Active Problems:    Dyspnea on exertion    Elevated troponin    Exertional chest pain    ASHLEY (acute kidney injury) (H24)    Elevated brain natriuretic peptide (BNP) level    Anemia, unspecified type      VTE prophylaxis:  Pneumatic Compression Devices  DIET: Orders Placed This Encounter      Combination Diet 2 gm NA Diet; No Caffeine Diet      Disposition/Barriers to discharge:   Code Status: Full Code    Subjective:  Dakota is feeling better today but still has some lightheaded with ambulation.  Denies any chest pain    PHYSICAL EXAM  Vitals:    05/17/24 1504 05/18/24 0027   Weight: 99.3 kg (219 lb) 100.7 kg (222 lb)     B/P:104/53 T:98.1 P:70 R:18     Intake/Output Summary (Last 24 hours) at 5/18/2024 1333  Last data filed at 5/18/2024 1200  Gross per 24 hour   Intake 1280 ml   Output 1600 ml   Net -320 ml      Body mass index is 30.11 kg/m .    Constitutional: awake, alert, cooperative, no apparent distress, and appears stated age  Respiratory: No increased work of breathing, good air exchange, clear to auscultation bilaterally, no crackles or wheezing  Cardiovascular: Normal apical impulse, regular rate and rhythm, normal S1 and S2, no S3 or S4, and no murmur noted  GI: No scars, normal bowel sounds, soft, non-distended, non-tender, no masses palpated, no hepatosplenomegally  Skin: no bruising or bleeding and normal skin color, texture, turgor  Musculoskeletal: There is no redness, warmth, or swelling of the joints.  Full range of motion noted.  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  Cranial nerves II-XII are grossly intact.  Motor is 5 out of 5 bilaterally.   Sensory is intact.    Neuropsychiatric: Appropriate with examiner      PERTINENT LABS/IMAGING:    I have personally reviewed the following data over the past 24 hrs:    6.0  \   8.2 (L)   / 129 (L)     131  (L) 95 (L) 77.9 (H) /  363 (H)   4.8 21 (L) 3.94 (H) \     Trop: 82 (H) BNP: N/A     TSH: N/A T4: N/A A1C: N/A       Imaging results reviewed over the past 24 hrs:   Recent Results (from the past 24 hour(s))   Chest XR,  PA & LAT    Narrative    EXAM: XR CHEST 2 VIEWS  LOCATION: [facility]  DATE: 2024    INDICATION:  Chest pain and shortness of breath.  COMPARISON: Portable AP view the chest 2024      Impression    IMPRESSION:     Cardiac silhouette is normal in size. Previous median sternotomy and coronary revascularization. The vascular pedicle is normal. Normal aortic arch and descending aortic contours.    Normal lung vascularity. No interstitial or alveolar opacities. Diaphragmatic curvature is preserved. No pleural effusion.    Unchanged small mid/lower thoracic spine degenerative osteophytes.   Echocardiogram Complete   Result Value    LVEF  50-55%    Narrative    [id number]  [id number]  [id number]  [id number] [address]     Name: DENY MONTERO  MRN: [id number]  : 1950  Study Date: 2024 09:02 AM  Age: 73 yrs  Gender: Male  Patient Location: [facility]  Reason For Study: CHF  Ordering Physician: LIZY AJLLOH  Performed By: AFSANEH     BSA: 2.2 m2  Height: 72 in  Weight: 222 lb  BP: 104/53 mmHg  ______________________________________________________________________________  Procedure  Complete Portable Echo Adult. Definity (NDC #85404-699) given intravenously.  Adequate quality two-dimensional was performed and interpreted.  ______________________________________________________________________________  Interpretation Summary     The left ventricle is normal in size. There is normal left ventricular wall  thickness.  LV systolic function is low-normal. The visual ejection fraction is 50-55%.  There is hypokinesis of the inferolateral wall.  Grade II or moderate diastolic dysfunction.     The right ventricle is  normal size. Mildly decreased right ventricular  systolic function  The left atrium is mildly dilated. The right atrium is moderately dilated.  There is mild (1+) tricuspid regurgitation.  There is moderate to severe mitral regurgitaiton which is likely functional  related to posterior leaflet tethering and elevated volume.  Severe (>55mmHg) pulmonary hypertension is present.  IVC diameter >2.1 cm collapsing <50% with sniff suggests a high RA pressure  estimated at 15 mmHg or greater.     When compared to previous study from 4/11/2024 the mitral regurgitaiton is  worse, filling pressures are worse.  ______________________________________________________________________________  Left Ventricle  The left ventricle is normal in size. There is normal left ventricular wall  thickness. LV systolic function is low-normal. The visual ejection fraction is  50-55%. Grade II or moderate diastolic dysfunction. There is hypokinesis of  the inferolateral wall.     Right Ventricle  The right ventricle is normal size. Mildly decreased right ventricular  systolic function.     Atria  The left atrium is mildly dilated. The right atrium is moderately dilated.     Mitral Valve  Mitral valve leaflets appear normal. There is moderate to severe mitral  regurgitaiton which is likely functional related to posterior leaflet  tethering and elevated volume. There is no mitral valve stenosis.     Tricuspid Valve  Tricuspid valve leaflets appear normal. There is mild (1+) tricuspid  regurgitation. Severe (>55mmHg) pulmonary hypertension is present.     Aortic Valve  The aortic valve is trileaflet. Aortic valve leaflets appear normal. No aortic  regurgitation is present. No aortic stenosis is present.     Pulmonic Valve  The pulmonic valve is not well visualized. There is trace pulmonic valvular  regurgitation.     Vessels  The aorta root is normal. IVC diameter >2.1 cm collapsing <50% with sniff  suggests a high RA pressure estimated at 15 mmHg  or greater.     Pericardium  There is no pericardial effusion.     Rhythm  Sinus rhythm was noted.  ______________________________________________________________________________  MMode/2D Measurements & Calculations  IVSd: 0.79 cm  LVIDd: 5.4 cm  LVIDs: 3.9 cm  LVPWd: 1.0 cm  FS: 28.4 %  LV mass(C)d: 183.3 grams  LV mass(C)dI: 82.3 grams/m2  Ao root diam: 2.9 cm  LA dimension: 5.2 cm  asc Aorta Diam: 2.9 cm  LA/Ao: 1.8  LVOT diam: 1.9 cm  LVOT area: 2.8 cm2  Ao root diam index Ht(cm/m): 1.6  Ao root diam index BSA (cm/m2): 1.3  Asc Ao diam index BSA (cm/m2): 1.3  Asc Ao diam index Ht(cm/m): 1.6  EF Biplane: 55.8 %  LA Volume (BP): 82.3 ml     LA Volume Index (BP): 36.9 ml/m2  LA Volume Indexed (AL/bp): 41.1 ml/m2  RWT: 0.38  TAPSE: 1.9 cm     Time Measurements  MM HR: 83.0 BPM     Doppler Measurements & Calculations  MV E max sagar: 121.0 cm/sec  MV A max sagar: 81.0 cm/sec  MV E/A: 1.5  MV dec time: 0.14 sec  Ao V2 max: 180.0 cm/sec  Ao max P.0 mmHg  Ao V2 mean: 118.0 cm/sec  Ao mean P.0 mmHg  Ao V2 VTI: 36.9 cm  ELIZABETH(I,D): 1.3 cm2  ELIZABETH(V,D): 1.5 cm2  LV V1 max PG: 3.8 mmHg  LV V1 max: 97.5 cm/sec  LV V1 VTI: 16.5 cm  MR PISA: 4.0 cm2  MR ERO: 0.26 cm2  MR volume: 33.3 ml  SV(LVOT): 46.8 ml  SI(LVOT): 21.0 ml/m2  PA acc time: 0.08 sec  TR max sagar: 334.0 cm/sec  TR max P.6 mmHg  AV Sagar Ratio (DI): 0.54  ELIZABETH Index (cm2/m2): 0.57  E/E' avg: 15.9  Lateral E/e': 15.3     Medial E/e': 16.5  RV S Sagar: 8.8 cm/sec     ______________________________________________________________________________  Report approved by: Davon King 2024 12:06 PM             Discussed with patient, family, cardiology, nursing staff and discharge planner    Torsten King MD  [facility] Medicine Service  [phone]

## 2024-05-18 NOTE — PROGRESS NOTES
"Care Management Follow Up    Length of Stay (days): 1    Expected Discharge Date: 05/19/2024     Concerns to be Addressed: Care progression - discharge planning     Patient plan of care discussed at interdisciplinary rounds: Yes    Anticipated Discharge Disposition:  TBD     Anticipated Discharge Services:  TBD  Anticipated Discharge DME:  SUSANNE    Patient/family educated on Medicare website which has current facility and service quality ratings:  NA  Education Provided on the Discharge Plan:  Yes per team  Patient/Family in Agreement with the Plan:  NA    Referrals Placed by CM/SW:  SUSANNE  Private pay costs discussed: Not applicable    Additional Information:  Per provider, Dr. King, patient is not ready. Cardiology and Nephrology following. Will put in PT eval.     Social Hx: \"Lives with spouse in a house. Report independent with ADLs/IADLs, but needs help with transportation. Has DM supplies. No community resources. Goal is to return home. Family will transport.\"    RNCM to follow for medical progression, recommendations, and final discharge plan.     Diane Thao RN     "

## 2024-05-18 NOTE — H&P
[facility]    History and Physical - Hospitalist Service       Date of Admission:  5/17/2024    Assessment & Plan      Dakota Bauer is a 73 year old male with history of diabetes, CAD, hypertension, NSTEMI, CKD, CHF, CVA, who presents to the ER with complaints of fatigue, chest pain, shortness of breath, and lightheadedness. He has had chest pain with exertion as well as shortness of breath with exertion.  This is been going on for over a month though seems to be worsening.  He had a near syncopal episode today patient states he felt dizziness and had to bring himself to the ground.  Patient states he had a similar episode of dizziness a month ago where he had fallen. Troponin elevated at 97 but BNP also 11,000.  Chest x-ray overall looks pretty good.  Creatinine is slightly worse than his baseline and up to 4.  Plan at this time is admission to the hospital for CHF exacerbations.  Spoke with cardiology who recommends holding off on heparin at this time unless troponin increases significantly.  Hemoglobin also decreased from baseline.  Patient denies blood in vomit or stool.  Cardiology recommends getting nephrology consult tomorrow with regards to his kidney disease.  They recommend holding off on diuresis tonight. Echo ordered results are pending. Orthostatic blood pressure results pending. PT/OT to assess for increased weakness and dizziness.     # Dyspnea on exertion  # Exertional chest pain  # Heart failure with reduced ejection fraction  # CAD  Chest x-ray results: Cardiac silhouette is normal in size. Previous median sternotomy and coronary revascularization. The vascular pedicle is normal. Normal aortic arch and descending aortic contours.Normal lung vascularity. No interstitial or alveolar opacities. Diaphragmatic curvature is preserved. No pleural effusion.Unchanged small mid/lower thoracic spine degenerative osteophytes.  Troponin elevated at 97, repeat troponin trending down  82  BMP 11,451  EKG reviewed, unchanged from previous  Cardiology consult, discussed tonight with the ED physician states to hold on heparin and diuresis at this time  Echo results pending  Strict I/O  Weigh daily    # Near syncope, with dizziness and weakness  Orthostatic blood pressure results pending  Cardiac monitoring  Echo results pending  PT and OT to assess    # ASHLEY, CKD stage IV  Creatinine today 4 elevated from baseline  Hold torsemide  Hold losartan  Nephrology consulted  Trend BMP in a.m.      # Anemia  Hgb today 8.8  Baseline hemoglobin approximately between 9 and 10  Trend CBC in a.m.    # Diabetes mellitus  Insulin Lantus  Accu-Cheks stable   Sliding scale insulin  Hold lispro    # Hyperlipidemia  Zetia  Atorvastatin    # Hypertension  Metoprolol  Blood pressure stable     # History of cerebral infarct   Plavix    # Hypothyroidism  Levothyroxine  TSH 2.21        Diet: Combination Diet 2 gm NA Diet; No Caffeine Diet (and additional linked orders)  Fluid restriction 1800 ML FLUID (and additional linked orders)    DVT Prophylaxis: Pneumatic Compression Devices  Mohan Catheter: Not present  Lines: None     Cardiac Monitoring: ACTIVE order. Indication: Acute decompensated heart failure (48 hours)  Code Status:  Full    Clinically Significant Risk Factors Present on Admission                # Drug Induced Platelet Defect: home medication list includes an antiplatelet medication  # Acute Kidney Injury, unspecified: based on a >150% or 0.3 mg/dL increase in last creatinine compared to past 90 day average, will monitor renal function  # Hypertension: Noted on problem list     # DMII: A1C = 7.0 % (Ref range: <5.7 %) within past 6 months   # Overweight: Estimated body mass index is 29.7 kg/m  as calculated from the following:    Height as of this encounter: 1.829 m (6').    Weight as of this encounter: 99.3 kg (219 lb).        # History of CABG: noted on surgical history       Disposition Plan     Medically  Ready for Discharge: Anticipated in 2-4 Days         The patient's care was discussed with the Attending Physician, Dr. Ahn .    Britney Bennett NP  Hospitalist Service  [facility]  Securely message with [employer] (more info)  Text page via [employer] Paging/Directory     ______________________________________________________________________    Chief Complaint   Dyspnea on exertion  Chest pain on exertion  Dizziness    History is obtained from the patient, family, and chart    History of Present Illness     Dakota Bauer is a 73 year old male with history of diabetes, CAD, hypertension, NSTEMI, CKD, CHF, CVA, who presents to the ER with complaints of fatigue, chest pain, shortness of breath, and lightheadedness. He has had chest pain with exertion as well as shortness of breath with exertion.  This is been going on for over a month though seems to be worsening.  Patient denies chest pain or shortness of breath at rest.  Patient denies dizziness at rest.  Patient denies palpitations.  Patient denies nausea vomiting or diarrhea.  Patient states he feels well at rest and only develops chest pain or shortness of breath with activity. He had a near syncopal episode today patient states he felt dizziness and had to bring himself to the ground.  Patient states he had a similar episode of dizziness a month ago where he had fallen.  Patient denies blood in vomit or stool. Patient states he had has had increased weakness and fatigue over the last month.     Past Medical History    Past Medical History:   Diagnosis Date    Anxiety     Chronic kidney disease     Coronary artery disease     Diabetes mellitus (H)     Diabetic ulcer of toe (H)     Disorder of thyroid     Hyperlipidemia     Hypertension     Hypothyroidism     SHERRELL (obstructive sleep apnea)     no cpap       Past Surgical History   Past Surgical History:   Procedure Laterality Date    AMPUTATE TOE(S) Left     BYPASS GRAFT ARTERY CORONARY   01/01/2005    [place] with Dr. Holter    CARDIAC CATHETERIZATION  01/01/2005    CARDIAC CATHETERIZATION  11/25/2016    no intervention    CATARACT EXTRACTION      CERVICAL DISC SURGERY  01/01/2014    CREATE FISTULA ARTERIOVENOUS UPPER EXTREMITY Left 4/2/2024    Procedure: CREATION, ARTERIOVENOUS FISTULA, UPPER EXTREMITY;  Surgeon: Edwin Lynne MD;  Location: [facility] Main OR    CV ANGIOGRAM CORONARY GRAFT N/A 07/27/2023    Procedure: Coronary Angiogram Graft;  Surgeon: Maikel Tripathi MD;  Location: [facility] CATH LAB CV    CV PCI N/A 07/27/2023    Procedure: Percutaneous Coronary Intervention;  Surgeon: Maikel Tripathi MD;  Location: [facility] CATH LAB CV    IR LOWER EXTREMITY ANGIOGRAM LEFT  04/07/2023    IR LOWER EXTREMITY ANGIOGRAM LEFT  04/08/2020    IR LOWER EXTREMITY ANGIOGRAM LEFT  06/21/2023       Prior to Admission Medications   Prior to Admission Medications   Prescriptions Last Dose Informant Patient Reported? Taking?   Continuous Blood Gluc Sensor (CerelinkSTYLE DAVID 14 DAY SENSOR) MISC   Yes No   TECHLITE PEN NEEDLES 31G X 5 MM miscellaneous   Yes No   VITAMIN D3 2,000 unit capsule 5/17/2024 at am  Yes Yes   Sig: [VITAMIN D3 2,000 UNIT CAPSULE] Take 2,000 Units by mouth daily.   aspirin 81 MG EC tablet 5/17/2024 at am  No Yes   Sig: Take 1 tablet (81 mg) by mouth daily Start tomorrow.   atorvastatin (LIPITOR) 40 MG tablet 5/16/2024 at hs  Yes Yes   Sig: Take 40 mg by mouth every evening   clopidogrel (PLAVIX) 75 MG tablet 5/17/2024 at am  No Yes   Sig: Take 1 tablet (75 mg) by mouth daily Dose to start tomorrow.   ezetimibe (ZETIA) 10 MG tablet 5/16/2024 at hs  No Yes   Sig: Take 1 tablet (10 mg) by mouth daily   fish oil-omega-3 fatty acids 1000 MG capsule 5/17/2024 at am  Yes Yes   Sig: Take 1 g by mouth daily   fluticasone (FLONASE) 50 MCG/ACT nasal spray 5/17/2024 at am  Yes Yes   Sig: Spray 2 sprays into both nostrils 2 times daily   gabapentin (NEURONTIN) 300 MG capsule 5/17/2024  at x1 dose  Yes Yes   Sig: [GABAPENTIN (NEURONTIN) 300 MG CAPSULE] Take 300 mg by mouth 3 (three) times a day.          insulin glargine (LANTUS PEN) 100 UNIT/ML pen 2024 at am  Yes Yes   Sig: Inject 30 Units Subcutaneous every morning   insulin lispro (HUMALOG KWIKPEN) 100 UNIT/ML (1 unit dial) KWIKPEN 2024 at breakfast and lunch dose  Yes Yes   Sig: Inject Subcutaneous 3 times daily (before meals) Carb count: 3 g carb : 1 unit insulin  Maximum 25 units per meal; 75 units per day   levothyroxine (SYNTHROID/LEVOTHROID) 125 MCG tablet 2024 at am  Yes Yes   Sig: Take 125 mcg by mouth daily   losartan (COZAAR) 25 MG tablet 2024 at evening  Yes Yes   Sig: Take 25 mg by mouth every evening   metoprolol succinate ER (TOPROL XL) 25 MG 24 hr tablet 2024 at evening  No Yes   Sig: Take 1 tablet (25 mg) by mouth daily   multivitamin with iron (ONE DAILY WITH IRON) Tab tablet 2024 at am  Yes Yes   Sig: [MULTIVITAMIN WITH IRON (ONE DAILY WITH IRON) TAB TABLET] Take 1 tablet by mouth daily.   nitroGLYcerin (NITROSTAT) 0.4 MG sublingual tablet Unknown at prn  No Yes   Sig: Place 1 tablet (0.4 mg) under the tongue every 5 minutes as needed   torsemide (DEMADEX) 20 MG tablet 2024 at am  No Yes   Sig: Take 4 tablets (80 mg) by mouth daily      Facility-Administered Medications: None        Review of Systems    The 10 point Review of Systems is negative other than noted in the HPI or here.    Social History   I have reviewed this patient's social history and updated it with pertinent information if needed.  Social History     Tobacco Use    Smoking status: Former     Current packs/day: 0.00     Types: Cigarettes     Quit date: 10/10/1984     Years since quittin.6    Smokeless tobacco: Never   Vaping Use    Vaping status: Never Used   Substance Use Topics    Alcohol use: Not Currently    Drug use: Never         Family History   I have reviewed this patient's family history and updated it with  pertinent information if needed.  Family History   Problem Relation Age of Onset    Diabetes Mother     Coronary Artery Disease Father     Coronary Artery Disease Brother          Allergies   Allergies   Allergen Reactions    Hydrochlorothiazide Other (See Comments)     Hyponatremia     Levofloxacin Other (See Comments)     Kidney pain        Physical Exam   Vital Signs: Temp: 98  F (36.7  C) Temp src: Oral BP: 127/60 Pulse: 81   Resp: 18 SpO2: 91 % O2 Device: None (Room air)    Weight: 219 lbs 0 oz    Constitutional: awake, alert, cooperative, no apparent distress, and appears stated age  Eyes: Lids and lashes normal, pupils equal, round and reactive to light, extra ocular muscles intact, sclera clear, conjunctiva normal  ENT: Normocephalic, without obvious abnormality, atraumatic, sinuses nontender on palpation, external ears without lesions, oral pharynx with moist mucous membranes, tonsils without erythema or exudates, gums normal and good dentition.  Hematologic / Lymphatic: no cervical lymphadenopathy and no supraclavicular lymphadenopathy  Respiratory: No increased work of breathing, good air exchange, clear to auscultation bilaterally, no crackles or wheezing  Cardiovascular: Normal apical impulse, regular rate and rhythm, normal S1 and S2, no S3 or S4, and no murmur noted  GI: No scars, normal bowel sounds, soft, non-distended, non-tender, no masses palpated, no hepatosplenomegally  Skin: no bruising or bleeding, normal skin color, texture, turgor, no redness, warmth, or swelling, and no rashes  Musculoskeletal: There is no redness, warmth, or swelling of the joints.  Full range of motion noted.  Motor strength is 5 out of 5 all extremities bilaterally.  Tone is normal.  Neurologic: Awake, alert, oriented to name, place and time.  Cranial nerves II-XII are grossly intact.  Motor is 5 out of 5 bilaterally.  Cerebellar finger to nose, heel to shin intact.  Sensory is intact.  Babinski down going, Romberg  negative, and gait is normal.  Neuropsychiatric: General: normal, calm, and normal eye contact    Medical Decision Making       75 MINUTES SPENT BY ME on the date of service doing chart review, history, exam, documentation & further activities per the note.      Data     I have personally reviewed the following data over the past 24 hrs:    4.6  \   8.8 (L)   / 130 (L)     135 97 (L) 69.3 (H) /  134 (H)   4.5 23 4.05 (H) \     Trop: 82 (H) BNP: 11,451 (H)     TSH: 2.21 T4: N/A A1C: N/A       Imaging results reviewed over the past 24 hrs:   Recent Results (from the past 24 hour(s))   Chest XR,  PA & LAT    Narrative    EXAM: XR CHEST 2 VIEWS  LOCATION: [facility]  DATE: 5/17/2024    INDICATION:  Chest pain and shortness of breath.  COMPARISON: Portable AP view the chest 4/11/2024      Impression    IMPRESSION:     Cardiac silhouette is normal in size. Previous median sternotomy and coronary revascularization. The vascular pedicle is normal. Normal aortic arch and descending aortic contours.    Normal lung vascularity. No interstitial or alveolar opacities. Diaphragmatic curvature is preserved. No pleural effusion.    Unchanged small mid/lower thoracic spine degenerative osteophytes.

## 2024-05-18 NOTE — CONSULTS
[facility] HEART CARE   [address], [place] [address]   [email or web address]   OFFICE: [phone]     CARDIOLOGY INPATIENT CONSULT NOTE     Impression and Plan     Assessment/Plan:  Mr. Dakota Bauer is a 73 year old male with CAD s/p CABG and STEMI of a graft to diagonal coronary artery, HFpEF, DM1, HLD, CKD, who presented to the hospital with dyspnea, chest pain, and lightheadedness.     Lightheadedness   - Likely has been getting relative hypotension causing orthostatic symptoms, though orthostatics in the ED were negative   - Continue to hold ARB   - Decrease metoprolol to 12.5mg qdaily    Acute on chronic HFpEF   - Continues to appear fluid overloaded on exam.  Weight is still 10+ lb over discharge weight from 1 month ago, though improved recently   - Will give 1 time 40mg IV lasix and gauge response    Chest pain/ elevated troponin   - Elevated troponin likely related to supply/demand mismatch and poor clearance from advanced CKD.     - Low risk stress test 1 month ago.  Pt has significant coronary hx   - Likely stable angina and pain due to myocardial stretch   - No indications for repeat ischemic evaluation at this time.   - Cont DAPT per previous plan    Primary Cardiologist: Dr. Montoya    History of Present Illness      Mr. Dakota Bauer is a 73 year old male with CAD s/p CABG and STEMI of a graft to diagonal coronary artery, HFpEF, DM1, HLD, CKD, who presented to the hospital with dyspnea, chest pain, and lightheadedness.  The patient notes he has had a few presyncopal episodes on standing that were concerning to him.  He notes feeling dizzy on standing which lasts about 5 minutes.  This is a relatively new symptom for him. He notes he has been trying to follow a low salt diet.  He initially gained weight on leaving the hospital last month but has been losing recently.  HE also reports chest pain and dyspnea on exertion which are chronic issues for him which  were present before his last hospitalization.              Review of Systems:  Further review of systems is otherwise negative/noncontributory (based on review of medical record (admission H&P) and 13 point review of systems reviewed. Pertinent positives noted).    Cardiac Diagnostics     ECG: Personally reviewed and interpreted: 5/17/2024  NSR, 1st degree AVB, Inferolateral ST depression    Telemetry (personally reviewed): NSR    Most recent:  Echocardiogram (results reviewed): 7/27/2023  Interpretation Summary     The left ventricle is normal in size.  There is mild concentric left ventricular hypertrophy.  The visual ejection fraction is 45-50%.  Diastolic Doppler findings (E/E' ratio and/or other parameters) suggest left  ventricular filling pressures are increased.  Poor quality images but suspicion of inferior and apical hypokinesis.  Normal right ventricle size and systolic function.  The right ventricular systolic pressure is approximated at 19mmHg plus the  right atrial pressure.  Sinus rhythm was noted.  Technically difficult, suboptimal study. Consider cardiac MRI for improved  image quality.  Prior echo from 3/8/23 was of better quality. Inferior wll was normal on that  study.    Stress test 4/12/2024    1.The nuclear stress test is abnormal.    2.Negative pharmacological regadenoson ECG for ischemia.    3.There is a medium sized area of a moderate degree of transmural infarction in the mid to basal inferior segment(s) of the left ventricle.  No ischemia seen.    4.The left ventricular ejection fraction at stress is 70%.  Postthoracotomy septal hypokinesis noted.    5.The findings of this examination were communicated to the nursing staff at [facility] and Dr. Mic Valerio.    A prior study was conducted on 6/9/2023.  The inferior scar appears to be new.      Cardiac Cath (results reviewed): 7/27/2023    Ost Cx to Prox Cx lesion is 100% stenosed.    Prox LAD lesion is 100% stenosed.    Ramus  lesion is 50% stenosed.    Prox RCA to Dist RCA lesion is 30% stenosed.    Prox Graft to Mid Graft lesion is 100% stenosed.     1.  Severe diffuse disease of native vessels with small caliber vessels distally.  2.  Chronic occlusion of native LAD and native left circumflex.  3.  Left main fills small narrow caliber ramus branch only  4.  Right coronary artery has diffuse mild to moderate disease but no severe stenoses.  5.  Patent saphenous vein graft to high distal lateral wall branch (? Diagonal or marginal branch) which is diffusely diseased. Anastomosis appears normal.  6.  Occluded saphenous vein graft to distal diagonal/lateral wall branches, most likely the acute infarct vessel.  7.  Patent BENITEZ to LAD. Anastomosis appears normal.  8.  Successful PCI saphenous vein graft to distal diagonal/lateral with drug-eluting stent implant x1  9.  Recommend continuing Plavix therapy indefinitely, risk factor management for ASCVD.      Medical History  Surgical History Family History Social History   Past Medical History:   Diagnosis Date    Anxiety     Chronic kidney disease     Coronary artery disease     Diabetes mellitus (H)     Diabetic ulcer of toe (H)     Disorder of thyroid     Hyperlipidemia     Hypertension     Hypothyroidism     SHERRELL (obstructive sleep apnea)     no cpap     Past Surgical History:   Procedure Laterality Date    AMPUTATE TOE(S) Left     BYPASS GRAFT ARTERY CORONARY  01/01/2005    [facility] with Dr. Holter    CARDIAC CATHETERIZATION  01/01/2005    CARDIAC CATHETERIZATION  11/25/2016    no intervention    CATARACT EXTRACTION      CERVICAL DISC SURGERY  01/01/2014    CREATE FISTULA ARTERIOVENOUS UPPER EXTREMITY Left 4/2/2024    Procedure: CREATION, ARTERIOVENOUS FISTULA, UPPER EXTREMITY;  Surgeon: Edwin Lynne MD;  Location: [facility] OR    CV ANGIOGRAM CORONARY GRAFT N/A 07/27/2023    Procedure: Coronary Angiogram Graft;  Surgeon: Maikel Tripathi MD;  Location: [facility]  LAB CV    CV PCI N/A 2023    Procedure: Percutaneous Coronary Intervention;  Surgeon: Maikel Tripathi MD;  Location: [facility] LAB CV    IR LOWER EXTREMITY ANGIOGRAM LEFT  2023    IR LOWER EXTREMITY ANGIOGRAM LEFT  2020    IR LOWER EXTREMITY ANGIOGRAM LEFT  2023     Family History   Problem Relation Age of Onset    Diabetes Mother     Coronary Artery Disease Father     Coronary Artery Disease Brother            Social History     Socioeconomic History    Marital status:      Spouse name: Not on file    Number of children: Not on file    Years of education: Not on file    Highest education level: Not on file   Occupational History    Not on file   Tobacco Use    Smoking status: Former     Current packs/day: 0.00     Types: Cigarettes     Quit date: 10/10/1984     Years since quittin.6    Smokeless tobacco: Never   Vaping Use    Vaping status: Never Used   Substance and Sexual Activity    Alcohol use: Not Currently    Drug use: Never    Sexual activity: Not on file   Other Topics Concern    Not on file   Social History Narrative    Not on file     Social Determinants of Health     Financial Resource Strain: High Risk (2021)    Received from [facility], [facility]    Financial Resource Strain     Difficulty of Paying Living Expenses: Not on file     Difficulty of Paying Living Expenses: Not on file   Food Insecurity: Not on file   Transportation Needs: Not on file   Physical Activity: Not on file   Stress: Not on file   Social Connections: Unknown (2021)    [facility]    Social Connections     Frequency of Communication with Friends and Family: Not on file   Interpersonal Safety: Not on file   Housing Stability: Not on file             Physical Examination   VITALS: /53 (BP Location: Right arm)    Pulse 75   Temp 98.1  F (36.7  C) (Oral)   Resp 18   Ht 1.829 m (6')   Wt 100.7 kg (222 lb)   SpO2 93%   BMI 30.11 kg/m    BMI: Body mass index is 30.11 kg/m .  Wt Readings from Last 3 Encounters:   05/18/24 100.7 kg (222 lb)   05/10/24 104.8 kg (231 lb)   05/06/24 101.2 kg (223 lb)       Intake/Output Summary (Last 24 hours) at 5/18/2024 1031  Last data filed at 5/18/2024 0855  Gross per 24 hour   Intake 920 ml   Output 1400 ml   Net -480 ml       General: pleasant male. No acute distress.   HENT: external ears normal. Nares patent. Mucous membranes moist.  Neck: + JVD  Lungs: clear to auscultation  COR:  regular rate and rhythm, No murmurs, rubs, or gallops  Abd: nondistended, BS present  Extrem: 1+ edema         Non-cardiac Imaging Studies Reviewed             Lab Results Reviewed    Chemistry/lipid CBC Cardiac Enzymes/BNP/TSH/INR   Recent Labs   Lab Test 12/08/23  0934   CHOL 122   HDL 40   LDL 64   TRIG 92     Recent Labs   Lab Test 12/08/23  0934 06/16/23  0909 12/16/22  0919   LDL 64 47 60     Recent Labs   Lab Test 05/18/24  0808 05/18/24  0448   NA  --  131*   POTASSIUM  --  4.8   CHLORIDE  --  95*   CO2  --  21*   * 265*   BUN  --  77.9*   CR  --  3.94*   GFRESTIMATED  --  15*   SVITLANA  --  8.4*     Recent Labs   Lab Test 05/18/24 0448 05/17/24  1517 05/06/24  0924   CR 3.94* 4.05* 3.45*     Recent Labs   Lab Test 04/11/24  0347 07/27/23  1222   A1C 7.0* 7.4*          Recent Labs   Lab Test 05/18/24 0448   WBC 6.0   HGB 8.2*   HCT 25.9*   MCV 94   *     Recent Labs   Lab Test 05/18/24  0448 05/17/24  1517 04/11/24  0347   HGB 8.2* 8.8* 10.9*    Recent Labs   Lab Test 06/02/21  1016   TROPONINI 0.13     Recent Labs   Lab Test 05/17/24  1517 04/11/24  0347 07/27/23  1222   NTBNPI 11,451* 7,812* 4,719*     Recent Labs   Lab Test 05/17/24  1517   TSH 2.21     Recent Labs   Lab Test 06/21/23  0710   INR 1.00           Current Inpatient Scheduled Medications   Scheduled Meds:  Current  Facility-Administered Medications   Medication Dose Route Frequency Provider Last Rate Last Admin    aspirin EC tablet 81 mg  81 mg Oral Daily Britney Bennett NP   81 mg at 05/18/24 0850    atorvastatin (LIPITOR) tablet 40 mg  40 mg Oral QPM Britney Bennett NP   40 mg at 05/17/24 2011    clopidogrel (PLAVIX) tablet 75 mg  75 mg Oral Daily Britney Bennett NP   75 mg at 05/18/24 0851    ezetimibe (ZETIA) tablet 10 mg  10 mg Oral QPM Britney Bennett NP   10 mg at 05/17/24 2159    fluticasone (FLONASE) 50 MCG/ACT spray 2 spray  2 spray Both Nostrils BID Britney Bennett NP   2 spray at 05/18/24 0851    gabapentin (NEURONTIN) capsule 300 mg  300 mg Oral Daily Britney Bennett NP   300 mg at 05/18/24 0853    insulin aspart (NovoLOG) injection (RAPID ACTING)   Subcutaneous TID w/meals Torsten King MD        insulin aspart (NovoLOG) injection (RAPID ACTING)  1-7 Units Subcutaneous TID AC Britney Bennett NP   3 Units at 05/18/24 0851    insulin aspart (NovoLOG) injection (RAPID ACTING)  1-5 Units Subcutaneous At Bedtime Britney Bennett NP        [START ON 5/19/2024] insulin glargine (LANTUS PEN) injection 30 Units  30 Units Subcutaneous QAM Torsten King MD        levothyroxine (SYNTHROID/LEVOTHROID) tablet 125 mcg  125 mcg Oral QAM AC Britney Bennett NP   125 mcg at 05/18/24 0645    [Held by provider] losartan (COZAAR) tablet 25 mg  25 mg Oral QPM Britney Bennett NP        metoprolol succinate ER (TOPROL XL) 24 hr tablet 25 mg  25 mg Oral QPM Britney Bennett NP   25 mg at 05/17/24 2011    sodium chloride (PF) 0.9% PF flush 3 mL  3 mL Intracatheter Q8H Britney Bennett NP   3 mL at 05/17/24 2015    [Held by provider] torsemide (DEMADEX) tablet 80 mg  80 mg Oral Daily Britney Bennett NP         Continuous Infusions:  Current Facility-Administered Medications   Medication Dose Route Frequency Provider Last Rate Last Admin       No current outpatient medications on file.          Medications Prior to Admission   Prior to Admission  medications    Medication Sig Start Date End Date Taking? Authorizing Provider   aspirin 81 MG EC tablet Take 1 tablet (81 mg) by mouth daily Start tomorrow. 7/28/23  Yes Maikel Tripathi MD   atorvastatin (LIPITOR) 40 MG tablet Take 40 mg by mouth every evening   Yes Unknown, Entered By History   clopidogrel (PLAVIX) 75 MG tablet Take 1 tablet (75 mg) by mouth daily Dose to start tomorrow. 7/28/23  Yes Maikel Tripathi MD   ezetimibe (ZETIA) 10 MG tablet Take 1 tablet (10 mg) by mouth daily 8/12/22  Yes Robinson Chopra MD   fish oil-omega-3 fatty acids 1000 MG capsule Take 1 g by mouth daily   Yes Reported, Patient   fluticasone (FLONASE) 50 MCG/ACT nasal spray Spray 2 sprays into both nostrils 2 times daily   Yes Unknown, Entered By History   gabapentin (NEURONTIN) 300 MG capsule [GABAPENTIN (NEURONTIN) 300 MG CAPSULE] Take 300 mg by mouth 3 (three) times a day.        11/18/15  Yes Provider, Historical   insulin glargine (LANTUS PEN) 100 UNIT/ML pen Inject 30 Units Subcutaneous every morning 10/10/16  Yes Provider, Historical   insulin lispro (HUMALOG KWIKPEN) 100 UNIT/ML (1 unit dial) KWIKPEN Inject Subcutaneous 3 times daily (before meals) Carb count: 3 g carb : 1 unit insulin  Maximum 25 units per meal; 75 units per day   Yes Unknown, Entered By History   levothyroxine (SYNTHROID/LEVOTHROID) 125 MCG tablet Take 125 mcg by mouth daily 7/27/22  Yes Reported, Patient   losartan (COZAAR) 25 MG tablet Take 25 mg by mouth every evening 10/5/23 10/4/24 Yes Reported, Patient   metoprolol succinate ER (TOPROL XL) 25 MG 24 hr tablet Take 1 tablet (25 mg) by mouth daily 5/8/24  Yes Huma Negrete APRN CNP   multivitamin with iron (ONE DAILY WITH IRON) Tab tablet [MULTIVITAMIN WITH IRON (ONE DAILY WITH IRON) TAB TABLET] Take 1 tablet by mouth daily. 10/10/16  Yes Provider, Historical   nitroGLYcerin (NITROSTAT) 0.4 MG sublingual tablet Place 1 tablet (0.4 mg) under the tongue every 5 minutes as needed 2/13/23  Yes  Robinson Chopra MD   torsemide (DEMADEX) 20 MG tablet Take 4 tablets (80 mg) by mouth daily 4/25/24  Yes Huma Negrete APRN CNP   VITAMIN D3 2,000 unit capsule [VITAMIN D3 2,000 UNIT CAPSULE] Take 2,000 Units by mouth daily. 9/15/17  Yes Provider, Historical   Continuous Blood Gluc Sensor (FREESTYLE DAVID 14 DAY SENSOR) [facility]  12/10/21   Reported, Patient   TECHLITE PEN NEEDLES 31G X 5 MM miscellaneous  12/20/21   Reported, Patient          Abdiel Phipps DO [facility]  Non-invasive Cardiologist  [facility]      Clinically Significant Risk Factors Present on Admission                # Drug Induced Platelet Defect: home medication list includes an antiplatelet medication  # Acute Kidney Injury, unspecified: based on a >150% or 0.3 mg/dL increase in last creatinine compared to past 90 day average, will monitor renal function  # Hypertension: Noted on problem list     # DMII: A1C = 7.0 % (Ref range: <5.7 %) within past 6 months   # Obesity: Estimated body mass index is 30.11 kg/m  as calculated from the following:    Height as of this encounter: 1.829 m (6').    Weight as of this encounter: 100.7 kg (222 lb).        # History of CABG: noted on surgical history

## 2024-05-19 NOTE — PLAN OF CARE
Problem: Adult Inpatient Plan of Care  Goal: Absence of Hospital-Acquired Illness or Injury  Intervention: Identify and Manage Fall Risk  Recent Flowsheet Documentation  Taken 5/18/2024 2000 by Lorin White RN  Safety Promotion/Fall Prevention:   activity supervised   assistive device/personal items within reach   clutter free environment maintained   lighting adjusted   nonskid shoes/slippers when out of bed  Intervention: Prevent Skin Injury  Recent Flowsheet Documentation  Taken 5/18/2024 2000 by Lorin White RN  Body Position: position changed independently  Intervention: Prevent Infection  Recent Flowsheet Documentation  Taken 5/18/2024 2000 by Lorin White RN  Infection Prevention: hand hygiene promoted  Goal: Optimal Comfort and Wellbeing  Intervention: Provide Person-Centered Care  Recent Flowsheet Documentation  Taken 5/18/2024 2000 by Lorin White RN  Trust Relationship/Rapport: care explained     Problem: Pain Acute  Goal: Optimal Pain Control and Function  Intervention: Prevent or Manage Pain  Recent Flowsheet Documentation  Taken 5/18/2024 2000 by Lorin White RN  Medication Review/Management: medications reviewed     Problem: Diabetes  Goal: Optimal Functional Ability  Intervention: Optimize Functional Ability  Recent Flowsheet Documentation  Taken 5/18/2024 2000 by Lorin White RN  Assistive Device Utilized: gait belt  Activity Management:   activity adjusted per tolerance   ambulated in room   ambulated to bathroom  Activity Assistance Provided: assistance, stand-by   Goal Outcome Evaluation:    VSS Denies pain. Utilizing call light appropriately and is able to verbalize needs effectively. Cont to have high blood glucose levels.

## 2024-05-19 NOTE — PROGRESS NOTES
"Care Management Follow Up    Length of Stay (days): 2    Expected Discharge Date: 05/20/2024     Concerns to be Addressed: Care progression - discharge planning     Patient plan of care discussed at interdisciplinary rounds: Yes    Anticipated Discharge Disposition:  PT rec home with assist. OT rec Transitional Care     Anticipated Discharge Services:  PT rec home with assist. OT rec Transitional Care  Anticipated Discharge DME:  NA    Patient/family educated on Medicare website which has current facility and service quality ratings:  NA  Education Provided on the Discharge Plan:  Yes per team  Patient/Family in Agreement with the Plan:  NA    Referrals Placed by CM/SW:  NA  Private pay costs discussed: Not applicable    Additional Information:  Per provider, Dr. King, patient is not ready. Will be here for another 2-3 days.    Social Hx: \"Lives with spouse in a house. Report independent with ADLs/IADLs, but needs help with transportation. Has DM supplies. No community resources. Goal is to return home. Family will transport.\"    RNCM to follow for medical progression, recommendations, and final discharge plan.     Diane Thao RN     Met with patient and his spouse, Funmilayo Schroeder, at bedside to discuss the OT discharge rec for Transitional Care. The spouse is in agreement.  Writer provided a list of local skilled nursing facilities - [facility] (which includes the medicare.gov website) for patient and family to review.     The spouse requested referrals sent to:  1) [facility]  2) [facility]  3) [facility]  4) [facility]  5) [facility]  6) [facility]    Referrrals sent.    "

## 2024-05-19 NOTE — PROGRESS NOTES
RENAL PROGRESS NOTE  CC: ASHLEY/CKD    S: Since last seen, patient Scr 4.35(3.94) this is after lasix yesterday.  Patient denies chest pain, SOB, muscle cramp, abdominal pain, weakness, and chills/fever.  Urine output yesterday 1200ml???.  Admits to poor intake due to nausea.      Seen with wife by the bedside, we spoke about dialysis, they would like to push back dialysis initiation as much as possible.  Patient had some nausea this morning, he had a BM that possibly had blood.  Hgb 7.4(8.2)     Impression and Plan:      1. Acute Kidney Injury/CKD- CKD thought to be from longstanding DM-type 1 and hypertension.  Normal renal ultrasound in the past.  Baseline creatinine is 3.13 to 3.45, progressive.  Presented with serum creatinine of 4.05, at 4.35 after lasix yesterday. K and bicarb normal.   His urine output has been okay.  Urinalysis with protein 20 and no blood.  Last UPC on 1/3/2024 was 0.67.   Scr improved with holding torsemide and losartan, but again up today after lasix yesterday.  I educated family about dialysis, plan is to monitor for the next 24 hours, may need to start dialysis if further decline in renal function.          Patient is followed by Dr. Padgett in renal clinic, last inpatient visit was 01/10/2024.  Has had dialysis discussion with Dr. Bhatia, AVF was placed on 1/10/2024, still needs a second step surgery for AVF.   No obvious sign of fluid overload today, breathing is okay.           Plan is to avoid any further renal insult by renally dosing all medications and avoiding nephrotoxic medications.  Avoid ACE inhibitors/ARB, Aminoglycosides/ IV constrast/  NSAID if possible.  -Continue holding ARB, I see no urgent need to restart Losartan, I would not restart this.              3. Blood Pressure/Volume/HFpEF:  Patient with long standing history of hypertension, presented with hypotension in setting of ACE-I/diuretic.  Blood pressure better with holding losartan.  Dizziness likely due to  hypotension.  Echo with normal left size ventricular function, LV systolic function is low-normal, EF is 50-55%, there is hypokinesis of the inferolateral wall, grade II or moderate diastolic dysfunction.  Right ventricle is normal.  Atrium is mildly dilated, mild tricuspid regurg, and severe pulmonary hypertension.   - Monitor today and recheck renal function tomorrow, likely will need dialysis in the next few days if no improvement.     Cardiology is onboard.                  -ARB on hold, seen by cardiology who decreased metoprolol to 12.5 mg on 5/18.             -Appears Euvolemic on presentation, although his weight is up from last month.                -Avoid over-correction of blood pressure, allow mild hyper-tension to help with renal perfusion.       4. Anemia: ASHLEY, and chronic illness all possibly contributing (target hgb of 10-11).  Hgb at 7.4(8.2)              - Iron saturation is 18 and ferritin 313, Started on course of IV iron.  Now with possible blood in BM, will recheck hgb now.                   - Monitor CBC      5. Coronary Artery Disease: history of CABG x4 vessels.     6. Diabetes: type 1 since age 19.         7. PAD: s/p left toes amputation in June of 2020.  S/p left leg angiogram with angioplasties and 2 stents in April of 2023.  Followed by Dr[facility] Serrato at [facility].              RAO Powers CNP  [facility]  Pager: [phone]      Physical Exam  BP 92/50 (BP Location: Right arm)   Pulse 65   Temp 97.7  F (36.5  C) (Oral)   Resp 20   Ht 1.829 m (6')   Wt 98 kg (216 lb 0.8 oz)   SpO2 94%   BMI 29.30 kg/m     Patient Vitals for the past 96 hrs:   Weight   05/19/24 0600 98 kg (216 lb 0.8 oz)   05/18/24 0027 100.7 kg (222 lb)   05/17/24 1504 99.3 kg (219 lb)       Intake/Output Summary (Last 24 hours) at 5/19/2024 1147  Last data filed at 5/19/2024 0900  Gross per 24 hour   Intake 340 ml   Output 300 ml   Net 40 ml       Physical  Exam:   BP 92/50 (BP Location: Right arm)   Pulse 65   Temp 97.7  F (36.5  C) (Oral)   Resp 20   Ht 1.829 m (6')   Wt 98 kg (216 lb 0.8 oz)   SpO2 94%   BMI 29.30 kg/m    In general this is a 73 year old male in no acute distress.   General: Calm & comfortable   Eyes: Pupils equal, sclerae not icteric   ENT: Mucus membranes moist, no lesions noted   Resp: CTA bilaterally, no distress, normal effort   CV: RRR without murmur, no hip/leg edema   GI: Soft NT NG   Psych: A&Ox3, not depressed   Neuro: Moves all extremities.     Skin: No rash noted       Recent Labs   Lab Test 05/19/24  0458 05/18/24  0448 05/17/24  1722 05/17/24  1517   POTASSIUM 4.7 4.8 4.0 4.5   CHLORIDE 98 95*  --  97*   ANIONGAP 17* 15  --  15     Recent Labs   Lab Test 05/19/24  0458 05/18/24  0448 05/17/24  1517   WBC 7.8 6.0 4.6   RBC 2.46* 2.76* 2.95*   MCV 94 94 95   MCH 30.1 29.7 29.8   RDW 14.1 14.1 14.3               I personally reviewed these labs today

## 2024-05-19 NOTE — PLAN OF CARE
Problem: Diabetes  Goal: Optimal Coping  Outcome: Progressing  Goal: Optimal Functional Ability  Outcome: Progressing  Intervention: Optimize Functional Ability  Recent Flowsheet Documentation  Taken 5/19/2024 1145 by Nisha Galicia RN  Assistive Device Utilized: gait belt  Activity Management:   activity adjusted per tolerance   ambulated in room   ambulated to bathroom  Activity Assistance Provided: assistance, stand-by  Taken 5/19/2024 0745 by Nisha Galicia, RN  Assistive Device Utilized: gait belt  Activity Management:   activity adjusted per tolerance   ambulated in room   ambulated to bathroom  Activity Assistance Provided: assistance, stand-by  Goal: Blood Glucose Level Within Target Range  Outcome: Progressing  Goal: Minimize Risk of Hypoglycemia  Outcome: Progressing     Problem: Nausea and Vomiting  Goal: Nausea and Vomiting Relief  Outcome: Progressing     Goal Outcome Evaluation:       Pt is A/O x 4. Assist x 1 with walker and gait belt. RA. VSS. Tele: SR with first degree AVB. Pt feeling a lot of nausea during first assessment, compazine PRN given helped, but not totally, pt still reporting mild nausea. Able to walk to the bathroom with no dizziness or lightheaded. Stool sample positive for blood. Currently hemoglobin 7.2. Dr. King notified. Contact precautions maintained.

## 2024-05-19 NOTE — PLAN OF CARE
Problem: Syncope  Goal: Absence of Syncopal Symptoms  Outcome: Not Progressing  Intervention: Manage Effect of Syncopal Symptoms  Recent Flowsheet Documentation  Taken 5/19/2024 0400 by Imtiaz Cai, RN  Safety Promotion/Fall Prevention:   activity supervised   assistive device/personal items within reach   lighting adjusted   nonskid shoes/slippers when out of bed   safety round/check completed   room organization consistent   room near nurse's station   room door open   patient and family education   mobility aid in reach   increase visualization of patient   increased rounding and observation   clutter free environment maintained  Taken 5/19/2024 0000 by Imtiaz Cai, RN  Safety Promotion/Fall Prevention:   activity supervised   assistive device/personal items within reach   lighting adjusted   nonskid shoes/slippers when out of bed   safety round/check completed   room organization consistent   room near nurse's station   room door open   patient and family education   mobility aid in reach   increase visualization of patient   increased rounding and observation   clutter free environment maintained   Goal Outcome Evaluation:    Cardiac:  Trending NSR with 1st AVB and BBB, rate 70's.  Respiratory:  Rate 16-20, non-labored.  Sat's: Maintaining mid 90's at RA.  LS: Clear all fields, bilat.  Neuro:  Intermittent numbness in upper extremities.  GI:  Passing flatus.  BS: Present X4 quadrants.  C/o nausea while attempting to site at side of bed.  Concern that orthostasis may be contributing factor to S&S (nausea).  See cardiology note comment regarding orthostasis.  Treated with PRN IV Zofran.  No further complaints.    :  Minimal UOP (see I/O).  Pain:  Denies.  Ambulation:  Up with assist X1.  Reminded not to get out of bed unattended.  High risk of orthostatic hypotension.   Labs:  AM labs pending.  Plan:  Monitor renal function.  Minimal diuresis, to protect renal function.   Watch for  reaction to changes in drug regimen.

## 2024-05-19 NOTE — PROGRESS NOTES
[facility]   [address], [place] [address]   [email or web address]   OFFICE: [phone]     CARDIOLOGY INPATIENT CONSULT NOTE     Impression and Plan     Assessment/Plan:  Mr. Dakota Bauer is a 73 year old male with CAD s/p CABG and STEMI of a graft to diagonal coronary artery, HFpEF, DM1, HLD, CKD, who presented to the hospital with dyspnea, chest pain, and lightheadedness.      Lightheadedness   - Likely has been getting relative hypotension causing orthostatic symptoms, though orthostatics in the ED were negative   - Mod-severe MR may also be contributing   - Continue to hold ARB   - Decreased metoprolol to 12.5mg qdaily     Acute on chronic HFpEF   - Continues to appear fluid overloaded on exam.  Weight is still 10+ lb over discharge weight from 1 month ago, though improved recently   - TTE with elevated biventricular filling pressures and mod-severe MR likely functional.   - s/p 1 time 40mg IV lasix with mildly worsening kidney function.  Will look to nephrology for input.       Chest pain/ elevated troponin   - Elevated troponin likely related to supply/demand mismatch and poor clearance from advanced CKD.     - Low risk stress test 1 month ago.  Pt has significant coronary hx   - Likely stable angina and pain due to myocardial stretch   - No indications for repeat ischemic evaluation at this time.   - Cont DAPT per previous plan     Primary Cardiologist: Dr. Montoya    Subjective     Very nauseous today    Cardiac Diagnostics     ECG: Personally reviewed and interpreted: 5/17/2024  NSR, 1st degree AVB, Inferolateral ST depression     Telemetry (personally reviewed): NSR     Most recent:  Echocardiogram (results reviewed): 5/18/2024  Interpretation Summary     The left ventricle is normal in size. There is normal left ventricular wall  thickness.  LV systolic function is low-normal. The visual ejection fraction is 50-55%.  There is hypokinesis of the  inferolateral wall.  Grade II or moderate diastolic dysfunction.     The right ventricle is normal size. Mildly decreased right ventricular  systolic function  The left atrium is mildly dilated. The right atrium is moderately dilated.  There is mild (1+) tricuspid regurgitation.  There is moderate to severe mitral regurgitaiton which is likely functional  related to posterior leaflet tethering and elevated volume.  Severe (>55mmHg) pulmonary hypertension is present.  IVC diameter >2.1 cm collapsing <50% with sniff suggests a high RA pressure  estimated at 15 mmHg or greater.     When compared to previous study from 4/11/2024 the mitral regurgitaiton is  worse, filling pressures are worse.    Echocardiogram (results reviewed): 7/27/2023  Interpretation Summary     The left ventricle is normal in size.  There is mild concentric left ventricular hypertrophy.  The visual ejection fraction is 45-50%.  Diastolic Doppler findings (E/E' ratio and/or other parameters) suggest left  ventricular filling pressures are increased.  Poor quality images but suspicion of inferior and apical hypokinesis.  Normal right ventricle size and systolic function.  The right ventricular systolic pressure is approximated at 19mmHg plus the  right atrial pressure.  Sinus rhythm was noted.  Technically difficult, suboptimal study. Consider cardiac MRI for improved  image quality.  Prior echo from 3/8/23 was of better quality. Inferior wll was normal on that  study.     Stress test 4/12/2024    1.The nuclear stress test is abnormal.    2.Negative pharmacological regadenoson ECG for ischemia.    3.There is a medium sized area of a moderate degree of transmural infarction in the mid to basal inferior segment(s) of the left ventricle.  No ischemia seen.    4.The left ventricular ejection fraction at stress is 70%.  Postthoracotomy septal hypokinesis noted.    5.The findings of this examination were communicated to the nursing staff at [facility] and Dr. Mic Valerio.    A prior study was conducted on 6/9/2023.  The inferior scar appears to be new.        Cardiac Cath (results reviewed): 7/27/2023    Ost Cx to Prox Cx lesion is 100% stenosed.    Prox LAD lesion is 100% stenosed.    Ramus lesion is 50% stenosed.    Prox RCA to Dist RCA lesion is 30% stenosed.    Prox Graft to Mid Graft lesion is 100% stenosed.     1.  Severe diffuse disease of native vessels with small caliber vessels distally.  2.  Chronic occlusion of native LAD and native left circumflex.  3.  Left main fills small narrow caliber ramus branch only  4.  Right coronary artery has diffuse mild to moderate disease but no severe stenoses.  5.  Patent saphenous vein graft to high distal lateral wall branch (? Diagonal or marginal branch) which is diffusely diseased. Anastomosis appears normal.  6.  Occluded saphenous vein graft to distal diagonal/lateral wall branches, most likely the acute infarct vessel.  7.  Patent BENITEZ to LAD. Anastomosis appears normal.  8.  Successful PCI saphenous vein graft to distal diagonal/lateral with drug-eluting stent implant x1  9.  Recommend continuing Plavix therapy indefinitely, risk factor management for ASCVD.        Medical History  Surgical History Family History Social History   Past Medical History:   Diagnosis Date    Anxiety     Chronic kidney disease     Coronary artery disease     Diabetes mellitus (H)     Diabetic ulcer of toe (H)     Disorder of thyroid     Hyperlipidemia     Hypertension     Hypothyroidism     SHERRELL (obstructive sleep apnea)     no cpap     Past Surgical History:   Procedure Laterality Date    AMPUTATE TOE(S) Left     BYPASS GRAFT ARTERY CORONARY  01/01/2005    [place] [facility] with Dr. Holter    CARDIAC CATHETERIZATION  01/01/2005    CARDIAC CATHETERIZATION  11/25/2016    no intervention    CATARACT EXTRACTION      CERVICAL DISC SURGERY  01/01/2014    CREATE FISTULA ARTERIOVENOUS UPPER EXTREMITY Left 4/2/2024    Procedure: CREATION,  ARTERIOVENOUS FISTULA, UPPER EXTREMITY;  Surgeon: Edwin Lynne MD;  Location: [facility] Main OR    CV ANGIOGRAM CORONARY GRAFT N/A 2023    Procedure: Coronary Angiogram Graft;  Surgeon: Maikel Tripathi MD;  Location: [facility] CATH LAB CV    CV PCI N/A 2023    Procedure: Percutaneous Coronary Intervention;  Surgeon: Maikel Tripathi MD;  Location: [facility] CATH LAB CV    IR LOWER EXTREMITY ANGIOGRAM LEFT  2023    IR LOWER EXTREMITY ANGIOGRAM LEFT  2020    IR LOWER EXTREMITY ANGIOGRAM LEFT  2023     Family History   Problem Relation Age of Onset    Diabetes Mother     Coronary Artery Disease Father     Coronary Artery Disease Brother            Social History     Socioeconomic History    Marital status:      Spouse name: Not on file    Number of children: Not on file    Years of education: Not on file    Highest education level: Not on file   Occupational History    Not on file   Tobacco Use    Smoking status: Former     Current packs/day: 0.00     Types: Cigarettes     Quit date: 10/10/1984     Years since quittin.6    Smokeless tobacco: Never   Vaping Use    Vaping status: Never Used   Substance and Sexual Activity    Alcohol use: Not Currently    Drug use: Never    Sexual activity: Not on file   Other Topics Concern    Not on file   Social History Narrative    Not on file     Social Determinants of Health     Financial Resource Strain: High Risk (2021)    [facility] [employer] [facility]    Financial Resource Strain     Difficulty of Paying Living Expenses: Not on file     Difficulty of Paying Living Expenses: Not on file   Food Insecurity: Not on file   Transportation Needs: Not on file   Physical Activity: Not on file   Stress: Not on file   Social Connections: Unknown (2021)    [facility], [facility]    Social Connections     Frequency of Communication with Friends and Family: Not on file   Interpersonal Safety: Not on file   Housing Stability: Not on file             Physical Examination   VITALS: BP 92/50 (BP Location: Right arm)   Pulse 65   Temp 97.7  F (36.5  C) (Oral)   Resp 20   Ht 1.829 m (6')   Wt 98 kg (216 lb 0.8 oz)   SpO2 94%   BMI 29.30 kg/m    BMI: Body mass index is 29.3 kg/m .  Wt Readings from Last 3 Encounters:   05/19/24 98 kg (216 lb 0.8 oz)   05/10/24 104.8 kg (231 lb)   05/06/24 101.2 kg (223 lb)       Intake/Output Summary (Last 24 hours) at 5/19/2024 0927  Last data filed at 5/18/2024 1600  Gross per 24 hour   Intake 478 ml   Output 300 ml   Net 178 ml       General: pleasant male. Uncomfortable due to nausea   HENT: external ears normal. Nares patent. Mucous membranes moist.  Neck: + JVD  Lungs: clear to auscultation  COR:  regular rate and rhythm, No murmurs, rubs, or gallops  Abd: nondistended, BS present  Extrem: 1+ edema         Non-cardiac Imaging Studies Reviewed             Lab Results Reviewed    Chemistry/lipid CBC Cardiac Enzymes/BNP/TSH/INR   Recent Labs   Lab Test 12/08/23  0934   CHOL 122   HDL 40   LDL 64   TRIG 92     Recent Labs   Lab Test 12/08/23  0934 06/16/23  0909 12/16/22  0919   LDL 64 47 60     Recent Labs   Lab Test 05/19/24  0808 05/19/24  0458   NA  --  136   POTASSIUM  --  4.7   CHLORIDE  --  98   CO2  --  21*   * 143*   BUN  --  97.7*   CR  --  4.35*   GFRESTIMATED  --  14*   SVITLANA  --  9.3     Recent Labs   Lab Test 05/19/24  0458 05/18/24  0448 05/17/24  1517   CR 4.35* 3.94* 4.05*     Recent Labs   Lab Test 04/11/24  0347 07/27/23  1222   A1C 7.0* 7.4*          Recent Labs   Lab Test 05/19/24  0458   WBC 7.8   HGB 7.4*   HCT 23.0*   MCV 94   *     Recent Labs   Lab Test 05/19/24  0458 05/18/24  0448 05/17/24  1517   HGB 7.4* 8.2* 8.8*    Recent Labs   Lab Test 06/02/21  1016   TROPONINI 0.13     Recent Labs   Lab Test  05/17/24  1517 04/11/24  0347 07/27/23  1222   NTBNPI 11,451* 7,812* 4,719*     Recent Labs   Lab Test 05/17/24  1517   TSH 2.21     Recent Labs   Lab Test 06/21/23  0710   INR 1.00           Current Inpatient Scheduled Medications   Scheduled Meds:  Current Facility-Administered Medications   Medication Dose Route Frequency Provider Last Rate Last Admin    aspirin EC tablet 81 mg  81 mg Oral Daily Britney Bennett NP   81 mg at 05/19/24 0827    atorvastatin (LIPITOR) tablet 40 mg  40 mg Oral QPM Britney Bennett NP   40 mg at 05/18/24 2051    clopidogrel (PLAVIX) tablet 75 mg  75 mg Oral Daily Britney Bennett NP   75 mg at 05/19/24 0826    ezetimibe (ZETIA) tablet 10 mg  10 mg Oral QPM Britney Bennett NP   10 mg at 05/18/24 2051    fluticasone (FLONASE) 50 MCG/ACT spray 2 spray  2 spray Both Nostrils BID Britney Bennett NP   2 spray at 05/19/24 0829    gabapentin (NEURONTIN) capsule 300 mg  300 mg Oral Daily Britney Bennett NP   300 mg at 05/19/24 0826    insulin aspart (NovoLOG) injection (RAPID ACTING)   Subcutaneous TID w/meals Torsten King MD   24 Units at 05/18/24 1738    insulin aspart (NovoLOG) injection (RAPID ACTING)  1-10 Units Subcutaneous TID AC Torsten King MD   3 Units at 05/19/24 0821    insulin aspart (NovoLOG) injection (RAPID ACTING)  1-7 Units Subcutaneous At Bedtime Torsten King MD   3 Units at 05/18/24 2052    insulin glargine (LANTUS PEN) injection 15 Units  15 Units Subcutaneous At Bedtime Torsten King MD   15 Units at 05/18/24 2052    insulin glargine (LANTUS PEN) injection 30 Units  30 Units Subcutaneous QAM AC Torsten King MD   30 Units at 05/19/24 0822    levothyroxine (SYNTHROID/LEVOTHROID) tablet 125 mcg  125 mcg Oral QAM AC Britney Bennett NP   125 mcg at 05/19/24 0826    [Held by provider] losartan (COZAAR) tablet 25 mg  25 mg Oral QPM Britney Bennett NP        metoprolol succinate ER (TOPROL-XL) 24 hr half-tab 12.5 mg  12.5 mg Oral QPM Abdiel Phipps DO   12.5 mg at 05/18/24  2051    sodium chloride (PF) 0.9% PF flush 3 mL  3 mL Intracatheter Q8H Britney Bennett NP   3 mL at 05/19/24 0828    [Held by provider] torsemide (DEMADEX) tablet 80 mg  80 mg Oral Daily Britney Bennett NP         Continuous Infusions:  Current Facility-Administered Medications   Medication Dose Route Frequency Provider Last Rate Last Admin       No current outpatient medications on file.          Medications Prior to Admission   Prior to Admission medications    Medication Sig Start Date End Date Taking? Authorizing Provider   aspirin 81 MG EC tablet Take 1 tablet (81 mg) by mouth daily Start tomorrow. 7/28/23  Yes Maikel Tripathi MD   atorvastatin (LIPITOR) 40 MG tablet Take 40 mg by mouth every evening   Yes Unknown, Entered By History   clopidogrel (PLAVIX) 75 MG tablet Take 1 tablet (75 mg) by mouth daily Dose to start tomorrow. 7/28/23  Yes Maikel Tripathi MD   ezetimibe (ZETIA) 10 MG tablet Take 1 tablet (10 mg) by mouth daily 8/12/22  Yes Robinson Chopra MD   fish oil-omega-3 fatty acids 1000 MG capsule Take 1 g by mouth daily   Yes Reported, Patient   fluticasone (FLONASE) 50 MCG/ACT nasal spray Spray 2 sprays into both nostrils 2 times daily   Yes Unknown, Entered By History   gabapentin (NEURONTIN) 300 MG capsule [GABAPENTIN (NEURONTIN) 300 MG CAPSULE] Take 300 mg by mouth 3 (three) times a day.        11/18/15  Yes Provider, Historical   insulin glargine (LANTUS PEN) 100 UNIT/ML pen Inject 30 Units Subcutaneous every morning 10/10/16  Yes Provider, Historical   insulin lispro (HUMALOG KWIKPEN) 100 UNIT/ML (1 unit dial) KWIKPEN Inject Subcutaneous 3 times daily (before meals) Carb count: 3 g carb : 1 unit insulin  Maximum 25 units per meal; 75 units per day   Yes Unknown, Entered By History   levothyroxine (SYNTHROID/LEVOTHROID) 125 MCG tablet Take 125 mcg by mouth daily 7/27/22  Yes Reported, Patient   losartan (COZAAR) 25 MG tablet Take 25 mg by mouth every evening 10/5/23 10/4/24 Yes Reported,  Patient   metoprolol succinate ER (TOPROL XL) 25 MG 24 hr tablet Take 1 tablet (25 mg) by mouth daily 5/8/24  Yes Huma Negrete APRN CNP   multivitamin with iron (ONE DAILY WITH IRON) Tab tablet [MULTIVITAMIN WITH IRON (ONE DAILY WITH IRON) TAB TABLET] Take 1 tablet by mouth daily. 10/10/16  Yes Provider, Historical   nitroGLYcerin (NITROSTAT) 0.4 MG sublingual tablet Place 1 tablet (0.4 mg) under the tongue every 5 minutes as needed 2/13/23  Yes Robinson Chopra MD   torsemide (DEMADEX) 20 MG tablet Take 4 tablets (80 mg) by mouth daily 4/25/24  Yes Huma Negrete APRN CNP   VITAMIN D3 2,000 unit capsule [VITAMIN D3 2,000 UNIT CAPSULE] Take 2,000 Units by mouth daily. 9/15/17  Yes Provider, Historical   Continuous Blood Gluc Sensor (FREESTYLE DAVID 14 DAY SENSOR) MISC  12/10/21   Reported, Patient   TECHLITE PEN NEEDLES 31G X 5 MM miscellaneous  12/20/21   Reported, Patient          Abdiel Phipps DO [facility]  Non-invasive Cardiologist  [facility]      Clinically Significant Risk Factors          # Hypocalcemia: Lowest Ca = 8.4 mg/dL in last 2 days, will monitor and replace as appropriate       # Thrombocytopenia: Lowest platelets = 129 in last 2 days, will monitor for bleeding  # Acute Kidney Injury, unspecified: based on a >150% or 0.3 mg/dL increase in last creatinine compared to past 90 day average, will monitor renal function  # Hypertension: Noted on problem list       # DMII: A1C = 7.0 % (Ref range: <5.7 %) within past 6 months   # Overweight: Estimated body mass index is 29.3 kg/m  as calculated from the following:    Height as of this encounter: 1.829 m (6').    Weight as of this encounter: 98 kg (216 lb 0.8 oz)., PRESENT ON ADMISSION     # Financial/Environmental Concerns: none   # History of CABG: noted on surgical history

## 2024-05-19 NOTE — PROGRESS NOTES
[facility]    PROGRESS NOTE - Hospitalist Service    Assessment and Plan  73 year old male with past medical history of diabetes, CAD, hypertension, NSTEMI, CKD, CHF, CVA, who presents to the ER with complaints of fatigue, chest pain, shortness of breath, and lightheadedness.  Admitted with acute on chronic CHF and ASHLEY.     Acute on chronic CHF  - Patient presented with progressive shortness of breath  -Elevated BNP on admission, chest x-ray is negative for pleural effusion.  - Hold home oral torsemide  - Cardiology consult, appreciate input  - Received IV diuresis on 5/18/2024  - Caution with IV diuresis because of his worsening kidney disease  - Echo shows EF of 50 to 55% with moderate to severe mitral regurgitation  - Continue to monitor telemetry     Dizziness with near syncope  - Suspect secondary to orthostatic drop in blood pressure  - Holding losartan and decrease metoprolol dose to  - PT/OT evaluation  - Echo as above     Elevated troponin  - Of low clinical value because of his advanced kidney disease.  - Patient had stress test done as ago which was mildly abnormal.  - Extensive history of coronary artery disease.  - Cardiology consult, appreciate input  - No need for further evaluation.     Acute on chronic kidney disease  - Concern for cardiorenal syndrome  - Nephrology consult, appreciate input  - Hold losartan  - renal ultrasound is negative for hydronephrosis or obstruction.  - caution with IV diuresis.  - Continue to monitor renal function.     Diabetes mellitus type 2  - Restart home Lantus  - Cover with sliding scale   - Add carb counting insulin coverage  - Hemoglobin A1c 7.1  -Blood glucose is still elevated, increase Lantus dose     Acute on chronic anemia  - Baseline anemia of chronic kidney disease  - hemoglobin significantly dropping today hemoglobin   - Patient had multiple dark stools  - Positive Hemoccult  - GI consult, appreciate input  - Start IV Protonix  -  Blood consent to sign  - Continue to monitor hemoglobin and transfuse if <7     Hypothyroidism  - Resume levothyroxine  - TSH 2.21     Hyperlipidemia  -Continue statin and Zetia     Weakness and deconditioning  - S/P recurrent fall  -PT/OT evaluation      50 MINUTES SPENT BY ME on the date of service doing chart review, history, exam, documentation & further activities per the note    Active Problems:    Dyspnea on exertion    Elevated troponin    Exertional chest pain    ASHLEY (acute kidney injury) (H24)    Elevated brain natriuretic peptide (BNP) level    Anemia, unspecified type      VTE prophylaxis:  Pneumatic Compression Devices  DIET: Orders Placed This Encounter      Combination Diet 2 gm NA Diet; No Caffeine Diet      Disposition/Barriers to discharge: Monitor renal function, monitor hemoglobin, potential dialysis and GI consult  Code Status: Full Code    Subjective:  Dakota not feeling well today, has some nausea this morning and multiple dark stools overnight.    PHYSICAL EXAM  Vitals:    05/17/24 1504 05/18/24 0027 05/19/24 0600   Weight: 99.3 kg (219 lb) 100.7 kg (222 lb) 98 kg (216 lb 0.8 oz)     B/P:101/50 T:97.7 P:79 R:18     Intake/Output Summary (Last 24 hours) at 5/19/2024 1503  Last data filed at 5/19/2024 1253  Gross per 24 hour   Intake 666 ml   Output 300 ml   Net 366 ml      Body mass index is 29.3 kg/m .    Constitutional: awake, alert, cooperative, no apparent distress, and appears stated age  Respiratory: No increased work of breathing, good air exchange, clear to auscultation bilaterally, no crackles or wheezing  Cardiovascular: Normal apical impulse, regular rate and rhythm, normal S1 and S2, no S3 or S4, and no murmur noted  GI: No scars, normal bowel sounds, soft, non-distended, non-tender, no masses palpated, no hepatosplenomegally  Skin: no bruising or bleeding and normal skin color, texture, turgor  Musculoskeletal: There is no redness, warmth, or swelling of the joints.  Full range of  motion noted.  no lower extremity pitting edema present  Neurologic: Awake, alert, oriented to name, place and time.  Cranial nerves II-XII are grossly intact.  Motor is 5 out of 5 bilaterally.   Sensory is intact.    Neuropsychiatric: Appropriate with examiner      PERTINENT LABS/IMAGING:    I have personally reviewed the following data over the past 24 hrs:    7.8  \   7.2 (L)   / 134 (L)     136 98 97.7 (H) /  264 (H)   4.7 21 (L) 4.35 (H) \     ALT: 42 AST: 48 (H) AP: 55 TBILI: 0.6   ALB: 3.5 TOT PROTEIN: 6.2 (L) LIPASE: N/A     Ferritin:  N/A % Retic:  N/A LDH:  N/A       Imaging results reviewed over the past 24 hrs:   Recent Results (from the past 24 hour(s))   US Renal Complete Non-Vascular    Narrative    EXAM: US RENAL COMPLETE NON-VASCULAR  LOCATION: [facility]  DATE: 5/18/2024    INDICATION: Acute on chronic kidney disease.  COMPARISON: None.  TECHNIQUE: Routine Bilateral Renal and Bladder Ultrasound.    FINDINGS:    RIGHT KIDNEY: 11.0 x 4.5 x 5.5 cm. Normal cortical thickness with mildly increased cortical echogenicity. No hydronephrosis or sonographically detectable calculi    LEFT KIDNEY: 10.3 x 4.3 x 3.9 cm. Normal cortical thickness and echogenicity. No hydronephrosis or sonographically detectable calculi.     BLADDER: Bladder is markedly distended but otherwise normal.    Partially visualized right pleural effusion.      Impression    IMPRESSION:    1.  No hydronephrosis or sonographically detectable calculi.    2.  Mildly increased right renal cortical echogenicity, as can be seen with chronic kidney disease and/or acute kidney injury. Normal left renal echogenicity.    3.  Marked bladder distention.       Discussed with patient, family, nursing staff and discharge planner    Torsten King MD  [facility] Medicine Service  [phone]

## 2024-05-20 NOTE — PLAN OF CARE
Problem: Adult Inpatient Plan of Care  Goal: Optimal Comfort and Wellbeing  Outcome: Progressing  Intervention: Provide Person-Centered Care  Recent Flowsheet Documentation  Taken 5/19/2024 2100 by Reji Johnston, RN  Trust Relationship/Rapport: care explained  Taken 5/19/2024 1700 by Reji Johnston, RN  Trust Relationship/Rapport: care explained   Goal Outcome Evaluation:    Patient continues to have high blood glucose levels, he states that he typically would receive more insulin at home than we are giving here. Hemoglobin redraw at 2000 was 7.3, up from 7.2 earlier today. He feels generalized discomfort, has intermittent tremors in his arms, and is tired. Slept most of the shift.

## 2024-05-20 NOTE — PROGRESS NOTES
Patient to dialysis, report to dialysis nurse and report called to primary RN. Patient is awake, slightly drowsy, same as prior to procedure. Wife here to see patient. VSS.

## 2024-05-20 NOTE — CONSULTS
[facility]    Dakota Bauer   [address] [facility] [address]  73 year old male  Admission Date/Time: 5/17/2024  3:43 PM    Primary Care Provider:  Jamal Gaffney    Requesting Physician: Torsten King MD      CHIEF COMPLAINT:     Chest pain, shortness of breath, lightheadedness    REASON FOR THE CONSULT: Anemia    HPI:   Dakota is a pleasant 73-year-old male with history of diabetes mellitus, CAD, hypertension, NSTEMI, CKD, CHF and CVA who presented to [facility] with complaints of fatigue, lightheadedness, chest pain, shortness of breath and dyspnea on exertion.    Patient states that his symptoms have been worsening progressively over the past month.  He had a near syncopal episode today prior to admission whereby he felt severe dizziness and had to bring himself to the ground to avoid falling.  He otherwise denies any fevers, chills, nausea, vomiting, melena or hematochezia.  He has been treated for acute on chronic CHF and acute on chronic CKD since admission.  He does have baseline anemia and hemoglobin was 8.2 on admission.  However, over the last couple of days, he had significant hemoglobin drop to 7.2.     Records, he does have history of advanced colonic polyps.  Last colonoscopy in 2018 with a large near 2 cm sessile serrated adenoma removed in the cecum, a small tubular adenoma and a 10 mm sessile serrated adenoma removed in the ascending colon.  He does not appear to have a EGD in the past.      REVIEW OF SYSTEMS: 13 point review of systems is negative except that noted above.    PAST MEDICAL HISTORY:   Past Medical History:   Diagnosis Date    Anxiety     Chronic kidney disease     Coronary artery disease     Diabetes mellitus (H)     Diabetic ulcer of toe (H)     Disorder of thyroid     Hyperlipidemia     Hypertension     Hypothyroidism     SHERRELL (obstructive sleep apnea)     no cpap       PAST SURGICAL HISTORY:   Past Surgical History:   Procedure Laterality  Date    AMPUTATE TOE(S) Left     BYPASS GRAFT ARTERY CORONARY  2005    [facility] with Dr. Holter    CARDIAC CATHETERIZATION  2005    CARDIAC CATHETERIZATION  2016    no intervention    CATARACT EXTRACTION      CERVICAL DISC SURGERY  2014    CREATE FISTULA ARTERIOVENOUS UPPER EXTREMITY Left 2024    Procedure: CREATION, ARTERIOVENOUS FISTULA, UPPER EXTREMITY;  Surgeon: Edwin Lynne MD;  Location: [facility] Main OR    CV ANGIOGRAM CORONARY GRAFT N/A 2023    Procedure: Coronary Angiogram Graft;  Surgeon: Maikel Tripathi MD;  Location: [facility] CATH LAB CV    CV PCI N/A 2023    Procedure: Percutaneous Coronary Intervention;  Surgeon: Maikel Tripathi MD;  Location: [facility] CATH LAB CV    IR LOWER EXTREMITY ANGIOGRAM LEFT  2023    IR LOWER EXTREMITY ANGIOGRAM LEFT  2020    IR LOWER EXTREMITY ANGIOGRAM LEFT  2023       FAMILY HISTORY:   Family History   Problem Relation Age of Onset    Diabetes Mother     Coronary Artery Disease Father     Coronary Artery Disease Brother        SOCIAL HISTORY:   Social History     Tobacco Use    Smoking status: Former     Current packs/day: 0.00     Types: Cigarettes     Quit date: 10/10/1984     Years since quittin.6    Smokeless tobacco: Never   Substance Use Topics    Alcohol use: Not Currently        MEDS:  Medications Prior to Admission   Medication Sig Dispense Refill Last Dose    aspirin 81 MG EC tablet Take 1 tablet (81 mg) by mouth daily Start tomorrow. 30 tablet 3 2024 at am    atorvastatin (LIPITOR) 40 MG tablet Take 40 mg by mouth every evening   2024 at hs    clopidogrel (PLAVIX) 75 MG tablet Take 1 tablet (75 mg) by mouth daily Dose to start tomorrow. 90 tablet 3 2024 at am    ezetimibe (ZETIA) 10 MG tablet Take 1 tablet (10 mg) by mouth daily 90 tablet 4 2024 at hs    fish oil-omega-3 fatty acids 1000 MG capsule Take 1 g by mouth daily   2024 at am    fluticasone  (FLONASE) 50 MCG/ACT nasal spray Spray 2 sprays into both nostrils 2 times daily   5/17/2024 at am    gabapentin (NEURONTIN) 300 MG capsule [GABAPENTIN (NEURONTIN) 300 MG CAPSULE] Take 300 mg by mouth 3 (three) times a day.          5/17/2024 at x1 dose    insulin glargine (LANTUS PEN) 100 UNIT/ML pen Inject 30 Units Subcutaneous every morning   5/17/2024 at am    insulin lispro (HUMALOG KWIKPEN) 100 UNIT/ML (1 unit dial) KWIKPEN Inject Subcutaneous 3 times daily (before meals) Carb count: 3 g carb : 1 unit insulin  Maximum 25 units per meal; 75 units per day   5/17/2024 at breakfast and lunch dose    levothyroxine (SYNTHROID/LEVOTHROID) 125 MCG tablet Take 125 mcg by mouth daily   5/17/2024 at am    losartan (COZAAR) 25 MG tablet Take 25 mg by mouth every evening   5/16/2024 at evening    metoprolol succinate ER (TOPROL XL) 25 MG 24 hr tablet Take 1 tablet (25 mg) by mouth daily 90 tablet 3 5/16/2024 at evening    multivitamin with iron (ONE DAILY WITH IRON) Tab tablet [MULTIVITAMIN WITH IRON (ONE DAILY WITH IRON) TAB TABLET] Take 1 tablet by mouth daily.   5/17/2024 at am    nitroGLYcerin (NITROSTAT) 0.4 MG sublingual tablet Place 1 tablet (0.4 mg) under the tongue every 5 minutes as needed 25 tablet 3 Unknown at prn    torsemide (DEMADEX) 20 MG tablet Take 4 tablets (80 mg) by mouth daily 360 tablet 1 5/17/2024 at am    VITAMIN D3 2,000 unit capsule [VITAMIN D3 2,000 UNIT CAPSULE] Take 2,000 Units by mouth daily.  3 5/17/2024 at am    Continuous Blood Gluc Sensor (FREESTYLE DAVID 14 DAY SENSOR) MISC        TECHLITE PEN NEEDLES 31G X 5 MM miscellaneous           ALLERGIES/SENSITIVITIES: Hydrochlorothiazide and Levofloxacin    MEDICATIONS:  No current outpatient medications on file.       PHYSICAL EXAM:  Temp:  [97.5  F (36.4  C)-98.6  F (37  C)] 98.3  F (36.8  C)  Pulse:  [60-84] 60  Resp:  [18-22] 20  BP: ()/(42-60) 90/42  SpO2:  [93 %-100 %] 100 %  Body mass index is 29.5 kg/m .  GEN: Well developed, well  nourished 73 year old in no acute distress.  HEENT: sclera anicteric, moist mucous membranes.   LYMPH: No cervical lymphadenopathy  PULM: Nonlabored breathing. Breath sounds equal.   CARDIO: Regular rate  GI: Non-distended. Soft.  Non-tender to palpation.  No guarding. No rebound tenderness.  EXT: warm, no lower extremity edema  NEURO: Alert. No focal defects.   PSYCH: Mental status appropriate, mood and affect normal.    SKIN: No rashes  MSK: No joint abnormalities    LABORATORY DATA:  CBC RESULTS:   Recent Labs   Lab Test 05/20/24 0434   WBC 11.4*   RBC 2.47*   HGB 7.2*   HCT 23.5*   MCV 95   MCH 29.1   MCHC 30.6*   RDW 14.6           CMP Results:   Recent Labs   Lab Test 05/20/24  0816 05/20/24 0434 05/19/24  0808 05/19/24 0458   NA  --  134*  --  136   POTASSIUM  --  4.7  --  4.7   CHLORIDE  --  97*  --  98   CO2  --  17*  --  21*   ANIONGAP  --  20*  --  17*   * 317*   < > 143*   BUN  --  117.2*  --  97.7*   CR  --  4.79*  --  4.35*   BILITOTAL  --   --   --  0.6   ALKPHOS  --   --   --  55   ALT  --   --   --  42   AST  --   --   --  48*    < > = values in this interval not displayed.        INR Results:   Recent Labs   Lab Test 06/21/23  0710   INR 1.00          RELEVANT IMAGING:    EXAM: XR CHEST 2 VIEWS  LOCATION: [facility]  DATE: 5/17/2024     INDICATION:  Chest pain and shortness of breath.  COMPARISON: Portable AP view the chest 4/11/2024                                                                      IMPRESSION:      Cardiac silhouette is normal in size. Previous median sternotomy and coronary revascularization. The vascular pedicle is normal. Normal aortic arch and descending aortic contours.     Normal lung vascularity. No interstitial or alveolar opacities. Diaphragmatic curvature is preserved. No pleural effusion.     Unchanged small mid/lower thoracic spine degenerative osteophytes.        EXAM: US RENAL COMPLETE NON-VASCULAR  LOCATION: [facility]  [facility]  DATE: 5/18/2024     INDICATION: Acute on chronic kidney disease.  COMPARISON: None.  TECHNIQUE: Routine Bilateral Renal and Bladder Ultrasound.     FINDINGS:     RIGHT KIDNEY: 11.0 x 4.5 x 5.5 cm. Normal cortical thickness with mildly increased cortical echogenicity. No hydronephrosis or sonographically detectable calculi     LEFT KIDNEY: 10.3 x 4.3 x 3.9 cm. Normal cortical thickness and echogenicity. No hydronephrosis or sonographically detectable calculi.      BLADDER: Bladder is markedly distended but otherwise normal.     Partially visualized right pleural effusion.                                                                      IMPRESSION:     1.  No hydronephrosis or sonographically detectable calculi.     2.  Mildly increased right renal cortical echogenicity, as can be seen with chronic kidney disease and/or acute kidney injury. Normal left renal echogenicity.     3.  Marked bladder distention.      ASSESSMENT:   Dakota is a pleasant 73-year-old male with history of diabetes mellitus, CAD, hypertension, NSTEMI, CKD, CHF and CVA who presented to [facility] with complaints of fatigue, lightheadedness, chest pain, shortness of breath and dyspnea on exertion.  GI is consulted for acute on chronic normocytic anemia.  He does not have any signs of overt GI bleeding but fecal occult on 5/19 was positive.  He does have history of advanced colonic polyps on last colonoscopy in 2018.  GI sources of anemia includes chronic bleeding from gastritis, H. pylori, peptic ulcer disease, AVMs, polyps or even malignancy.  Given he has never had an upper endoscopy in the past, I would recommend an upper endoscopy and colonoscopy to further evaluate his anemia.  Ideally, this should be done once he is hemodynamically stable medically to undergo bowel prep and sedation.        PLAN:  Continue to monitor hemoglobin and transfuse hemoglobin >7    Would recommend an upper endoscopy and  colonoscopy once medically stable to evaluate for GI sources of anemia. Will discuss with primary team regarding timing of procedure.     Plan was discussed with patient and wife at bedside.        65 minutes of total time was spent providing patient care, including patient evaluation, reviewing documentation/test results and .                                                Jose Hedrick M.D.  Thank you for the opportunity to participate in the care of this patient.   Please feel free to call me with any questions or concerns.  Phone number [phone].              CC: [facility], Jamal Gaffney

## 2024-05-20 NOTE — PLAN OF CARE
Goal Outcome Evaluation:      Plan of Care Reviewed With: patient, spouse    Overall Patient Progress: no changeOverall Patient Progress: no change         Pt tired today, sleeping on and off most of shift. Refused the VSS. Tele NSR with some ST depression. Plavix and aspirin held by provider d/t Hgb. SCD pumps placed on pt. Pt 1 assist to bathroom with walker. No BM this shift Poor appetite, c/o nausea. Only had 1 pack of flores crackers this afternoon. PRNs given for nausea (see MAR), no improvement per pt. Pt denies pain. BG remains elevated in 300s.     Around 1430 pt experienced SOB while laying on side. Wife reported pts eyes rolled back in his head and pt clutched chest. Pt alert upon entering the room and denied chest pain. HR dipped briefly to low 50s. BP stable. B. Hospitalist notified and saw pt at bedside. Considering transfer to ICU.    Plan for dialysis catheter placement in IR.  Wife a bedside.

## 2024-05-20 NOTE — PRE-PROCEDURE
GENERAL PRE-PROCEDURE:   Procedure:  Tunneled or non-tunneled dialysis catheter placement  Date/Time:  5/20/2024 3:10 PM    Written consent obtained?: Yes    Risks and benefits: Risks, benefits and alternatives were discussed    Consent given by:  Patient  Patient states understanding of procedure being performed: Yes    Patient's understanding of procedure matches consent: Yes    Procedure consent matches procedure scheduled: Yes    : fentanyl only for tunneled.  Appropriately NPO:  Yes  ASA Class:  4  Mallampati  :  Grade 2- soft palate, base of uvula, tonsillar pillars, and portion of posterior pharyngeal wall visible  Lungs:  Lungs clear with good breath sounds bilaterally  Heart:  Normal heart sounds and rate  History & Physical reviewed:  History and physical reviewed and no updates needed  Statement of review:  I have reviewed the lab findings, diagnostic data, medications, and the plan for sedation

## 2024-05-20 NOTE — CONSULTS
"  Interventional Radiology - Consultation Note:  [facility]  05/20/2024     Reason for Consult:  tunneled dialysis catheter placement   Requesting Provider:  Juan Manuel Taylor APRN CNP     HPI:  Dakota Bauer is a 73 year old male with ASHLEY and CKD. AVF was placed on 1/10/2024, still needs a second step surgery for AV. Patient in need of access for hemodialysis. Tunneled dialysis catheter placement requested.     Nephrologist: Dr. Bhatia    Upon arriving to patient's room; wife was yelling that the patient was going into a coma; patient's eyes were reported to have \"rolled back in his head\" and was semi-conscious. No issues found on telemetry monitor when reviewed by staff, VSS, BG in 300s. Patient was then sleeping, but able to answer questions appropriately.     IMAGING:    None to review     NPO Status:  ate gram crackers around noon.   Anticoagulation/Antiplatelets/Bleeding tendencies:  aspirin, plavix; no hold required  Antibiotics:  ancef ordered for IR procedure    REVIEW OF SYSTEMS:  A comprehensive 10-point review of systems was performed. All systems were reviewed and negative with exception to those reported in the HPI.     PAST MEDICAL HISTORY:  Past Medical History:   Diagnosis Date    Anxiety     Chronic kidney disease     Coronary artery disease     Diabetes mellitus (H)     Diabetic ulcer of toe (H)     Disorder of thyroid     Hyperlipidemia     Hypertension     Hypothyroidism     SHERRELL (obstructive sleep apnea)     no cpap       PAST SURGICAL HISTORY:  Past Surgical History:   Procedure Laterality Date    AMPUTATE TOE(S) Left     BYPASS GRAFT ARTERY CORONARY  01/01/2005    [place] with Dr. Holter    CARDIAC CATHETERIZATION  01/01/2005    CARDIAC CATHETERIZATION  11/25/2016    no intervention    CATARACT EXTRACTION      CERVICAL DISC SURGERY  01/01/2014    CREATE FISTULA ARTERIOVENOUS UPPER EXTREMITY Left 4/2/2024    Procedure: CREATION, ARTERIOVENOUS FISTULA, UPPER " EXTREMITY;  Surgeon: Edwin Lynne MD;  Location: [facility] Main OR    CV ANGIOGRAM CORONARY GRAFT N/A 2023    Procedure: Coronary Angiogram Graft;  Surgeon: Maikel Tripathi MD;  Location: [facility] CATH LAB CV    CV PCI N/A 2023    Procedure: Percutaneous Coronary Intervention;  Surgeon: Maikel Tripathi MD;  Location: [facility] CATH LAB CV    IR LOWER EXTREMITY ANGIOGRAM LEFT  2023    IR LOWER EXTREMITY ANGIOGRAM LEFT  2020    IR LOWER EXTREMITY ANGIOGRAM LEFT  2023       FAMILY HISTORY:  Family History   Problem Relation Age of Onset    Diabetes Mother     Coronary Artery Disease Father     Coronary Artery Disease Brother         SOCIAL HISTORY:  Social History     Socioeconomic History    Marital status:      Spouse name: None    Number of children: None    Years of education: None    Highest education level: None   Tobacco Use    Smoking status: Former     Current packs/day: 0.00     Types: Cigarettes     Quit date: 10/10/1984     Years since quittin.6    Smokeless tobacco: Never   Vaping Use    Vaping status: Never Used   Substance and Sexual Activity    Alcohol use: Not Currently    Drug use: Never     Social Determinants of Health      Received from [employer] [facility] [employer] [facility]    Financial Resource Strain    [facility] [employer] [employer] [facility]    Social Connections        ALLERGIES:  Allergies   Allergen Reactions    Hydrochlorothiazide Other (See Comments)     Hyponatremia     Levofloxacin Other (See Comments)     Kidney pain        MEDICATIONS:  Current Facility-Administered Medications   Medication Dose Route Frequency Provider Last Rate Last Admin    acetaminophen (TYLENOL) tablet 650 mg  650 mg Oral Q4H PRN Britney Bennett NP        Or    acetaminophen (TYLENOL) Suppository 650 mg  650 mg Rectal Q4H PRN Britney Bennett,  NP        [Held by provider] aspirin EC tablet 81 mg  81 mg Oral Daily Britney Bennett NP   81 mg at 05/19/24 0827    atorvastatin (LIPITOR) tablet 40 mg  40 mg Oral QPM Britney Bennett NP   40 mg at 05/19/24 2035    calcium carbonate (TUMS) chewable tablet 1,000 mg  1,000 mg Oral 4x Daily PRN Britney Bennett NP   1,000 mg at 05/18/24 0645    [Held by provider] clopidogrel (PLAVIX) tablet 75 mg  75 mg Oral Daily Britney Bennett NP   75 mg at 05/19/24 0826    glucose gel 15-30 g  15-30 g Oral Q15 Min PRN Torsten King MD        Or    dextrose 50 % injection 25-50 mL  25-50 mL Intravenous Q15 Min PRN Torsten King MD        Or    glucagon injection 1 mg  1 mg Subcutaneous Q15 Min PRN Torsten King MD        ezetimibe (ZETIA) tablet 10 mg  10 mg Oral QPM Britney Bennett NP   10 mg at 05/19/24 2035    fluticasone (FLONASE) 50 MCG/ACT spray 2 spray  2 spray Both Nostrils BID Britney Bennett NP   2 spray at 05/20/24 0908    gabapentin (NEURONTIN) capsule 300 mg  300 mg Oral Daily Britney Bennett NP   300 mg at 05/20/24 0906    hydrALAZINE (APRESOLINE) tablet 10 mg  10 mg Oral Q4H PRN Britney Bennett NP        Or    hydrALAZINE (APRESOLINE) injection 10 mg  10 mg Intravenous Q4H PRN Britney Bennett NP        insulin aspart (NovoLOG) injection (RAPID ACTING)   Subcutaneous TID w/meals Torsten King MD   5 Units at 05/20/24 1328    insulin aspart (NovoLOG) injection (RAPID ACTING)  1-10 Units Subcutaneous TID AC Torsten King MD   7 Units at 05/20/24 1217    insulin aspart (NovoLOG) injection (RAPID ACTING)  1-7 Units Subcutaneous At Bedtime Torsten King MD   6 Units at 05/19/24 2029    insulin glargine (LANTUS PEN) injection 15 Units  15 Units Subcutaneous At Bedtime Torsten King MD   15 Units at 05/19/24 2030    insulin glargine (LANTUS PEN) injection 30 Units  30 Units Subcutaneous QAM AC Torsten King MD   30 Units at 05/20/24 0838    iron sucrose (VENOFER) 200 mg in sodium chloride 0.9 % 120 mL intermittent  infusion  200 mg Intravenous Daily Juan Manuel Taylor, APRN  mL/hr at 05/20/24 0908 200 mg at 05/20/24 0908    levothyroxine (SYNTHROID/LEVOTHROID) tablet 125 mcg  125 mcg Oral QAM AC Britney Bennett NP   125 mcg at 05/20/24 0645    lidocaine (LMX4) cream   Topical Q1H PRN Britney Bennett NP        lidocaine 1 % 0.1-1 mL  0.1-1 mL Other Q1H PRN Britney Bennett NP        [Held by provider] losartan (COZAAR) tablet 25 mg  25 mg Oral QPM Britney Bennett NP        metoprolol succinate ER (TOPROL-XL) 24 hr half-tab 12.5 mg  12.5 mg Oral QPM Torsten King MD   12.5 mg at 05/19/24 2035    ondansetron (ZOFRAN ODT) ODT tab 4 mg  4 mg Oral Q6H PRN Britney Bennett NP        Or    ondansetron (ZOFRAN) injection 4 mg  4 mg Intravenous Q6H PRN Britney Bennett NP   4 mg at 05/20/24 1229    pantoprazole (PROTONIX) IV push injection 40 mg  40 mg Intravenous BID Torsten King MD   40 mg at 05/20/24 0645    prochlorperazine (COMPAZINE) injection 5 mg  5 mg Intravenous Q6H PRN Torsten King MD   5 mg at 05/20/24 0907    senna-docusate (SENOKOT-S/PERICOLACE) 8.6-50 MG per tablet 1 tablet  1 tablet Oral BID PRN Britney Bennett NP        Or    senna-docusate (SENOKOT-S/PERICOLACE) 8.6-50 MG per tablet 2 tablet  2 tablet Oral BID PRN Britney Bennett NP        sodium chloride (PF) 0.9% PF flush 3 mL  3 mL Intracatheter Q8H Britney Bennett NP   3 mL at 05/20/24 1216    sodium chloride (PF) 0.9% PF flush 3 mL  3 mL Intracatheter q1 min prn Britney Bennett NP        [Held by provider] torsemide (DEMADEX) tablet 80 mg  80 mg Oral Daily Britney Bennett NP            LABS:  INR   Date Value Ref Range Status   06/21/2023 1.00 0.85 - 1.15 Final      Hemoglobin   Date Value Ref Range Status   05/20/2024 7.2 (L) 13.3 - 17.7 g/dL Final     Platelet Count   Date Value Ref Range Status   05/20/2024 155 150 - 450 10e3/uL Final     Creatinine   Date Value Ref Range Status   05/20/2024 4.79 (H) 0.67 - 1.17 mg/dL Final     Potassium   Date Value Ref Range  Status   05/20/2024 4.7 3.4 - 5.3 mmol/L Final   06/05/2023 4.3 3.5 - 5.0 mmol/L Final         EXAM:  BP 95/50 (BP Location: Right arm)   Pulse 79   Temp 98.1  F (36.7  C) (Axillary)   Resp 18   Ht 1.829 m (6')   Wt 98.7 kg (217 lb 8 oz)   SpO2 98%   BMI 29.50 kg/m    General: Stable. In no acute distress.    Neurologic: Lethargic and oriented x 3.   Psychiatric: Appropriate mood and affect. Cooperative. Answering questions appropriately. Linear/coherent thought process.   Respiratory: Normal respirations on room air. Lungs clear to auscultation bilaterally.  Cardiac: S1S2, regular rate and rhythm, without murmur, clicks or rubs.  Skin: Warm and dry. Without excoriations, ecchymosis, erythema, lesions or open sores on upper chest/neck.        Pre-Sedation Assessment:  Mallampati Airway Classification:  II - Faucial pillars and soft palate may be seen, but uvula is masked by the base of the tongue  Previous reaction to anesthesia/sedation:  No  Sedation plan based on assessment: fentanyl only  ASA Classification: Class 4 - SEVERE SYSTEMIC DISEASE, ACUTE UNSTABLE PROBLEMS.   Code Status/Advanced care directive: Full Code     ASSESSMENT/PLAN:    Case discussed with Dr. Mercado in IR.   Fentanyl only  Ancef ordered for IR procedure.    Recommend proceeding with tunneled dialysis catheter placement with fentanyl only; pending patient's status may require non-tunneled dialysis catheter placement; laterality per radiologist.     Recommendations and procedural education reviewed with patient/family in detail including, but not limited to risks, benefits and alternatives with understanding verbalized by patient/family.    Thank you for kindly for this consultation.     Total time spent on the date of the encounter: 40 minutes.      RAO Weinberg CNP  Interventional Radiology  [phone]

## 2024-05-20 NOTE — SEDATION DOCUMENTATION
Patient Name: Dakota Bauer  Medical Record Number: [id number]  Today's Date: 5/20/2024    Procedure: tunneled dialysis catheter  Proceduralist: Dr. Mercado      Sedation medications administered: 0 mg midazolam and 50 mcg fentanyl   Sedation time: 0 minutes    Patient directly to dialysis. Wife notified. Tolerated procedure well.

## 2024-05-20 NOTE — PROGRESS NOTES
[facility]    PROGRESS NOTE - Hospitalist Service    Assessment and Plan  73 year old male with past medical history of diabetes, CAD, hypertension, NSTEMI, CKD, CHF, CVA, who presents to the ER with complaints of fatigue, chest pain, shortness of breath, and lightheadedness.  Admitted with acute on chronic CHF and ASHLEY.     Acute on chronic CHF  - Patient presented with progressive shortness of breath  -Elevated BNP on admission, chest x-ray is negative for pleural effusion.  - Hold home oral torsemide  - Cardiology consult, appreciate input  - Received IV diuresis on 5/18/2024  - Caution with IV diuresis because of his worsening kidney disease  - Echo shows EF of 50 to 55% with moderate to severe mitral regurgitation  - Continue to monitor telemetry     Dizziness with near syncope  - Suspect secondary to orthostatic drop in blood pressure  - Holding losartan and decrease metoprolol dose to  - PT/OT evaluation  - Echo as above     Elevated troponin  - Of low clinical value because of his advanced kidney disease.  - Patient had stress test done as ago which was mildly abnormal.  - Extensive history of coronary artery disease.  - Cardiology consult, appreciate input  - No need for further evaluation.     Acute on chronic kidney disease  - Concern for cardiorenal syndrome and uremia  - Nephrology consult, appreciate input  - Hold losartan  - renal ultrasound is negative for hydronephrosis or obstruction.  - caution with IV diuresis.  -Patient is developing metabolic acidosis, discussed with nephrology, plan to start dialysis  - Continue to monitor renal function.     Diabetes mellitus type 2 with hyperglycemia  - Restart home Lantus  - Cover with sliding scale   - Add carb counting insulin coverage  - Hemoglobin A1c 7.1  - Blood glucose is still elevated, increase Lantus dose  - BMP shows metabolic acidosis with increasing gap  - Check ketones in blood  - Monitor BMP closely and May transfer to  ICU and start IV insulin if ketones are elevated     Acute on chronic anemia  - Baseline anemia of chronic kidney disease  - hemoglobin significantly dropping today hemoglobin   - Patient had multiple dark stools  - Positive Hemoccult  - GI consult, appreciate input  - Need to have endoscopy but currently will not tolerate procedure  - Start IV Protonix  - Hold aspirin and Plavix  - Blood consent is signed  - Continue to monitor hemoglobin and transfuse if <7     Hypothyroidism  - Resume levothyroxine  - TSH 2.21     Hyperlipidemia  - Continue statin and Zetia     Weakness and deconditioning  - S/P recurrent fall  - PT/OT evaluation     50 MINUTES SPENT BY ME on the date of service doing chart review, history, exam, documentation & further activities per the note    Active Problems:    Dyspnea on exertion    Elevated troponin    Exertional chest pain    ASHLEY (acute kidney injury) (H24)    Elevated brain natriuretic peptide (BNP) level    Anemia, unspecified type      VTE prophylaxis:  Pneumatic Compression Devices  DIET: Orders Placed This Encounter      Moderate Consistent Carb (60 g CHO per Meal) Diet      NPO per Anesthesia Guidelines for Procedure/Surgery Except for: Meds      Disposition/Barriers to discharge: Initiation of dialysis, monitor blood glucose  Code Status: Full Code    Subjective:  Dakota is not feeling well today, has some nausea but no vomiting.  No more black stools overnight.    PHYSICAL EXAM  Vitals:    05/18/24 0027 05/19/24 0600 05/20/24 0345   Weight: 100.7 kg (222 lb) 98 kg (216 lb 0.8 oz) 98.7 kg (217 lb 8 oz)     B/P:95/50 T:98.1 P:79 R:18     Intake/Output Summary (Last 24 hours) at 5/20/2024 1414  Last data filed at 5/20/2024 1239  Gross per 24 hour   Intake 851 ml   Output 600 ml   Net 251 ml      Body mass index is 29.5 kg/m .    Constitutional: awake, alert, cooperative, no apparent distress, and appears stated age  Respiratory: Decreased breath sounds at lung bases: Rhonchi.  No  wheeze.    Cardiovascular: Normal apical impulse, regular rate and rhythm, normal S1 and S2, no S3 or S4, and no murmur noted  GI: No scars, normal bowel sounds, soft, non-distended, non-tender, no masses palpated, no hepatosplenomegally  Skin: no bruising or bleeding and normal skin color, texture, turgor  Musculoskeletal: There is no redness, warmth, or swelling of the joints.  Full range of motion noted.  +1 bilateral lower extremity pitting edema present, partial amputation of left foot  Neurologic: Awake, alert, oriented to name, place and time.  Cranial nerves II-XII are grossly intact.  Motor is 5 out of 5 bilaterally.   Sensory is intact.    Neuropsychiatric: Appropriate with examiner      PERTINENT LABS/IMAGING:    I have personally reviewed the following data over the past 24 hrs:    11.4 (H)  \   7.2 (L)   / 155     134 (L) 97 (L) 117.2 (H) /  304 (H)   4.7 17 (L) 4.79 (H) \       Imaging results reviewed over the past 24 hrs:   No results found for this or any previous visit (from the past 24 hour(s)).    Discussed with patient, family, nephrology, nursing staff and discharge planner    Torsten King MD  [facility] Medicine Service  [phone]

## 2024-05-20 NOTE — PROCEDURES
Interventional Radiology Post-Procedure Note   ?   Brief Procedure Note:   Patient name: Dakota Bauer  Pt MRN:[id number]   Date of procedure: 5/20/2024     Procedure(s): Tunneled dialysis catheter placement  Sedation method: Moderate sedation was employed. The patient was monitored by an interventional radiology nurse at all times throughout the procedure under my direct guidance.  Pre Procedure Diagnosis: Renal insufficiency  Post Procedure Diagnosis: Same  Indications: Needs dialysis   ?   Specimen(s) removed: None   Additional studies ordered: None  Drains: None   Estimated Blood Loss: Minimal  Complications: None  Vascular closure method: N/A    Findings/Notes/Comments: Successful right internal jugular tunneled dialysis catheter placement.   ?   Please see dictation in PACS or under the Imaging tab in EPIC for detailed procedure note.     Latrell Mercado M.D.   Vascular and Interventional Radiology   Pager: [phone]   After Hours / Scheduling: [phone]     5/20/2024  5:38 PM

## 2024-05-20 NOTE — PROGRESS NOTES
Cardiology Progress Note    Assessment/Plan:  1.  Acute on chronic HFpEF with no significant diuresis since admission and acute worsening of renal function following IV diuretic.  Nephrology consulted.  They are planning placement of a dialysis catheter to initiate hemodialysis.  2.  Chest pain/elevated troponin, felt likely due to demand ischemia and poor clearance from advanced CKD.  Low risk stress test 1 month ago.  No ischemic evaluation recommended.  Continue with current medical therapy.  3.  Hypotension, etiology unclear as LV systolic function appears normal on current echocardiogram.  Suspect this may be contributing to worsening kidney function as well.  4.  Anemia, likely secondary to chronic kidney disease.    Primary cardiologist: Dr. Geronimo Montoya    Subjective:  Patient very drowsy.  His wife stated he had had a spell where he rolled on his side and his eyes rolled back.  Talked to nursing staff who said they saw nothing other than a slight drop in heart rate from 80-50 but no profound bradycardia or pauses.  Currently awaiting dialysis catheter placement so they can initiate dialysis.    Current Facility-Administered Medications   Medication Dose Route Frequency Provider Last Rate Last Admin    [Held by provider] aspirin EC tablet 81 mg  81 mg Oral Daily Britney Bennett NP   81 mg at 05/19/24 0827    atorvastatin (LIPITOR) tablet 40 mg  40 mg Oral QPM Britney Bennett NP   40 mg at 05/19/24 2035    [START ON 5/21/2024] ceFAZolin Sodium (ANCEF) injection 2 g  2 g Intravenous Pre-Op/Pre-procedure x 1 dose Alanna Partida, APRN CNP        [Held by provider] clopidogrel (PLAVIX) tablet 75 mg  75 mg Oral Daily Britney Bennett NP   75 mg at 05/19/24 0826    ezetimibe (ZETIA) tablet 10 mg  10 mg Oral QPM Britney Bennett NP   10 mg at 05/19/24 2035    fluticasone (FLONASE) 50 MCG/ACT spray 2 spray  2 spray Both Nostrils BID Britney Bennett NP   2 spray at 05/20/24 0908    gabapentin (NEURONTIN) capsule 300  mg  300 mg Oral Daily Britney Bennett NP   300 mg at 05/20/24 0906    insulin aspart (NovoLOG) injection (RAPID ACTING)   Subcutaneous TID w/meals Torsten King MD   5 Units at 05/20/24 1328    insulin aspart (NovoLOG) injection (RAPID ACTING)  1-10 Units Subcutaneous TID AC Torsten King MD   7 Units at 05/20/24 1217    insulin aspart (NovoLOG) injection (RAPID ACTING)  1-7 Units Subcutaneous At Bedtime Torsten King MD   6 Units at 05/19/24 2029    insulin glargine (LANTUS PEN) injection 15 Units  15 Units Subcutaneous At Bedtime Torsten King MD   15 Units at 05/19/24 2030    insulin glargine (LANTUS PEN) injection 30 Units  30 Units Subcutaneous QAM AC Torsten King MD   30 Units at 05/20/24 0838    iron sucrose (VENOFER) 200 mg in sodium chloride 0.9 % 120 mL intermittent infusion  200 mg Intravenous Daily Juan Manuel Taylor APRN  mL/hr at 05/20/24 0908 200 mg at 05/20/24 0908    levothyroxine (SYNTHROID/LEVOTHROID) tablet 125 mcg  125 mcg Oral QAM AC Britney Bennett NP   125 mcg at 05/20/24 0645    [Held by provider] losartan (COZAAR) tablet 25 mg  25 mg Oral QPM Britney Bennett NP        metoprolol succinate ER (TOPROL-XL) 24 hr half-tab 12.5 mg  12.5 mg Oral QPM Torsten King MD   12.5 mg at 05/19/24 2035    pantoprazole (PROTONIX) IV push injection 40 mg  40 mg Intravenous BID Torsten King MD   40 mg at 05/20/24 0645    sodium chloride (PF) 0.9% PF flush 3 mL  3 mL Intracatheter Q8H Britney Bennett NP   3 mL at 05/20/24 1216    [Held by provider] torsemide (DEMADEX) tablet 80 mg  80 mg Oral Daily Britney Bennett NP             Objective:   Vital signs in last 24 hours:  Temp:  [97.5  F (36.4  C)-98.6  F (37  C)] 98.1  F (36.7  C)  Pulse:  [60-84] 79  Resp:  [18-22] 18  BP: ()/(42-60) 95/50  SpO2:  [93 %-100 %] 98 %  Weight:        Review of Systems:  Negative    Physical Exam:  General appearance: Sleepy but cooperative, no distress   Head: Normocephalic, without obvious abnormality,  atraumatic  Neck: no JVD   Lungs: clear to auscultation bilaterally with diminished breath sounds at the bases  Heart: Regular rate and rhythm.  S1, S2 normal.  2/6 holosystolic murmur heard at the apex  Extremities: No peripheral edema    Cardiographics (personally reviewed):   Telemetry demonstrates sinus rhythm    Imaging (personally reviewed):   No additional imaging    Lab Results (personally reviewed):     Recent Labs   Lab Test 12/08/23  0934   CHOL 122   HDL 40   LDL 64   TRIG 92     Recent Labs   Lab Test 12/08/23  0934 06/16/23  0909 12/16/22  0919   LDL 64 47 60     Recent Labs   Lab Test 05/20/24  1436 05/20/24  0816 05/20/24  0434   NA  --   --  134*   POTASSIUM  --   --  4.7   CHLORIDE  --   --  97*   CO2  --   --  17*   *   < > 317*   BUN  --   --  117.2*   CR  --   --  4.79*   GFRESTIMATED  --   --  12*   SVITLANA  --   --  9.2    < > = values in this interval not displayed.     Recent Labs   Lab Test 05/20/24  0434 05/19/24  0458 05/18/24  0448   CR 4.79* 4.35* 3.94*     Recent Labs   Lab Test 04/11/24  0347 07/27/23  1222   A1C 7.0* 7.4*          Recent Labs   Lab Test 05/20/24  1418 05/20/24  0434   WBC  --  11.4*   HGB 7.4* 7.2*   HCT  --  23.5*   MCV  --  95   PLT  --  155     Recent Labs   Lab Test 05/20/24  1418 05/20/24  0434 05/19/24 2010   HGB 7.4* 7.2* 7.3*    Recent Labs   Lab Test 06/02/21  1016   TROPONINI 0.13     Recent Labs   Lab Test 05/17/24  1517 04/11/24  0347 07/27/23  1222   NTBNPI 11,451* 7,812* 4,719*     Recent Labs   Lab Test 05/17/24  1517   TSH 2.21     Recent Labs   Lab Test 06/21/23  0710   INR 1.00          Marleni Gavin MD

## 2024-05-20 NOTE — PROGRESS NOTES
RENAL PROGRESS NOTE  CC: ASHLEY/CKD    S: Since last seen, patient Scr 4.79(4.35), bicarb at 17.  Urine output not accurately recorded.  Hgb 7.2(7.3), GI consulted to evaluate for GIB.  Patient with weakness, nausea, dizziness, fatigue, tremor sleepiness, decreased urine output and not feeling like himself.  Seen with his wife by the bedside, she participated in ROS.  After education, family agreed to dialysis.  Urine output yesterday only 200 ml.           Impression and Plan:      1. Acute Kidney Injury/CKD- CKD thought to be from longstanding DM-type 1 and hypertension.  Normal renal ultrasound in the past.  Baseline creatinine is 3.13 to 3.45, progressive.  Presented with serum creatinine of 4.05, now up to 4.7 today. K normal but bicarb low.  Urinalysis with protein 20 and no blood.  Last UPC on 1/3/2024 was 0.67.   -Patient developing uremia and with decreased urine output.  Also developing fluid overload.  After educating patient and his wife about risk and benefit of dialysis, they agreed to proceed with dialysis.  Consent signed.  Order placed for PCAD and dialysis order placed for today.  2.5hr of HD today and 3 hr tomorrow.           Patient is followed by Dr. Padgett in renal clinic, last inpatient visit was 01/10/2024.  Has had dialysis discussion with Dr. Bhatia, AVF was placed on 1/10/2024, still needs a second step surgery for AVF.          Plan is to avoid any further renal insult by renally dosing all medications and avoiding nephrotoxic medications.  Avoid ACE inhibitors/ARB, Aminoglycosides/ IV constrast/  NSAID if possible.             3. Blood Pressure/Volume/HFpEF:  Patient with long standing history of hypertension, presented with hypotension in setting of ACE-I/diuretic.  Blood pressure better with holding losartan.  Dizziness likely due to hypotension and also uremia.  Echo with normal left size ventricular function, LV systolic function is low-normal, EF is 50-55%, there is hypokinesis of  the inferolateral wall, grade II or moderate diastolic dysfunction.  Right ventricle is normal.  Atrium is mildly dilated, mild tricuspid regurg, and severe pulmonary hypertension.   - HD today and tomorrow.   Cardiology is onboard.                  -ARB on hold, seen by cardiology who decreased metoprolol to 12.5 mg on 5/18.             -Weight has been trending up since last month.          4. Anemia: ASHLEY, and chronic illness all possibly contributing (target hgb of 10-11).  Hgb at 7.2             - Iron saturation is 18 and ferritin 313, Started on course of IV iron.  Now with possible blood in BM, occult fecal was positive for blood on 5/19, GI onboard, recommend upper endoscopy and colonoscopy once medically stable.                   - Monitor CBC      5. Coronary Artery Disease: history of CABG x4 vessels.     6. Diabetes: type 1 since age 19.         7. PAD: s/p left toes amputation in June of 2020.  S/p left leg angiogram with angioplasties and 2 stents in April of 2023.  Followed by Dr. Serrato at [facility].        Juan Manuel Taylor, DNP, CNP, FLOR  [facility]  Pager: [phone]      Physical Exam  BP 95/50 (BP Location: Right arm)   Pulse 79   Temp 98.1  F (36.7  C) (Axillary)   Resp 18   Ht 1.829 m (6')   Wt 98.7 kg (217 lb 8 oz)   SpO2 98%   BMI 29.50 kg/m     Patient Vitals for the past 96 hrs:   Weight   05/20/24 0345 98.7 kg (217 lb 8 oz)   05/19/24 0600 98 kg (216 lb 0.8 oz)   05/18/24 0027 100.7 kg (222 lb)   05/17/24 1504 99.3 kg (219 lb)       Intake/Output Summary (Last 24 hours) at 5/19/2024 1147  Last data filed at 5/19/2024 0900  Gross per 24 hour   Intake 340 ml   Output 300 ml   Net 40 ml       Physical Exam:   BP 95/50 (BP Location: Right arm)   Pulse 79   Temp 98.1  F (36.7  C) (Axillary)   Resp 18   Ht 1.829 m (6')   Wt 98.7 kg (217 lb 8 oz)   SpO2 98%   BMI 29.50 kg/m    In general this is a 73 year old male in no acute distress.    General: Weak, sleepy.  Eyes: Pupils equal, sclerae not icteric   ENT: Mucus membranes moist, no lesions noted   Resp: work of breathing worsening, no distress  CV: RRR without murmur, no hip/leg edema   GI: Soft NT NG   Psych: A&Ox3,   Neuro: Moves all extremities.     Skin: No rash noted       Recent Labs   Lab Test 05/19/24  0458 05/18/24  0448 05/17/24  1722 05/17/24  1517   POTASSIUM 4.7 4.8 4.0 4.5   CHLORIDE 98 95*  --  97*   ANIONGAP 17* 15  --  15     Recent Labs   Lab Test 05/19/24  0458 05/18/24  0448 05/17/24  1517   WBC 7.8 6.0 4.6   RBC 2.46* 2.76* 2.95*   MCV 94 94 95   MCH 30.1 29.7 29.8   RDW 14.1 14.1 14.3               I personally reviewed these labs today

## 2024-05-21 PROCEDURE — 92950 HEART/LUNG RESUSCITATION CPR: CPT | Performed by: INTERNAL MEDICINE

## 2024-05-21 ASSESSMENT — ACTIVITIES OF DAILY LIVING (ADL)
ADLS_ACUITY_SCORE: 35

## 2024-05-21 NOTE — PLAN OF CARE
Problem: Adult Inpatient Plan of Care  Goal: Optimal Comfort and Wellbeing  Outcome: Progressing  Intervention: Provide Person-Centered Care  Recent Flowsheet Documentation  Taken 5/20/2024 1600 by eRji Johnston RN  Trust Relationship/Rapport: care explained   Goal Outcome Evaluation:    Patient went to IR to have dialysis catheter placed. He then had dialysis performed. He was very restless after, his dialysis catheter started to leak, new dressing applied and no longer leaking. Once arriving to P3 he went into an episode of respiratory distress, became hypoxic with oxygen saturations maintaining in the mid 80's. RT called and started on oxy mask, initially needing 15L oxygen, weaned now down to 2L. Blood glucose on arrival to P3 after dialysis was 197. Complains of back pain, PRN tylenol given and pillow support repositioning utilized and has been helpful.

## 2024-05-21 NOTE — PROGRESS NOTES
Hemodialysis Treatment  Note        Pre weight: Bed Scale-98.7kg    Post weight: Standing Scale: 98.7kg    Ultrafiltration: Net of 0L    Access: Dressing changed d/t  bleeding at site. Dressing reinforced with gauze. Biopatch under the gauze. Both ports flushed and aspirated w/o difficulty.     Total Blood Volume Processed:     Total Dialysis Time: 2.5 Hours    Run Summary: Pt dialyzed against a K2 per K protocol. Treatment was uneventful. Patient was hemodynamically stable. Pt was a bit restless during treatment. He c/o of SOB prior to tx starting O2 starrted at 2 li/min throughout tx. SATS at 100%. See HD flowsheet for all data.     Interventions: monitored VS  every 15 minutes/PRN.      Plan: Dialysis scheduled for 05/21/24, 5/22/24 sand PRN Per Renal MD.    Michael Tipton RN  [facility]

## 2024-05-21 NOTE — ANESTHESIA PROCEDURE NOTES
Airway       Patient location during procedure: Floor  Staff -        CRNA: Lyle Loredo APRN CRNA       Performed By: CRNA  Consent for Airway        Urgency: emergent       Consent: No emergent situation.  Indications and Patient Condition       Indications for airway management: cardiovascular arrest         Mask difficulty assessment: 1 - vent by mask    Final Airway Details       Final airway type: endotracheal airway       Successful airway: Single subglottic suction  Endotracheal Airway Details        ETT size (mm): 7.0       Cuffed: yes       Successful intubation technique: video laryngoscopy       VL Blade Size: Glidescope 3       Grade View of Cords: 1       Adjucts: stylet       Position: Center       Measured from: gums/teeth       Secured at (cm): 21    Post intubation assessment        ETT secured and Vent settings by primary/ICU team       Placement verified by: capnometry, equal breath sounds and chest rise        Number of attempts at approach: 1       Ease of procedure: easy       Dentition: Unchanged

## 2024-05-21 NOTE — CONSULTS
SPIRITUAL HEALTH SERVICES CONSULT NOTE  [facility]; P3    Saw pt Dakota Bauer per Spiritual Care Consult    Patient/Family Understanding of Illness and Goals of Care - Overnight On-call. Received call from unit staff, notifying me that Dakota had  and family would like to see a . Met with Dakota's wife Funmilayo Schroeder, daughters Yolanda and Jessica, and a son-in-law. Funmilayo Schroeder shares that Dakota came in on Friday and that he has been dealing with CHF and kidney issues. He had his first session of dialysis today. She notes that he was very restless afterwards and could not find a comfortable position. She also notes that he was not at his baseline for mentation and that his speech wasn't making sense. Funmilayo Schroeder went home at 9:00. She says that she was notified after midnight that despite CPR efforts, Dakota no longer had a heartbeat. This was understandably a shock to the family. Funmilayo Schroeder notes that Dakota wanted to be cremated. They would like to work with [facility] on [address] in [place].  home notified of Dakota's passing and will wait for our call before coming to the hospital.    Distress and Loss - Family is actively grieving an unexpected loss    Strengths, Coping, and Resources - Family is supporting one another at this time. I encouraged them to be gentle with themselves in the months ahead.     Meaning, Beliefs, and Spirituality - Dakota had been connected to a [employer] Holiness in the past. Because of his desire for cremation, the family does not want to contact their Holiness at this time. Prayer shared.     Plan of Care: No further plans for follow-up at this time, but will remain available for further support as patient/family needs/desires.    Time Spent with Patient/Family: 75 minutes    Verena Rain M.Div.      Office: [phone] (for non-urgent requests)  Please Vocera or page through [facility] for time-sensitive requests

## 2024-05-21 NOTE — PROGRESS NOTES
Intensivist brief note    Responded to CODE BLUE on this patient.  No history available to me and there was no provider at the bedside who knew him.  Labs from around 2pm reviewed; low hgb and elevated AG metabolic acidosis noted; K was normal at 4.8.  His bedside nurse was present and provided a description of the day's events.  CPR was in progress with 2 compressors; eventually replaced with curtis device. The rhythm was PEA.  3 rounds of epi, 1 amp sodium bicarb, 1g CaCl were given during the code.   Airway was placed by CRNA; no issues per report.  SpO2 was in the 50s during the code.   No pulses palpable during multiple pulse checks.  Bedside cardiac POCUS showed cardiac standstill (true PEA) even after ~15 minutes of CPR. Based on the presence of cardiac standstill >10 minutes and extremely low likelihood of ROSC, decision made to stop compressions.    I debriefed with the family medicine resident who was present during part of the code blue and she is going to update the family.     Rakesh (Harpreet) MD Joshua  [facility] ([facility])  Clinic [phone]  Fax [phone]

## 2024-05-21 NOTE — SIGNIFICANT EVENT
Significant Event Note    Time of event: 11:30PM    Description of event:  CODE BLUE was called on patient after noting to be desatting getting bradycardic and then ultimately unable to find pulse. Intensivist present and running code. Patient had just underwent IR procedure for catheter placement this evening and after that was anxious and become hypoxic intermittently. This improved with oxygen and then was able to wean down actually. After he was actually doing well but then suddenly became bradycardic and hypoxic then without pulse. CPR was performed (see intensivist note for further details) for about 15 -20 minutes. Bedside US done by intensivist confirmed PEA arrest -- code stopped. Wife was updated on patient status  via phone and again at bedside when she presented after code. Unclear cause of arrest -- possible PE vs metabolic.       Discussed with: patient's family/emergency contact, bedside nurse, and intesivist, code team    Taylor Marrufo MD

## 2024-05-26 LAB
ATRIAL RATE - MUSE: 84 BPM
DIASTOLIC BLOOD PRESSURE - MUSE: NORMAL MMHG
INTERPRETATION ECG - MUSE: NORMAL
P AXIS - MUSE: 62 DEGREES
PR INTERVAL - MUSE: 214 MS
QRS DURATION - MUSE: 130 MS
QT - MUSE: 420 MS
QTC - MUSE: 496 MS
R AXIS - MUSE: -54 DEGREES
SYSTOLIC BLOOD PRESSURE - MUSE: NORMAL MMHG
T AXIS - MUSE: 130 DEGREES
VENTRICULAR RATE- MUSE: 84 BPM

## 2024-05-29 NOTE — DISCHARGE SUMMARY
[facility]  Hospitalist Discharge Summary      Date of Admission:  2024  Date of Discharge:  2024  Discharging Provider: Torsten King MD  Discharge Service: Hospitalist Service    Discharge Diagnoses   Death diagnose  Cardiopulmonary arrest  Acute on chronic renal failure with metabolic acidosis  Acute on chronic CHF, diastolic dysfunction  Coronary artery disease  Elevated troponin with type II MI  Chronic kidney disease stage V  Acute on chronic anemia  Diabetes mellitus type 2  Hypotension with syncope  Hyperlipidemia    Clinically Significant Risk Factors     # DMII: A1C = 7.0 % (Ref range: <5.7 %) within past 6 months  # Overweight: Estimated body mass index is 29.5 kg/m  as calculated from the following:    Height as of this encounter: 1.829 m (6').    Weight as of this encounter: 98.7 kg (217 lb 8 oz).       Follow-ups Needed After Discharge       Unresulted Labs Ordered in the Past 30 Days of this Admission       No orders found from 2024 to 2024.        These results will be followed up by     Discharge Disposition   Discharged to  home  Condition at discharge:     Hospital Course   73 year old male with past medical history of diabetes, CAD, hypertension, NSTEMI, CKD, CHF, CVA, who presents to the ER with complaints of fatigue, chest pain, shortness of breath, and lightheadedness. Admitted with acute on chronic CHF and ASHLEY on chronic kidney disease.  Nephrology and cardiology has been following.  Also GI was consulted as patient developed acute on chronic anemia.  Endoscopy workup was postponed till patient medical condition is stable for procedure.  Unfortunately patient creatinine continues to get worse and started to experience uremic syndrome.  Patient required dialysis, which was initiated on 2024, on the evening of 2024 patient developed significant acute bradycardia and hypoxia.  CODE BLUE was called and CPR was started, please  refer to intensivist and house officer note for CODE BLUE details.  Patient did not respond to CPR resuscitation for about 15 to 20 minutes and was pronounced dead by house officer.    Consultations This Hospital Stay   NEPHROLOGY IP CONSULT  CORE CLINIC EVALUATION IP CONSULT  OCCUPATIONAL THERAPY ADULT IP CONSULT  NUTRITION SERVICES ADULT IP CONSULT  CARE MANAGEMENT / SOCIAL WORK IP CONSULT  CARDIOLOGY IP CONSULT  NEPHROLOGY IP CONSULT  PHYSICAL THERAPY ADULT IP CONSULT  CARE MANAGEMENT / SOCIAL WORK IP CONSULT  GASTROENTEROLOGY IP CONSULT  INTERVENTIONAL RADIOLOGY ADULT/PEDS IP CONSULT  NEPHROLOGY IP CONSULT  SPIRITUAL HEALTH SERVICES IP CONSULT    Code Status   Prior    Time Spent on this Encounter   I, Torsten King MD, personally saw the patient today and spent less than or equal to 30 minutes discharging this patient.       Torsten King MD  [facility]  [address] [place] [id number]  Phone: [phone]  Fax: [phone]  ______________________________________________________________________    Physical Exam   Vital Signs:                    Weight: 217 lbs 8 oz         Primary Care Physician   Jamal Gaffney    Discharge Orders   No discharge procedures on file.    Significant Results and Procedures   Most Recent 3 CBC's:  Recent Labs   Lab Test 05/20/24  1418 05/20/24  0434 05/19/24 2010 05/19/24  1413 05/19/24  0458 05/18/24  0448   WBC  --  11.4*  --   --  7.8 6.0   HGB 7.4* 7.2* 7.3*   < > 7.4* 8.2*   MCV  --  95  --   --  94 94   PLT  --  155  --   --  134* 129*    < > = values in this interval not displayed.     Most Recent 3 BMP's:  Recent Labs   Lab Test 05/20/24 2120 05/20/24  1619 05/20/24  1436 05/20/24  1418 05/20/24  0816 05/20/24  0434 05/19/24  0808 05/19/24  0458   NA  --   --   --  132*  --  134*  --  136   POTASSIUM  --   --   --  4.8  --  4.7  --  4.7   CHLORIDE  --   --   --  96*  --  97*  --  98   CO2  --   --   --  17*  --  17*  --   21*   BUN  --   --   --  120.8*  --  117.2*  --  97.7*   CR  --   --   --  4.79*  --  4.79*  --  4.35*   ANIONGAP  --   --   --  19*  --  20*  --  17*   SVITLANA  --   --   --  9.2  --  9.2  --  9.3   * 311* 345* 372*   < > 317*   < > 143*    < > = values in this interval not displayed.     Most Recent 2 LFT's:  Recent Labs   Lab Test 05/19/24  0458 08/01/23  0439   AST 48* 59*   ALT 42 45   ALKPHOS 55 49   BILITOTAL 0.6 0.4   ,   Results for orders placed or performed during the hospital encounter of 05/17/24   Chest XR,  PA & LAT    Narrative    EXAM: XR CHEST 2 VIEWS  LOCATION: [facility]  DATE: 5/17/2024    INDICATION:  Chest pain and shortness of breath.  COMPARISON: Portable AP view the chest 4/11/2024      Impression    IMPRESSION:     Cardiac silhouette is normal in size. Previous median sternotomy and coronary revascularization. The vascular pedicle is normal. Normal aortic arch and descending aortic contours.    Normal lung vascularity. No interstitial or alveolar opacities. Diaphragmatic curvature is preserved. No pleural effusion.    Unchanged small mid/lower thoracic spine degenerative osteophytes.   US Renal Complete Non-Vascular    Narrative    EXAM: US RENAL COMPLETE NON-VASCULAR  LOCATION: [facility]  DATE: 5/18/2024    INDICATION: Acute on chronic kidney disease.  COMPARISON: None.  TECHNIQUE: Routine Bilateral Renal and Bladder Ultrasound.    FINDINGS:    RIGHT KIDNEY: 11.0 x 4.5 x 5.5 cm. Normal cortical thickness with mildly increased cortical echogenicity. No hydronephrosis or sonographically detectable calculi    LEFT KIDNEY: 10.3 x 4.3 x 3.9 cm. Normal cortical thickness and echogenicity. No hydronephrosis or sonographically detectable calculi.     BLADDER: Bladder is markedly distended but otherwise normal.    Partially visualized right pleural effusion.      Impression    IMPRESSION:    1.  No hydronephrosis or sonographically detectable  calculi.    2.  Mildly increased right renal cortical echogenicity, as can be seen with chronic kidney disease and/or acute kidney injury. Normal left renal echogenicity.    3.  Marked bladder distention.   IR CVC Tunnel Placement > 5 Yrs of Age    Narrative    [facility]  LOCATION: [facility]  DATE: 5/20/2024    PROCEDURE:TUNNELED DIALYSIS CATHETER PLACEMENT    INTERVENTIONAL RADIOLOGIST: Latrell Mercado MD.    INDICATION: 73-year-old male with ESRD and an immature left upper extremity fistula requiring dialysis.    CONSENT: The risks, benefits and alternatives of tunneled dialysis catheter placement were discussed with the patient  in detail. All questions were answered. Informed consent was given to proceed with the procedure.    MODERATE SEDATION: None. 50 mcg IV fentanyl was administered for patient comfort.    CONTRAST: None.  ANTIBIOTICS: Ancef 2 grams IV.  ADDITIONAL MEDICATIONS: None.    FLUOROSCOPIC TIME: 2.0 minutes.  RADIATION DOSE: Air Kerma: 34 mGy.    COMPLICATIONS: No immediate complications.    STERILE BARRIER TECHNIQUE: Maximum sterile barrier technique was used. Cutaneous antisepsis was performed at the operative site with application of 2% chlorhexidine and large sterile drape. Prior to the procedure, the  and assistant performed   hand hygiene and wore hat, mask, sterile gown, and sterile gloves during the entire procedure.    PROCEDURE:     Using real-time ultrasound guidance, the right internal jugular vein was punctured. A subcutaneous tunnel was created requiring a second incision. Using this access, a 15.5 [place], 23 cm tip to cuff Duraflow dialysis catheter was advanced under   fluoroscopic guidance until the tip was in the proximal right atrium.  The catheter was tested and found to flush and aspirate appropriately.  The catheter was heparinized and secured to the skin.    FINDINGS:  Ultrasound shows an anechoic and compressible jugular vein. A  permanent image was stored.      At the completion of the study, the new catheter tip lies in the proximal right atrium.      Impression    IMPRESSION:    Tunneled dialysis catheter placement, as discussed above.   Echocardiogram Complete     Value    LVEF  50-55%    [facility]    [id number]  [id number]  [id number]^[email or web address] [address]     Name: DENY MONTERO  MRN: [id number]  : 1950  Study Date: 2024 09:02 AM  Age: 73 yrs  Gender: Male  Patient Location: [facility]  Reason For Study: CHF  Ordering Physician: LIZY JALLOH  Performed By: AFSANEH     BSA: 2.2 m2  Height: 72 in  Weight: 222 lb  BP: 104/53 mmHg  ______________________________________________________________________________  Procedure  Complete Portable Echo Adult. Definity (NDC #84212-006) given intravenously.  Adequate quality two-dimensional was performed and interpreted.  ______________________________________________________________________________  Interpretation Summary     The left ventricle is normal in size. There is normal left ventricular wall  thickness.  LV systolic function is low-normal. The visual ejection fraction is 50-55%.  There is hypokinesis of the inferolateral wall.  Grade II or moderate diastolic dysfunction.     The right ventricle is normal size. Mildly decreased right ventricular  systolic function  The left atrium is mildly dilated. The right atrium is moderately dilated.  There is mild (1+) tricuspid regurgitation.  There is moderate to severe mitral regurgitaiton which is likely functional  related to posterior leaflet tethering and elevated volume.  Severe (>55mmHg) pulmonary hypertension is present.  IVC diameter >2.1 cm collapsing <50% with sniff suggests a high RA pressure  estimated at 15 mmHg or greater.     When compared to previous study from 2024 the mitral regurgitaiton is  worse, filling pressures are  worse.  ______________________________________________________________________________  Left Ventricle  The left ventricle is normal in size. There is normal left ventricular wall  thickness. LV systolic function is low-normal. The visual ejection fraction is  50-55%. Grade II or moderate diastolic dysfunction. There is hypokinesis of  the inferolateral wall.     Right Ventricle  The right ventricle is normal size. Mildly decreased right ventricular  systolic function.     Atria  The left atrium is mildly dilated. The right atrium is moderately dilated.     Mitral Valve  Mitral valve leaflets appear normal. There is moderate to severe mitral  regurgitaiton which is likely functional related to posterior leaflet  tethering and elevated volume. There is no mitral valve stenosis.     Tricuspid Valve  Tricuspid valve leaflets appear normal. There is mild (1+) tricuspid  regurgitation. Severe (>55mmHg) pulmonary hypertension is present.     Aortic Valve  The aortic valve is trileaflet. Aortic valve leaflets appear normal. No aortic  regurgitation is present. No aortic stenosis is present.     Pulmonic Valve  The pulmonic valve is not well visualized. There is trace pulmonic valvular  regurgitation.     Vessels  The aorta root is normal. IVC diameter >2.1 cm collapsing <50% with sniff  suggests a high RA pressure estimated at 15 mmHg or greater.     Pericardium  There is no pericardial effusion.     Rhythm  Sinus rhythm was noted.  ______________________________________________________________________________  MMode/2D Measurements & Calculations  IVSd: 0.79 cm  LVIDd: 5.4 cm  LVIDs: 3.9 cm  LVPWd: 1.0 cm  FS: 28.4 %  LV mass(C)d: 183.3 grams  LV mass(C)dI: 82.3 grams/m2  Ao root diam: 2.9 cm  LA dimension: 5.2 cm  asc Aorta Diam: 2.9 cm  LA/Ao: 1.8  LVOT diam: 1.9 cm  LVOT area: 2.8 cm2  Ao root diam index Ht(cm/m): 1.6  Ao root diam index BSA (cm/m2): 1.3  Asc Ao diam index BSA (cm/m2): 1.3  Asc Ao diam index Ht(cm/m):  1.6  EF Biplane: 55.8 %  LA Volume (BP): 82.3 ml     LA Volume Index (BP): 36.9 ml/m2  LA Volume Indexed (AL/bp): 41.1 ml/m2  RWT: 0.38  TAPSE: 1.9 cm     Time Measurements  MM HR: 83.0 BPM     Doppler Measurements & Calculations  MV E max sagar: 121.0 cm/sec  MV A max sagar: 81.0 cm/sec  MV E/A: 1.5  MV dec time: 0.14 sec  Ao V2 max: 180.0 cm/sec  Ao max P.0 mmHg  Ao V2 mean: 118.0 cm/sec  Ao mean P.0 mmHg  Ao V2 VTI: 36.9 cm  ELIZABETH(I,D): 1.3 cm2  ELIZABETH(V,D): 1.5 cm2  LV V1 max PG: 3.8 mmHg  LV V1 max: 97.5 cm/sec  LV V1 VTI: 16.5 cm  MR PISA: 4.0 cm2  MR ERO: 0.26 cm2  MR volume: 33.3 ml  SV(LVOT): 46.8 ml  SI(LVOT): 21.0 ml/m2  PA acc time: 0.08 sec  TR max sagar: 334.0 cm/sec  TR max P.6 mmHg  AV Sagar Ratio (DI): 0.54  ELIZABETH Index (cm2/m2): 0.57  E/E' avg: 15.9  Lateral E/e': 15.3     Medial E/e': 16.5  RV S Sagar: 8.8 cm/sec     ______________________________________________________________________________  Report approved by: Davon King 2024 12:06 PM             Discharge Medications   Discharge Medication List as of 2024  6:32 AM        CONTINUE these medications which have NOT CHANGED    Details   aspirin 81 MG EC tablet Take 1 tablet (81 mg) by mouth daily Start tomorrow., Disp-30 tablet, R-3, E-Prescribe      atorvastatin (LIPITOR) 40 MG tablet Take 40 mg by mouth every evening, Historical      clopidogrel (PLAVIX) 75 MG tablet Take 1 tablet (75 mg) by mouth daily Dose to start tomorrow., Disp-90 tablet, R-3, E-Prescribe      Continuous Blood Gluc Sensor (FREESTYLE DAVID 14 DAY SENSOR) MISC Historical      ezetimibe (ZETIA) 10 MG tablet Take 1 tablet (10 mg) by mouth daily, Disp-90 tablet, R-4, E-Prescribe      fish oil-omega-3 fatty acids 1000 MG capsule Take 1 g by mouth daily, Historical      fluticasone (FLONASE) 50 MCG/ACT nasal spray Spray 2 sprays into both nostrils 2 times daily, Historical      gabapentin (NEURONTIN) 300 MG capsule [GABAPENTIN (NEURONTIN) 300 MG CAPSULE] Take 300  mg by mouth 3 (three) times a day.       , Historical      insulin glargine (LANTUS PEN) 100 UNIT/ML pen Inject 30 Units Subcutaneous every morning, Historical      insulin lispro (HUMALOG KWIKPEN) 100 UNIT/ML (1 unit dial) KWIKPEN Inject Subcutaneous 3 times daily (before meals) Carb count: 3 g carb : 1 unit insulin  Maximum 25 units per meal; 75 units per day, Historical      levothyroxine (SYNTHROID/LEVOTHROID) 125 MCG tablet Take 125 mcg by mouth daily, Historical      losartan (COZAAR) 25 MG tablet Take 25 mg by mouth every evening, Historical      metoprolol succinate ER (TOPROL XL) 25 MG 24 hr tablet Take 1 tablet (25 mg) by mouth daily, Disp-90 tablet, R-3, E-PrescribeDose decrease.      multivitamin with iron (ONE DAILY WITH IRON) Tab tablet [MULTIVITAMIN WITH IRON (ONE DAILY WITH IRON) TAB TABLET] Take 1 tablet by mouth daily., Historical      nitroGLYcerin (NITROSTAT) 0.4 MG sublingual tablet Place 1 tablet (0.4 mg) under the tongue every 5 minutes as needed, Disp-25 tablet, R-3, E-Prescribe      TECHLITE PEN NEEDLES 31G X 5 MM miscellaneous JEANNETTE, Historical      torsemide (DEMADEX) 20 MG tablet Take 4 tablets (80 mg) by mouth daily, Disp-360 tablet, R-1, E-PrescribeDose change. Please only fill when patient requests.      VITAMIN D3 2,000 unit capsule [VITAMIN D3 2,000 UNIT CAPSULE] Take 2,000 Units by mouth daily., R-3, JEANNETTE, Historical           Allergies   Allergies   Allergen Reactions    Hydrochlorothiazide Other (See Comments)     Hyponatremia     Levofloxacin Other (See Comments)     Kidney pain

## 2025-01-13 NOTE — PLAN OF CARE
"(Refer to 11/5/24 encounter).  I spoke with Christina. [facility] so he went to have his MRI completed but became extremely claustrophobic so the scan was aborted. The plan is to re-attempt the MRI in an \"open system\" scanner which needs to be through a different facility so another MRI compatibility form is needed. Christina was told this facility faxed a form earlier this morning. I looked and we have no received anything yet.  She will call that facility to confirm that they faxed the form to the right number. She will also obtain their number for our records in case we need to contact them to get them the forms they need.  TAYE MONTANEZ  " Goal Outcome Evaluation:    Pt denies chest pain or shortness of breath.  BC positive, started on Vanyco, pt tolerated well.     Pt having fevers overnight temp 101.0, gave tylenol and ice pack applied. Recheck 1 hour later 100.6.  Pt reports feeling a little better after tylenol given.     Right groin site C/D/I, CMS intact.     Pt on 2 lnc sating 93%.     NSR w/ BBB on tele monitor

## (undated) DEVICE — SU PROLENE 6-0 24" C-1 DA TAPER POINT BLUE 8726H

## (undated) DEVICE — TOWEL SURG 16INW X 26INL WHITE STRL XRAY DETECTABLE X8314W

## (undated) DEVICE — BASIN EMESIS STERILE  SSK9005A

## (undated) DEVICE — DRSG GAUZE 4X4" 3033

## (undated) DEVICE — CUSTOM PACK CORONARY SAN5BCRHEA

## (undated) DEVICE — SOL NACL 0.9% INJ 250ML BAG 2B1322Q

## (undated) DEVICE — DRAPE EXTREMITY UPPER 120X76" 29414

## (undated) DEVICE — PREP CHLORAPREP 26ML TINTED HI-LITE ORANGE 930815

## (undated) DEVICE — CATH ANGIO INFINITI IM 4FRX100CM 538460

## (undated) DEVICE — DEVICE INFLATION SYR W/ HEMOSTASIS VALVE 12IN EXT IN4904

## (undated) DEVICE — SOL WATER IRRIG 1000ML BOTTLE 2F7114

## (undated) DEVICE — SYR 20ML LL W/O NDL 302830

## (undated) DEVICE — INTRO SHEATH 4FRX10CM PINNACLE RSS402

## (undated) DEVICE — ELECTRODE DEFIB CADENCE 22550R

## (undated) DEVICE — Device

## (undated) DEVICE — SUTURE SILK 0 TIES 30IN SA86G

## (undated) DEVICE — LOOP RETRACT-O-TAPE 16GA X 18 QUEST 1012

## (undated) DEVICE — MANIFOLD KIT ANGIO AUTOMATED 014613

## (undated) DEVICE — SU PROLENE 6-0 BV-1 DA 24" 8805H

## (undated) DEVICE — CATH SPRINTER 2.0 X 12MM RX SPL20012X

## (undated) DEVICE — DRSG KERLIX 4 1/2"X4YDS ROLL 6715

## (undated) DEVICE — CATH LAUNCHER 6FR LCB LA6LCB

## (undated) DEVICE — WIRE GUIDE HI-TRQ  WHISPER MS JTIP 0.014"X190CM 1005357HJ

## (undated) DEVICE — KIT HAND CONTROL ACIST 014644 AR-P54

## (undated) DEVICE — SYR ANGIOGRAPHY MULTIUSE KIT ACIST 014612

## (undated) DEVICE — ESU GROUND PAD ADULT REM W/15' CORD E7507DB

## (undated) DEVICE — CLIP SPRING FOGARTY SOFTJAW CSOFT6

## (undated) DEVICE — DRAPE SHEET REV FOLD 3/4 9349

## (undated) DEVICE — CATH ANGIO INFINITI JL5 4FRX100CM 538422

## (undated) DEVICE — SUTURE SILK 3-0 TIES 18IN A184H

## (undated) DEVICE — LIGACLIP SMALL AESCULAP J1180-1

## (undated) DEVICE — VESSEL LOOP BLUE MINI 30-713

## (undated) DEVICE — GOWN SURGICAL SMARTGOWN 2XL 89075

## (undated) DEVICE — DECANTER VIAL 2006S

## (undated) DEVICE — GEL ULTRASOUND AQUASONIC 20GM 01-01

## (undated) DEVICE — CUSTOM PACK PERIPH VASCULAR SCV5BPVHEA

## (undated) DEVICE — DECANTER BAG 2002S

## (undated) DEVICE — GUIDEWIRE FORTE FLOPPY J TOP 34949-05J

## (undated) DEVICE — CATH BALLOON EMERGE 2.0X20MM H7493918920200

## (undated) DEVICE — CATH ANGIO INFINITI 3DRC 4FRX100CM 538476

## (undated) DEVICE — SU SILK 2-0 TIE 18' A185H

## (undated) DEVICE — SOL NACL 0.9% IRRIG 1000ML BOTTLE 2F7124

## (undated) DEVICE — INTRO SHEATH 6FRX10CM PINNACLE RSS602

## (undated) DEVICE — LIGACLIP MEDIUM AESCULAP B2180-1

## (undated) DEVICE — NEEDLE HYPO MAGELLAN SAFETY 22GA 1 1/2IN 8881850215

## (undated) DEVICE — PACK MAJOR BASIN 673

## (undated) DEVICE — SU VICRYL+ 3-0 27IN SH UND VCP416H

## (undated) DEVICE — BNDG KLING 4" 2236

## (undated) DEVICE — CATH BALLOON EMERGE 2.25X20MMH7493918920220

## (undated) DEVICE — GLOVE SURG GAMMEX PI POLYISOPRENE WHITE SZ 8 LF 20685780

## (undated) DEVICE — ESU PENCIL SMOKE EVAC W/ROCKER SWITCH 0703-047-000

## (undated) RX ORDER — HEPARIN SODIUM 1000 [USP'U]/ML
INJECTION, SOLUTION INTRAVENOUS; SUBCUTANEOUS
Status: DISPENSED
Start: 2023-04-07

## (undated) RX ORDER — LIDOCAINE HYDROCHLORIDE 10 MG/ML
INJECTION, SOLUTION INFILTRATION; PERINEURAL
Status: DISPENSED
Start: 2024-01-01

## (undated) RX ORDER — HYDRALAZINE HYDROCHLORIDE 20 MG/ML
INJECTION INTRAMUSCULAR; INTRAVENOUS
Status: DISPENSED
Start: 2023-04-07

## (undated) RX ORDER — PROTAMINE SULFATE 10 MG/ML
INJECTION, SOLUTION INTRAVENOUS
Status: DISPENSED
Start: 2023-01-01

## (undated) RX ORDER — ASPIRIN 325 MG
TABLET ORAL
Status: DISPENSED
Start: 2023-01-01

## (undated) RX ORDER — FENTANYL CITRATE 50 UG/ML
INJECTION, SOLUTION INTRAMUSCULAR; INTRAVENOUS
Status: DISPENSED
Start: 2023-01-01

## (undated) RX ORDER — NITROGLYCERIN 5 MG/ML
VIAL (ML) INTRAVENOUS
Status: DISPENSED
Start: 2023-01-01

## (undated) RX ORDER — LIDOCAINE HYDROCHLORIDE 10 MG/ML
INJECTION, SOLUTION INFILTRATION; PERINEURAL
Status: DISPENSED
Start: 2023-04-07

## (undated) RX ORDER — CEFAZOLIN SODIUM/WATER 2 G/20 ML
SYRINGE (ML) INTRAVENOUS
Status: DISPENSED
Start: 2024-01-01

## (undated) RX ORDER — HEPARIN SODIUM 1000 [USP'U]/ML
INJECTION, SOLUTION INTRAVENOUS; SUBCUTANEOUS
Status: DISPENSED
Start: 2024-01-01

## (undated) RX ORDER — BUPIVACAINE HYDROCHLORIDE 2.5 MG/ML
INJECTION, SOLUTION EPIDURAL; INFILTRATION; INTRACAUDAL
Status: DISPENSED
Start: 2024-01-01

## (undated) RX ORDER — CLOPIDOGREL BISULFATE 75 MG/1
TABLET ORAL
Status: DISPENSED
Start: 2023-01-01

## (undated) RX ORDER — HEPARIN SODIUM 1000 [USP'U]/ML
INJECTION, SOLUTION INTRAVENOUS; SUBCUTANEOUS
Status: DISPENSED
Start: 2023-01-01

## (undated) RX ORDER — VERAPAMIL HYDROCHLORIDE 2.5 MG/ML
INJECTION, SOLUTION INTRAVENOUS
Status: DISPENSED
Start: 2023-01-01

## (undated) RX ORDER — DIAZEPAM 5 MG
TABLET ORAL
Status: DISPENSED
Start: 2023-01-01

## (undated) RX ORDER — LIDOCAINE HYDROCHLORIDE 10 MG/ML
INJECTION, SOLUTION EPIDURAL; INFILTRATION; INTRACAUDAL; PERINEURAL
Status: DISPENSED
Start: 2024-01-01

## (undated) RX ORDER — PROPOFOL 10 MG/ML
INJECTION, EMULSION INTRAVENOUS
Status: DISPENSED
Start: 2024-01-01

## (undated) RX ORDER — FENTANYL CITRATE 50 UG/ML
INJECTION, SOLUTION INTRAMUSCULAR; INTRAVENOUS
Status: DISPENSED
Start: 2024-01-01

## (undated) RX ORDER — ACETAMINOPHEN 325 MG/1
TABLET ORAL
Status: DISPENSED
Start: 2023-04-07

## (undated) RX ORDER — LIDOCAINE HYDROCHLORIDE 10 MG/ML
INJECTION, SOLUTION INFILTRATION; PERINEURAL
Status: DISPENSED
Start: 2023-01-01

## (undated) RX ORDER — CLOPIDOGREL 300 MG/1
TABLET, FILM COATED ORAL
Status: DISPENSED
Start: 2023-01-01

## (undated) RX ORDER — FENTANYL CITRATE 50 UG/ML
INJECTION, SOLUTION INTRAMUSCULAR; INTRAVENOUS
Status: DISPENSED
Start: 2023-04-07

## (undated) RX ORDER — PROTAMINE SULFATE 10 MG/ML
INJECTION, SOLUTION INTRAVENOUS
Status: DISPENSED
Start: 2024-01-01